# Patient Record
Sex: FEMALE | Race: WHITE | NOT HISPANIC OR LATINO | Employment: OTHER | URBAN - METROPOLITAN AREA
[De-identification: names, ages, dates, MRNs, and addresses within clinical notes are randomized per-mention and may not be internally consistent; named-entity substitution may affect disease eponyms.]

---

## 2017-04-28 ENCOUNTER — TELEPHONE (OUTPATIENT)
Dept: CARDIOLOGY | Facility: CLINIC | Age: 82
End: 2017-04-28

## 2017-04-28 NOTE — TELEPHONE ENCOUNTER
----- Message from Kirsten Celestin sent at 4/28/2017  9:17 AM CDT -----  Contact: self, [phone]  Patient called in requesting to speak with you regarding her medications information requested.  Please advise.

## 2017-05-11 ENCOUNTER — OFFICE VISIT (OUTPATIENT)
Dept: CARDIOLOGY | Facility: CLINIC | Age: 82
End: 2017-05-11
Payer: MEDICARE

## 2017-05-11 VITALS
HEIGHT: 65 IN | HEART RATE: 63 BPM | BODY MASS INDEX: 25.83 KG/M2 | WEIGHT: 155 LBS | SYSTOLIC BLOOD PRESSURE: 178 MMHG | DIASTOLIC BLOOD PRESSURE: 70 MMHG

## 2017-05-11 DIAGNOSIS — I51.89 DIASTOLIC DYSFUNCTION: ICD-10-CM

## 2017-05-11 DIAGNOSIS — E03.9 HYPOTHYROIDISM, UNSPECIFIED TYPE: ICD-10-CM

## 2017-05-11 DIAGNOSIS — I10 ESSENTIAL HYPERTENSION: ICD-10-CM

## 2017-05-11 DIAGNOSIS — I51.81 TAKOTSUBO CARDIOMYOPATHY: ICD-10-CM

## 2017-05-11 DIAGNOSIS — G45.9 TRANSIENT CEREBRAL ISCHEMIA, UNSPECIFIED TYPE: ICD-10-CM

## 2017-05-11 DIAGNOSIS — I77.9 RIGHT-SIDED CAROTID ARTERY DISEASE: ICD-10-CM

## 2017-05-11 DIAGNOSIS — I65.8: ICD-10-CM

## 2017-05-11 DIAGNOSIS — R06.02 SOB (SHORTNESS OF BREATH): Primary | ICD-10-CM

## 2017-05-11 PROCEDURE — 3078F DIAST BP <80 MM HG: CPT | Mod: S$GLB,,, | Performed by: INTERNAL MEDICINE

## 2017-05-11 PROCEDURE — 1126F AMNT PAIN NOTED NONE PRSNT: CPT | Mod: S$GLB,,, | Performed by: INTERNAL MEDICINE

## 2017-05-11 PROCEDURE — 3077F SYST BP >= 140 MM HG: CPT | Mod: S$GLB,,, | Performed by: INTERNAL MEDICINE

## 2017-05-11 PROCEDURE — 99999 PR PBB SHADOW E&M-EST. PATIENT-LVL III: CPT | Mod: PBBFAC,,, | Performed by: INTERNAL MEDICINE

## 2017-05-11 PROCEDURE — 99203 OFFICE O/P NEW LOW 30 MIN: CPT | Mod: S$GLB,,, | Performed by: INTERNAL MEDICINE

## 2017-05-11 PROCEDURE — 1159F MED LIST DOCD IN RCRD: CPT | Mod: S$GLB,,, | Performed by: INTERNAL MEDICINE

## 2017-05-11 PROCEDURE — 1160F RVW MEDS BY RX/DR IN RCRD: CPT | Mod: S$GLB,,, | Performed by: INTERNAL MEDICINE

## 2017-05-11 RX ORDER — MONTELUKAST SODIUM 10 MG/1
TABLET ORAL
COMMUNITY
Start: 2017-05-10 | End: 2018-05-10

## 2017-05-11 RX ORDER — FLUTICASONE PROPIONATE AND SALMETEROL 50; 250 UG/1; UG/1
POWDER RESPIRATORY (INHALATION)
COMMUNITY
Start: 2017-05-10 | End: 2018-09-06

## 2017-05-11 RX ORDER — DESLORATADINE 5 MG/1
TABLET ORAL
COMMUNITY
Start: 2017-05-10 | End: 2018-05-10

## 2017-05-11 RX ORDER — ALBUTEROL SULFATE 90 UG/1
AEROSOL, METERED RESPIRATORY (INHALATION)
COMMUNITY
Start: 2017-05-10 | End: 2018-05-10

## 2017-05-12 NOTE — PROGRESS NOTES
Subjective:    Patient ID:  Dorothy M Knobloch is a 84 y.o. female who presents for evaluation of Shortness of Breath      HPI  85 y/o female with a hx of Takotsubo Cardiomyopathy 3/2013, HTN, moderate right carotid disease, TIA 12/2013 who presents for evaluation.  She had a dental procedure last summer after which she had fever and SOB and presented to ED. Had 2DE without evidence of vegetation, did not receive Abx and symptoms resolved. For the past several months she had progressive and worsening SOB. Had 2DE with vegetation on MV. No evidence of active infection. Her SOB is with activity or with stress. Currently being evaluated by Pulmonary and Cardiology at . Denies CP, palpitations, orthopnea, PND, or orthopnea. Takes care of her  who is going blind and under a lot of stress.     Review of Systems   Constitution: Negative for weakness and malaise/fatigue.   HENT: Negative for congestion and headaches.    Eyes: Negative for blurred vision.   Cardiovascular: Positive for dyspnea on exertion. Negative for chest pain, claudication, cyanosis, irregular heartbeat, leg swelling, near-syncope, orthopnea, palpitations, paroxysmal nocturnal dyspnea and syncope.   Respiratory: Positive for shortness of breath.    Endocrine: Negative for polyuria.   Hematologic/Lymphatic: Negative for bleeding problem.   Skin: Negative for itching and rash.   Musculoskeletal: Negative for joint swelling, muscle cramps and muscle weakness.   Gastrointestinal: Negative for abdominal pain, hematemesis, hematochezia, melena, nausea and vomiting.   Genitourinary: Negative for dysuria and hematuria.   Neurological: Negative for dizziness, focal weakness, light-headedness and loss of balance.   Psychiatric/Behavioral: Negative for depression. The patient is nervous/anxious.         Objective:    Physical Exam   Constitutional: She is oriented to person, place, and time. She appears well-developed and well-nourished.   HENT:   Head:  "Normocephalic and atraumatic.   Neck: Neck supple. No JVD present.   Cardiovascular: Normal rate, regular rhythm and normal heart sounds.    Pulses:       Carotid pulses are 2+ on the right side, and 2+ on the left side.       Radial pulses are 2+ on the right side, and 2+ on the left side.        Femoral pulses are 2+ on the right side, and 2+ on the left side.       Dorsalis pedis pulses are 2+ on the right side, and 2+ on the left side.        Posterior tibial pulses are 2+ on the right side, and 2+ on the left side.   Pulmonary/Chest: Effort normal and breath sounds normal.   Abdominal: Soft. Bowel sounds are normal.   Musculoskeletal: She exhibits no edema.   Neurological: She is alert and oriented to person, place, and time.   Skin: Skin is warm and dry.   Psychiatric: She has a normal mood and affect. Her behavior is normal. Thought content normal.         Assessment:       1. SOB (shortness of breath)    2. 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 Transient cerebral ischemia, unspecified type    4. Right-sided carotid artery disease    5. Essential hypertension    6. Takotsubo cardiomyopathy    7. Hypothyroidism, unspecified type    8. Diastolic dysfunction      83 y/o female with hx and presentation as above. SOB is with exertion and difficult to determine etiology. Has CT in our system which shows multiple nodules, but pt states repeat CT at another facility did not show modules and had some "lung tests" done. Need records. KELSEY this week to evaluate vegetation and will also need records.        Plan:       -Continue current medical management for now  -Obtain records from Pulmonologist and Cardiologist at             "

## 2017-12-01 ENCOUNTER — OFFICE VISIT (OUTPATIENT)
Dept: CARDIOLOGY | Facility: CLINIC | Age: 82
End: 2017-12-01
Payer: MEDICARE

## 2017-12-01 VITALS
DIASTOLIC BLOOD PRESSURE: 70 MMHG | HEART RATE: 70 BPM | BODY MASS INDEX: 26.33 KG/M2 | HEIGHT: 65 IN | SYSTOLIC BLOOD PRESSURE: 148 MMHG | WEIGHT: 158 LBS | OXYGEN SATURATION: 97 %

## 2017-12-01 DIAGNOSIS — I65.8 OCCLUSION AND STENOSIS OF MULTIPLE AND BILATERAL PRECEREBRAL ARTERIES: ICD-10-CM

## 2017-12-01 DIAGNOSIS — I51.81 TAKOTSUBO CARDIOMYOPATHY: ICD-10-CM

## 2017-12-01 DIAGNOSIS — I77.9 RIGHT-SIDED CAROTID ARTERY DISEASE: ICD-10-CM

## 2017-12-01 DIAGNOSIS — E03.9 HYPOTHYROIDISM, UNSPECIFIED TYPE: ICD-10-CM

## 2017-12-01 DIAGNOSIS — I10 ESSENTIAL HYPERTENSION: Primary | ICD-10-CM

## 2017-12-01 DIAGNOSIS — I51.89 DIASTOLIC DYSFUNCTION: ICD-10-CM

## 2017-12-01 DIAGNOSIS — I24.9 ACUTE CORONARY SYNDROME: ICD-10-CM

## 2017-12-01 DIAGNOSIS — G45.9 TRANSIENT CEREBRAL ISCHEMIA, UNSPECIFIED TYPE: ICD-10-CM

## 2017-12-01 PROCEDURE — 99999 PR PBB SHADOW E&M-EST. PATIENT-LVL III: CPT | Mod: PBBFAC,,, | Performed by: INTERNAL MEDICINE

## 2017-12-01 PROCEDURE — 99214 OFFICE O/P EST MOD 30 MIN: CPT | Mod: S$GLB,,, | Performed by: INTERNAL MEDICINE

## 2017-12-01 RX ORDER — AMLODIPINE BESYLATE 5 MG/1
TABLET ORAL
COMMUNITY
Start: 2017-11-28 | End: 2017-12-01 | Stop reason: SDUPTHER

## 2017-12-01 RX ORDER — METOPROLOL SUCCINATE 25 MG/1
25 TABLET, EXTENDED RELEASE ORAL DAILY
COMMUNITY
Start: 2017-11-28 | End: 2018-06-26 | Stop reason: SDUPTHER

## 2017-12-01 RX ORDER — ASPIRIN 325 MG
50000 TABLET, DELAYED RELEASE (ENTERIC COATED) ORAL
Refills: 5 | COMMUNITY
Start: 2017-09-14

## 2017-12-01 RX ORDER — VALSARTAN AND HYDROCHLOROTHIAZIDE 320; 12.5 MG/1; MG/1
1 TABLET, FILM COATED ORAL DAILY
Refills: 6 | COMMUNITY
Start: 2017-11-20 | End: 2018-04-12

## 2017-12-01 RX ORDER — AMLODIPINE BESYLATE 10 MG/1
10 TABLET ORAL DAILY
Qty: 30 TABLET | Refills: 11 | Status: SHIPPED | OUTPATIENT
Start: 2017-12-01 | End: 2018-12-04

## 2017-12-01 RX ORDER — DOXAZOSIN 1 MG/1
1 TABLET ORAL NIGHTLY
Qty: 30 TABLET | Refills: 11 | Status: SHIPPED | OUTPATIENT
Start: 2017-12-01 | End: 2018-03-13

## 2017-12-01 NOTE — PATIENT INSTRUCTIONS
Increase amlodipine to 2 tablets (10 mg) once daily and when the prescription is finished,  new 10 mg tablet prescription    Start Doxazosin 1 mg every night

## 2017-12-11 ENCOUNTER — OFFICE VISIT (OUTPATIENT)
Dept: CARDIOLOGY | Facility: CLINIC | Age: 82
End: 2017-12-11
Payer: MEDICARE

## 2017-12-11 VITALS
DIASTOLIC BLOOD PRESSURE: 66 MMHG | HEART RATE: 73 BPM | SYSTOLIC BLOOD PRESSURE: 132 MMHG | BODY MASS INDEX: 25.66 KG/M2 | HEIGHT: 65 IN | OXYGEN SATURATION: 97 % | WEIGHT: 154 LBS

## 2017-12-11 DIAGNOSIS — E03.9 HYPOTHYROIDISM, UNSPECIFIED TYPE: ICD-10-CM

## 2017-12-11 DIAGNOSIS — I65.8 OCCLUSION AND STENOSIS OF MULTIPLE AND BILATERAL PRECEREBRAL ARTERIES: ICD-10-CM

## 2017-12-11 DIAGNOSIS — I24.9 ACUTE CORONARY SYNDROME: ICD-10-CM

## 2017-12-11 DIAGNOSIS — G45.9 TRANSIENT CEREBRAL ISCHEMIA, UNSPECIFIED TYPE: ICD-10-CM

## 2017-12-11 DIAGNOSIS — E78.5 HYPERLIPIDEMIA, UNSPECIFIED HYPERLIPIDEMIA TYPE: ICD-10-CM

## 2017-12-11 DIAGNOSIS — I10 ESSENTIAL HYPERTENSION: Primary | ICD-10-CM

## 2017-12-11 DIAGNOSIS — I51.81 TAKOTSUBO CARDIOMYOPATHY: ICD-10-CM

## 2017-12-11 DIAGNOSIS — I77.9 RIGHT-SIDED CAROTID ARTERY DISEASE: ICD-10-CM

## 2017-12-11 DIAGNOSIS — I51.89 DIASTOLIC DYSFUNCTION: ICD-10-CM

## 2017-12-11 PROCEDURE — 99214 OFFICE O/P EST MOD 30 MIN: CPT | Mod: S$GLB,,, | Performed by: INTERNAL MEDICINE

## 2017-12-11 PROCEDURE — 99999 PR PBB SHADOW E&M-EST. PATIENT-LVL III: CPT | Mod: PBBFAC,,, | Performed by: INTERNAL MEDICINE

## 2017-12-27 ENCOUNTER — TELEPHONE (OUTPATIENT)
Dept: CARDIOLOGY | Facility: CLINIC | Age: 82
End: 2017-12-27

## 2017-12-27 NOTE — TELEPHONE ENCOUNTER
----- Message from Zina Coles sent at 12/27/2017 10:29 AM CST -----  Contact: [phone]/self  Patient is requesting a call back regarding records being sent from another hospital. Please advise.

## 2018-01-04 ENCOUNTER — TELEPHONE (OUTPATIENT)
Dept: CARDIOLOGY | Facility: CLINIC | Age: 83
End: 2018-01-04

## 2018-01-04 NOTE — TELEPHONE ENCOUNTER
----- Message from Patience Jenkins sent at 1/4/2018 11:35 AM CST -----  Contact: self/[phone]  She is calling for the result of the testing she had done at .

## 2018-01-08 ENCOUNTER — TELEPHONE (OUTPATIENT)
Dept: CARDIOLOGY | Facility: CLINIC | Age: 83
End: 2018-01-08

## 2018-01-08 NOTE — TELEPHONE ENCOUNTER
----- Message from Tatyana Dupree sent at 1/8/2018  4:35 PM CST -----  Contact: Self [phone]  Patient is calling to talk to nurse she would like a call back from the doctor. Please advice

## 2018-01-16 ENCOUNTER — TELEPHONE (OUTPATIENT)
Dept: CARDIOLOGY | Facility: CLINIC | Age: 83
End: 2018-01-16

## 2018-01-16 ENCOUNTER — OFFICE VISIT (OUTPATIENT)
Dept: CARDIOLOGY | Facility: CLINIC | Age: 83
End: 2018-01-16
Payer: MEDICARE

## 2018-01-16 VITALS
OXYGEN SATURATION: 98 % | SYSTOLIC BLOOD PRESSURE: 126 MMHG | DIASTOLIC BLOOD PRESSURE: 65 MMHG | BODY MASS INDEX: 25.49 KG/M2 | HEART RATE: 64 BPM | WEIGHT: 153 LBS | HEIGHT: 65 IN

## 2018-01-16 DIAGNOSIS — I77.9 RIGHT-SIDED CAROTID ARTERY DISEASE: ICD-10-CM

## 2018-01-16 DIAGNOSIS — I51.89 DIASTOLIC DYSFUNCTION: ICD-10-CM

## 2018-01-16 DIAGNOSIS — I10 ESSENTIAL HYPERTENSION: ICD-10-CM

## 2018-01-16 DIAGNOSIS — I51.81 TAKOTSUBO CARDIOMYOPATHY: ICD-10-CM

## 2018-01-16 DIAGNOSIS — G45.9 TRANSIENT CEREBRAL ISCHEMIA, UNSPECIFIED TYPE: ICD-10-CM

## 2018-01-16 DIAGNOSIS — I24.9 ACUTE CORONARY SYNDROME: ICD-10-CM

## 2018-01-16 DIAGNOSIS — R06.09 DOE (DYSPNEA ON EXERTION): Primary | ICD-10-CM

## 2018-01-16 DIAGNOSIS — E03.9 HYPOTHYROIDISM, UNSPECIFIED TYPE: ICD-10-CM

## 2018-01-16 DIAGNOSIS — E78.5 HYPERLIPIDEMIA, UNSPECIFIED HYPERLIPIDEMIA TYPE: ICD-10-CM

## 2018-01-16 PROCEDURE — 99214 OFFICE O/P EST MOD 30 MIN: CPT | Mod: S$GLB,,, | Performed by: INTERNAL MEDICINE

## 2018-01-16 PROCEDURE — 99999 PR PBB SHADOW E&M-EST. PATIENT-LVL III: CPT | Mod: PBBFAC,,, | Performed by: INTERNAL MEDICINE

## 2018-01-16 NOTE — PROGRESS NOTES
Subjective:    Patient ID:  Dorothy M Knobloch is a 85 y.o. female who presents for follow-up of Hypertension and Shortness of Breath      HPI  84 y/o female with a hx of SSS s/p PPM, Takotsubo Cardiomyopathy 3/2013, HTN, moderate right carotid disease, TIA 12/2013, HLD, hypothyroidism, 2DE with vegetation on the MV (dental procedure?) who presents for f/u.  Last clinic visit was seen for symptomatic HTN and I started her on doxyzosin 1 mg nightly and SBP has significantly improved. BP cuff available and readings 120's. Her symptoms have also improved and she is feeling better. Tolerating regimen well. Continues to have SMITH, however, has not worsened and she is able to accomplish her ADL's. Before SMITH was regular and with moderate activity. SMITH is now intermittent and she was able to walk here with cane from parking area without significant SMITH. Denies CP, orthopnea, PND, syncope. Compliant with meds. Has bilateral LE edema which has not worsened, and has been using compression stockings with improvement. She is frustrated with the SMITH, however, it has improved since improvement in BP. Has chest CT soon and appt with Pulmonologist in March. [facility] section record reviewed and has 2DE from 9/2017 with normal EF 65%, grade I diastolic dysfunction and mild MS.    Review of Systems   Constitution: Positive for malaise/fatigue. Negative for weakness.   HENT: Negative for congestion.    Eyes: Negative for blurred vision.   Cardiovascular: Positive for dyspnea on exertion and leg swelling. Negative for chest pain, claudication, cyanosis, irregular heartbeat, near-syncope, orthopnea, palpitations, paroxysmal nocturnal dyspnea and syncope.   Respiratory: Positive for shortness of breath.    Endocrine: Negative for polyuria.   Hematologic/Lymphatic: Negative for bleeding problem.   Skin: Negative for itching and rash.   Musculoskeletal: Negative for joint swelling, muscle cramps and muscle weakness.   Gastrointestinal:  Negative for abdominal pain, hematemesis, hematochezia, melena, nausea and vomiting.   Genitourinary: Negative for dysuria and hematuria.   Neurological: Negative for dizziness, focal weakness, headaches, light-headedness and loss of balance.   Psychiatric/Behavioral: Negative for depression. The patient is not nervous/anxious.         Objective:    Physical Exam   Constitutional: She is oriented to person, place, and time. She appears well-developed and well-nourished.   HENT:   Head: Normocephalic and atraumatic.   Neck: Neck supple. No JVD present.   Cardiovascular: Normal rate and regular rhythm.    Murmur heard.   Systolic murmur is present with a grade of 2/6   Pulses:       Carotid pulses are 2+ on the right side, and 2+ on the left side.       Radial pulses are 2+ on the right side, and 2+ on the left side.        Femoral pulses are 2+ on the right side, and 2+ on the left side.       Dorsalis pedis pulses are 2+ on the right side, and 2+ on the left side.        Posterior tibial pulses are 2+ on the right side, and 2+ on the left side.   Pulmonary/Chest: Effort normal and breath sounds normal.   Abdominal: Soft. Bowel sounds are normal.   Musculoskeletal: She exhibits no edema.   Neurological: She is alert and oriented to person, place, and time.   Skin: Skin is warm and dry.   Psychiatric: She has a normal mood and affect. Her behavior is normal. Thought content normal.         Assessment:       1. SMITH (dyspnea on exertion)    2. Essential hypertension    3. Diastolic dysfunction    4. Acute coronary syndrome    5. Right-sided carotid artery disease    6. Hyperlipidemia, unspecified hyperlipidemia type    7. Takotsubo cardiomyopathy    8. Transient cerebral ischemia, unspecified type    9. Hypothyroidism, unspecified type      84 y/o female with hx and presentation as above. Improved from a SOB perspective, but still pretty significantly SOB. I have optimized her from a cardiac standpoint and will await  pulmonary w/u. Will repeat 2DE in about 1 month and have her f/u with me.        Plan:       -2DE in 1 month  -f/u shortly after

## 2018-01-28 ENCOUNTER — NURSE TRIAGE (OUTPATIENT)
Dept: ADMINISTRATIVE | Facility: CLINIC | Age: 83
End: 2018-01-28

## 2018-01-28 ENCOUNTER — HOSPITAL ENCOUNTER (OUTPATIENT)
Facility: HOSPITAL | Age: 83
Discharge: HOME | End: 2018-01-29
Attending: EMERGENCY MEDICINE | Admitting: PSYCHIATRY & NEUROLOGY
Payer: MEDICARE

## 2018-01-28 DIAGNOSIS — D32.9 MENINGIOMA: ICD-10-CM

## 2018-01-28 DIAGNOSIS — H34.12 CRAO (CENTRAL RETINAL ARTERY OCCLUSION), LEFT: Primary | ICD-10-CM

## 2018-01-28 LAB
ALBUMIN SERPL BCP-MCNC: 3.6 G/DL
ALP SERPL-CCNC: 80 U/L
ALT SERPL W/O P-5'-P-CCNC: 8 U/L
ANION GAP SERPL CALC-SCNC: 10 MMOL/L
APTT BLDCRRT: 25.1 SEC
AST SERPL-CCNC: 15 U/L
BACTERIA #/AREA URNS AUTO: NORMAL /HPF
BASOPHILS # BLD AUTO: 0.05 K/UL
BASOPHILS NFR BLD: 0.7 %
BILIRUB SERPL-MCNC: 0.3 MG/DL
BILIRUB UR QL STRIP: NEGATIVE
BUN SERPL-MCNC: 29 MG/DL
CALCIUM SERPL-MCNC: 9.8 MG/DL
CHLORIDE SERPL-SCNC: 103 MMOL/L
CHOLEST SERPL-MCNC: 277 MG/DL
CHOLEST/HDLC SERPL: 4.1 {RATIO}
CLARITY UR REFRACT.AUTO: CLEAR
CO2 SERPL-SCNC: 24 MMOL/L
COLOR UR AUTO: ABNORMAL
CREAT SERPL-MCNC: 0.8 MG/DL
CRP SERPL-MCNC: 2.8 MG/L
DIFFERENTIAL METHOD: ABNORMAL
EOSINOPHIL # BLD AUTO: 0.1 K/UL
EOSINOPHIL NFR BLD: 1.4 %
ERYTHROCYTE [DISTWIDTH] IN BLOOD BY AUTOMATED COUNT: 12.9 %
ERYTHROCYTE [SEDIMENTATION RATE] IN BLOOD BY WESTERGREN METHOD: 22 MM/HR
EST. GFR  (AFRICAN AMERICAN): >60 ML/MIN/1.73 M^2
EST. GFR  (NON AFRICAN AMERICAN): >60 ML/MIN/1.73 M^2
ESTIMATED AVG GLUCOSE: 103 MG/DL
GLUCOSE SERPL-MCNC: 107 MG/DL
GLUCOSE UR QL STRIP: ABNORMAL
HBA1C MFR BLD HPLC: 5.2 %
HCT VFR BLD AUTO: 36.1 %
HDLC SERPL-MCNC: 68 MG/DL
HDLC SERPL: 24.5 %
HGB BLD-MCNC: 12.3 G/DL
HGB UR QL STRIP: NEGATIVE
IMM GRANULOCYTES # BLD AUTO: 0.04 K/UL
IMM GRANULOCYTES NFR BLD AUTO: 0.6 %
INR PPP: 0.9
KETONES UR QL STRIP: NEGATIVE
LDLC SERPL CALC-MCNC: 180.6 MG/DL
LEUKOCYTE ESTERASE UR QL STRIP: NEGATIVE
LYMPHOCYTES # BLD AUTO: 1.5 K/UL
LYMPHOCYTES NFR BLD: 22.1 %
MCH RBC QN AUTO: 30.4 PG
MCHC RBC AUTO-ENTMCNC: 34.1 G/DL
MCV RBC AUTO: 89 FL
MICROSCOPIC COMMENT: NORMAL
MONOCYTES # BLD AUTO: 0.4 K/UL
MONOCYTES NFR BLD: 6 %
NEUTROPHILS # BLD AUTO: 4.8 K/UL
NEUTROPHILS NFR BLD: 69.2 %
NITRITE UR QL STRIP: NEGATIVE
NONHDLC SERPL-MCNC: 209 MG/DL
NRBC BLD-RTO: 0 /100 WBC
PH UR STRIP: 7 [PH] (ref 5–8)
PLATELET # BLD AUTO: 235 K/UL
PMV BLD AUTO: 9.1 FL
POCT GLUCOSE: 107 MG/DL (ref 70–110)
POTASSIUM SERPL-SCNC: 4.1 MMOL/L
PROT SERPL-MCNC: 7.2 G/DL
PROT UR QL STRIP: NEGATIVE
PROTHROMBIN TIME: 10 SEC
RBC # BLD AUTO: 4.05 M/UL
RBC #/AREA URNS AUTO: 2 /HPF (ref 0–4)
SODIUM SERPL-SCNC: 137 MMOL/L
SP GR UR STRIP: 1.01 (ref 1–1.03)
T4 FREE SERPL-MCNC: 1.4 NG/DL
TRIGL SERPL-MCNC: 142 MG/DL
TSH SERPL DL<=0.005 MIU/L-ACNC: 0.17 UIU/ML
URN SPEC COLLECT METH UR: ABNORMAL
UROBILINOGEN UR STRIP-ACNC: NEGATIVE EU/DL
WBC # BLD AUTO: 6.97 K/UL
WBC #/AREA URNS AUTO: 0 /HPF (ref 0–5)
YEAST UR QL AUTO: NORMAL

## 2018-01-28 PROCEDURE — 99283 EMERGENCY DEPT VISIT LOW MDM: CPT | Mod: GC,,, | Performed by: OPHTHALMOLOGY

## 2018-01-28 PROCEDURE — 85651 RBC SED RATE NONAUTOMATED: CPT

## 2018-01-28 PROCEDURE — 84439 ASSAY OF FREE THYROXINE: CPT

## 2018-01-28 PROCEDURE — 25000003 PHARM REV CODE 250: Performed by: PSYCHIATRY & NEUROLOGY

## 2018-01-28 PROCEDURE — 85610 PROTHROMBIN TIME: CPT

## 2018-01-28 PROCEDURE — 99220 PR INITIAL OBSERVATION CARE,LEVL III: CPT | Mod: ICN,,, | Performed by: PSYCHIATRY & NEUROLOGY

## 2018-01-28 PROCEDURE — G0378 HOSPITAL OBSERVATION PER HR: HCPCS

## 2018-01-28 PROCEDURE — A4216 STERILE WATER/SALINE, 10 ML: HCPCS | Performed by: PSYCHIATRY & NEUROLOGY

## 2018-01-28 PROCEDURE — 81001 URINALYSIS AUTO W/SCOPE: CPT

## 2018-01-28 PROCEDURE — 93010 ELECTROCARDIOGRAM REPORT: CPT | Mod: ,,, | Performed by: INTERNAL MEDICINE

## 2018-01-28 PROCEDURE — 63600175 PHARM REV CODE 636 W HCPCS: Performed by: PSYCHIATRY & NEUROLOGY

## 2018-01-28 PROCEDURE — 25500020 PHARM REV CODE 255: Performed by: EMERGENCY MEDICINE

## 2018-01-28 PROCEDURE — 82962 GLUCOSE BLOOD TEST: CPT

## 2018-01-28 PROCEDURE — 99285 EMERGENCY DEPT VISIT HI MDM: CPT | Mod: ,,, | Performed by: EMERGENCY MEDICINE

## 2018-01-28 PROCEDURE — 80061 LIPID PANEL: CPT

## 2018-01-28 PROCEDURE — 84443 ASSAY THYROID STIM HORMONE: CPT

## 2018-01-28 PROCEDURE — 80053 COMPREHEN METABOLIC PANEL: CPT

## 2018-01-28 PROCEDURE — 99285 EMERGENCY DEPT VISIT HI MDM: CPT | Mod: 25

## 2018-01-28 PROCEDURE — 85730 THROMBOPLASTIN TIME PARTIAL: CPT

## 2018-01-28 PROCEDURE — 93005 ELECTROCARDIOGRAM TRACING: CPT

## 2018-01-28 PROCEDURE — 85025 COMPLETE CBC W/AUTO DIFF WBC: CPT

## 2018-01-28 PROCEDURE — 86140 C-REACTIVE PROTEIN: CPT

## 2018-01-28 PROCEDURE — 83036 HEMOGLOBIN GLYCOSYLATED A1C: CPT

## 2018-01-28 RX ORDER — VALSARTAN 160 MG/1
320 TABLET ORAL DAILY
Status: DISCONTINUED | OUTPATIENT
Start: 2018-01-29 | End: 2018-01-29 | Stop reason: HOSPADM

## 2018-01-28 RX ORDER — VALSARTAN AND HYDROCHLOROTHIAZIDE 320; 12.5 MG/1; MG/1
1 TABLET, FILM COATED ORAL DAILY
Status: DISCONTINUED | OUTPATIENT
Start: 2018-01-29 | End: 2018-01-28 | Stop reason: SDUPTHER

## 2018-01-28 RX ORDER — ATORVASTATIN CALCIUM 20 MG/1
40 TABLET, FILM COATED ORAL DAILY
Status: DISCONTINUED | OUTPATIENT
Start: 2018-01-29 | End: 2018-01-29 | Stop reason: HOSPADM

## 2018-01-28 RX ORDER — HYDROCHLOROTHIAZIDE 12.5 MG/1
12.5 TABLET ORAL DAILY
Status: DISCONTINUED | OUTPATIENT
Start: 2018-01-29 | End: 2018-01-29 | Stop reason: HOSPADM

## 2018-01-28 RX ORDER — METOPROLOL TARTRATE 25 MG/1
25 TABLET, FILM COATED ORAL 2 TIMES DAILY
Status: DISCONTINUED | OUTPATIENT
Start: 2018-01-28 | End: 2018-01-29 | Stop reason: HOSPADM

## 2018-01-28 RX ORDER — DOXAZOSIN 1 MG/1
1 TABLET ORAL NIGHTLY
Status: DISCONTINUED | OUTPATIENT
Start: 2018-01-28 | End: 2018-01-29 | Stop reason: HOSPADM

## 2018-01-28 RX ORDER — AMLODIPINE BESYLATE 10 MG/1
10 TABLET ORAL DAILY
Status: DISCONTINUED | OUTPATIENT
Start: 2018-01-29 | End: 2018-01-29 | Stop reason: HOSPADM

## 2018-01-28 RX ORDER — ASPIRIN 325 MG
325 TABLET, DELAYED RELEASE (ENTERIC COATED) ORAL DAILY
Status: DISCONTINUED | OUTPATIENT
Start: 2018-01-29 | End: 2018-01-29 | Stop reason: HOSPADM

## 2018-01-28 RX ORDER — SODIUM CHLORIDE 0.9 % (FLUSH) 0.9 %
3 SYRINGE (ML) INJECTION EVERY 8 HOURS
Status: DISCONTINUED | OUTPATIENT
Start: 2018-01-28 | End: 2018-01-29 | Stop reason: HOSPADM

## 2018-01-28 RX ORDER — CLOPIDOGREL BISULFATE 75 MG/1
75 TABLET ORAL DAILY
Status: DISCONTINUED | OUTPATIENT
Start: 2018-01-29 | End: 2018-01-29 | Stop reason: HOSPADM

## 2018-01-28 RX ORDER — LABETALOL HYDROCHLORIDE 5 MG/ML
10 INJECTION, SOLUTION INTRAVENOUS
Status: DISCONTINUED | OUTPATIENT
Start: 2018-01-28 | End: 2018-01-29 | Stop reason: HOSPADM

## 2018-01-28 RX ORDER — LEVOTHYROXINE SODIUM 88 UG/1
88 TABLET ORAL
Status: DISCONTINUED | OUTPATIENT
Start: 2018-01-29 | End: 2018-01-29 | Stop reason: HOSPADM

## 2018-01-28 RX ORDER — HEPARIN SODIUM 5000 [USP'U]/ML
5000 INJECTION, SOLUTION INTRAVENOUS; SUBCUTANEOUS EVERY 8 HOURS
Status: DISCONTINUED | OUTPATIENT
Start: 2018-01-28 | End: 2018-01-29 | Stop reason: HOSPADM

## 2018-01-28 RX ADMIN — METOPROLOL TARTRATE 25 MG: 25 TABLET ORAL at 09:01

## 2018-01-28 RX ADMIN — IOHEXOL 75 ML: 350 INJECTION, SOLUTION INTRAVENOUS at 05:01

## 2018-01-28 RX ADMIN — DOXAZOSIN 1 MG: 1 TABLET ORAL at 09:01

## 2018-01-28 RX ADMIN — Medication 3 ML: at 09:01

## 2018-01-28 NOTE — ED PROVIDER NOTES
"Encounter Date: 1/28/2018    SCRIBE #1 NOTE: I, Ann Marie Huynh, am scribing for, and in the presence of,  Dr. Ray. I have scribed the following portions of the note - the Resident attestation.       History     Chief Complaint   Patient presents with    Eye Problem     states bright lights in left eye last night, now describes as 1/8 pie shape, no other neuro deficits.       84 yo F w/ hx of HTN, CHF, sick sinus s/p pacemaker placement, prior CVA/TIA, takotsubo cardiomyopathy 2013, right carotid disease, HLD, presenting to ED w/ new onset vision changes.  Patient states that last night she was having some flashing lights in her visual field from her left eye "like everything was shiny" and went to bed around 12:30am with these symptoms, states that when she woke up at 4:45am her visual field was clear because she could read the clock fine, went to the bathroom and when returning she states that her visual field was wedge shaped  - has continued to only have a small wedge shaped visual field intact in her left eye, no visual changes to right, no pain to her eye. Denies recent trauma, falls, pain, chest pain, shortness of breath, back pain, or weakness/numbness/tingling.       The history is provided by the patient and medical records.     Review of patient's allergies indicates:   Allergen Reactions    Aspartame Swelling     equal     Past Medical History:   Diagnosis Date    Anticoagulant long-term use     Arthritis     Bilateral nonexudative age-related macular degeneration 8/27/2013    Blood transfusion     Cataract     CHF (congestive heart failure)     Enlarged heart     High cholesterol     Hypertension     Sick sinus syndrome     Stroke     Thyroid disease      Past Surgical History:   Procedure Laterality Date    CARDIAC PACEMAKER PLACEMENT      CARPAL TUNNEL RELEASE Bilateral 1991    CATARACT EXTRACTION Right     JOINT REPLACEMENT      DC TRANSCRAN DOPP INTRACRAN, EMBOLI W/O INJ  " 1/29/2018         SKIN BIOPSY      TONSILLECTOMY      TOTAL HIP ARTHROPLASTY  11-1-2000    right/[facility]     Family History   Problem Relation Age of Onset    Early death Mother     Early death Father     Arthritis Sister     Hearing loss Daughter     Birth defects Son     Hypertension Son     Diabetes Paternal Aunt     Cancer Paternal Aunt 80     colon  cancer    Diabetes Paternal Uncle     Arthritis Maternal Grandmother     Arthritis Paternal Grandmother     Hypertension Paternal Grandmother     Amblyopia Neg Hx     Blindness Neg Hx     Cataracts Neg Hx     Glaucoma Neg Hx     Macular degeneration Neg Hx     Retinal detachment Neg Hx     Strabismus Neg Hx     Stroke Neg Hx     Thyroid disease Neg Hx      Social History   Substance Use Topics    Smoking status: Never Smoker    Smokeless tobacco: Never Used    Alcohol use No     Review of Systems   Constitutional: Negative for chills, diaphoresis and fever.   HENT: Negative for congestion, sneezing and sore throat.    Eyes: Positive for visual disturbance. Negative for photophobia, pain, discharge and redness.   Respiratory: Negative for cough, shortness of breath and wheezing.    Cardiovascular: Negative for chest pain, palpitations and leg swelling.   Gastrointestinal: Negative for abdominal distention, abdominal pain, constipation, diarrhea, nausea and vomiting.   Genitourinary: Negative for dysuria and flank pain.   Musculoskeletal: Negative for back pain, neck pain and neck stiffness.   Skin: Negative for rash and wound.   Neurological: Negative for dizziness, tremors, seizures, syncope, facial asymmetry, speech difficulty, weakness, light-headedness, numbness and headaches.   Psychiatric/Behavioral: Negative for agitation and confusion.       Physical Exam     Initial Vitals [01/28/18 1214]   BP Pulse Resp Temp SpO2   (!) 146/63 68 18 98.1 °F (36.7 °C) 100 %      MAP       90.67         Physical Exam    Nursing note and  vitals reviewed.  Constitutional: She appears well-developed and well-nourished. She is not diaphoretic. She is cooperative.  Non-toxic appearance. She does not have a sickly appearance. She does not appear ill. No distress.   HENT:   Head: Normocephalic and atraumatic.   Mouth/Throat: Oropharynx is clear and moist. No oropharyngeal exudate.   Eyes: Conjunctivae and EOM are normal. No scleral icterus.   Neck: Normal range of motion.   Cardiovascular: Normal rate, regular rhythm, normal heart sounds and intact distal pulses. Exam reveals no gallop and no friction rub.    No murmur heard.  Pulmonary/Chest: Breath sounds normal. No accessory muscle usage or stridor. No respiratory distress. She has no wheezes. She has no rhonchi.   Abdominal: Soft. She exhibits no distension. There is no tenderness.   Musculoskeletal: Normal range of motion. She exhibits no edema or tenderness.   Neurological: She is alert and oriented to person, place, and time. She has normal strength. She displays no tremor. No sensory deficit. She exhibits normal muscle tone. She displays no seizure activity. GCS eye subscore is 4. GCS verbal subscore is 5. GCS motor subscore is 6.   Patient has wedge shaped visual field from left eye, right intact, unable to detect motion or light outside of this wedge shaped visual field in left eye    Skin: Skin is warm and dry. No erythema.         ED Course   Procedures  Labs Reviewed   CBC W/ AUTO DIFFERENTIAL - Abnormal; Notable for the following:        Result Value    Hematocrit 36.1 (*)     MPV 9.1 (*)     Immature Granulocytes 0.6 (*)     All other components within normal limits   COMPREHENSIVE METABOLIC PANEL - Abnormal; Notable for the following:     BUN, Bld 29 (*)     ALT 8 (*)     All other components within normal limits   SEDIMENTATION RATE, MANUAL - Abnormal; Notable for the following:     Sed Rate 22 (*)     All other components within normal limits   URINALYSIS - Abnormal; Notable for the  following:     Glucose, UA 3+ (*)     All other components within normal limits    Narrative:     If patient is unable to provide a clean catch specimen due to  impairments such as mobility or cognition, obtain straight  cath specimen.  received yellow and gray top   TSH - Abnormal; Notable for the following:     TSH 0.165 (*)     All other components within normal limits    Narrative:     ADD ON GHGB PER MD KALA @  01/28/2018  16:55 ORDER #[id number]   LIPID PANEL - Abnormal; Notable for the following:     Cholesterol 277 (*)     LDL Cholesterol 180.6 (*)     All other components within normal limits    Narrative:     ADD ON GHGB PER MD KALA @  01/28/2018  16:55 ORDER #[id number]   PROTIME-INR   APTT   C-REACTIVE PROTEIN   TSH   LIPID PANEL   HEMOGLOBIN A1C   HEMOGLOBIN A1C    Narrative:     ADD ON GHGB PER MD KALA @  01/28/2018  16:55 ORDER #[id number]   URINALYSIS MICROSCOPIC    Narrative:     If patient is unable to provide a clean catch specimen due to  impairments such as mobility or cognition, obtain straight  cath specimen.  received yellow and gray top   T4, FREE    Narrative:     ADD ON GHGB PER MD KALA @  01/28/2018  16:55 ORDER #[id number]   POCT GLUCOSE   POCT GLUCOSE MONITORING CONTINUOUS        HO2  86 yo F w/ recent visual changes last night at midnight and again at just before 5am, patient states wedge shaped visual field from left eye, painless.   Concern currently for retinal detachment, central ret art occlusion, central ret vein occlusion, cva, thromboembolic event.  Opthalmology consulted and evaluated patient, labs and imaging ordered.   Bartolo Ascencio MD PGY2  01/29/2018 1:44 PM      HO2  Ophthalmology evaluated patient, diagnosed patient with central retinal artery occlusion, vascular neurology consulted. CT pending at this time.   Bartolo Ascencio MD PGY2  01/29/2018 3:13 PM    HO2  Patient admitted at this time for central retinal artery occlusion, obs per  vascular neurology. Patient states that she will stay for further evaluation and management.   Bratolo Ascencio MD PGY2  01/29/2018 5:32 PM    Medical Decision Making:   History:   Old Medical Records: I decided to obtain old medical records.  Clinical Tests:   Lab Tests: Ordered and Reviewed  Radiological Study: Ordered and Reviewed  Medical Tests: Ordered and Reviewed            Scribe Attestation:   Scribe #1: I performed the above scribed service and the documentation accurately describes the services I performed. I attest to the accuracy of the note.    Attending Attestation:   Physician Attestation Statement for Resident:  As the supervising MD   Physician Attestation Statement: I have personally seen and examined this patient.   I agree with the above history. -: 85 year old female presenting with visual field disturbance since 0500 today. Patient was directed to ER. Opthalmology is evaluating patient. Will continue to monitor.     Ophthalmology evaluated patient.  Exam is consistent with ASTRID BELL.  Discussed case with vascular neurology who is recommending admission for further workup.     As the supervising MD I agree with the above PE.    As the supervising MD I agree with the above treatment, course, plan, and disposition.  I have reviewed and agree with the residents interpretation of the following: lab data, CT scans and EKG.          Physician Attestation for Scribe:      Comments: I, Dr. Marcy Ray, personally performed the services described in this documentation. All medical record entries made by the scribe were at my direction and in my presence.  I have reviewed the chart and agree that the record reflects my personal performance and is accurate and complete. Marcy Ray MD.              ED Course      Clinical Impression:   The encounter diagnosis was CRAO (central retinal artery occlusion), left.    Disposition:   Disposition: Admitted                        Bartolo Ascencio,  MD  Resident  01/28/18 7628       Marcy Ray MD  01/29/18 [id number]

## 2018-01-28 NOTE — CONSULTS
[facility]  Ophthalmology  Consult Note    Patient Name: Dorothy M Knobloch  MRN: [id number]  Admission Date: 1/28/2018  Hospital Length of Stay: 0 days  Attending Provider: Marcy Ray MD   Primary Care Physician: Lola Wolff MD  Principal Problem:<principal problem not specified>    Inpatient consult to Ophthalmology  Consult performed by: DENISE PANDA  Consult ordered by: GARETT HERNANDEZ        Subjective:     Chief Complaint:  L eye vision loss    HPI:   86 y/o female with a hx of SSS s/p PPM, Takotsubo Cardiomyopathy 3/2013, HTN, moderate right carotid disease, TIA 12/2013, HLD, hypothyroidism, 2DE with vegetation on the MV (dental procedure?), PVD OU, ERM OS, NSC OU, dry AMD OU, presents with vision loss OS since 12:30am. Patient states she started seeing sparkling lights in her vision around 12:30am before she went to sleep and when she awoke around 5am this morning to use the restroom, everything was dark in her left eye except for her superotemporal vision. Patient denies any floaters or trauma. Denies headaches, jaw claudication, scalp tenderness.     No new subjective & objective note has been filed under this hospital service since the last note was generated.      Base Eye Exam     Visual Acuity (Snellen - Linear)       Right Left    Dist sc 20/40 CF@ 3ft inferotemporal          Tonometry (Tonopen, 12:27 PM)       Right Left    Pressure 19 17          Pupils       Dark Light Shape React APD    Right 3 2 Round Brisk None    Left 3 2 Round Brisk +4          Visual Fields       Right Left    Restrictions  Total inferior temporal, superior nasal, inferior nasal deficiencies; Partial inner superior temporal deficiency          Extraocular Movement       Right Left     Full, Ortho Full, Ortho          Dilation     Both eyes:  1.0% Mydriacyl, 2.5% phenylephrine @ 12:25 PM            Slit Lamp and Fundus Exam     External Exam       Right Left    External Normal Normal        "   Slit Lamp Exam       Right Left    Lids/Lashes Normal Normal    Conjunctiva/Sclera White and quiet White and quiet    Cornea Clear Clear    Anterior Chamber Deep and quiet Deep and quiet    Iris Round and reactive Round and reactive    Lens PCIOL Nuclear sclerosis, Cortical cataract    Vitreous Posterior vitreous detachment Posterior vitreous detachment          Fundus Exam       Right Left    Disc Normal 2 white emboli inferior optic disc     C/D Ratio 0.40 0.35    Macula Drusen Drusen, Epiretinal membrane causing foveal traction, retinal edema    Vessels attenuated attenuated, boxcarring of inferotemporal vessels    Periphery Normal Normal, no holes/tears 360              Assessment and Plan:     CRAO (central retinal artery occlusion), left    -patient with initial "sparkling lights" OS around 12:30 am before bed and starting having black vision except a superotemporal "pie" around 5am this morning  -on DFE, no obvious cherry red spot with retinal whitening, but boxcarring of inferotemporal vessels and macular edema noted along with 2 white emboli in inferior vessels in disc   -normal platelets and CRP. Normal ESR for age. No GCA symptoms.   -recommend vascular neurology consult  -recommend U/S bilateral carotids given previous 50-59% stenosis of bilateral carotid arteries, most likely source of emboli - consider vascular surgery consult to assess whether surgical intervention is advised   -recommend 2D echo CFD  -do not recommend IR ophthalmic artery tPA given time lapse since initial visual change and patients desire to be conservative  -follow up with Dr. Cole outpatient in 1 month         NS (nuclear sclerosis)    -PCIOL OD - vision stable per patient   -NS OS        Bilateral nonexudative age-related macular degeneration    -AG, AREDS2        Epiretinal membrane, left eye    -causing foveal distortion          Seen and discussed with Dr. Cole.   Thank you for your consult.     Gricelda Alfaro, " MD  Ophthalmology  [facility]

## 2018-01-28 NOTE — ASSESSMENT & PLAN NOTE
"-patient with initial "sparkling lights" OS around 12:30 am before bed and starting having black vision except a superotemporal "pie" around 5am this morning  -on DFE, no obvious cherry red spot with retinal whitening, but boxcarring of inferotemporal vessels and macular edema noted along with 2 white emboli in inferior vessels in disc   -recommend vascular neurology consult  -recommend U/S bilateral carotids given previous 50-59% stenosis of bilateral carotid arteries, most likely source of emboli - consider vascular surgery consult to assess whether surgical intervention is advised   -recommend 2D echo CFD  -do not recommend IR ophthalmic artery tPA given limited literature evidence and significant time lapse since initial visual change  -follow up with Dr. Cole outpatient in 1 month   "

## 2018-01-28 NOTE — ED TRIAGE NOTES
"Pt arrived to ED with CC of left eye partial vision loss, reports was see "sparkles last night" from left eye onset approx midnight, administered artificial tears to left eye but resolved so pt went to bed, woke up able to see through "pie shaped" area of vision field on lateral aspect, reports sees black and white through rest of visual field. Denies pain itching or drainage from eye. Denies HA dizziness or blurred vision. Denies injury.    No other complaints at this time.    Patient identifiers verified and correct for Dorothy M Knobloch.    LOC: The patient is awake, alert and oriented x 4. Pt is speaking appropriately, no slurred speech.  APPEARANCE: Patient resting and in no acute distress. Pt is clean and well groomed. No JVD visible. Pt reports pain level of 0.  SKIN: Skin is warm dry and intact, and color is consistent with ethnicity. No tenting observed and capillary refill <3 seconds. No clubbing noted to nail beds. No breakdown or brusing visible and mucus membranes moist and acyanotic.  MUSCULOSKELETAL: Full range of motion present in all extremities. Hand  equal and leg strength strong +5 bilaterally.  RESPIRATORY: Airway is open and patent. Respirations-unlabored, regular rate, equal bilaterally on inspiration and expiration. No accessory muscle use noted. Lungs clear to auscultation in all fields bilaterally anterior and posterior.   CARDIAC: Patient has regular heart rate and rhythm.  No peripheral edema noted, and patient has no c/o chest pain.  NEUROLOGIC: Eyes open spontaneously and facial expression symmetrical. Pt behavior appropriate to situation, and pt follows commands.  Pt reports sensation present in all extremities when touched with a finger.        "

## 2018-01-28 NOTE — ED NOTES
Attempt made to call report, Obs states unable to take report on pt because they have 10 patients at this time and have to move a patient out before they can accept pt, states to call back in an hour.

## 2018-01-28 NOTE — HPI
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 Patient evaluated in ED yesterday after initially seeing sparkling lights OS followed by black vision with superotemporal vision remaining. CTA head and neck showed "Significant atherosclerosis of the carotid bulbs bilaterally with approximately 25 percent stenosis of the proximal ICA by mass effect criteria.  Otherwise, no evidence of significant stenosis or vascular occlusion. Persistent beaded appearance of the bilateral internal carotid arteries, left greater than right, which can be seen in the setting of fibromuscular dysplasia." Emboli detection study unable to be performed. 2D echo showed normal systolic function with no vegetation.     Ophthalmology consulted today for blurry vision and decreased depth perception. Patient stated she noted around 12:30pm that when looking at TV, edges of image are blurred. Denies floaters, flashes, or dark spots in the R eye.   "

## 2018-01-28 NOTE — ED NOTES
Hourly rounding complete. Patient resting in stretcher and is in NAD at this time. Pt is awake alert and oriented x4, VSS, respirations even and unlabored. Pt denies pain at this time. Family at bedside, pt and family aware of POC - Ophthalmology MD at bedside. Bed low and locked with side rails up x2, call bell in pt reach. Pt voices no needs at this time.

## 2018-01-28 NOTE — TELEPHONE ENCOUNTER
"Patient stated that she has almost total vision loss in her left eye that started around 0030. History of TIAs. Advised to go the ED per protocol, however she refused this disposition. Notified Dr. Gricelda Alfaro, opthalmology resident, who spoke directly with the patient. Dr. Alfaro suggested the ED as well but the patient remains hesitant.    Reason for Disposition   Vision loss or change is main symptom   Complete loss of vision in 1 or both eyes    Answer Assessment - Initial Assessment Questions  1. SYMPTOM: "What is the main symptom you are concerned about?" (e.g., weakness, numbness)      Left eye vision loss  2. ONSET: "When did this start?" (minutes, hours, days; while sleeping)      Last night  3. LAST NORMAL: "When was the last time you were normal (no symptoms)?"      Midnight was fine and 30 minutes later the symptoms started   4. PATTERN "Does this come and go, or has it been constant since it started?"  "Is it present now?"      Constant and present now  5. CARDIAC SYMPTOMS: "Have you had any of the following symptoms: chest pain, difficulty breathing, palpitations?"      None  6. NEUROLOGIC SYMPTOMS: "Have you had any of the following symptoms: headache, dizziness, vision loss, double vision, changes in speech, unsteady on your feet?"      Just vision loss  7. OTHER SYMPTOMS: "Do you have any other symptoms?"      No  8. PREGNANCY: "Is there any chance you are pregnant?" "When was your last menstrual period?"      No    Protocols used: ST NEUROLOGIC DEFICIT-A-AH, ST VISION LOSS OR CHANGE-A-AH      "

## 2018-01-29 ENCOUNTER — TELEPHONE (OUTPATIENT)
Dept: OPHTHALMOLOGY | Facility: CLINIC | Age: 83
End: 2018-01-29

## 2018-01-29 ENCOUNTER — TELEPHONE (OUTPATIENT)
Dept: NEUROLOGY | Facility: CLINIC | Age: 83
End: 2018-01-29

## 2018-01-29 VITALS
HEIGHT: 63 IN | BODY MASS INDEX: 28.41 KG/M2 | OXYGEN SATURATION: 96 % | DIASTOLIC BLOOD PRESSURE: 53 MMHG | RESPIRATION RATE: 16 BRPM | TEMPERATURE: 97 F | SYSTOLIC BLOOD PRESSURE: 107 MMHG | HEART RATE: 60 BPM | WEIGHT: 160.31 LBS

## 2018-01-29 PROBLEM — H53.8 BLURRY VISION, BILATERAL: Status: ACTIVE | Noted: 2018-01-29

## 2018-01-29 PROBLEM — D32.9 MENINGIOMA: Status: ACTIVE | Noted: 2018-01-29

## 2018-01-29 PROBLEM — H34.12 CRAO (CENTRAL RETINAL ARTERY OCCLUSION), LEFT: Status: ACTIVE | Noted: 2018-01-29

## 2018-01-29 LAB
ALBUMIN SERPL BCP-MCNC: 3.2 G/DL
ALP SERPL-CCNC: 71 U/L
ALT SERPL W/O P-5'-P-CCNC: 7 U/L
ANION GAP SERPL CALC-SCNC: 7 MMOL/L
APTT BLDCRRT: 24.8 SEC
AST SERPL-CCNC: 13 U/L
BASOPHILS # BLD AUTO: 0.06 K/UL
BASOPHILS NFR BLD: 1.1 %
BILIRUB SERPL-MCNC: 0.4 MG/DL
BUN SERPL-MCNC: 21 MG/DL
CALCIUM SERPL-MCNC: 9.6 MG/DL
CHLORIDE SERPL-SCNC: 104 MMOL/L
CK MB SERPL-MCNC: 1.5 NG/ML
CK MB SERPL-RTO: 3.3 %
CK SERPL-CCNC: 46 U/L
CO2 SERPL-SCNC: 26 MMOL/L
CREAT SERPL-MCNC: 0.7 MG/DL
DIFFERENTIAL METHOD: ABNORMAL
EOSINOPHIL # BLD AUTO: 0.2 K/UL
EOSINOPHIL NFR BLD: 3.5 %
ERYTHROCYTE [DISTWIDTH] IN BLOOD BY AUTOMATED COUNT: 12.8 %
EST. GFR  (AFRICAN AMERICAN): >60 ML/MIN/1.73 M^2
EST. GFR  (NON AFRICAN AMERICAN): >60 ML/MIN/1.73 M^2
ESTIMATED PA SYSTOLIC PRESSURE: 32.81
GLUCOSE SERPL-MCNC: 91 MG/DL
HCT VFR BLD AUTO: 34.8 %
HGB BLD-MCNC: 11.7 G/DL
IMM GRANULOCYTES # BLD AUTO: 0.02 K/UL
IMM GRANULOCYTES NFR BLD AUTO: 0.4 %
INR PPP: 1
LYMPHOCYTES # BLD AUTO: 1.8 K/UL
LYMPHOCYTES NFR BLD: 32.1 %
MAGNESIUM SERPL-MCNC: 2 MG/DL
MCH RBC QN AUTO: 30.5 PG
MCHC RBC AUTO-ENTMCNC: 33.6 G/DL
MCV RBC AUTO: 91 FL
MITRAL VALVE MOBILITY: NORMAL
MONOCYTES # BLD AUTO: 0.5 K/UL
MONOCYTES NFR BLD: 9 %
NEUTROPHILS # BLD AUTO: 3 K/UL
NEUTROPHILS NFR BLD: 53.9 %
NRBC BLD-RTO: 0 /100 WBC
PHOSPHATE SERPL-MCNC: 2.7 MG/DL
PLATELET # BLD AUTO: 227 K/UL
PMV BLD AUTO: 9.3 FL
POCT GLUCOSE: 87 MG/DL (ref 70–110)
POTASSIUM SERPL-SCNC: 3.7 MMOL/L
PROT SERPL-MCNC: 6.4 G/DL
PROTHROMBIN TIME: 10.3 SEC
RBC # BLD AUTO: 3.83 M/UL
RETIRED EF AND QEF - SEE NOTES: 60 (ref 55–65)
SODIUM SERPL-SCNC: 137 MMOL/L
TRICUSPID VALVE REGURGITATION: NORMAL
TROPONIN I SERPL DL<=0.01 NG/ML-MCNC: 0.01 NG/ML
WBC # BLD AUTO: 5.64 K/UL

## 2018-01-29 PROCEDURE — G8989 SELF CARE D/C STATUS: HCPCS | Mod: CH

## 2018-01-29 PROCEDURE — A4216 STERILE WATER/SALINE, 10 ML: HCPCS | Performed by: PSYCHIATRY & NEUROLOGY

## 2018-01-29 PROCEDURE — G8987 SELF CARE CURRENT STATUS: HCPCS | Mod: CH

## 2018-01-29 PROCEDURE — 83735 ASSAY OF MAGNESIUM: CPT

## 2018-01-29 PROCEDURE — 85025 COMPLETE CBC W/AUTO DIFF WBC: CPT

## 2018-01-29 PROCEDURE — 97161 PT EVAL LOW COMPLEX 20 MIN: CPT

## 2018-01-29 PROCEDURE — 92610 EVALUATE SWALLOWING FUNCTION: CPT

## 2018-01-29 PROCEDURE — 97530 THERAPEUTIC ACTIVITIES: CPT

## 2018-01-29 PROCEDURE — 99215 OFFICE O/P EST HI 40 MIN: CPT | Mod: ,,, | Performed by: PSYCHIATRY & NEUROLOGY

## 2018-01-29 PROCEDURE — 97165 OT EVAL LOW COMPLEX 30 MIN: CPT

## 2018-01-29 PROCEDURE — 93880 EXTRACRANIAL BILAT STUDY: CPT | Performed by: SURGERY

## 2018-01-29 PROCEDURE — 36415 COLL VENOUS BLD VENIPUNCTURE: CPT

## 2018-01-29 PROCEDURE — 97127 HC THERAPEUTIC INTVTN, COGN FUNCTION - OT: CPT

## 2018-01-29 PROCEDURE — 82553 CREATINE MB FRACTION: CPT

## 2018-01-29 PROCEDURE — G8988 SELF CARE GOAL STATUS: HCPCS | Mod: CH

## 2018-01-29 PROCEDURE — 85610 PROTHROMBIN TIME: CPT

## 2018-01-29 PROCEDURE — 25000003 PHARM REV CODE 250: Performed by: PSYCHIATRY & NEUROLOGY

## 2018-01-29 PROCEDURE — 93306 TTE W/DOPPLER COMPLETE: CPT | Mod: 26,,, | Performed by: INTERNAL MEDICINE

## 2018-01-29 PROCEDURE — 80053 COMPREHEN METABOLIC PANEL: CPT

## 2018-01-29 PROCEDURE — G0378 HOSPITAL OBSERVATION PER HR: HCPCS

## 2018-01-29 PROCEDURE — 82550 ASSAY OF CK (CPK): CPT

## 2018-01-29 PROCEDURE — 93892 TCD EMBOLI DETECT W/O INJ: CPT

## 2018-01-29 PROCEDURE — 97535 SELF CARE MNGMENT TRAINING: CPT

## 2018-01-29 PROCEDURE — 84100 ASSAY OF PHOSPHORUS: CPT

## 2018-01-29 PROCEDURE — 85730 THROMBOPLASTIN TIME PARTIAL: CPT

## 2018-01-29 PROCEDURE — G8979 MOBILITY GOAL STATUS: HCPCS | Mod: CH

## 2018-01-29 PROCEDURE — 93306 TTE W/DOPPLER COMPLETE: CPT

## 2018-01-29 PROCEDURE — G8980 MOBILITY D/C STATUS: HCPCS | Mod: CH

## 2018-01-29 PROCEDURE — G8978 MOBILITY CURRENT STATUS: HCPCS | Mod: CH

## 2018-01-29 PROCEDURE — 84484 ASSAY OF TROPONIN QUANT: CPT

## 2018-01-29 PROCEDURE — 99220 PR INITIAL OBSERVATION CARE,LEVL III: CPT | Mod: GC,,, | Performed by: SURGERY

## 2018-01-29 RX ORDER — ATORVASTATIN CALCIUM 40 MG/1
TABLET, FILM COATED ORAL
Qty: 90 TABLET | Refills: 0 | Status: CANCELLED | OUTPATIENT
Start: 2018-01-29

## 2018-01-29 RX ORDER — CLOPIDOGREL BISULFATE 75 MG/1
TABLET ORAL
Qty: 90 TABLET | Refills: 0 | Status: CANCELLED | OUTPATIENT
Start: 2018-01-29

## 2018-01-29 RX ORDER — ATORVASTATIN CALCIUM 40 MG/1
40 TABLET, FILM COATED ORAL DAILY
Qty: 30 TABLET | Refills: 0 | Status: SHIPPED | OUTPATIENT
Start: 2018-01-30 | End: 2018-03-13

## 2018-01-29 RX ORDER — ATORVASTATIN CALCIUM 40 MG/1
40 TABLET, FILM COATED ORAL DAILY
Qty: 30 TABLET | Refills: 0 | Status: SHIPPED | OUTPATIENT
Start: 2018-01-30 | End: 2018-01-29

## 2018-01-29 RX ORDER — CLOPIDOGREL BISULFATE 75 MG/1
75 TABLET ORAL DAILY
Qty: 30 TABLET | Refills: 0 | Status: SHIPPED | OUTPATIENT
Start: 2018-01-30 | End: 2018-01-29

## 2018-01-29 RX ORDER — CLOPIDOGREL BISULFATE 75 MG/1
75 TABLET ORAL DAILY
Qty: 30 TABLET | Refills: 0 | Status: SHIPPED | OUTPATIENT
Start: 2018-01-30 | End: 2020-06-09 | Stop reason: SDUPTHER

## 2018-01-29 RX ADMIN — Medication 3 ML: at 03:01

## 2018-01-29 RX ADMIN — METOPROLOL TARTRATE 25 MG: 25 TABLET ORAL at 09:01

## 2018-01-29 RX ADMIN — VALSARTAN 320 MG: 160 TABLET, FILM COATED ORAL at 09:01

## 2018-01-29 RX ADMIN — REGULAR STRENGTH 325 MG: 325 TABLET ORAL at 09:01

## 2018-01-29 RX ADMIN — LEVOTHYROXINE SODIUM 88 MCG: 88 TABLET ORAL at 06:01

## 2018-01-29 RX ADMIN — CLOPIDOGREL 75 MG: 75 TABLET, FILM COATED ORAL at 09:01

## 2018-01-29 RX ADMIN — AMLODIPINE BESYLATE 10 MG: 10 TABLET ORAL at 09:01

## 2018-01-29 RX ADMIN — HYDROCHLOROTHIAZIDE 12.5 MG: 12.5 TABLET ORAL at 09:01

## 2018-01-29 RX ADMIN — Medication 3 ML: at 06:01

## 2018-01-29 NOTE — PT/OT/SLP EVAL
"Speech-Language Pathology Evaluation  Bedside Swallow    Patient Name:  Dorothy M Knobloch   MRN:  [id number]  Admitting Diagnosis: CRAO (central retinal artery occlusion), left    Recommendations:                 General Recommendations:  SLP Speech/Language/Cognitive Evaluation  Diet recommendations:  Regular, Thin   Aspiration Precautions: Standard aspiration precautions   General Precautions: Standard, fall, vision impaired  Communication strategies:  none    History:     Past Medical History:   Diagnosis Date    Anticoagulant long-term use     Arthritis     Bilateral nonexudative age-related macular degeneration 8/27/2013    Blood transfusion     Cataract     CHF (congestive heart failure)     Enlarged heart     High cholesterol     Hypertension     Sick sinus syndrome     Stroke     Thyroid disease        Past Surgical History:   Procedure Laterality Date    CARDIAC PACEMAKER PLACEMENT      CATARACT EXTRACTION Right     JOINT REPLACEMENT      SD TRANSCRAN DOPP INTRACRAN, EMBOLI W/O INJ  1/29/2018         SKIN BIOPSY      TONSILLECTOMY      TOTAL HIP ARTHROPLASTY  11-1-2000    [facility]       Social History: Patient lives with & cares for  who is blind. She is 100% IND with ADLs, handles cooking, cleaning & finances. Pt drives.     Prior diet: regular/thin      Subjective   Awake & alert.  at bedside. SLP Speech/Language/Cognitive Evaluation had to be discontinued 2/2 technician arrived to complete echo had to be completed. NSG reports pt tolerating current diet & taking meds whole with thin.     Objective:   Pt reports speech, language & cognition at baseline. Pt reports "I can only see out of a tiny wedge in left eye" but does not report notice visual spatial deficits & is able to read fluently using right eye without difficulty. Pt did appear to demonstrate impaired depth perception when reaching for water bottle but will need further assessment.       Oral " Musculature Evaluation  · Oral Musculature: WFL  · Dentition: present and adequate  · Mucosal Quality: adequate  · Oral Labial Strength and Mobility: WFL  · Lingual Strength and Mobility: WFL  · Volitional Cough: adequate  · Volitional Swallow: adequate  · Voice Prior to PO Intake: clear    Bedside Swallow Eval:   Consistencies Assessed:  · Thin liquids sips via water bottle x3  · Solids bites of crcaker x2     Oral Phase:   · WFL    Pharyngeal Phase:   · no overt clinical signs/symptoms of aspiration  · no overt clinical signs/symptoms of pharyngeal dysphagia    SLP educated pt on possible effects of dx, role of SLP, POC with f/u for SLP Speech/Language/Cognitive Evaluation, precautions, etc.     Assessment:     Dorothy M Knobloch is a 85 y.o. female with an SLP diagnosis of CRAO. Oropharyngeal swallow appears WFL, tolerating current diet. SLP will follow up to complete SLP Speech/Language/Cognitive Evaluation.      Goals:    SLP Goals        Problem: SLP Goal    Goal Priority Disciplines Outcome   SLP Goal     SLP    Description:  Speech Language Pathology Goals  Goals expected to be met by 2/5  1. Pt will participate in SLP Speech/Language/Cognitive Evaluation                    Plan:     · Patient to be seen:      · Plan of Care expires:     · Plan of Care reviewed with:  patient, spouse   · SLP Follow-Up:     Yes, to complete SLP Speech/Language/Cognitive Evaluation     Discharge recommendations:  home     Time Tracking:     SLP Treatment Date:   01/29/18  Speech Start Time:  1000  Speech Stop Time:  1019     Speech Total Time (min):  19 min    Billable Minutes: Eval Swallow and Oral Function 11 and Seld Care/Home Management Training 8    Arleen Slater CCC-SLP  01/29/2018   [phone]

## 2018-01-29 NOTE — PLAN OF CARE
Problem: Patient Care Overview  Goal: Plan of Care Review  Outcome: Ongoing (interventions implemented as appropriate)  Patient has remained free of falls this shift. She is AAOx4 and VS's have been stable.  is at the bedside because he does not see well and cannot be at home by himself. He is probably patient's most important concern. Daughter is also ill and had a child die in October. Patient is doing her best to not show her worry.

## 2018-01-29 NOTE — PLAN OF CARE
PCP:Lola Wolff MD    Extended Emergency Contact Information  Primary Emergency Contact: Sohail Solis  Address: [address] [place] [address] [facility]  Home Phone: [phone]  Mobile Phone: [phone]  Relation: Daughter  Secondary Emergency Contact: Knobloch,Bonnie   [place]  Mobile Phone: [phone]  Relation: Grandchild      [facility] [address] - [facility] [address] AT [facility] [address]  [address] [facility] [address]  Phone: [phone] Fax: [phone]    [id number] - [place], [place] - [address] AT [facility]  [address] [facility] [address]  Phone: [phone] Fax: [phone]    Payor: [employer] MEDICARE / Plan: [employer] 65 / Product Type: Medicare Advantage /     Patient was independent living and taking care of her  prior to hospitalization. PT/OT are recommending home with no needs at this time. Cm will continue to follow.     01/29/18 0845   Discharge Assessment   Assessment Type Discharge Planning Assessment   Confirmed/corrected address and phone number on facesheet? Yes   Assessment information obtained from? Patient   Communicated expected length of stay with patient/caregiver yes   Prior to hospitilization cognitive status: Alert/Oriented   Prior to hospitalization functional status: Independent   Current cognitive status: Alert/Oriented   Current Functional Status: Independent   Facility Arrived From: Home   Lives With spouse   Able to Return to Prior Arrangements yes   Is patient able to care for self after discharge? Yes   Who are your caregiver(s) and their phone number(s)? Sohail Solis (daughter) H [phone] C [phone], Bonnie Knobloch (grandchild) [phone]   Patient's perception of discharge disposition home or selfcare   Readmission Within The Last 30 Days no previous admission in last 30 days    Patient currently being followed by outpatient case management? No   Patient currently receives any other outside agency services? No   Equipment Currently Used at Home none   Do you have any problems affording any of your prescribed medications? No   Is the patient taking medications as prescribed? yes   Does the patient have transportation home? Yes   Transportation Available car;family or friend will provide   Does the patient receive services at the [facility]mad[facility] Clinic? No   Discharge Plan A Home with family   Discharge Plan B Home with family;Home Health   Patient/Family In Agreement With Plan yes

## 2018-01-29 NOTE — ED NOTES
Bed: [facility]  Expected date:   Expected time:   Means of arrival:   Comments:

## 2018-01-29 NOTE — PT/OT/SLP EVAL
"Occupational Therapy   Evaluation/Discharge Summary    Name: Dorothy M Knobloch  MRN: [id number]  Admitting Diagnosis:  CRAO (central retinal artery occlusion), left      Recommendations:     Discharge Recommendations: home  Discharge Equipment Recommendations:  none  Barriers to discharge:  None    History:     Occupational Profile:  Patient resides with  (blind) in [place] in 2 story home (live on first floor) with one step to enter.  PTA patient independent with ADLs including driving.  DME:  Cane.  Patient is right handed.  Hobbies:  [place] painting, art work, sewing, VANCL.  Roles/Responsibilies:  Mother, wife, grandmother, grocery shopping, cooking.    Past Medical History:   Diagnosis Date    Anticoagulant long-term use     Arthritis     Bilateral nonexudative age-related macular degeneration 8/27/2013    Blood transfusion     Cataract     CHF (congestive heart failure)     Enlarged heart     High cholesterol     Hypertension     Sick sinus syndrome     Stroke     Thyroid disease        Past Surgical History:   Procedure Laterality Date    CARDIAC PACEMAKER PLACEMENT      CATARACT EXTRACTION Right     JOINT REPLACEMENT      SKIN BIOPSY      TONSILLECTOMY      TOTAL HIP ARTHROPLASTY  11-1-2000    [facility]       Subjective     Patient:  "I just have this pie shape view out of my left eye.  I need to ask the doctor about driving."  Communicated with: Nurse prior to session.  Pain/Comfort:  · Pain Rating 1: 0/10  · Pain Rating Post-Intervention 1: 0/10    Patients cultural, spiritual, Nondenominational conflicts given the current situation: Oriental orthodox    Objective:     Patient found with: blood pressure cuff, peripheral IV, telemetry   present.  General Precautions: Standard, aspiration, fall   Orthopedic Precautions:N/A   Braces: N/A     Occupational Performance:    Bed Mobility:    · Patient completed Rolling/Turning to Left with  independence  · Patient completed " "Rolling/Turning to Right with independence  · Patient completed Scooting/Bridging with independence  · Patient completed Supine to Sit with independence  · Patient completed Sit to Supine with independence    Functional Mobility/Transfers:  · Patient completed Sit <> Stand Transfer with modified independence  with cane   · Patient completed Bed <> Chair Transfer using Stand Pivot technique with modified independence with cane    Activities of Daily Living:  · Grooming: independence while standing  · UB Dressing: independence    · LB Dressing: independence    · Toileting: modified independence     Cognitive/Visual Perceptual:  Cognitive/Psychosocial Skills:     -       Oriented to: Person, Place, Time and Situation   -       Follows Commands/attention:Follows one-step commands  -       Communication: clear/fluent  -       Memory: No Deficits noted  -       Safety awareness/insight to disability: intact   -       Mood/Affect/Coping skills/emotional control: Appropriate to situation  Visual/Perceptual:      -describes "pie shape" view out of left eye    Physical Exam:  Postural examination/scapula alignment:    -       Rounded shoulders  Skin integrity: Visible skin intact  Edema:  None noted  Sensation:    -       Intact  Upper Extremity Range of Motion:     -       Right Upper Extremity: WNL  -       Left Upper Extremity: WNL  Upper Extremity Strength:    -       Right Upper Extremity: WNL  -       Left Upper Extremity: WNL    Patient left supine with all lines intact and call button in reach    AMPA 6 Click:  AMPA Total Score: 24    Treatment & Education:  Patient/ Family education provided for stroke warning signs, prevention guidelines and personal risk factors.  Patient/ Family verbalizing understanding via teach back method.   Patient education provided on role of OT and need for no further OT upon discharge.   Patient verbalizing understanding via teach back method. Patient and family instructed on need to " "call for assistance for toileting needs and when getting up.  Patient alert and oriented x 3; able to follow 4/4 one step commands.  Patient attentive and interactive throughout the session.  Patient able to identify 5/5 body parts.  Able to name 5/5 objects.  Able to sequence 7/7 days of the week and 12/12 months of the year.  Able to read 3 paragraphs without error.  Able to identify 20/20 items on visual scanning task with efficient search pattern.  Patient's functional status and disposition recommendation discussed with stroke team in daily rounds.  White board updated in patient's room.  OT asked if there were any other questions; patient/ family had no further questions.       Education:    Assessment:     Dorothy M Knobloch is a 85 y.o. female with a medical diagnosis of CRAO (central retinal artery occlusion), left.  She presents with vision impairments without affecting safety during ADLs.  Able to organize occupational performance in a timely and safe manner; demonstrating skills necessary to successfully and appropriately participate in everyday tasks and social situations.  No activity limitations noted. No further OT needed.     Rehab Prognosis:  Good; patient would benefit from acute skilled OT services to address these deficits and reach maximum level of function.         Clinical Decision Makin.  OT Low:  "Pt evaluation falls under low complexity for evaluation coding due to performance deficits noted in 1-3 areas as stated above and 0 co-morbities affecting current functional status. Data obtained from problem focused assessments. No modifications or assistance was required for completion of evaluation. Only brief occupational profile and history review completed."     Plan:     · Discharge from OT services  · Plan of Care Expires: 18  · Plan of Care Reviewed with: patient, spouse    This Plan of care has been discussed with the patient who was involved in its development and " understands and is in agreement with the identified goals and treatment plan    GOALS:    Occupational Therapy Goals     Not on file          Multidisciplinary Problems (Resolved)        Problem: Occupational Therapy Goal    Goal Priority Disciplines Outcome Interventions   Occupational Therapy Goal   (Resolved)     OT, PT/OT Outcome(s) achieved    Description:  Goals not set 2* no further OT needed.                      Time Tracking:     OT Date of Treatment: 01/29/18  OT Start Time: 0638  OT Stop Time: 0714  OT Total Time (min): 36 min    Billable Minutes:Evaluation 12  Therapeutic Activity 10  Cognitive Retraining 14    ALAN Robertson  1/29/2018

## 2018-01-29 NOTE — PROGRESS NOTES
"Gerda from stroke aware that pt is c/o new onset of poor bilateral depth perception and bilateral "clouds in outskirts of vision".   "

## 2018-01-29 NOTE — HPI
84 yo F with h/o HTN, HLD, sick sinus syndrome s/p pacemaker, TIA (2013)  Presenting to hospital for changes to vision in left eye.  Patient reports that on Saturday had sparkling lights around her vision around 12:30 AM followed by a weblike vision superimposed, awoke around 5 am on Sunday with everything dark in her left eye with the exception of supratemporal vision.  Was seen initially by ophthalmology who diagnosed with left central retinal artery occlusion; with involvement of vascular neurology. Initial NIHSS was 1.  CTA head/neck done showing moderate stenosis of R ICA; minimal stenosis of L ICA.  Patient had US 2013 demonstrating 50-69% R ICA stenosis was well.  Vascular surgery consulted for possible intervention.    Patient reports being taken off her ASA, has not been taking.  Has had issues with shortness of breath with ambulating, is being worked up by cards/pulmonolgy currently.    Never a smoker.No prior MI.

## 2018-01-29 NOTE — SUBJECTIVE & OBJECTIVE
Past Medical History:   Diagnosis Date    Anticoagulant long-term use     Arthritis     Bilateral nonexudative age-related macular degeneration 8/27/2013    Blood transfusion     Cataract     CHF (congestive heart failure)     Enlarged heart     High cholesterol     Hypertension     Sick sinus syndrome     Stroke     Thyroid disease      Past Surgical History:   Procedure Laterality Date    CARDIAC PACEMAKER PLACEMENT      CATARACT EXTRACTION Right     JOINT REPLACEMENT      SKIN BIOPSY      TONSILLECTOMY      TOTAL HIP ARTHROPLASTY  11-1-2000    right[facility]     Review of patient's allergies indicates:   Allergen Reactions    Aspartame Swelling     equal       Scheduled Medications:    amLODIPine  10 mg Oral Daily    aspirin  325 mg Oral Daily    atorvastatin  40 mg Oral Daily    clopidogrel  75 mg Oral Daily    doxazosin  1 mg Oral QHS    heparin (porcine)  5,000 Units Subcutaneous Q8H    hydroCHLOROthiazide  12.5 mg Oral Daily    levothyroxine  88 mcg Oral Before breakfast    metoprolol tartrate  25 mg Oral BID    sodium chloride 0.9%  3 mL Intravenous Q8H    valsartan  320 mg Oral Daily       PRN Medications: labetalol, sodium chloride 0.9%    Family History     Problem Relation (Age of Onset)    Arthritis Sister, Maternal Grandmother, Paternal Grandmother    Birth defects Son    Cancer Paternal Aunt (80)    Diabetes Paternal Aunt, Paternal Uncle    Early death Mother, Father    Hearing loss Daughter    Hypertension Son, Paternal Grandmother        Social History Main Topics    Smoking status: Never Smoker    Smokeless tobacco: Never Used    Alcohol use No    Drug use: No    Sexual activity: Not on file     Review of Systems   Constitutional: Negative for chills, fatigue and fever.   HENT: Negative for facial swelling, trouble swallowing and voice change.    Eyes: Positive for visual disturbance. Negative for discharge.   Respiratory: Negative for cough, shortness of  breath and wheezing.    Cardiovascular: Negative for chest pain and palpitations.   Gastrointestinal: Negative for abdominal distention, nausea and vomiting.   Genitourinary: Negative for difficulty urinating and flank pain.   Musculoskeletal: Negative for arthralgias and gait problem.   Skin: Negative for color change and rash.   Neurological: Positive for numbness. Negative for facial asymmetry, speech difficulty, weakness and headaches.        Paresthesias to L foot    Psychiatric/Behavioral: Negative for agitation and confusion.     Objective:     Vital Signs (Most Recent):  Temp: 98.1 °F (36.7 °C) (01/29/18 0832)  Pulse: 65 (01/29/18 0832)  Resp: 17 (01/29/18 0832)  BP: 137/60 (01/29/18 0832)  SpO2: (!) 94 % (01/29/18 0832)    Vital Signs (24h Range):  Temp:  [97.7 °F (36.5 °C)-98.2 °F (36.8 °C)] 98.1 °F (36.7 °C)  Pulse:  [59-79] 65  Resp:  [13-20] 17  SpO2:  [91 %-100 %] 94 %  BP: (127-161)/(57-74) 137/60     Body mass index is 28.4 kg/m².    Physical Exam   Constitutional: She is oriented to person, place, and time. She appears well-developed and well-nourished.   HENT:   Head: Normocephalic and atraumatic.   Eyes: EOM are normal. Pupils are equal, round, and reactive to light.   Neck: Normal range of motion.   Cardiovascular: Normal rate and regular rhythm.    Pulmonary/Chest: No respiratory distress.   Abdominal: Soft. There is no tenderness.   Musculoskeletal: Normal range of motion. She exhibits no deformity.   Neurological: She is alert and oriented to person, place, and time. A sensory deficit (L foot) is present. She exhibits normal muscle tone.   RUE: 5/5.  LUE: 5/5.  RLE: 4+/5.  LLE: 5/5.     Skin: Skin is warm and dry.   Psychiatric: She has a normal mood and affect. Her behavior is normal. Cognition and memory are not impaired.   Vitals reviewed.    NEUROLOGICAL EXAMINATION:     MENTAL STATUS   Oriented to person, place, and time.     CRANIAL NERVES     CN III, IV, VI   Pupils are equal, round,  and reactive to light.  Extraocular motions are normal.       Diagnostic Results:   Labs: Reviewed  X-Ray: Reviewed  CT: Reviewed

## 2018-01-29 NOTE — ASSESSMENT & PLAN NOTE
86 yo F with h/o HTN, HLD, sick sinus syndrome s/p pacemaker, TIA (2013)  Presenting to hospital for changes to vision in left eye found to have L CRAO.    Patient's right ICA with higher grade stenosis than left; will obtain US to assess (2013 with 50-69% stenosis R ICA)  Agree with dual antiplatelet for L CRAO (ASA 81 mg, plavix) high dose statin  No intervention on L ICA at this time; patient needs continued w/u for shortness of breath  Will arrange for follow up with Dr. Portillo in 3-4 weeks with repeat US

## 2018-01-29 NOTE — ASSESSMENT & PLAN NOTE
86 yo female w CRAO on the left eye.   CTA with bilateral carotid disease, carotid US with 1-39% plaque but velocities suggestive of higher degree of stenosis. Vascular surgery consulted but defer intervention at this time, will follow up as outpatient.   ECHO with LAE.       Antiplatelets: ASA 325mg/plavix 75mg   Statins: Atorvastatin 40mg daily   Diagnostics pending: none  DVT PPX: heparin 5000u subq  PT/OT/ST evaluate and treat: recommended for home without therapy  Follow up: Vascular surgery (Dr. Portillo), vascular surgery and neurosurgery (for incidental likely meningioma), opthalmology (Dr. Cole outpatient in 1 month)

## 2018-01-29 NOTE — ASSESSMENT & PLAN NOTE
84 yo female w CRAO on the left eye.  Will complete workup with cardiac echo, but has somewhat irregular plaque in the right carotid artery.    Will get TCD for microemboli and consult vascular surgery for their impression.

## 2018-01-29 NOTE — CONSULTS
Inpatient consult to Physical Medicine Rehab  Consult performed by: ROB STOLL  Consult ordered by: CORINNE SIMON  Reason for consult: assess rehab needs        Reviewed patient history and current admission.  Rehab team following.  Full consult to follow.    SHE Finley, FNP-C  Physical Medicine & Rehabilitation   01/29/2018  Spectralink: [id number]

## 2018-01-29 NOTE — HOSPITAL COURSE
1/29/18: Evaluated by OT.  PT evaluation pending.  Bed mobility, sit to stand, and transfers (I).  UBD and LBD (I).

## 2018-01-29 NOTE — ASSESSMENT & PLAN NOTE
"-patient with initial "sparkling lights" OS around 12:30 am before bed and starting having black vision except a superotemporal "pie"  -on DFE, no obvious cherry red spot with retinal whitening, but boxcarring of inferotemporal vessels and macular edema noted along with 2 white emboli in inferior vessels in disc   -patient admitted to obs under vascular neurology for further workup  -CTA neck showed stenosis of carotid bulbs and beading of internal carotids L>R  -lipid panel showed elevated cholesterol and LDL   -2D echo showed normal systolic function with no vegetation   -TCD emboli study unable to be done   -follow up with Dr. Cole outpatient in 1 month   "

## 2018-01-29 NOTE — PLAN OF CARE
Problem: Patient Care Overview  Goal: Plan of Care Review  Outcome: Ongoing (interventions implemented as appropriate)  Pt's VSS,NAD noted. Bed locked in lowest position, side rails upx2, call bell within reach. Pt ambulates independently with quad cane very well. Pt remains free of falls.

## 2018-01-29 NOTE — SUBJECTIVE & OBJECTIVE
Past Medical History:   Diagnosis Date    Anticoagulant long-term use     Arthritis     Bilateral nonexudative age-related macular degeneration 8/27/2013    Blood transfusion     Cataract     CHF (congestive heart failure)     Enlarged heart     High cholesterol     Hypertension     Sick sinus syndrome     Stroke     Thyroid disease      Past Surgical History:   Procedure Laterality Date    CARDIAC PACEMAKER PLACEMENT      CATARACT EXTRACTION Right     JOINT REPLACEMENT      SKIN BIOPSY      TONSILLECTOMY      TOTAL HIP ARTHROPLASTY  11-1-2000    right/[facility]     Family History   Problem Relation Age of Onset    Early death Mother     Early death Father     Arthritis Sister     Hearing loss Daughter     Birth defects Son     Hypertension Son     Diabetes Paternal Aunt     Cancer Paternal Aunt 80     colon  cancer    Diabetes Paternal Uncle     Arthritis Maternal Grandmother     Arthritis Paternal Grandmother     Hypertension Paternal Grandmother     Amblyopia Neg Hx     Blindness Neg Hx     Cataracts Neg Hx     Glaucoma Neg Hx     Macular degeneration Neg Hx     Retinal detachment Neg Hx     Strabismus Neg Hx     Stroke Neg Hx     Thyroid disease Neg Hx      Social History   Substance Use Topics    Smoking status: Never Smoker    Smokeless tobacco: Never Used    Alcohol use No     Review of patient's allergies indicates:   Allergen Reactions    Aspartame Swelling     equal       Medications: I have reviewed the current medication administration record.    Prescriptions Prior to Admission   Medication Sig Dispense Refill Last Dose    amLODIPine (NORVASC) 10 MG tablet Take 1 tablet (10 mg total) by mouth once daily. 30 tablet 11 1/28/2018    cholecalciferol, vitamin D3, 50,000 unit capsule Take 50,000 Units by mouth every 7 days.  5 1/28/2018    doxazosin (CARDURA) 1 MG tablet Take 1 tablet (1 mg total) by mouth every evening. 30 tablet 11 1/28/2018     ibuprofen (ADVIL,MOTRIN) 800 MG tablet TK 1 T PO   Q EIGHT H PRN P  0 1/27/2018    levothyroxine (SYNTHROID) 88 MCG tablet Take 88 mcg by mouth before breakfast.    1/28/2018    metoprolol succinate (TOPROL-XL) 25 MG 24 hr tablet    1/28/2018    valsartan-hydrochlorothiazide (DIOVAN-HCT) 320-12.5 mg per tablet Take 1 tablet by mouth once daily.  6 1/28/2018    ADVAIR DISKUS 250-50 mcg/dose diskus inhaler        aspirin 325 MG tablet Take 325 mg by mouth once daily.       desloratadine (CLARINEX) 5 mg tablet        fish oil-omega-3 fatty acids 300-1,000 mg capsule Take 1 g by mouth once daily.       montelukast (SINGULAIR) 10 mg tablet        NITROSTAT 0.4 mg SL tablet        VENTOLIN HFA 90 mcg/actuation inhaler           Review of Systems   Constitutional: Negative for chills and fever.   HENT: Negative for drooling, hearing loss, sore throat and trouble swallowing.    Eyes: Negative for visual disturbance.   Respiratory: Negative for choking and shortness of breath.    Cardiovascular: Negative for chest pain.   Gastrointestinal: Negative for blood in stool, nausea and vomiting.   Genitourinary: Negative for hematuria.   Musculoskeletal: Negative for arthralgias and back pain.   Skin: Negative for rash.   Neurological: Negative for dizziness, facial asymmetry, speech difficulty, weakness, light-headedness, numbness and headaches.   Hematological: Does not bruise/bleed easily.   Psychiatric/Behavioral: Negative for confusion and sleep disturbance.     Objective:     Vital Signs (Most Recent):  Temp: 98.2 °F (36.8 °C) (01/28/18 1700)  Pulse: 62 (01/28/18 1900)  Resp: 20 (01/28/18 1800)  BP: 135/63 (01/28/18 1800)  SpO2: 99 % (01/28/18 1800)    Vital Signs Range (Last 24H):  Temp:  [98.1 °F (36.7 °C)-98.2 °F (36.8 °C)]   Pulse:  [60-79]   Resp:  [13-20]   BP: (127-161)/(57-74)   SpO2:  [96 %-100 %]     Physical Exam   Constitutional: She appears well-developed and well-nourished.   HENT:   Head:  Normocephalic.   Eyes: EOM are normal. Pupils are equal, round, and reactive to light.   Neck: Normal range of motion.   Cardiovascular: Normal rate and regular rhythm.    Pulmonary/Chest: Breath sounds normal.   Abdominal: Soft. Bowel sounds are normal.   Musculoskeletal: Normal range of motion.   Skin: No rash noted.   Psychiatric: She has a normal mood and affect.       Neurological Exam:   LOC: alert  Attention Span: Good   Language: No aphasia  Articulation: No dysarthria  Orientation: Person, Place, Time   Visual Fields: Full  Monocular visual loss: left  EOM (CN III, IV, VI): Full/intact  Pupils (CN II, III): PERRL  Facial Sensation (CN V): Normal  Facial Movement (CN VII): Symmetric facial expression    Gag Reflex: present  Reflexes: 2+ throughout  Motor: Arm left  Normal 5/5  Leg left  Normal 5/5  Arm right  Normal 5/5  Leg right Normal 5/5  Cebellar: No evidence of appendicular or axial ataxia  Sensation: Intact to light touch, temperature and vibration  Tone: Normal tone throughout      Laboratory:  CMP:   Recent Labs  Lab 01/28/18  1317   CALCIUM 9.8   ALBUMIN 3.6   PROT 7.2      K 4.1   CO2 24      BUN 29*   CREATININE 0.8   ALKPHOS 80   ALT 8*   AST 15   BILITOT 0.3     CBC:   Recent Labs  Lab 01/28/18  1317   WBC 6.97   RBC 4.05   HGB 12.3   HCT 36.1*      MCV 89   MCH 30.4   MCHC 34.1     Lipid Panel:   Recent Labs  Lab 01/28/18  1317   CHOL 277*   LDLCALC 180.6*   HDL 68   TRIG 142     Coagulation:   Recent Labs  Lab 01/28/18  1317   INR 0.9   APTT 25.1     Hgb A1C:   Recent Labs  Lab 01/28/18  1317   HGBA1C 5.2     TSH:   Recent Labs  Lab 01/28/18  1317   TSH 0.165*       Diagnostic Results:      Brain imaging:  Head CT: no acute findings    Vessel Imaging:  CTA: bilateral carotid disease

## 2018-01-29 NOTE — HOSPITAL COURSE
1/29/18: NAEON. TCD unable to be performed; no windows to visualize. Vascular surgery defers intervention, recommend follow up as outpatient.     Ms. Knobloch is an 86yo F with HTN, HLD, CHF, carotid artery disease who presents with left eye visual loss, secondary to central retinal artery occlusion. The occlusion is most likely thrombo-embolic from carotid disease. CTA showed significant carotid bulb atherosclerosis bilaterally. CT head was negative for acute infarct/hemorrhage. She was seen by opthalmology IP and is recommended to follow up OP as well. Vascular surgery was consulted to give recommendations on carotid disease but no acute intervention was recommended; she will follow up in 4 weeks as outpatient. Carotid US showed velocities suggestive of 60-79% bilaterally but plaque visualized is closer to 1-39% bilaterally. She will have a repeat carotid US with vascular surgery. TCDs were unable to be performed due to lack of windows. Ms. Knobloch will go home without any therapy needs. She is on aspirin and plavix for secondary stroke prevention as well as atorvastatin 40mg daily. She was provided with stroke education prior to discharge.

## 2018-01-29 NOTE — PROGRESS NOTES
[facility]  Vascular Neurology  Comprehensive Stroke Center  Progress Note    Assessment/Plan:     * CRAO (central retinal artery occlusion), left    84 yo female w CRAO on the left eye.   CTA with bilateral carotid disease, carotid US with 1-39% plaque but velocities suggestive of higher degree of stenosis. Vascular surgery consulted but defer intervention at this time, will follow up as outpatient.   ECHO with LAE.       Antiplatelets: ASA 325mg/plavix 75mg   Statins: Atorvastatin 40mg daily   Diagnostics pending: none  DVT PPX: heparin 5000u subq  PT/OT/ST evaluate and treat: recommended for home without therapy  Follow up: Vascular surgery (Dr. Portillo), vascular surgery and neurosurgery (for incidental likely meningioma), opthalmology (Dr. Cole outpatient in 1 month)            Blurry vision, bilateral    Consult ophthalmology, appreciate recommendations        Meningioma    1.2 cm enhancing mass in the posterior fossa, likely meningioma  Will have patient follow up as OP with neurosurgery; defer to neurosurgery for further imaging needs        Hypothyroidism    Stroke risk factor  Levothyroxine 88mcg         HLD (hyperlipidemia)    Stroke risk factor   Atorvastatin 40mg daily        Essential hypertension    Stroke risk factor  BP<140 as outpatient              1/29/18: NAEON. TCD unable to be performed; no windows to visualize. Vascular surgery defers intervention, recommend follow up as outpatient.     STROKE DOCUMENTATION        NIH Scale:  1a. Level Of Consciousness: 0-->Alert: keenly responsive  1b. LOC Questions: 0-->Answers both questions correctly  1c. LOC Commands: 0-->Performs both tasks correctly  2. Best Gaze: 0-->Normal  3. Visual: 2-->Complete hemianopia  4. Facial Palsy: 0-->Normal symmetrical movements  5a. Motor Arm, Left: 0-->No drift: limb holds 90 (or 45) degrees for full 10 secs  5b. Motor Arm, Right: 0-->No drift: limb holds 90 (or 45) degrees for full 10 secs  6a.  Motor Leg, Left: 0-->No drift: leg holds 30 degree position for full 5 secs  6b. Motor Leg, Right: 0-->No drift: leg holds 30 degree position for full 5 secs  7. Limb Ataxia: 0-->Absent  8. Sensory: 0-->Normal: no sensory loss  9. Best Language: 0-->No aphasia: normal  10. Dysarthria: 0-->Normal  11. Extinction and Inattention (formerly Neglect): 0-->No abnormality  Total (NIH Stroke Scale): 2       Modified [place] Score: 0  [place] Coma Scale:    ABCD2 Score:    [id number] Score:   HAS -BLED Score:   ICH Score:   [place] Classification:      Hemorrhagic change of an Ischemic Stroke: Does this patient have an ischemic stroke with hemorrhagic changes? No     Neurologic Chief Complaint: L CRAO    Subjective:     Interval History: Patient is seen for follow-up neurological assessment and treatment recommendations:     NAEON. TCD unable to be performed; no windows to visualize. Vascular surgery defers intervention, recommend follow up as outpatient.     HPI, Past Medical, Family, and Social History remains the same as documented in the initial encounter.     Review of Systems   Constitutional: Negative for fever.   Eyes: Positive for visual disturbance. Negative for photophobia and pain.   Respiratory: Negative for shortness of breath.    Cardiovascular: Negative for palpitations.   Musculoskeletal: Negative for gait problem.   Neurological: Negative for headaches.   Psychiatric/Behavioral: Negative for agitation and confusion.     Scheduled Meds:   amLODIPine  10 mg Oral Daily    aspirin  325 mg Oral Daily    atorvastatin  40 mg Oral Daily    clopidogrel  75 mg Oral Daily    doxazosin  1 mg Oral QHS    heparin (porcine)  5,000 Units Subcutaneous Q8H    hydroCHLOROthiazide  12.5 mg Oral Daily    levothyroxine  88 mcg Oral Before breakfast    metoprolol tartrate  25 mg Oral BID    sodium chloride 0.9%  3 mL Intravenous Q8H    valsartan  320 mg Oral Daily     Continuous Infusions:   sodium chloride 0.9%        PRN Meds:labetalol, sodium chloride 0.9%    Objective:     Vital Signs (Most Recent):  Temp: 97.6 °F (36.4 °C) (01/29/18 1536)  Pulse: 67 (01/29/18 1536)  Resp: 17 (01/29/18 1536)  BP: (!) 120/57 (01/29/18 1536)  SpO2: (!) 94 % (01/29/18 1536)  BP Location: Left arm    Vital Signs Range (Last 24H):  Temp:  [97.6 °F (36.4 °C)-98.2 °F (36.8 °C)]   Pulse:  [58-79]   Resp:  [17-20]   BP: (114-144)/(53-63)   SpO2:  [91 %-100 %]   BP Location: Left arm    Physical Exam   Constitutional: She appears well-developed and well-nourished. No distress.   HENT:   Head: Normocephalic and atraumatic.   Eyes: EOM are normal.   Neck: Normal range of motion. Neck supple.   Abdominal: Soft.   Neurological: She is alert.   Skin: Skin is warm and dry. She is not diaphoretic.       Neurological Exam:   LOC: alert  Attention Span: Good   Language: No aphasia  Articulation: No dysarthria  Orientation: Person, Place, Time   Visual Fields: Monocular visual loss: left  EOM (CN III, IV, VI): Full/intact  Facial Movement (CN VII): Symmetric facial expression    Motor: Arm left  Normal 5/5  Leg left  Normal 5/5  Arm right  Normal 5/5  Leg right Normal 5/5  Cebellar: No evidence of appendicular or axial ataxia  Sensation: Intact to light touch, temperature and vibration  Tone: Normal tone throughout    Laboratory:  CMP:   Recent Labs  Lab 01/29/18  0526   CALCIUM 9.6   ALBUMIN 3.2*   PROT 6.4      K 3.7   CO2 26      BUN 21   CREATININE 0.7   ALKPHOS 71   ALT 7*   AST 13   BILITOT 0.4     BMP:   Recent Labs  Lab 01/29/18  0526      K 3.7      CO2 26   BUN 21   CREATININE 0.7   CALCIUM 9.6     CBC:   Recent Labs  Lab 01/29/18  0526   WBC 5.64   RBC 3.83*   HGB 11.7*   HCT 34.8*      MCV 91   MCH 30.5   MCHC 33.6     Lipid Panel:   Recent Labs  Lab 01/28/18  1317   CHOL 277*   LDLCALC 180.6*   HDL 68   TRIG 142     Coagulation:   Recent Labs  Lab 01/29/18  0526   INR 1.0   APTT 24.8     Platelet Aggregation Study:  No results for input(s): PLTAGG, PLTAGINTERP, PLTAGREGLACO, ADPPLTAGGREG in the last 168 hours.  Hgb A1C:   Recent Labs  Lab 01/28/18  1317   HGBA1C 5.2     TSH:   Recent Labs  Lab 01/28/18  1317   TSH 0.165*       Diagnostic Results     Brain Imaging   CT head 1/28/18:   1.2  cm partially calcified structure centered within the posterior fossa located along the tentorium which appears to demonstrate enhancement.  The findings represent a partially calcified meningioma.  Nonemergent MRI of the brain with contrast may be obtained for additional evaluation.  Grossly unchanged irregular soft tissue opacity within the right lung apex.  Significant degenerative changes of the cervical spine.    Vessel Imaging   CTA head and neck 1/28/18:  Significant atherosclerosis of the carotid bulbs bilaterally with approximately 25 percent stenosis of the proximal ICA by mass effect criteria.  Otherwise, no evidence of significant stenosis or vascular occlusion.    Persistent beaded appearance of the bilateral internal carotid arteries, left greater than right, which can be seen in the setting of fibromuscular dysplasia.    1.2  cm partially calcified structure centered within the posterior fossa located along the tentorium which appears to demonstrate enhancement.  The findings represent a partially calcified meningioma.  Nonemergent MRI of the brain with contrast may be obtained for additional evaluation.    Grossly unchanged irregular soft tissue opacity within the right lung apex.    Significant degenerative changes of the cervical spine.    Cardiac Imaging   ECHO 1/29/18:   CONCLUSIONS     1 - Normal left ventricular systolic function (EF 60-65%).     2 - Normal right ventricular systolic function .     3 - Moderate left atrial enlargement.     4 - Mild tricuspid regurgitation.     5 - Marked mitral annular calcification without stenosis.     6 - Aortic sclerosis without stenosis.     7 - The estimated PA systolic pressure is  33 mmHg      Gerda Le PA-C  Comprehensive Stroke Center  Department of Vascular Neurology   [facility]

## 2018-01-29 NOTE — CONSULTS
[facility]  Vascular Surgery  Consult Note    Inpatient consult to Vascular Surgery  Consult performed by: ONELIA BYRNES  Consult ordered by: CORINNE SIMON        Subjective:     Chief Complaint/Reason for Admission: left vision changes    History of Present Illness: 86 yo F with h/o HTN, HLD, sick sinus syndrome s/p pacemaker, TIA (2013)  Presenting to hospital for changes to vision in left eye.  Patient reports that on Saturday had sparkling lights around her vision around 12:30 AM followed by a weblike vision superimposed, awoke around 5 am on Sunday with everything dark in her left eye with the exception of supratemporal vision.  Was seen initially by ophthalmology who diagnosed with left central retinal artery occlusion; with involvement of vascular neurology. Initial NIHSS was 1.  CTA head/neck done showing moderate stenosis of R ICA; minimal stenosis of L ICA.  Patient had US 2013 demonstrating 50-69% R ICA stenosis was well.  Vascular surgery consulted for possible intervention.    Patient reports being taken off her ASA, has not been taking.  Has had issues with shortness of breath with ambulating, is being worked up by cards/pulmonolgy currently.    Never a smoker.No prior MI.    Prescriptions Prior to Admission   Medication Sig Dispense Refill Last Dose    amLODIPine (NORVASC) 10 MG tablet Take 1 tablet (10 mg total) by mouth once daily. 30 tablet 11 1/28/2018    cholecalciferol, vitamin D3, 50,000 unit capsule Take 50,000 Units by mouth every 7 days.  5 1/28/2018    doxazosin (CARDURA) 1 MG tablet Take 1 tablet (1 mg total) by mouth every evening. 30 tablet 11 1/28/2018    ibuprofen (ADVIL,MOTRIN) 800 MG tablet TK 1 T PO   Q EIGHT H PRN P  0 1/27/2018    levothyroxine (SYNTHROID) 88 MCG tablet Take 88 mcg by mouth before breakfast.    1/28/2018    metoprolol succinate (TOPROL-XL) 25 MG 24 hr tablet    1/28/2018    valsartan-hydrochlorothiazide (DIOVAN-HCT) 320-12.5  mg per tablet Take 1 tablet by mouth once daily.  6 1/28/2018    ADVAIR DISKUS 250-50 mcg/dose diskus inhaler        aspirin 325 MG tablet Take 325 mg by mouth once daily.       desloratadine (CLARINEX) 5 mg tablet        fish oil-omega-3 fatty acids 300-1,000 mg capsule Take 1 g by mouth once daily.       montelukast (SINGULAIR) 10 mg tablet        NITROSTAT 0.4 mg SL tablet        VENTOLIN HFA 90 mcg/actuation inhaler           Review of patient's allergies indicates:   Allergen Reactions    Aspartame Swelling     equal       Past Medical History:   Diagnosis Date    Anticoagulant long-term use     Arthritis     Bilateral nonexudative age-related macular degeneration 8/27/2013    Blood transfusion     Cataract     CHF (congestive heart failure)     Enlarged heart     High cholesterol     Hypertension     Sick sinus syndrome     Stroke     Thyroid disease      Past Surgical History:   Procedure Laterality Date    CARDIAC PACEMAKER PLACEMENT      CATARACT EXTRACTION Right     JOINT REPLACEMENT      SKIN BIOPSY      TONSILLECTOMY      TOTAL HIP ARTHROPLASTY  11-1-2000    [facility]     Family History     Problem Relation (Age of Onset)    Arthritis Sister, Maternal Grandmother, Paternal Grandmother    Birth defects Son    Cancer Paternal Aunt (80)    Diabetes Paternal Aunt, Paternal Uncle    Early death Mother, Father    Hearing loss Daughter    Hypertension Son, Paternal Grandmother        Social History Main Topics    Smoking status: Never Smoker    Smokeless tobacco: Never Used    Alcohol use No    Drug use: No    Sexual activity: Not on file     Review of Systems   Constitutional: Negative for activity change and chills.   HENT: Negative for congestion and dental problem.    Eyes: Positive for visual disturbance.   Respiratory: Positive for shortness of breath. Negative for apnea.    Cardiovascular: Negative for chest pain and leg swelling.   Gastrointestinal: Negative  for abdominal distention and abdominal pain.   Endocrine: Negative for cold intolerance and heat intolerance.   Genitourinary: Negative for difficulty urinating.   Musculoskeletal: Negative for arthralgias and back pain.   Skin: Negative for color change and pallor.   Allergic/Immunologic: Negative for environmental allergies and food allergies.   Neurological: Negative for dizziness.   Hematological: Negative for adenopathy. Does not bruise/bleed easily.   Psychiatric/Behavioral: Negative for agitation and behavioral problems.     Objective:     Vital Signs (Most Recent):  Temp: 98.1 °F (36.7 °C) (01/29/18 0832)  Pulse: 65 (01/29/18 0832)  Resp: 17 (01/29/18 0832)  BP: 137/60 (01/29/18 0832)  SpO2: (!) 94 % (01/29/18 0832) Vital Signs (24h Range):  Temp:  [97.7 °F (36.5 °C)-98.2 °F (36.8 °C)] 98.1 °F (36.7 °C)  Pulse:  [59-79] 65  Resp:  [13-20] 17  SpO2:  [91 %-100 %] 94 %  BP: (127-161)/(57-74) 137/60     Weight: 72.7 kg (160 lb 4.8 oz)  Body mass index is 28.4 kg/m².    Physical Exam   Constitutional: She is oriented to person, place, and time. She appears well-developed and well-nourished.   HENT:   Head: Normocephalic and atraumatic.   Eyes: EOM are normal.   Cardiovascular: Normal rate and regular rhythm.    Pulses:       Femoral pulses are 2+ on the right side, and 2+ on the left side.  Right 2+ DP; Left biphasic DP and PT   Pulmonary/Chest: Effort normal. No respiratory distress.   Abdominal: Soft. She exhibits no distension. There is no tenderness.   Musculoskeletal: Normal range of motion. She exhibits no edema.   Neurological: She is alert and oriented to person, place, and time.   5/5 strength bilateral upper and lower extremities   Skin: Skin is warm.       Significant Labs:  CBC:   Recent Labs  Lab 01/29/18  0526   WBC 5.64   RBC 3.83*   HGB 11.7*   HCT 34.8*      MCV 91   MCH 30.5   MCHC 33.6     CMP:   Recent Labs  Lab 01/29/18  0526   GLU 91   CALCIUM 9.6   ALBUMIN 3.2*   PROT 6.4       K 3.7   CO2 26      BUN 21   CREATININE 0.7   ALKPHOS 71   ALT 7*   AST 13   BILITOT 0.4     Coagulation:   Recent Labs  Lab 01/29/18  0526   LABPROT 10.3   INR 1.0   APTT 24.8       Significant Diagnostics:  CT ANGIOGRAM OF THE HEAD AND NECK:     TECHNIQUE: Initial noncontrast  scanogram performed. Using multidetector helical CT technique, and 75 cc of 350 contrast IV, thin section arterial phase axial images of the neck and head (COW) were performed.2 D coronal, sagittal, and/or 3-D reconstructions post processing of the intracranial and neck vessels was performed.     COMPARISON: CTA head and neck 12/13/13    INDICATION: Vessel stenosis, vision loss    FINDINGS:    CT head:    The ventricular system is normal in size and configuration.    There is mild patchy hypoattenuation of supratentorial white matter in a distribution consistent with mild chronic microvascular ischemic change.  No evidence of large vascular territory infarct.  No intra-axial mass or intracranial hemorrhage.    There is a 1.2 cm partially calcified structure centered within the posterior fossa located along the tentorium which appears to demonstrate enhancement.  No extra-axial hemorrhage.  No other enhancing lesion.    CTA:  There is calcific atherosclerosis of the aortic arch.  The bilateral common carotid arteries appear widely patent.  There is significant calcified and noncalcified atherosclerotic plaque of the carotid bulbs with approximately 25 percent stenosis bilaterally by NASCET criteria.  There is a persistent beaded appearance of the bilateral internal carotid arteries, left greater than right, which is nonspecific but can be seen in setting of fibromuscular dysplasia.  There is mild calcific atherosclerosis of the cavernous ICA bilaterally without significant stenosis.  The internal carotid arteries are otherwise widely patent.  No significant stenosis or occlusion of the intracranial anterior circulation including  the MCA and HOMERO.    The vertebral arteries are widely patent bilaterally.  There is mild calcific atherosclerosis of the V4 segments.  The basilar artery and posterior cerebral arteries are patent without evidence of stenosis or occlusion.  No vascular aneurysm.    The soft tissue structures of the neck reveal no significant abnormality.    Airways appear patent.  There is an irregular soft tissue opacity containing calcifications within the right lung apex which appears relatively stable in extent from the previous examination on 9/6/16.  No new pulmonary lesions.    There is severe degenerative change of the cervical spine with associated intervertebral disc space narrowing, endplate changes, and mild kyphotic curvature.  No evidence of acute fracture or destructive osseous lesion.  The calvarium appears normal.  There is partial opacification of the left mastoid air cells.   Impression        Significant atherosclerosis of the carotid bulbs bilaterally with approximately 25 percent stenosis of the proximal ICA by mass effect criteria.  Otherwise, no evidence of significant stenosis or vascular occlusion.    Persistent beaded appearance of the bilateral internal carotid arteries, left greater than right, which can be seen in the setting of fibromuscular dysplasia.    1.2  cm partially calcified structure centered within the posterior fossa located along the tentorium which appears to demonstrate enhancement.  The findings represent a partially calcified meningioma.  Nonemergent MRI of the brain with contrast may be obtained for additional evaluation.    Grossly unchanged irregular soft tissue opacity within the right lung apex.    Significant degenerative changes of the cervical spine.        ______________________________________     Electronically signed by resident: CORAL DOWNEY MD         Assessment/Plan:     * CRAO (central retinal artery occlusion), left    84 yo F with h/o HTN, HLD, sick sinus syndrome  s/p pacemaker, TIA (2013)  Presenting to hospital for changes to vision in left eye found to have L CRAO.    Patient's right ICA with higher grade stenosis than left; will obtain US to assess (2013 with 50-69% stenosis R ICA)  Agree with dual antiplatelet for L CRAO (ASA 81 mg, plavix) high dose statin  No intervention on L ICA at this time; patient needs continued w/u for shortness of breath  Will arrange for follow up with Dr. Portillo in 3-4 weeks with repeat US            Thank you for your consult.     Meme Whitehead MD  Vascular Surgery  [facility]    Vascular Attending  Agree with above  Very active and functional 86 yo woman with h/o CAD, pacemaker presenting with L CRAO - acute L vision changes on 1/27/18  Reviewed CTA neck:  L ICA has no stenosis but a kink in mid-ICA and some mild web-like changes, as seen in FMD. However, these are not severe.    Etiology may have been the significant kink in the L ICA  R ICA has at least 60% stenosis; could obtain carotid u/s to further characterize  Would treat with ASA 81 po, plavix 75 po qd and statin rx  Fu with me in 1 month    Tino Portillo MD FACS

## 2018-01-29 NOTE — ED NOTES
2nd attempt made to call report, states still waiting to move a patient off the unit in order to accept this patient, charge nurse Edgar made aware.

## 2018-01-29 NOTE — PROCEDURES
**THIS IS A PRELIMINARY REPORT**     Notable abnormal values in bold:         Findings:   Patient has absent bilateral transtemporal windows. Unable to perform emboli detection study.    Please correlate clinically.    See final report for detailed and verified results.       ESTELLE Harrison  Registered Vascular Sonographer  [phone] (Office)

## 2018-01-29 NOTE — SUBJECTIVE & OBJECTIVE
Prescriptions Prior to Admission   Medication Sig Dispense Refill Last Dose    amLODIPine (NORVASC) 10 MG tablet Take 1 tablet (10 mg total) by mouth once daily. 30 tablet 11 1/28/2018    cholecalciferol, vitamin D3, 50,000 unit capsule Take 50,000 Units by mouth every 7 days.  5 1/28/2018    doxazosin (CARDURA) 1 MG tablet Take 1 tablet (1 mg total) by mouth every evening. 30 tablet 11 1/28/2018    ibuprofen (ADVIL,MOTRIN) 800 MG tablet TK 1 T PO   Q EIGHT H PRN P  0 1/27/2018    levothyroxine (SYNTHROID) 88 MCG tablet Take 88 mcg by mouth before breakfast.    1/28/2018    metoprolol succinate (TOPROL-XL) 25 MG 24 hr tablet    1/28/2018    valsartan-hydrochlorothiazide (DIOVAN-HCT) 320-12.5 mg per tablet Take 1 tablet by mouth once daily.  6 1/28/2018    ADVAIR DISKUS 250-50 mcg/dose diskus inhaler        aspirin 325 MG tablet Take 325 mg by mouth once daily.       desloratadine (CLARINEX) 5 mg tablet        fish oil-omega-3 fatty acids 300-1,000 mg capsule Take 1 g by mouth once daily.       montelukast (SINGULAIR) 10 mg tablet        NITROSTAT 0.4 mg SL tablet        VENTOLIN HFA 90 mcg/actuation inhaler           Review of patient's allergies indicates:   Allergen Reactions    Aspartame Swelling     equal       Past Medical History:   Diagnosis Date    Anticoagulant long-term use     Arthritis     Bilateral nonexudative age-related macular degeneration 8/27/2013    Blood transfusion     Cataract     CHF (congestive heart failure)     Enlarged heart     High cholesterol     Hypertension     Sick sinus syndrome     Stroke     Thyroid disease      Past Surgical History:   Procedure Laterality Date    CARDIAC PACEMAKER PLACEMENT      CATARACT EXTRACTION Right     JOINT REPLACEMENT      SKIN BIOPSY      TONSILLECTOMY      TOTAL HIP ARTHROPLASTY  11-1-2000    [facility]     Family History     Problem Relation (Age of Onset)    Arthritis Sister, Maternal Grandmother,  Paternal Grandmother    Birth defects Son    Cancer Paternal Aunt (80)    Diabetes Paternal Aunt, Paternal Uncle    Early death Mother, Father    Hearing loss Daughter    Hypertension Son, Paternal Grandmother        Social History Main Topics    Smoking status: Never Smoker    Smokeless tobacco: Never Used    Alcohol use No    Drug use: No    Sexual activity: Not on file     Review of Systems   Constitutional: Negative for activity change and chills.   HENT: Negative for congestion and dental problem.    Eyes: Positive for visual disturbance.   Respiratory: Positive for shortness of breath. Negative for apnea.    Cardiovascular: Negative for chest pain and leg swelling.   Gastrointestinal: Negative for abdominal distention and abdominal pain.   Endocrine: Negative for cold intolerance and heat intolerance.   Genitourinary: Negative for difficulty urinating.   Musculoskeletal: Negative for arthralgias and back pain.   Skin: Negative for color change and pallor.   Allergic/Immunologic: Negative for environmental allergies and food allergies.   Neurological: Negative for dizziness.   Hematological: Negative for adenopathy. Does not bruise/bleed easily.   Psychiatric/Behavioral: Negative for agitation and behavioral problems.     Objective:     Vital Signs (Most Recent):  Temp: 98.1 °F (36.7 °C) (01/29/18 0832)  Pulse: 65 (01/29/18 0832)  Resp: 17 (01/29/18 0832)  BP: 137/60 (01/29/18 0832)  SpO2: (!) 94 % (01/29/18 0832) Vital Signs (24h Range):  Temp:  [97.7 °F (36.5 °C)-98.2 °F (36.8 °C)] 98.1 °F (36.7 °C)  Pulse:  [59-79] 65  Resp:  [13-20] 17  SpO2:  [91 %-100 %] 94 %  BP: (127-161)/(57-74) 137/60     Weight: 72.7 kg (160 lb 4.8 oz)  Body mass index is 28.4 kg/m².    Physical Exam   Constitutional: She is oriented to person, place, and time. She appears well-developed and well-nourished.   HENT:   Head: Normocephalic and atraumatic.   Eyes: EOM are normal.   Cardiovascular: Normal rate and regular rhythm.     Pulses:       Femoral pulses are 2+ on the right side, and 2+ on the left side.  Right 2+ DP; Left biphasic DP and PT   Pulmonary/Chest: Effort normal. No respiratory distress.   Abdominal: Soft. She exhibits no distension. There is no tenderness.   Musculoskeletal: Normal range of motion. She exhibits no edema.   Neurological: She is alert and oriented to person, place, and time.   5/5 strength bilateral upper and lower extremities   Skin: Skin is warm.       Significant Labs:  CBC:   Recent Labs  Lab 01/29/18  0526   WBC 5.64   RBC 3.83*   HGB 11.7*   HCT 34.8*      MCV 91   MCH 30.5   MCHC 33.6     CMP:   Recent Labs  Lab 01/29/18  0526   GLU 91   CALCIUM 9.6   ALBUMIN 3.2*   PROT 6.4      K 3.7   CO2 26      BUN 21   CREATININE 0.7   ALKPHOS 71   ALT 7*   AST 13   BILITOT 0.4     Coagulation:   Recent Labs  Lab 01/29/18  0526   LABPROT 10.3   INR 1.0   APTT 24.8       Significant Diagnostics:  CT ANGIOGRAM OF THE HEAD AND NECK:     TECHNIQUE: Initial noncontrast  scanogram performed. Using multidetector helical CT technique, and 75 cc of 350 contrast IV, thin section arterial phase axial images of the neck and head (COW) were performed.2 D coronal, sagittal, and/or 3-D reconstructions post processing of the intracranial and neck vessels was performed.     COMPARISON: CTA head and neck 12/13/13    INDICATION: Vessel stenosis, vision loss    FINDINGS:    CT head:    The ventricular system is normal in size and configuration.    There is mild patchy hypoattenuation of supratentorial white matter in a distribution consistent with mild chronic microvascular ischemic change.  No evidence of large vascular territory infarct.  No intra-axial mass or intracranial hemorrhage.    There is a 1.2 cm partially calcified structure centered within the posterior fossa located along the tentorium which appears to demonstrate enhancement.  No extra-axial hemorrhage.  No other enhancing  lesion.    CTA:  There is calcific atherosclerosis of the aortic arch.  The bilateral common carotid arteries appear widely patent.  There is significant calcified and noncalcified atherosclerotic plaque of the carotid bulbs with approximately 25 percent stenosis bilaterally by NASCET criteria.  There is a persistent beaded appearance of the bilateral internal carotid arteries, left greater than right, which is nonspecific but can be seen in setting of fibromuscular dysplasia.  There is mild calcific atherosclerosis of the cavernous ICA bilaterally without significant stenosis.  The internal carotid arteries are otherwise widely patent.  No significant stenosis or occlusion of the intracranial anterior circulation including the MCA and HOMERO.    The vertebral arteries are widely patent bilaterally.  There is mild calcific atherosclerosis of the V4 segments.  The basilar artery and posterior cerebral arteries are patent without evidence of stenosis or occlusion.  No vascular aneurysm.    The soft tissue structures of the neck reveal no significant abnormality.    Airways appear patent.  There is an irregular soft tissue opacity containing calcifications within the right lung apex which appears relatively stable in extent from the previous examination on 9/6/16.  No new pulmonary lesions.    There is severe degenerative change of the cervical spine with associated intervertebral disc space narrowing, endplate changes, and mild kyphotic curvature.  No evidence of acute fracture or destructive osseous lesion.  The calvarium appears normal.  There is partial opacification of the left mastoid air cells.   Impression        Significant atherosclerosis of the carotid bulbs bilaterally with approximately 25 percent stenosis of the proximal ICA by mass effect criteria.  Otherwise, no evidence of significant stenosis or vascular occlusion.    Persistent beaded appearance of the bilateral internal carotid arteries, left greater  than right, which can be seen in the setting of fibromuscular dysplasia.    1.2  cm partially calcified structure centered within the posterior fossa located along the tentorium which appears to demonstrate enhancement.  The findings represent a partially calcified meningioma.  Nonemergent MRI of the brain with contrast may be obtained for additional evaluation.    Grossly unchanged irregular soft tissue opacity within the right lung apex.    Significant degenerative changes of the cervical spine.        ______________________________________     Electronically signed by resident: CORAL DOWNEY MD

## 2018-01-29 NOTE — HPI
Dorothy Knobloch is a 85-year-old female with PMHx of thyroid disease, SSS (s/p PPM), HLP, HTN, CHF, and macular degeneration.  Patient presented to [facility] [place] [place] on 1/28 with decreased vision of the L eye without trauma.  Opthalmology were consulted and recommend VN consult for central retinal artery occlusion.  She previously had a Echo in 2016 which revealed 50-59% stenosis of bilateral carotid arteries, most likely source of emboli per Opthalmology.  CTA revealed significant atherosclerosis of the carotid bulbs bilaterally with a partially calcified structure centered within the posterior fossa.    Echo pending.     Functional History: Patient lives in [place] with  in a two story home with 1 step to enter.  Prior to admission, (I) with ADLs, mobility, and driving.  DME: [place].

## 2018-01-29 NOTE — SUBJECTIVE & OBJECTIVE
Neurologic Chief Complaint: L CRAO    Subjective:     Interval History: Patient is seen for follow-up neurological assessment and treatment recommendations:     NAEON. TCD unable to be performed; no windows to visualize. Vascular surgery defers intervention, recommend follow up as outpatient.     HPI, Past Medical, Family, and Social History remains the same as documented in the initial encounter.     Review of Systems   Constitutional: Negative for fever.   Eyes: Positive for visual disturbance. Negative for photophobia and pain.   Respiratory: Negative for shortness of breath.    Cardiovascular: Negative for palpitations.   Musculoskeletal: Negative for gait problem.   Neurological: Negative for headaches.   Psychiatric/Behavioral: Negative for agitation and confusion.     Scheduled Meds:   amLODIPine  10 mg Oral Daily    aspirin  325 mg Oral Daily    atorvastatin  40 mg Oral Daily    clopidogrel  75 mg Oral Daily    doxazosin  1 mg Oral QHS    heparin (porcine)  5,000 Units Subcutaneous Q8H    hydroCHLOROthiazide  12.5 mg Oral Daily    levothyroxine  88 mcg Oral Before breakfast    metoprolol tartrate  25 mg Oral BID    sodium chloride 0.9%  3 mL Intravenous Q8H    valsartan  320 mg Oral Daily     Continuous Infusions:   sodium chloride 0.9%       PRN Meds:labetalol, sodium chloride 0.9%    Objective:     Vital Signs (Most Recent):  Temp: 97.6 °F (36.4 °C) (01/29/18 1536)  Pulse: 67 (01/29/18 1536)  Resp: 17 (01/29/18 1536)  BP: (!) 120/57 (01/29/18 1536)  SpO2: (!) 94 % (01/29/18 1536)  BP Location: Left arm    Vital Signs Range (Last 24H):  Temp:  [97.6 °F (36.4 °C)-98.2 °F (36.8 °C)]   Pulse:  [58-79]   Resp:  [17-20]   BP: (114-144)/(53-63)   SpO2:  [91 %-100 %]   BP Location: Left arm    Physical Exam   Constitutional: She appears well-developed and well-nourished. No distress.   HENT:   Head: Normocephalic and atraumatic.   Eyes: EOM are normal.   Neck: Normal range of motion. Neck supple.    Abdominal: Soft.   Neurological: She is alert.   Skin: Skin is warm and dry. She is not diaphoretic.       Neurological Exam:   LOC: alert  Attention Span: Good   Language: No aphasia  Articulation: No dysarthria  Orientation: Person, Place, Time   Visual Fields: Monocular visual loss: left  EOM (CN III, IV, VI): Full/intact  Facial Movement (CN VII): Symmetric facial expression    Motor: Arm left  Normal 5/5  Leg left  Normal 5/5  Arm right  Normal 5/5  Leg right Normal 5/5  Cebellar: No evidence of appendicular or axial ataxia  Sensation: Intact to light touch, temperature and vibration  Tone: Normal tone throughout    Laboratory:  CMP:   Recent Labs  Lab 01/29/18  0526   CALCIUM 9.6   ALBUMIN 3.2*   PROT 6.4      K 3.7   CO2 26      BUN 21   CREATININE 0.7   ALKPHOS 71   ALT 7*   AST 13   BILITOT 0.4     BMP:   Recent Labs  Lab 01/29/18  0526      K 3.7      CO2 26   BUN 21   CREATININE 0.7   CALCIUM 9.6     CBC:   Recent Labs  Lab 01/29/18  0526   WBC 5.64   RBC 3.83*   HGB 11.7*   HCT 34.8*      MCV 91   MCH 30.5   MCHC 33.6     Lipid Panel:   Recent Labs  Lab 01/28/18  1317   CHOL 277*   LDLCALC 180.6*   HDL 68   TRIG 142     Coagulation:   Recent Labs  Lab 01/29/18  0526   INR 1.0   APTT 24.8     Platelet Aggregation Study: No results for input(s): PLTAGG, PLTAGINTERP, PLTAGREGLACO, ADPPLTAGGREG in the last 168 hours.  Hgb A1C:   Recent Labs  Lab 01/28/18  1317   HGBA1C 5.2     TSH:   Recent Labs  Lab 01/28/18  1317   TSH 0.165*       Diagnostic Results     Brain Imaging   CT head 1/28/18:   1.2  cm partially calcified structure centered within the posterior fossa located along the tentorium which appears to demonstrate enhancement.  The findings represent a partially calcified meningioma.  Nonemergent MRI of the brain with contrast may be obtained for additional evaluation.  Grossly unchanged irregular soft tissue opacity within the right lung apex.  Significant degenerative  changes of the cervical spine.    Vessel Imaging   CTA head and neck 1/28/18:  Significant atherosclerosis of the carotid bulbs bilaterally with approximately 25 percent stenosis of the proximal ICA by mass effect criteria.  Otherwise, no evidence of significant stenosis or vascular occlusion.    Persistent beaded appearance of the bilateral internal carotid arteries, left greater than right, which can be seen in the setting of fibromuscular dysplasia.    1.2  cm partially calcified structure centered within the posterior fossa located along the tentorium which appears to demonstrate enhancement.  The findings represent a partially calcified meningioma.  Nonemergent MRI of the brain with contrast may be obtained for additional evaluation.    Grossly unchanged irregular soft tissue opacity within the right lung apex.    Significant degenerative changes of the cervical spine.    Cardiac Imaging   ECHO 1/29/18:   CONCLUSIONS     1 - Normal left ventricular systolic function (EF 60-65%).     2 - Normal right ventricular systolic function .     3 - Moderate left atrial enlargement.     4 - Mild tricuspid regurgitation.     5 - Marked mitral annular calcification without stenosis.     6 - Aortic sclerosis without stenosis.     7 - The estimated PA systolic pressure is 33 mmHg

## 2018-01-29 NOTE — ED NOTES
Hourly rounding complete. Patient resting in stretcher and is in NAD at this time. Pt is awake alert and oriented x4, VSS, respirations even and unlabored. Family at bedside. Pt and family aware of POC. Bed low and locked with side rails up x2, call bell in pt reach. Pt voices no needs at this time.

## 2018-01-29 NOTE — PLAN OF CARE
Problem: Physical Therapy Goal  Goal: Physical Therapy Goal  Outcome: Outcome(s) achieved Date Met: 01/29/18  Initial eval completed.  Results, POC, and therapy recommendations discussed with patient and .  Complete evaluation documentation to follow.     Pt requires supervision with mobility in the hospital setting d/t visual deficits. Patient is at her baseline mobility.     Rubi Alvarado, PT  1/29/2018  [phone] (pager)

## 2018-01-29 NOTE — HPI
84 yo female who presented with left eye decreased vision.  She denies associated sympotms like pain or weakness. Has not had in the past.  Symptoms have not improved.  Has not been treated. Seen by ophtho and dx with left central retinal artery occlusion.  There were no clear triggers.

## 2018-01-29 NOTE — ASSESSMENT & PLAN NOTE
-presented with decreased L vision without trauma  opthalmology consult and concern for CRAO given previous Echo with notable stenosis  - likely embolic etiology for CRAO  -VN consulted for evaluation of carotid stenosis  -Echo pending

## 2018-01-29 NOTE — PLAN OF CARE
Problem: Occupational Therapy Goal  Goal: Occupational Therapy Goal  Goals not set 2* no further OT needed.    Outcome: Outcome(s) achieved Date Met: 01/29/18  OT evaluation completed.  Discharged from OT.  ALAN Robertson  1/29/2018

## 2018-01-29 NOTE — H&P
[facility]  Vascular Neurology  Comprehensive Stroke Center  History & Physical    Inpatient consult to Vascular (Stroke) Neurology  Consult performed by: CORINNE SIMON  Consult ordered by: GARETT HERNANDEZ  Reason for consult: L CRAO  Assessment/Recommendations: See note        Assessment/Plan:     Patient is a 85 y.o. year old female with:    * CRAO (central retinal artery occlusion), left    84 yo female w CRAO on the left eye.  Will complete workup with cardiac echo, but has somewhat irregular plaque in the right carotid artery.    Will get TCD for microemboli and consult vascular surgery for their impression.            STROKE DOCUMENTATION          NIH Scale:  Interval: baseline (upon arrival/admit)  1a. Level Of Consciousness: 0-->Alert: keenly responsive  1b. LOC Questions: 0-->Answers both questions correctly  1c. LOC Commands: 0-->Performs both tasks correctly  2. Best Gaze: 0-->Normal  3. Visual: 1-->Partial hemianopia  4. Facial Palsy: 0-->Normal symmetrical movements  5a. Motor Arm, Left: 0-->No drift: limb holds 90 (or 45) degrees for full 10 secs  5b. Motor Arm, Right: 0-->No drift: limb holds 90 (or 45) degrees for full 10 secs  6a. Motor Leg, Left: 0-->No drift: leg holds 30 degree position for full 5 secs  6b. Motor Leg, Right: 0-->No drift: leg holds 30 degree position for full 5 secs  7. Limb Ataxia: 0-->Absent  8. Sensory: 0-->Normal: no sensory loss  9. Best Language: 0-->No aphasia: normal  10. Dysarthria: 0-->Normal  11. Extinction and Inattention (formerly Neglect): 0-->No abnormality  Total (NIH Stroke Scale): 1     Modified [place] Score: 0  John Coma Scale:15   ABCD2 Score:    [id number] Score:   HAS -BLED Score:   ICH Score:   Hunt & Keys Classification:      Thrombolysis Candidate? No, Out of window       Interventional Revascularization Candidate?   Is the patient eligible for mechanical endovascular reperfusion (EVERTON)?  No; No large vessel  occlusion    Hemorrhagic change of an Ischemic Stroke: Does this patient have an ischemic stroke with hemorrhagic changes? No         Subjective:     History of Present Illness:  84 yo female who presented with left eye decreased vision.  She denies associated sympotms like pain or weakness. Has not had in the past.  Symptoms have not improved.  Has not been treated. Seen by ophtho and dx with left central retinal artery occlusion.  There were no clear triggers.        Past Medical History:   Diagnosis Date    Anticoagulant long-term use     Arthritis     Bilateral nonexudative age-related macular degeneration 8/27/2013    Blood transfusion     Cataract     CHF (congestive heart failure)     Enlarged heart     High cholesterol     Hypertension     Sick sinus syndrome     Stroke     Thyroid disease      Past Surgical History:   Procedure Laterality Date    CARDIAC PACEMAKER PLACEMENT      CATARACT EXTRACTION Right     JOINT REPLACEMENT      SKIN BIOPSY      TONSILLECTOMY      TOTAL HIP ARTHROPLASTY  11-1-2000    right/[facility]     Family History   Problem Relation Age of Onset    Early death Mother     Early death Father     Arthritis Sister     Hearing loss Daughter     Birth defects Son     Hypertension Son     Diabetes Paternal Aunt     Cancer Paternal Aunt 80     colon  cancer    Diabetes Paternal Uncle     Arthritis Maternal Grandmother     Arthritis Paternal Grandmother     Hypertension Paternal Grandmother     Amblyopia Neg Hx     Blindness Neg Hx     Cataracts Neg Hx     Glaucoma Neg Hx     Macular degeneration Neg Hx     Retinal detachment Neg Hx     Strabismus Neg Hx     Stroke Neg Hx     Thyroid disease Neg Hx      Social History   Substance Use Topics    Smoking status: Never Smoker    Smokeless tobacco: Never Used    Alcohol use No     Review of patient's allergies indicates:   Allergen Reactions    Aspartame Swelling     equal       Medications: I have  reviewed the current medication administration record.    Prescriptions Prior to Admission   Medication Sig Dispense Refill Last Dose    amLODIPine (NORVASC) 10 MG tablet Take 1 tablet (10 mg total) by mouth once daily. 30 tablet 11 1/28/2018    cholecalciferol, vitamin D3, 50,000 unit capsule Take 50,000 Units by mouth every 7 days.  5 1/28/2018    doxazosin (CARDURA) 1 MG tablet Take 1 tablet (1 mg total) by mouth every evening. 30 tablet 11 1/28/2018    ibuprofen (ADVIL,MOTRIN) 800 MG tablet TK 1 T PO   Q EIGHT H PRN P  0 1/27/2018    levothyroxine (SYNTHROID) 88 MCG tablet Take 88 mcg by mouth before breakfast.    1/28/2018    metoprolol succinate (TOPROL-XL) 25 MG 24 hr tablet    1/28/2018    valsartan-hydrochlorothiazide (DIOVAN-HCT) 320-12.5 mg per tablet Take 1 tablet by mouth once daily.  6 1/28/2018    ADVAIR DISKUS 250-50 mcg/dose diskus inhaler        aspirin 325 MG tablet Take 325 mg by mouth once daily.       desloratadine (CLARINEX) 5 mg tablet        fish oil-omega-3 fatty acids 300-1,000 mg capsule Take 1 g by mouth once daily.       montelukast (SINGULAIR) 10 mg tablet        NITROSTAT 0.4 mg SL tablet        VENTOLIN HFA 90 mcg/actuation inhaler           Review of Systems   Constitutional: Negative for chills and fever.   HENT: Negative for drooling, hearing loss, sore throat and trouble swallowing.    Eyes: Negative for visual disturbance.   Respiratory: Negative for choking and shortness of breath.    Cardiovascular: Negative for chest pain.   Gastrointestinal: Negative for blood in stool, nausea and vomiting.   Genitourinary: Negative for hematuria.   Musculoskeletal: Negative for arthralgias and back pain.   Skin: Negative for rash.   Neurological: Negative for dizziness, facial asymmetry, speech difficulty, weakness, light-headedness, numbness and headaches.   Hematological: Does not bruise/bleed easily.   Psychiatric/Behavioral: Negative for confusion and sleep disturbance.      Objective:     Vital Signs (Most Recent):  Temp: 98.2 °F (36.8 °C) (01/28/18 1700)  Pulse: 62 (01/28/18 1900)  Resp: 20 (01/28/18 1800)  BP: 135/63 (01/28/18 1800)  SpO2: 99 % (01/28/18 1800)    Vital Signs Range (Last 24H):  Temp:  [98.1 °F (36.7 °C)-98.2 °F (36.8 °C)]   Pulse:  [60-79]   Resp:  [13-20]   BP: (127-161)/(57-74)   SpO2:  [96 %-100 %]     Physical Exam   Constitutional: She appears well-developed and well-nourished.   HENT:   Head: Normocephalic.   Eyes: EOM are normal. Pupils are equal, round, and reactive to light.   Neck: Normal range of motion.   Cardiovascular: Normal rate and regular rhythm.    Pulmonary/Chest: Breath sounds normal.   Abdominal: Soft. Bowel sounds are normal.   Musculoskeletal: Normal range of motion.   Skin: No rash noted.   Psychiatric: She has a normal mood and affect.       Neurological Exam:   LOC: alert  Attention Span: Good   Language: No aphasia  Articulation: No dysarthria  Orientation: Person, Place, Time   Visual Fields: Full  Monocular visual loss: left  EOM (CN III, IV, VI): Full/intact  Pupils (CN II, III): PERRL  Facial Sensation (CN V): Normal  Facial Movement (CN VII): Symmetric facial expression    Gag Reflex: present  Reflexes: 2+ throughout  Motor: Arm left  Normal 5/5  Leg left  Normal 5/5  Arm right  Normal 5/5  Leg right Normal 5/5  Cebellar: No evidence of appendicular or axial ataxia  Sensation: Intact to light touch, temperature and vibration  Tone: Normal tone throughout      Laboratory:  CMP:   Recent Labs  Lab 01/28/18  1317   CALCIUM 9.8   ALBUMIN 3.6   PROT 7.2      K 4.1   CO2 24      BUN 29*   CREATININE 0.8   ALKPHOS 80   ALT 8*   AST 15   BILITOT 0.3     CBC:   Recent Labs  Lab 01/28/18  1317   WBC 6.97   RBC 4.05   HGB 12.3   HCT 36.1*      MCV 89   MCH 30.4   MCHC 34.1     Lipid Panel:   Recent Labs  Lab 01/28/18  1317   CHOL 277*   LDLCALC 180.6*   HDL 68   TRIG 142     Coagulation:   Recent Labs  Lab 01/28/18  1317    INR 0.9   APTT 25.1     Hgb A1C:   Recent Labs  Lab 01/28/18  1317   HGBA1C 5.2     TSH:   Recent Labs  Lab 01/28/18  1317   TSH 0.165*       Diagnostic Results:      Brain imaging:  Head CT: no acute findings    Vessel Imaging:  CTA: bilateral carotid disease          Eleno Park MD  Comprehensive Stroke Center  Department of Vascular Neurology   [facility]

## 2018-01-29 NOTE — CONSULTS
"[facility]  Ophthalmology  Consult Note    Patient Name: Dorothy M Knobloch  MRN: [id number]  Admission Date: 1/28/2018  Hospital Length of Stay: 0 days  Attending Provider: Eleno Park MD   Primary Care Physician: Lola Wolff MD  Principal Problem:CRAO (central retinal artery occlusion), left    Inpatient consult to Ophthalmology  Consult performed by: DENISE PANDA  Consult ordered by: NGOC BESS        Subjective:     Chief Complaint:  Blurry vision     HPI:   84 y/o female with a hx of SSS s/p PPM, Takotsubo Cardiomyopathy 3/2013, HTN, moderate right carotid disease, TIA 12/2013, HLD, hypothyroidism, 2DE with vegetation on the MV (dental procedure?), PVD OU, ERM OS, NSC OU, dry AMD OU, admitted for workup of CRAO OS. Patient evaluated in ED yesterday after initially seeing sparkling lights OS followed by black vision with superotemporal vision remaining. CTA head and neck showed "Significant atherosclerosis of the carotid bulbs bilaterally with approximately 25 percent stenosis of the proximal ICA by mass effect criteria.  Otherwise, no evidence of significant stenosis or vascular occlusion. Persistent beaded appearance of the bilateral internal carotid arteries, left greater than right, which can be seen in the setting of fibromuscular dysplasia." Emboli detection study unable to be performed. 2D echo showed normal systolic function with no vegetation.     Ophthalmology consulted today for blurry vision and decreased depth perception. Patient stated she noted around 12:30pm that when looking at TV, edges of image are blurred. Denies floaters, flashes, or dark spots in the R eye.     No new subjective & objective note has been filed under this hospital service since the last note was generated.      Base Eye Exam     Visual Acuity (Snellen - Linear)       Right Left    Dist sc 20/40 CF@ 3ft inferotemporal          Tonometry (Tonopen, 2:55 PM)       Right Left    Pressure 13 9    " "      Pupils       Dark Light Shape React APD    Right 3 2 Round Brisk None    Left 4 3.5 Round Slow +4          Visual Fields       Right Left    Restrictions  Total inferior temporal, superior nasal, inferior nasal deficiencies; Partial inner superior temporal deficiency          Extraocular Movement       Right Left     Full, Ortho Full, Ortho          Dilation     Both eyes:  1.0% Mydriacyl, 2.5% phenylephrine @ 3:00 PM            Slit Lamp and Fundus Exam     External Exam       Right Left    External Normal Normal          Slit Lamp Exam       Right Left    Lids/Lashes Normal Normal    Conjunctiva/Sclera White and quiet White and quiet    Cornea Clear Clear    Anterior Chamber Deep and quiet Deep and quiet    Iris Round and reactive Round and reactive    Lens PCIOL Nuclear sclerosis, Cortical cataract    Vitreous Posterior vitreous detachment Posterior vitreous detachment          Fundus Exam       Right Left    Disc Normal 2 white emboli inferior optic disc     C/D Ratio 0.40 0.35    Macula Drusen Drusen, Epiretinal membrane causing foveal traction, retinal edema    Vessels attenuated attenuated, boxcarring of inferotemporal vessels    Periphery Normal Normal, no holes/tears 360              Assessment and Plan:     * CRAO (central retinal artery occlusion), left    -patient with initial "sparkling lights" OS around 12:30 am before bed and starting having black vision except a superotemporal "pie"  -on DFE, no obvious cherry red spot with retinal whitening, but boxcarring of inferotemporal vessels and macular edema noted along with 2 white emboli in inferior vessels in disc   -patient admitted to obs under vascular neurology for further workup  -CTA neck showed stenosis of carotid bulbs and beading of internal carotids L>R  -lipid panel showed elevated cholesterol and LDL   -2D echo showed normal systolic function with no vegetation   -TCD emboli study unable to be done   -follow up with Dr. Cole " outpatient in 1 month         Blurry vision, bilateral    -patient with new onset blurry vision which she describes as edge of image blurry when she stares at TV for a long time  -asked patient to blink a few times after this happened and she stated it went away  -no floaters/flashes/dark spots in R eye - VA stable  -on DFE, no changes from yesterday - no holes/tears/lesions   -suspect decreased tear film when prolonged staring - asked patient to note whether this clears with blinking if it happens again   -can start PFATs QID OU  -RD precautions extensively reviewed - patient expressed understanding         NS (nuclear sclerosis)    -PCIOL OD - vision stable per patient   -NS OS        Bilateral nonexudative age-related macular degeneration    -AG, AREDS2        Epiretinal membrane, left eye    -causing foveal distortion          Please call if patient notes any additional visual changes.  Thank you for your consult. I will sign off. Please contact us if you have any additional questions.    Gricelda Alfaro MD  Ophthalmology  [facility]

## 2018-01-29 NOTE — ASSESSMENT & PLAN NOTE
-patient with new onset blurry vision which she describes as edge of image blurry when she stares at TV for a long time  -asked patient to blink a few times after this happened and she stated it went away  -no floaters/flashes/dark spots in R eye  -on DFE, no changes from yesterday - no holes/tears/lesions   -suspect decreased tear film when prolonged staring - asked patient to note whether this clears with blinking if it happens again   -can start PFATs QID OU  -RD precautions extensively reviewed - patient expressed understanding

## 2018-01-29 NOTE — PT/OT/SLP EVAL
Physical Therapy Evaluation and Discharge Note    Patient Name:  Dorothy M Knobloch   MRN:  [id number]    Recommendations:     Discharge Recommendations:  home   Discharge Equipment Recommendations: none   Barriers to discharge: None    Assessment:     Dorothy M Knobloch is a 85 y.o. female admitted with a medical diagnosis of CRAO (central retinal artery occlusion), left. She was completely independent prior to admit and is the sole caregiver for her  who has visual deficits.  At this time, the patient is functioning at her prior level of function and does not require further acute PT services. She demonstrated safety and independence with mobility and gait despite visual deficits. She is safe to return home with no additional PT needs when medically appropriate.  Recommend driving assessment prior to returning to driving.    Recent Surgery: * No surgery found *      Plan:     During this hospitalization, patient does not require further acute PT services.  Please re-consult if situation changes.     Plan of Care Reviewed with: patient, spouse    History:     Past Medical History:   Diagnosis Date    Anticoagulant long-term use     Arthritis     Bilateral nonexudative age-related macular degeneration 8/27/2013    Blood transfusion     Cataract     CHF (congestive heart failure)     Enlarged heart     High cholesterol     Hypertension     Sick sinus syndrome     Stroke     Thyroid disease        Past Surgical History:   Procedure Laterality Date    CARDIAC PACEMAKER PLACEMENT      CARPAL TUNNEL RELEASE Bilateral 1991    CATARACT EXTRACTION Right     JOINT REPLACEMENT      [place] TRANSCRAN DOPP INTRACRAN, EMBOLI W/O INJ  1/29/2018         SKIN BIOPSY      TONSILLECTOMY      TOTAL HIP ARTHROPLASTY  11-1-2000    right/[facility]       Subjective     Communicated with RN prior to session.  Patient found supine in bed with  at bedside upon PT entry to room, agreeable to evaluation.   "    Chief Complaint: L visual deficits  Patient comments/goals: "I have to get home. I am the one who takes care of my .  My daughter has cancer.  She was my right hand man."  Pain/Comfort:  · Pain Rating 1: 0/10  · Pain Rating Post-Intervention 1: 0/10    Patients cultural, spiritual, Rastafari conflicts given the current situation:      Living Environment:  Pt lives in [place] in a 2 story home, but stays on the 1st floor.  She was independent, driving and caring for her  prior to admit.  She used a cane for mobility.     Patient has the following equipment:  (cane).  DME owned (not currently used): none.  Upon discharge, patient will have limited assistance from her daughter.    Objective:     Patient found with: telemetry     General Precautions: Standard, aspiration, fall, vision impaired     PHYSICAL EXAMINATION  Cognitive Function:  - Oriented to: person, place, time and situation  - Level of Alertness: awake and alert  - Follows Commands/attention: Follows multistep  commands  - Communication: clear/fluent  - Safety awareness/insight to disability: intact  Musculoskeletal System  Upper Extremities:   ROM: WFL  Strength: WFL  Lower Extremities:  ROM: WFL  Strength: grossly 5/5 in all major muscle groups  Integumentary System: Visible skin intact  Neuromuscular System:  - Sensation: grossly intact   - Coordination: NT  Posture and gross symmetry: rounded shoulders, wide DAKOTA    BALANCE:  Sitting:  - Static: GOOD+: Takes MAXIMAL challenges from all directions.  ;   - Dynamic: GOOD+: Maintains balance through MAXIMAL excursions of active trunk motion;   Standing:  - Static Stand: GOOD: Takes MODERATE challenges from all directions;   - Dynamic stand: GOOD+: Independent gait (with or without assistive device);     FUNCTIONAL MOBILITY ASSESSMENT:  Bed Mobility: performed with HOB elevated  - Rolling/Turning R: independent  - Rolling/Turning L:  independent  - Supine <> sit:  independent  - " Scooting EOB:  independent     Transfers:  - Sit <> stand transfer: stand by assist   - Bed <> chair transfer: stand by assist with cane    Gait:   - Gait 50 feet x 2 with cane and supervision   - Patient demonstrated 2 point gait pattern using cane with wide DAKOTA but no overt LOB or path deviation.   - She compensated for visual deficits using head turns without ceuing.     THERAPEUTIC ACTIVITIES AND EXERCISES:  Therapist educated patient and  on the role of PT, POC, and therapy recommendations of no therapy needs.  Therapist discussed the patients current mobility status and level of assistance with patient and .  PT reviewed stroke education with patient using teach back method and repetition.  Therapist answered questions to patient/familys satisfaction within scope of practice.  White board updated to reflect current level of assistance.     Pt is safe to perform transfers and with nursing supervision.      Patient left up in chair with all lines intact, call button in reach and  present.     AM-PAC 6 CLICK MOBILITY  Total Score:24     GOALS:    Physical Therapy Goals     Not on file          Multidisciplinary Problems (Resolved)        Problem: Physical Therapy Goal    Goal Priority Disciplines Outcome Goal Variances Interventions   Physical Therapy Goal   (Resolved)     PT/OT, PT Outcome(s) achieved                     Clinical Decision Making:   COMPLEXITY OF PT EXAMINATION:  HISTORY  - Comorbidities that affect the PT plan of care or the patient's ability to participate in/progress with therapy:  1. TIA  2. Arthritis  3. Chronic R MARCEL with need for AD  4. Age related MD  5. Cardiac pacemaker  - Personal Factors:   1. Time since onset of injury / illness / exacerbation.  2. Patient's home situation (environment and family support).  EXAMINATION  - Body Systems:  1. Communication ability, affect, cognition, language, and learning style: the assessment of the ability to make needs known,  consciousness, orientation, expected emotional /behavioral responses, and learning preferences  2. Neuromuscular system: a general assessment of gross coordinated movement (eg, balance, gait, locomotion, transfers, and transitions) and motor function (motor control and motor learning)  3. Musculoskeletal system: the assessment of gross symmetry, gross range of motion, gross strength, height, and weight  4. Integumentary system: the assessment of pliability(texture), presence of scar formation, skin color, and skin integrity  - Activity or participation limitations:   Status of current condition  CLINICAL PRESENTATION: Stable and/or uncomplicated  LEVEL OF COMPLEXITY: Low Complexity - no personal factors or comorbidities that impact the plan of care; examination addressing 1-2 body structures and functions, activity limitations, and/or participation restrictions; and clinical presentation with stable or uncomplicated characteristics.    Time Tracking:     PT Received On: 01/29/18  PT Start Time: 1510     PT Stop Time: 1540  PT Total Time (min): 30 min     Billable Minutes: Evaluation 20 and Therapeutic Activity 10      Rubi Alvarado, PT  01/29/2018

## 2018-01-29 NOTE — PLAN OF CARE
01/29/18 1706   Final Note   Assessment Type Final Discharge Note   Discharge Disposition Home     Patient is discharged to home today no needs. Cm tried to make a PCP appt for the patient within two weeks from discharge date (today) but the  stated that the patient would have to call and make her own appt. Cm notified patient that she needs a follow up appt in two weeks from today. Cm also placed the PCP appt time frame in the AVS. In basket message was sent to Vascular Neurology for hospital follow up in 4-6 weeks. Patient's family will provide transportation home.

## 2018-01-29 NOTE — ASSESSMENT & PLAN NOTE
1.2 cm enhancing mass in the posterior fossa, likely meningioma  Will have patient follow up as OP with neurosurgery; defer to neurosurgery for further imaging needs

## 2018-01-29 NOTE — TELEPHONE ENCOUNTER
----- Message from Johanne Ulloa RN sent at 1/29/2018  3:17 PM CST -----  Contact: Johanne Beck  Please schedule patient for hospital follow up appt in 4-6 weeks. Please call the patient with date an time of appt.

## 2018-01-30 ENCOUNTER — TELEPHONE (OUTPATIENT)
Dept: NEUROSURGERY | Facility: HOSPITAL | Age: 83
End: 2018-01-30

## 2018-01-30 DIAGNOSIS — I65.23 BILATERAL CAROTID ARTERY STENOSIS: Primary | ICD-10-CM

## 2018-01-30 NOTE — TELEPHONE ENCOUNTER
Called number on file.  Spoke with patient.  Risk factors specific to patient for stroke discussed with teach back implemented.  Patient verbalized understanding of discharge instructions and medications.  Patient was asked about discharge appointments and follow up care.  Follow appointment scheduled for 2/22/2018 at 1540. Warning signs discussed with teach back discussion method implemented.  Notified to seek immediate medical help via 911 if new or worsening stroke symptoms occur.  Patient relayed no new questions or comments at this time.  Instructed to call [facility] with any further questions.

## 2018-01-30 NOTE — PROGRESS NOTES
Pt discharged and left unit via wheelchair with family. VSS, NAD noted. IV removed with catheter intact. Discharge instructions reviewed and pt verbalized understanding.

## 2018-02-03 NOTE — DISCHARGE SUMMARY
[facility]  Vascular Neurology  Comprehensive Stroke Center  Discharge Summary     Summary:     Admit Date: 1/28/2018 12:15 PM    Discharge Date and Time: 1/29/2018  6:31 PM    Attending Physician: No att. providers found     Discharge Provider: Gerda Le PA-C    History of Present Illness: 86 yo female who presented with left eye decreased vision.  She denies associated sympotms like pain or weakness. Has not had in the past.  Symptoms have not improved.  Has not been treated. Seen by ophtho and dx with left central retinal artery occlusion.  There were no clear triggers.    Hospital Course (synopsis of major diagnoses, care, treatment, and services provided during the course of the hospital stay): 1/29/18: NAEON. TCD unable to be performed; no windows to visualize. Vascular surgery defers intervention, recommend follow up as outpatient.     Ms. Knobloch is an 86yo F with HTN, HLD, CHF, carotid artery disease who presents with left eye visual loss, secondary to central retinal artery occlusion. The occlusion is most likely thrombo-embolic from carotid disease. CTA showed significant carotid bulb atherosclerosis bilaterally. CT head was negative for acute infarct/hemorrhage. She was seen by opthalmology IP and is recommended to follow up OP as well. Vascular surgery was consulted to give recommendations on carotid disease but no acute intervention was recommended; she will follow up in 4 weeks as outpatient. Carotid US showed velocities suggestive of 60-79% bilaterally but plaque visualized is closer to 1-39% bilaterally. She will have a repeat carotid US with vascular surgery. TCDs were unable to be performed due to lack of windows. Ms. Knobloch will go home without any therapy needs. She is on aspirin and plavix for secondary stroke prevention as well as atorvastatin 40mg daily. She was provided with stroke education prior to discharge.     STROKE DOCUMENTATION         NIH  Scale:  Interval: 7 days or at discharge (whichever comes first)  1a. Level Of Consciousness: 0-->Alert: keenly responsive  1b. LOC Questions: 0-->Answers both questions correctly  1c. LOC Commands: 0-->Performs both tasks correctly  2. Best Gaze: 0-->Normal  3. Visual: 2-->Complete hemianopia  4. Facial Palsy: 0-->Normal symmetrical movements  5a. Motor Arm, Left: 0-->No drift: limb holds 90 (or 45) degrees for full 10 secs  5b. Motor Arm, Right: 0-->No drift: limb holds 90 (or 45) degrees for full 10 secs  6a. Motor Leg, Left: 0-->No drift: leg holds 30 degree position for full 5 secs  6b. Motor Leg, Right: 0-->No drift: leg holds 30 degree position for full 5 secs  7. Limb Ataxia: 0-->Absent  8. Sensory: 0-->Normal: no sensory loss  9. Best Language: 0-->No aphasia: normal  10. Dysarthria: 0-->Normal  11. Extinction and Inattention (formerly Neglect): 0-->No abnormality  Total (NIH Stroke Scale): 2        Modified Denton Score: 1  Hext Coma Scale:15   ABCD2 Score:    [id number] Score:   HAS -BLED Score:   ICH Score:   Hunt & Keys Classification:       Assessment/Plan:     Diagnostic Results:      Brain Imaging:   Brain Imaging   CT head 1/28/18:   1.2  cm partially calcified structure centered within the posterior fossa located along the tentorium which appears to demonstrate enhancement.  The findings represent a partially calcified meningioma.  Nonemergent MRI of the brain with contrast may be obtained for additional evaluation.  Grossly unchanged irregular soft tissue opacity within the right lung apex.  Significant degenerative changes of the cervical spine.     Vessel Imaging   CTA head and neck 1/28/18:  Significant atherosclerosis of the carotid bulbs bilaterally with approximately 25 percent stenosis of the proximal ICA by mass effect criteria.  Otherwise, no evidence of significant stenosis or vascular occlusion.    Persistent beaded appearance of the bilateral internal carotid arteries, left  greater than right, which can be seen in the setting of fibromuscular dysplasia.    1.2  cm partially calcified structure centered within the posterior fossa located along the tentorium which appears to demonstrate enhancement.  The findings represent a partially calcified meningioma.  Nonemergent MRI of the brain with contrast may be obtained for additional evaluation.    Grossly unchanged irregular soft tissue opacity within the right lung apex.    Significant degenerative changes of the cervical spine.     Cardiac Imaging   ECHO 1/29/18:   CONCLUSIONS     1 - Normal left ventricular systolic function (EF 60-65%).     2 - Normal right ventricular systolic function .     3 - Moderate left atrial enlargement.     4 - Mild tricuspid regurgitation.     5 - Marked mitral annular calcification without stenosis.     6 - Aortic sclerosis without stenosis.     7 - The estimated PA systolic pressure is 33 mmHg    Interventions: None    Complications: None    Disposition: Home or Self Care    Final Active Diagnoses:    Diagnosis Date Noted POA    PRINCIPAL PROBLEM:  CRAO (central retinal artery occlusion), left [H34.12] 01/29/2018 Yes    Blurry vision, bilateral [H53.8] 01/29/2018 Yes    Meningioma [D32.9] 01/29/2018 Yes    HLD (hyperlipidemia) [E78.5] 12/13/2013 Yes    Hypothyroidism [E03.9] 12/13/2013 Yes    Essential hypertension [I10] 12/13/2013 Yes    NS (nuclear sclerosis) [H25.10] 08/27/2013 Yes    Epiretinal membrane, left eye [H35.372] 08/27/2013 Yes    Bilateral nonexudative age-related macular degeneration [H35.3130] 08/27/2013 Yes      Problems Resolved During this Admission:    Diagnosis Date Noted Date Resolved POA     * CRAO (central retinal artery occlusion), left    84 yo female w CRAO on the left eye.   CTA with bilateral carotid disease, carotid US with 1-39% plaque but velocities suggestive of higher degree of stenosis. Vascular surgery consulted but defer intervention at this time, will follow  up as outpatient.   ECHO with LAE.       Antiplatelets: ASA 325mg/plavix 75mg   Statins: Atorvastatin 40mg daily   Diagnostics pending: none  DVT PPX: heparin 5000u subq  PT/OT/ST evaluate and treat: recommended for home without therapy  Follow up: Vascular surgery (Dr. Portillo), vascular surgery and neurosurgery (for incidental likely meningioma), opthalmology (Dr. Cole outpatient in 1 month)            Blurry vision, bilateral    Consult ophthalmology, appreciate recommendations        Meningioma    1.2 cm enhancing mass in the posterior fossa, likely meningioma  Will have patient follow up as OP with neurosurgery; defer to neurosurgery for further imaging needs        Hypothyroidism    Stroke risk factor  Levothyroxine 88mcg         HLD (hyperlipidemia)    Stroke risk factor   Atorvastatin 40mg daily        Essential hypertension    Stroke risk factor  BP<140 as outpatient             Recommendations:     Post-discharge complication risks: None    Stroke Education given to: patient and family    Follow-up in Stroke Clinic in 4-6 weeks.     Discharge Plan:  Antithrombotics: Aspirin 325mg, Clopidogrel 75mg  Statin: Atorvastatin 40mg  Anticoagulant: none  Smoking Cessation  Aggresive risk factor modification:  Hypertension  High Cholesterol  Diet  Exercise  Obesity  Carotid Artery disease    Follow Up:  Follow-up Information     Lola Wolff MD. Schedule an appointment as soon as possible for a visit in 2 weeks.    Specialty:  Family Medicine  Why:  Jason attempted to make the appt but the  office asked for the patient to call and schedule an appt.   Contact information:  [address] [address] [phone]             [facility]    Specialty:  Neurology  Why:  The office will call you to schedule an appt in 4-6 weeks. If the office has not called you by 2/5/18 please call the office to schedule an appt.  Contact information:  [address]  [place]  [place] [address] [phone]  Additional information:  7th Floor           Tino Portillo MD In 4 weeks.    Specialty:  Vascular Surgery  Why:  with carotid US  Contact information:  [address]  [facility] [id number] [phone]             Schedule an appointment as soon as possible for a visit with [facility] 7th Fl.    Specialty:  Neurosurgery  Why:  For follow up of incidental meningioma  Contact information:  [address]  [facility] [address] [phone]  Additional information:  7th Floor                 Patient Instructions:     Ambulatory Referral to Neurosurgery   Referral Priority: Routine Referral Type: Consultation   Referral Reason: Specialty Services Required    Requested Specialty: Neurosurgery    Number of Visits Requested: 1      Diet Cardiac   Order Comments: See Stroke Patient Education Guide Booklet for details.     Call 911 for any of the following:   Order Comments: Call 911  right away if any of the following warning signs come on suddenly, even if the symptoms only last for a few minutes. With stroke, timing is very important.   - Warning Signs of Stroke:  - Weakness: You may feel a sudden weakness, tingling or loss of feeling on one side of your face or body.  - Vision Problems: You may have sudden double vision or trouble seeing in one or both eyes.  - Speech Problems: You may have sudden trouble talking, slured speech, or problems understanding others.  - Headache: You may have sudden, severe headache.  - Movement Problems: You may experience dizziness, a feeling of spinning, a loss of balance, a feeling of falling or blackouts.         Medications:  Reconciled Home Medications:   Discharge Medication List as of 1/29/2018  5:44 PM      START taking these medications    Details   atorvastatin (LIPITOR) 40 MG tablet Take 1 tablet (40 mg total) by mouth once daily., Starting Tue 1/30/2018, Until Wed 1/30/2019, Normal      clopidogrel (PLAVIX)  75 mg tablet Take 1 tablet (75 mg total) by mouth once daily., Starting Tue 1/30/2018, Until Wed 1/30/2019, Normal         CONTINUE these medications which have NOT CHANGED    Details   ADVAIR DISKUS 250-50 mcg/dose diskus inhaler Starting 5/10/2017, Until Discontinued, Historical Med      amLODIPine (NORVASC) 10 MG tablet Take 1 tablet (10 mg total) by mouth once daily., Starting Fri 12/1/2017, Normal      aspirin 325 MG tablet Take 325 mg by mouth once daily., Until Discontinued, Historical Med      cholecalciferol, vitamin D3, 50,000 unit capsule Take 50,000 Units by mouth every 7 days., Starting Thu 9/14/2017, Historical Med      desloratadine (CLARINEX) 5 mg tablet Starting 5/10/2017, Until Discontinued, Historical Med      doxazosin (CARDURA) 1 MG tablet Take 1 tablet (1 mg total) by mouth every evening., Starting Fri 12/1/2017, Until Sat 12/1/2018, Normal      fish oil-omega-3 fatty acids 300-1,000 mg capsule Take 1 g by mouth once daily., Until Discontinued, Historical Med      ibuprofen (ADVIL,MOTRIN) 800 MG tablet TK 1 T PO   Q EIGHT H PRN P, Historical Med      levothyroxine (SYNTHROID) 88 MCG tablet Take 88 mcg by mouth before breakfast. , Until Discontinued, Historical Med      metoprolol succinate (TOPROL-XL) 25 MG 24 hr tablet Starting Tue 11/28/2017, Historical Med      montelukast (SINGULAIR) 10 mg tablet Starting 5/10/2017, Until Discontinued, Historical Med      NITROSTAT 0.4 mg SL tablet Starting 9/21/2015, Until Discontinued, Historical Med      valsartan-hydrochlorothiazide (DIOVAN-HCT) 320-12.5 mg per tablet Take 1 tablet by mouth once daily., Starting Mon 11/20/2017, Historical Med      VENTOLIN HFA 90 mcg/actuation inhaler Starting 5/10/2017, Until Discontinued, Historical Med             Gerda Le PA-C  [facility]  Department of Vascular Neurology   [facility]

## 2018-02-03 NOTE — ASSESSMENT & PLAN NOTE
84 yo female w CRAO on the left eye.   CTA with bilateral carotid disease, carotid US with 1-39% plaque but velocities suggestive of higher degree of stenosis. Vascular surgery consulted but defer intervention at this time, will follow up as outpatient.   ECHO with LAE.       Antiplatelets: ASA 325mg/plavix 75mg   Statins: Atorvastatin 40mg daily   Diagnostics pending: none  DVT PPX: heparin 5000u subq  PT/OT/ST evaluate and treat: recommended for home without therapy  Follow up: Vascular surgery (Dr. Portillo), vascular surgery and neurosurgery (for incidental likely meningioma), opthalmology (Dr. Cole outpatient in 1 month)

## 2018-02-06 ENCOUNTER — TELEPHONE (OUTPATIENT)
Dept: CARDIOLOGY | Facility: CLINIC | Age: 83
End: 2018-02-06

## 2018-02-06 NOTE — TELEPHONE ENCOUNTER
----- Message from Bryan Strange sent at 2/6/2018  4:12 PM CST -----  Contact: [phone] self  Patient called in requesting to speak with you. Patient prefers to speak with a nurse. Please advise.

## 2018-02-09 ENCOUNTER — TELEPHONE (OUTPATIENT)
Dept: CARDIOLOGY | Facility: CLINIC | Age: 83
End: 2018-02-09

## 2018-02-09 NOTE — TELEPHONE ENCOUNTER
On Plavix was told by PCP to take aspirin 325 mg in addition. Wants to make sure this is ok with you.

## 2018-02-09 NOTE — TELEPHONE ENCOUNTER
----- Message from July Betts sent at 2/9/2018 11:21 AM CST -----  Contact: [phone]  Patient called in requesting to speak with you. Patient prefers to speak with a nurse. Please advise.

## 2018-02-09 NOTE — TELEPHONE ENCOUNTER
----- Message from Priscilla Hoskins sent at 2/9/2018  3:58 PM CST -----  Contact: Self/ [phone]  Patient called in requesting to speak with you. Patient prefers to speak with a nurse.     Please call and advise.

## 2018-02-14 ENCOUNTER — HOME CARE VISIT (OUTPATIENT)
Dept: NEUROLOGY | Facility: HOSPITAL | Age: 83
End: 2018-02-14

## 2018-02-20 ENCOUNTER — OFFICE VISIT (OUTPATIENT)
Dept: OPHTHALMOLOGY | Facility: CLINIC | Age: 83
End: 2018-02-20
Payer: MEDICARE

## 2018-02-20 VITALS — SYSTOLIC BLOOD PRESSURE: 129 MMHG | DIASTOLIC BLOOD PRESSURE: 57 MMHG | HEART RATE: 65 BPM

## 2018-02-20 DIAGNOSIS — H26.491 POSTERIOR CAPSULAR OPACIFICATION, RIGHT: ICD-10-CM

## 2018-02-20 DIAGNOSIS — H43.813 POSTERIOR VITREOUS DETACHMENT, BOTH EYES: ICD-10-CM

## 2018-02-20 DIAGNOSIS — H35.372 EPIRETINAL MEMBRANE, LEFT EYE: ICD-10-CM

## 2018-02-20 DIAGNOSIS — H34.12 CRAO (CENTRAL RETINAL ARTERY OCCLUSION), LEFT: Primary | ICD-10-CM

## 2018-02-20 PROCEDURE — 92134 CPTRZ OPH DX IMG PST SGM RTA: CPT | Mod: S$GLB,,, | Performed by: OPHTHALMOLOGY

## 2018-02-20 PROCEDURE — 99999 PR PBB SHADOW E&M-EST. PATIENT-LVL III: CPT | Mod: PBBFAC,,, | Performed by: OPHTHALMOLOGY

## 2018-02-20 PROCEDURE — 92014 COMPRE OPH EXAM EST PT 1/>: CPT | Mod: S$GLB,,, | Performed by: OPHTHALMOLOGY

## 2018-02-20 PROCEDURE — 92226 PR SPECIAL EYE EXAM, SUBSEQUENT: CPT | Mod: RT,S$GLB,, | Performed by: OPHTHALMOLOGY

## 2018-02-20 NOTE — PROGRESS NOTES
HPI     Eye Problem    Additional comments: f/u per Dr Alfaro/MICKEY OS           Comments   DLS 1/28/18- Had stroke in eye 2/4/18. She was getting ready for bed and she started seeing webbing in vision      OCT - ERM OS  Drusen OU      A/P    1. CRAO OS  Was admitted for stroke work up    2. PCIOL OD  3 piece in slight decentration     NS OS    PCO OD   Pt with significant glare - would benefit from PCO  Will set up with JDN    3. PVD OU    4. Dry AMD OU -   AREDS/AG    5. ERM OS      6 months OCT

## 2018-02-22 DIAGNOSIS — R53.1 WEAKNESS: ICD-10-CM

## 2018-02-22 DIAGNOSIS — I10 HYPERTENSION, UNSPECIFIED TYPE: Primary | ICD-10-CM

## 2018-02-22 DIAGNOSIS — R30.0 DYSURIA: ICD-10-CM

## 2018-02-24 ENCOUNTER — LAB VISIT (OUTPATIENT)
Dept: LAB | Facility: HOSPITAL | Age: 83
End: 2018-02-24
Attending: INTERNAL MEDICINE
Payer: MEDICARE

## 2018-02-24 DIAGNOSIS — I10 HYPERTENSION, UNSPECIFIED TYPE: ICD-10-CM

## 2018-02-24 LAB
ALBUMIN SERPL BCP-MCNC: 3.4 G/DL
ALP SERPL-CCNC: 75 U/L
ALT SERPL W/O P-5'-P-CCNC: 11 U/L
ANION GAP SERPL CALC-SCNC: 10 MMOL/L
AST SERPL-CCNC: 16 U/L
BASOPHILS # BLD AUTO: 0.07 K/UL
BASOPHILS NFR BLD: 1.1 %
BILIRUB SERPL-MCNC: 0.3 MG/DL
BUN SERPL-MCNC: 25 MG/DL
CALCIUM SERPL-MCNC: 9.6 MG/DL
CHLORIDE SERPL-SCNC: 101 MMOL/L
CO2 SERPL-SCNC: 24 MMOL/L
CREAT SERPL-MCNC: 0.8 MG/DL
DIFFERENTIAL METHOD: ABNORMAL
EOSINOPHIL # BLD AUTO: 0.3 K/UL
EOSINOPHIL NFR BLD: 3.9 %
ERYTHROCYTE [DISTWIDTH] IN BLOOD BY AUTOMATED COUNT: 12.9 %
EST. GFR  (AFRICAN AMERICAN): >60 ML/MIN/1.73 M^2
EST. GFR  (NON AFRICAN AMERICAN): >60 ML/MIN/1.73 M^2
GLUCOSE SERPL-MCNC: 100 MG/DL
HCT VFR BLD AUTO: 36.2 %
HGB BLD-MCNC: 12.2 G/DL
LYMPHOCYTES # BLD AUTO: 1.8 K/UL
LYMPHOCYTES NFR BLD: 27.7 %
MCH RBC QN AUTO: 30 PG
MCHC RBC AUTO-ENTMCNC: 33.7 G/DL
MCV RBC AUTO: 89 FL
MONOCYTES # BLD AUTO: 0.6 K/UL
MONOCYTES NFR BLD: 8.9 %
NEUTROPHILS # BLD AUTO: 3.9 K/UL
NEUTROPHILS NFR BLD: 58.1 %
NRBC BLD-RTO: 0 /100 WBC
PLATELET # BLD AUTO: 277 K/UL
PMV BLD AUTO: 8.7 FL
POTASSIUM SERPL-SCNC: 4.4 MMOL/L
PROT SERPL-MCNC: 7 G/DL
RBC # BLD AUTO: 4.06 M/UL
SODIUM SERPL-SCNC: 135 MMOL/L
WBC # BLD AUTO: 6.65 K/UL

## 2018-02-24 PROCEDURE — 85025 COMPLETE CBC W/AUTO DIFF WBC: CPT

## 2018-02-24 PROCEDURE — 80053 COMPREHEN METABOLIC PANEL: CPT

## 2018-02-24 PROCEDURE — 36415 COLL VENOUS BLD VENIPUNCTURE: CPT

## 2018-02-25 VITALS — OXYGEN SATURATION: 99 % | SYSTOLIC BLOOD PRESSURE: 122 MMHG | HEART RATE: 70 BPM | DIASTOLIC BLOOD PRESSURE: 64 MMHG

## 2018-02-25 NOTE — PROGRESS NOTES
Mr. Knobloch VS are WNL on today's visit. She is familiar to  program. PMH of stroke 3yrs ago. LHE educated again on stroke S/S including what to do if they occur. Patient reports multiple MD visits for BP management over the last yr. LHE educated and instructed on current stroke residual present and S/S to report to medical professionals.

## 2018-02-27 RX ORDER — ATORVASTATIN CALCIUM 40 MG/1
40 TABLET, FILM COATED ORAL DAILY
Qty: 30 TABLET | Refills: 0 | OUTPATIENT
Start: 2018-02-27 | End: 2019-02-27

## 2018-03-01 ENCOUNTER — OFFICE VISIT (OUTPATIENT)
Dept: VASCULAR SURGERY | Facility: CLINIC | Age: 83
End: 2018-03-01
Payer: MEDICARE

## 2018-03-01 ENCOUNTER — HOSPITAL ENCOUNTER (OUTPATIENT)
Dept: VASCULAR SURGERY | Facility: CLINIC | Age: 83
Discharge: HOME | End: 2018-03-01
Attending: SURGERY
Payer: MEDICARE

## 2018-03-01 VITALS
SYSTOLIC BLOOD PRESSURE: 145 MMHG | DIASTOLIC BLOOD PRESSURE: 61 MMHG | HEIGHT: 63 IN | WEIGHT: 154.31 LBS | TEMPERATURE: 98 F | HEART RATE: 61 BPM | BODY MASS INDEX: 27.34 KG/M2

## 2018-03-01 DIAGNOSIS — Z86.018 HISTORY OF HEMANGIOMA: Primary | ICD-10-CM

## 2018-03-01 DIAGNOSIS — I65.23 BILATERAL CAROTID ARTERY STENOSIS: ICD-10-CM

## 2018-03-01 DIAGNOSIS — G45.9 TRANSIENT CEREBRAL ISCHEMIA, UNSPECIFIED TYPE: Primary | ICD-10-CM

## 2018-03-01 PROCEDURE — 99214 OFFICE O/P EST MOD 30 MIN: CPT | Mod: S$GLB,,, | Performed by: SURGERY

## 2018-03-01 PROCEDURE — 93880 EXTRACRANIAL BILAT STUDY: CPT | Mod: S$GLB,,, | Performed by: SURGERY

## 2018-03-01 PROCEDURE — 3078F DIAST BP <80 MM HG: CPT | Mod: S$GLB,,, | Performed by: SURGERY

## 2018-03-01 PROCEDURE — 3077F SYST BP >= 140 MM HG: CPT | Mod: S$GLB,,, | Performed by: SURGERY

## 2018-03-01 PROCEDURE — 99999 PR PBB SHADOW E&M-EST. PATIENT-LVL III: CPT | Mod: PBBFAC,,, | Performed by: SURGERY

## 2018-03-01 NOTE — PROGRESS NOTES
Dorothy M Knobloch  03/01/2018    HPI:  Patient is a 85 y.o. female with a h/o HTN, HLD, sick sinus syndrome s/p pacemaker, TIA (2013) --- I met her for vision changes in left eye: ISAIAH AREVALO in late Jan 2018 - no recurrences.    Prior to coming, she had 'sparkling lights' around her vision around 12:30 AM followed by a weblike vision superimposed, awoke around 5 am on Sunday with everything dark in her left eye with the exception of supratemporal vision.  Was seen initially by ophthalmology who diagnosed with left central retinal artery occlusion; with involvement of vascular neurology. Initial NIHSS was 1.  CTA head/neck done showing moderate stenosis of R ICA; minimal stenosis of L ICA.  Patient had US 2013 demonstrating 50-69% R ICA stenosis was well.    Now has a recurrent cataract in R eye.      Patient reports being taken off her ASA, has not been taking.  Has had issues with shortness of breath with ambulating, is being worked up by cards/pulmonolgy currently.    Never a smoker.No prior MI.    Past Medical History:   Diagnosis Date    Anticoagulant long-term use     Arthritis     Bilateral nonexudative age-related macular degeneration 8/27/2013    Blood transfusion     Cataract     CHF (congestive heart failure)     Enlarged heart     High cholesterol     Hypertension     Sick sinus syndrome     Stroke     Thyroid disease      Past Surgical History:   Procedure Laterality Date    CARDIAC PACEMAKER PLACEMENT      CARPAL TUNNEL RELEASE Bilateral 1991    CATARACT EXTRACTION Right     JOINT REPLACEMENT      OK TRANSCRAN DOPP INTRACRAN, EMBOLI W/O INJ  1/29/2018         SKIN BIOPSY      TONSILLECTOMY      TOTAL HIP ARTHROPLASTY  11-1-2000    right/[facility]     Family History   Problem Relation Age of Onset    Early death Mother     Early death Father     Arthritis Sister     Hearing loss Daughter     Birth defects Son     Hypertension Son     Diabetes Paternal Aunt     Cancer  Paternal Aunt 80     colon  cancer    Diabetes Paternal Uncle     Arthritis Maternal Grandmother     Arthritis Paternal Grandmother     Hypertension Paternal Grandmother     Amblyopia Neg Hx     Blindness Neg Hx     Cataracts Neg Hx     Glaucoma Neg Hx     Macular degeneration Neg Hx     Retinal detachment Neg Hx     Strabismus Neg Hx     Stroke Neg Hx     Thyroid disease Neg Hx      Social History     Social History    Marital status:      Spouse name: N/A    Number of children: N/A    Years of education: N/A     Occupational History    Not on file.     Social History Main Topics    Smoking status: Never Smoker    Smokeless tobacco: Never Used    Alcohol use No    Drug use: No    Sexual activity: Not on file     Other Topics Concern    Not on file     Social History Narrative    No narrative on file       Current Outpatient Prescriptions:     ADVAIR DISKUS 250-50 mcg/dose diskus inhaler, , Disp: , Rfl:     amLODIPine (NORVASC) 10 MG tablet, Take 1 tablet (10 mg total) by mouth once daily., Disp: 30 tablet, Rfl: 11    aspirin 325 MG tablet, Take 325 mg by mouth once daily., Disp: , Rfl:     atorvastatin (LIPITOR) 40 MG tablet, Take 1 tablet (40 mg total) by mouth once daily., Disp: 30 tablet, Rfl: 0    cholecalciferol, vitamin D3, 50,000 unit capsule, Take 50,000 Units by mouth every 7 days., Disp: , Rfl: 5    clopidogrel (PLAVIX) 75 mg tablet, Take 1 tablet (75 mg total) by mouth once daily., Disp: 30 tablet, Rfl: 0    desloratadine (CLARINEX) 5 mg tablet, , Disp: , Rfl:     doxazosin (CARDURA) 1 MG tablet, Take 1 tablet (1 mg total) by mouth every evening., Disp: 30 tablet, Rfl: 11    fish oil-omega-3 fatty acids 300-1,000 mg capsule, Take 1 g by mouth once daily., Disp: , Rfl:     ibuprofen (ADVIL,MOTRIN) 800 MG tablet, TK 1 T PO   Q EIGHT H PRN P, Disp: , Rfl: 0    levothyroxine (SYNTHROID) 88 MCG tablet, Take 88 mcg by mouth before breakfast. , Disp: , Rfl:      metoprolol succinate (TOPROL-XL) 25 MG 24 hr tablet, , Disp: , Rfl:     montelukast (SINGULAIR) 10 mg tablet, , Disp: , Rfl:     NITROSTAT 0.4 mg SL tablet, , Disp: , Rfl:     valsartan-hydrochlorothiazide (DIOVAN-HCT) 320-12.5 mg per tablet, Take 1 tablet by mouth once daily., Disp: , Rfl: 6    VENTOLIN HFA 90 mcg/actuation inhaler, , Disp: , Rfl:     REVIEW OF SYSTEMS:  General: negative; ENT: negative; Allergy and Immunology: negative; Hematological and Lymphatic: Negative; Endocrine: negative; Respiratory: no cough, shortness of breath, or wheezing; Cardiovascular: no chest pain or dyspnea on exertion; Gastrointestinal: no abdominal pain/back, change in bowel habits, or bloody stools; Genito-Urinary: no dysuria, trouble voiding, or hematuria; Musculoskeletal: negative  Neurological: no TIA or stroke symptoms; Psychiatric: no nervousness, anxiety or depression.    PHYSICAL EXAM:   Vitals:    18 1502   BP: (!) 145/61   Pulse: 61   Temp: 98 °F (36.7 °C)     Right Arm BP - Sittin/61 (18 1502)  Left Arm BP - Sittin/56 (18 1502)      General appearance:  Alert, well-appearing, and in no distress.  Oriented to person, place, and time   Neurological: Normal speech, no focal findings noted; CN II - XII grossly intact           Musculoskeletal: Digits/nail without cyanosis/clubbing.  Normal muscle strength/tone.                 Neck: Supple, no significant adenopathy; thyroid is not enlarged                  No carotid bruit can be auscultated                Chest:  Clear to auscultation, no wheezes, rales or rhonchi, symmetric air entry     No use of accessory muscles             Cardiac: Normal rate and regular rhythm, S1 and S2 normal; PMI non-displaced          Abdomen: Soft, nontender, nondistended, no masses or organomegaly     No rebound tenderness noted; bowel sounds normal     Pulsatile aortic mass is not palpable.     No groin adenopathy      Extremities:   2+ femoral pulses  bilaterally     LAB RESULTS:  Lab Results   Component Value Date    K 4.4 02/24/2018    K 3.7 01/29/2018    K 4.1 01/28/2018    CREATININE 0.8 02/24/2018    CREATININE 0.7 01/29/2018    CREATININE 0.8 01/28/2018     Lab Results   Component Value Date    WBC 6.65 02/24/2018    WBC 5.64 01/29/2018    WBC 6.97 01/28/2018    HCT 36.2 (L) 02/24/2018    HCT 34.8 (L) 01/29/2018    HCT 36.1 (L) 01/28/2018     02/24/2018     01/29/2018     01/28/2018     Lab Results   Component Value Date    HGBA1C 5.2 01/28/2018    HGBA1C 5.8 12/13/2013    HGBA1C 5.5 03/14/2013     IMAGING:  Carotid u/s Jan 2017:                      Right                               Left  _____________________________________________________________________                    PSV               EDV               PSV               EDV                    [cm/s]            [cm/s]            [cm/s]            [cm/s]  CCA prox.         32                                  74                  CCA dist.         39                7                 84                13  ICA prox.         108               18                110               29  ICA dist.         154               25                190               22  ECA mid.          65                6                 135                 Vertebral mid.    67                7                 68                22    Ratio ICA / CCA   3.9               3.6               2.3               2.2    ICA Plaque        Calcification                       Calcification  Vertebral Artery  Resistant flow                      Resistant flow     The determination of percent ICA stenosis using hemodynamic criteria is problematic in the presence of bilateral hemodynamically significant stenoses or contralateral occlusion. Visual estimation is taken into account as well as hemodynamic criteria in   estimating ICA stenosis.   Heavy acoustic shadowing secondary to calcific plague denies complete visualization  of the ICA.    Report Summary:  Impression:   RIGHT SIDE:   Velocities are suggestive of 60-79 % right ICA stenosis. However, plaque within the ICA is visualized 1-39% ICA stenosis.  Calcification of the right internal carotid artery.   Resistant flow in the right vertebral artery.   LEFT SIDE:  Velocities are suggestive of 60-79 % right ICA stenosis. However, plaque within the ICA is visualized 1-39% ICA stenosis.  Calcification of the left internal carotid artery.   Resistant flow in the left vertebral artery.     These findings of tortuous vessels and elevated PSVs of the distal ICAs without evidence of a focal stenosis suggest possible fibromuscular dysplasia of the distal ICA versus tortuous arteries.      IMP/PLAN:  Very active and functional 85 y.o.woman with h/o CAD, h/o HTN, HLD, sick sinus syndrome s/p pacemaker, TIA (2013) with pacemaker (June 2017) presenting with L CRAO - acute L vision changes on 1/27/18  Reviewed CTA neck:  L ICA has no stenosis but a kink in mid-ICA and some mild web-like changes, as seen in FMD. However, these are not severe.    Etiology may have been the significant kink in the L ICA with beaded-like appearance, such as FMD.    Both ICA have < 50% stenosis    Has responded to DAPT; would cont with ASA 81 po, plavix 75 po qd and statin rx  F/u with carotid u/s in 6 months  Will schedule w NS for calcified hemangioma - outpt    Tino Portillo MD FACS  Vascular/Endovascular Surgery

## 2018-03-13 ENCOUNTER — OFFICE VISIT (OUTPATIENT)
Dept: CARDIOLOGY | Facility: CLINIC | Age: 83
End: 2018-03-13
Payer: MEDICARE

## 2018-03-13 VITALS
BODY MASS INDEX: 27.38 KG/M2 | WEIGHT: 154.5 LBS | HEIGHT: 63 IN | SYSTOLIC BLOOD PRESSURE: 127 MMHG | DIASTOLIC BLOOD PRESSURE: 67 MMHG | HEART RATE: 65 BPM

## 2018-03-13 DIAGNOSIS — E03.9 HYPOTHYROIDISM, UNSPECIFIED TYPE: ICD-10-CM

## 2018-03-13 DIAGNOSIS — H43.813 POSTERIOR VITREOUS DETACHMENT, BOTH EYES: ICD-10-CM

## 2018-03-13 DIAGNOSIS — I65.23 BILATERAL CAROTID ARTERY STENOSIS: ICD-10-CM

## 2018-03-13 DIAGNOSIS — I51.89 DIASTOLIC DYSFUNCTION: ICD-10-CM

## 2018-03-13 DIAGNOSIS — I77.9 RIGHT-SIDED CAROTID ARTERY DISEASE: ICD-10-CM

## 2018-03-13 DIAGNOSIS — G45.9 TRANSIENT CEREBRAL ISCHEMIA, UNSPECIFIED TYPE: ICD-10-CM

## 2018-03-13 DIAGNOSIS — I51.81 TAKOTSUBO CARDIOMYOPATHY: ICD-10-CM

## 2018-03-13 DIAGNOSIS — E78.5 HYPERLIPIDEMIA, UNSPECIFIED HYPERLIPIDEMIA TYPE: ICD-10-CM

## 2018-03-13 DIAGNOSIS — H53.8 BLURRY VISION, BILATERAL: ICD-10-CM

## 2018-03-13 DIAGNOSIS — I10 ESSENTIAL HYPERTENSION: Primary | ICD-10-CM

## 2018-03-13 DIAGNOSIS — H34.12 CRAO (CENTRAL RETINAL ARTERY OCCLUSION), LEFT: ICD-10-CM

## 2018-03-13 PROCEDURE — 99214 OFFICE O/P EST MOD 30 MIN: CPT | Mod: S$GLB,,, | Performed by: INTERNAL MEDICINE

## 2018-03-13 PROCEDURE — 99999 PR PBB SHADOW E&M-EST. PATIENT-LVL III: CPT | Mod: PBBFAC,,, | Performed by: INTERNAL MEDICINE

## 2018-03-13 PROCEDURE — 3074F SYST BP LT 130 MM HG: CPT | Mod: CPTII,S$GLB,, | Performed by: INTERNAL MEDICINE

## 2018-03-13 PROCEDURE — 3078F DIAST BP <80 MM HG: CPT | Mod: CPTII,S$GLB,, | Performed by: INTERNAL MEDICINE

## 2018-03-13 RX ORDER — ASPIRIN 81 MG/1
81 TABLET ORAL DAILY
COMMUNITY
End: 2018-09-12

## 2018-03-13 NOTE — PROGRESS NOTES
Assessment /Plan     For exam results, see Encounter Report.    Posterior capsular opacification, right    Nuclear sclerosis of left eye    Advanced atrophic nonexudative age-related macular degeneration of both eyes with subfoveal involvement    CRAO (central retinal artery occlusion), left    Iris neovascularization, left          Dr. Cole::  1. CRAO OS  Was admitted for stroke work up     ==> + NVI & NVD OS  3/27/2018  Discussed with Dr Cole  Plan for IO meds / PRP OS        2. PCIOL OD  3 piece in slight decentration   OD SP YAG Cap 3/27/2018    NS OS    PCO OD       3. PVD OU    4. Dry AMD OU -   AREDS/AG    5. ERM OS        Laser Capsulotomy  right eye    Laser Capsulotomy Surgery Consent:  Patient with visually significant capsular opacification negatively impacting visually based ADLs and QOL.  Discussed with patient options, risks and benefits, expectations of laser surgery  with questions and answers to facilitate discussion.  We specifically covered the risks of IOP spike, bleeding, lens disruption, persistent visual disturbance,macular edema, retinal detachment,  iritis & pain,  and the need for further surgery.  The patient  voiced good understanding and patient wishes to proceed with surgery.  The patient will likely benefit from laser surgery and patient signed consent for Right Eye.      The correct eye was identified by patient prior to start of the procedure.    YAG Capsulotomy:    Total Energy:  63.9 mJ    Total # of Exposures: 44 Burst    Patient tolerated procedure well       Carol understands the information Dr. English provided at the time of visit.  The patient voices good understanding of these these instructions and agrees with the plan.  Retinal detachment precautions were discussed and patient is to return for increasing flashes, floaters and decreasing vision.  In addition, patient will return to clinic sooner as needed for pain, decreasing vision etc.    PF 1% 4x/day  for 4-5 Days in the eye with laser treatment      RD precautions:  Discussed with patient symptoms of RD with increased flashes, floaters, decreasing vision.  Patient/Family to call and return immediately to clinic should the symptoms of RD occur.  patient voice good understanding.        Plan  RTC 2 days retina service --> SP YAG Cap OD --> & NVI / NVD / PAS OS after CRVA  RTC sooner prn with good understanding

## 2018-03-15 NOTE — PROGRESS NOTES
Subjective:    Patient ID:  Dorothy M Knobloch is a 85 y.o. female who presents for follow-up of Hypertension      HPI  84 y/o female with a hx of SSS s/p PPM, Takotsubo Cardiomyopathy 3/2013, HTN, moderate bilateral carotid disease, TIA 12/2013, HLD, hypothyroidism, 2DE with vegetation on the MV (dental procedure?), who presents for f/u. Since last clinic visit was hospitalized for visual deficit and diagnosed with left central retinal artery occlusion. Elevated lipid panel intolerant to statins. Had been feeling weak at home, so I stopped doxyzosin. Has been feeling better. BP stable in the 130's. SOB improved. Main complaint is of left eye visual deficit. Also has right eye issues and is scheduled to have a procedure soon. Denies CP, orthopnea, PND, syncope. Compliant with meds. Has bilateral LE edema which has improved with compression stockings. EPIC media section record reviewed and has 2DE from 9/2017 with normal EF 65%, grade I diastolic dysfunction and mild MS. 2DE from 2/2018 with normal EF, DD, and moderate AI.     Review of Systems   Constitution: Positive for malaise/fatigue. Negative for weakness.   HENT: Negative for congestion.    Eyes: Positive for vision loss in left eye. Negative for blurred vision.   Cardiovascular: Positive for dyspnea on exertion. Negative for chest pain, claudication, cyanosis, irregular heartbeat, leg swelling, near-syncope, orthopnea, palpitations, paroxysmal nocturnal dyspnea and syncope.   Respiratory: Negative for shortness of breath.    Endocrine: Negative for polyuria.   Hematologic/Lymphatic: Negative for bleeding problem.   Skin: Negative for itching and rash.   Musculoskeletal: Negative for joint swelling, muscle cramps and muscle weakness.   Gastrointestinal: Negative for abdominal pain, hematemesis, hematochezia, melena, nausea and vomiting.   Genitourinary: Negative for dysuria and hematuria.   Neurological: Negative for dizziness, focal weakness, headaches,  light-headedness and loss of balance.   Psychiatric/Behavioral: Negative for depression. The patient is not nervous/anxious.         Objective:    Physical Exam   Constitutional: She is oriented to person, place, and time. She appears well-developed and well-nourished.   HENT:   Head: Normocephalic and atraumatic.   Neck: Neck supple. No JVD present.   Cardiovascular: Normal rate and regular rhythm.    Murmur heard.   Systolic murmur is present with a grade of 2/6   Pulses:       Carotid pulses are 2+ on the right side, and 2+ on the left side.       Radial pulses are 2+ on the right side, and 2+ on the left side.        Femoral pulses are 2+ on the right side, and 2+ on the left side.       Dorsalis pedis pulses are 2+ on the right side, and 2+ on the left side.        Posterior tibial pulses are 2+ on the right side, and 2+ on the left side.   Pulmonary/Chest: Effort normal and breath sounds normal.   Abdominal: Soft. Bowel sounds are normal.   Musculoskeletal: She exhibits no edema.   Neurological: She is alert and oriented to person, place, and time.   Skin: Skin is warm and dry.   Psychiatric: She has a normal mood and affect. Her behavior is normal. Thought content normal.         Assessment:       1. Essential hypertension    2. Right-sided carotid artery disease    3. Bilateral carotid artery stenosis    4. Diastolic dysfunction    5. Hyperlipidemia, unspecified hyperlipidemia type    6. Takotsubo cardiomyopathy    7. Posterior vitreous detachment, both eyes    8. CRAO (central retinal artery occlusion), left    9. Blurry vision, bilateral    10. Transient cerebral ischemia, unspecified type    11. Hypothyroidism, unspecified type      86 y/o female with hx and presentation as above. Improved from a cardiac perspective. Would likely benefit from PCSK9 inhibitor, as she is intolerant to statin.        Plan:       -f/u in 3 months

## 2018-03-21 ENCOUNTER — HOME CARE VISIT (OUTPATIENT)
Dept: NEUROLOGY | Facility: HOSPITAL | Age: 83
End: 2018-03-21

## 2018-03-27 ENCOUNTER — OFFICE VISIT (OUTPATIENT)
Dept: OPHTHALMOLOGY | Facility: CLINIC | Age: 83
End: 2018-03-27
Payer: MEDICARE

## 2018-03-27 VITALS — HEART RATE: 63 BPM | DIASTOLIC BLOOD PRESSURE: 51 MMHG | SYSTOLIC BLOOD PRESSURE: 136 MMHG

## 2018-03-27 DIAGNOSIS — H25.12 NUCLEAR SCLEROSIS OF LEFT EYE: ICD-10-CM

## 2018-03-27 DIAGNOSIS — H26.491 POSTERIOR CAPSULAR OPACIFICATION, RIGHT: Primary | ICD-10-CM

## 2018-03-27 DIAGNOSIS — H35.3134 ADVANCED ATROPHIC NONEXUDATIVE AGE-RELATED MACULAR DEGENERATION OF BOTH EYES WITH SUBFOVEAL INVOLVEMENT: ICD-10-CM

## 2018-03-27 DIAGNOSIS — H34.12 CRAO (CENTRAL RETINAL ARTERY OCCLUSION), LEFT: ICD-10-CM

## 2018-03-27 DIAGNOSIS — H21.1X2 IRIS NEOVASCULARIZATION, LEFT: ICD-10-CM

## 2018-03-27 PROCEDURE — 66821 AFTER CATARACT LASER SURGERY: CPT | Mod: RT,S$GLB,, | Performed by: OPHTHALMOLOGY

## 2018-03-27 PROCEDURE — 99999 PR PBB SHADOW E&M-EST. PATIENT-LVL III: CPT | Mod: PBBFAC,,, | Performed by: OPHTHALMOLOGY

## 2018-03-27 PROCEDURE — 92020 GONIOSCOPY: CPT | Mod: S$GLB,,, | Performed by: OPHTHALMOLOGY

## 2018-03-27 PROCEDURE — 92014 COMPRE OPH EXAM EST PT 1/>: CPT | Mod: 57,S$GLB,, | Performed by: OPHTHALMOLOGY

## 2018-03-29 ENCOUNTER — OFFICE VISIT (OUTPATIENT)
Dept: OPHTHALMOLOGY | Facility: CLINIC | Age: 83
End: 2018-03-29
Payer: MEDICARE

## 2018-03-29 VITALS — HEART RATE: 62 BPM | SYSTOLIC BLOOD PRESSURE: 136 MMHG | DIASTOLIC BLOOD PRESSURE: 55 MMHG

## 2018-03-29 DIAGNOSIS — H34.12 CENTRAL RETINAL ARTERY OCCLUSION OF LEFT EYE: Primary | ICD-10-CM

## 2018-03-29 DIAGNOSIS — H34.12 CRAO (CENTRAL RETINAL ARTERY OCCLUSION), LEFT: ICD-10-CM

## 2018-03-29 DIAGNOSIS — H40.52X1 NEOVASCULAR GLAUCOMA, LEFT EYE, MILD STAGE: ICD-10-CM

## 2018-03-29 PROCEDURE — 67228 TREATMENT X10SV RETINOPATHY: CPT | Mod: 79,LT,S$GLB, | Performed by: OPHTHALMOLOGY

## 2018-03-29 PROCEDURE — 92014 COMPRE OPH EXAM EST PT 1/>: CPT | Mod: 25,S$GLB,, | Performed by: OPHTHALMOLOGY

## 2018-03-29 PROCEDURE — 92134 CPTRZ OPH DX IMG PST SGM RTA: CPT | Mod: S$GLB,,, | Performed by: OPHTHALMOLOGY

## 2018-03-29 PROCEDURE — 99999 PR PBB SHADOW E&M-EST. PATIENT-LVL III: CPT | Mod: PBBFAC,,, | Performed by: OPHTHALMOLOGY

## 2018-03-29 NOTE — PROGRESS NOTES
HPI     Eye Problem    Additional comments: check per Tameka           Comments   DLS 2/20/18- Stroke OS January 28th which blinded eye. She recently saw   Tameka and was told that abnormal blood vessels were growing behind OS.   Since laser she notices black spot in vision when she first gets up in the   morning      OCT - ERM OS  Drusen OU      A/P    1. CRAO OS  Was admitted for stroke work up  Now with NVI and early NVG OS    PRP OS    2. PCIOL OD  3 piece in slight decentration   S/p YAG for PCO - pt notes improvement    NS OS      3. PVD OU    4. Dry AMD OU -   AREDS/AG    5. ERM OS      2 weeks for PRP OS complete    Risks, benefits, and alternatives to treatment discussed in detail with the patient.  The patient voiced understanding and wished to proceed with the procedure    Laser Procedure Note  Dx: CRAO with NVG OS  Laser: PRP OS  Argon  Spot: 200  Power: 150-500  Dur: 0.1  #:   [id number]  Complications: None  F/U as above

## 2018-04-03 ENCOUNTER — TELEPHONE (OUTPATIENT)
Dept: OPHTHALMOLOGY | Facility: CLINIC | Age: 83
End: 2018-04-03

## 2018-04-03 ENCOUNTER — OFFICE VISIT (OUTPATIENT)
Dept: OPHTHALMOLOGY | Facility: CLINIC | Age: 83
End: 2018-04-03
Payer: MEDICARE

## 2018-04-03 VITALS — HEART RATE: 65 BPM | DIASTOLIC BLOOD PRESSURE: 64 MMHG | SYSTOLIC BLOOD PRESSURE: 154 MMHG

## 2018-04-03 DIAGNOSIS — H43.813 POSTERIOR VITREOUS DETACHMENT, BOTH EYES: ICD-10-CM

## 2018-04-03 DIAGNOSIS — H21.1X2 IRIS NEOVASCULARIZATION, LEFT: ICD-10-CM

## 2018-04-03 DIAGNOSIS — H40.52X2 NEOVASCULAR GLAUCOMA OF LEFT EYE, MODERATE STAGE: ICD-10-CM

## 2018-04-03 DIAGNOSIS — H34.12 CRAO (CENTRAL RETINAL ARTERY OCCLUSION), LEFT: Primary | ICD-10-CM

## 2018-04-03 PROBLEM — H40.52X0 NEOVASCULAR GLAUCOMA, LEFT EYE: Status: ACTIVE | Noted: 2018-03-29

## 2018-04-03 PROCEDURE — S0119 ONDANSETRON 4 MG: HCPCS | Mod: S$GLB,,, | Performed by: OPHTHALMOLOGY

## 2018-04-03 PROCEDURE — 92014 COMPRE OPH EXAM EST PT 1/>: CPT | Mod: 24,25,S$GLB, | Performed by: OPHTHALMOLOGY

## 2018-04-03 PROCEDURE — 99999 PR PBB SHADOW E&M-EST. PATIENT-LVL III: CPT | Mod: PBBFAC,,, | Performed by: OPHTHALMOLOGY

## 2018-04-03 PROCEDURE — 67028 INJECTION EYE DRUG: CPT | Mod: 79,LT,S$GLB, | Performed by: OPHTHALMOLOGY

## 2018-04-03 RX ORDER — ONDANSETRON 4 MG/1
4 TABLET, ORALLY DISINTEGRATING ORAL ONCE
Status: COMPLETED | OUTPATIENT
Start: 2018-04-03 | End: 2018-04-03

## 2018-04-03 RX ORDER — ONDANSETRON 4 MG/1
4 TABLET, FILM COATED ORAL ONCE
Status: DISCONTINUED | OUTPATIENT
Start: 2018-04-03 | End: 2018-05-10

## 2018-04-03 RX ORDER — ACETAZOLAMIDE 250 MG/1
250 TABLET ORAL
Status: COMPLETED | OUTPATIENT
Start: 2018-04-03 | End: 2018-04-03

## 2018-04-03 RX ADMIN — Medication 1.25 MG: at 12:04

## 2018-04-03 RX ADMIN — ACETAZOLAMIDE 250 MG: 250 TABLET ORAL at 11:04

## 2018-04-03 RX ADMIN — ONDANSETRON 4 MG: 4 TABLET, ORALLY DISINTEGRATING ORAL at 12:04

## 2018-04-03 NOTE — TELEPHONE ENCOUNTER
Phoned in RX combigan BID OS to [facility] [phone]. Patient thought she was to use a second drop but confirmed combigan only per Kelsey

## 2018-04-03 NOTE — TELEPHONE ENCOUNTER
----- Message from Renea Trinh sent at 4/3/2018  4:00 PM CDT -----  Contact: Carol Gabriel called to f/u about orders to start a Rx she does not have. Pt can be reached at [phone].

## 2018-04-03 NOTE — PATIENT INSTRUCTIONS

## 2018-04-03 NOTE — PROGRESS NOTES
HPI     DLS 3/29/18-   Pt states she has been in a lot pain, severe worse than childbirth off and   on since Sunday.  Has not thrown up since early this AM .  VA has not   changed.     Instilled Alphagan P and Combigan drops OS  11:37.  Pt given Acetazolamide 250mg PO 11:42 with water and belia crackers.         OCT - ERM OS  Drusen OU      A/P    1. CRAO OS  Was admitted for stroke work up  Now with NVI and early NVG OS  S/p Parital PRP    Avastin OS today with tap    Resume Combigan BID OS    2. PCIOL OD  3 piece in slight decentration   S/p YAG for PCO - pt notes improvement    NS OS      3. PVD OU    4. Dry AMD OU -   AREDS/AG    5. ERM OS      1 week for PRP OS complete    Risks, benefits, and alternatives to treatment discussed in detail with the patient.  The patient voiced understanding and wished to proceed with the procedure    Injection Procedure Note:  Diagnosis:NVG OS    Topical Proparacaine and Betadine.  Inject Avastin OS at 6:00 @ 3.5-4mm posterior to limbus  Tap 30g  Post Operative Dx: Same  Complications: None  Follow up as above.

## 2018-04-06 ENCOUNTER — OFFICE VISIT (OUTPATIENT)
Dept: OPHTHALMOLOGY | Facility: CLINIC | Age: 83
End: 2018-04-06
Payer: MEDICARE

## 2018-04-06 DIAGNOSIS — H40.52X2 NEOVASCULAR GLAUCOMA OF LEFT EYE, MODERATE STAGE: Primary | ICD-10-CM

## 2018-04-06 PROCEDURE — 92014 COMPRE OPH EXAM EST PT 1/>: CPT | Mod: S$GLB,,, | Performed by: OPHTHALMOLOGY

## 2018-04-06 PROCEDURE — 99999 PR PBB SHADOW E&M-EST. PATIENT-LVL II: CPT | Mod: PBBFAC,,, | Performed by: OPHTHALMOLOGY

## 2018-04-06 RX ORDER — BRIMONIDINE TARTRATE, TIMOLOL MALEATE 2; 5 MG/ML; MG/ML
SOLUTION/ DROPS OPHTHALMIC
Refills: 3 | COMMUNITY
Start: 2018-04-03 | End: 2018-10-20 | Stop reason: SDUPTHER

## 2018-04-06 RX ORDER — ACETAZOLAMIDE 500 MG/1
500 CAPSULE, EXTENDED RELEASE ORAL 2 TIMES DAILY
Qty: 60 CAPSULE | Refills: 11 | Status: SHIPPED | OUTPATIENT
Start: 2018-04-06 | End: 2018-09-06

## 2018-04-06 NOTE — PROGRESS NOTES
HPI     Eye Problem    Additional comments: eye pain           Comments   DLS 4/3/18- Yesterday she started with pain OS which she rates as 8-9 on   pain scale. It is a sharp pain that shoots to the top of her head    Combigan BID OS    HPI     DLS 3/29/18-   Pt states she has been in a lot pain, severe worse than childbirth off and   on since Sunday.  Has not thrown up since early this AM .  VA has not   changed.     Instilled Alphagan P and Combigan drops OS  11:37.  Pt given Acetazolamide 250mg PO 11:42 with water and belia crackers.         OCT - ERM OS  Drusen OU      A/P    1. CRAO OS  Was admitted for stroke work up  Now with NVI and early NVG OS  S/p Parital PRP - Needs PRP completion    S/p Avastin OS today with tap4/3/18    Increase Combigan TID OS  Azopt TID  Diamox 500 BID      2. PCIOL OD  3 piece in slight decentration   S/p YAG for PCO - pt notes improvement    NS OS    3. PVD OU    4. Dry AMD OU -   AREDS/AG    5. ERM OS      JDN on Monday    Me 3 weeks for more avastin - will need eventual PRP

## 2018-04-09 ENCOUNTER — OFFICE VISIT (OUTPATIENT)
Dept: OPHTHALMOLOGY | Facility: CLINIC | Age: 83
End: 2018-04-09
Payer: MEDICARE

## 2018-04-09 DIAGNOSIS — H34.12 CRAO (CENTRAL RETINAL ARTERY OCCLUSION), LEFT: ICD-10-CM

## 2018-04-09 DIAGNOSIS — H35.372 EPIRETINAL MEMBRANE, LEFT EYE: ICD-10-CM

## 2018-04-09 DIAGNOSIS — H21.1X2 IRIS NEOVASCULARIZATION, LEFT: ICD-10-CM

## 2018-04-09 DIAGNOSIS — H40.52X2 NEOVASCULAR GLAUCOMA OF LEFT EYE, MODERATE STAGE: Primary | ICD-10-CM

## 2018-04-09 DIAGNOSIS — H35.3134 ADVANCED ATROPHIC NONEXUDATIVE AGE-RELATED MACULAR DEGENERATION OF BOTH EYES WITH SUBFOVEAL INVOLVEMENT: ICD-10-CM

## 2018-04-09 PROBLEM — H26.491 POSTERIOR CAPSULAR OPACIFICATION, RIGHT: Status: RESOLVED | Noted: 2018-02-20 | Resolved: 2018-04-09

## 2018-04-09 PROCEDURE — 92012 INTRM OPH EXAM EST PATIENT: CPT | Mod: S$GLB,,, | Performed by: OPHTHALMOLOGY

## 2018-04-09 PROCEDURE — 92020 GONIOSCOPY: CPT | Mod: S$GLB,,, | Performed by: OPHTHALMOLOGY

## 2018-04-09 PROCEDURE — 99999 PR PBB SHADOW E&M-EST. PATIENT-LVL II: CPT | Mod: PBBFAC,,, | Performed by: OPHTHALMOLOGY

## 2018-04-09 NOTE — PROGRESS NOTES
HPI     85 yr old here for 1 week f/u of high IOP OS.  Mrs. Knobloch states that   she is now having bilateral eye pain when she reads.  The pain comes and   goes, feels like a sharp shooting pain.  She had not noticed any vision   changes right eye and is putting in her eye drops daily.  Dr. cole   started her on Diamox 250 mg last week.  No pain today     Combigan TID OS   Azopt TID OS   Diamox 500 mg BID    1. ERM OS with foveal distortion   Rec. 25g PPV/MP/AFx OS     2. NS OU     3. PVD OU     4. Early Dry AMD OU    Last edited by Krys Bryant on 4/9/2018  1:36 PM. (History)            Assessment /Plan     For exam results, see Encounter Report.    Neovascular glaucoma of left eye, moderate stage    CRAO (central retinal artery occlusion), left    Advanced atrophic nonexudative age-related macular degeneration of both eyes with subfoveal involvement    Epiretinal membrane, left eye    Iris neovascularization, left        Dr. Cole::  CRAO OS  Was admitted for stroke work up     ==> + NVI & NVD OS  3/27/2018  Dr Cole  Plan for IO meds / PRP OS    Left eye  Combigan TID --> BID  Azopt BID  Diamox 250 BID --> discussed SE --> Experiencing SE Fatigue, Loss of appetite, depressed etc    Patient will discuss with HTN meds and substitute meds    Consider  OS as discussed        2. PCIOL OD  3 piece in slight decentration   OD SP YAG Cap 3/27/2018    NS OS        3. PVD OU    4. Dry AMD OU -   AREDS/AG    5. ERM OS      RD precautions:  Discussed with patient symptoms of RD with increased flashes, floaters, decreasing vision.  Patient/Family to call and return immediately to clinic should the symptoms of RD occur.  patient voice good understanding.        Plan  RTC Dr Cole as scheduled  [facility] PRN  RTC sooner prn with good understanding

## 2018-04-10 ENCOUNTER — TELEPHONE (OUTPATIENT)
Dept: CARDIOLOGY | Facility: CLINIC | Age: 83
End: 2018-04-10

## 2018-04-10 NOTE — TELEPHONE ENCOUNTER
----- Message from Priscilla Hoskins sent at 4/9/2018  3:32 PM CDT -----  Contact: Self/ [phone]  Patient called in requesting to speak with you. Patient prefers to speak with a nurse.     Please call and advise.

## 2018-04-11 ENCOUNTER — TELEPHONE (OUTPATIENT)
Dept: OPHTHALMOLOGY | Facility: CLINIC | Age: 83
End: 2018-04-11

## 2018-04-11 NOTE — TELEPHONE ENCOUNTER
Mrs. Knobloch called and stated that her blood pressure was very low, she was unable to stay awake most of the day and felt terrible.  Mrs. Knobloch stated that she is physically unable to get out of the bed and that she has been checking her blood pressure through out the day, it has been running around 108/52. Pt stated she spoke to her cardiologist Dr. Abadie who stopped her HCTZ 2 days ago.   Spoke to Dr. English who advised pt to decrease her Diamox 500mg sequels to once daily with food.   Pt verbalized that she will take her Diamox 500mg once a day with food. She will call in 2 days to report if she is feeling any better.

## 2018-04-11 NOTE — TELEPHONE ENCOUNTER
----- Message from Echo Hodgson sent at 4/11/2018 11:49 AM CDT -----  Contact: Carol  Pt calling to check back with Dr. English with regard to her medication.  She states that her pressure is too low in her eyes now and thinks she may have to delete something.  She can be reached at [phone]

## 2018-04-12 ENCOUNTER — OFFICE VISIT (OUTPATIENT)
Dept: OPHTHALMOLOGY | Facility: CLINIC | Age: 83
End: 2018-04-12
Payer: MEDICARE

## 2018-04-12 DIAGNOSIS — H40.52X2 NEOVASCULAR GLAUCOMA OF LEFT EYE, MODERATE STAGE: ICD-10-CM

## 2018-04-12 DIAGNOSIS — H34.12 CRAO (CENTRAL RETINAL ARTERY OCCLUSION), LEFT: Primary | ICD-10-CM

## 2018-04-12 DIAGNOSIS — H35.372 EPIRETINAL MEMBRANE, LEFT EYE: ICD-10-CM

## 2018-04-12 DIAGNOSIS — H21.1X2 IRIS NEOVASCULARIZATION, LEFT: ICD-10-CM

## 2018-04-12 PROCEDURE — 92014 COMPRE OPH EXAM EST PT 1/>: CPT | Mod: 25,S$GLB,, | Performed by: OPHTHALMOLOGY

## 2018-04-12 PROCEDURE — 99999 PR PBB SHADOW E&M-EST. PATIENT-LVL III: CPT | Mod: PBBFAC,,, | Performed by: OPHTHALMOLOGY

## 2018-04-12 PROCEDURE — 67228 TREATMENT X10SV RETINOPATHY: CPT | Mod: 79,LT,S$GLB, | Performed by: OPHTHALMOLOGY

## 2018-04-12 RX ORDER — BRINZOLAMIDE 10 MG/ML
1 SUSPENSION/ DROPS OPHTHALMIC 3 TIMES DAILY
COMMUNITY
End: 2018-08-09 | Stop reason: SDUPTHER

## 2018-04-12 NOTE — PROGRESS NOTES
Mrs. Buckner PV completed. Patient reports no new symptoms, or hospital stays or ER visits. She reports having multiple MD appointments coming up.

## 2018-04-12 NOTE — PROGRESS NOTES
HPI     DLS 4/6/18- No eye pain. Some of her medications were changed because her   BP became very low.    Combigan BID OS  Azopt TID OS  Diamox 250 daily      HPI     Eye Problem    Additional comments: eye pain           Comments   DLS 4/3/18- Yesterday she started with pain OS which she rates as 8-9 on   pain scale. It is a sharp pain that shoots to the top of her head    Combigan BID OS    HPI     DLS 3/29/18-   Pt states she has been in a lot pain, severe worse than childbirth off and   on since Sunday.  Has not thrown up since early this AM .  VA has not   changed.     Instilled Alphagan P and Combigan drops OS  11:37.  Pt given Acetazolamide 250mg PO 11:42 with water and belia crackers.         OCT - ERM OS  Drusen OU      A/P    1. CRAO OS  Was admitted for stroke work up  Now with NVI and early NVG OS  S/p Parital PRP - Needs PRP completion today    S/p Avastin OS  with tap 4/3/18    Combigan BID OS  Azopt TID  Was on diamox 500 BID - having hypotensive episodes  JDN cut back Diamox to 250mg Q day  Dr. Mcneill D/C'gerald Valsartan    2. PCIOL OD  3 piece in slight decentration   S/p YAG for PCO - pt notes improvement    NS OS    3. PVD OU    4. Dry AMD OU -   AREDS/AG    5. ERM OS      3 weeks for Avastin OS with tap    Risks, benefits, and alternatives to treatment discussed in detail with the patient.  The patient voiced understanding and wished to proceed with the procedure    Laser Procedure Note  Dx: CRAO/NVG OS  Laser: PRP OS  Argon  Spot: 200  Power: 150-500  Dur: 0.1s  #:   [id number]  Complications: None  F/U as above

## 2018-04-23 ENCOUNTER — HOME CARE VISIT (OUTPATIENT)
Dept: NEUROLOGY | Facility: HOSPITAL | Age: 83
End: 2018-04-23

## 2018-04-23 VITALS
OXYGEN SATURATION: 98 % | HEART RATE: 62 BPM | WEIGHT: 153.81 LBS | SYSTOLIC BLOOD PRESSURE: 120 MMHG | BODY MASS INDEX: 27.24 KG/M2 | DIASTOLIC BLOOD PRESSURE: 48 MMHG

## 2018-04-23 NOTE — PROGRESS NOTES
Mr. Knobloch VS are WNL on today's visit. She has been having many visits for eye treatments. Denies any ER or hospital visits. LHE educated him on hypotension, proper hydration, and discussed new injections for HLP.

## 2018-04-27 ENCOUNTER — TELEPHONE (OUTPATIENT)
Dept: CARDIOLOGY | Facility: CLINIC | Age: 83
End: 2018-04-27

## 2018-04-27 RX ORDER — HYDROCHLOROTHIAZIDE 12.5 MG/1
12.5 TABLET ORAL DAILY
Qty: 30 TABLET | Refills: 11 | Status: SHIPPED | OUTPATIENT
Start: 2018-04-27 | End: 2019-04-17 | Stop reason: SDUPTHER

## 2018-04-27 NOTE — TELEPHONE ENCOUNTER
----- Message from Sallie Alfaro sent at 4/26/2018  4:58 PM CDT -----  Contact: [phone]/self  Patient requesting to speak with you regarding medication that was supposed to be called in for her. Please advise.

## 2018-04-27 NOTE — TELEPHONE ENCOUNTER
States you were to order HCTZ for her due to LE swelling.  She is not sure what dose you wanted.  Needs it sent to [facility].

## 2018-05-10 ENCOUNTER — PROCEDURE VISIT (OUTPATIENT)
Dept: OPHTHALMOLOGY | Facility: CLINIC | Age: 83
End: 2018-05-10
Payer: MEDICARE

## 2018-05-10 VITALS — SYSTOLIC BLOOD PRESSURE: 125 MMHG | HEART RATE: 56 BPM | DIASTOLIC BLOOD PRESSURE: 58 MMHG

## 2018-05-10 DIAGNOSIS — H34.12 CRAO (CENTRAL RETINAL ARTERY OCCLUSION), LEFT: Primary | ICD-10-CM

## 2018-05-10 DIAGNOSIS — H40.52X3 NEOVASCULAR GLAUCOMA OF LEFT EYE, SEVERE STAGE: ICD-10-CM

## 2018-05-10 DIAGNOSIS — H43.813 POSTERIOR VITREOUS DETACHMENT, BOTH EYES: ICD-10-CM

## 2018-05-10 PROCEDURE — 92014 COMPRE OPH EXAM EST PT 1/>: CPT | Mod: 25,S$GLB,, | Performed by: OPHTHALMOLOGY

## 2018-05-10 PROCEDURE — 67028 INJECTION EYE DRUG: CPT | Mod: 79,LT,S$GLB, | Performed by: OPHTHALMOLOGY

## 2018-05-10 RX ADMIN — Medication 1.25 MG: at 04:05

## 2018-05-10 NOTE — PROGRESS NOTES
HPI     3 weeks for Avastin OS with tap  DLS-04/12/2018 Dr. Cole     Pt sts no change in va but still getting pressure sensation in OU but OS>OD.   (-)Flashes (-)Floaters  (+)Photophobia  (+)Glare    Combigan BID OS  Azopt TID OS  Diamox 250 daily    HPI     DLS 4/6/18- No eye pain. Some of her medications were changed because her   BP became very low.    Combigan BID OS  Azopt TID OS  Diamox 250 daily      HPI     Eye Problem    Additional comments: eye pain           Comments   DLS 4/3/18- Yesterday she started with pain OS which she rates as 8-9 on   pain scale. It is a sharp pain that shoots to the top of her head    Combigan BID OS    HPI     DLS 3/29/18-   Pt states she has been in a lot pain, severe worse than childbirth off and   on since Sunday.  Has not thrown up since early this AM .  VA has not   changed.     Instilled Alphagan P and Combigan drops OS  11:37.  Pt given Acetazolamide 250mg PO 11:42 with water and belia crackers.         OCT - ERM OS  Drusen OU      A/P    1. CRAO OS  Was admitted for stroke work up  Now with NVI and early NVG OS  S/p Avastin OS  with tap 4/3/18  S/p PRP - completed 4/12/18    Combigan BID OS  Azopt TID  Was on diamox 500 BID - having hypotensive episodes  JDN cut back Diamox to 250mg Q day  Dr. Mcneill D/ASTRID'gerald Valsartan    Avastin OS with tap    DC Diamox 2 weeks prior to return visit    2. PCIOL OD  3 piece in slight decentration   S/p YAG for PCO - pt notes improvement    NS OS    3. PVD OU    4. Dry AMD OU -   AREDS/AG    5. ERM OS      8 weeks for Avastin OS with tap    Risks, benefits, and alternatives to treatment discussed in detail with the patient.  The patient voiced understanding and wished to proceed with the procedure    Injection Procedure Note:  Diagnosis: CRAO with NVG OS    Topical Proparacaine and Betadine.  Inject Avastin OS at 6:00 @ 3.5-4mm posterior to limbus  Tap with 30g  Post Operative Dx: Same  Complications: None  Follow up as above.

## 2018-05-10 NOTE — PATIENT INSTRUCTIONS

## 2018-05-29 ENCOUNTER — HOME CARE VISIT (OUTPATIENT)
Dept: NEUROLOGY | Facility: HOSPITAL | Age: 83
End: 2018-05-29

## 2018-05-29 ENCOUNTER — TELEPHONE (OUTPATIENT)
Dept: CARDIOLOGY | Facility: CLINIC | Age: 83
End: 2018-05-29

## 2018-05-29 NOTE — TELEPHONE ENCOUNTER
----- Message from Kirsten Sabi sent at 5/29/2018  9:43 AM CDT -----  Contact: self, [phone]  Patient requests to be seen sooner than the next available appointment on 6/12. States she is having issues with medication prescribed. Please advise.

## 2018-05-30 VITALS
DIASTOLIC BLOOD PRESSURE: 64 MMHG | HEART RATE: 67 BPM | OXYGEN SATURATION: 99 % | WEIGHT: 151.19 LBS | BODY MASS INDEX: 26.78 KG/M2 | SYSTOLIC BLOOD PRESSURE: 108 MMHG

## 2018-05-30 NOTE — PROGRESS NOTES
Mrs. Buckner VS are WNL on today's visit. She states she's been having diarrhea for the last 9 days. SM nurse notified patient she needs to contact her PCP and notify them of the diarrhea. New Hyperlipidemia medication started 3 weeks ago. Pralvent weekly injections. LHE educated patient in dehydration and and encouraged her to increase fluid intake due to diarrhea. Patient denies any ER visits or hospital stays.

## 2018-06-07 ENCOUNTER — OFFICE VISIT (OUTPATIENT)
Dept: CARDIOLOGY | Facility: CLINIC | Age: 83
End: 2018-06-07
Payer: MEDICARE

## 2018-06-07 VITALS
HEART RATE: 60 BPM | BODY MASS INDEX: 26.4 KG/M2 | SYSTOLIC BLOOD PRESSURE: 119 MMHG | WEIGHT: 149 LBS | OXYGEN SATURATION: 96 % | HEIGHT: 63 IN | DIASTOLIC BLOOD PRESSURE: 62 MMHG

## 2018-06-07 DIAGNOSIS — I65.8 OCCLUSION AND STENOSIS OF MULTIPLE AND BILATERAL PRECEREBRAL ARTERIES: ICD-10-CM

## 2018-06-07 DIAGNOSIS — I77.9 RIGHT-SIDED CAROTID ARTERY DISEASE: ICD-10-CM

## 2018-06-07 DIAGNOSIS — I65.23 BILATERAL CAROTID ARTERY STENOSIS: ICD-10-CM

## 2018-06-07 DIAGNOSIS — I51.89 DIASTOLIC DYSFUNCTION: Primary | ICD-10-CM

## 2018-06-07 DIAGNOSIS — H34.12 CRAO (CENTRAL RETINAL ARTERY OCCLUSION), LEFT: ICD-10-CM

## 2018-06-07 DIAGNOSIS — I24.9 ACUTE CORONARY SYNDROME: ICD-10-CM

## 2018-06-07 DIAGNOSIS — I10 ESSENTIAL HYPERTENSION: ICD-10-CM

## 2018-06-07 DIAGNOSIS — E78.5 HYPERLIPIDEMIA, UNSPECIFIED HYPERLIPIDEMIA TYPE: ICD-10-CM

## 2018-06-07 DIAGNOSIS — G45.9 TRANSIENT CEREBRAL ISCHEMIA, UNSPECIFIED TYPE: ICD-10-CM

## 2018-06-07 DIAGNOSIS — E03.9 HYPOTHYROIDISM, UNSPECIFIED TYPE: ICD-10-CM

## 2018-06-07 DIAGNOSIS — I51.81 TAKOTSUBO CARDIOMYOPATHY: ICD-10-CM

## 2018-06-07 PROCEDURE — 3074F SYST BP LT 130 MM HG: CPT | Mod: CPTII,S$GLB,, | Performed by: INTERNAL MEDICINE

## 2018-06-07 PROCEDURE — 99214 OFFICE O/P EST MOD 30 MIN: CPT | Mod: S$GLB,,, | Performed by: INTERNAL MEDICINE

## 2018-06-07 PROCEDURE — 99999 PR PBB SHADOW E&M-EST. PATIENT-LVL III: CPT | Mod: PBBFAC,,, | Performed by: INTERNAL MEDICINE

## 2018-06-07 PROCEDURE — 3078F DIAST BP <80 MM HG: CPT | Mod: CPTII,S$GLB,, | Performed by: INTERNAL MEDICINE

## 2018-06-07 NOTE — PROGRESS NOTES
Subjective:    Patient ID:  Dorothy M Knobloch is a 85 y.o. female who presents for follow-up of Shortness of Breath      HPI     86 y/o female with a hx of SSS s/p PPM, Takotsubo Cardiomyopathy 3/2013, HTN, moderate bilateral carotid disease, TIA 12/2013, HLD, hypothyroidism, 2DE with vegetation on the MV (dental procedure?), visual deficit and diagnosed with left central retinal artery occlusion. EPIC media section record reviewed and has 2DE from 9/2017 with normal EF 65%, grade I diastolic dysfunction and mild MS. 2DE from 2/2018 with normal EF, DD, and moderate AI. Elevated lipid panel intolerant to statins, so recently started on Praluent. Had been feeling weak at home, so I stopped doxyzosin. Has been feeling better. BP stable in the 130's.   Since last clinic visit had about 5-6 days of diarrhea, now resolved. Continues to have intermittent SOB, however, has not worsened. Main complaint is of left eye visual deficit, which has improved. Denies CP, orthopnea, PND, syncope. Compliant with meds. Has bilateral LE edema which has improved with compression stockings, however, still present (amlodipine?). States she has no energy or drive and is depressed.     Review of Systems   Constitution: Positive for weakness and malaise/fatigue.   HENT: Negative for congestion.    Eyes: Negative for blurred vision.   Cardiovascular: Positive for dyspnea on exertion and leg swelling. Negative for chest pain, claudication, cyanosis, irregular heartbeat, near-syncope, orthopnea, palpitations, paroxysmal nocturnal dyspnea and syncope.   Respiratory: Positive for shortness of breath.    Endocrine: Negative for polyuria.   Hematologic/Lymphatic: Negative for bleeding problem.   Skin: Negative for itching and rash.   Musculoskeletal: Positive for back pain, joint pain, joint swelling and muscle weakness. Negative for muscle cramps.   Gastrointestinal: Positive for diarrhea. Negative for abdominal pain, hematemesis, hematochezia,  melena, nausea and vomiting.   Genitourinary: Negative for dysuria and hematuria.   Neurological: Negative for dizziness, focal weakness, headaches, light-headedness and loss of balance.   Psychiatric/Behavioral: Positive for depression. The patient is not nervous/anxious.         Objective:    Physical Exam   Constitutional: She is oriented to person, place, and time. She appears well-developed and well-nourished.   HENT:   Head: Normocephalic and atraumatic.   Neck: Neck supple. No JVD present.   Cardiovascular: Normal rate and regular rhythm.    Murmur heard.   Systolic murmur is present with a grade of 2/6   Pulses:       Carotid pulses are 2+ on the right side, and 2+ on the left side.       Radial pulses are 2+ on the right side, and 2+ on the left side.        Femoral pulses are 2+ on the right side, and 2+ on the left side.       Posterior tibial pulses are 1+ on the right side, and 1+ on the left side.   Pulmonary/Chest: Effort normal and breath sounds normal.   Abdominal: Soft. Bowel sounds are normal.   Musculoskeletal: She exhibits edema.   Neurological: She is alert and oriented to person, place, and time.   Skin: Skin is warm and dry.   Psychiatric: She has a normal mood and affect. Her behavior is normal. Thought content normal.         Assessment:       1. Diastolic dysfunction    2. Takotsubo cardiomyopathy    3. Essential hypertension    4. Right-sided carotid artery disease    5. Bilateral carotid artery stenosis    6. Acute coronary syndrome    7. Hyperlipidemia, unspecified hyperlipidemia type    8. 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 CRAO (central retinal artery occlusion), left    10. Transient cerebral ischemia, unspecified type    11. Hypothyroidism, unspecified type      86 y/o female with hx and presentation as above. Stable from a cardiac perspective and BP well controlled on current regimen- no changes. LE edema may be related to amlodipine, but no  pain and BP well controlled, so will continue. Discussed lack of energy, appetite, and depression. Counseled to stay active, and will likely need a dietary supplement like BOOST.        Plan:       -As above  -F/u in 3 months

## 2018-06-26 RX ORDER — METOPROLOL SUCCINATE 25 MG/1
25 TABLET, EXTENDED RELEASE ORAL DAILY
Qty: 90 TABLET | Refills: 3 | Status: SHIPPED | OUTPATIENT
Start: 2018-06-26 | End: 2018-12-04

## 2018-07-02 ENCOUNTER — HOME CARE VISIT (OUTPATIENT)
Dept: NEUROLOGY | Facility: HOSPITAL | Age: 83
End: 2018-07-02

## 2018-07-05 ENCOUNTER — TELEPHONE (OUTPATIENT)
Dept: OPHTHALMOLOGY | Facility: CLINIC | Age: 83
End: 2018-07-05

## 2018-07-05 NOTE — TELEPHONE ENCOUNTER
----- Message from Stephanie Montes sent at 7/5/2018  8:25 AM CDT -----  Contact: Knobloch,Dorothy   Pt would like to speak with  nurse please ,pt has a question for the doctor,pt can be reached [phone] please thank you.

## 2018-07-05 NOTE — TELEPHONE ENCOUNTER
pt called to report off and on eye pain in the right eye.  No loss of vision or blurred vision.    pt will try artificial tears and call back in the AM if no improvement.

## 2018-07-08 NOTE — PROGRESS NOTES
PV completed with Mrs. Knobloch. No changes in medication. Denies any ER visits or hospital stays.

## 2018-07-12 ENCOUNTER — PROCEDURE VISIT (OUTPATIENT)
Dept: OPHTHALMOLOGY | Facility: CLINIC | Age: 83
End: 2018-07-12
Payer: MEDICARE

## 2018-07-12 VITALS — HEART RATE: 57 BPM | SYSTOLIC BLOOD PRESSURE: 127 MMHG | DIASTOLIC BLOOD PRESSURE: 58 MMHG

## 2018-07-12 DIAGNOSIS — H34.12 CRAO (CENTRAL RETINAL ARTERY OCCLUSION), LEFT: Primary | ICD-10-CM

## 2018-07-12 DIAGNOSIS — H35.3132 NONEXUDATIVE AGE-RELATED MACULAR DEGENERATION, BILATERAL, INTERMEDIATE DRY STAGE: ICD-10-CM

## 2018-07-12 DIAGNOSIS — H43.813 POSTERIOR VITREOUS DETACHMENT, BOTH EYES: ICD-10-CM

## 2018-07-12 DIAGNOSIS — H40.52X2 NEOVASCULAR GLAUCOMA OF LEFT EYE, MODERATE STAGE: ICD-10-CM

## 2018-07-12 PROCEDURE — 92226 PR SPECIAL EYE EXAM, SUBSEQUENT: CPT | Mod: LT,S$GLB,, | Performed by: OPHTHALMOLOGY

## 2018-07-12 PROCEDURE — 92014 COMPRE OPH EXAM EST PT 1/>: CPT | Mod: S$GLB,,, | Performed by: OPHTHALMOLOGY

## 2018-07-12 NOTE — PROGRESS NOTES
HPI     Eye Problem    Additional comments: 8 week check           Comments   DLS 5/10/18- At time she gets pressure sensation OD which is relieved with   ATs. She gets an occasional tinge of pain OS-nothing too bad    Combigan BID OS   Azopt TID OS         OCT - ERM OS  Drusen OU      A/P    1. CRAO OS  Was admitted for stroke work up   NVI and early NVG OS  S/p Avastin OS  with tap last 5/18  S/p PRP - completed 4/12/18    Combigan BID OS  Azopt TID  Was on diamox 500 BID - having hypotensive episodes  JDN cut back Diamox to 250mg Q day  Dr. Mcneill D/C'd Valsartan    7/18 - no NVI/NVA - Try without avastin and tap today  DC Diamox  Keep Combigan and AZOPT BID OS        2. PCIOL OD  3 piece in slight decentration   S/p YAG for PCO - pt notes improvement    NS OS    3. PVD OU    4. Dry AMD OU -   AREDS/AG    5. ERM OS      8 weeks

## 2018-08-03 ENCOUNTER — HOME CARE VISIT (OUTPATIENT)
Dept: NEUROLOGY | Facility: HOSPITAL | Age: 83
End: 2018-08-03

## 2018-08-04 VITALS
WEIGHT: 149 LBS | BODY MASS INDEX: 26.39 KG/M2 | HEART RATE: 59 BPM | OXYGEN SATURATION: 97 % | DIASTOLIC BLOOD PRESSURE: 64 MMHG | SYSTOLIC BLOOD PRESSURE: 138 MMHG

## 2018-08-04 NOTE — PROGRESS NOTES
Ms. Knobloch VS are WNL on today's visit. She denies any ER visits or hospital stays. patient reports her pacemaker doctor prescribed her Eliquis but she's afraid to take it. Per her records she has a follow up appointment with her cardiologist next week. LHE educated her on medication prescribed and compliance, also communicating concerns to appropriate medical personal in a timey fashion.  nurse instructed her to contact prescribing MD and notify him that she is not taking the medication along with her concerns. In addition also notify cardiologist at visit next week.

## 2018-08-09 DIAGNOSIS — H40.52X2 NEOVASCULAR GLAUCOMA OF LEFT EYE, MODERATE STAGE: Primary | ICD-10-CM

## 2018-08-09 RX ORDER — BRINZOLAMIDE 10 MG/ML
1 SUSPENSION/ DROPS OPHTHALMIC 2 TIMES DAILY
Qty: 10 ML | Refills: 6 | Status: SHIPPED | OUTPATIENT
Start: 2018-08-09 | End: 2019-08-21 | Stop reason: SDUPTHER

## 2018-08-09 NOTE — TELEPHONE ENCOUNTER
----- Message from Renea Trinh sent at 8/9/2018 10:30 AM CDT -----  Contact: Carol  Needs Advice    Reason for call:    Pt called to request a Rx for the samples she was given in clinic.  Communication Preference:[phone]  Additional Information:

## 2018-08-10 ENCOUNTER — TELEPHONE (OUTPATIENT)
Dept: OPTOMETRY | Facility: CLINIC | Age: 83
End: 2018-08-10

## 2018-08-10 ENCOUNTER — OFFICE VISIT (OUTPATIENT)
Dept: OPHTHALMOLOGY | Facility: CLINIC | Age: 83
End: 2018-08-10
Payer: MEDICARE

## 2018-08-10 DIAGNOSIS — H57.11 PERIORBITAL PAIN, RIGHT: ICD-10-CM

## 2018-08-10 DIAGNOSIS — H25.12 NUCLEAR SCLEROTIC CATARACT OF LEFT EYE: ICD-10-CM

## 2018-08-10 DIAGNOSIS — Z98.41 STATUS POST CATARACT EXTRACTION AND INSERTION OF INTRAOCULAR LENS OF RIGHT EYE: ICD-10-CM

## 2018-08-10 DIAGNOSIS — H35.3134 ADVANCED ATROPHIC NONEXUDATIVE AGE-RELATED MACULAR DEGENERATION OF BOTH EYES WITH SUBFOVEAL INVOLVEMENT: ICD-10-CM

## 2018-08-10 DIAGNOSIS — Z96.1 STATUS POST CATARACT EXTRACTION AND INSERTION OF INTRAOCULAR LENS OF RIGHT EYE: ICD-10-CM

## 2018-08-10 DIAGNOSIS — H54.10 BLINDNESS AND LOW VISION: ICD-10-CM

## 2018-08-10 DIAGNOSIS — H34.12 CRAO (CENTRAL RETINAL ARTERY OCCLUSION), LEFT: ICD-10-CM

## 2018-08-10 DIAGNOSIS — H04.123 DRY EYE SYNDROME OF BOTH EYES: Primary | ICD-10-CM

## 2018-08-10 PROBLEM — Z98.49 STATUS POST CATARACT EXTRACTION AND INSERTION OF INTRAOCULAR LENS: Status: ACTIVE | Noted: 2018-08-10

## 2018-08-10 PROCEDURE — 92014 COMPRE OPH EXAM EST PT 1/>: CPT | Mod: S$GLB,,, | Performed by: OPHTHALMOLOGY

## 2018-08-10 PROCEDURE — 99999 PR PBB SHADOW E&M-EST. PATIENT-LVL II: CPT | Mod: PBBFAC,,, | Performed by: OPHTHALMOLOGY

## 2018-08-10 PROCEDURE — 92020 GONIOSCOPY: CPT | Mod: S$GLB,,, | Performed by: OPHTHALMOLOGY

## 2018-08-10 RX ORDER — ALIROCUMAB 75 MG/ML
INJECTION, SOLUTION SUBCUTANEOUS
COMMUNITY
Start: 2018-08-06 | End: 2024-01-12

## 2018-08-10 RX ORDER — AMLODIPINE BESYLATE 5 MG/1
TABLET ORAL
COMMUNITY
Start: 2018-07-13 | End: 2018-09-06 | Stop reason: DRUGHIGH

## 2018-08-10 NOTE — PROGRESS NOTES
HPI     Eye Pain    Additional comments: eye pain od x's 1 mon           Comments   Patient states her right eye has been somewhat painful for about 1 month   now. Va in OD seems to be stable. No discharge.    Combigan BID OS   Azopt TID OS   AT's PRN OU    1) CRAO OS   NVI and early NVG   S/p partial PRP   S/p avastid os w/tap (5/10/18)     2) PCIOL OD   NS OS     3) PVD OU     4) Dry AMD OU     5) ERM OS        Last edited by Jerrell Mackey on 8/10/2018 11:59 AM. (History)            Assessment /Plan     For exam results, see Encounter Report.    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  Also discussed options utilizing medications.          Dr. Cole::  CRAO OS  Was admitted for stroke work up     ==> + NVI & NVD OS  3/27/2018  Dr Cole  IO meds / PRP OS    Left eye  Combigan BID  Azopt BID  Diamox 250 BID --> Intolerant        PCIOL OD  3 piece in slight decentration   OD SP YAG Cap 3/27/2018    NS OS  Observe      PVD OU    Dry AMD OU -   AREDS/AG    ERM OS      RD precautions:  Discussed with patient symptoms of RD with increased flashes, floaters, decreasing vision.  Patient/Family to call and return immediately to clinic should the symptoms of RD occur.  patient voice good understanding.        Plan  RTC Dr Cole as scheduled  [facility] PRN  RTC sooner prn with good understanding

## 2018-08-10 NOTE — TELEPHONE ENCOUNTER
Pt sts having pain with no relief from gtts offered same day appointment pt declined i told pt next available for dr cai was not until sept and i could get her in to see another ophthalmologist sooner pt requested Dr. English i did not see anything until august 31st pt was frustrated and hung up sent message to Dr. English to see if they can see her sooner

## 2018-08-17 ENCOUNTER — TELEPHONE (OUTPATIENT)
Dept: CARDIOLOGY | Facility: CLINIC | Age: 83
End: 2018-08-17

## 2018-08-17 NOTE — TELEPHONE ENCOUNTER
Left pt message advising her about [employer] pacemaker check scheduled for next week Tuesday. Pt advised to give a call back if she has any questions

## 2018-08-21 ENCOUNTER — CLINICAL SUPPORT (OUTPATIENT)
Dept: CARDIOLOGY | Facility: CLINIC | Age: 83
End: 2018-08-21
Payer: MEDICARE

## 2018-08-21 DIAGNOSIS — Z95.0 CARDIAC PACEMAKER: Primary | ICD-10-CM

## 2018-08-21 PROCEDURE — 99499 UNLISTED E&M SERVICE: CPT | Mod: S$GLB,,, | Performed by: INTERNAL MEDICINE

## 2018-08-21 PROCEDURE — 93280 PM DEVICE PROGR EVAL DUAL: CPT | Mod: 26,,, | Performed by: INTERNAL MEDICINE

## 2018-08-22 PROBLEM — Z95.0 CARDIAC PACEMAKER: Status: ACTIVE | Noted: 2018-08-22

## 2018-08-22 NOTE — PROGRESS NOTES
"  Subjective:      Patient ID: Dorothy M Knobloch is a 86 y.o. female.    Chief Complaint: Follow-up (Device check only)    HPI:    ROS    [employer] pacemaker interrogated in office with rep.  FAINA 9 years.  30 atrial tachycardia or atrial fibrillation episodes with the longest lasting an hour and a half on 8/4/18 with ventricular response 65 bpm.  Total AF burden 0.8% since June 29/2018.  EGM's reviewed and paroxysmal a fib confirmed.  Although PMH includes "anticoagulant long term use" I do not see formal anticoagulant on medication list.  Will defer decision regarding formal anticoagulation to Dr Mcneill.    Leads OK.  Normal device function.    Full report scanned into [employer].    Past Medical History:   Diagnosis Date    Anticoagulant long-term use     Arthritis     Bilateral nonexudative age-related macular degeneration 8/27/2013    Blood transfusion     Cataract     CHF (congestive heart failure)     Enlarged heart     High cholesterol     Hypertension     Sick sinus syndrome     Stroke     Thyroid disease         Past Surgical History:   Procedure Laterality Date    CARDIAC PACEMAKER PLACEMENT      CARPAL TUNNEL RELEASE Bilateral 1991    CATARACT EXTRACTION Right     JOINT REPLACEMENT      MI TRANSCRAN DOPP INTRACRAN, EMBOLI W/O INJ  1/29/2018         SKIN BIOPSY      TONSILLECTOMY      TOTAL HIP ARTHROPLASTY  11-1-2000    right/[facility]       Family History   Problem Relation Age of Onset    Early death Mother     Early death Father     Arthritis Sister     Hearing loss Daughter     Birth defects Son     Hypertension Son     Diabetes Paternal Aunt     Cancer Paternal Aunt 80        colon  cancer    Diabetes Paternal Uncle     Arthritis Maternal Grandmother     Arthritis Paternal Grandmother     Hypertension Paternal Grandmother     Amblyopia Neg Hx     Blindness Neg Hx     Cataracts Neg Hx     Glaucoma Neg Hx     Macular degeneration Neg Hx     Retinal " detachment Neg Hx     Strabismus Neg Hx     Stroke Neg Hx     Thyroid disease Neg Hx        Social History     Socioeconomic History    Marital status:      Spouse name: Not on file    Number of children: Not on file    Years of education: Not on file    Highest education level: Not on file   Social Needs    Financial resource strain: Not on file    Food insecurity - worry: Not on file    Food insecurity - inability: Not on file    Transportation needs - medical: Not on file    Transportation needs - non-medical: Not on file   Occupational History    Not on file   Tobacco Use    Smoking status: Never Smoker    Smokeless tobacco: Never Used   Substance and Sexual Activity    Alcohol use: No     Alcohol/week: 0.0 oz    Drug use: No    Sexual activity: Not on file   Other Topics Concern    Not on file   Social History Narrative    Cares for her blind         Current Outpatient Medications on File Prior to Visit   Medication Sig Dispense Refill    acetaZOLAMIDE (DIAMOX SEQUELS) 500 mg CpSR Take 1 capsule (500 mg total) by mouth 2 (two) times daily. (Patient taking differently: Take 500 mg by mouth once daily. ) 60 capsule 11    ADVAIR DISKUS 250-50 mcg/dose diskus inhaler       alirocumab (PRALUENT PEN SUBQ) Inject into the skin.      amLODIPine (NORVASC) 10 MG tablet Take 1 tablet (10 mg total) by mouth once daily. 30 tablet 11    amLODIPine (NORVASC) 5 MG tablet       aspirin (ECOTRIN) 81 MG EC tablet Take 81 mg by mouth once daily.      brinzolamide (AZOPT) 1 % ophthalmic suspension Place 1 drop into the left eye 2 (two) times daily. 10 mL 6    cholecalciferol, vitamin D3, 50,000 unit capsule Take 50,000 Units by mouth every 7 days.  5    clopidogrel (PLAVIX) 75 mg tablet Take 1 tablet (75 mg total) by mouth once daily. 30 tablet 0    COMBIGAN 0.2-0.5 % Drop INSTILL 1 DROP INTO LEFT EYE TWICE A DAY  3    hydroCHLOROthiazide (HYDRODIURIL) 12.5 MG Tab Take 1 tablet (12.5 mg  total) by mouth once daily. 30 tablet 11    levothyroxine (SYNTHROID) 88 MCG tablet Take 88 mcg by mouth before breakfast.       metoprolol succinate (TOPROL-XL) 25 MG 24 hr tablet Take 1 tablet (25 mg total) by mouth once daily. 90 tablet 3    NITROSTAT 0.4 mg SL tablet       PRALUENT PEN 75 mg/mL PnIj        No current facility-administered medications on file prior to visit.        Review of patient's allergies indicates:   Allergen Reactions    Aspartame Swelling     equal     Objective:   There were no vitals filed for this visit.     Physical Exam     Assessment:     1. Cardiac pacemaker      Plan:   Carol was seen today for follow-up.    Diagnoses and all orders for this visit:    Cardiac pacemaker         No Follow-up on file.

## 2018-09-05 ENCOUNTER — HOME CARE VISIT (OUTPATIENT)
Dept: NEUROLOGY | Facility: HOSPITAL | Age: 83
End: 2018-09-05

## 2018-09-06 ENCOUNTER — OFFICE VISIT (OUTPATIENT)
Dept: OPHTHALMOLOGY | Facility: CLINIC | Age: 83
End: 2018-09-06
Payer: MEDICARE

## 2018-09-06 DIAGNOSIS — H34.12 CRAO (CENTRAL RETINAL ARTERY OCCLUSION), LEFT: Primary | ICD-10-CM

## 2018-09-06 DIAGNOSIS — H35.372 EPIRETINAL MEMBRANE, LEFT EYE: ICD-10-CM

## 2018-09-06 DIAGNOSIS — H35.3132 NONEXUDATIVE AGE-RELATED MACULAR DEGENERATION, BILATERAL, INTERMEDIATE DRY STAGE: ICD-10-CM

## 2018-09-06 DIAGNOSIS — H43.813 POSTERIOR VITREOUS DETACHMENT, BOTH EYES: ICD-10-CM

## 2018-09-06 PROCEDURE — 92014 COMPRE OPH EXAM EST PT 1/>: CPT | Mod: S$PBB,,, | Performed by: OPHTHALMOLOGY

## 2018-09-06 PROCEDURE — 92226 PR SPECIAL EYE EXAM, SUBSEQUENT: CPT | Mod: S$PBB,LT,, | Performed by: OPHTHALMOLOGY

## 2018-09-06 PROCEDURE — 92226 PR SPECIAL EYE EXAM, SUBSEQUENT: CPT | Mod: 50,PBBFAC,PO | Performed by: OPHTHALMOLOGY

## 2018-09-06 PROCEDURE — 99213 OFFICE O/P EST LOW 20 MIN: CPT | Mod: PBBFAC,25,PO | Performed by: OPHTHALMOLOGY

## 2018-09-06 PROCEDURE — 99999 PR PBB SHADOW E&M-EST. PATIENT-LVL III: CPT | Mod: PBBFAC,,, | Performed by: OPHTHALMOLOGY

## 2018-09-06 NOTE — PROGRESS NOTES
HPI     Eye Problem      Additional comments: 8 week check              Comments     DLS 7/12/18- No changes x last visit    Combigan BID OS   Azopt BID OS         OCT - ERM OS  Drusen OU      A/P    1. CRAO OS  Was admitted for stroke work up   NVI and early NVG OS  S/p Avastin OS  with tap last 5/18  S/p PRP - completed 4/12/18    Combigan BID OS  Azopt TID  Was on diamox 500 BID - having hypotensive episodes  JDN cut back Diamox to 250mg Q day  Dr. Mcneill D/C'gerald Valsartan    7/18 - no NVI/NVA - Try without avastin and tap today  DC Diamox  Keep Combigan and AZOPT BID OS      2. PCIOL OD  3 piece in slight decentration   S/p YAG for PCO - pt notes improvement    NS OS    3. PVD OU    4. Dry AMD OU -   AREDS/AG    5. ERM OS  Given CRAO - nothing to do      6 months

## 2018-09-11 ENCOUNTER — OFFICE VISIT (OUTPATIENT)
Dept: CARDIOLOGY | Facility: CLINIC | Age: 83
End: 2018-09-11
Payer: MEDICARE

## 2018-09-11 ENCOUNTER — TELEPHONE (OUTPATIENT)
Dept: CARDIOLOGY | Facility: CLINIC | Age: 83
End: 2018-09-11

## 2018-09-11 VITALS
SYSTOLIC BLOOD PRESSURE: 120 MMHG | WEIGHT: 149 LBS | OXYGEN SATURATION: 96 % | DIASTOLIC BLOOD PRESSURE: 64 MMHG | HEART RATE: 61 BPM | BODY MASS INDEX: 26.4 KG/M2 | HEIGHT: 63 IN

## 2018-09-11 DIAGNOSIS — H35.3134 ADVANCED ATROPHIC NONEXUDATIVE AGE-RELATED MACULAR DEGENERATION OF BOTH EYES WITH SUBFOVEAL INVOLVEMENT: ICD-10-CM

## 2018-09-11 DIAGNOSIS — I24.9 ACUTE CORONARY SYNDROME: ICD-10-CM

## 2018-09-11 DIAGNOSIS — G45.9 TRANSIENT CEREBRAL ISCHEMIA, UNSPECIFIED TYPE: ICD-10-CM

## 2018-09-11 DIAGNOSIS — I77.9 RIGHT-SIDED CAROTID ARTERY DISEASE: ICD-10-CM

## 2018-09-11 DIAGNOSIS — D32.9 MENINGIOMA: ICD-10-CM

## 2018-09-11 DIAGNOSIS — I10 ESSENTIAL HYPERTENSION: ICD-10-CM

## 2018-09-11 DIAGNOSIS — E03.9 HYPOTHYROIDISM, UNSPECIFIED TYPE: ICD-10-CM

## 2018-09-11 DIAGNOSIS — I51.81 TAKOTSUBO CARDIOMYOPATHY: ICD-10-CM

## 2018-09-11 DIAGNOSIS — H54.10 BLINDNESS AND LOW VISION: ICD-10-CM

## 2018-09-11 DIAGNOSIS — E78.5 HYPERLIPIDEMIA, UNSPECIFIED HYPERLIPIDEMIA TYPE: ICD-10-CM

## 2018-09-11 DIAGNOSIS — I51.89 DIASTOLIC DYSFUNCTION: ICD-10-CM

## 2018-09-11 DIAGNOSIS — I48.0 PAROXYSMAL ATRIAL FIBRILLATION: Primary | ICD-10-CM

## 2018-09-11 DIAGNOSIS — H34.12 CRAO (CENTRAL RETINAL ARTERY OCCLUSION), LEFT: ICD-10-CM

## 2018-09-11 DIAGNOSIS — I65.23 BILATERAL CAROTID ARTERY STENOSIS: ICD-10-CM

## 2018-09-11 DIAGNOSIS — Z95.0 CARDIAC PACEMAKER: ICD-10-CM

## 2018-09-11 PROCEDURE — 99999 PR PBB SHADOW E&M-EST. PATIENT-LVL III: CPT | Mod: PBBFAC,,, | Performed by: INTERNAL MEDICINE

## 2018-09-11 PROCEDURE — 99213 OFFICE O/P EST LOW 20 MIN: CPT | Mod: PBBFAC,PO | Performed by: INTERNAL MEDICINE

## 2018-09-11 PROCEDURE — 1101F PT FALLS ASSESS-DOCD LE1/YR: CPT | Mod: CPTII,,, | Performed by: INTERNAL MEDICINE

## 2018-09-11 PROCEDURE — 99214 OFFICE O/P EST MOD 30 MIN: CPT | Mod: S$PBB,,, | Performed by: INTERNAL MEDICINE

## 2018-09-11 NOTE — PROGRESS NOTES
Subjective:    Patient ID:  Dorothy M Knobloch is a 86 y.o. female who presents for follow-up of Atrial Fibrillation and Leg Swelling      HPI  85 y/o female with a hx of SSS s/p PPM, Afib not currently on AC, Takotsubo Cardiomyopathy 3/2013, HTN, moderate bilateral carotid disease, TIA 12/2013, HLD, hypothyroidism, 2DE with vegetation on the MV (dental procedure?), visual deficit and diagnosed with left central retinal artery occlusion. EPIC media section record reviewed and has 2DE from 9/2017 with normal EF 65%, grade I diastolic dysfunction and mild MS. 2DE from 2/2018 with normal EF, DD, and moderate AI. Elevated lipid panel intolerant to statins, so recently started on Praluent. Had been feeling weak at home, so I stopped doxyzosin, felt better after. BP stable in the 130's.   Continues to have intermittent SOB, however, has not worsened. Main complaint is of left eye visual deficit, which has improved. Denies CP, orthopnea, PND, syncope. Compliant with meds. Has bilateral LE edema which has improved with compression stockings, however, still present (amlodipine?). States she has no energy or drive and is depressed.   Recent PPM interrogation (report available in media section) with episodes of Afib with longest 1.5 hours (afib burden 0.8%?). Underlying rhythm SR with frequent 2:1 AVB.     Review of Systems   Constitution: Positive for weakness and malaise/fatigue.   HENT: Negative for congestion.    Eyes: Negative for blurred vision.   Cardiovascular: Positive for dyspnea on exertion and leg swelling. Negative for chest pain, claudication, cyanosis, irregular heartbeat, near-syncope, orthopnea, palpitations, paroxysmal nocturnal dyspnea and syncope.   Respiratory: Positive for shortness of breath.    Endocrine: Negative for polyuria.   Hematologic/Lymphatic: Negative for bleeding problem.   Skin: Negative for itching and rash.   Musculoskeletal: Positive for back pain, joint pain, joint swelling and muscle  weakness. Negative for muscle cramps.   Gastrointestinal: Negative for abdominal pain, hematemesis, hematochezia, melena, nausea and vomiting.   Genitourinary: Negative for dysuria and hematuria.   Neurological: Negative for dizziness, focal weakness, headaches, light-headedness and loss of balance.   Psychiatric/Behavioral: Positive for depression. The patient is not nervous/anxious.         Objective:    Physical Exam   Constitutional: She is oriented to person, place, and time. She appears well-developed and well-nourished.   HENT:   Head: Normocephalic and atraumatic.   Neck: Neck supple. No JVD present.   Cardiovascular: Normal rate and regular rhythm.   Murmur heard.   Systolic murmur is present with a grade of 2/6.  Pulses:       Carotid pulses are 2+ on the right side, and 2+ on the left side.       Radial pulses are 2+ on the right side, and 2+ on the left side.        Femoral pulses are 2+ on the right side, and 2+ on the left side.       Posterior tibial pulses are 1+ on the right side, and 1+ on the left side.   Pulmonary/Chest: Effort normal and breath sounds normal.   Abdominal: Soft. Bowel sounds are normal.   Musculoskeletal: She exhibits edema.   Neurological: She is alert and oriented to person, place, and time.   Skin: Skin is warm and dry.   Psychiatric: She has a normal mood and affect. Her behavior is normal. Thought content normal.         Assessment:       1. Paroxysmal atrial fibrillation    2. Takotsubo cardiomyopathy    3. Essential hypertension    4. Diastolic dysfunction    5. Hyperlipidemia, unspecified hyperlipidemia type    6. Right-sided carotid artery disease    7. Bilateral carotid artery stenosis    8. Cardiac pacemaker    9. Acute coronary syndrome    10. Meningioma    11. Hypothyroidism, unspecified type    12. CRAO (central retinal artery occlusion), left    13. Blindness and low vision    14. Advanced atrophic nonexudative age-related macular degeneration of both eyes with  subfoveal involvement    15. Transient cerebral ischemia, unspecified type      85 y/o pt with hx and presentation as above. Continues to have chronic SOB, unchanged. PPM interrogated and reveals episodes of PAFib. Currently on BB therapy with metoprolol. CHADSVasc 7 (CHF, HTN, Age x2, stroke/TIA x 2, female) and indication for AC. Will Start Eliquis 2.5 mg BID (Age > 80 and weight close to 60 kg). Will D/C ASA and continue plavix (to limit triple therapy in this octogenarian will continue plavix with Eliquis only). Will have her establish with EP here.   Leg edema continues to be and issue and she states she had a recent venous doppler at  - will attempt to obtain records. Amlodipine may be a contributing factor, however, given how difficult it has been to control BP with other regimens, she has opted to continue with amlodipine. Leg edema is currently treated with compression stockings and HCTZ (any increase in dose of HCTZ or lasix has caused her not to have good UOP per patient).   On a positive note, her BP has been under good control recently.        Plan:       -Eliquis 2.5 mg BID  -Obtain records from   -Referral to EP  -f/u with me in 1 month

## 2018-09-11 NOTE — TELEPHONE ENCOUNTER
----- Message from Nisa Hunt sent at 9/11/2018  4:35 PM CDT -----  Contact: [phone]  Called in to provide fax and phone number for Dr.James Vaca. Pt stated nurse is aware of situation.    Phone Number- [phone]  Fax Number- [phone]

## 2018-09-12 ENCOUNTER — TELEPHONE (OUTPATIENT)
Dept: CARDIOLOGY | Facility: CLINIC | Age: 83
End: 2018-09-12

## 2018-09-12 NOTE — TELEPHONE ENCOUNTER
Pt was seen in clinic yesterday, is supposed to be starting Eliquis 2.5 mg BID but rx was never sent

## 2018-09-12 NOTE — TELEPHONE ENCOUNTER
----- Message from Kirsten Sabi sent at 9/12/2018 11:46 AM CDT -----  Contact: self, [phone]  Patient states her Eloquis prescription refill requested has not been sent to [facility] pharmacy in [place]. Please advise.

## 2018-09-18 ENCOUNTER — OFFICE VISIT (OUTPATIENT)
Dept: ELECTROPHYSIOLOGY | Facility: CLINIC | Age: 83
End: 2018-09-18
Payer: MEDICARE

## 2018-09-18 ENCOUNTER — HOSPITAL ENCOUNTER (OUTPATIENT)
Dept: CARDIOLOGY | Facility: CLINIC | Age: 83
Discharge: HOME | End: 2018-09-18
Payer: MEDICARE

## 2018-09-18 VITALS
WEIGHT: 149.06 LBS | BODY MASS INDEX: 26.41 KG/M2 | DIASTOLIC BLOOD PRESSURE: 74 MMHG | HEIGHT: 63 IN | HEART RATE: 70 BPM | SYSTOLIC BLOOD PRESSURE: 126 MMHG

## 2018-09-18 DIAGNOSIS — I49.9 CARDIAC ARRHYTHMIA, UNSPECIFIED CARDIAC ARRHYTHMIA TYPE: ICD-10-CM

## 2018-09-18 DIAGNOSIS — I10 ESSENTIAL HYPERTENSION: ICD-10-CM

## 2018-09-18 DIAGNOSIS — I48.0 PAROXYSMAL ATRIAL FIBRILLATION: Primary | ICD-10-CM

## 2018-09-18 DIAGNOSIS — G45.9 TIA (TRANSIENT ISCHEMIC ATTACK): ICD-10-CM

## 2018-09-18 DIAGNOSIS — I48.91 ATRIAL FIBRILLATION: ICD-10-CM

## 2018-09-18 DIAGNOSIS — Z95.0 CARDIAC PACEMAKER: ICD-10-CM

## 2018-09-18 DIAGNOSIS — I49.9 CARDIAC ARRHYTHMIA, UNSPECIFIED CARDIAC ARRHYTHMIA TYPE: Primary | ICD-10-CM

## 2018-09-18 DIAGNOSIS — G45.9 TRANSIENT CEREBRAL ISCHEMIA, UNSPECIFIED TYPE: ICD-10-CM

## 2018-09-18 DIAGNOSIS — I51.89 DIASTOLIC DYSFUNCTION: ICD-10-CM

## 2018-09-18 DIAGNOSIS — Z95.0 CARDIAC PACEMAKER IN SITU: Primary | ICD-10-CM

## 2018-09-18 PROCEDURE — 99999 PR PBB SHADOW E&M-EST. PATIENT-LVL III: CPT | Mod: PBBFAC,,, | Performed by: INTERNAL MEDICINE

## 2018-09-18 PROCEDURE — 99213 OFFICE O/P EST LOW 20 MIN: CPT | Mod: PBBFAC,25 | Performed by: INTERNAL MEDICINE

## 2018-09-18 PROCEDURE — 1101F PT FALLS ASSESS-DOCD LE1/YR: CPT | Mod: CPTII,,, | Performed by: INTERNAL MEDICINE

## 2018-09-18 PROCEDURE — 93010 ELECTROCARDIOGRAM REPORT: CPT | Mod: S$PBB,,, | Performed by: INTERNAL MEDICINE

## 2018-09-18 PROCEDURE — 93005 ELECTROCARDIOGRAM TRACING: CPT | Mod: PBBFAC | Performed by: INTERNAL MEDICINE

## 2018-09-18 PROCEDURE — 99214 OFFICE O/P EST MOD 30 MIN: CPT | Mod: S$PBB,,, | Performed by: INTERNAL MEDICINE

## 2018-09-18 NOTE — LETTER
September 18, 2018      North Mcneill MD  [address]  [address]  [place] [address]           [facility] - Arrhythmia  [address]  [facility] [address]  Phone: [phone]  Fax: [phone]          Patient: Dorothy M Knobloch   MR Number: [id number]   YOB: 1932   Date of Visit: 9/18/2018       Dear Dr. North Mcneill:    Thank you for referring Dorothy Knobloch to me for evaluation. Attached you will find relevant portions of my assessment and plan of care.    If you have questions, please do not hesitate to call me. I look forward to following Dorothy Knobloch along with you.    Sincerely,    Marquis Velazquez MD    Enclosure  CC:  No Recipients    If you would like to receive this communication electronically, please contact [email or web address] or [phone] to request more information on [employer] access.    For providers and/or their staff who would like to refer a patient to [facility], please contact us through our one-stop-shop provider referral line, [facility], at [phone].    If you feel you have received this communication in error or would no longer like to receive these types of communications, please e-mail [email or web address]

## 2018-09-18 NOTE — PROGRESS NOTES
Subjective:    Patient ID:  Dorothy M Knobloch is a 86 y.o. female who presents for evaluation of Paroxysmal atrial fibrillation    Referring Cardiologist: North Mcneill MD    HPI  I had the pleasure of seeing Mrs. Knobloch today in our electrophysiology clinic in consultation for her pacemaker and atrial fibrillation. As you are aware she is a pleasant 86 year-old woman with hypertension, Takotsubo CMP 3/2013, TIA 12/2013, moderate right carotid artery disease, paroxysmal AF, and high-grade AV block with bradycardia s/p dc PPM 2017 at [facility]. She is transferring care to [facility]. She feels well. Device interrogation notes normal function. She does have <1% AF burden without high-ventricular rates. She notes occasional brief fluttering sensation.    ECHO 1/2018 CONCLUSIONS     1 - Normal left ventricular systolic function (EF 60-65%).     2 - Normal right ventricular systolic function .     3 - Moderate left atrial enlargement.     4 - Mild tricuspid regurgitation.     5 - Marked mitral annular calcification without stenosis.     6 - Aortic sclerosis without stenosis.     7 - The estimated PA systolic pressure is 33 mmHg.    PPM interrogation: stable parameters, 52%A, 63%V paced. AF<0.8%, longest episode 3 hours.    My interpretation of today's in clinic ECG is AV paced rhythm at 70bpm and QRS duration of 150msec.    Review of Systems   Constitution: Positive for malaise/fatigue. Negative for weakness.   HENT: Negative for congestion and sore throat.    Eyes: Negative for blurred vision and visual disturbance.   Cardiovascular: Positive for dyspnea on exertion and palpitations. Negative for chest pain, leg swelling, near-syncope and orthopnea.   Respiratory: Negative for cough and shortness of breath.    Hematologic/Lymphatic: Negative for bleeding problem. Does not bruise/bleed easily.   Skin: Negative.    Musculoskeletal: Positive for arthritis, back pain and joint pain.   Gastrointestinal: Negative for  hematochezia and melena.   Neurological: Negative for dizziness and light-headedness.        Objective:    Physical Exam   Constitutional: She is oriented to person, place, and time. She appears well-developed and well-nourished. No distress.   HENT:   Head: Normocephalic and atraumatic.   Eyes: Conjunctivae are normal. Right eye exhibits no discharge. Left eye exhibits no discharge.   Neck: Neck supple. No JVD present.   Cardiovascular: Normal rate, regular rhythm and normal heart sounds. Exam reveals no gallop and no friction rub.   No murmur heard.  Pulmonary/Chest: Effort normal and breath sounds normal. No respiratory distress. She has no wheezes. She has no rales.   Abdominal: Soft. Bowel sounds are normal. She exhibits no distension. There is no tenderness. There is no rebound.   Musculoskeletal: She exhibits no edema.   Neurological: She is alert and oriented to person, place, and time.   Skin: Skin is warm and dry. She is not diaphoretic.   Psychiatric: She has a normal mood and affect. Her behavior is normal. Thought content normal.   Vitals reviewed.        Assessment:       1. Paroxysmal atrial fibrillation    2. Essential hypertension    3. Cardiac pacemaker    4. Transient cerebral ischemia, unspecified type         Plan:       In summary, Mrs. Knobloch is a pleasant 86 year-old woman with hypertension, Takotsubo CMP 3/2013, TIA 12/2013, moderate right carotid artery disease, paroxysmal AF, and high-grade AV block with bradycardia s/p dc PPM 2017 at [facility]. I had a long discussion with the patient about the pathophysiology and risks of atrial fibrillation and its basic pathophysiology, including its health implications and treatment options with the patient. Specifically, I addressed the need for CVA (stroke) prophylaxis with aspirin versus oral anticoagulation (warfarin vs NOACs, discussed bleeding risks, and need to come to the ER for any head trauma for CT scanning even if asymptomatic). Her  [id number] score is >2 and she is on eliquis. Recommend standard eliquis dose 5mg bid as her only indication for reduced dosing is age. Her weight is 67 kg and has normal renal function. Would check CBC in 6 weeks. Her PPM is operating appropriately. Discussed long-term potential risk of RV pacing induced cardiomyopathy.     Plan  CBC in 6 weeks  PPM check every 3 months  RTC in 6 months, sooner if needed.    Thank you for allowing me to participate in the care of this patient. Please do not hesitate to call me with any questions or concerns.    Marquis Velazquez MD, PhD  Cardiac Electrophysiology

## 2018-10-12 ENCOUNTER — HOME CARE VISIT (OUTPATIENT)
Dept: NEUROLOGY | Facility: HOSPITAL | Age: 83
End: 2018-10-12

## 2018-10-16 ENCOUNTER — OFFICE VISIT (OUTPATIENT)
Dept: CARDIOLOGY | Facility: CLINIC | Age: 83
End: 2018-10-16
Payer: MEDICARE

## 2018-10-16 VITALS
SYSTOLIC BLOOD PRESSURE: 122 MMHG | OXYGEN SATURATION: 97 % | DIASTOLIC BLOOD PRESSURE: 66 MMHG | BODY MASS INDEX: 25.87 KG/M2 | HEIGHT: 63 IN | WEIGHT: 146 LBS | HEART RATE: 69 BPM

## 2018-10-16 DIAGNOSIS — E03.9 HYPOTHYROIDISM, UNSPECIFIED TYPE: ICD-10-CM

## 2018-10-16 DIAGNOSIS — H34.12 CRAO (CENTRAL RETINAL ARTERY OCCLUSION), LEFT: ICD-10-CM

## 2018-10-16 DIAGNOSIS — I10 ESSENTIAL HYPERTENSION: ICD-10-CM

## 2018-10-16 DIAGNOSIS — I65.23 BILATERAL CAROTID ARTERY STENOSIS: ICD-10-CM

## 2018-10-16 DIAGNOSIS — I51.81 TAKOTSUBO CARDIOMYOPATHY: ICD-10-CM

## 2018-10-16 DIAGNOSIS — H53.8 BLURRY VISION, BILATERAL: ICD-10-CM

## 2018-10-16 DIAGNOSIS — D32.9 MENINGIOMA: ICD-10-CM

## 2018-10-16 DIAGNOSIS — I51.89 DIASTOLIC DYSFUNCTION: ICD-10-CM

## 2018-10-16 DIAGNOSIS — E78.5 HYPERLIPIDEMIA, UNSPECIFIED HYPERLIPIDEMIA TYPE: ICD-10-CM

## 2018-10-16 DIAGNOSIS — I24.9 ACUTE CORONARY SYNDROME: ICD-10-CM

## 2018-10-16 DIAGNOSIS — Z95.0 CARDIAC PACEMAKER: ICD-10-CM

## 2018-10-16 DIAGNOSIS — I65.8 OCCLUSION AND STENOSIS OF MULTIPLE AND BILATERAL PRECEREBRAL ARTERIES: ICD-10-CM

## 2018-10-16 DIAGNOSIS — I48.0 PAROXYSMAL ATRIAL FIBRILLATION: Primary | ICD-10-CM

## 2018-10-16 PROCEDURE — 1101F PT FALLS ASSESS-DOCD LE1/YR: CPT | Mod: CPTII,,, | Performed by: INTERNAL MEDICINE

## 2018-10-16 PROCEDURE — 99999 PR PBB SHADOW E&M-EST. PATIENT-LVL III: CPT | Mod: PBBFAC,,, | Performed by: INTERNAL MEDICINE

## 2018-10-16 PROCEDURE — 99214 OFFICE O/P EST MOD 30 MIN: CPT | Mod: S$PBB,,, | Performed by: INTERNAL MEDICINE

## 2018-10-16 PROCEDURE — 99213 OFFICE O/P EST LOW 20 MIN: CPT | Mod: PBBFAC,PO | Performed by: INTERNAL MEDICINE

## 2018-10-16 NOTE — PROGRESS NOTES
Subjective:    Patient ID:  Dorothy M Knobloch is a 86 y.o. female who presents for follow-up of Atrial Fibrillation      HPI  87 y/o female with a hx of SSS s/p PPM, Afib not currently on AC, Takotsubo Cardiomyopathy 3/2013, HTN, moderate bilateral carotid disease, TIA 12/2013, HLD, hypothyroidism, 2DE with vegetation on the MV (dental procedure?), visual deficit and diagnosed with left central retinal artery occlusion. EPIC media section record reviewed and has 2DE from 9/2017 with normal EF 65%, grade I diastolic dysfunction and mild MS. 2DE from 2/2018 with normal EF, DD, and moderate AI. Elevated lipid panel intolerant to statins, so started on Praluent. Had been feeling weak at home, so I stopped doxyzosin, felt better after. BP stable since.  Continues to have intermittent SOB, however, has not worsened. Main complaint is of left eye visual deficit, which has improved. Denies CP, orthopnea, PND, syncope. Compliant with meds. Has bilateral LE edema which has improved with compression stockings. Established now with EP and PPM clinic. Overall stable and doing 'OK' per patient. Gets fatigued while cooking.     Review of Systems   Constitution: Positive for malaise/fatigue. Negative for weakness.   HENT: Negative for congestion.    Eyes: Negative for blurred vision.   Cardiovascular: Positive for dyspnea on exertion and leg swelling. Negative for chest pain, claudication, cyanosis, irregular heartbeat, near-syncope, orthopnea, palpitations, paroxysmal nocturnal dyspnea and syncope.   Respiratory: Negative for shortness of breath.    Endocrine: Negative for polyuria.   Hematologic/Lymphatic: Negative for bleeding problem.   Skin: Negative for itching and rash.   Musculoskeletal: Positive for joint pain. Negative for joint swelling, muscle cramps and muscle weakness.   Gastrointestinal: Negative for abdominal pain, hematemesis, hematochezia, melena, nausea and vomiting.   Genitourinary: Negative for dysuria and  hematuria.   Neurological: Negative for dizziness, focal weakness, headaches, light-headedness and loss of balance.   Psychiatric/Behavioral: Negative for depression. The patient is not nervous/anxious.         Objective:    Physical Exam   Constitutional: She is oriented to person, place, and time. She appears well-developed and well-nourished.   HENT:   Head: Normocephalic and atraumatic.   Neck: Neck supple. No JVD present.   Cardiovascular: Normal rate and regular rhythm.   Murmur heard.   Systolic murmur is present with a grade of 2/6.  Pulses:       Carotid pulses are 2+ on the right side, and 2+ on the left side.       Radial pulses are 2+ on the right side, and 2+ on the left side.        Femoral pulses are 2+ on the right side, and 2+ on the left side.       Posterior tibial pulses are 1+ on the right side, and 1+ on the left side.   Pulmonary/Chest: Effort normal and breath sounds normal.   Abdominal: Soft. Bowel sounds are normal.   Musculoskeletal: She exhibits edema.   Neurological: She is alert and oriented to person, place, and time.   Skin: Skin is warm and dry.   Psychiatric: She has a normal mood and affect. Her behavior is normal. Thought content normal.         Assessment:       1. Paroxysmal atrial fibrillation    2. Takotsubo cardiomyopathy    3. Acute coronary syndrome    4. Bilateral carotid artery stenosis    5. Cardiac pacemaker    6. Diastolic dysfunction    7. Essential hypertension    8. Hyperlipidemia, unspecified hyperlipidemia type    9. 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 CRAO (central retinal artery occlusion), left    11. Blurry vision, bilateral    12. Meningioma    13. Hypothyroidism, unspecified type      85 y/o pt with hx and presentation as above. Doing well from a cardiac perspective and compensated from a HF perspective. Symptoms stable and no change to management.          Plan:       -Continue current medical management  -f/u in 3  months

## 2018-10-18 ENCOUNTER — TELEPHONE (OUTPATIENT)
Dept: OPHTHALMOLOGY | Facility: CLINIC | Age: 83
End: 2018-10-18

## 2018-10-18 NOTE — TELEPHONE ENCOUNTER
I informed her she can try and use Refresh or Optive and see if these products are better for her.    ----- Message from Echo Hodgson sent at 10/18/2018 11:10 AM CDT -----  Contact: Carol  Pt using Systane vials and she states that when she uses it, it makes the edges of her eye itch.  She would like to know what else she can use that will not make her eyes itch.  She can be reached at [phone]

## 2018-10-22 RX ORDER — BRIMONIDINE TARTRATE, TIMOLOL MALEATE 2; 5 MG/ML; MG/ML
SOLUTION/ DROPS OPHTHALMIC
Qty: 5 ML | Refills: 3 | Status: SHIPPED | OUTPATIENT
Start: 2018-10-22 | End: 2019-05-01 | Stop reason: SDUPTHER

## 2018-10-31 ENCOUNTER — LAB VISIT (OUTPATIENT)
Dept: LAB | Facility: HOSPITAL | Age: 83
End: 2018-10-31
Attending: INTERNAL MEDICINE
Payer: MEDICARE

## 2018-10-31 DIAGNOSIS — I48.0 PAROXYSMAL ATRIAL FIBRILLATION: ICD-10-CM

## 2018-10-31 LAB
BASOPHILS # BLD AUTO: 0.05 K/UL
BASOPHILS NFR BLD: 0.9 %
DIFFERENTIAL METHOD: NORMAL
EOSINOPHIL # BLD AUTO: 0.1 K/UL
EOSINOPHIL NFR BLD: 1.9 %
ERYTHROCYTE [DISTWIDTH] IN BLOOD BY AUTOMATED COUNT: 13.8 %
HCT VFR BLD AUTO: 38.2 %
HGB BLD-MCNC: 12.9 G/DL
IMM GRANULOCYTES # BLD AUTO: 0.02 K/UL
IMM GRANULOCYTES NFR BLD AUTO: 0.4 %
LYMPHOCYTES # BLD AUTO: 1.5 K/UL
LYMPHOCYTES NFR BLD: 26.9 %
MCH RBC QN AUTO: 30.5 PG
MCHC RBC AUTO-ENTMCNC: 33.8 G/DL
MCV RBC AUTO: 90 FL
MONOCYTES # BLD AUTO: 0.6 K/UL
MONOCYTES NFR BLD: 9.8 %
NEUTROPHILS # BLD AUTO: 3.4 K/UL
NEUTROPHILS NFR BLD: 60.1 %
NRBC BLD-RTO: 0 /100 WBC
PLATELET # BLD AUTO: 252 K/UL
PMV BLD AUTO: 9.3 FL
RBC # BLD AUTO: 4.23 M/UL
WBC # BLD AUTO: 5.69 K/UL

## 2018-10-31 PROCEDURE — 36415 COLL VENOUS BLD VENIPUNCTURE: CPT

## 2018-10-31 PROCEDURE — 85025 COMPLETE CBC W/AUTO DIFF WBC: CPT

## 2018-11-02 ENCOUNTER — TELEPHONE (OUTPATIENT)
Dept: ELECTROPHYSIOLOGY | Facility: CLINIC | Age: 83
End: 2018-11-02

## 2018-11-02 DIAGNOSIS — I48.0 PAROXYSMAL ATRIAL FIBRILLATION: Primary | ICD-10-CM

## 2018-11-02 NOTE — TELEPHONE ENCOUNTER
Spoke to Ms. Knobloch.  Results given.    Ms. Knobloch states she has noticed increased SOB on exertion over the last couple of weeks.  She states she does not think she is in afib, but she doesn't really know.      She reports she was being followed by pulmonology at [facility] (Dr. Phoenix) however he told her there was nothing further he would do for her over a year ago.     Ms. Knobloch does not think it is bad enough for ED at this time.  Let her know I would discuss with Dr. Velazquez and let her know what his recommendations are.     Ms. Knobloch verbalizes understanding and appreciates call.

## 2018-11-02 NOTE — TELEPHONE ENCOUNTER
----- Message from Marquis Velazquez MD sent at 11/1/2018 12:36 PM CDT -----  Please notify the patient that blood count remains normal since starting eliquis

## 2018-11-06 NOTE — TELEPHONE ENCOUNTER
Attempting to reach Ms. Medina to find out how we can get a transmission sent over.      No answer, message left for pt to return call to clinic.

## 2018-11-08 ENCOUNTER — HOSPITAL ENCOUNTER (OUTPATIENT)
Dept: CARDIOLOGY | Facility: CLINIC | Age: 83
Discharge: HOME | End: 2018-11-08
Attending: INTERNAL MEDICINE
Payer: MEDICARE

## 2018-11-08 VITALS
BODY MASS INDEX: 25.87 KG/M2 | DIASTOLIC BLOOD PRESSURE: 66 MMHG | HEART RATE: 64 BPM | WEIGHT: 146 LBS | SYSTOLIC BLOOD PRESSURE: 122 MMHG | HEIGHT: 63 IN

## 2018-11-08 DIAGNOSIS — I48.0 PAROXYSMAL ATRIAL FIBRILLATION: ICD-10-CM

## 2018-11-08 LAB
ASCENDING AORTA: 2.97 CM
AV MEAN GRADIENT: 5.07 MMHG
AV PEAK GRADIENT: 8.86 MMHG
AV VALVE AREA: 2.35 CM2
BSA FOR ECHO PROCEDURE: 1.72 M2
CV ECHO LV RWT: 0.38 CM
DOP CALC AO PEAK VEL: 1.49 M/S
DOP CALC AO VTI: 22.22 CM
DOP CALC LVOT AREA: 3.46 CM2
DOP CALC LVOT DIAMETER: 2.1 CM
DOP CALC LVOT STROKE VOLUME: 52.14 CM3
DOP CALCLVOT PEAK VEL VTI: 15.06 CM
E WAVE DECELERATION TIME: 119.9 MSEC
E/A RATIO: 0.82
E/E' RATIO: 5.4
ECHO LV POSTERIOR WALL: 0.8 CM (ref 0.6–1.1)
FRACTIONAL SHORTENING: 35 % (ref 28–44)
INTERVENTRICULAR SEPTUM: 0.73 CM (ref 0.6–1.1)
IVRT: 0.05 MSEC
LA MAJOR: 3.93 CM
LA MINOR: 4.03 CM
LA WIDTH: 3.66 CM
LEFT ATRIUM SIZE: 2.44 CM
LEFT ATRIUM VOLUME INDEX: 17.6 ML/M2
LEFT ATRIUM VOLUME: 30.21 CM3
LEFT INTERNAL DIMENSION IN SYSTOLE: 2.74 CM (ref 2.1–4)
LEFT VENTRICLE DIASTOLIC VOLUME INDEX: 46.41 ML/M2
LEFT VENTRICLE DIASTOLIC VOLUME: 79.83 ML
LEFT VENTRICLE MASS INDEX: 56.2 G/M2
LEFT VENTRICLE SYSTOLIC VOLUME INDEX: 16.3 ML/M2
LEFT VENTRICLE SYSTOLIC VOLUME: 28.05 ML
LEFT VENTRICULAR INTERNAL DIMENSION IN DIASTOLE: 4.23 CM (ref 3.5–6)
LEFT VENTRICULAR MASS: 96.63 G
LV LATERAL E/E' RATIO: 5.4
LV SEPTAL E/E' RATIO: 5.4
MV PEAK A VEL: 0.66 M/S
MV PEAK E VEL: 0.54 M/S
MV STENOSIS PRESSURE HALF TIME: 34.77 MS
MV VALVE AREA P 1/2 METHOD: 6.33 CM2
PISA TR MAX VEL: 1.7 M/S
PULM VEIN S/D RATIO: 0.9
PV PEAK D VEL: 0.69 M/S
PV PEAK GRADIENT: 3.16 MMHG
PV PEAK S VEL: 0.62 M/S
PV PEAK VELOCITY: 0.88 CM/S
RA MAJOR: 4.51 CM
RA PRESSURE: 3 MMHG
RA WIDTH: 2.98 CM
RIGHT VENTRICULAR END-DIASTOLIC DIMENSION: 3 CM
SINUS: 3.52 CM
STJ: 2.8 CM
TDI LATERAL: 0.1
TDI SEPTAL: 0.1
TDI: 0.1
TR MAX PG: 11.56 MMHG
TRICUSPID ANNULAR PLANE SYSTOLIC EXCURSION: 1.56 CM
TV REST PULMONARY ARTERY PRESSURE: 14.56 MMHG

## 2018-11-08 PROCEDURE — 93306 TTE W/DOPPLER COMPLETE: CPT | Mod: S$GLB,,, | Performed by: INTERNAL MEDICINE

## 2018-11-08 NOTE — TELEPHONE ENCOUNTER
Spoke to Ms. Medina     Echo scheduled for today at 1:45.  Directions given.  Ms. Medina verbalizes understanding and appreciates call.

## 2018-11-09 ENCOUNTER — TELEPHONE (OUTPATIENT)
Dept: ELECTROPHYSIOLOGY | Facility: CLINIC | Age: 83
End: 2018-11-09

## 2018-11-09 NOTE — PROGRESS NOTES
Please notify the patient that her heart ultrasound was completely normal. I have no explanation from a cardiac standpoint to explain her symptoms.

## 2018-11-09 NOTE — TELEPHONE ENCOUNTER
----- Message from Marquis Velazquez MD sent at 11/9/2018  7:42 AM CST -----  Please notify the patient that her heart ultrasound was completely normal. I have no explanation from a cardiac standpoint to explain her symptoms.

## 2018-11-12 ENCOUNTER — CLINICAL SUPPORT (OUTPATIENT)
Dept: CARDIOLOGY | Facility: HOSPITAL | Age: 83
End: 2018-11-12
Attending: INTERNAL MEDICINE
Payer: MEDICARE

## 2018-11-12 ENCOUNTER — TELEPHONE (OUTPATIENT)
Dept: CARDIOLOGY | Facility: HOSPITAL | Age: 83
End: 2018-11-12

## 2018-11-12 DIAGNOSIS — Z95.0 CARDIAC PACEMAKER IN SITU: ICD-10-CM

## 2018-11-12 DIAGNOSIS — G45.9 TIA (TRANSIENT ISCHEMIC ATTACK): ICD-10-CM

## 2018-11-12 DIAGNOSIS — I48.91 ATRIAL FIBRILLATION: ICD-10-CM

## 2018-11-12 DIAGNOSIS — I51.89 DIASTOLIC DYSFUNCTION: ICD-10-CM

## 2018-11-12 PROCEDURE — 93294 REM INTERROG EVL PM/LDLS PM: CPT | Mod: ,,, | Performed by: INTERNAL MEDICINE

## 2018-11-12 PROCEDURE — 93296 REM INTERROG EVL PM/IDS: CPT

## 2018-11-19 ENCOUNTER — HOME CARE VISIT (OUTPATIENT)
Dept: NEUROLOGY | Facility: HOSPITAL | Age: 83
End: 2018-11-19

## 2018-11-20 ENCOUNTER — CLINICAL SUPPORT (OUTPATIENT)
Dept: CARDIOLOGY | Facility: CLINIC | Age: 83
End: 2018-11-20
Payer: MEDICARE

## 2018-11-20 DIAGNOSIS — Z95.0 CARDIAC PACEMAKER: Primary | ICD-10-CM

## 2018-11-20 DIAGNOSIS — I48.0 PAROXYSMAL ATRIAL FIBRILLATION: ICD-10-CM

## 2018-11-20 PROCEDURE — 99499 UNLISTED E&M SERVICE: CPT | Mod: S$GLB,,, | Performed by: INTERNAL MEDICINE

## 2018-11-20 PROCEDURE — 93280 PM DEVICE PROGR EVAL DUAL: CPT | Mod: 26,,, | Performed by: INTERNAL MEDICINE

## 2018-11-29 NOTE — PROGRESS NOTES
Subjective:      Patient ID: Dorothy M Knobloch is a 86 y.o. female.    Chief Complaint: No chief complaint on file.    HPI:    ROS Dual chamber pacemaker interrogated in office.  FAINA 8 years, Leads OK.  Atrial fibrillation 2% of the time.  On Eliquis.  Normal device function.    Past Medical History:   Diagnosis Date    Anticoagulant long-term use     Arthritis     Bilateral nonexudative age-related macular degeneration 8/27/2013    Blood transfusion     Cataract     CHF (congestive heart failure)     Enlarged heart     High cholesterol     Hypertension     Sick sinus syndrome     Stroke     Thyroid disease         Past Surgical History:   Procedure Laterality Date    CARDIAC PACEMAKER PLACEMENT      CARPAL TUNNEL RELEASE Bilateral 1991    CATARACT EXTRACTION Right     COLONOSCOPY N/A 8/11/2015    Performed by Cirilo Yo [facility] at [facility] (4TH FLR)    JOINT REPLACEMENT      ME TRANSCRAN DOPP INTRACRAN, EMBOLI W/O INJ  1/29/2018         SKIN BIOPSY      TONSILLECTOMY      TOTAL HIP ARTHROPLASTY  11-1-2000    right/[facility]       Family History   Problem Relation Age of Onset    Early death Mother     Early death Father     Arthritis Sister     Hearing loss Daughter     Birth defects Son     Hypertension Son     Diabetes Paternal Aunt     Cancer Paternal Aunt 80        colon  cancer    Diabetes Paternal Uncle     Arthritis Maternal Grandmother     Arthritis Paternal Grandmother     Hypertension Paternal Grandmother     Amblyopia Neg Hx     Blindness Neg Hx     Cataracts Neg Hx     Glaucoma Neg Hx     Macular degeneration Neg Hx     Retinal detachment Neg Hx     Strabismus Neg Hx     Stroke Neg Hx     Thyroid disease Neg Hx        Social History     Socioeconomic History    Marital status:      Spouse name: Not on file    Number of children: Not on file    Years of education: Not on file    Highest education level: Not on file   Social Needs     Financial resource strain: Not on file    Food insecurity - worry: Not on file    Food insecurity - inability: Not on file    Transportation needs - medical: Not on file    Transportation needs - non-medical: Not on file   Occupational History    Not on file   Tobacco Use    Smoking status: Never Smoker    Smokeless tobacco: Never Used   Substance and Sexual Activity    Alcohol use: No     Alcohol/week: 0.0 oz    Drug use: No    Sexual activity: Not on file   Other Topics Concern    Not on file   Social History Narrative    Cares for her blind         Current Outpatient Medications on File Prior to Visit   Medication Sig Dispense Refill    amLODIPine (NORVASC) 10 MG tablet Take 1 tablet (10 mg total) by mouth once daily. 30 tablet 11    apixaban (ELIQUIS) 5 mg Tab Take 1 tablet (5 mg total) by mouth 2 (two) times daily. 180 tablet 3    brinzolamide (AZOPT) 1 % ophthalmic suspension Place 1 drop into the left eye 2 (two) times daily. 10 mL 6    cholecalciferol, vitamin D3, 50,000 unit capsule Take 50,000 Units by mouth every 7 days.  5    clopidogrel (PLAVIX) 75 mg tablet Take 1 tablet (75 mg total) by mouth once daily. 30 tablet 0    COMBIGAN 0.2-0.5 % Drop INSTILL 1 DROP INTO LEFT EYE TWICE A DAY 5 mL 3    hydroCHLOROthiazide (HYDRODIURIL) 12.5 MG Tab Take 1 tablet (12.5 mg total) by mouth once daily. 30 tablet 11    levothyroxine (SYNTHROID) 88 MCG tablet Take 88 mcg by mouth before breakfast.       metoprolol succinate (TOPROL-XL) 25 MG 24 hr tablet Take 1 tablet (25 mg total) by mouth once daily. 90 tablet 3    NITROSTAT 0.4 mg SL tablet       PRALUENT PEN 75 mg/mL PnIj        No current facility-administered medications on file prior to visit.        Review of patient's allergies indicates:   Allergen Reactions    Aspartame Swelling     equal     Objective:   There were no vitals filed for this visit.     Physical Exam     Assessment:     1. Cardiac pacemaker    2. Paroxysmal  atrial fibrillation      Plan:   Diagnoses and all orders for this visit:    Cardiac pacemaker    Paroxysmal atrial fibrillation         No Follow-up on file.

## 2018-12-04 ENCOUNTER — OFFICE VISIT (OUTPATIENT)
Dept: CARDIOLOGY | Facility: CLINIC | Age: 83
End: 2018-12-04
Payer: MEDICARE

## 2018-12-04 VITALS
HEIGHT: 63 IN | WEIGHT: 148 LBS | BODY MASS INDEX: 26.22 KG/M2 | DIASTOLIC BLOOD PRESSURE: 53 MMHG | SYSTOLIC BLOOD PRESSURE: 115 MMHG | HEART RATE: 68 BPM

## 2018-12-04 DIAGNOSIS — I48.0 PAROXYSMAL ATRIAL FIBRILLATION: ICD-10-CM

## 2018-12-04 DIAGNOSIS — E03.9 HYPOTHYROIDISM, UNSPECIFIED TYPE: ICD-10-CM

## 2018-12-04 DIAGNOSIS — H34.12 CRAO (CENTRAL RETINAL ARTERY OCCLUSION), LEFT: ICD-10-CM

## 2018-12-04 DIAGNOSIS — I65.8 OCCLUSION AND STENOSIS OF MULTIPLE AND BILATERAL PRECEREBRAL ARTERIES: ICD-10-CM

## 2018-12-04 DIAGNOSIS — D32.9 MENINGIOMA: ICD-10-CM

## 2018-12-04 DIAGNOSIS — Z95.0 CARDIAC PACEMAKER: ICD-10-CM

## 2018-12-04 DIAGNOSIS — I48.91 A-FIB: ICD-10-CM

## 2018-12-04 DIAGNOSIS — I65.23 BILATERAL CAROTID ARTERY STENOSIS: ICD-10-CM

## 2018-12-04 DIAGNOSIS — I77.9 RIGHT-SIDED CAROTID ARTERY DISEASE, UNSPECIFIED TYPE: ICD-10-CM

## 2018-12-04 DIAGNOSIS — E78.5 HYPERLIPIDEMIA, UNSPECIFIED HYPERLIPIDEMIA TYPE: ICD-10-CM

## 2018-12-04 DIAGNOSIS — I48.91 ATRIAL FIBRILLATION, UNSPECIFIED TYPE: Primary | ICD-10-CM

## 2018-12-04 DIAGNOSIS — I51.81 TAKOTSUBO CARDIOMYOPATHY: ICD-10-CM

## 2018-12-04 DIAGNOSIS — I51.89 DIASTOLIC DYSFUNCTION: ICD-10-CM

## 2018-12-04 DIAGNOSIS — G45.9 TIA (TRANSIENT ISCHEMIC ATTACK): ICD-10-CM

## 2018-12-04 PROCEDURE — 93000 ELECTROCARDIOGRAM COMPLETE: CPT | Mod: S$GLB,,, | Performed by: INTERNAL MEDICINE

## 2018-12-04 PROCEDURE — 1101F PT FALLS ASSESS-DOCD LE1/YR: CPT | Mod: CPTII,S$GLB,, | Performed by: INTERNAL MEDICINE

## 2018-12-04 PROCEDURE — 99999 PR PBB SHADOW E&M-EST. PATIENT-LVL III: CPT | Mod: PBBFAC,,, | Performed by: INTERNAL MEDICINE

## 2018-12-04 PROCEDURE — 99214 OFFICE O/P EST MOD 30 MIN: CPT | Mod: S$GLB,,, | Performed by: INTERNAL MEDICINE

## 2018-12-04 PROCEDURE — 93288 INTERROG EVL PM/LDLS PM IP: CPT | Mod: 26,,, | Performed by: INTERNAL MEDICINE

## 2018-12-04 RX ORDER — METOPROLOL SUCCINATE 25 MG/1
50 TABLET, EXTENDED RELEASE ORAL DAILY
Qty: 180 TABLET | Refills: 3 | Status: SHIPPED | OUTPATIENT
Start: 2018-12-04 | End: 2019-11-27 | Stop reason: SDUPTHER

## 2018-12-04 RX ORDER — AMLODIPINE BESYLATE 5 MG/1
5 TABLET ORAL DAILY
Qty: 90 TABLET | Refills: 3 | Status: SHIPPED | OUTPATIENT
Start: 2018-12-04 | End: 2019-11-27 | Stop reason: SDUPTHER

## 2018-12-04 NOTE — PATIENT INSTRUCTIONS
Increase metoprolol to 50 mg once daily starting tomorrow  Decrease amlodipine to 5 mg once daily starting tomorrow

## 2018-12-04 NOTE — PROGRESS NOTES
Subjective:    Patient ID:  Dorothy M Knobloch is a 86 y.o. female who presents for follow-up of Palpitations      HPI     87 y/o female with a hx of SSS s/p PPM, Afib currently on AC with Eliquis and follows with Dr Velazquez, Takotsubo Cardiomyopathy 3/2013, HTN, moderate bilateral carotid disease, TIA 12/2013, HLD, hypothyroidism, 2DE with vegetation on the MV (dental procedure?), visual deficit and diagnosed with left central retinal artery occlusion. Recent 2DE with normal function. Elevated lipid panel intolerant to statins, so started on Praluent. Had been feeling weak at home, so I stopped doxyzosin, felt better after. BP stable since. Has chronic intermittent SOB.   Saturday had acute onset of weakness, palps, dizziness and worsening SOB. Had a stressful situation at home. The following 2 days continued to have similar symptoms although no more stress. Denies CP, orthopnea, PND, syncope. Compliant with meds. Has bilateral LE edema which has improved with compression stockings. Established now with EP and PPM clinic. Recent PPM interrogation showed 2% AFib burden (Nov 20). I interrogated the PPM today in clinic and since NOv has increased Afib burden to 4%.     Review of Systems   Constitution: Positive for weakness and malaise/fatigue.   HENT: Negative for congestion.    Eyes: Negative for blurred vision.   Cardiovascular: Positive for dyspnea on exertion, leg swelling and palpitations. Negative for chest pain, claudication, cyanosis, irregular heartbeat, near-syncope, orthopnea, paroxysmal nocturnal dyspnea and syncope.   Respiratory: Positive for shortness of breath.    Endocrine: Negative for polyuria.   Hematologic/Lymphatic: Negative for bleeding problem.   Skin: Negative for itching and rash.   Musculoskeletal: Positive for arthritis and joint pain. Negative for joint swelling, muscle cramps and muscle weakness.   Gastrointestinal: Negative for abdominal pain, hematemesis, hematochezia, melena, nausea  and vomiting.   Genitourinary: Negative for dysuria and hematuria.   Neurological: Positive for dizziness. Negative for focal weakness, headaches, light-headedness and loss of balance.   Psychiatric/Behavioral: Negative for depression. The patient is not nervous/anxious.         Objective:    Physical Exam   Constitutional: She is oriented to person, place, and time. She appears well-developed and well-nourished.   HENT:   Head: Normocephalic and atraumatic.   Neck: Neck supple. No JVD present.   Cardiovascular: Normal rate and regular rhythm.   Murmur heard.   Systolic murmur is present with a grade of 2/6.  Pulses:       Carotid pulses are 2+ on the right side, and 2+ on the left side.       Radial pulses are 2+ on the right side, and 2+ on the left side.        Femoral pulses are 2+ on the right side, and 2+ on the left side.       Posterior tibial pulses are 1+ on the right side, and 1+ on the left side.   Pulmonary/Chest: Effort normal and breath sounds normal.   Abdominal: Soft. Bowel sounds are normal.   Musculoskeletal: She exhibits edema.   Neurological: She is alert and oriented to person, place, and time.   Skin: Skin is warm and dry.   Psychiatric: She has a normal mood and affect. Her behavior is normal. Thought content normal.         Assessment:       1. Atrial fibrillation, unspecified type    2. A-fib    3. Paroxysmal atrial fibrillation    4. Takotsubo cardiomyopathy    5. 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 Hyperlipidemia, unspecified hyperlipidemia type    7. Diastolic dysfunction    8. Right-sided carotid artery disease, unspecified type    9. Cardiac pacemaker    10. Bilateral carotid artery stenosis    11. CRAO (central retinal artery occlusion), left    12. TIA (transient ischemic attack)    13. Meningioma    14. Hypothyroidism, unspecified type      85 y/o pt with hx and presentation as above. Symptoms may be related to increased episodes of Afib. ECG  in clinic with AV paced rhythm with approx 4% afib burden (previously 2%). Will increase metoprolol and discuss with Dr Velazquez role of antiarrhythmic.         Plan:       -Increase Toprol to 50 mg daily  -Decrease amolodipine to 5 mg  -f/u phone call tomorrow and in clinic 1 month

## 2019-01-24 ENCOUNTER — TELEPHONE (OUTPATIENT)
Dept: CARDIOLOGY | Facility: HOSPITAL | Age: 84
End: 2019-01-24

## 2019-01-24 NOTE — TELEPHONE ENCOUNTER
----- Message from Bekah Rose MA sent at 1/24/2019 12:10 PM CST -----  Contact: self  Pt. has an appt. with  on 3/19.In notes with appt,it has no device,but she said she has home monitor device. Does she need to have it checked. Please call

## 2019-02-08 DIAGNOSIS — Z95.0 CARDIAC PACEMAKER IN SITU: Primary | ICD-10-CM

## 2019-02-12 ENCOUNTER — OFFICE VISIT (OUTPATIENT)
Dept: CARDIOLOGY | Facility: CLINIC | Age: 84
End: 2019-02-12
Payer: MEDICARE

## 2019-02-12 VITALS
HEIGHT: 63 IN | WEIGHT: 149 LBS | BODY MASS INDEX: 26.4 KG/M2 | DIASTOLIC BLOOD PRESSURE: 70 MMHG | SYSTOLIC BLOOD PRESSURE: 128 MMHG | HEART RATE: 63 BPM | OXYGEN SATURATION: 94 %

## 2019-02-12 DIAGNOSIS — I51.89 DIASTOLIC DYSFUNCTION: ICD-10-CM

## 2019-02-12 DIAGNOSIS — H34.12 CRAO (CENTRAL RETINAL ARTERY OCCLUSION), LEFT: ICD-10-CM

## 2019-02-12 DIAGNOSIS — D32.9 MENINGIOMA: ICD-10-CM

## 2019-02-12 DIAGNOSIS — Z95.0 CARDIAC PACEMAKER: ICD-10-CM

## 2019-02-12 DIAGNOSIS — G45.9 TIA (TRANSIENT ISCHEMIC ATTACK): ICD-10-CM

## 2019-02-12 DIAGNOSIS — I51.81 TAKOTSUBO CARDIOMYOPATHY: ICD-10-CM

## 2019-02-12 DIAGNOSIS — E78.5 HYPERLIPIDEMIA, UNSPECIFIED HYPERLIPIDEMIA TYPE: ICD-10-CM

## 2019-02-12 DIAGNOSIS — I24.9 ACUTE CORONARY SYNDROME: ICD-10-CM

## 2019-02-12 DIAGNOSIS — I65.8 OCCLUSION AND STENOSIS OF MULTIPLE AND BILATERAL PRECEREBRAL ARTERIES: ICD-10-CM

## 2019-02-12 DIAGNOSIS — I48.0 PAROXYSMAL ATRIAL FIBRILLATION: Primary | ICD-10-CM

## 2019-02-12 DIAGNOSIS — E03.9 HYPOTHYROIDISM, UNSPECIFIED TYPE: ICD-10-CM

## 2019-02-12 DIAGNOSIS — I77.9 RIGHT-SIDED CAROTID ARTERY DISEASE, UNSPECIFIED TYPE: ICD-10-CM

## 2019-02-12 DIAGNOSIS — H54.10 BLINDNESS AND LOW VISION: ICD-10-CM

## 2019-02-12 DIAGNOSIS — I10 ESSENTIAL HYPERTENSION: ICD-10-CM

## 2019-02-12 DIAGNOSIS — I65.23 BILATERAL CAROTID ARTERY STENOSIS: ICD-10-CM

## 2019-02-12 PROCEDURE — 1101F PR PT FALLS ASSESS DOC 0-1 FALLS W/OUT INJ PAST YR: ICD-10-PCS | Mod: CPTII,S$GLB,, | Performed by: INTERNAL MEDICINE

## 2019-02-12 PROCEDURE — 1101F PT FALLS ASSESS-DOCD LE1/YR: CPT | Mod: CPTII,S$GLB,, | Performed by: INTERNAL MEDICINE

## 2019-02-12 PROCEDURE — 99214 OFFICE O/P EST MOD 30 MIN: CPT | Mod: S$GLB,,, | Performed by: INTERNAL MEDICINE

## 2019-02-12 PROCEDURE — 99999 PR PBB SHADOW E&M-EST. PATIENT-LVL III: ICD-10-PCS | Mod: PBBFAC,,, | Performed by: INTERNAL MEDICINE

## 2019-02-12 PROCEDURE — 99999 PR PBB SHADOW E&M-EST. PATIENT-LVL III: CPT | Mod: PBBFAC,,, | Performed by: INTERNAL MEDICINE

## 2019-02-12 PROCEDURE — 99214 PR OFFICE/OUTPT VISIT, EST, LEVL IV, 30-39 MIN: ICD-10-PCS | Mod: S$GLB,,, | Performed by: INTERNAL MEDICINE

## 2019-02-12 NOTE — PROGRESS NOTES
Subjective:    Patient ID:  Dorothy M Knobloch is a 86 y.o. female who presents for follow-up of Atrial Fibrillation      HPI     85 y/o female with a hx of SSS s/p PPM, Afib currently on AC with Eliquis and follows with Dr Velazquez, Takotsubo Cardiomyopathy 3/2013, HTN, moderate bilateral carotid disease, TIA 12/2013, HLD, hypothyroidism, 2DE with vegetation on the MV (dental procedure?), visual deficit and diagnosed with left central retinal artery occlusion. Recent 2DE with normal function. Elevated lipid panel intolerant to statins, so started on Praluent. Had been feeling weak at home, so I stopped doxyzosin, felt better after. BP stable since. Has chronic intermittent SOB.   Had been doing well with improvement in SOB and last Friday had acute onset of SOB. Had a stressful situation - had an issue with picking up a Rx. Has continue to have SOB. Denies CP, orthopnea, PND, syncope. Compliant with meds. Has bilateral LE edema which has improved with compression stockings. Established now with EP and PPM clinic. Last clinic visit PPM interrogated and had increased Afib burden to 4% and I doubled BB.     Review of Systems   Constitution: Positive for weakness and malaise/fatigue.   HENT: Negative for congestion.    Eyes: Negative for blurred vision.   Cardiovascular: Positive for dyspnea on exertion and leg swelling. Negative for chest pain, claudication, cyanosis, irregular heartbeat, near-syncope, orthopnea, palpitations, paroxysmal nocturnal dyspnea and syncope.   Respiratory: Positive for shortness of breath.    Endocrine: Negative for polyuria.   Hematologic/Lymphatic: Negative for bleeding problem.   Skin: Negative for itching and rash.   Musculoskeletal: Positive for joint pain, joint swelling and muscle weakness. Negative for muscle cramps.   Gastrointestinal: Negative for abdominal pain, hematemesis, hematochezia, melena, nausea and vomiting.   Genitourinary: Negative for dysuria and hematuria.    Neurological: Negative for dizziness, focal weakness, headaches, light-headedness and loss of balance.   Psychiatric/Behavioral: Negative for depression. The patient is not nervous/anxious.         Objective:    Physical Exam   Constitutional: She is oriented to person, place, and time. She appears well-developed and well-nourished.   HENT:   Head: Normocephalic and atraumatic.   Neck: Neck supple. No JVD present.   Cardiovascular: Normal rate and regular rhythm.   Murmur heard.   Systolic murmur is present with a grade of 2/6.  Pulses:       Carotid pulses are 2+ on the right side, and 2+ on the left side.       Radial pulses are 2+ on the right side, and 2+ on the left side.        Femoral pulses are 2+ on the right side, and 2+ on the left side.       Posterior tibial pulses are 1+ on the right side, and 1+ on the left side.   Pulmonary/Chest: Effort normal and breath sounds normal.   Abdominal: Soft. Bowel sounds are normal.   Musculoskeletal: She exhibits edema.   Neurological: She is alert and oriented to person, place, and time.   Skin: Skin is warm and dry.   Psychiatric: She has a normal mood and affect. Her behavior is normal. Thought content normal.         Assessment:       1. Paroxysmal atrial fibrillation    2. Takotsubo cardiomyopathy    3. Diastolic dysfunction    4. Cardiac pacemaker    5. Bilateral carotid artery stenosis    6. Acute coronary syndrome    7. Right-sided carotid artery disease, unspecified type    8. Essential hypertension    9. Hyperlipidemia, unspecified hyperlipidemia type    10. 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 TIA (transient ischemic attack)    12. Blindness and low vision    13. CRAO (central retinal artery occlusion), left    14. Meningioma    15. Hypothyroidism, unspecified type      85 y/o pt with hx and presentation as above. Doing well from a cardiac perspective and compensated from a HF perspective. Her symptoms are likely  related to stress and anxiety. We discussed methods for reducing stress. She has stress at home and her symptoms are likely provoked and exacerbated by stress. Offered starting PRN BZD, however, she wishes to hold off for now. Discussed the importance of med compliance, heart healthy diet, and regular exercise.        Plan:       -Continue current medical management  -f/u in 2 weeks

## 2019-02-25 ENCOUNTER — TELEPHONE (OUTPATIENT)
Dept: CARDIOLOGY | Facility: HOSPITAL | Age: 84
End: 2019-02-25

## 2019-02-25 NOTE — TELEPHONE ENCOUNTER
Returned the patient's call and the patient stated she was unable to send her remote transmission ([employer] link) and she did speak to [employer] who also attempted to assist her.  Patient stated the person she spoke to informed her the monitor is not working and that a new one would be mailed to her as well as a return kit for the one she already has.  Confirmed in the Care link web site that a new monitor was ordered on today.  Patient stated she informed them that she does Not have a Smart Phone therefor she would Not be able to use the [employer].  Patient instructed to contact the clinic upon receipt of the new monitor and that the home monitor device check would now be rescheduled to 3/11/19 to allow receipt of the new monitor.  Patient verbalized understanding to all of the above.

## 2019-02-25 NOTE — TELEPHONE ENCOUNTER
----- Message from Charla Rivera sent at 2/25/2019  9:58 AM CST -----  Contact: Pt called   Pt having problems with her device. Please call pt @ [phone]. Thank you.

## 2019-02-25 NOTE — TELEPHONE ENCOUNTER
Returned the patient's call on this am.  Patient stated she was calling to find out what the routine process is as it relates to her Pacemaker remote monitoring.  Informed the patient she would send the manual transmission @ anytime on the date it is scheduled on.  Patient was instructed to send her manual transmission this am as to she is scheduled for this today.  Patient given next the date and time of her next remote transmission on 5/27/19.  Patient stated she does not need assistance with this as to she has done manual transmissions with her previous doctors office.  Understanding was verbalized and patient appreciated the return call.

## 2019-02-26 ENCOUNTER — OFFICE VISIT (OUTPATIENT)
Dept: CARDIOLOGY | Facility: CLINIC | Age: 84
End: 2019-02-26
Payer: MEDICARE

## 2019-02-26 VITALS
BODY MASS INDEX: 26.62 KG/M2 | OXYGEN SATURATION: 94 % | HEIGHT: 63 IN | DIASTOLIC BLOOD PRESSURE: 70 MMHG | HEART RATE: 65 BPM | WEIGHT: 150.25 LBS | SYSTOLIC BLOOD PRESSURE: 138 MMHG

## 2019-02-26 DIAGNOSIS — I24.9 ACUTE CORONARY SYNDROME: ICD-10-CM

## 2019-02-26 DIAGNOSIS — I77.9 RIGHT-SIDED CAROTID ARTERY DISEASE, UNSPECIFIED TYPE: ICD-10-CM

## 2019-02-26 DIAGNOSIS — I65.23 BILATERAL CAROTID ARTERY STENOSIS: ICD-10-CM

## 2019-02-26 DIAGNOSIS — I48.0 PAROXYSMAL ATRIAL FIBRILLATION: ICD-10-CM

## 2019-02-26 DIAGNOSIS — H34.12 CRAO (CENTRAL RETINAL ARTERY OCCLUSION), LEFT: ICD-10-CM

## 2019-02-26 DIAGNOSIS — Z95.0 CARDIAC PACEMAKER: ICD-10-CM

## 2019-02-26 DIAGNOSIS — E78.5 HYPERLIPIDEMIA, UNSPECIFIED HYPERLIPIDEMIA TYPE: ICD-10-CM

## 2019-02-26 DIAGNOSIS — I51.89 DIASTOLIC DYSFUNCTION: Primary | ICD-10-CM

## 2019-02-26 DIAGNOSIS — G45.9 TIA (TRANSIENT ISCHEMIC ATTACK): ICD-10-CM

## 2019-02-26 DIAGNOSIS — I10 ESSENTIAL HYPERTENSION: ICD-10-CM

## 2019-02-26 DIAGNOSIS — I51.81 TAKOTSUBO CARDIOMYOPATHY: ICD-10-CM

## 2019-02-26 DIAGNOSIS — E03.9 HYPOTHYROIDISM, UNSPECIFIED TYPE: ICD-10-CM

## 2019-02-26 DIAGNOSIS — I65.8 OCCLUSION AND STENOSIS OF MULTIPLE AND BILATERAL PRECEREBRAL ARTERIES: ICD-10-CM

## 2019-02-26 PROCEDURE — 99214 PR OFFICE/OUTPT VISIT, EST, LEVL IV, 30-39 MIN: ICD-10-PCS | Mod: S$GLB,,, | Performed by: INTERNAL MEDICINE

## 2019-02-26 PROCEDURE — 99999 PR PBB SHADOW E&M-EST. PATIENT-LVL III: CPT | Mod: PBBFAC,,, | Performed by: INTERNAL MEDICINE

## 2019-02-26 PROCEDURE — 1101F PT FALLS ASSESS-DOCD LE1/YR: CPT | Mod: CPTII,S$GLB,, | Performed by: INTERNAL MEDICINE

## 2019-02-26 PROCEDURE — 1101F PR PT FALLS ASSESS DOC 0-1 FALLS W/OUT INJ PAST YR: ICD-10-PCS | Mod: CPTII,S$GLB,, | Performed by: INTERNAL MEDICINE

## 2019-02-26 PROCEDURE — 99999 PR PBB SHADOW E&M-EST. PATIENT-LVL III: ICD-10-PCS | Mod: PBBFAC,,, | Performed by: INTERNAL MEDICINE

## 2019-02-26 PROCEDURE — 99214 OFFICE O/P EST MOD 30 MIN: CPT | Mod: S$GLB,,, | Performed by: INTERNAL MEDICINE

## 2019-02-26 NOTE — PROGRESS NOTES
Subjective:    Patient ID:  Dorothy M Knobloch is a 86 y.o. female who presents for follow-up of Atrial Fibrillation and Congestive Heart Failure      HPI     85 y/o female with a hx of SSS s/p PPM, Afib currently on AC with Eliquis and follows with Dr Velazquez, Takutsubrachel Cardiomyopathy 3/2013, HTN, moderate bilateral carotid disease, TIA 12/2013, HLD, hypothyroidism, 2DE with vegetation on the MV (dental procedure?), visual deficit and diagnosed with left central retinal artery occlusion. Recent 2DE with normal function. Elevated lipid panel intolerant to statins, so started on Praluent. Had been feeling weak at home, so I stopped doxyzosin, felt better after. BP stable since. Has chronic intermittent SOB.   Last clinic visit had an episode of palps with SOB from a stressful situation. No further episodes, however, current main complaint is SMITH and fatigue. Denies CP, orthopnea, PND, syncope. Compliant with meds. Has bilateral LE edema which has improved with compression stockings. Established now with EP and PPM clinic.    Review of Systems   Constitution: Positive for weakness and malaise/fatigue.   HENT: Negative for congestion.    Eyes: Negative for blurred vision.   Cardiovascular: Positive for dyspnea on exertion, leg swelling and palpitations. Negative for chest pain, claudication, cyanosis, irregular heartbeat, near-syncope, orthopnea, paroxysmal nocturnal dyspnea and syncope.   Respiratory: Positive for shortness of breath.    Endocrine: Negative for polyuria.   Hematologic/Lymphatic: Negative for bleeding problem.   Skin: Negative for itching and rash.   Musculoskeletal: Positive for back pain, joint pain and joint swelling. Negative for muscle cramps and muscle weakness.   Gastrointestinal: Negative for abdominal pain, hematemesis, hematochezia, melena, nausea and vomiting.   Genitourinary: Negative for dysuria and hematuria.   Neurological: Positive for focal weakness. Negative for dizziness, headaches,  light-headedness and loss of balance.   Psychiatric/Behavioral: Negative for depression. The patient is not nervous/anxious.         Objective:    Physical Exam   Constitutional: She is oriented to person, place, and time. She appears well-developed and well-nourished.   HENT:   Head: Normocephalic and atraumatic.   Neck: Neck supple. No JVD present.   Cardiovascular: Normal rate and regular rhythm.   Murmur heard.   Systolic murmur is present with a grade of 2/6.  Pulses:       Carotid pulses are 2+ on the right side, and 2+ on the left side.       Radial pulses are 2+ on the right side, and 2+ on the left side.        Femoral pulses are 2+ on the right side, and 2+ on the left side.       Dorsalis pedis pulses are 2+ on the right side, and 2+ on the left side.        Posterior tibial pulses are 2+ on the right side, and 2+ on the left side.   Pulmonary/Chest: Effort normal and breath sounds normal.   Abdominal: Soft. Bowel sounds are normal.   Musculoskeletal: She exhibits no edema.   Neurological: She is alert and oriented to person, place, and time.   Skin: Skin is warm and dry.   Psychiatric: She has a normal mood and affect. Her behavior is normal. Thought content normal.         Assessment:       1. Diastolic dysfunction    2. Paroxysmal atrial fibrillation    3. Acute coronary syndrome    4. Bilateral carotid artery stenosis    5. Cardiac pacemaker    6. Right-sided carotid artery disease, unspecified type    7. Essential hypertension    8. Hyperlipidemia, unspecified hyperlipidemia type    9. 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 Takotsubo cardiomyopathy    11. TIA (transient ischemic attack)    12. CRAO (central retinal artery occlusion), left    13. Hypothyroidism, unspecified type      87 y/o pt with hx and presentation as above. Doing well from a cardiac perspective and compensated from a HF perspective. Feeling better, however, SMITH and fatigue still an issue. Will  continue with current regimen for now.        Plan:       -Continue current medical management  -f/u in 2 months

## 2019-03-08 ENCOUNTER — TELEPHONE (OUTPATIENT)
Dept: CARDIOLOGY | Facility: HOSPITAL | Age: 84
End: 2019-03-08

## 2019-03-11 ENCOUNTER — CLINICAL SUPPORT (OUTPATIENT)
Dept: CARDIOLOGY | Facility: HOSPITAL | Age: 84
End: 2019-03-11
Attending: INTERNAL MEDICINE
Payer: MEDICARE

## 2019-03-11 DIAGNOSIS — Z95.0 CARDIAC PACEMAKER IN SITU: ICD-10-CM

## 2019-03-11 PROCEDURE — 93296 REM INTERROG EVL PM/IDS: CPT

## 2019-03-18 DIAGNOSIS — G45.9 TIA (TRANSIENT ISCHEMIC ATTACK): Primary | ICD-10-CM

## 2019-03-19 ENCOUNTER — OFFICE VISIT (OUTPATIENT)
Dept: ELECTROPHYSIOLOGY | Facility: CLINIC | Age: 84
End: 2019-03-19
Payer: MEDICARE

## 2019-03-19 ENCOUNTER — HOSPITAL ENCOUNTER (OUTPATIENT)
Dept: CARDIOLOGY | Facility: CLINIC | Age: 84
Discharge: HOME | End: 2019-03-19
Payer: MEDICARE

## 2019-03-19 VITALS
HEART RATE: 60 BPM | HEIGHT: 63 IN | DIASTOLIC BLOOD PRESSURE: 52 MMHG | WEIGHT: 147 LBS | BODY MASS INDEX: 26.05 KG/M2 | SYSTOLIC BLOOD PRESSURE: 144 MMHG

## 2019-03-19 DIAGNOSIS — I44.2 AV BLOCK, 3RD DEGREE: ICD-10-CM

## 2019-03-19 DIAGNOSIS — I48.0 PAROXYSMAL ATRIAL FIBRILLATION: Primary | ICD-10-CM

## 2019-03-19 DIAGNOSIS — Z95.0 CARDIAC PACEMAKER: ICD-10-CM

## 2019-03-19 DIAGNOSIS — G45.9 TIA (TRANSIENT ISCHEMIC ATTACK): ICD-10-CM

## 2019-03-19 PROCEDURE — 99999 PR PBB SHADOW E&M-EST. PATIENT-LVL III: ICD-10-PCS | Mod: PBBFAC,,, | Performed by: INTERNAL MEDICINE

## 2019-03-19 PROCEDURE — 93000 RHYTHM STRIP: ICD-10-PCS | Mod: S$GLB,,, | Performed by: INTERNAL MEDICINE

## 2019-03-19 PROCEDURE — 99213 OFFICE O/P EST LOW 20 MIN: CPT | Mod: S$GLB,,, | Performed by: INTERNAL MEDICINE

## 2019-03-19 PROCEDURE — 1101F PR PT FALLS ASSESS DOC 0-1 FALLS W/OUT INJ PAST YR: ICD-10-PCS | Mod: CPTII,S$GLB,, | Performed by: INTERNAL MEDICINE

## 2019-03-19 PROCEDURE — 1101F PT FALLS ASSESS-DOCD LE1/YR: CPT | Mod: CPTII,S$GLB,, | Performed by: INTERNAL MEDICINE

## 2019-03-19 PROCEDURE — 99213 PR OFFICE/OUTPT VISIT, EST, LEVL III, 20-29 MIN: ICD-10-PCS | Mod: S$GLB,,, | Performed by: INTERNAL MEDICINE

## 2019-03-19 PROCEDURE — 93000 ELECTROCARDIOGRAM COMPLETE: CPT | Mod: S$GLB,,, | Performed by: INTERNAL MEDICINE

## 2019-03-19 PROCEDURE — 99999 PR PBB SHADOW E&M-EST. PATIENT-LVL III: CPT | Mod: PBBFAC,,, | Performed by: INTERNAL MEDICINE

## 2019-03-19 NOTE — PROGRESS NOTES
Subjective:    Patient ID:  Dorothy M Knobloch is a 86 y.o. female who presents for evaluation of Atrial Fibrillation    Referring Cardiologist: North Mcneill MD    HPI  Prior Hx:  I had the pleasure of seeing Mrs. Knobloch today in our electrophysiology clinic in consultation for her pacemaker and atrial fibrillation. As you are aware she is a pleasant 86 year-old woman with hypertension, Takotsubo CMP 3/2013, TIA 12/2013, moderate right carotid artery disease, paroxysmal AF, and high-grade AV block with bradycardia s/p dc PPM 2017 at [facility]. She is transferring care to [facility]. She feels well. Device interrogation notes normal function. She does have <1% AF burden without high-ventricular rates. She notes occasional brief fluttering sensation.    ECHO 1/2018 CONCLUSIONS     1 - Normal left ventricular systolic function (EF 60-65%).     2 - Normal right ventricular systolic function .     3 - Moderate left atrial enlargement.     4 - Mild tricuspid regurgitation.     5 - Marked mitral annular calcification without stenosis.     6 - Aortic sclerosis without stenosis.     7 - The estimated PA systolic pressure is 33 mmHg.    PPM interrogation: stable parameters, 52%A, 63%V paced. AF<0.8%, longest episode 3 hours.    Interim Hx:  Mrs. Mcakenzie returns for 6 months follow-up. 63% RV pacing, 3%AF burden, longest episode 2 hours. V rates controlled.    My interpretation of today's in clinic ECG is AV paced rhythm at 60 bpm and QRS duration of 150msec.    Review of Systems   Constitution: Positive for malaise/fatigue. Negative for weakness.   HENT: Negative for congestion and sore throat.    Eyes: Negative for blurred vision and visual disturbance.   Cardiovascular: Positive for dyspnea on exertion and palpitations. Negative for chest pain, leg swelling, near-syncope and orthopnea.   Respiratory: Negative for cough and shortness of breath.    Hematologic/Lymphatic: Negative for bleeding problem. Does not bruise/bleed  easily.   Skin: Negative.    Musculoskeletal: Positive for arthritis, back pain and joint pain.   Gastrointestinal: Negative for hematochezia and melena.   Neurological: Negative for dizziness and light-headedness.        Objective:    Physical Exam   Constitutional: She is oriented to person, place, and time. She appears well-developed and well-nourished. No distress.   HENT:   Head: Normocephalic and atraumatic.   Eyes: Conjunctivae are normal. Right eye exhibits no discharge. Left eye exhibits no discharge.   Neck: Neck supple. No JVD present.   Cardiovascular: Normal rate, regular rhythm and normal heart sounds. Exam reveals no gallop and no friction rub.   No murmur heard.  Pulmonary/Chest: Effort normal and breath sounds normal. No respiratory distress. She has no wheezes. She has no rales.   Abdominal: Soft. Bowel sounds are normal. She exhibits no distension. There is no tenderness. There is no rebound.   Musculoskeletal: She exhibits no edema.   Neurological: She is alert and oriented to person, place, and time.   Skin: Skin is warm and dry. She is not diaphoretic.   Psychiatric: She has a normal mood and affect. Her behavior is normal. Thought content normal.   Vitals reviewed.        Assessment:       1. Paroxysmal atrial fibrillation    2. Cardiac pacemaker    3. AV block, 3rd degree         Plan:       In summary, Mrs. Knobloch is a pleasant 86 year-old woman with hypertension, Takotsubo CMP 3/2013, TIA 12/2013, moderate right carotid artery disease, paroxysmal AF, and high-grade AV block with bradycardia s/p dc PPM 2017 at [facility]. She understands risks of AF and bleeding risks of DOAC therapy. Her [id number] score is >2 and she is on eliquis. Device is functioning appropriately. Discussed long-term potential risk of RV pacing induced cardiomyopathy.     Continue remote checks every 3 months. RTC in 6 months.    Thank you for allowing me to participate in the care of this patient. Please do not hesitate  to call me with any questions or concerns.    Marquis Velazquez MD, PhD  Cardiac Electrophysiology

## 2019-03-25 ENCOUNTER — OFFICE VISIT (OUTPATIENT)
Dept: OPHTHALMOLOGY | Facility: CLINIC | Age: 84
End: 2019-03-25
Payer: MEDICARE

## 2019-03-25 DIAGNOSIS — H25.12 NUCLEAR SCLEROTIC CATARACT OF LEFT EYE: ICD-10-CM

## 2019-03-25 DIAGNOSIS — H34.12 CRAO (CENTRAL RETINAL ARTERY OCCLUSION), LEFT: ICD-10-CM

## 2019-03-25 DIAGNOSIS — H35.372 EPIRETINAL MEMBRANE, LEFT EYE: ICD-10-CM

## 2019-03-25 DIAGNOSIS — H35.3132 NONEXUDATIVE AGE-RELATED MACULAR DEGENERATION, BILATERAL, INTERMEDIATE DRY STAGE: Primary | ICD-10-CM

## 2019-03-25 PROCEDURE — 92014 PR EYE EXAM, EST PATIENT,COMPREHESV: ICD-10-PCS | Mod: S$GLB,,, | Performed by: OPHTHALMOLOGY

## 2019-03-25 PROCEDURE — 92226 PR SPECIAL EYE EXAM, SUBSEQUENT: ICD-10-PCS | Mod: LT,S$GLB,, | Performed by: OPHTHALMOLOGY

## 2019-03-25 PROCEDURE — 99999 PR PBB SHADOW E&M-EST. PATIENT-LVL III: ICD-10-PCS | Mod: PBBFAC,,, | Performed by: OPHTHALMOLOGY

## 2019-03-25 PROCEDURE — 92226 PR SPECIAL EYE EXAM, SUBSEQUENT: CPT | Mod: RT,S$GLB,, | Performed by: OPHTHALMOLOGY

## 2019-03-25 PROCEDURE — 99999 PR PBB SHADOW E&M-EST. PATIENT-LVL III: CPT | Mod: PBBFAC,,, | Performed by: OPHTHALMOLOGY

## 2019-03-25 PROCEDURE — 92014 COMPRE OPH EXAM EST PT 1/>: CPT | Mod: S$GLB,,, | Performed by: OPHTHALMOLOGY

## 2019-03-25 NOTE — PROGRESS NOTES
HPI     6 mo f/u   DLS 09/06/2018 Dr. Cole    Pt sts no change since last visit denies pain   (+)Flashes occasionally  (-)Floaters  (-)Photophobia  (-)Glare    Combigan BID OS   Azopt BID OS       OCT - ERM OS  Drusen OU      A/P    1. CRAO OS  Was admitted for stroke work up   NVI and early NVG OS  S/p Avastin OS  with tap last 5/18  S/p PRP - completed 4/12/18    Combigan BID OS  Azopt TID  Was on diamox 500 BID - having hypotensive episodes  JDN cut back Diamox to 250mg Q day  Dr. Mcneill D/C'gerald Valsartan    7/18 - no NVI/NVA - Try without avastin and tap today  DC Diamox  Keep Combigan and AZOPT BID OS      2. PCIOL OD  3 piece in slight decentration   S/p YAG for PCO - pt notes improvement    NS OS    3. PVD OU    4. Dry AMD OU -   AREDS/AG    5. ERM OS  Given CRAO - nothing to do      12 months

## 2019-04-05 ENCOUNTER — TELEPHONE (OUTPATIENT)
Dept: OPHTHALMOLOGY | Facility: CLINIC | Age: 84
End: 2019-04-05

## 2019-04-05 NOTE — TELEPHONE ENCOUNTER
----- Message from Zo Ybarra sent at 4/5/2019  1:57 PM CDT -----  Contact: Dorothy Ms. Knobloch is returning Spencer's call. She can be reached at [phone]

## 2019-04-17 RX ORDER — HYDROCHLOROTHIAZIDE 12.5 MG/1
12.5 TABLET ORAL DAILY
Qty: 30 TABLET | Refills: 11 | Status: SHIPPED | OUTPATIENT
Start: 2019-04-17 | End: 2020-03-11

## 2019-04-17 NOTE — TELEPHONE ENCOUNTER
----- Message from Geoff Hunt sent at 4/17/2019 10:01 AM CDT -----  Contact: self/[phone]  Type:  RX Refill Request    Who Called: Carol  Refill or New Rx: refill  RX Name and Strength: hydroCHLOROthiazide (HYDRODIURIL) 12.5 MG Tab  How is the patient currently taking it? (ex. 1XDay): 1Xday  Is this a 30 day or 90 day RX: 30 day  Preferred Pharmacy with phone number: [id number] [place], [place] - [address] AT [facility]  Local or Mail Order: Local  Ordering Provider: Dr. Mcneill  Would the patient rather a call back or a response via [facility]?  Call back  Best Call Back Number: [phone]  Additional Information: She says the pharmacy told her your office is not responding to their request for a refill

## 2019-05-02 RX ORDER — BRIMONIDINE TARTRATE, TIMOLOL MALEATE 2; 5 MG/ML; MG/ML
SOLUTION/ DROPS OPHTHALMIC
Qty: 5 ML | Refills: 3 | Status: SHIPPED | OUTPATIENT
Start: 2019-05-02 | End: 2024-01-12

## 2019-05-28 NOTE — PROGRESS NOTES
Assessment /Plan     For exam results, see Encounter Report.    Advanced atrophic nonexudative age-related macular degeneration of both eyes with subfoveal involvement    Blindness and low vision    CRAO (central retinal artery occlusion), left    Dry eye syndrome of both eyes    Neovascular glaucoma of left eye, moderate stage    Nuclear sclerotic cataract of left eye    Status post cataract extraction and insertion of intraocular lens of right eye          Dr. Cole::  CRAO OS  Was admitted for stroke work up     ==> + NVI & NVD OS  3/27/2018  Dr Cole  IO meds / PRP OS    Left eye  Combigan BID  Azopt BID  Diamox 250 BID --> Intolerant      PCIOL OD  3 piece in slight decentration   OD SP YAG Cap 3/27/2018    NS OS  Observe      PVD OU    Dry AMD OU -   AREDS/AG    ERM OS    Dry Eye Syndrome: discussed use of warm compresses, preserved & non-preserved artificial tears, gel and PM ointment options.  Also discussed options utilizing medications.        RD precautions:  Discussed with patient symptoms of RD with increased flashes, floaters, decreasing vision.  Patient/Family to call and return immediately to clinic should the symptoms of RD occur.  patient voice good understanding.      Plan  RTC Dr Cole as scheduled  Tameka 6 month with  --> IOP and Gonio  RTC sooner prn with good understanding

## 2019-06-04 ENCOUNTER — OFFICE VISIT (OUTPATIENT)
Dept: OPHTHALMOLOGY | Facility: CLINIC | Age: 84
End: 2019-06-04
Payer: MEDICARE

## 2019-06-04 DIAGNOSIS — H40.52X2 NEOVASCULAR GLAUCOMA OF LEFT EYE, MODERATE STAGE: Primary | ICD-10-CM

## 2019-06-04 DIAGNOSIS — H54.10 BLINDNESS AND LOW VISION: ICD-10-CM

## 2019-06-04 DIAGNOSIS — H25.12 NUCLEAR SCLEROTIC CATARACT OF LEFT EYE: ICD-10-CM

## 2019-06-04 DIAGNOSIS — Z96.1 STATUS POST CATARACT EXTRACTION AND INSERTION OF INTRAOCULAR LENS OF RIGHT EYE: ICD-10-CM

## 2019-06-04 DIAGNOSIS — H04.123 DRY EYE SYNDROME OF BOTH EYES: ICD-10-CM

## 2019-06-04 DIAGNOSIS — H34.12 CRAO (CENTRAL RETINAL ARTERY OCCLUSION), LEFT: ICD-10-CM

## 2019-06-04 DIAGNOSIS — Z98.41 STATUS POST CATARACT EXTRACTION AND INSERTION OF INTRAOCULAR LENS OF RIGHT EYE: ICD-10-CM

## 2019-06-04 DIAGNOSIS — H35.3134 ADVANCED ATROPHIC NONEXUDATIVE AGE-RELATED MACULAR DEGENERATION OF BOTH EYES WITH SUBFOVEAL INVOLVEMENT: ICD-10-CM

## 2019-06-04 PROCEDURE — 99999 PR PBB SHADOW E&M-EST. PATIENT-LVL II: ICD-10-PCS | Mod: PBBFAC,,, | Performed by: OPHTHALMOLOGY

## 2019-06-04 PROCEDURE — 92012 PR EYE EXAM, EST PATIENT,INTERMED: ICD-10-PCS | Mod: S$GLB,,, | Performed by: OPHTHALMOLOGY

## 2019-06-04 PROCEDURE — 92012 INTRM OPH EXAM EST PATIENT: CPT | Mod: S$GLB,,, | Performed by: OPHTHALMOLOGY

## 2019-06-04 PROCEDURE — 99999 PR PBB SHADOW E&M-EST. PATIENT-LVL II: CPT | Mod: PBBFAC,,, | Performed by: OPHTHALMOLOGY

## 2019-06-14 ENCOUNTER — TELEPHONE (OUTPATIENT)
Dept: CARDIOLOGY | Facility: HOSPITAL | Age: 84
End: 2019-06-14

## 2019-06-14 ENCOUNTER — CLINICAL SUPPORT (OUTPATIENT)
Dept: CARDIOLOGY | Facility: HOSPITAL | Age: 84
End: 2019-06-14
Attending: INTERNAL MEDICINE
Payer: MEDICARE

## 2019-06-14 DIAGNOSIS — Z95.0 CARDIAC PACEMAKER IN SITU: ICD-10-CM

## 2019-06-14 PROCEDURE — 93296 REM INTERROG EVL PM/IDS: CPT

## 2019-06-14 NOTE — TELEPHONE ENCOUNTER
Returned the patient's call on this afternoon and informed her the Pacemaker check appointment scheduled on 7/9/19 will be rescheduled in Sept along with her 6 month f/u with MD/NP.  Patient was scheduled for a remote check on 6/10/19.  Patient to send a manual transmission on this afternoon. Understanding was verbalized.

## 2019-06-14 NOTE — TELEPHONE ENCOUNTER
----- Message from Charla Rivera sent at 6/14/2019 12:07 PM CDT -----  Contact: Pt called  Pt would like to rescheduled appt. Please call pt @ [phone]. Thank you.

## 2019-07-26 ENCOUNTER — TELEPHONE (OUTPATIENT)
Dept: ELECTROPHYSIOLOGY | Facility: CLINIC | Age: 84
End: 2019-07-26

## 2019-07-26 NOTE — TELEPHONE ENCOUNTER
----- Message from Bekah Rose MA sent at 7/26/2019  1:16 PM CDT -----  Contact: self  Pt. is calling to make an recall appt. with   for Sept. Please call.

## 2019-08-06 ENCOUNTER — TELEPHONE (OUTPATIENT)
Dept: ELECTROPHYSIOLOGY | Facility: CLINIC | Age: 84
End: 2019-08-06

## 2019-08-06 NOTE — TELEPHONE ENCOUNTER
Mrs. Knobloch ,  Your appointments have already been scheduled 10-03-19.  Please call to confirm appointments.    Etta Hong (rita)  .  08/06/2019                  ----- Message from Etta Hong MA sent at 7/22/2019  4:58 PM CDT -----  Message   Received: Today   Message Contents   Charla WHITTAKER Staff  Caller: pt called (Today,  3:51 PM)         Pt would like to schedule an appt and she has a device.Please call pt @ [phone]. Thank you.

## 2019-08-21 DIAGNOSIS — H40.52X2 NEOVASCULAR GLAUCOMA OF LEFT EYE, MODERATE STAGE: ICD-10-CM

## 2019-08-21 RX ORDER — BRINZOLAMIDE 10 MG/ML
SUSPENSION/ DROPS OPHTHALMIC
Qty: 10 ML | Refills: 6 | Status: SHIPPED | OUTPATIENT
Start: 2019-08-21 | End: 2020-11-19 | Stop reason: SDUPTHER

## 2019-09-22 DIAGNOSIS — I48.0 PAROXYSMAL ATRIAL FIBRILLATION: ICD-10-CM

## 2019-09-23 RX ORDER — APIXABAN 5 MG/1
TABLET, FILM COATED ORAL
Qty: 180 TABLET | Refills: 3 | Status: SHIPPED | OUTPATIENT
Start: 2019-09-23 | End: 2020-10-22

## 2019-10-03 ENCOUNTER — HOSPITAL ENCOUNTER (OUTPATIENT)
Dept: CARDIOLOGY | Facility: CLINIC | Age: 84
Discharge: HOME | End: 2019-10-03
Payer: MEDICARE

## 2019-10-03 ENCOUNTER — OFFICE VISIT (OUTPATIENT)
Dept: ELECTROPHYSIOLOGY | Facility: CLINIC | Age: 84
End: 2019-10-03
Payer: MEDICARE

## 2019-10-03 VITALS
HEART RATE: 80 BPM | SYSTOLIC BLOOD PRESSURE: 139 MMHG | BODY MASS INDEX: 26.58 KG/M2 | HEIGHT: 63 IN | WEIGHT: 150 LBS | DIASTOLIC BLOOD PRESSURE: 64 MMHG

## 2019-10-03 DIAGNOSIS — Z95.0 CARDIAC PACEMAKER: ICD-10-CM

## 2019-10-03 DIAGNOSIS — I48.0 PAROXYSMAL ATRIAL FIBRILLATION: ICD-10-CM

## 2019-10-03 DIAGNOSIS — I44.2 AV BLOCK, 3RD DEGREE: Primary | ICD-10-CM

## 2019-10-03 DIAGNOSIS — I10 ESSENTIAL HYPERTENSION: ICD-10-CM

## 2019-10-03 DIAGNOSIS — G45.9 TIA (TRANSIENT ISCHEMIC ATTACK): ICD-10-CM

## 2019-10-03 PROCEDURE — 93010 RHYTHM STRIP: ICD-10-PCS | Mod: S$GLB,,, | Performed by: INTERNAL MEDICINE

## 2019-10-03 PROCEDURE — 93010 ELECTROCARDIOGRAM REPORT: CPT | Mod: S$GLB,,, | Performed by: INTERNAL MEDICINE

## 2019-10-03 PROCEDURE — 99214 PR OFFICE/OUTPT VISIT, EST, LEVL IV, 30-39 MIN: ICD-10-PCS | Mod: S$GLB,,, | Performed by: INTERNAL MEDICINE

## 2019-10-03 PROCEDURE — 99214 OFFICE O/P EST MOD 30 MIN: CPT | Mod: S$GLB,,, | Performed by: INTERNAL MEDICINE

## 2019-10-03 PROCEDURE — 1101F PR PT FALLS ASSESS DOC 0-1 FALLS W/OUT INJ PAST YR: ICD-10-PCS | Mod: CPTII,S$GLB,, | Performed by: INTERNAL MEDICINE

## 2019-10-03 PROCEDURE — 1101F PT FALLS ASSESS-DOCD LE1/YR: CPT | Mod: CPTII,S$GLB,, | Performed by: INTERNAL MEDICINE

## 2019-10-03 PROCEDURE — 99999 PR PBB SHADOW E&M-EST. PATIENT-LVL III: ICD-10-PCS | Mod: PBBFAC,,, | Performed by: INTERNAL MEDICINE

## 2019-10-03 PROCEDURE — 99999 PR PBB SHADOW E&M-EST. PATIENT-LVL III: CPT | Mod: PBBFAC,,, | Performed by: INTERNAL MEDICINE

## 2019-10-03 PROCEDURE — 93005 ELECTROCARDIOGRAM TRACING: CPT | Mod: S$GLB,,, | Performed by: INTERNAL MEDICINE

## 2019-10-03 PROCEDURE — 93005 RHYTHM STRIP: ICD-10-PCS | Mod: S$GLB,,, | Performed by: INTERNAL MEDICINE

## 2019-10-03 NOTE — PROGRESS NOTES
Subjective:    Patient ID:  Dorothy M Knobloch is a 87 y.o. female who presents for evaluation of Atrial Fibrillation    Referring Cardiologist: North Mcneill MD    Prior Hx:  I had the pleasure of seeing Mrs. Knobloch today in our electrophysiology clinic in consultation for her pacemaker and atrial fibrillation. As you are aware she is a pleasant 87 year-old woman with hypertension, Takotsubo CMP 3/2013, TIA 12/2013, moderate right carotid artery disease, paroxysmal AF, and high-grade AV block with bradycardia s/p dc PPM 2017 at [facility]. She is transferring care to [facility]. She feels well. Device interrogation notes normal function. She does have <1% AF burden without high-ventricular rates. She notes occasional brief fluttering sensation.    ECHO 1/2018 CONCLUSIONS     1 - Normal left ventricular systolic function (EF 60-65%).     2 - Normal right ventricular systolic function .     3 - Moderate left atrial enlargement.     4 - Mild tricuspid regurgitation.     5 - Marked mitral annular calcification without stenosis.     6 - Aortic sclerosis without stenosis.     7 - The estimated PA systolic pressure is 33 mmHg.    PPM interrogation: stable parameters, 52%A, 63%V paced. AF<0.8%, longest episode 3 hours.    Interim Hx:  Mrs. Mackenzie returns for 6 months follow-up. 75% A paced and 70% RV pacing, 18.8%AF burden, longest episode 27 minutes. V rates controlled. She does not notice any AF anymore.    My interpretation of today's in clinic ECG is A sensed V paced rhythm at 80 bpm and QRS duration of 170msec.    Review of Systems   Constitution: Positive for malaise/fatigue.   HENT: Negative for congestion and sore throat.    Eyes: Negative for blurred vision and visual disturbance.   Cardiovascular: Positive for dyspnea on exertion. Negative for chest pain, leg swelling, near-syncope, orthopnea and palpitations.   Respiratory: Negative for cough and shortness of breath.    Hematologic/Lymphatic: Negative for  bleeding problem. Does not bruise/bleed easily.   Skin: Negative.    Musculoskeletal: Positive for arthritis, back pain and joint pain.   Gastrointestinal: Negative for hematochezia and melena.   Neurological: Negative for dizziness, light-headedness and weakness.        Objective:    Physical Exam   Constitutional: She is oriented to person, place, and time. She appears well-developed and well-nourished. No distress.   HENT:   Head: Normocephalic and atraumatic.   Eyes: Conjunctivae are normal. Right eye exhibits no discharge. Left eye exhibits no discharge.   Neck: Neck supple. No JVD present.   Cardiovascular: Normal rate, regular rhythm and normal heart sounds. Exam reveals no gallop and no friction rub.   No murmur heard.  Pulmonary/Chest: Effort normal and breath sounds normal. No respiratory distress. She has no wheezes. She has no rales.   Abdominal: Soft. Bowel sounds are normal. She exhibits no distension. There is no tenderness. There is no rebound.   Musculoskeletal: She exhibits no edema.   Neurological: She is alert and oriented to person, place, and time.   Skin: Skin is warm and dry. She is not diaphoretic.   Psychiatric: She has a normal mood and affect. Her behavior is normal. Thought content normal.   Vitals reviewed.        Assessment:       1. AV block, 3rd degree    2. Cardiac pacemaker    3. Essential hypertension    4. Paroxysmal atrial fibrillation         Plan:       In summary, Mrs. Knobloch is a pleasant 87 year-old woman with hypertension, Takotsubo CMP 3/2013, TIA 12/2013, moderate right carotid artery disease, paroxysmal AF, and high-grade AV block with bradycardia s/p dc PPM 2017 at [facility]. She understands risks of AF and bleeding risks of DOAC therapy. Her [id number] score is >2 and she is on eliquis. Device is functioning appropriately. Discussed long-term potential risk of RV pacing induced cardiomyopathy. Would get yearly ECHO.    Continue remote checks every 3 months. RTC in 6  months.    Thank you for allowing me to participate in the care of this patient. Please do not hesitate to call me with any questions or concerns.    Marquis Velazquez MD, PhD  Cardiac Electrophysiology

## 2019-10-04 ENCOUNTER — TELEPHONE (OUTPATIENT)
Dept: ELECTROPHYSIOLOGY | Facility: CLINIC | Age: 84
End: 2019-10-04

## 2019-10-04 NOTE — TELEPHONE ENCOUNTER
Spoke to Jovanni Knobloch.  Results given.  She verbalizes understanding and will call our office with any questions or concerns.

## 2019-10-04 NOTE — TELEPHONE ENCOUNTER
----- Message from Marquis Velazquez MD sent at 10/4/2019 10:29 AM CDT -----  Please notify the patient that her lab results are normal.

## 2019-10-04 NOTE — TELEPHONE ENCOUNTER
Attempting to reach Mrs. Knobloch regarding test results.  No answer x 2, message left asking for her to return call to our office.

## 2019-10-08 ENCOUNTER — CLINICAL SUPPORT (OUTPATIENT)
Dept: CARDIOLOGY | Facility: HOSPITAL | Age: 84
End: 2019-10-08
Attending: INTERNAL MEDICINE
Payer: MEDICARE

## 2019-10-08 DIAGNOSIS — Z95.0 CARDIAC PACEMAKER IN SITU: ICD-10-CM

## 2019-10-08 PROCEDURE — 93294 CARDIAC DEVICE CHECK - REMOTE: ICD-10-PCS | Mod: ,,, | Performed by: INTERNAL MEDICINE

## 2019-10-08 PROCEDURE — 93294 REM INTERROG EVL PM/LDLS PM: CPT | Mod: ,,, | Performed by: INTERNAL MEDICINE

## 2019-10-08 PROCEDURE — 93296 REM INTERROG EVL PM/IDS: CPT | Performed by: INTERNAL MEDICINE

## 2019-10-09 ENCOUNTER — HOSPITAL ENCOUNTER (OUTPATIENT)
Dept: CARDIOLOGY | Facility: CLINIC | Age: 84
Discharge: HOME | End: 2019-10-09
Attending: INTERNAL MEDICINE
Payer: MEDICARE

## 2019-10-09 VITALS
HEART RATE: 60 BPM | HEIGHT: 64 IN | SYSTOLIC BLOOD PRESSURE: 128 MMHG | WEIGHT: 153 LBS | BODY MASS INDEX: 26.12 KG/M2 | DIASTOLIC BLOOD PRESSURE: 66 MMHG

## 2019-10-09 DIAGNOSIS — I44.2 AV BLOCK, 3RD DEGREE: ICD-10-CM

## 2019-10-09 LAB
ASCENDING AORTA: 3.13 CM
AV INDEX (PROSTH): 0.46
AV MEAN GRADIENT: 7 MMHG
AV PEAK GRADIENT: 12 MMHG
AV VALVE AREA: 1.41 CM2
AV VELOCITY RATIO: 0.46
BSA FOR ECHO PROCEDURE: 1.77 M2
CV ECHO LV RWT: 0.33 CM
DOP CALC AO PEAK VEL: 1.75 M/S
DOP CALC AO VTI: 37.93 CM
DOP CALC LVOT AREA: 3 CM2
DOP CALC LVOT DIAMETER: 1.97 CM
DOP CALC LVOT PEAK VEL: 0.8 M/S
DOP CALC LVOT STROKE VOLUME: 53.56 CM3
DOP CALCLVOT PEAK VEL VTI: 17.58 CM
E/E' RATIO: 20 M/S
ECHO LV POSTERIOR WALL: 0.8 CM (ref 0.6–1.1)
FRACTIONAL SHORTENING: 53 % (ref 28–44)
HR TR ECHO: 74 BPM
INTERVENTRICULAR SEPTUM: 0.9 CM (ref 0.6–1.1)
LA MAJOR: 6.13 CM
LA MINOR: 6.31 CM
LA WIDTH: 3.02 CM
LEFT ATRIUM SIZE: 4.19 CM
LEFT ATRIUM VOLUME INDEX: 38.3 ML/M2
LEFT ATRIUM VOLUME: 66.89 CM3
LEFT INTERNAL DIMENSION IN SYSTOLE: 2.23 CM (ref 2.1–4)
LEFT VENTRICLE DIASTOLIC VOLUME INDEX: 61.06 ML/M2
LEFT VENTRICLE DIASTOLIC VOLUME: 106.59 ML
LEFT VENTRICLE MASS INDEX: 78 G/M2
LEFT VENTRICLE SYSTOLIC VOLUME INDEX: 9.6 ML/M2
LEFT VENTRICLE SYSTOLIC VOLUME: 16.82 ML
LEFT VENTRICULAR INTERNAL DIMENSION IN DIASTOLE: 4.78 CM (ref 3.5–6)
LEFT VENTRICULAR MASS: 136.12 G
LV LATERAL E/E' RATIO: 20 M/S
LV SEPTAL E/E' RATIO: 20 M/S
MV MEAN GRADIENT: 3 MMHG
MV PEAK E VEL: 1.4 M/S
PISA TR MAX VEL: 2.97 M/S
RA MAJOR: 6.47 CM
RA PRESSURE: 8 MMHG
RA WIDTH: 4.16 CM
RIGHT VENTRICULAR END-DIASTOLIC DIMENSION: 3.95 CM
SINUS: 2.7 CM
STJ: 2.16 CM
TDI LATERAL: 0.07 M/S
TDI SEPTAL: 0.07 M/S
TDI: 0.07 M/S
TR MAX PG: 35 MMHG
TRICUSPID ANNULAR PLANE SYSTOLIC EXCURSION: 1.89 CM
TV REST PULMONARY ARTERY PRESSURE: 43 MMHG

## 2019-10-09 PROCEDURE — 93306 ECHO (CUPID ONLY): ICD-10-PCS | Mod: S$GLB,,, | Performed by: INTERNAL MEDICINE

## 2019-10-09 PROCEDURE — 93306 TTE W/DOPPLER COMPLETE: CPT | Mod: S$GLB,,, | Performed by: INTERNAL MEDICINE

## 2019-10-10 ENCOUNTER — TELEPHONE (OUTPATIENT)
Dept: ELECTROPHYSIOLOGY | Facility: CLINIC | Age: 84
End: 2019-10-10

## 2019-10-10 NOTE — TELEPHONE ENCOUNTER
Spoke to Mrs. Medina.  Results given.  She verbalizes understanding and appreciates call.    Detail Level: Detailed Comment: Per Dr. Sharmila Gutierrez, send to pharmacy to see if covered by patientâs insurance, if not covered patient may use OTC Sarna lotion.

## 2019-10-10 NOTE — TELEPHONE ENCOUNTER
----- Message from Marquis Velazquez MD sent at 10/9/2019  4:09 PM CDT -----  Please notify the patient that her heart function is normal.

## 2019-10-25 ENCOUNTER — TELEPHONE (OUTPATIENT)
Dept: CARDIOLOGY | Facility: CLINIC | Age: 84
End: 2019-10-25

## 2019-10-25 NOTE — TELEPHONE ENCOUNTER
----- Message from Zina Kemp sent at 10/25/2019 10:02 AM CDT -----  Contact: [phone] /self  Type:  Needs Medical Advice    Who Called:  self  Symptoms (please be specific):  Trouble breathing at night   How long has patient had these symptoms:   2 weeks but is getting worse  Pharmacy name and phone #:     Would the patient rather a call back or a response via [facility]?  call  Best Call Back Number:    Additional Information:

## 2019-10-25 NOTE — TELEPHONE ENCOUNTER
Left pt detailed message advising her to head over to the ED for any difficulty breathing, SOB, chest tightness     Requested call back if any questions

## 2019-10-28 ENCOUNTER — TELEPHONE (OUTPATIENT)
Dept: ELECTROPHYSIOLOGY | Facility: CLINIC | Age: 84
End: 2019-10-28

## 2019-10-28 NOTE — TELEPHONE ENCOUNTER
----- Message from Marquis Velazquez MD sent at 10/28/2019  3:51 PM CDT -----  Agree with evaluation by Dr. Mcneill. Recent ECHO noted a normal LVEF. We can have her seen in our clinic and discuss starting amiodarone. I was never convinced her AF caused any true symptoms.    ----- Message -----  From: Kallie Seay RN  Sent: 10/28/2019   2:14 PM CDT  To: MD Dr. Marcus Beckford,  Please see below from [place].  Mrs. Knobloch called today with worsening sob and a new cough.  Please advise.   ThanksKallie     ----- Message -----  From: Neelima Leon RN  Sent: 10/28/2019   1:48 PM CDT  To: Kallie Seay RN    Hi Kallie,    Ms. Knobloch is having a lot more AF than when she saw Dr. Velazquez last in clinic. His OV note on 10/3/19 states her longest episode was ~ 3 hours and her AF burden was 18.8%. Today's transmission is showing her longest event being 18 hours and her AF burden is now 59.3%.    ----- Message -----  From: RT Jami  Sent: 10/28/2019   1:31 PM CDT  To: Kallie Seay RN, Neelima Leon RN    MDT, patient just transmitted today.    Good afternoon,     Any alerts or arrhythmias noted on device?     Thanks,     KINGSLEY Arreguin   Routing comment     Kallie Seay RN 3 minutes ago (1:22 PM)          Spoke to Mrs. Knobloch who reports she has been having a cough and sob that is worsening over the past week or so.  She reports the cough is new, but she has been having the sob.  She reports she has gone from sleeping with one pillow to now sleeping with 3.  She reports she thinks this may be due to heart failure.  Let Mrs. Knobloch know we are first going to rule out any arrhythmias on her device, and if that comes back ok, we will send message over to Dr. Mcneill's staff to address possible fluid overload.  She reports she does not want to go to ED at this time.     She verbalizes understanding and appreciates call.       Documentation     Kallie Seay RN 11 minutes ago (1:14 PM)          ----- Message from  Chitra Gan sent at 10/28/2019 11:54 AM CDT -----  Contact: Self  Pt is having labor breathing with a cough for the past week.  Thanks     [phone]

## 2019-10-28 NOTE — TELEPHONE ENCOUNTER
----- Message from Chitra Gan sent at 10/28/2019 11:54 AM CDT -----  Contact: Self  Pt is having labor breathing with a cough for the past week.  Thanks    [phone]

## 2019-10-28 NOTE — TELEPHONE ENCOUNTER
Spoke to Mrs. Altmanoch and appointment set up to see our NP, Neelima, this Thursday at 1130  With EKG prior.   Mrs. Altmanoch verbalizes understanding and appreciates call.

## 2019-10-28 NOTE — TELEPHONE ENCOUNTER
Spoke to Mrs. Knobloch who reports she has been having a cough and sob that is worsening over the past week or so.  She reports the cough is new, but she has been having the sob.  She reports she has gone from sleeping with one pillow to now sleeping with 3.  She reports she thinks this may be due to heart failure.  Let Mrs. Knobloch know we are first going to rule out any arrhythmias on her device, and if that comes back ok, we will send message over to Dr. Mcneill's staff to address possible fluid overload.  She reports she does not want to go to ED at this time.    She verbalizes understanding and appreciates call.

## 2019-10-30 NOTE — PROGRESS NOTES
Ms. Knobloch is a patient of Dr. Velazquez and was last seen in clinic 10/3/19.    Subjective:   Patient ID:  Dorothy M Knobloch is a 87 y.o. female who presents for follow-up of Atrial fibrillation.  HPI:  Ms. Knobloch is a 87 y.o. 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     Background:  General Cardiologist Dr. 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 She is transferring care to [facility]. She feels well. Device interrogation (9/18/2018) notes normal function. She does have <1% AF burden without high-ventricular rates. She notes occasional brief fluttering sensation. Echo 1/2018 Normal left ventricular systolic function (EF 60-65%).    PPM interrogation: stable parameters, 52%A, 63%V paced. AF<0.8%, longest episode 3 hours.     3/19/19 Patient returned for 6 month follow-up. 75% A paced and 70% RV pacing, 18.8% AF burden, longest episode 27 minutes. V rates controlled. She does not notice any AF anymore.  ECG showed A sensed V paced rhythm at 80 bpm and QRS duration of 170msec.  Dr. Velazquez discussed long-term potential risk of RV pacing induced cardiomyopathy. Will get yearly ECHO.  Continue remote checks every 3 months. RTC in 6 months.    10/3/19 Last office visit no concerns noted, CBC and BMP WNL.  10/9/19 Echo showed EF 65%.    10/28/19 Mrs. Knobloch called to report she has been having a cough and SOB that has worsened over the past week or so. She reports the cough is new, but she has been having the SOB x 2 weeks.  She reports she has gone from sleeping with one pillow to now sleeping with 3 and that she thinks this may be due to heart failure. She did not want to go to the ED.  Remote device transmission  showed patient is having a lot more AF than when she saw Dr. Velazquez last in clinic. His OV note on 10/3/19 states her longest episode was ~ 3 hours and her AF burden was 18.8%. 10/28/19 transmission showed her longest event being 18 hours and her AF burden is now 59.3%. Per Dr. Velazquez, patient can be seen in our clinic and discuss starting amiodarone. Although, he was never convinced her AF caused any true symptoms. Will also plan to contact Dr. Mcneill's staff to address possible fluid overload.   Recent ECHO noted a normal LVEF.     Update (10/31/2019):  Ms. Knobloch presents today with c/o SOB/SMITH x 2 weeks and a dry cough that started a few days ago. She experiences orthopnea (sleeping on three pillows lately) and bilateral LE edema. She denies chest pain with exertion or at rest, palpitations, light headedness, dizziness, or syncope. She denies any symptoms c/w AF, states she no longer feels her AF. She states she does not feel ill enough to go to the ED today. In clinic pulse ox showed O2 sat 98%.   Last Echo (10/9/19) showed EF 65%, PA systolic pressure 43.     She is currently taking hydrochlorothiazide 12.5 mg daily, eliquis 5 mg BID for stroke prophylaxis-denies significant bleeding episodes, and metoprolol 50 mg daily for HR control. Kidney function is stable, with a creatinine of 0.7 on 10/3/19.    10/28/19 device transmission showed AF burden is now 59.3% longest episode being 18 hours (up from 18.8% burden with longest episode 3 hours). A-pacing 35.3% V-pacing 98.9%. V rates controlled. She does not notice any AF symptoms.   Case and plans discussed with Dr. Velazquez. We will have patient see Dr. Mcneill tomorrow for diuresis and have her follow up with us in 4-6 weeks to assess for symptom improvement. If patient is still symptomatic after diuresis, may consider amiodarone initiation for AF. I offered patient lasix PO until she is able to see Dr. Mcneill, she agreed.    I have personally reviewed the  patient's EKG today, which shows V-paced rhythm at 80 bpm. QTc is 519.    Recent Cardiac Tests:  2D Echo (10/9/19):  · Mild left atrial enlargement.  · Normal left ventricular systolic function. The estimated ejection fraction is 65%  · Mild right atrial enlargement.  · Normal right ventricular systolic function.  · Mild aortic valve stenosis.  · Aortic valve area is 1.41 cm2; peak velocity is 1.75 m/s; mean gradient is 7 mmHg.  · Severe mitral annular calcification. The mean diastolic gradient across the mitral valve is 3 mmHg at a heart rate of 74 bpm.  · Mild mitral regurgitation.  · Mild tricuspid regurgitation.  · Pulmonary hypertension present.  · The estimated PA systolic pressure is 43 mm Hg  · Intermediate central venous pressure (8 mm Hg).    Past Medical History:   Diagnosis Date    Anticoagulant long-term use     Arthritis     Bilateral nonexudative age-related macular degeneration 8/27/2013    Blood transfusion     Cataract     CHF (congestive heart failure)     Enlarged heart     High cholesterol     Hypertension     Sick sinus syndrome     Stroke     Thyroid disease      Past Surgical History:   Procedure Laterality Date    CARDIAC PACEMAKER PLACEMENT      CARPAL TUNNEL RELEASE Bilateral 1991    CATARACT EXTRACTION Right     JOINT REPLACEMENT      MO TRANSCRAN DOPP INTRACRAN, EMBOLI W/O INJ  1/29/2018         SKIN BIOPSY      TONSILLECTOMY      TOTAL HIP ARTHROPLASTY  11-1-2000    right/[facility]       Current Outpatient Medications   Medication Sig    amLODIPine (NORVASC) 5 MG tablet Take 1 tablet (5 mg total) by mouth once daily.    AZOPT 1 % ophthalmic suspension PLACE 1 DROP INTO THE LEFT EYE 2 (TWO) TIMES DAILY.    cholecalciferol, vitamin D3, 50,000 unit capsule Take 50,000 Units by mouth every 7 days.    COMBIGAN 0.2-0.5 % Drop INSTILL 1 DROP INTO LEFT EYE TWICE A DAY    ELIQUIS 5 mg Tab TAKE 1 TABLET BY MOUTH TWICE A DAY    hydroCHLOROthiazide (HYDRODIURIL)  12.5 MG Tab Take 1 tablet (12.5 mg total) by mouth once daily.    levothyroxine (SYNTHROID) 88 MCG tablet Take 88 mcg by mouth before breakfast.     metoprolol succinate (TOPROL-XL) 25 MG 24 hr tablet Take 2 tablets (50 mg total) by mouth once daily.    NITROSTAT 0.4 mg SL tablet     PRALUENT PEN 75 mg/mL PnIj     clopidogrel (PLAVIX) 75 mg tablet Take 1 tablet (75 mg total) by mouth once daily.    furosemide (LASIX) 20 MG tablet Take 1 tablet (20 mg total) by mouth once daily.     No current facility-administered medications for this visit.      Review of Systems   Constitution: Negative for chills, fever and malaise/fatigue.   HENT: Negative for congestion and nosebleeds.    Eyes: Negative for blurred vision.   Cardiovascular: Positive for dyspnea on exertion, leg swelling and orthopnea. Negative for chest pain, irregular heartbeat, near-syncope, palpitations, paroxysmal nocturnal dyspnea and syncope.   Respiratory: Positive for cough and shortness of breath. Negative for hemoptysis, sleep disturbances due to breathing and wheezing.    Endocrine: Negative for polyphagia.   Hematologic/Lymphatic: Negative for bleeding problem. Does not bruise/bleed easily.   Skin: Negative for itching and rash.   Musculoskeletal: Negative for back pain, joint pain, muscle cramps and muscle weakness.   Gastrointestinal: Negative for bloating, abdominal pain and hematemesis.   Genitourinary: Negative for hematuria.   Neurological: Negative for dizziness, focal weakness, headaches, light-headedness, loss of balance, numbness, paresthesias and weakness.   Psychiatric/Behavioral: Negative for altered mental status. The patient is not nervous/anxious.      Objective:   BP (!) 144/65   Pulse 80   SpO2 98%     Physical Exam   Constitutional: She is oriented to person, place, and time. She appears well-developed and well-nourished. She does not appear ill. No distress.   HENT:   Head: Normocephalic and atraumatic.   Eyes: Pupils are  equal, round, and reactive to light. Conjunctivae, EOM and lids are normal.   Neck: Normal range of motion. Neck supple.   Cardiovascular: Normal rate, normal heart sounds, intact distal pulses and normal pulses. An irregularly irregular rhythm present. Exam reveals no gallop and no friction rub.   No murmur heard.  Pulses:       Radial pulses are 2+ on the right side, and 2+ on the left side.   Pulmonary/Chest: Effort normal and breath sounds normal. No accessory muscle usage. No respiratory distress. She has no decreased breath sounds. She has no wheezes. She has no rhonchi. She has no rales. She exhibits no tenderness.   Left chest wall device site in good condition.   Abdominal: Soft. Bowel sounds are normal. She exhibits no distension. There is no tenderness. There is no guarding.   Musculoskeletal: Normal range of motion. She exhibits edema.   1+ bilateral LEs.   Neurological: She is alert and oriented to person, place, and time. She has normal strength. She is not disoriented. No sensory deficit. Coordination and gait normal.   Skin: Skin is warm and dry. No bruising noted. She is not diaphoretic. No erythema.   Psychiatric: She has a normal mood and affect. Her speech is normal and behavior is normal. Judgment and thought content normal.   Nursing note and vitals reviewed.    Lab Results   Component Value Date     10/03/2019    K 3.8 10/03/2019    MG 2.0 01/29/2018    BUN 18 10/03/2019    CREATININE 0.7 10/03/2019    ALT 11 02/24/2018    AST 16 02/24/2018    HGB 13.1 10/03/2019    HCT 41.1 10/03/2019    TSH 0.165 (L) 01/28/2018    LDLCALC 180.6 (H) 01/28/2018     Recent Labs   Lab 01/28/18  1317 01/29/18  0526   INR 0.9 1.0       Assessment:     1. SOB (shortness of breath)    2. AV block, 3rd degree    3. Paroxysmal atrial fibrillation    4. Cardiac pacemaker    5. Takotsubo cardiomyopathy    6. Bilateral carotid artery stenosis    7. Essential hypertension    8. TIA (transient ischemic attack)    9.  Hypothyroidism, unspecified type    10. Hyperlipidemia, unspecified hyperlipidemia type      Plan:   In summary, Ms. Knobloch is a 87 y.o. 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 Ms. Knobloch presents today with c/o SOB/SMITH x 2 weeks and a dry cough that started a few days ago. She experiences orthopnea and bilateral LE edema. She denies any symptoms c/w AF, states she no longer feels her AF. She states she does not feel ill enough to go to the ED today. In clinic pulse ox showed O2 sat 98%.   Last Echo (10/9/19) showed EF 65%, PA systolic pressure 43.     She is currently taking hydrochlorothiazide 12.5 mg daily, eliquis 5 mg BID for stroke prophylaxis, and metoprolol 50 mg daily for HR control. Kidney function is stable, with a creatinine of 0.7 on 10/3/19.    Remote device transmission (10/28/19) showed AF burden is now 59.3% longest episode being 18 hours (up from 18.8% burden with longest episode 3 hours). A-pacing 35.3% V-pacing 98.9%. V rates controlled. She does not notice any AF symptoms.   Case and plans discussed with Dr. Velazquez. We will have patient see Dr. Mcneill tomorrow for diuresis and have her follow up with us in 4-6 weeks to assess for symptom improvement. If patient is still symptomatic after diuresis, may consider amiodarone initiation for AF. I offered patient lasix PO until she is able to see Dr. Mcneill, she agreed.    Continue current medication regimen.  Lasix 20 mg PO daily.  Follow up with Dr. Mcneill tomorrow.  Follow up in EP about 4-6 weeks (around 12/12/2019), sooner as needed.    *Case discussed and copy of this note has been sent to Dr. Velazquez*  ------------------------------------------------------------------    SHE Mora, FNP-C  [facility]

## 2019-10-31 ENCOUNTER — TELEPHONE (OUTPATIENT)
Dept: CARDIOLOGY | Facility: CLINIC | Age: 84
End: 2019-10-31

## 2019-10-31 ENCOUNTER — TELEPHONE (OUTPATIENT)
Dept: ELECTROPHYSIOLOGY | Facility: CLINIC | Age: 84
End: 2019-10-31

## 2019-10-31 ENCOUNTER — HOSPITAL ENCOUNTER (OUTPATIENT)
Dept: CARDIOLOGY | Facility: CLINIC | Age: 84
Discharge: HOME | End: 2019-10-31
Payer: MEDICARE

## 2019-10-31 ENCOUNTER — OFFICE VISIT (OUTPATIENT)
Dept: ELECTROPHYSIOLOGY | Facility: CLINIC | Age: 84
End: 2019-10-31
Payer: MEDICARE

## 2019-10-31 VITALS — HEART RATE: 80 BPM | SYSTOLIC BLOOD PRESSURE: 144 MMHG | DIASTOLIC BLOOD PRESSURE: 65 MMHG | OXYGEN SATURATION: 98 %

## 2019-10-31 DIAGNOSIS — G45.9 TIA (TRANSIENT ISCHEMIC ATTACK): ICD-10-CM

## 2019-10-31 DIAGNOSIS — R06.02 SOB (SHORTNESS OF BREATH): Primary | ICD-10-CM

## 2019-10-31 DIAGNOSIS — I51.81 TAKOTSUBO CARDIOMYOPATHY: ICD-10-CM

## 2019-10-31 DIAGNOSIS — Z95.0 CARDIAC PACEMAKER: ICD-10-CM

## 2019-10-31 DIAGNOSIS — I48.0 PAROXYSMAL ATRIAL FIBRILLATION: ICD-10-CM

## 2019-10-31 DIAGNOSIS — I65.23 BILATERAL CAROTID ARTERY STENOSIS: ICD-10-CM

## 2019-10-31 DIAGNOSIS — I10 ESSENTIAL HYPERTENSION: ICD-10-CM

## 2019-10-31 DIAGNOSIS — E78.5 HYPERLIPIDEMIA, UNSPECIFIED HYPERLIPIDEMIA TYPE: ICD-10-CM

## 2019-10-31 DIAGNOSIS — E03.9 HYPOTHYROIDISM, UNSPECIFIED TYPE: ICD-10-CM

## 2019-10-31 DIAGNOSIS — I44.2 AV BLOCK, 3RD DEGREE: ICD-10-CM

## 2019-10-31 PROCEDURE — 93005 RHYTHM STRIP: ICD-10-PCS | Mod: S$GLB,,, | Performed by: INTERNAL MEDICINE

## 2019-10-31 PROCEDURE — 93010 RHYTHM STRIP: ICD-10-PCS | Mod: S$GLB,,, | Performed by: INTERNAL MEDICINE

## 2019-10-31 PROCEDURE — 1101F PR PT FALLS ASSESS DOC 0-1 FALLS W/OUT INJ PAST YR: ICD-10-PCS | Mod: CPTII,S$GLB,, | Performed by: NURSE PRACTITIONER

## 2019-10-31 PROCEDURE — 93005 ELECTROCARDIOGRAM TRACING: CPT | Mod: S$GLB,,, | Performed by: INTERNAL MEDICINE

## 2019-10-31 PROCEDURE — 99999 PR PBB SHADOW E&M-EST. PATIENT-LVL III: ICD-10-PCS | Mod: PBBFAC,,, | Performed by: NURSE PRACTITIONER

## 2019-10-31 PROCEDURE — 99214 OFFICE O/P EST MOD 30 MIN: CPT | Mod: S$GLB,,, | Performed by: NURSE PRACTITIONER

## 2019-10-31 PROCEDURE — 99999 PR PBB SHADOW E&M-EST. PATIENT-LVL III: CPT | Mod: PBBFAC,,, | Performed by: NURSE PRACTITIONER

## 2019-10-31 PROCEDURE — 99214 PR OFFICE/OUTPT VISIT, EST, LEVL IV, 30-39 MIN: ICD-10-PCS | Mod: S$GLB,,, | Performed by: NURSE PRACTITIONER

## 2019-10-31 PROCEDURE — 93010 ELECTROCARDIOGRAM REPORT: CPT | Mod: S$GLB,,, | Performed by: INTERNAL MEDICINE

## 2019-10-31 PROCEDURE — 1101F PT FALLS ASSESS-DOCD LE1/YR: CPT | Mod: CPTII,S$GLB,, | Performed by: NURSE PRACTITIONER

## 2019-10-31 RX ORDER — FUROSEMIDE 20 MG/1
20 TABLET ORAL DAILY
Qty: 15 TABLET | Refills: 0 | Status: SHIPPED | OUTPATIENT
Start: 2019-10-31 | End: 2019-11-01

## 2019-10-31 NOTE — TELEPHONE ENCOUNTER
Reached out to Neelima galicia/ Dr. Velazquez office     Dr. Acosta's requesting that pt be seen tomorrow for SOB and fluid overload     Pt was put on the schedule

## 2019-10-31 NOTE — TELEPHONE ENCOUNTER
----- Message from Tomasz Narendra sent at 10/31/2019 12:22 PM CDT -----  Contact: [phone]/ Neelima with Dr. Marquis Velazquez/ [facility] Samantha with Dr. Marquis Velazquez would like to speak with you in regards to getting the patient in for an appointment on 11/1/19. Patient is experiencing fluid over load and shortness of breath. Please call.

## 2019-10-31 NOTE — TELEPHONE ENCOUNTER
----- Message from Cici Cerrato MA sent at 10/31/2019 12:41 PM CDT -----  Katie Ellison!  I spoke to Neelima, we got the patient scheduled to see Dr. Mcneill tomorrow. She said she would give the patient a call to let her know as well.    Thank you  ----- Message -----  From: Terra Rodriguez MA  Sent: 10/31/2019  12:09 PM CDT  To: [place] Mcneill MD, [facility] Staff    Hi Dr. Mcneill,    Dr. Velazquez asked me to reach out to you and your staff to see if it's possible for Mrs. Knobloch to see you tomorrow for SOB & fluid overload.      Can someone please each out to Mrs. Knobloch to get her scheduled??    Thank you for your help!  Terra

## 2019-11-01 ENCOUNTER — HOSPITAL ENCOUNTER (OUTPATIENT)
Dept: RADIOLOGY | Facility: HOSPITAL | Age: 84
Discharge: HOME | End: 2019-11-01
Attending: INTERNAL MEDICINE
Payer: MEDICARE

## 2019-11-01 ENCOUNTER — OFFICE VISIT (OUTPATIENT)
Dept: CARDIOLOGY | Facility: CLINIC | Age: 84
End: 2019-11-01
Payer: MEDICARE

## 2019-11-01 VITALS
HEIGHT: 64 IN | SYSTOLIC BLOOD PRESSURE: 130 MMHG | BODY MASS INDEX: 27.18 KG/M2 | OXYGEN SATURATION: 94 % | WEIGHT: 159.19 LBS | HEART RATE: 83 BPM | DIASTOLIC BLOOD PRESSURE: 60 MMHG

## 2019-11-01 DIAGNOSIS — G45.9 TIA (TRANSIENT ISCHEMIC ATTACK): ICD-10-CM

## 2019-11-01 DIAGNOSIS — I10 ESSENTIAL HYPERTENSION: ICD-10-CM

## 2019-11-01 DIAGNOSIS — I51.81 TAKOTSUBO CARDIOMYOPATHY: ICD-10-CM

## 2019-11-01 DIAGNOSIS — I77.9 RIGHT-SIDED CAROTID ARTERY DISEASE, UNSPECIFIED TYPE: ICD-10-CM

## 2019-11-01 DIAGNOSIS — I65.8 OCCLUSION AND STENOSIS OF MULTIPLE AND BILATERAL PRECEREBRAL ARTERIES: ICD-10-CM

## 2019-11-01 DIAGNOSIS — I65.23 BILATERAL CAROTID ARTERY STENOSIS: ICD-10-CM

## 2019-11-01 DIAGNOSIS — I48.0 PAROXYSMAL ATRIAL FIBRILLATION: ICD-10-CM

## 2019-11-01 DIAGNOSIS — R06.02 SOB (SHORTNESS OF BREATH): ICD-10-CM

## 2019-11-01 DIAGNOSIS — E03.9 HYPOTHYROIDISM, UNSPECIFIED TYPE: ICD-10-CM

## 2019-11-01 DIAGNOSIS — I51.89 DIASTOLIC DYSFUNCTION: ICD-10-CM

## 2019-11-01 DIAGNOSIS — Z95.0 CARDIAC PACEMAKER: ICD-10-CM

## 2019-11-01 DIAGNOSIS — I44.2 AV BLOCK, 3RD DEGREE: ICD-10-CM

## 2019-11-01 DIAGNOSIS — R06.02 SOB (SHORTNESS OF BREATH): Primary | ICD-10-CM

## 2019-11-01 DIAGNOSIS — E78.5 HYPERLIPIDEMIA, UNSPECIFIED HYPERLIPIDEMIA TYPE: ICD-10-CM

## 2019-11-01 PROCEDURE — 71046 X-RAY EXAM CHEST 2 VIEWS: CPT | Mod: TC,FY

## 2019-11-01 PROCEDURE — 99999 PR PBB SHADOW E&M-EST. PATIENT-LVL IV: CPT | Mod: PBBFAC,,, | Performed by: INTERNAL MEDICINE

## 2019-11-01 PROCEDURE — 99214 PR OFFICE/OUTPT VISIT, EST, LEVL IV, 30-39 MIN: ICD-10-PCS | Mod: S$GLB,,, | Performed by: INTERNAL MEDICINE

## 2019-11-01 PROCEDURE — 1101F PT FALLS ASSESS-DOCD LE1/YR: CPT | Mod: CPTII,S$GLB,, | Performed by: INTERNAL MEDICINE

## 2019-11-01 PROCEDURE — 1101F PR PT FALLS ASSESS DOC 0-1 FALLS W/OUT INJ PAST YR: ICD-10-PCS | Mod: CPTII,S$GLB,, | Performed by: INTERNAL MEDICINE

## 2019-11-01 PROCEDURE — 99214 OFFICE O/P EST MOD 30 MIN: CPT | Mod: S$GLB,,, | Performed by: INTERNAL MEDICINE

## 2019-11-01 PROCEDURE — 71046 XR CHEST PA AND LATERAL: ICD-10-PCS | Mod: 26,,, | Performed by: RADIOLOGY

## 2019-11-01 PROCEDURE — 71046 X-RAY EXAM CHEST 2 VIEWS: CPT | Mod: 26,,, | Performed by: RADIOLOGY

## 2019-11-01 PROCEDURE — 99999 PR PBB SHADOW E&M-EST. PATIENT-LVL IV: ICD-10-PCS | Mod: PBBFAC,,, | Performed by: INTERNAL MEDICINE

## 2019-11-01 RX ORDER — BUMETANIDE 0.5 MG/1
0.5 TABLET ORAL 2 TIMES DAILY
Qty: 180 TABLET | Refills: 3 | Status: SHIPPED | OUTPATIENT
Start: 2019-11-01 | End: 2020-10-20

## 2019-11-01 RX ORDER — BENZONATATE 100 MG/1
100 CAPSULE ORAL 3 TIMES DAILY PRN
Qty: 60 CAPSULE | Refills: 1 | Status: SHIPPED | OUTPATIENT
Start: 2019-11-01 | End: 2024-01-12

## 2019-11-01 NOTE — PATIENT INSTRUCTIONS
Take Bumex twice daily and if after 3 days, no noticeable increase in urination, increase to 2 tabs in the AM and 2 tabs in the PM

## 2019-11-13 NOTE — PROGRESS NOTES
Subjective:    Patient ID:  Dorothy M Knobloch is a 87 y.o. female who presents for follow-up of Congestive Heart Failure      HPI     86 y/o female with a hx of SSS s/p PPM, Afib currently on AC with Eliquis and follows with Dr Velazquez, Takutsubo Cardiomyopathy 3/2013, HTN, moderate bilateral carotid disease, TIA 12/2013, HLD, hypothyroidism, 2DE with vegetation on the MV (dental procedure?), visual deficit and diagnosed with left central retinal artery occlusion.Last 2DE with normal function. Elevated lipid panel intolerant to statins, so started on Praluent, no longer taking due to reaction. Had been feeling weak at home, so I stopped doxyzosin, felt better after. BP stable since. Has chronic intermittent SOB.   Current main complaint is SMITH and fatigue. Denies CP, orthopnea, PND, syncope. Compliant with meds. Has bilateral LE edema which has improved with compression stockings. Established now with EP and PPM clinic.    Review of Systems   Constitution: Positive for malaise/fatigue.   HENT: Negative for congestion.    Eyes: Negative for blurred vision.   Cardiovascular: Positive for dyspnea on exertion and leg swelling. Negative for chest pain, claudication, cyanosis, irregular heartbeat, near-syncope, orthopnea, palpitations, paroxysmal nocturnal dyspnea and syncope.   Respiratory: Positive for shortness of breath.    Endocrine: Negative for polyuria.   Hematologic/Lymphatic: Negative for bleeding problem.   Skin: Negative for itching and rash.   Musculoskeletal: Negative for joint swelling, muscle cramps and muscle weakness.   Gastrointestinal: Negative for abdominal pain, hematemesis, hematochezia, melena, nausea and vomiting.   Genitourinary: Negative for dysuria and hematuria.   Neurological: Positive for weakness. Negative for dizziness, focal weakness, headaches, light-headedness and loss of balance.   Psychiatric/Behavioral: Negative for depression. The patient is not nervous/anxious.         Objective:     Physical Exam   Constitutional: She is oriented to person, place, and time. She appears well-developed and well-nourished.   HENT:   Head: Normocephalic and atraumatic.   Neck: Neck supple. No JVD present.   Cardiovascular: Normal rate, regular rhythm and normal heart sounds.   Pulses:       Carotid pulses are 2+ on the right side, and 2+ on the left side.       Radial pulses are 2+ on the right side, and 2+ on the left side.        Femoral pulses are 2+ on the right side, and 2+ on the left side.       Dorsalis pedis pulses are 2+ on the right side, and 2+ on the left side.        Posterior tibial pulses are 2+ on the right side, and 2+ on the left side.   Pulmonary/Chest: Effort normal. She has rales.   Abdominal: Soft. Bowel sounds are normal.   Musculoskeletal: She exhibits edema.   Neurological: She is alert and oriented to person, place, and time.   Skin: Skin is warm and dry.   Psychiatric: She has a normal mood and affect. Her behavior is normal. Thought content normal.         Assessment:       1. SOB (shortness of breath)    2. AV block, 3rd degree    3. Right-sided carotid artery disease, unspecified type    4. Diastolic dysfunction    5. Essential hypertension    6. Cardiac pacemaker    7. Bilateral carotid artery stenosis    8. Hyperlipidemia, unspecified hyperlipidemia type    9. 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 Paroxysmal atrial fibrillation    11. Takotsubo cardiomyopathy    12. TIA (transient ischemic attack)    13. Hypothyroidism, unspecified type      86 y/o pt with hx and presentation as above. SOB was worsened and palpitations. Concerned about respiratory tract infection. Will obtain CXR and labs. Does not appear clinically too volume overloaded, but will order BNP. Has been following a low sodium diet. Will increase Bumex for better diuresis. Discussed the etiology, evaluation, and management of CHF, DD, HTN, HLD, Pafib, TIA, hypothyroidism, TIA,  PPM. Discussed the importance of med compliance, heart healthy diet, and regular exercise.        Plan:       -CXR  -CBC, CMP, BNP  -Increase Bumex  -f/u in 2 weeks

## 2019-11-15 ENCOUNTER — HOSPITAL ENCOUNTER (OUTPATIENT)
Dept: RADIOLOGY | Facility: HOSPITAL | Age: 84
Discharge: HOME | End: 2019-11-15
Attending: INTERNAL MEDICINE
Payer: MEDICARE

## 2019-11-15 ENCOUNTER — OFFICE VISIT (OUTPATIENT)
Dept: CARDIOLOGY | Facility: CLINIC | Age: 84
End: 2019-11-15
Payer: MEDICARE

## 2019-11-15 VITALS
DIASTOLIC BLOOD PRESSURE: 60 MMHG | SYSTOLIC BLOOD PRESSURE: 115 MMHG | WEIGHT: 152 LBS | HEIGHT: 63 IN | HEART RATE: 78 BPM | BODY MASS INDEX: 26.93 KG/M2

## 2019-11-15 DIAGNOSIS — I50.9 CONGESTIVE HEART FAILURE, UNSPECIFIED HF CHRONICITY, UNSPECIFIED HEART FAILURE TYPE: ICD-10-CM

## 2019-11-15 DIAGNOSIS — G45.9 TIA (TRANSIENT ISCHEMIC ATTACK): ICD-10-CM

## 2019-11-15 DIAGNOSIS — I65.8 OCCLUSION AND STENOSIS OF MULTIPLE AND BILATERAL PRECEREBRAL ARTERIES: ICD-10-CM

## 2019-11-15 DIAGNOSIS — I44.2 AV BLOCK, 3RD DEGREE: ICD-10-CM

## 2019-11-15 DIAGNOSIS — R06.02 SHORTNESS OF BREATH: ICD-10-CM

## 2019-11-15 DIAGNOSIS — I51.89 DIASTOLIC DYSFUNCTION: ICD-10-CM

## 2019-11-15 DIAGNOSIS — E78.5 HYPERLIPIDEMIA, UNSPECIFIED HYPERLIPIDEMIA TYPE: ICD-10-CM

## 2019-11-15 DIAGNOSIS — E03.9 HYPOTHYROIDISM, UNSPECIFIED TYPE: ICD-10-CM

## 2019-11-15 DIAGNOSIS — I48.0 PAROXYSMAL ATRIAL FIBRILLATION: ICD-10-CM

## 2019-11-15 DIAGNOSIS — Z95.0 CARDIAC PACEMAKER: ICD-10-CM

## 2019-11-15 DIAGNOSIS — I77.9 RIGHT-SIDED CAROTID ARTERY DISEASE, UNSPECIFIED TYPE: ICD-10-CM

## 2019-11-15 DIAGNOSIS — R06.02 SOB (SHORTNESS OF BREATH): Primary | ICD-10-CM

## 2019-11-15 DIAGNOSIS — I51.81 TAKOTSUBO CARDIOMYOPATHY: ICD-10-CM

## 2019-11-15 DIAGNOSIS — I24.9 ACUTE CORONARY SYNDROME: ICD-10-CM

## 2019-11-15 DIAGNOSIS — I65.23 BILATERAL CAROTID ARTERY STENOSIS: ICD-10-CM

## 2019-11-15 DIAGNOSIS — D32.9 MENINGIOMA: ICD-10-CM

## 2019-11-15 DIAGNOSIS — I10 ESSENTIAL HYPERTENSION: ICD-10-CM

## 2019-11-15 PROCEDURE — 99214 PR OFFICE/OUTPT VISIT, EST, LEVL IV, 30-39 MIN: ICD-10-PCS | Mod: S$GLB,,, | Performed by: INTERNAL MEDICINE

## 2019-11-15 PROCEDURE — 1101F PR PT FALLS ASSESS DOC 0-1 FALLS W/OUT INJ PAST YR: ICD-10-PCS | Mod: CPTII,S$GLB,, | Performed by: INTERNAL MEDICINE

## 2019-11-15 PROCEDURE — 99999 PR PBB SHADOW E&M-EST. PATIENT-LVL III: ICD-10-PCS | Mod: PBBFAC,,, | Performed by: INTERNAL MEDICINE

## 2019-11-15 PROCEDURE — 1101F PT FALLS ASSESS-DOCD LE1/YR: CPT | Mod: CPTII,S$GLB,, | Performed by: INTERNAL MEDICINE

## 2019-11-15 PROCEDURE — 71250 CT THORAX DX C-: CPT | Mod: 26,,, | Performed by: RADIOLOGY

## 2019-11-15 PROCEDURE — 1159F MED LIST DOCD IN RCRD: CPT | Mod: S$GLB,,, | Performed by: INTERNAL MEDICINE

## 2019-11-15 PROCEDURE — 71250 CT THORAX DX C-: CPT | Mod: TC

## 2019-11-15 PROCEDURE — 71250 CT CHEST WITHOUT CONTRAST: ICD-10-PCS | Mod: 26,,, | Performed by: RADIOLOGY

## 2019-11-15 PROCEDURE — 1159F PR MEDICATION LIST DOCUMENTED IN MEDICAL RECORD: ICD-10-PCS | Mod: S$GLB,,, | Performed by: INTERNAL MEDICINE

## 2019-11-15 PROCEDURE — 99999 PR PBB SHADOW E&M-EST. PATIENT-LVL III: CPT | Mod: PBBFAC,,, | Performed by: INTERNAL MEDICINE

## 2019-11-15 PROCEDURE — 1126F AMNT PAIN NOTED NONE PRSNT: CPT | Mod: S$GLB,,, | Performed by: INTERNAL MEDICINE

## 2019-11-15 PROCEDURE — 1126F PR PAIN SEVERITY QUANTIFIED, NO PAIN PRESENT: ICD-10-PCS | Mod: S$GLB,,, | Performed by: INTERNAL MEDICINE

## 2019-11-15 PROCEDURE — 99214 OFFICE O/P EST MOD 30 MIN: CPT | Mod: S$GLB,,, | Performed by: INTERNAL MEDICINE

## 2019-11-15 NOTE — PROGRESS NOTES
Subjective:    Patient ID:  Dorothy M Knobloch is a 87 y.o. female who presents for follow-up of Shortness of Breath      HPI    86 y/o female with a hx of SSS s/p PPM, Afib currently on AC with Eliquis and follows with Dr Velazquez, Takutsubrachel Cardiomyopathy 3/2013, HTN, moderate bilateral carotid disease, TIA 12/2013, HLD, hypothyroidism, 2DE with vegetation on the MV (dental procedure?), visual deficit and diagnosed with left central retinal artery occlusion.Last 2DE with normal function. Elevated lipid panel intolerant to statins, so started on Praluent, no longer taking due to reaction. Had been feeling weak at home, so I stopped doxyzosin, felt better after. BP stable since. Has chronic intermittent SOB.   Current main complaint continues to be SMITH and fatigue. Bumex increased and slight improvement. CXR with RML opacity. BNP elevated >1000 and other labs remarkable for mild hyponatremia. Denies CP, orthopnea, PND, syncope. Compliant with meds. Has bilateral LE edema which has improved with compression stockings.     Review of Systems   Constitution: Positive for malaise/fatigue.   HENT: Negative for congestion.    Eyes: Negative for blurred vision.   Cardiovascular: Positive for dyspnea on exertion and leg swelling. Negative for chest pain, claudication, cyanosis, irregular heartbeat, near-syncope, orthopnea, palpitations, paroxysmal nocturnal dyspnea and syncope.   Respiratory: Positive for shortness of breath.    Endocrine: Negative for polyuria.   Hematologic/Lymphatic: Negative for bleeding problem.   Skin: Negative for itching and rash.   Musculoskeletal: Negative for joint swelling, muscle cramps and muscle weakness.   Gastrointestinal: Negative for abdominal pain, hematemesis, hematochezia, melena, nausea and vomiting.   Genitourinary: Negative for dysuria and hematuria.   Neurological: Positive for weakness. Negative for dizziness, focal weakness, headaches, light-headedness and loss of balance.    Psychiatric/Behavioral: Negative for depression. The patient is not nervous/anxious.         Objective:    Physical Exam   Constitutional: She is oriented to person, place, and time. She appears well-developed and well-nourished.   HENT:   Head: Normocephalic and atraumatic.   Neck: Neck supple. No JVD present.   Cardiovascular: Normal rate, regular rhythm and normal heart sounds.   Pulses:       Carotid pulses are 2+ on the right side, and 2+ on the left side.       Radial pulses are 2+ on the right side, and 2+ on the left side.        Femoral pulses are 2+ on the right side, and 2+ on the left side.       Dorsalis pedis pulses are 2+ on the right side, and 2+ on the left side.        Posterior tibial pulses are 2+ on the right side, and 2+ on the left side.   Pulmonary/Chest: Effort normal and breath sounds normal.   Abdominal: Soft. Bowel sounds are normal.   Musculoskeletal: She exhibits edema.   Neurological: She is alert and oriented to person, place, and time.   Skin: Skin is warm and dry.   Psychiatric: She has a normal mood and affect. Her behavior is normal. Thought content normal.         Assessment:       1. SOB (shortness of breath)    2. Shortness of breath    3. Diastolic dysfunction    4. Paroxysmal atrial fibrillation    5. Takotsubo cardiomyopathy    6. Essential hypertension    7. Hyperlipidemia, unspecified hyperlipidemia type    8. 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 Cardiac pacemaker    10. Right-sided carotid artery disease, unspecified type    11. Bilateral carotid artery stenosis    12. Acute coronary syndrome    13. AV block, 3rd degree    14. TIA (transient ischemic attack)    15. Meningioma    16. Hypothyroidism, unspecified type      88 y/o pt with hx and presentation as above. Continues to be SOB, mildly improved. Will obtain chest CT. Continue diuretic, low sodium diet. Discussed the etiology, evaluation, and management of CHF, HTN, HLD, PPM,  DELBERT, TIA. Discussed the importance of med compliance, heart healthy diet, and regular exercise.          Plan:       -Chest CT  -continue diuretic  -f/u in 2 weeks

## 2019-11-24 PROBLEM — I50.9 CHF (CONGESTIVE HEART FAILURE): Status: ACTIVE | Noted: 2019-11-24

## 2019-11-27 NOTE — TELEPHONE ENCOUNTER
Pt has scheduled clinical visit to see you next week Tuesday 12/3 at 10:00    Pt was requesting an appointment in the afternoon instead    Advised pt to come in at any time in the afternoon next week Tuesday 12/3

## 2019-11-27 NOTE — TELEPHONE ENCOUNTER
----- Message from Tomasz Mackey sent at 11/26/2019 11:19 AM CST -----  Contact: [phone]/ self   Patient requesting to speak with you regarding her appointment. She wants to know if she can be seen at a later time on her scheduled appointment date. Please call.

## 2019-11-28 RX ORDER — AMLODIPINE BESYLATE 5 MG/1
TABLET ORAL
Qty: 90 TABLET | Refills: 3 | Status: SHIPPED | OUTPATIENT
Start: 2019-11-28 | End: 2020-12-08

## 2019-11-28 RX ORDER — METOPROLOL SUCCINATE 25 MG/1
50 TABLET, EXTENDED RELEASE ORAL DAILY
Qty: 180 TABLET | Refills: 3 | Status: SHIPPED | OUTPATIENT
Start: 2019-11-28 | End: 2020-12-08

## 2019-12-12 ENCOUNTER — HOSPITAL ENCOUNTER (OUTPATIENT)
Dept: CARDIOLOGY | Facility: CLINIC | Age: 84
Discharge: HOME | End: 2019-12-12
Payer: MEDICARE

## 2019-12-12 ENCOUNTER — OFFICE VISIT (OUTPATIENT)
Dept: ELECTROPHYSIOLOGY | Facility: CLINIC | Age: 84
End: 2019-12-12
Payer: MEDICARE

## 2019-12-12 ENCOUNTER — CLINICAL SUPPORT (OUTPATIENT)
Dept: CARDIOLOGY | Facility: HOSPITAL | Age: 84
End: 2019-12-12
Attending: INTERNAL MEDICINE
Payer: MEDICARE

## 2019-12-12 VITALS
WEIGHT: 147.5 LBS | SYSTOLIC BLOOD PRESSURE: 136 MMHG | HEIGHT: 63 IN | HEART RATE: 75 BPM | BODY MASS INDEX: 26.13 KG/M2 | DIASTOLIC BLOOD PRESSURE: 60 MMHG

## 2019-12-12 DIAGNOSIS — I10 ESSENTIAL HYPERTENSION: ICD-10-CM

## 2019-12-12 DIAGNOSIS — I48.0 PAROXYSMAL ATRIAL FIBRILLATION: ICD-10-CM

## 2019-12-12 DIAGNOSIS — G45.9 TIA (TRANSIENT ISCHEMIC ATTACK): ICD-10-CM

## 2019-12-12 DIAGNOSIS — I44.2 AV BLOCK, 3RD DEGREE: ICD-10-CM

## 2019-12-12 DIAGNOSIS — Z95.0 CARDIAC PACEMAKER: ICD-10-CM

## 2019-12-12 DIAGNOSIS — I50.9 CONGESTIVE HEART FAILURE, UNSPECIFIED HF CHRONICITY, UNSPECIFIED HEART FAILURE TYPE: Primary | ICD-10-CM

## 2019-12-12 DIAGNOSIS — E78.5 HYPERLIPIDEMIA, UNSPECIFIED HYPERLIPIDEMIA TYPE: ICD-10-CM

## 2019-12-12 DIAGNOSIS — I77.9 RIGHT-SIDED CAROTID ARTERY DISEASE, UNSPECIFIED TYPE: ICD-10-CM

## 2019-12-12 DIAGNOSIS — Z95.0 CARDIAC PACEMAKER IN SITU: ICD-10-CM

## 2019-12-12 DIAGNOSIS — E03.9 HYPOTHYROIDISM, UNSPECIFIED TYPE: ICD-10-CM

## 2019-12-12 PROCEDURE — 99214 PR OFFICE/OUTPT VISIT, EST, LEVL IV, 30-39 MIN: ICD-10-PCS | Mod: S$GLB,,, | Performed by: NURSE PRACTITIONER

## 2019-12-12 PROCEDURE — 93010 ELECTROCARDIOGRAM REPORT: CPT | Mod: S$GLB,,, | Performed by: INTERNAL MEDICINE

## 2019-12-12 PROCEDURE — 99999 PR PBB SHADOW E&M-EST. PATIENT-LVL III: CPT | Mod: PBBFAC,,, | Performed by: NURSE PRACTITIONER

## 2019-12-12 PROCEDURE — 1101F PT FALLS ASSESS-DOCD LE1/YR: CPT | Mod: CPTII,S$GLB,, | Performed by: NURSE PRACTITIONER

## 2019-12-12 PROCEDURE — 93005 ELECTROCARDIOGRAM TRACING: CPT | Mod: S$GLB,,, | Performed by: INTERNAL MEDICINE

## 2019-12-12 PROCEDURE — 99214 OFFICE O/P EST MOD 30 MIN: CPT | Mod: S$GLB,,, | Performed by: NURSE PRACTITIONER

## 2019-12-12 PROCEDURE — 1159F MED LIST DOCD IN RCRD: CPT | Mod: S$GLB,,, | Performed by: NURSE PRACTITIONER

## 2019-12-12 PROCEDURE — 1101F PR PT FALLS ASSESS DOC 0-1 FALLS W/OUT INJ PAST YR: ICD-10-PCS | Mod: CPTII,S$GLB,, | Performed by: NURSE PRACTITIONER

## 2019-12-12 PROCEDURE — 93010 RHYTHM STRIP: ICD-10-PCS | Mod: S$GLB,,, | Performed by: INTERNAL MEDICINE

## 2019-12-12 PROCEDURE — 93005 RHYTHM STRIP: ICD-10-PCS | Mod: S$GLB,,, | Performed by: INTERNAL MEDICINE

## 2019-12-12 PROCEDURE — 1126F PR PAIN SEVERITY QUANTIFIED, NO PAIN PRESENT: ICD-10-PCS | Mod: S$GLB,,, | Performed by: NURSE PRACTITIONER

## 2019-12-12 PROCEDURE — 1159F PR MEDICATION LIST DOCUMENTED IN MEDICAL RECORD: ICD-10-PCS | Mod: S$GLB,,, | Performed by: NURSE PRACTITIONER

## 2019-12-12 PROCEDURE — 1126F AMNT PAIN NOTED NONE PRSNT: CPT | Mod: S$GLB,,, | Performed by: NURSE PRACTITIONER

## 2019-12-12 PROCEDURE — 99999 PR PBB SHADOW E&M-EST. PATIENT-LVL III: ICD-10-PCS | Mod: PBBFAC,,, | Performed by: NURSE PRACTITIONER

## 2019-12-12 NOTE — PROGRESS NOTES
Ms. Knobloch is a patient of Dr. Velazquez and was last seen in clinic 10/31/19.    Subjective:   Patient ID:  Dorothy M Knobloch is a 87 y.o. female who presents for follow-up of Atrial fibrillation.  HPI:  Ms. Knobloch is a 87 y.o. female with hypertension, Takotsubo CMP 3/2013, TIA 12/2013, moderate right carotid artery disease, paroxysmal AF, and high-grade AV block with bradycardia s/p dc PPM 2017 at [facility].      Background:  General Cardiologist Dr. North Mcneill  87 year-old female with hypertension, Takotsubo CMP 3/2013, TIA 12/2013, moderate right carotid artery disease, paroxysmal AF, and high-grade AV block with bradycardia s/p dc PPM 2017 at [facility]. She is transferring care to [facility]. She feels well. Device interrogation (9/18/2018) notes normal function. She does have <1% AF burden without high-ventricular rates. She notes occasional brief fluttering sensation. Echo 1/2018 Normal left ventricular systolic function (EF 60-65%).    PPM interrogation: stable parameters, 52%A, 63%V paced. AF<0.8%, longest episode 3 hours.     3/19/19 Patient returned for 6 month follow-up. 75% A paced and 70% RV pacing, 18.8% AF burden, longest episode 27 minutes. V rates controlled. She does not notice any AF anymore.  ECG showed A sensed V paced rhythm at 80 bpm and QRS duration of 170msec.  Dr. Velazquez discussed long-term potential risk of RV pacing induced cardiomyopathy. Will get yearly ECHO.  Continue remote checks every 3 months. RTC in 6 months.     10/3/19 Last office visit no concerns noted, CBC and BMP WNL.  10/9/19 Echo showed EF 65%.     10/28/19 Mrs. Knobloch called to report she has been having a cough and SOB that has worsened over the past week or so. She reports the cough is new, but she has been having the SOB x 2 weeks.  She reports she has gone from sleeping with one pillow to now sleeping with 3 and that she thinks this may be due to heart failure. She did not want to go to the ED.  Remote device  transmission showed patient is having a lot more AF than when she saw Dr. Velazquez last in clinic. His OV note on 10/3/19 states her longest episode was ~ 3 hours and her AF burden was 18.8%. 10/28/19 transmission showed her longest event being 18 hours and her AF burden is now 59.3%. Per Dr. Velazquez, patient can be seen in our clinic and discuss starting amiodarone. Although, he was never convinced her AF caused any true symptoms. Will also plan to contact Dr. Mcneill's staff to address possible fluid overload.   Recent ECHO noted a normal LVEF.      Update (10/31/2019):  Ms. Knobloch presents today with c/o SOB/SMITH x 2 weeks and a dry cough that started a few days ago. She experiences orthopnea (sleeping on three pillows lately) and bilateral LE edema. She denies chest pain with exertion or at rest, palpitations, light headedness, dizziness, or syncope. She denies any symptoms c/w AF, states she no longer feels her AF. She states she does not feel ill enough to go to the ED today. In clinic pulse ox showed O2 sat 98%.   Last Echo (10/9/19) showed EF 65%, PA systolic pressure 43.      She is currently taking hydrochlorothiazide 12.5 mg daily, eliquis 5 mg BID for stroke prophylaxis-denies significant bleeding episodes, and metoprolol 50 mg daily for HR control. Kidney function is stable, with a creatinine of 0.7 on 10/3/19.     10/28/19 device transmission showed AF burden is now 59.3% longest episode being 18 hours (up from 18.8% burden with longest episode 3 hours). A-pacing 35.3% V-pacing 98.9%. V rates controlled. She does not notice any AF symptoms.   Case and plans discussed with Dr. Velazquez. We will have patient see Dr. Mcneill tomorrow for diuresis and have her follow up with us in 4-6 weeks to assess for symptom improvement. If patient is still symptomatic after diuresis, may consider amiodarone initiation for AF. I offered patient lasix PO until she is able to see Dr. Mcneill, she agreed.    Update  (12/13/2019):  Ms. Knobloch presents for follow up of AF and CHF exacerbation. She was seen by Dr. Mcneill after our last visit and began diuresis. He initiated bumex 0.5 mg BID and Ms. Knobloch states she has improved tremendously since beginning this medication. She denies chest pain with exertion or at rest, palpitations, SOB, dizziness, or syncope. She still will experience SMITH, but only with long distances. She states she does not feel her AF.    Dr. Mcneill obtain chest X-ray and CT which showed right lobe nodule. He is following this up.    She is currently taking hydrochlorothiazide 12.5 mg daily, eliquis 5 mg BID for stroke prophylaxis-denies significant bleeding episodes, metoprolol 50 mg daily for HR control, and bumex 0.5 mg BID. Kidney function is stable, with a creatinine of 0.7 on 11/1/19.     Device Interrogation (12/12/2019) reveals an intrinsic atrial fibrillation with stable lead and device function. Episode of AF in progress since 11/1/19. Overall burden now 100%. More persistent AF. She paces 73% in the RA and 76% in the RV pacing. V rates controlled. 5.5 years left on battery. (of note she has high grade AV block).    I suspect her former symptoms were due to fluid overload from CHF exacerbation and not AF. She feels much better now after to diuresis. She is asymptomatic from an AF standpoint.  Case discussed with Dr. Velazquez who agrees. We will make no changes for now and stay the course.     I have personally reviewed the patient's EKG today, which shows V-paced rhythm at 75 bpm.     Recent Cardiac Tests:  2D Echo (10/9/2019):  · Mild left atrial enlargement.  · Normal left ventricular systolic function. The estimated ejection fraction is 65%  · Mild right atrial enlargement.  · Normal right ventricular systolic function.  · Mild aortic valve stenosis.  · Aortic valve area is 1.41 cm2; peak velocity is 1.75 m/s; mean gradient is 7 mmHg.  · Severe mitral annular calcification. The mean  diastolic gradient across the mitral valve is 3 mmHg at a heart rate of 74 bpm.  · Mild mitral regurgitation.  · Mild tricuspid regurgitation.  · Pulmonary hypertension present.  · The estimated PA systolic pressure is 43 mm Hg  · Intermediate central venous pressure (8 mm Hg).  Past Medical History:   Diagnosis Date    Anticoagulant long-term use     Arthritis     Bilateral nonexudative age-related macular degeneration 8/27/2013    Blood transfusion     Cataract     CHF (congestive heart failure)     Enlarged heart     High cholesterol     Hypertension     Sick sinus syndrome     Stroke     Thyroid disease      Past Surgical History:   Procedure Laterality Date    CARDIAC PACEMAKER PLACEMENT      CARPAL TUNNEL RELEASE Bilateral 1991    CATARACT EXTRACTION Right     JOINT REPLACEMENT      MA TRANSCRAN DOPP INTRACRAN, EMBOLI W/O INJ  1/29/2018         SKIN BIOPSY      TONSILLECTOMY      TOTAL HIP ARTHROPLASTY  11-1-2000    right/[facility]       Current Outpatient Medications   Medication Sig    amLODIPine (NORVASC) 5 MG tablet TAKE 1 TABLET BY MOUTH EVERY DAY    AZOPT 1 % ophthalmic suspension PLACE 1 DROP INTO THE LEFT EYE 2 (TWO) TIMES DAILY.    benzonatate (TESSALON) 100 MG capsule Take 1 capsule (100 mg total) by mouth 3 (three) times daily as needed for Cough.    bumetanide (BUMEX) 0.5 MG Tab Take 1 tablet (0.5 mg total) by mouth 2 (two) times daily.    cholecalciferol, vitamin D3, 50,000 unit capsule Take 50,000 Units by mouth every 7 days.    COMBIGAN 0.2-0.5 % Drop INSTILL 1 DROP INTO LEFT EYE TWICE A DAY    ELIQUIS 5 mg Tab TAKE 1 TABLET BY MOUTH TWICE A DAY    hydroCHLOROthiazide (HYDRODIURIL) 12.5 MG Tab Take 1 tablet (12.5 mg total) by mouth once daily.    levothyroxine (SYNTHROID) 88 MCG tablet Take 88 mcg by mouth before breakfast.     metoprolol succinate (TOPROL-XL) 25 MG 24 hr tablet TAKE 2 TABLETS (50 MG TOTAL) BY MOUTH ONCE DAILY.    NITROSTAT 0.4 mg SL tablet  "    PRALUENT PEN 75 mg/mL PnIj     clopidogrel (PLAVIX) 75 mg tablet Take 1 tablet (75 mg total) by mouth once daily.     No current facility-administered medications for this visit.        Review of Systems   Constitution: Negative for chills, fever and malaise/fatigue.   HENT: Negative for congestion and nosebleeds.    Eyes: Negative for blurred vision.   Cardiovascular: Positive for dyspnea on exertion. Negative for chest pain, irregular heartbeat, leg swelling, near-syncope, orthopnea, palpitations, paroxysmal nocturnal dyspnea and syncope.   Respiratory: Negative for cough, hemoptysis, shortness of breath, sleep disturbances due to breathing and wheezing.    Endocrine: Negative for polyphagia.   Hematologic/Lymphatic: Negative for bleeding problem. Does not bruise/bleed easily.   Skin: Negative for itching and rash.   Musculoskeletal: Negative for back pain, joint pain, muscle cramps and muscle weakness.   Gastrointestinal: Negative for bloating, abdominal pain and hematemesis.   Genitourinary: Negative for hematuria.   Neurological: Negative for dizziness, focal weakness, headaches, light-headedness, loss of balance, numbness, paresthesias and weakness.   Psychiatric/Behavioral: Negative for altered mental status. The patient is not nervous/anxious.        Objective:     /60   Pulse 75   Ht 5' 3" (1.6 m)   Wt 66.9 kg (147 lb 7.8 oz)   BMI 26.13 kg/m²     Physical Exam   Constitutional: She is oriented to person, place, and time. She appears well-developed and well-nourished. She does not appear ill. No distress.   HENT:   Head: Normocephalic and atraumatic.   Eyes: Pupils are equal, round, and reactive to light. Conjunctivae, EOM and lids are normal.   Neck: Normal range of motion. Neck supple.   Cardiovascular: Normal rate, intact distal pulses and normal pulses. An irregularly irregular rhythm present. PMI is not displaced.   Pulses:       Radial pulses are 2+ on the right side, and 2+ on the " left side.   Pulmonary/Chest: Effort normal and breath sounds normal. No accessory muscle usage. No respiratory distress. She has no decreased breath sounds. She has no wheezes. She has no rhonchi. She has no rales. She exhibits no tenderness.   Device site in good condition.   Abdominal: Soft. Bowel sounds are normal. She exhibits no distension. There is no tenderness. There is no guarding.   Musculoskeletal: Normal range of motion. She exhibits no edema.   Neurological: She is alert and oriented to person, place, and time. She has normal strength. She is not disoriented. No sensory deficit. Coordination and gait normal.   Skin: Skin is warm and dry. No bruising noted. She is not diaphoretic. No erythema.   Psychiatric: She has a normal mood and affect. Her speech is normal and behavior is normal. Judgment and thought content normal.   Nursing note and vitals reviewed.    Lab Results   Component Value Date     (L) 11/01/2019    K 3.9 11/01/2019    MG 2.0 01/29/2018    BUN 14 11/01/2019    CREATININE 0.7 11/01/2019    ALT 13 11/01/2019    AST 19 11/01/2019    HGB 12.6 11/01/2019    HCT 37.2 11/01/2019    TSH 0.165 (L) 01/28/2018    LDLCALC 180.6 (H) 01/28/2018       Recent Labs   Lab 01/28/18  1317 01/29/18  0526   INR 0.9 1.0         Assessment:     1. Congestive heart failure, unspecified HF chronicity, unspecified heart failure type    2. AV block, 3rd degree    3. Paroxysmal atrial fibrillation    4. Cardiac pacemaker    5. Right-sided carotid artery disease, unspecified type    6. Hypothyroidism, unspecified type    7. Hyperlipidemia, unspecified hyperlipidemia type    8. Essential hypertension    9. TIA (transient ischemic attack)      Plan:   In summary, Ms. Knobloch is a 87 y.o. female with a history of hypertension, Takotsubo CMP 3/2013, TIA 12/2013, moderate right carotid artery disease, paroxysmal AF, and high-grade AV block with bradycardia s/p dc PPM 2017 at [facility]. At our last visit, Ms. Knobloch  presents with c/o SOB/SMITH x 2 weeks and a dry cough that started a few days ago. She was experiencing orthopnea (sleeping on three pillows lately) and bilateral LE edema. She denied any symptoms c/w AF, stated she no longer feels her AF. She is well rate controlled. In clinic pulse ox showed O2 sat 98%. Last Echo (10/9/19) showed EF 65%, PA systolic pressure 43.   10/28/19 device transmission showed AF burden was 59.3% longest episode being 18 hours (up from 18.8% burden with longest episode 3 hours). A-pacing 35.3% V-pacing 98.9%. V rates controlled.   Scheduled patient to see Dr. Mcneill for diuresis and have her follow up with us in 4-6 weeks to assess for symptom improvement. If patient is still symptomatic after diuresis, may consider amiodarone initiation for AF.     Today, Ms. Knobloch presents for follow up of AF and CHF exacerbation. She was seen by Dr. Mcneill after our last visit and began diuresis. He initiated bumex 0.5 mg BID and Ms. Knobloch states she has improved tremendously since beginning this medication. She still will experience SMITH, but only with long distances. She states she does not feel her AF.    Dr. Mcneill obtain chest X-ray and CT which showed right lobe nodule. He is following this up.    She is currently taking hydrochlorothiazide 12.5 mg daily, eliquis 5 mg BID for stroke prophylaxis-denies significant bleeding episodes, metoprolol 50 mg daily for HR control, and bumex 0.5 mg BID. Kidney function is stable, with a creatinine of 0.7 on 11/1/19.     Device Interrogation (12/12/2019) reveals an intrinsic atrial fibrillation with stable lead and device function. Episode of AF in progress since 11/1/19. Overall burden now 100%. More persistent AF. She paces 73% in the RA and 76% in the RV pacing. V rates controlled. 5.5 years left on battery (of note she has high grade AV block).    I suspect her former symptoms were due to fluid overload from CHF exacerbation and not AF. She feels much  better now after to diuresis. She is asymptomatic from an AF standpoint.  Case discussed with Dr. Velazquez who agrees. We will make no changes for now and stay the course.     Follow up in about 6 months (around 6/12/2020).  *Case discussed with Dr. Velazquez and a copy of this note has been sent to Dr. Velazquez*  ------------------------------------------------------------------    SHE Mora, ARTP-C  [facility]

## 2020-01-06 ENCOUNTER — OFFICE VISIT (OUTPATIENT)
Dept: OPHTHALMOLOGY | Facility: CLINIC | Age: 85
End: 2020-01-06
Payer: MEDICARE

## 2020-01-06 DIAGNOSIS — Z96.1 STATUS POST CATARACT EXTRACTION AND INSERTION OF INTRAOCULAR LENS OF RIGHT EYE: ICD-10-CM

## 2020-01-06 DIAGNOSIS — Z98.41 STATUS POST CATARACT EXTRACTION AND INSERTION OF INTRAOCULAR LENS OF RIGHT EYE: ICD-10-CM

## 2020-01-06 DIAGNOSIS — H25.12 NUCLEAR SCLEROTIC CATARACT OF LEFT EYE: Primary | ICD-10-CM

## 2020-01-06 DIAGNOSIS — H54.10 BLINDNESS AND LOW VISION: ICD-10-CM

## 2020-01-06 DIAGNOSIS — H35.372 EPIRETINAL MEMBRANE, LEFT EYE: ICD-10-CM

## 2020-01-06 DIAGNOSIS — H35.3134 ADVANCED ATROPHIC NONEXUDATIVE AGE-RELATED MACULAR DEGENERATION OF BOTH EYES WITH SUBFOVEAL INVOLVEMENT: ICD-10-CM

## 2020-01-06 DIAGNOSIS — H04.123 DRY EYE SYNDROME OF BOTH EYES: ICD-10-CM

## 2020-01-06 DIAGNOSIS — H34.12 CRAO (CENTRAL RETINAL ARTERY OCCLUSION), LEFT: ICD-10-CM

## 2020-01-06 PROCEDURE — 99999 PR PBB SHADOW E&M-EST. PATIENT-LVL III: ICD-10-PCS | Mod: PBBFAC,,, | Performed by: OPHTHALMOLOGY

## 2020-01-06 PROCEDURE — 92014 PR EYE EXAM, EST PATIENT,COMPREHESV: ICD-10-PCS | Mod: S$GLB,,, | Performed by: OPHTHALMOLOGY

## 2020-01-06 PROCEDURE — 99999 PR PBB SHADOW E&M-EST. PATIENT-LVL III: CPT | Mod: PBBFAC,,, | Performed by: OPHTHALMOLOGY

## 2020-01-06 PROCEDURE — 92014 COMPRE OPH EXAM EST PT 1/>: CPT | Mod: S$GLB,,, | Performed by: OPHTHALMOLOGY

## 2020-01-06 NOTE — PROGRESS NOTES
Assessment /Plan     For exam results, see Encounter Report.    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 Cole::  CRAO OS  Was admitted for stroke work up     ==> + NVI & NVD OS  3/27/2018  Dr Cole  IO meds / PRP OS    Left eye  Combigan BID  Azopt BID  Diamox 250 BID --> Intolerant      PCIOL OD  3 piece in slight decentration   OD SP YAG Cap 3/27/2018    NS OS  Observe      PVD OU    Dry AMD OU -   AREDS/AG  [facility] Chart Routine c Q & A    ERM OS    Dry Eye Syndrome: discussed use of warm compresses, preserved & non-preserved artificial tears, gel and PM ointment options.  Also discussed options utilizing medications.        RD precautions:  Discussed with patient symptoms of RD with increased flashes, floaters, decreasing vision.  Patient/Family to call and return immediately to clinic should the symptoms of RD occur.  patient voice good understanding.      Plan  RTC Dr Cole as scheduled  Tameka 6 month with  --> IOP  RTC sooner prn with good understanding

## 2020-01-21 ENCOUNTER — OFFICE VISIT (OUTPATIENT)
Dept: CARDIOLOGY | Facility: CLINIC | Age: 85
End: 2020-01-21
Payer: MEDICARE

## 2020-01-21 VITALS
DIASTOLIC BLOOD PRESSURE: 50 MMHG | HEART RATE: 81 BPM | WEIGHT: 149.56 LBS | SYSTOLIC BLOOD PRESSURE: 112 MMHG | BODY MASS INDEX: 26.5 KG/M2 | HEIGHT: 63 IN | OXYGEN SATURATION: 97 %

## 2020-01-21 DIAGNOSIS — I48.0 PAROXYSMAL ATRIAL FIBRILLATION: ICD-10-CM

## 2020-01-21 DIAGNOSIS — E03.9 HYPOTHYROIDISM, UNSPECIFIED TYPE: ICD-10-CM

## 2020-01-21 DIAGNOSIS — D32.9 MENINGIOMA: ICD-10-CM

## 2020-01-21 DIAGNOSIS — I50.9 CONGESTIVE HEART FAILURE, UNSPECIFIED HF CHRONICITY, UNSPECIFIED HEART FAILURE TYPE: Primary | ICD-10-CM

## 2020-01-21 DIAGNOSIS — Z95.0 CARDIAC PACEMAKER: ICD-10-CM

## 2020-01-21 DIAGNOSIS — I44.2 AV BLOCK, 3RD DEGREE: ICD-10-CM

## 2020-01-21 DIAGNOSIS — I51.81 TAKOTSUBO CARDIOMYOPATHY: ICD-10-CM

## 2020-01-21 DIAGNOSIS — I50.9 CHF (CONGESTIVE HEART FAILURE): ICD-10-CM

## 2020-01-21 DIAGNOSIS — G45.9 TIA (TRANSIENT ISCHEMIC ATTACK): ICD-10-CM

## 2020-01-21 DIAGNOSIS — I24.9 ACUTE CORONARY SYNDROME: ICD-10-CM

## 2020-01-21 DIAGNOSIS — R06.02 SOB (SHORTNESS OF BREATH): ICD-10-CM

## 2020-01-21 DIAGNOSIS — H43.813 POSTERIOR VITREOUS DETACHMENT, BOTH EYES: ICD-10-CM

## 2020-01-21 DIAGNOSIS — I77.9 RIGHT-SIDED CAROTID ARTERY DISEASE, UNSPECIFIED TYPE: ICD-10-CM

## 2020-01-21 DIAGNOSIS — I65.8 OCCLUSION AND STENOSIS OF MULTIPLE AND BILATERAL PRECEREBRAL ARTERIES: ICD-10-CM

## 2020-01-21 DIAGNOSIS — I65.23 BILATERAL CAROTID ARTERY STENOSIS: ICD-10-CM

## 2020-01-21 DIAGNOSIS — H35.3134 ADVANCED ATROPHIC NONEXUDATIVE AGE-RELATED MACULAR DEGENERATION OF BOTH EYES WITH SUBFOVEAL INVOLVEMENT: ICD-10-CM

## 2020-01-21 DIAGNOSIS — H25.12 NUCLEAR SCLEROTIC CATARACT OF LEFT EYE: ICD-10-CM

## 2020-01-21 DIAGNOSIS — H34.12 CRAO (CENTRAL RETINAL ARTERY OCCLUSION), LEFT: ICD-10-CM

## 2020-01-21 DIAGNOSIS — I51.89 DIASTOLIC DYSFUNCTION: ICD-10-CM

## 2020-01-21 DIAGNOSIS — E78.5 HYPERLIPIDEMIA, UNSPECIFIED HYPERLIPIDEMIA TYPE: ICD-10-CM

## 2020-01-21 DIAGNOSIS — I10 ESSENTIAL HYPERTENSION: ICD-10-CM

## 2020-01-21 PROCEDURE — 93005 EKG 12-LEAD: ICD-10-PCS | Mod: S$GLB,,, | Performed by: INTERNAL MEDICINE

## 2020-01-21 PROCEDURE — 1101F PR PT FALLS ASSESS DOC 0-1 FALLS W/OUT INJ PAST YR: ICD-10-PCS | Mod: CPTII,S$GLB,, | Performed by: INTERNAL MEDICINE

## 2020-01-21 PROCEDURE — 1159F PR MEDICATION LIST DOCUMENTED IN MEDICAL RECORD: ICD-10-PCS | Mod: S$GLB,,, | Performed by: INTERNAL MEDICINE

## 2020-01-21 PROCEDURE — 99214 PR OFFICE/OUTPT VISIT, EST, LEVL IV, 30-39 MIN: ICD-10-PCS | Mod: S$GLB,,, | Performed by: INTERNAL MEDICINE

## 2020-01-21 PROCEDURE — 1159F MED LIST DOCD IN RCRD: CPT | Mod: S$GLB,,, | Performed by: INTERNAL MEDICINE

## 2020-01-21 PROCEDURE — 1101F PT FALLS ASSESS-DOCD LE1/YR: CPT | Mod: CPTII,S$GLB,, | Performed by: INTERNAL MEDICINE

## 2020-01-21 PROCEDURE — 99999 PR PBB SHADOW E&M-EST. PATIENT-LVL III: CPT | Mod: PBBFAC,,, | Performed by: INTERNAL MEDICINE

## 2020-01-21 PROCEDURE — 93005 ELECTROCARDIOGRAM TRACING: CPT | Mod: S$GLB,,, | Performed by: INTERNAL MEDICINE

## 2020-01-21 PROCEDURE — 99999 PR PBB SHADOW E&M-EST. PATIENT-LVL III: ICD-10-PCS | Mod: PBBFAC,,, | Performed by: INTERNAL MEDICINE

## 2020-01-21 PROCEDURE — 1126F AMNT PAIN NOTED NONE PRSNT: CPT | Mod: S$GLB,,, | Performed by: INTERNAL MEDICINE

## 2020-01-21 PROCEDURE — 99214 OFFICE O/P EST MOD 30 MIN: CPT | Mod: S$GLB,,, | Performed by: INTERNAL MEDICINE

## 2020-01-21 PROCEDURE — 93010 ELECTROCARDIOGRAM REPORT: CPT | Mod: S$GLB,,, | Performed by: INTERNAL MEDICINE

## 2020-01-21 PROCEDURE — 1126F PR PAIN SEVERITY QUANTIFIED, NO PAIN PRESENT: ICD-10-PCS | Mod: S$GLB,,, | Performed by: INTERNAL MEDICINE

## 2020-01-21 PROCEDURE — 93010 EKG 12-LEAD: ICD-10-PCS | Mod: S$GLB,,, | Performed by: INTERNAL MEDICINE

## 2020-01-21 RX ORDER — ALBUTEROL SULFATE 90 UG/1
2 AEROSOL, METERED RESPIRATORY (INHALATION) EVERY 6 HOURS PRN
Qty: 1 INHALER | Refills: 0 | Status: SHIPPED | OUTPATIENT
Start: 2020-01-21 | End: 2020-07-27

## 2020-01-21 NOTE — PROGRESS NOTES
"Subjective:    Patient ID:  Dorothy M Knobloch is a 87 y.o. female who presents for follow-up of Congestive Heart Failure      HPI    86 y/o female with a hx of SSS s/p PPM, Afib currently on AC with Eliquis and follows with Dr Velazquez, Takutsubo Cardiomyopathy 3/2013, HTN, moderate bilateral carotid disease, TIA 12/2013, HLD, hypothyroidism, 2DE with vegetation on the MV (dental procedure?), visual deficit and diagnosed with left central retinal artery occlusion.Last 2DE with normal function. Elevated lipid panel intolerant to statins, so started on Praluent, no longer taking due to reaction. Had been feeling weak at home, so I stopped doxyzosin, felt better after. BP stable since. Has chronic intermittent SOB.   Has episodic SMITH and fatigue. Bumex increased and slight improvement last episode. CXR with RML opacity. BNP elevated >1000 and other labs remarkable for mild hyponatremia. Again developed an episode of SOB, fatigue, and "just not feeling well." Has palps, unchanged LE edema. Denies CP, orthopnea, PND, syncope. Compliant with meds. Has bilateral LE edema which has improved with compression stockings.     Review of Systems   Constitution: Positive for malaise/fatigue.   HENT: Negative for congestion.    Eyes: Positive for blurred vision.   Cardiovascular: Positive for dyspnea on exertion and leg swelling. Negative for chest pain, claudication, cyanosis, irregular heartbeat, near-syncope, orthopnea, palpitations, paroxysmal nocturnal dyspnea and syncope.   Respiratory: Negative for shortness of breath.    Endocrine: Negative for polyuria.   Hematologic/Lymphatic: Negative for bleeding problem.   Skin: Negative for itching and rash.   Musculoskeletal: Negative for joint swelling, muscle cramps and muscle weakness.   Gastrointestinal: Negative for abdominal pain, hematemesis, hematochezia, melena, nausea and vomiting.   Genitourinary: Negative for dysuria and hematuria.   Neurological: Negative for dizziness, " focal weakness, headaches, light-headedness, loss of balance and weakness.   Psychiatric/Behavioral: Negative for depression. The patient is not nervous/anxious.         Objective:    Physical Exam   Constitutional: She is oriented to person, place, and time. She appears well-developed and well-nourished.   HENT:   Head: Normocephalic and atraumatic.   Neck: Neck supple. No JVD present.   Cardiovascular: Normal rate, regular rhythm and normal heart sounds.   Pulses:       Carotid pulses are 2+ on the right side, and 2+ on the left side.       Radial pulses are 2+ on the right side, and 2+ on the left side.        Femoral pulses are 2+ on the right side, and 2+ on the left side.  Pulmonary/Chest: Effort normal and breath sounds normal.   Abdominal: Soft. Bowel sounds are normal.   Musculoskeletal: She exhibits edema.   Neurological: She is alert and oriented to person, place, and time.   Skin: Skin is warm and dry.   Psychiatric: She has a normal mood and affect. Her behavior is normal. Thought content normal.         Assessment:       1. Congestive heart failure, unspecified HF chronicity, unspecified heart failure type    2. Diastolic dysfunction    3. Essential hypertension    4. Hyperlipidemia, unspecified hyperlipidemia type    5. Bilateral carotid artery stenosis    6. Cardiac pacemaker    7. Right-sided carotid artery disease, unspecified type    8. Acute coronary syndrome    9. AV block, 3rd degree    10. 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 Paroxysmal atrial fibrillation    12. Takotsubo cardiomyopathy    13. TIA (transient ischemic attack)    14. 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 CRAO (central retinal artery occlusion), left    16. Nuclear sclerotic cataract of left eye    17. Posterior vitreous detachment, both eyes    18. Meningioma    19. Hypothyroidism, unspecified type    20. SOB  (shortness of breath)      88 y/o pt with hx and presentation as above. Continues to be SOB, mildly improved. Continues to have good UOP and symptoms may be anxiety related vs asthma. Will prescribe inhaler, continue other meds and close f/u. Discussed the etiology, evaluation, and management of CHF, HTN, HLD, PPM, DELBERT, TIA. Discussed the importance of med compliance, heart healthy diet, and regular exercise.     Plan:       -Albuterol inhaler PRN  -F/u in 2 weeks

## 2020-02-04 ENCOUNTER — OFFICE VISIT (OUTPATIENT)
Dept: CARDIOLOGY | Facility: CLINIC | Age: 85
End: 2020-02-04
Payer: MEDICARE

## 2020-02-04 VITALS
HEART RATE: 82 BPM | OXYGEN SATURATION: 95 % | HEIGHT: 63 IN | WEIGHT: 145.94 LBS | BODY MASS INDEX: 25.86 KG/M2 | DIASTOLIC BLOOD PRESSURE: 60 MMHG | SYSTOLIC BLOOD PRESSURE: 132 MMHG

## 2020-02-04 DIAGNOSIS — G45.9 TIA (TRANSIENT ISCHEMIC ATTACK): ICD-10-CM

## 2020-02-04 DIAGNOSIS — Z95.0 CARDIAC PACEMAKER: ICD-10-CM

## 2020-02-04 DIAGNOSIS — I77.9 RIGHT-SIDED CAROTID ARTERY DISEASE, UNSPECIFIED TYPE: ICD-10-CM

## 2020-02-04 DIAGNOSIS — H34.12 CRAO (CENTRAL RETINAL ARTERY OCCLUSION), LEFT: ICD-10-CM

## 2020-02-04 DIAGNOSIS — R06.02 SOB (SHORTNESS OF BREATH): ICD-10-CM

## 2020-02-04 DIAGNOSIS — I44.2 AV BLOCK, 3RD DEGREE: ICD-10-CM

## 2020-02-04 DIAGNOSIS — Z96.1 STATUS POST CATARACT EXTRACTION AND INSERTION OF INTRAOCULAR LENS OF RIGHT EYE: ICD-10-CM

## 2020-02-04 DIAGNOSIS — I51.81 TAKOTSUBO CARDIOMYOPATHY: ICD-10-CM

## 2020-02-04 DIAGNOSIS — I24.9 ACUTE CORONARY SYNDROME: ICD-10-CM

## 2020-02-04 DIAGNOSIS — I65.23 BILATERAL CAROTID ARTERY STENOSIS: ICD-10-CM

## 2020-02-04 DIAGNOSIS — D32.9 MENINGIOMA: ICD-10-CM

## 2020-02-04 DIAGNOSIS — E03.9 HYPOTHYROIDISM, UNSPECIFIED TYPE: ICD-10-CM

## 2020-02-04 DIAGNOSIS — I51.89 DIASTOLIC DYSFUNCTION: ICD-10-CM

## 2020-02-04 DIAGNOSIS — I10 ESSENTIAL HYPERTENSION: ICD-10-CM

## 2020-02-04 DIAGNOSIS — E78.5 HYPERLIPIDEMIA, UNSPECIFIED HYPERLIPIDEMIA TYPE: ICD-10-CM

## 2020-02-04 DIAGNOSIS — I48.0 PAROXYSMAL ATRIAL FIBRILLATION: ICD-10-CM

## 2020-02-04 DIAGNOSIS — I50.9 CONGESTIVE HEART FAILURE, UNSPECIFIED HF CHRONICITY, UNSPECIFIED HEART FAILURE TYPE: Primary | ICD-10-CM

## 2020-02-04 DIAGNOSIS — Z98.41 STATUS POST CATARACT EXTRACTION AND INSERTION OF INTRAOCULAR LENS OF RIGHT EYE: ICD-10-CM

## 2020-02-04 DIAGNOSIS — I65.8 OCCLUSION AND STENOSIS OF MULTIPLE AND BILATERAL PRECEREBRAL ARTERIES: ICD-10-CM

## 2020-02-04 PROCEDURE — 1159F PR MEDICATION LIST DOCUMENTED IN MEDICAL RECORD: ICD-10-PCS | Mod: S$GLB,,, | Performed by: INTERNAL MEDICINE

## 2020-02-04 PROCEDURE — 99214 OFFICE O/P EST MOD 30 MIN: CPT | Mod: S$GLB,,, | Performed by: INTERNAL MEDICINE

## 2020-02-04 PROCEDURE — 99214 PR OFFICE/OUTPT VISIT, EST, LEVL IV, 30-39 MIN: ICD-10-PCS | Mod: S$GLB,,, | Performed by: INTERNAL MEDICINE

## 2020-02-04 PROCEDURE — 99999 PR PBB SHADOW E&M-EST. PATIENT-LVL III: CPT | Mod: PBBFAC,,, | Performed by: INTERNAL MEDICINE

## 2020-02-04 PROCEDURE — 1101F PR PT FALLS ASSESS DOC 0-1 FALLS W/OUT INJ PAST YR: ICD-10-PCS | Mod: CPTII,S$GLB,, | Performed by: INTERNAL MEDICINE

## 2020-02-04 PROCEDURE — 1126F AMNT PAIN NOTED NONE PRSNT: CPT | Mod: S$GLB,,, | Performed by: INTERNAL MEDICINE

## 2020-02-04 PROCEDURE — 1159F MED LIST DOCD IN RCRD: CPT | Mod: S$GLB,,, | Performed by: INTERNAL MEDICINE

## 2020-02-04 PROCEDURE — 1126F PR PAIN SEVERITY QUANTIFIED, NO PAIN PRESENT: ICD-10-PCS | Mod: S$GLB,,, | Performed by: INTERNAL MEDICINE

## 2020-02-04 PROCEDURE — 99999 PR PBB SHADOW E&M-EST. PATIENT-LVL III: ICD-10-PCS | Mod: PBBFAC,,, | Performed by: INTERNAL MEDICINE

## 2020-02-04 PROCEDURE — 1101F PT FALLS ASSESS-DOCD LE1/YR: CPT | Mod: CPTII,S$GLB,, | Performed by: INTERNAL MEDICINE

## 2020-02-04 NOTE — PROGRESS NOTES
"Subjective:    Patient ID:  Dorothy M Knobloch is a 87 y.o. female who presents for follow-up of Congestive Heart Failure      HPI    86 y/o female with a hx of SSS s/p PPM, Afib currently on AC with Eliquis and follows with Dr Velazquez, Takutsubo Cardiomyopathy 3/2013, HTN, moderate bilateral carotid disease, TIA 12/2013, HLD, hypothyroidism, 2DE with vegetation on the MV (dental procedure?), visual deficit and diagnosed with left central retinal artery occlusion.Last 2DE with normal function. Elevated lipid panel intolerant to statins, so started on Praluent, no longer taking due to reaction. Had been feeling weak at home, so I stopped doxyzosin, felt better after. BP stable since. Has chronic intermittent SOB.   Has episodic SMITH and fatigue. Bumex increased and slight improvement last episode. Previously, CXR with RML opacity, BNP elevated >1000 and other labs remarkable for mild hyponatremia. Again developed an episode of SOB, fatigue, and "just not feeling well."   Prescribed inhaler and improved since last visit.   Has now infrequent palps, unchanged LE edema. Denies CP, orthopnea, PND, syncope. Compliant with meds. Has bilateral LE edema which has improved with compression stockings.     Review of Systems   Constitution: Positive for malaise/fatigue.   HENT: Negative for congestion.    Eyes: Negative for blurred vision.   Cardiovascular: Positive for dyspnea on exertion, leg swelling and palpitations. Negative for chest pain, claudication, cyanosis, irregular heartbeat, near-syncope, orthopnea, paroxysmal nocturnal dyspnea and syncope.   Respiratory: Positive for shortness of breath.    Endocrine: Negative for polyuria.   Hematologic/Lymphatic: Negative for bleeding problem.   Skin: Negative for itching and rash.   Musculoskeletal: Positive for back pain, joint pain, joint swelling and muscle cramps. Negative for muscle weakness.   Gastrointestinal: Negative for abdominal pain, hematemesis, hematochezia, " melena, nausea and vomiting.   Genitourinary: Negative for dysuria and hematuria.   Neurological: Positive for dizziness and weakness. Negative for focal weakness, headaches, light-headedness and loss of balance.   Psychiatric/Behavioral: Negative for depression. The patient is not nervous/anxious.         Objective:    Physical Exam   Constitutional: She is oriented to person, place, and time. She appears well-developed and well-nourished.   HENT:   Head: Normocephalic and atraumatic.   Neck: Neck supple. No JVD present.   Cardiovascular: Normal rate, regular rhythm and normal heart sounds.   Pulses:       Carotid pulses are 2+ on the right side, and 2+ on the left side.       Radial pulses are 2+ on the right side, and 2+ on the left side.        Femoral pulses are 2+ on the right side, and 2+ on the left side.  Pulmonary/Chest: Effort normal and breath sounds normal.   Abdominal: Soft. Bowel sounds are normal.   Musculoskeletal: She exhibits no edema.   Neurological: She is alert and oriented to person, place, and time.   Skin: Skin is warm and dry.   Psychiatric: She has a normal mood and affect. Her behavior is normal. Thought content normal.         Assessment:       1. Congestive heart failure, unspecified HF chronicity, unspecified heart failure type    2. Acute coronary syndrome    3. AV block, 3rd degree    4. Cardiac pacemaker    5. Right-sided carotid artery disease, unspecified type    6. Diastolic dysfunction    7. Essential hypertension    8. Hyperlipidemia, unspecified hyperlipidemia type    9. 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 Paroxysmal atrial fibrillation    11. Takotsubo cardiomyopathy    12. Bilateral carotid artery stenosis    13. Status post cataract extraction and insertion of intraocular lens of right eye    14. TIA (transient ischemic attack)    15. CRAO (central retinal artery occlusion), left    16. Meningioma    17. Hypothyroidism, unspecified  type    18. SOB (shortness of breath)      88 y/o pt with hx and presentation as above. Continues to be SOB, mildly improved. Continues to have good UOP and symptoms may be anxiety related vs asthma. Continue other meds and close f/u. Reassurance given. Discussed the etiology, evaluation, and management of CHF, HTN, HLD, PPM, DELBERT, TIA. Discussed the importance of med compliance, heart healthy diet, and regular exercise.       Plan:       -Continue current medical management  -f/u in 3 months

## 2020-03-10 ENCOUNTER — IMMUNIZATION (OUTPATIENT)
Dept: PHARMACY | Facility: CLINIC | Age: 85
End: 2020-03-10
Payer: MEDICARE

## 2020-03-11 RX ORDER — HYDROCHLOROTHIAZIDE 12.5 MG/1
TABLET ORAL
Qty: 90 TABLET | Refills: 3 | Status: SHIPPED | OUTPATIENT
Start: 2020-03-11 | End: 2021-03-08

## 2020-06-09 RX ORDER — CLOPIDOGREL BISULFATE 75 MG/1
75 TABLET ORAL DAILY
Qty: 90 TABLET | Refills: 3 | Status: SHIPPED | OUTPATIENT
Start: 2020-06-09 | End: 2021-03-31

## 2020-09-11 ENCOUNTER — TELEPHONE (OUTPATIENT)
Dept: ELECTROPHYSIOLOGY | Facility: CLINIC | Age: 85
End: 2020-09-11

## 2020-09-11 ENCOUNTER — TELEPHONE (OUTPATIENT)
Dept: CARDIOLOGY | Facility: CLINIC | Age: 85
End: 2020-09-11

## 2020-09-11 DIAGNOSIS — I49.8 OTHER SPECIFIED CARDIAC ARRHYTHMIAS: Primary | ICD-10-CM

## 2020-09-11 NOTE — TELEPHONE ENCOUNTER
Spoke with Mrs.Knobloch to confirm times of upcoming appt with  . Pt is aware that a EKG & DEVICE appt was scheduled. A appointment letter will be mailed out as a reminder. Pt verbalized understanding.         ----- Message from Rose Hunt MA sent at 9/11/2020 10:40 AM CDT -----  Regarding: FW: Dereje    ----- Message -----  From: Chitra Gan  Sent: 9/11/2020  10:29 AM CDT  To: Marcus WHITTAKER Staff  Subject: Orders                                           Pt is scheduled for 11/24 needs in-clinic device & EKG, if can call Pt with time. Thanks

## 2020-09-11 NOTE — TELEPHONE ENCOUNTER
----- Message from Dana Arreguin sent at 9/11/2020 10:24 AM CDT -----  Contact: patient/  [phone]  Patient returning your call   Please advise

## 2020-09-29 ENCOUNTER — OFFICE VISIT (OUTPATIENT)
Dept: CARDIOLOGY | Facility: CLINIC | Age: 85
End: 2020-09-29
Payer: MEDICARE

## 2020-09-29 VITALS
BODY MASS INDEX: 25.25 KG/M2 | DIASTOLIC BLOOD PRESSURE: 67 MMHG | OXYGEN SATURATION: 97 % | HEART RATE: 72 BPM | WEIGHT: 142.5 LBS | SYSTOLIC BLOOD PRESSURE: 126 MMHG

## 2020-09-29 DIAGNOSIS — I51.89 DIASTOLIC DYSFUNCTION: ICD-10-CM

## 2020-09-29 DIAGNOSIS — Z95.0 CARDIAC PACEMAKER: ICD-10-CM

## 2020-09-29 DIAGNOSIS — G45.9 TIA (TRANSIENT ISCHEMIC ATTACK): ICD-10-CM

## 2020-09-29 DIAGNOSIS — I65.8 OCCLUSION AND STENOSIS OF MULTIPLE AND BILATERAL PRECEREBRAL ARTERIES: ICD-10-CM

## 2020-09-29 DIAGNOSIS — I51.81 TAKOTSUBO CARDIOMYOPATHY: ICD-10-CM

## 2020-09-29 DIAGNOSIS — E78.5 HYPERLIPIDEMIA, UNSPECIFIED HYPERLIPIDEMIA TYPE: ICD-10-CM

## 2020-09-29 DIAGNOSIS — R06.02 SOB (SHORTNESS OF BREATH): ICD-10-CM

## 2020-09-29 DIAGNOSIS — I48.0 PAROXYSMAL ATRIAL FIBRILLATION: ICD-10-CM

## 2020-09-29 DIAGNOSIS — I77.9 RIGHT-SIDED CAROTID ARTERY DISEASE, UNSPECIFIED TYPE: ICD-10-CM

## 2020-09-29 DIAGNOSIS — I65.23 BILATERAL CAROTID ARTERY STENOSIS: ICD-10-CM

## 2020-09-29 DIAGNOSIS — I10 ESSENTIAL HYPERTENSION: ICD-10-CM

## 2020-09-29 DIAGNOSIS — I50.9 CONGESTIVE HEART FAILURE, UNSPECIFIED HF CHRONICITY, UNSPECIFIED HEART FAILURE TYPE: Primary | ICD-10-CM

## 2020-09-29 PROCEDURE — 99214 PR OFFICE/OUTPT VISIT, EST, LEVL IV, 30-39 MIN: ICD-10-PCS | Mod: S$GLB,,, | Performed by: INTERNAL MEDICINE

## 2020-09-29 PROCEDURE — 1101F PR PT FALLS ASSESS DOC 0-1 FALLS W/OUT INJ PAST YR: ICD-10-PCS | Mod: CPTII,S$GLB,, | Performed by: INTERNAL MEDICINE

## 2020-09-29 PROCEDURE — 1159F MED LIST DOCD IN RCRD: CPT | Mod: S$GLB,,, | Performed by: INTERNAL MEDICINE

## 2020-09-29 PROCEDURE — 1159F PR MEDICATION LIST DOCUMENTED IN MEDICAL RECORD: ICD-10-PCS | Mod: S$GLB,,, | Performed by: INTERNAL MEDICINE

## 2020-09-29 PROCEDURE — 1126F AMNT PAIN NOTED NONE PRSNT: CPT | Mod: S$GLB,,, | Performed by: INTERNAL MEDICINE

## 2020-09-29 PROCEDURE — 99999 PR PBB SHADOW E&M-EST. PATIENT-LVL III: CPT | Mod: PBBFAC,,, | Performed by: INTERNAL MEDICINE

## 2020-09-29 PROCEDURE — 99999 PR PBB SHADOW E&M-EST. PATIENT-LVL III: ICD-10-PCS | Mod: PBBFAC,,, | Performed by: INTERNAL MEDICINE

## 2020-09-29 PROCEDURE — 1101F PT FALLS ASSESS-DOCD LE1/YR: CPT | Mod: CPTII,S$GLB,, | Performed by: INTERNAL MEDICINE

## 2020-09-29 PROCEDURE — 99214 OFFICE O/P EST MOD 30 MIN: CPT | Mod: S$GLB,,, | Performed by: INTERNAL MEDICINE

## 2020-09-29 PROCEDURE — 1126F PR PAIN SEVERITY QUANTIFIED, NO PAIN PRESENT: ICD-10-PCS | Mod: S$GLB,,, | Performed by: INTERNAL MEDICINE

## 2020-10-15 ENCOUNTER — TELEPHONE (OUTPATIENT)
Dept: OPHTHALMOLOGY | Facility: CLINIC | Age: 85
End: 2020-10-15

## 2020-10-15 NOTE — TELEPHONE ENCOUNTER
----- Message from Echo Hodgson sent at 10/15/2020  3:22 PM CDT -----  Regarding: pt thinks she has pink eye  Contact: Carol  Wants to speak to someone with regard and see what she should do. Please contact her at [phone]

## 2020-10-16 ENCOUNTER — TELEPHONE (OUTPATIENT)
Dept: OPHTHALMOLOGY | Facility: CLINIC | Age: 85
End: 2020-10-16

## 2020-10-16 NOTE — TELEPHONE ENCOUNTER
Pt unable to come to clinic today.   Dr. English advised pt to use aggressive artificial tears and try Lunr282 BID OS   Pt will come to see Dr. English on 10/20/2020 at 3:00.   Pt verbalized understanding.

## 2020-10-16 NOTE — TELEPHONE ENCOUNTER
----- Message from Kolton Godinez sent at 10/16/2020  8:04 AM CDT -----  Regarding: Appt  Contact: Carol Gabriel need to reschedule appt.      [phone]

## 2020-10-20 ENCOUNTER — OFFICE VISIT (OUTPATIENT)
Dept: OPHTHALMOLOGY | Facility: CLINIC | Age: 85
End: 2020-10-20
Payer: MEDICARE

## 2020-10-20 DIAGNOSIS — H54.10 BLINDNESS AND LOW VISION: ICD-10-CM

## 2020-10-20 DIAGNOSIS — Z96.1 STATUS POST CATARACT EXTRACTION AND INSERTION OF INTRAOCULAR LENS OF RIGHT EYE: ICD-10-CM

## 2020-10-20 DIAGNOSIS — Z98.41 STATUS POST CATARACT EXTRACTION AND INSERTION OF INTRAOCULAR LENS OF RIGHT EYE: ICD-10-CM

## 2020-10-20 DIAGNOSIS — H04.123 DRY EYE SYNDROME OF BOTH EYES: ICD-10-CM

## 2020-10-20 DIAGNOSIS — H02.045 SPASTIC ENTROPION OF LEFT LOWER EYELID: Primary | ICD-10-CM

## 2020-10-20 DIAGNOSIS — H25.12 NUCLEAR SCLEROTIC CATARACT OF LEFT EYE: ICD-10-CM

## 2020-10-20 DIAGNOSIS — H34.12 CRAO (CENTRAL RETINAL ARTERY OCCLUSION), LEFT: ICD-10-CM

## 2020-10-20 PROCEDURE — 92012 INTRM OPH EXAM EST PATIENT: CPT | Mod: S$GLB,,, | Performed by: OPHTHALMOLOGY

## 2020-10-20 PROCEDURE — 99999 PR PBB SHADOW E&M-EST. PATIENT-LVL III: CPT | Mod: PBBFAC,,, | Performed by: OPHTHALMOLOGY

## 2020-10-20 PROCEDURE — 99999 PR PBB SHADOW E&M-EST. PATIENT-LVL III: ICD-10-PCS | Mod: PBBFAC,,, | Performed by: OPHTHALMOLOGY

## 2020-10-20 PROCEDURE — 92012 PR EYE EXAM, EST PATIENT,INTERMED: ICD-10-PCS | Mod: S$GLB,,, | Performed by: OPHTHALMOLOGY

## 2020-10-20 NOTE — PROGRESS NOTES
Assessment /Plan     For exam results, see Encounter Report.    Spastic entropion of left lower eyelid    Blindness and low vision    CRAO (central retinal artery occlusion), left    Dry eye syndrome of both eyes    Nuclear sclerotic cataract of left eye    Status post cataract extraction and insertion of intraocular lens of right eye        LLL spastic entropion  Intermittent  No K-abrasion   Lubricants  Tape  Discussed sx options      Dr. Cole::  CRAO OS  Was admitted for stroke work up     ==> + NVI & NVD OS  3/27/2018  Dr Cole  IO meds / PRP OS      Left eye --> not using --> NVG resolved  Combigan BID  Azopt BID  Diamox 250 BID --> Intolerant      PCIOL OD  3 piece in slight decentration   OD SP YAG Cap 3/27/2018    NS OS  Observe      PVD OU    Dry AMD OU -   AREDS/AG  Amsler Chart Routine c Q & A    ERM OS    Dry Eye Syndrome: discussed use of warm compresses, preserved & non-preserved artificial tears, gel and PM ointment options.  Also discussed options utilizing medications.      RD precautions:  Discussed with patient symptoms of RD with increased flashes, floaters, decreasing vision.  Patient/Family to call and return immediately to clinic should the symptoms of RD occur.  patient voice good understanding.      Plan  RTC Dr Cole as scheduled  Tameka 6 month with  --> IOP / DFE --> keep appt  RTC sooner prn with good understanding

## 2020-10-29 NOTE — PROGRESS NOTES
"Subjective:    Patient ID:  Dorothy M Knobloch is a 88 y.o. female who presents for follow-up of Congestive Heart Failure      HPI     89 y/o female with a hx of SSS s/p PPM, Afib currently on AC with Eliquis and follows with Dr Velazquez, Takutsubo Cardiomyopathy 3/2013, HTN, moderate bilateral carotid disease, TIA 12/2013, HLD, hypothyroidism, 2DE with vegetation on the MV (dental procedure?), visual deficit and diagnosed with left central retinal artery occlusion. Last 2DE with normal function. Elevated lipid panel intolerant to statins, so started on Praluent, no longer taking due to reaction. Had been feeling weak at home, so I stopped doxyzosin, felt better after. BP stable since. Has chronic intermittent SOB.   Has episodic SMITH and fatigue. Bumex increased with improvement. Previously, CXR with RML opacity, BNP elevated >1000 and other labs remarkable for mild hyponatremia. Again developed an episode of SOB, fatigue, and "just not feeling well." Prescribed inhaler and improved.  Doing fair lately and lege edema improved. Denies CP, orthopnea, PND, syncope. Compliant with meds. Has bilateral LE edema which has improved with compression stockings. Has caregiver fatigue.    Review of Systems   Constitution: Positive for malaise/fatigue.   HENT: Negative for congestion.    Eyes: Negative for blurred vision.   Cardiovascular: Positive for dyspnea on exertion and leg swelling. Negative for chest pain, claudication, cyanosis, irregular heartbeat, near-syncope, orthopnea, palpitations, paroxysmal nocturnal dyspnea and syncope.   Respiratory: Positive for shortness of breath.    Endocrine: Negative for polyuria.   Hematologic/Lymphatic: Negative for bleeding problem.   Skin: Negative for itching and rash.   Musculoskeletal: Positive for back pain, joint pain and muscle weakness. Negative for joint swelling and muscle cramps.   Gastrointestinal: Negative for abdominal pain, hematemesis, hematochezia, melena, nausea and " vomiting.   Genitourinary: Negative for dysuria and hematuria.   Neurological: Positive for weakness. Negative for dizziness, focal weakness, headaches, light-headedness and loss of balance.   Psychiatric/Behavioral: Negative for depression. The patient is nervous/anxious.         Objective:    Physical Exam   Constitutional: She is oriented to person, place, and time. She appears well-developed and well-nourished.   HENT:   Head: Normocephalic and atraumatic.   Neck: Neck supple. No JVD present.   Cardiovascular: Normal rate, regular rhythm and normal heart sounds.   Pulses:       Carotid pulses are 2+ on the right side and 2+ on the left side.       Radial pulses are 2+ on the right side and 2+ on the left side.        Femoral pulses are 2+ on the right side and 2+ on the left side.  Pulmonary/Chest: Effort normal and breath sounds normal.   Abdominal: Soft. Bowel sounds are normal.   Musculoskeletal:         General: Edema present.   Neurological: She is alert and oriented to person, place, and time.   Skin: Skin is warm and dry.   Psychiatric: She has a normal mood and affect. Her behavior is normal. Thought content normal.         Assessment:       1. Congestive heart failure, unspecified HF chronicity, unspecified heart failure type    2. Diastolic dysfunction    3. Essential hypertension    4. Hyperlipidemia, unspecified hyperlipidemia type    5. 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 Paroxysmal atrial fibrillation    7. Takotsubo cardiomyopathy    8. Right-sided carotid artery disease, unspecified type    9. Cardiac pacemaker    10. Bilateral carotid artery stenosis    11. TIA (transient ischemic attack)    12. SOB (shortness of breath)      89 y/o pt with hx and presentation as above. Continues to be SOB, mildly improved. Continues to have good UOP and symptoms may be anxiety related vs asthma. Continue other meds and close f/u. Reassurance given. Discussed the etiology,  evaluation, and management of CHF, HTN, HLD, PPM, DELBERT, TIA. Discussed the importance of med compliance, heart healthy diet, and regular exercise.     Plan:       -Continue current medical management  -f/u in 6 months

## 2020-11-19 DIAGNOSIS — H40.52X2 NEOVASCULAR GLAUCOMA OF LEFT EYE, MODERATE STAGE: ICD-10-CM

## 2020-11-19 RX ORDER — BRINZOLAMIDE 10 MG/ML
1 SUSPENSION/ DROPS OPHTHALMIC 2 TIMES DAILY
Qty: 10 ML | Refills: 6 | Status: SHIPPED | OUTPATIENT
Start: 2020-11-19 | End: 2021-12-13

## 2020-11-19 NOTE — TELEPHONE ENCOUNTER
----- Message from Kolton Godinez sent at 11/19/2020 12:04 PM CST -----  Regarding: Refill  Contact: Carol  Pt need new Rx for AZOPT 1 % ophthalmic suspension. Pt is almost out completely.      [id number] - [place], [place] - [address] [facility]  [address]  [facility] [address]  Phone: [phone] Fax: [phone]

## 2020-11-23 ENCOUNTER — TELEPHONE (OUTPATIENT)
Dept: CARDIOLOGY | Facility: HOSPITAL | Age: 85
End: 2020-11-23

## 2020-11-23 NOTE — TELEPHONE ENCOUNTER
Patient called to cancel follow up in clinic tomorrow r/t Covid 19 concerns. Advised patient to call back to clinic when ready to reschedule.

## 2020-11-24 ENCOUNTER — TELEPHONE (OUTPATIENT)
Dept: CARDIOLOGY | Facility: HOSPITAL | Age: 85
End: 2020-11-24

## 2020-11-24 NOTE — TELEPHONE ENCOUNTER
Patient called back. I walked her through reconnecting her remote home monitor. We were unable to get a connection from her monitor. Gave her the number to [employer] Get Connected. I asked her to call me back and let me know what they say. She stated understanding.

## 2020-11-24 NOTE — TELEPHONE ENCOUNTER
Called patient to have her reconnect her remote home [employer] monitor. No answer. I left her a voice message to return my call. I left my direct number.

## 2020-12-09 ENCOUNTER — CLINICAL SUPPORT (OUTPATIENT)
Dept: CARDIOLOGY | Facility: HOSPITAL | Age: 85
End: 2020-12-09
Attending: INTERNAL MEDICINE
Payer: MEDICARE

## 2020-12-09 DIAGNOSIS — Z95.0 PRESENCE OF CARDIAC PACEMAKER: ICD-10-CM

## 2020-12-09 PROCEDURE — 93296 REM INTERROG EVL PM/IDS: CPT | Performed by: INTERNAL MEDICINE

## 2020-12-09 PROCEDURE — 93294 CARDIAC DEVICE CHECK - REMOTE: ICD-10-PCS | Mod: ,,, | Performed by: INTERNAL MEDICINE

## 2020-12-09 PROCEDURE — 93294 REM INTERROG EVL PM/LDLS PM: CPT | Mod: ,,, | Performed by: INTERNAL MEDICINE

## 2021-01-13 ENCOUNTER — IMMUNIZATION (OUTPATIENT)
Dept: INTERNAL MEDICINE | Facility: CLINIC | Age: 86
End: 2021-01-13
Payer: MEDICARE

## 2021-01-13 DIAGNOSIS — Z23 NEED FOR VACCINATION: ICD-10-CM

## 2021-01-13 PROCEDURE — 91300 COVID-19, MRNA, LNP-S, PF, 30 MCG/0.3 ML DOSE VACCINE: CPT | Mod: PBBFAC | Performed by: INTERNAL MEDICINE

## 2021-02-03 ENCOUNTER — IMMUNIZATION (OUTPATIENT)
Dept: INTERNAL MEDICINE | Facility: CLINIC | Age: 86
End: 2021-02-03
Payer: MEDICARE

## 2021-02-03 DIAGNOSIS — Z23 NEED FOR VACCINATION: Primary | ICD-10-CM

## 2021-02-03 PROCEDURE — 0002A PR IMMUNIZ ADMIN, SARS-COV-2 COVID-19 VACC, 30MCG/0.3ML, 2ND DOSE: CPT | Mod: PBBFAC | Performed by: INTERNAL MEDICINE

## 2021-02-03 PROCEDURE — 91300 PR SARS-COV- 2 COVID-19 VACCINE, NO PRSV, 30MCG/0.3ML, IM: CPT | Mod: PBBFAC | Performed by: INTERNAL MEDICINE

## 2021-02-03 RX ADMIN — RNA INGREDIENT BNT-162B2 0.3 ML: 0.23 INJECTION, SUSPENSION INTRAMUSCULAR at 10:02

## 2021-07-16 ENCOUNTER — TELEPHONE (OUTPATIENT)
Dept: CARDIOLOGY | Facility: HOSPITAL | Age: 86
End: 2021-07-16

## 2021-07-18 ENCOUNTER — CLINICAL SUPPORT (OUTPATIENT)
Dept: CARDIOLOGY | Facility: HOSPITAL | Age: 86
End: 2021-07-18
Payer: MEDICARE

## 2021-07-18 DIAGNOSIS — Z95.0 PRESENCE OF CARDIAC PACEMAKER: ICD-10-CM

## 2021-07-18 DIAGNOSIS — I44.2 ATRIOVENTRICULAR BLOCK, COMPLETE: ICD-10-CM

## 2021-07-18 PROCEDURE — 93294 REM INTERROG EVL PM/LDLS PM: CPT | Mod: ,,, | Performed by: INTERNAL MEDICINE

## 2021-07-18 PROCEDURE — 93296 REM INTERROG EVL PM/IDS: CPT | Performed by: INTERNAL MEDICINE

## 2021-07-18 PROCEDURE — 93294 CARDIAC DEVICE CHECK - REMOTE: ICD-10-PCS | Mod: ,,, | Performed by: INTERNAL MEDICINE

## 2021-08-09 ENCOUNTER — TELEPHONE (OUTPATIENT)
Dept: CARDIOLOGY | Facility: HOSPITAL | Age: 86
End: 2021-08-09

## 2021-08-11 ENCOUNTER — DOCUMENTATION ONLY (OUTPATIENT)
Dept: CARDIOLOGY | Facility: HOSPITAL | Age: 86
End: 2021-08-11

## 2021-08-20 RX ORDER — AMOXICILLIN 500 MG/1
500 TABLET, FILM COATED ORAL EVERY 12 HOURS
Qty: 10 TABLET | Refills: 0 | Status: SHIPPED | OUTPATIENT
Start: 2021-08-20 | End: 2021-08-25

## 2021-10-20 ENCOUNTER — CLINICAL SUPPORT (OUTPATIENT)
Dept: CARDIOLOGY | Facility: HOSPITAL | Age: 86
End: 2021-10-20
Payer: MEDICARE

## 2021-10-20 DIAGNOSIS — Z95.0 PRESENCE OF CARDIAC PACEMAKER: ICD-10-CM

## 2021-10-20 PROCEDURE — 93294 CARDIAC DEVICE CHECK - REMOTE: ICD-10-PCS | Mod: ,,, | Performed by: INTERNAL MEDICINE

## 2021-10-20 PROCEDURE — 93296 REM INTERROG EVL PM/IDS: CPT | Performed by: INTERNAL MEDICINE

## 2021-10-20 PROCEDURE — 93294 REM INTERROG EVL PM/LDLS PM: CPT | Mod: ,,, | Performed by: INTERNAL MEDICINE

## 2021-11-09 ENCOUNTER — PES CALL (OUTPATIENT)
Dept: ADMINISTRATIVE | Facility: CLINIC | Age: 86
End: 2021-11-09
Payer: MEDICARE

## 2021-11-11 ENCOUNTER — IMMUNIZATION (OUTPATIENT)
Dept: INTERNAL MEDICINE | Facility: CLINIC | Age: 86
End: 2021-11-11
Payer: MEDICARE

## 2021-11-11 DIAGNOSIS — Z23 NEED FOR VACCINATION: Primary | ICD-10-CM

## 2021-11-11 PROCEDURE — 91300 COVID-19, MRNA, LNP-S, PF, 30 MCG/0.3 ML DOSE VACCINE: CPT | Mod: PBBFAC | Performed by: INTERNAL MEDICINE

## 2021-11-11 PROCEDURE — 0003A COVID-19, MRNA, LNP-S, PF, 30 MCG/0.3 ML DOSE VACCINE: CPT | Mod: CV19,PBBFAC | Performed by: INTERNAL MEDICINE

## 2021-12-21 ENCOUNTER — TELEPHONE (OUTPATIENT)
Dept: OPHTHALMOLOGY | Facility: CLINIC | Age: 86
End: 2021-12-21
Payer: MEDICARE

## 2021-12-21 ENCOUNTER — OFFICE VISIT (OUTPATIENT)
Dept: OPHTHALMOLOGY | Facility: CLINIC | Age: 86
End: 2021-12-21
Payer: MEDICARE

## 2021-12-21 DIAGNOSIS — Z13.9 SCREENING FOR UNSPECIFIED CONDITION: Primary | ICD-10-CM

## 2021-12-21 DIAGNOSIS — H34.12 CRAO (CENTRAL RETINAL ARTERY OCCLUSION), LEFT: ICD-10-CM

## 2021-12-21 DIAGNOSIS — H54.10 BLINDNESS AND LOW VISION: ICD-10-CM

## 2021-12-21 DIAGNOSIS — H02.035 SENILE ENTROPION OF LEFT LOWER EYELID: Primary | ICD-10-CM

## 2021-12-21 PROCEDURE — 99214 PR OFFICE/OUTPT VISIT, EST, LEVL IV, 30-39 MIN: ICD-10-PCS | Mod: 25,S$GLB,, | Performed by: OPHTHALMOLOGY

## 2021-12-21 PROCEDURE — 92285 EXTERNAL PHOTOGRAPHY - OU - BOTH EYES: ICD-10-PCS | Mod: S$GLB,,, | Performed by: OPHTHALMOLOGY

## 2021-12-21 PROCEDURE — 68840 EXPLORE/IRRIGATE TEAR DUCTS: CPT | Mod: LT,S$GLB,, | Performed by: OPHTHALMOLOGY

## 2021-12-21 PROCEDURE — 92285 EXTERNAL OCULAR PHOTOGRAPHY: CPT | Mod: S$GLB,,, | Performed by: OPHTHALMOLOGY

## 2021-12-21 PROCEDURE — 99999 PR PBB SHADOW E&M-EST. PATIENT-LVL III: CPT | Mod: PBBFAC,,, | Performed by: OPHTHALMOLOGY

## 2021-12-21 PROCEDURE — 99214 OFFICE O/P EST MOD 30 MIN: CPT | Mod: 25,S$GLB,, | Performed by: OPHTHALMOLOGY

## 2021-12-21 PROCEDURE — 99999 PR PBB SHADOW E&M-EST. PATIENT-LVL III: ICD-10-PCS | Mod: PBBFAC,,, | Performed by: OPHTHALMOLOGY

## 2021-12-21 PROCEDURE — 68840 PR EXPLORE LACRIMAL CANALICULI: ICD-10-PCS | Mod: LT,S$GLB,, | Performed by: OPHTHALMOLOGY

## 2021-12-22 ENCOUNTER — TELEPHONE (OUTPATIENT)
Dept: OPHTHALMOLOGY | Facility: CLINIC | Age: 86
End: 2021-12-22
Payer: MEDICARE

## 2021-12-22 DIAGNOSIS — H04.123 DRY EYE SYNDROME OF BOTH EYES: ICD-10-CM

## 2021-12-22 DIAGNOSIS — H02.035 SENILE ENTROPION OF LEFT LOWER EYELID: Primary | ICD-10-CM

## 2022-01-12 ENCOUNTER — TELEPHONE (OUTPATIENT)
Dept: CARDIOLOGY | Facility: HOSPITAL | Age: 87
End: 2022-01-12
Payer: MEDICARE

## 2022-01-12 NOTE — TELEPHONE ENCOUNTER
Received message that patient voiced concerns about remote device checks to MA earlier this afternoon while on a phone call to confirm upcoming apt. Called patient to get additional information to assist, no answer. Left detailed voicemail with direct contact info for return call to address concerns.

## 2022-01-13 ENCOUNTER — HOSPITAL ENCOUNTER (OUTPATIENT)
Dept: CARDIOLOGY | Facility: CLINIC | Age: 87
Discharge: HOME | End: 2022-01-13
Payer: MEDICARE

## 2022-01-13 ENCOUNTER — CLINICAL SUPPORT (OUTPATIENT)
Dept: CARDIOLOGY | Facility: HOSPITAL | Age: 87
End: 2022-01-13
Attending: INTERNAL MEDICINE
Payer: MEDICARE

## 2022-01-13 ENCOUNTER — OFFICE VISIT (OUTPATIENT)
Dept: ELECTROPHYSIOLOGY | Facility: CLINIC | Age: 87
End: 2022-01-13
Payer: MEDICARE

## 2022-01-13 VITALS
DIASTOLIC BLOOD PRESSURE: 62 MMHG | HEART RATE: 63 BPM | HEIGHT: 62 IN | BODY MASS INDEX: 27.59 KG/M2 | WEIGHT: 149.94 LBS | SYSTOLIC BLOOD PRESSURE: 130 MMHG

## 2022-01-13 DIAGNOSIS — I44.2 AV BLOCK, 3RD DEGREE: Primary | ICD-10-CM

## 2022-01-13 DIAGNOSIS — I49.8 OTHER SPECIFIED CARDIAC ARRHYTHMIAS: ICD-10-CM

## 2022-01-13 DIAGNOSIS — Z95.0 CARDIAC PACEMAKER: ICD-10-CM

## 2022-01-13 DIAGNOSIS — I10 ESSENTIAL HYPERTENSION: ICD-10-CM

## 2022-01-13 DIAGNOSIS — I48.0 PAROXYSMAL ATRIAL FIBRILLATION: ICD-10-CM

## 2022-01-13 PROCEDURE — 1126F PR PAIN SEVERITY QUANTIFIED, NO PAIN PRESENT: ICD-10-PCS | Mod: CPTII,S$GLB,, | Performed by: INTERNAL MEDICINE

## 2022-01-13 PROCEDURE — 1159F PR MEDICATION LIST DOCUMENTED IN MEDICAL RECORD: ICD-10-PCS | Mod: CPTII,S$GLB,, | Performed by: INTERNAL MEDICINE

## 2022-01-13 PROCEDURE — 99214 OFFICE O/P EST MOD 30 MIN: CPT | Mod: S$GLB,,, | Performed by: INTERNAL MEDICINE

## 2022-01-13 PROCEDURE — 1159F MED LIST DOCD IN RCRD: CPT | Mod: CPTII,S$GLB,, | Performed by: INTERNAL MEDICINE

## 2022-01-13 PROCEDURE — 1126F AMNT PAIN NOTED NONE PRSNT: CPT | Mod: CPTII,S$GLB,, | Performed by: INTERNAL MEDICINE

## 2022-01-13 PROCEDURE — 99214 PR OFFICE/OUTPT VISIT, EST, LEVL IV, 30-39 MIN: ICD-10-PCS | Mod: S$GLB,,, | Performed by: INTERNAL MEDICINE

## 2022-01-13 PROCEDURE — 93010 RHYTHM STRIP: ICD-10-PCS | Mod: S$GLB,,, | Performed by: INTERNAL MEDICINE

## 2022-01-13 PROCEDURE — 1160F PR REVIEW ALL MEDS BY PRESCRIBER/CLIN PHARMACIST DOCUMENTED: ICD-10-PCS | Mod: CPTII,S$GLB,, | Performed by: INTERNAL MEDICINE

## 2022-01-13 PROCEDURE — 1160F RVW MEDS BY RX/DR IN RCRD: CPT | Mod: CPTII,S$GLB,, | Performed by: INTERNAL MEDICINE

## 2022-01-13 PROCEDURE — 99999 PR PBB SHADOW E&M-EST. PATIENT-LVL III: CPT | Mod: PBBFAC,,, | Performed by: INTERNAL MEDICINE

## 2022-01-13 PROCEDURE — 3288F PR FALLS RISK ASSESSMENT DOCUMENTED: ICD-10-PCS | Mod: CPTII,S$GLB,, | Performed by: INTERNAL MEDICINE

## 2022-01-13 PROCEDURE — 1101F PR PT FALLS ASSESS DOC 0-1 FALLS W/OUT INJ PAST YR: ICD-10-PCS | Mod: CPTII,S$GLB,, | Performed by: INTERNAL MEDICINE

## 2022-01-13 PROCEDURE — 99999 PR PBB SHADOW E&M-EST. PATIENT-LVL III: ICD-10-PCS | Mod: PBBFAC,,, | Performed by: INTERNAL MEDICINE

## 2022-01-13 PROCEDURE — 93005 ELECTROCARDIOGRAM TRACING: CPT | Mod: S$GLB,,, | Performed by: INTERNAL MEDICINE

## 2022-01-13 PROCEDURE — 93280 CARDIAC DEVICE CHECK - IN CLINIC & HOSPITAL: ICD-10-PCS | Mod: 26,,, | Performed by: INTERNAL MEDICINE

## 2022-01-13 PROCEDURE — 93280 PM DEVICE PROGR EVAL DUAL: CPT | Mod: 26,,, | Performed by: INTERNAL MEDICINE

## 2022-01-13 PROCEDURE — 93005 RHYTHM STRIP: ICD-10-PCS | Mod: S$GLB,,, | Performed by: INTERNAL MEDICINE

## 2022-01-13 PROCEDURE — 93010 ELECTROCARDIOGRAM REPORT: CPT | Mod: S$GLB,,, | Performed by: INTERNAL MEDICINE

## 2022-01-13 PROCEDURE — 1101F PT FALLS ASSESS-DOCD LE1/YR: CPT | Mod: CPTII,S$GLB,, | Performed by: INTERNAL MEDICINE

## 2022-01-13 PROCEDURE — 3288F FALL RISK ASSESSMENT DOCD: CPT | Mod: CPTII,S$GLB,, | Performed by: INTERNAL MEDICINE

## 2022-01-13 PROCEDURE — 93280 PM DEVICE PROGR EVAL DUAL: CPT

## 2022-01-13 NOTE — PROGRESS NOTES
Subjective:    Patient ID:  Dorothy M Knobloch is a 89 y.o. female who presents for evaluation of Pacemaker Check and Atrial Fibrillation    Referring Cardiologist: North Mcneill MD    Prior Hx:  I had the pleasure of seeing Mrs. Knobloch today in our electrophysiology clinic in consultation for her pacemaker and atrial fibrillation. As you are aware she is a pleasant 87 year-old woman with hypertension, Takotsubo CMP 3/2013, TIA 12/2013, moderate right carotid artery disease, paroxysmal AF, and high-grade AV block with bradycardia s/p dc PPM 2017 at [facility]. She is transferring care to [facility]. She feels well. Device interrogation notes normal function. She does have <1% AF burden without high-ventricular rates. She notes occasional brief fluttering sensation.    PPM interrogation: stable parameters, 52%A, 63%V paced. AF<0.8%, longest episode 3 hours.    Mrs. Mackenzie returns for  follow-up 10/2019. 75% A paced and 70% RV pacing, 18.8%AF burden, longest episode 27 minutes. V rates controlled. She didnot notice any AF anymore. We discussed risks of RV pacing induced CMP. ECHO noted preserved LVEF.    12/13/2019: Seen by Neelima Lake. She was seen by Dr. Mcneill after our last visit and began diuresis. He initiated bumex 0.5 mg BID.    Interim Hx:  Mrs. Mackenzie returns for overdue follow-up. She has no current complaints.    In clinic device interrogation notes stable lead parameters with 75% RA and 90% RV pacing with 25% AF burden and 3 years of estimated battery longevity.    My interpretation of today's in clinic ECG is AV paced rhythm at 63 bpm.    Review of Systems   Constitutional: Negative for malaise/fatigue.   HENT: Negative for congestion and sore throat.    Eyes: Negative for blurred vision and visual disturbance.   Cardiovascular: Positive for dyspnea on exertion. Negative for chest pain, leg swelling, near-syncope, orthopnea and palpitations.   Respiratory: Negative for cough and shortness of  breath.    Hematologic/Lymphatic: Negative for bleeding problem. Does not bruise/bleed easily.   Skin: Negative.    Musculoskeletal: Positive for arthritis, back pain and joint pain.   Gastrointestinal: Negative for hematochezia and melena.   Neurological: Negative for dizziness, light-headedness and weakness.        Objective:    Physical Exam  Vitals reviewed.   Constitutional:       General: She is not in acute distress.     Appearance: She is well-developed. She is not diaphoretic.   HENT:      Head: Normocephalic and atraumatic.   Eyes:      General:         Right eye: No discharge.         Left eye: No discharge.      Conjunctiva/sclera: Conjunctivae normal.   Neck:      Vascular: No JVD.   Cardiovascular:      Rate and Rhythm: Normal rate and regular rhythm.      Heart sounds: Normal heart sounds. No murmur heard.  No friction rub. No gallop.    Pulmonary:      Effort: Pulmonary effort is normal. No respiratory distress.      Breath sounds: Normal breath sounds. No wheezing or rales.   Abdominal:      General: Bowel sounds are normal. There is no distension.      Palpations: Abdomen is soft.      Tenderness: There is no abdominal tenderness. There is no rebound.   Musculoskeletal:      Cervical back: Neck supple.   Skin:     General: Skin is warm and dry.   Neurological:      Mental Status: She is alert and oriented to person, place, and time.   Psychiatric:         Behavior: Behavior normal.         Thought Content: Thought content normal.           Assessment:       1. AV block, 3rd degree    2. Cardiac pacemaker    3. Essential hypertension    4. Paroxysmal atrial fibrillation         Plan:       In summary, Mrs. Knobloch is a pleasant 89 year-old woman with hypertension, Takotsubo CMP 3/2013, TIA 12/2013, moderate right carotid artery disease, paroxysmal AF, and high-grade AV block with bradycardia s/p dc PPM 2017 at [facility]. She understands risks of AF and bleeding risks of DOAC therapy. Her [id number]  score is >2 and she is on eliquis. Device is functioning appropriately. Discussed long-term potential risk of RV pacing induced cardiomyopathy. Would get yearly ECHO. Will order one now. Need updated CBC/BMP.    Continue remote checks every 3 months. RTC in 6 months.    Thank you for allowing me to participate in the care of this patient. Please do not hesitate to call me with any questions or concerns.    Marquis Velazquez MD, PhD  Cardiac Electrophysiology                  HPI

## 2022-01-18 ENCOUNTER — CLINICAL SUPPORT (OUTPATIENT)
Dept: CARDIOLOGY | Facility: HOSPITAL | Age: 87
End: 2022-01-18
Payer: MEDICARE

## 2022-01-18 DIAGNOSIS — Z95.0 PRESENCE OF CARDIAC PACEMAKER: ICD-10-CM

## 2022-01-18 PROCEDURE — 93294 CARDIAC DEVICE CHECK - REMOTE: ICD-10-PCS | Mod: ,,, | Performed by: INTERNAL MEDICINE

## 2022-01-18 PROCEDURE — 93294 REM INTERROG EVL PM/LDLS PM: CPT | Mod: ,,, | Performed by: INTERNAL MEDICINE

## 2022-01-18 PROCEDURE — 93296 REM INTERROG EVL PM/IDS: CPT | Performed by: INTERNAL MEDICINE

## 2022-01-19 ENCOUNTER — TELEPHONE (OUTPATIENT)
Dept: CARDIOLOGY | Facility: HOSPITAL | Age: 87
End: 2022-01-19
Payer: MEDICARE

## 2022-01-19 NOTE — TELEPHONE ENCOUNTER
RE: in AF since 1/14/22  Received: Yesterday  MD Becky Beckford, RN  Nothing further, thanks             Previous Messages        ________________________________________    in AF since 1/14/22  Received: Today  Becky Mckeon, MD Dr. Marcus Alford,   Pt was seen by you in clinic 1/13/22.   She has been in AF since 1/14/22 seen on remote.     She reports she is asymptomatic, taking Eliquis and Toprol   V rates wnl as she has CHB.     Please advise,   Becky

## 2022-01-24 ENCOUNTER — TELEPHONE (OUTPATIENT)
Dept: ELECTROPHYSIOLOGY | Facility: CLINIC | Age: 87
End: 2022-01-24
Payer: MEDICARE

## 2022-01-24 NOTE — PROGRESS NOTES
Spoke with patient's daughter (Phoebe) who was concerned because Ms Knobloch has not been feeling well. I spoke with Ms Knobloch and she states for the past week she has been very symptomatic with palpitations and SOB that wakes her up in the early morning. SBP has been in the 130's and HR in the 80's despite symptoms. Has been in afib with controlled V response per messages on Epic. Will increase Toprol to 2 tabs in the AM and 1 in the PM.

## 2022-01-24 NOTE — TELEPHONE ENCOUNTER
"----- Message from Marquis Velazquez MD sent at 1/19/2022  5:41 PM CST -----  Regarding: RE: Billable transmission alert  THanks    ----- Message -----  From: Neelima Leon RN  Sent: 1/19/2022   1:42 PM CST  To: Marquis Velazquez MD, Corrie Cole  Subject: Billable transmission alert                      Hey Dr. Velazquez,    [facility] alert received for patient being in AF for 5 days. Event started on 1/14/21 @ 9:06 AM and is currently still in progress. Patient is taking Eliquis and V rates in AF are well controlled (she's CHB). I called and spoke with Ms. Knobloch who reported no symptoms other than her normal SOB. She stated  "I don't realize when I'm in it". She's compliant with her Eliquis.       Please advise of any changes.  Thanks,  Stanislaw      "

## 2022-02-09 ENCOUNTER — TELEPHONE (OUTPATIENT)
Dept: CARDIOLOGY | Facility: CLINIC | Age: 87
End: 2022-02-09
Payer: MEDICARE

## 2022-02-09 NOTE — TELEPHONE ENCOUNTER
----- Message from Dana Arreguin sent at 2/9/2022  1:20 PM CST -----  Contact: patient [phone]  Patient calling to speak with the nurse (personal)  Please advise

## 2022-02-21 ENCOUNTER — TELEPHONE (OUTPATIENT)
Dept: OPHTHALMOLOGY | Facility: CLINIC | Age: 87
End: 2022-02-21
Payer: MEDICARE

## 2022-02-21 NOTE — TELEPHONE ENCOUNTER
----- Message from Ariane Moser sent at 2/21/2022  2:04 PM CST -----  Contact: Carol Knobloch  Patient is returning a call to from you. Patient call back number is [phone].       Patient called and surgery placed on hold, patient moving into assisted living. Patient will call back when ready to reschedule, call down to scheduling and have them place on new date.     Rossi Horner

## 2022-02-21 NOTE — TELEPHONE ENCOUNTER
----- Message from Funmilayo Xie sent at 2/16/2022  2:09 PM CST -----  Contact: [phone]    ----- Message -----  From: Coni Franco  Sent: 2/16/2022   1:51 PM CST  To: Rupa Dover Staff    Patient is calling to postpone surgery. Please call back at [phone]    Patient called message left, for her to return my call when convenient    Rossi Horner

## 2022-03-17 ENCOUNTER — HOSPITAL ENCOUNTER (OUTPATIENT)
Dept: CARDIOLOGY | Facility: HOSPITAL | Age: 87
Discharge: HOME | End: 2022-03-17
Attending: INTERNAL MEDICINE
Payer: MEDICARE

## 2022-03-17 VITALS
WEIGHT: 149 LBS | SYSTOLIC BLOOD PRESSURE: 130 MMHG | DIASTOLIC BLOOD PRESSURE: 62 MMHG | HEART RATE: 68 BPM | HEIGHT: 62 IN | BODY MASS INDEX: 27.42 KG/M2

## 2022-03-17 DIAGNOSIS — I44.2 AV BLOCK, 3RD DEGREE: ICD-10-CM

## 2022-03-17 LAB
ASCENDING AORTA: 2.78 CM
AV INDEX (PROSTH): 0.34
AV MEAN GRADIENT: 13 MMHG
AV PEAK GRADIENT: 22 MMHG
AV VALVE AREA: 1.07 CM2
AV VELOCITY RATIO: 0.33
BSA FOR ECHO PROCEDURE: 1.72 M2
CV ECHO LV RWT: 0.44 CM
DOP CALC AO PEAK VEL: 2.35 M/S
DOP CALC AO VTI: 54.6 CM
DOP CALC LVOT AREA: 3.1 CM2
DOP CALC LVOT DIAMETER: 2 CM
DOP CALC LVOT PEAK VEL: 0.78 M/S
DOP CALC LVOT STROKE VOLUME: 58.37 CM3
DOP CALCLVOT PEAK VEL VTI: 18.59 CM
E WAVE DECELERATION TIME: 173.27 MSEC
E/A RATIO: 3.34
E/E' RATIO: 27.4 M/S
ECHO LV POSTERIOR WALL: 0.93 CM (ref 0.6–1.1)
EJECTION FRACTION: 60 %
FRACTIONAL SHORTENING: 38 % (ref 28–44)
INTERVENTRICULAR SEPTUM: 0.9 CM (ref 0.6–1.1)
LA MAJOR: 5.93 CM
LA MINOR: 5.94 CM
LA WIDTH: 3.79 CM
LEFT ATRIUM SIZE: 4.2 CM
LEFT ATRIUM VOLUME INDEX MOD: 37.3 ML/M2
LEFT ATRIUM VOLUME INDEX: 47.5 ML/M2
LEFT ATRIUM VOLUME MOD: 63.07 CM3
LEFT ATRIUM VOLUME: 80.3 CM3
LEFT INTERNAL DIMENSION IN SYSTOLE: 2.59 CM (ref 2.1–4)
LEFT VENTRICLE DIASTOLIC VOLUME INDEX: 46.77 ML/M2
LEFT VENTRICLE DIASTOLIC VOLUME: 79.04 ML
LEFT VENTRICLE MASS INDEX: 72 G/M2
LEFT VENTRICLE SYSTOLIC VOLUME INDEX: 14.5 ML/M2
LEFT VENTRICLE SYSTOLIC VOLUME: 24.45 ML
LEFT VENTRICULAR INTERNAL DIMENSION IN DIASTOLE: 4.21 CM (ref 3.5–6)
LEFT VENTRICULAR MASS: 121.85 G
LV LATERAL E/E' RATIO: 22.83 M/S
LV SEPTAL E/E' RATIO: 34.25 M/S
MV PEAK A VEL: 0.41 M/S
MV PEAK E VEL: 1.37 M/S
MV STENOSIS PRESSURE HALF TIME: 50.25 MS
MV VALVE AREA P 1/2 METHOD: 4.38 CM2
PISA TR MAX VEL: 2.82 M/S
RA MAJOR: 6.19 CM
RA PRESSURE: 15 MMHG
RA WIDTH: 3.7 CM
RIGHT ATRIAL AREA: 22 CM2
RIGHT VENTRICULAR END-DIASTOLIC DIMENSION: 3.98 CM
RV TISSUE DOPPLER FREE WALL SYSTOLIC VELOCITY 1 (APICAL 4 CHAMBER VIEW): 10.33 CM/S
SINUS: 2.67 CM
STJ: 2.44 CM
TDI LATERAL: 0.06 M/S
TDI SEPTAL: 0.04 M/S
TDI: 0.05 M/S
TR MAX PG: 32 MMHG
TRICUSPID ANNULAR PLANE SYSTOLIC EXCURSION: 1.68 CM
TV REST PULMONARY ARTERY PRESSURE: 47 MMHG

## 2022-03-17 PROCEDURE — 93306 ECHO (CUPID ONLY): ICD-10-PCS | Mod: 26,,, | Performed by: INTERNAL MEDICINE

## 2022-03-17 PROCEDURE — 93306 TTE W/DOPPLER COMPLETE: CPT

## 2022-03-17 PROCEDURE — 93306 TTE W/DOPPLER COMPLETE: CPT | Mod: 26,,, | Performed by: INTERNAL MEDICINE

## 2022-03-17 NOTE — PROGRESS NOTES
Please notify the patient that her heart function remains normal. Her aortic valve remains moderately sticky. Something Dr. Mcneill will keep an eye on in the future. Should she feel like she is getting more short of breath I would recommend she reach out to him regarding diuretic therapy.

## 2022-03-21 ENCOUNTER — TELEPHONE (OUTPATIENT)
Dept: ELECTROPHYSIOLOGY | Facility: CLINIC | Age: 87
End: 2022-03-21
Payer: MEDICARE

## 2022-03-21 NOTE — TELEPHONE ENCOUNTER
Attempted to contact patient to discuss results of her echocardiogram but no answer received. VM left advising that her results show that her heart functions remains normal and request return call to discuss further details regarding  results.

## 2022-03-21 NOTE — TELEPHONE ENCOUNTER
----- Message from Marquis Velazquez MD sent at 3/17/2022  4:44 PM CDT -----  Please notify the patient that her heart function remains normal. Her aortic valve remains moderately sticky. Something Dr. Mcneill will keep an eye on in the future. Should she feel like she is getting more short of breath I would recommend she reach out to him regarding diuretic therapy.

## 2022-03-22 ENCOUNTER — TELEPHONE (OUTPATIENT)
Dept: ELECTROPHYSIOLOGY | Facility: CLINIC | Age: 87
End: 2022-03-22
Payer: MEDICARE

## 2022-03-25 ENCOUNTER — PATIENT MESSAGE (OUTPATIENT)
Dept: ELECTROPHYSIOLOGY | Facility: CLINIC | Age: 87
End: 2022-03-25
Payer: MEDICARE

## 2022-03-25 NOTE — TELEPHONE ENCOUNTER
Spoke with patient. Echo results discussed as well as Dr Velazquez recommendations to notify Dr Mcneill if she begins to become short of breath or retaining fluid to discuss diuretic therapy. Patient states that she has been having increased lower extremity edema recently and will contact Dr Mcneill to discuss.

## 2022-04-20 ENCOUNTER — CLINICAL SUPPORT (OUTPATIENT)
Dept: CARDIOLOGY | Facility: HOSPITAL | Age: 87
End: 2022-04-20
Payer: MEDICARE

## 2022-04-20 DIAGNOSIS — I44.2 ATRIOVENTRICULAR BLOCK, COMPLETE: ICD-10-CM

## 2022-04-20 DIAGNOSIS — Z95.0 PRESENCE OF CARDIAC PACEMAKER: ICD-10-CM

## 2022-04-20 DIAGNOSIS — I48.91 UNSPECIFIED ATRIAL FIBRILLATION: ICD-10-CM

## 2022-04-20 PROCEDURE — 93296 REM INTERROG EVL PM/IDS: CPT | Performed by: INTERNAL MEDICINE

## 2022-04-20 PROCEDURE — 93294 CARDIAC DEVICE CHECK - REMOTE: ICD-10-PCS | Mod: ,,, | Performed by: INTERNAL MEDICINE

## 2022-04-20 PROCEDURE — 93294 REM INTERROG EVL PM/LDLS PM: CPT | Mod: ,,, | Performed by: INTERNAL MEDICINE

## 2022-05-08 RX ORDER — METOPROLOL SUCCINATE 25 MG/1
TABLET, EXTENDED RELEASE ORAL
Qty: 270 TABLET | Refills: 3 | Status: SHIPPED | OUTPATIENT
Start: 2022-05-08 | End: 2023-04-14

## 2022-06-30 ENCOUNTER — HOSPITAL ENCOUNTER (OUTPATIENT)
Dept: CARDIOLOGY | Facility: CLINIC | Age: 87
Discharge: HOME | End: 2022-06-30
Payer: MEDICARE

## 2022-06-30 ENCOUNTER — OFFICE VISIT (OUTPATIENT)
Dept: ELECTROPHYSIOLOGY | Facility: CLINIC | Age: 87
End: 2022-06-30
Payer: MEDICARE

## 2022-06-30 ENCOUNTER — CLINICAL SUPPORT (OUTPATIENT)
Dept: CARDIOLOGY | Facility: HOSPITAL | Age: 87
End: 2022-06-30
Attending: INTERNAL MEDICINE
Payer: MEDICARE

## 2022-06-30 VITALS
SYSTOLIC BLOOD PRESSURE: 150 MMHG | HEART RATE: 83 BPM | WEIGHT: 144.81 LBS | BODY MASS INDEX: 25.66 KG/M2 | HEIGHT: 63 IN | DIASTOLIC BLOOD PRESSURE: 60 MMHG

## 2022-06-30 DIAGNOSIS — I48.0 PAROXYSMAL ATRIAL FIBRILLATION: ICD-10-CM

## 2022-06-30 DIAGNOSIS — I44.2 AV BLOCK, 3RD DEGREE: ICD-10-CM

## 2022-06-30 DIAGNOSIS — I77.9 RIGHT-SIDED CAROTID ARTERY DISEASE, UNSPECIFIED TYPE: ICD-10-CM

## 2022-06-30 DIAGNOSIS — Z95.0 CARDIAC PACEMAKER: ICD-10-CM

## 2022-06-30 DIAGNOSIS — I10 ESSENTIAL HYPERTENSION: ICD-10-CM

## 2022-06-30 DIAGNOSIS — I44.2 AV BLOCK, 3RD DEGREE: Primary | ICD-10-CM

## 2022-06-30 PROCEDURE — 1126F PR PAIN SEVERITY QUANTIFIED, NO PAIN PRESENT: ICD-10-PCS | Mod: CPTII,S$GLB,, | Performed by: INTERNAL MEDICINE

## 2022-06-30 PROCEDURE — 93005 RHYTHM STRIP: ICD-10-PCS | Mod: S$GLB,,, | Performed by: INTERNAL MEDICINE

## 2022-06-30 PROCEDURE — 1160F RVW MEDS BY RX/DR IN RCRD: CPT | Mod: CPTII,S$GLB,, | Performed by: INTERNAL MEDICINE

## 2022-06-30 PROCEDURE — 93010 RHYTHM STRIP: ICD-10-PCS | Mod: S$GLB,,, | Performed by: INTERNAL MEDICINE

## 2022-06-30 PROCEDURE — 99214 OFFICE O/P EST MOD 30 MIN: CPT | Mod: S$GLB,,, | Performed by: INTERNAL MEDICINE

## 2022-06-30 PROCEDURE — 99214 PR OFFICE/OUTPT VISIT, EST, LEVL IV, 30-39 MIN: ICD-10-PCS | Mod: S$GLB,,, | Performed by: INTERNAL MEDICINE

## 2022-06-30 PROCEDURE — 93010 ELECTROCARDIOGRAM REPORT: CPT | Mod: S$GLB,,, | Performed by: INTERNAL MEDICINE

## 2022-06-30 PROCEDURE — 99999 PR PBB SHADOW E&M-EST. PATIENT-LVL III: ICD-10-PCS | Mod: PBBFAC,,, | Performed by: INTERNAL MEDICINE

## 2022-06-30 PROCEDURE — 1159F PR MEDICATION LIST DOCUMENTED IN MEDICAL RECORD: ICD-10-PCS | Mod: CPTII,S$GLB,, | Performed by: INTERNAL MEDICINE

## 2022-06-30 PROCEDURE — 93005 ELECTROCARDIOGRAM TRACING: CPT | Mod: S$GLB,,, | Performed by: INTERNAL MEDICINE

## 2022-06-30 PROCEDURE — 1160F PR REVIEW ALL MEDS BY PRESCRIBER/CLIN PHARMACIST DOCUMENTED: ICD-10-PCS | Mod: CPTII,S$GLB,, | Performed by: INTERNAL MEDICINE

## 2022-06-30 PROCEDURE — 99999 PR PBB SHADOW E&M-EST. PATIENT-LVL III: CPT | Mod: PBBFAC,,, | Performed by: INTERNAL MEDICINE

## 2022-06-30 PROCEDURE — 1126F AMNT PAIN NOTED NONE PRSNT: CPT | Mod: CPTII,S$GLB,, | Performed by: INTERNAL MEDICINE

## 2022-06-30 PROCEDURE — 1159F MED LIST DOCD IN RCRD: CPT | Mod: CPTII,S$GLB,, | Performed by: INTERNAL MEDICINE

## 2022-06-30 NOTE — PROGRESS NOTES
Subjective:    Patient ID:  Dorothy M Knobloch is a 89 y.o. female who presents for evaluation of Atrial Fibrillation and PPM check    Referring Cardiologist: [place] Mcneill MD    Prior Hx:  I had the pleasure of seeing Mrs. Knobloch today in our electrophysiology clinic in consultation for her pacemaker and atrial fibrillation. 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 She is transferring care to [facility]. She feels well. Device interrogation notes normal function. She does have <1% AF burden without high-ventricular rates. She notes occasional brief fluttering sensation.    PPM interrogation: stable parameters, 52%A, 63%V paced. AF<0.8%, longest episode 3 hours.    Mrs. Mackenzie returns for  follow-up 10/2019. 75% A paced and 70% RV pacing, 18.8%AF burden, longest episode 27 minutes. V rates controlled. She didnot notice any AF anymore. We discussed risks of RV pacing induced CMP. ECHO noted preserved LVEF.    12/13/2019: Seen by [place]. She was seen by Dr. Mcneill after our last visit and began diuresis. He initiated bumex 0.5 mg BID.    1/2022: Seen in follow-up. Had no complaints. ECHO 3/2022 noted preserved LV function with moderate AS.    Interim Hx:  Mrs. Mackenzie returns for overdue follow-up. She reports intermittently her PPM bothers her depending on her position. She has lost some weight.    In clinic device interrogation notes stable lead parameters with 10% RA and 99% RV pacing with 100% AF burden since January 2022, 2.5 years of estimated battery longevity.    My interpretation of today's in clinic ECG is AV paced rhythm at 83 bpm.    Review of Systems   Constitutional: Negative for malaise/fatigue.   HENT: Negative for congestion and sore throat.    Eyes: Negative for blurred vision and visual  disturbance.   Cardiovascular: Positive for dyspnea on exertion. Negative for chest pain, leg swelling, near-syncope, orthopnea and palpitations.   Respiratory: Negative for cough and shortness of breath.    Hematologic/Lymphatic: Negative for bleeding problem. Does not bruise/bleed easily.   Skin: Negative.    Musculoskeletal: Positive for arthritis, back pain and joint pain.   Gastrointestinal: Negative for hematochezia and melena.   Neurological: Negative for dizziness, light-headedness and weakness.        Objective:    Physical Exam  Vitals reviewed.   Constitutional:       General: She is not in acute distress.     Appearance: She is well-developed. She is not diaphoretic.   HENT:      Head: Normocephalic and atraumatic.   Eyes:      General:         Right eye: No discharge.         Left eye: No discharge.      Conjunctiva/sclera: Conjunctivae normal.   Neck:      Vascular: No JVD.   Cardiovascular:      Rate and Rhythm: Normal rate and regular rhythm.      Heart sounds: Normal heart sounds. No murmur heard.    No friction rub. No gallop.   Pulmonary:      Effort: Pulmonary effort is normal. No respiratory distress.      Breath sounds: Normal breath sounds. No wheezing or rales.   Chest:      Comments: PPM well healed, skin not retracted or adherent to the generator.  Abdominal:      General: Bowel sounds are normal. There is no distension.      Palpations: Abdomen is soft.      Tenderness: There is no abdominal tenderness. There is no rebound.   Musculoskeletal:      Cervical back: Neck supple.   Skin:     General: Skin is warm and dry.   Neurological:      Mental Status: She is alert and oriented to person, place, and time.   Psychiatric:         Behavior: Behavior normal.         Thought Content: Thought content normal.           Assessment:       1. AV block, 3rd degree    2. Paroxysmal atrial fibrillation    3. Cardiac pacemaker    4. Right-sided carotid artery disease, unspecified type    5. Essential  hypertension         Plan:       In summary, Mrs. 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 She understands risks of AF and bleeding risks of DOAC therapy. Her [id number] score is >2 and she is on eliquis. Device is functioning appropriately. Discussed long-term potential risk of RV pacing induced cardiomyopathy. Would get yearly ECHO.     Continue remote checks every 3 months. RTC in 1 year with ECHO just prior.    Thank you for allowing me to participate in the care of this patient. Please do not hesitate to call me with any questions or concerns.    Marquis Velazquez MD, PhD  Cardiac Electrophysiology                  HPI

## 2022-07-11 ENCOUNTER — TELEPHONE (OUTPATIENT)
Dept: OPHTHALMOLOGY | Facility: CLINIC | Age: 87
End: 2022-07-11
Payer: MEDICARE

## 2022-07-11 NOTE — TELEPHONE ENCOUNTER
----- Message from Coni Franco sent at 7/11/2022 10:20 AM CDT -----  Patient is calling in regards to OS. She stated that it's hurting and swollen. She stated that the eyelashes have gone into the OS.     Please contact patient to schedule at [phone]

## 2022-07-12 ENCOUNTER — OFFICE VISIT (OUTPATIENT)
Dept: OPHTHALMOLOGY | Facility: CLINIC | Age: 87
End: 2022-07-12
Payer: MEDICARE

## 2022-07-12 DIAGNOSIS — H25.12 NUCLEAR SCLEROTIC CATARACT OF LEFT EYE: ICD-10-CM

## 2022-07-12 DIAGNOSIS — H35.3134 ADVANCED ATROPHIC NONEXUDATIVE AGE-RELATED MACULAR DEGENERATION OF BOTH EYES WITH SUBFOVEAL INVOLVEMENT: ICD-10-CM

## 2022-07-12 DIAGNOSIS — Z96.1 STATUS POST CATARACT EXTRACTION AND INSERTION OF INTRAOCULAR LENS OF RIGHT EYE: ICD-10-CM

## 2022-07-12 DIAGNOSIS — H54.10 BLINDNESS AND LOW VISION: ICD-10-CM

## 2022-07-12 DIAGNOSIS — H34.12 CRAO (CENTRAL RETINAL ARTERY OCCLUSION), LEFT: ICD-10-CM

## 2022-07-12 DIAGNOSIS — H40.52X2 NEOVASCULAR GLAUCOMA OF LEFT EYE, MODERATE STAGE: Primary | ICD-10-CM

## 2022-07-12 DIAGNOSIS — Z98.41 STATUS POST CATARACT EXTRACTION AND INSERTION OF INTRAOCULAR LENS OF RIGHT EYE: ICD-10-CM

## 2022-07-12 DIAGNOSIS — H02.006 ENTROPION AND TRICHIASIS OF EYELID, LEFT: ICD-10-CM

## 2022-07-12 DIAGNOSIS — H04.123 DRY EYE SYNDROME OF BOTH EYES: ICD-10-CM

## 2022-07-12 PROCEDURE — 99999 PR PBB SHADOW E&M-EST. PATIENT-LVL III: ICD-10-PCS | Mod: PBBFAC,,, | Performed by: OPHTHALMOLOGY

## 2022-07-12 PROCEDURE — 99999 PR PBB SHADOW E&M-EST. PATIENT-LVL III: CPT | Mod: PBBFAC,,, | Performed by: OPHTHALMOLOGY

## 2022-07-12 PROCEDURE — 99214 PR OFFICE/OUTPT VISIT, EST, LEVL IV, 30-39 MIN: ICD-10-PCS | Mod: S$GLB,,, | Performed by: OPHTHALMOLOGY

## 2022-07-12 PROCEDURE — 1126F PR PAIN SEVERITY QUANTIFIED, NO PAIN PRESENT: ICD-10-PCS | Mod: CPTII,S$GLB,, | Performed by: OPHTHALMOLOGY

## 2022-07-12 PROCEDURE — 1160F RVW MEDS BY RX/DR IN RCRD: CPT | Mod: CPTII,S$GLB,, | Performed by: OPHTHALMOLOGY

## 2022-07-12 PROCEDURE — 92020 PR SPECIAL EYE EVAL,GONIOSCOPY: ICD-10-PCS | Mod: S$GLB,,, | Performed by: OPHTHALMOLOGY

## 2022-07-12 PROCEDURE — 1159F PR MEDICATION LIST DOCUMENTED IN MEDICAL RECORD: ICD-10-PCS | Mod: CPTII,S$GLB,, | Performed by: OPHTHALMOLOGY

## 2022-07-12 PROCEDURE — 1126F AMNT PAIN NOTED NONE PRSNT: CPT | Mod: CPTII,S$GLB,, | Performed by: OPHTHALMOLOGY

## 2022-07-12 PROCEDURE — 99214 OFFICE O/P EST MOD 30 MIN: CPT | Mod: S$GLB,,, | Performed by: OPHTHALMOLOGY

## 2022-07-12 PROCEDURE — 1159F MED LIST DOCD IN RCRD: CPT | Mod: CPTII,S$GLB,, | Performed by: OPHTHALMOLOGY

## 2022-07-12 PROCEDURE — 1160F PR REVIEW ALL MEDS BY PRESCRIBER/CLIN PHARMACIST DOCUMENTED: ICD-10-PCS | Mod: CPTII,S$GLB,, | Performed by: OPHTHALMOLOGY

## 2022-07-12 PROCEDURE — 92020 GONIOSCOPY: CPT | Mod: S$GLB,,, | Performed by: OPHTHALMOLOGY

## 2022-07-12 NOTE — PROGRESS NOTES
Assessment /Plan     For exam results, see Encounter Report.    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 Cole::  CRAO OS  Was admitted for stroke work up       NVG OS  ==> + NVI & NVD OS  3/27/2018  Dr Cole  IO meds / PRP OS      Left eye --> using  Combigan BID  Azopt BID  Diamox 250 BID --> Intolerant      PC IOL OD  3 piece in slight decentration   OD SP YAG Cap 3/27/2018    NSC OS  Observe      PVD OU    Dry AMD OU -   AREDS/AG  Amsler Chart Routine c Q & A    ERM OS  Observe    LLL spastic entropion  Intermittent  No K-abrasion   Lubricants  Tape  Discussed sx options --> deferring      Dry Eye Syndrome: discussed use of warm compresses, preserved & non-preserved artificial tears, gel and PM ointment options.  Also discussed options utilizing medications.    RD precautions:  Discussed with patient symptoms of RD with increased flashes, floaters, decreasing vision.  Patient/Family to call and return immediately to clinic should the symptoms of RD occur.  patient voice good understanding.      Plan  RTC 6 month with  --> IOP / DFE & adherence  RTC sooner prn with good understanding

## 2022-07-19 ENCOUNTER — CLINICAL SUPPORT (OUTPATIENT)
Dept: CARDIOLOGY | Facility: HOSPITAL | Age: 87
End: 2022-07-19
Payer: MEDICARE

## 2022-07-19 DIAGNOSIS — I44.2 ATRIOVENTRICULAR BLOCK, COMPLETE: ICD-10-CM

## 2022-07-19 DIAGNOSIS — Z95.0 PRESENCE OF CARDIAC PACEMAKER: ICD-10-CM

## 2022-07-19 DIAGNOSIS — I48.91 UNSPECIFIED ATRIAL FIBRILLATION: ICD-10-CM

## 2022-07-19 PROCEDURE — 93296 REM INTERROG EVL PM/IDS: CPT | Performed by: INTERNAL MEDICINE

## 2022-08-11 ENCOUNTER — TELEPHONE (OUTPATIENT)
Dept: CARDIOLOGY | Facility: CLINIC | Age: 87
End: 2022-08-11
Payer: MEDICARE

## 2022-08-11 NOTE — TELEPHONE ENCOUNTER
Detail Level: Generalized Reached out to in regards to insurance claim for Assisted Living pending office records.    Reached out to [facility] at [phone] spoke with Salvador who stated that no records were received from Dr Mcneill's office.     Verified correct fax for records to be sent [phone]    Policy [id number]    Claim [id number]    All records from Dr Mcneill and Dr Velazquez were faxed with diagnostic results of Echo, EKG, Lab and device interrogations faxed as requested.     Notified Pattey that all records have been sent and to please notify our office if there is any else we can provide      Voiced appreciation for response.

## 2022-09-11 NOTE — PROGRESS NOTES
"Subjective:    Patient ID:  Dorothy M Knobloch is a 85 y.o. female who presents for follow-up of Hypertension      HPI  86 y/o female with a hx of SSS s/p PPM, Takotsubo Cardiomyopathy 3/2013, HTN, moderate right carotid disease, TIA 12/2013, HLD, hypothyroidism, 2DE with vegetation on the MV (dental procedure?) who presents for f/u.  Last clinic visit was seen for symptomatic HTN (had been having SBP in the 200's nightly for about 3-4 nights and was feeling overall "bad" and SOB). I started her on doxyzosin 1 mg nightly and SBP has significantly improved. BP cuff available and readings 120-140's. Her symptoms have also improved and she is feeling better. Tolerating regimen well. Continues to have SMITH, however, has not worsened and she is able to accomplish her ADL's. Denies CP, orthopnea, PND, syncope. Compliant with meds. Has bilateral LE edema which has not worsened, and does not really tolerate compression stockings.     Review of Systems   Constitution: Positive for weakness. Negative for malaise/fatigue.   HENT: Negative for congestion.    Eyes: Negative for blurred vision.   Cardiovascular: Positive for dyspnea on exertion and leg swelling. Negative for chest pain, claudication, cyanosis, irregular heartbeat, near-syncope, orthopnea, palpitations, paroxysmal nocturnal dyspnea and syncope.   Respiratory: Negative for shortness of breath.    Endocrine: Negative for polyuria.   Hematologic/Lymphatic: Negative for bleeding problem.   Skin: Negative for itching and rash.   Musculoskeletal: Negative for joint swelling, muscle cramps and muscle weakness.   Gastrointestinal: Negative for abdominal pain, hematemesis, hematochezia, melena, nausea and vomiting.   Genitourinary: Negative for dysuria and hematuria.   Neurological: Negative for dizziness, focal weakness, headaches, light-headedness and loss of balance.   Psychiatric/Behavioral: Negative for depression. The patient is not nervous/anxious.         Objective: "    Physical Exam   Constitutional: She is oriented to person, place, and time. She appears well-developed and well-nourished.   HENT:   Head: Normocephalic and atraumatic.   Neck: Neck supple. No JVD present.   Cardiovascular: Normal rate and regular rhythm.    Murmur heard.   Systolic murmur is present with a grade of 2/6   Pulses:       Carotid pulses are 2+ on the right side, and 2+ on the left side.       Radial pulses are 2+ on the right side, and 2+ on the left side.        Femoral pulses are 2+ on the right side, and 2+ on the left side.       Dorsalis pedis pulses are 2+ on the right side, and 2+ on the left side.        Posterior tibial pulses are 2+ on the right side, and 2+ on the left side.   Pulmonary/Chest: Effort normal and breath sounds normal.   Abdominal: Soft. Bowel sounds are normal.   Musculoskeletal: She exhibits edema.   Neurological: She is alert and oriented to person, place, and time.   Skin: Skin is warm and dry.   Psychiatric: She has a normal mood and affect. Her behavior is normal. Thought content normal.         Assessment:       1. Essential hypertension    2. Transient cerebral ischemia, unspecified type    3. Acute coronary syndrome    4. Right-sided carotid artery disease    5. Diastolic dysfunction    6. Hyperlipidemia, unspecified hyperlipidemia type    7. 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 Takotsubo cardiomyopathy    9. Hypothyroidism, unspecified type      86 y/o female with hx and presentation as above. Improved symptomatically and BP improved on current regimen. Will continue for now and monitor.        Plan:       -Continue current medical regimen  -f/u in 3 months             CHIEF COMPLAINT: Patient is a 56y old  Male who presents with a chief complaint of L HydroPTX (10 Sep 2022 15:16)    INTERVAL EVENTS:  - Heparin GTT increased to 11.5 overnight.     REVIEW OF SYSTEMS: Seen by bedside during AM rounds and denies SOB but notes mild pain in left chest/ scapular.     OBJECTIVE:  ICU Vital Signs Last 24 Hrs  T(C): 36.2 (11 Sep 2022 04:39), Max: 36.8 (10 Sep 2022 08:45)  T(F): 97.1 (11 Sep 2022 04:39), Max: 98.2 (10 Sep 2022 08:45)  HR: 78 (11 Sep 2022 04:39) (72 - 89)  BP: 140/96 (11 Sep 2022 04:39) (130/92 - 149/97)  BP(mean): --  ABP: --  ABP(mean): --  RR: 18 (11 Sep 2022 04:39) (18 - 20)  SpO2: 97% (11 Sep 2022 04:39) (96% - 98%)    O2 Parameters below as of 11 Sep 2022 04:39  Patient On (Oxygen Delivery Method): room air    CAPILLARY BLOOD GLUCOSE    HOSPITAL MEDICATIONS:  MEDICATIONS  (STANDING):  chlorhexidine 2% Cloths 1 Application(s) Topical daily  heparin  Infusion 1000 Unit(s)/Hr (11.5 mL/Hr) IV Continuous <Continuous>  influenza   Vaccine 0.5 milliLiter(s) IntraMuscular once  lidocaine   4% Patch 1 Patch Transdermal every 24 hours  polyethylene glycol 3350 17 Gram(s) Oral daily  senna 2 Tablet(s) Oral at bedtime    MEDICATIONS  (PRN):  acetaminophen     Tablet .. 650 milliGRAM(s) Oral every 6 hours PRN Temp greater or equal to 38C (100.4F), Mild Pain (1 - 3), Moderate Pain (4 - 6)    PHYSICAL EXAMINATION  General: NAD   Cards: S1/S2, no murmurs   Pulm: CTA on right, however diminished breath sounds on left. No wheezes.   Abdomen: Soft, NTND. BS (+)   Extremities: No pedal edema. ALEJANDRA of BL upper and lower extremities.  Neurology: AOx3 with no focal neurological deficits     LABS:                        12.9   3.61  )-----------( 270      ( 11 Sep 2022 04:28 )             40.2     09-11    136  |  100  |  18  ----------------------------<  90  4.0   |  26  |  1.12    Ca    9.1      11 Sep 2022 04:28  Phos  3.8     09-11  Mg     1.90     09-11    TPro  7.0  /  Alb  x   /  TBili  x   /  DBili  x   /  AST  x   /  ALT  x   /  AlkPhos  x   09-11    PT/INR - ( 11 Sep 2022 04:28 )   PT: 11.9 sec;   INR: 1.03 ratio         PTT - ( 11 Sep 2022 04:28 )  PTT:65.7 sec      PAST MEDICAL & SURGICAL HISTORY:  Former smoker    s/p open right thoracotomy for posterior cardiac mass removal ~20 years ago, reported by patient to be benign.    FAMILY HISTORY:  Family history of cancer in father  Cancer of unknown origin on Father's side.    Social History:  Unmarried, has two children, lives in [place] but flew to [place] to help Sister who is sick.   Unvaccinated for COVID (10 Sep 2022 14:18)    RADIOLOGY:  [ ] Reviewed and interpreted by me    PULMONARY FUNCTION TESTS:    EKG: CHIEF COMPLAINT: Patient is a 56y old  Male who presents with a chief complaint of L HydroPTX (10 Sep 2022 15:16)    INTERVAL EVENTS:  - Heparin GTT increased to 11.5 overnight.   - Pending IP evaluation     REVIEW OF SYSTEMS: Seen by bedside during AM rounds and denies SOB but notes mild pain in left chest/ scapular.     OBJECTIVE:  ICU Vital Signs Last 24 Hrs  T(C): 36.2 (11 Sep 2022 04:39), Max: 36.8 (10 Sep 2022 08:45)  T(F): 97.1 (11 Sep 2022 04:39), Max: 98.2 (10 Sep 2022 08:45)  HR: 78 (11 Sep 2022 04:39) (72 - 89)  BP: 140/96 (11 Sep 2022 04:39) (130/92 - 149/97)  BP(mean): --  ABP: --  ABP(mean): --  RR: 18 (11 Sep 2022 04:39) (18 - 20)  SpO2: 97% (11 Sep 2022 04:39) (96% - 98%)    O2 Parameters below as of 11 Sep 2022 04:39  Patient On (Oxygen Delivery Method): room air    CAPILLARY BLOOD GLUCOSE    HOSPITAL MEDICATIONS:  MEDICATIONS  (STANDING):  chlorhexidine 2% Cloths 1 Application(s) Topical daily  heparin  Infusion 1000 Unit(s)/Hr (11.5 mL/Hr) IV Continuous <Continuous>  influenza   Vaccine 0.5 milliLiter(s) IntraMuscular once  lidocaine   4% Patch 1 Patch Transdermal every 24 hours  polyethylene glycol 3350 17 Gram(s) Oral daily  senna 2 Tablet(s) Oral at bedtime    MEDICATIONS  (PRN):  acetaminophen     Tablet .. 650 milliGRAM(s) Oral every 6 hours PRN Temp greater or equal to 38C (100.4F), Mild Pain (1 - 3), Moderate Pain (4 - 6)    PHYSICAL EXAMINATION  General: NAD   Cards: S1/S2, no murmurs   Pulm: CTA on right, however diminished breath sounds on left. No wheezes.   Abdomen: Soft, NTND. BS (+)   Extremities: No pedal edema. ALEJANDRA of BL upper and lower extremities.  Neurology: AOx3 with no focal neurological deficits     LABS:                        12.9   3.61  )-----------( 270      ( 11 Sep 2022 04:28 )             40.2     09-11    136  |  100  |  18  ----------------------------<  90  4.0   |  26  |  1.12    Ca    9.1      11 Sep 2022 04:28  Phos  3.8     09-11  Mg     1.90     09-11    TPro  7.0  /  Alb  x   /  TBili  x   /  DBili  x   /  AST  x   /  ALT  x   /  AlkPhos  x   09-11    PT/INR - ( 11 Sep 2022 04:28 )   PT: 11.9 sec;   INR: 1.03 ratio        PTT - ( 11 Sep 2022 04:28 )  PTT:65.7 sec    PAST MEDICAL & SURGICAL HISTORY:  Former smoker    s/p open right thoracotomy for posterior cardiac mass removal ~20 years ago, reported by patient to be benign.    FAMILY HISTORY:  Family history of cancer in father  Cancer of unknown origin on Father's side.    Social History:  Unmarried, has two children, lives in [place] but flew to [place] to help Sister who is sick.   Unvaccinated for COVID (10 Sep 2022 14:18)    RADIOLOGY:  [ ] Reviewed and interpreted by me    PULMONARY FUNCTION TESTS:    EKG: CHIEF COMPLAINT: Patient is a 56y old  Male who presents with a chief complaint of L HydroPTX (10 Sep 2022 15:16)    INTERVAL EVENTS:  - Heparin GTT increased to 11.5 overnight.   - Complaining of chest pain on left, likely second to PTX, however pending CE/ECG.   - Pending IP evaluation     REVIEW OF SYSTEMS: Seen by bedside during AM rounds and denies SOB but notes mild pain in left chest/ scapular.     OBJECTIVE:  ICU Vital Signs Last 24 Hrs  T(C): 36.2 (11 Sep 2022 04:39), Max: 36.8 (10 Sep 2022 08:45)  T(F): 97.1 (11 Sep 2022 04:39), Max: 98.2 (10 Sep 2022 08:45)  HR: 78 (11 Sep 2022 04:39) (72 - 89)  BP: 140/96 (11 Sep 2022 04:39) (130/92 - 149/97)  BP(mean): --  ABP: --  ABP(mean): --  RR: 18 (11 Sep 2022 04:39) (18 - 20)  SpO2: 97% (11 Sep 2022 04:39) (96% - 98%)    O2 Parameters below as of 11 Sep 2022 04:39  Patient On (Oxygen Delivery Method): room air    CAPILLARY BLOOD GLUCOSE    HOSPITAL MEDICATIONS:  MEDICATIONS  (STANDING):  chlorhexidine 2% Cloths 1 Application(s) Topical daily  heparin  Infusion 1000 Unit(s)/Hr (11.5 mL/Hr) IV Continuous <Continuous>  influenza   Vaccine 0.5 milliLiter(s) IntraMuscular once  lidocaine   4% Patch 1 Patch Transdermal every 24 hours  polyethylene glycol 3350 17 Gram(s) Oral daily  senna 2 Tablet(s) Oral at bedtime    MEDICATIONS  (PRN):  acetaminophen     Tablet .. 650 milliGRAM(s) Oral every 6 hours PRN Temp greater or equal to 38C (100.4F), Mild Pain (1 - 3), Moderate Pain (4 - 6)    PHYSICAL EXAMINATION  General: NAD   Cards: S1/S2, no murmurs   Pulm: CTA on right, however diminished breath sounds on left. No wheezes.   Abdomen: Soft, NTND. BS (+)   Extremities: No pedal edema. ALEJANDRA of BL upper and lower extremities.  Neurology: AOx3 with no focal neurological deficits     LABS:                        12.9   3.61  )-----------( 270      ( 11 Sep 2022 04:28 )             40.2     09-11    136  |  100  |  18  ----------------------------<  90  4.0   |  26  |  1.12    Ca    9.1      11 Sep 2022 04:28  Phos  3.8     09-11  Mg     1.90     09-11    TPro  7.0  /  Alb  x   /  TBili  x   /  DBili  x   /  AST  x   /  ALT  x   /  AlkPhos  x   09-11    PT/INR - ( 11 Sep 2022 04:28 )   PT: 11.9 sec;   INR: 1.03 ratio        PTT - ( 11 Sep 2022 04:28 )  PTT:65.7 sec    PAST MEDICAL & SURGICAL HISTORY:  Former smoker    s/p open right thoracotomy for posterior cardiac mass removal ~20 years ago, reported by patient to be benign.    FAMILY HISTORY:  Family history of cancer in father  Cancer of unknown origin on Father's side.    Social History:  Unmarried, has two children, lives in [place] but flew to [place] to help Sister who is sick.   Unvaccinated for COVID (10 Sep 2022 14:18)    RADIOLOGY:  [ ] Reviewed and interpreted by me    PULMONARY FUNCTION TESTS:    EKG:

## 2022-10-19 ENCOUNTER — CLINICAL SUPPORT (OUTPATIENT)
Dept: CARDIOLOGY | Facility: HOSPITAL | Age: 87
End: 2022-10-19
Payer: MEDICARE

## 2022-10-19 DIAGNOSIS — Z95.0 PRESENCE OF CARDIAC PACEMAKER: ICD-10-CM

## 2022-10-19 PROCEDURE — 93296 REM INTERROG EVL PM/IDS: CPT | Performed by: INTERNAL MEDICINE

## 2022-10-19 PROCEDURE — 93294 REM INTERROG EVL PM/LDLS PM: CPT | Mod: ,,, | Performed by: INTERNAL MEDICINE

## 2022-10-19 PROCEDURE — 93294 CARDIAC DEVICE CHECK - REMOTE: ICD-10-PCS | Mod: ,,, | Performed by: INTERNAL MEDICINE

## 2023-01-03 ENCOUNTER — OFFICE VISIT (OUTPATIENT)
Dept: OPHTHALMOLOGY | Facility: CLINIC | Age: 88
End: 2023-01-03
Payer: MEDICARE

## 2023-01-03 DIAGNOSIS — H35.3132 NONEXUDATIVE AGE-RELATED MACULAR DEGENERATION, BILATERAL, INTERMEDIATE DRY STAGE: ICD-10-CM

## 2023-01-03 DIAGNOSIS — Z98.41 STATUS POST CATARACT EXTRACTION AND INSERTION OF INTRAOCULAR LENS OF RIGHT EYE: ICD-10-CM

## 2023-01-03 DIAGNOSIS — H34.12 CRAO (CENTRAL RETINAL ARTERY OCCLUSION), LEFT: ICD-10-CM

## 2023-01-03 DIAGNOSIS — H40.52X2 NEOVASCULAR GLAUCOMA OF LEFT EYE, MODERATE STAGE: Primary | ICD-10-CM

## 2023-01-03 DIAGNOSIS — H04.123 DRY EYE SYNDROME OF BOTH EYES: ICD-10-CM

## 2023-01-03 DIAGNOSIS — Z96.1 STATUS POST CATARACT EXTRACTION AND INSERTION OF INTRAOCULAR LENS OF RIGHT EYE: ICD-10-CM

## 2023-01-03 DIAGNOSIS — H25.12 NUCLEAR SCLEROTIC CATARACT OF LEFT EYE: ICD-10-CM

## 2023-01-03 PROCEDURE — 3288F FALL RISK ASSESSMENT DOCD: CPT | Mod: CPTII,S$GLB,, | Performed by: OPHTHALMOLOGY

## 2023-01-03 PROCEDURE — 99214 PR OFFICE/OUTPT VISIT, EST, LEVL IV, 30-39 MIN: ICD-10-PCS | Mod: S$GLB,,, | Performed by: OPHTHALMOLOGY

## 2023-01-03 PROCEDURE — 1160F PR REVIEW ALL MEDS BY PRESCRIBER/CLIN PHARMACIST DOCUMENTED: ICD-10-PCS | Mod: CPTII,S$GLB,, | Performed by: OPHTHALMOLOGY

## 2023-01-03 PROCEDURE — 1159F PR MEDICATION LIST DOCUMENTED IN MEDICAL RECORD: ICD-10-PCS | Mod: CPTII,S$GLB,, | Performed by: OPHTHALMOLOGY

## 2023-01-03 PROCEDURE — 99999 PR PBB SHADOW E&M-EST. PATIENT-LVL III: ICD-10-PCS | Mod: PBBFAC,,, | Performed by: OPHTHALMOLOGY

## 2023-01-03 PROCEDURE — 1101F PT FALLS ASSESS-DOCD LE1/YR: CPT | Mod: CPTII,S$GLB,, | Performed by: OPHTHALMOLOGY

## 2023-01-03 PROCEDURE — 1159F MED LIST DOCD IN RCRD: CPT | Mod: CPTII,S$GLB,, | Performed by: OPHTHALMOLOGY

## 2023-01-03 PROCEDURE — 1160F RVW MEDS BY RX/DR IN RCRD: CPT | Mod: CPTII,S$GLB,, | Performed by: OPHTHALMOLOGY

## 2023-01-03 PROCEDURE — 1126F AMNT PAIN NOTED NONE PRSNT: CPT | Mod: CPTII,S$GLB,, | Performed by: OPHTHALMOLOGY

## 2023-01-03 PROCEDURE — 99214 OFFICE O/P EST MOD 30 MIN: CPT | Mod: S$GLB,,, | Performed by: OPHTHALMOLOGY

## 2023-01-03 PROCEDURE — 99999 PR PBB SHADOW E&M-EST. PATIENT-LVL III: CPT | Mod: PBBFAC,,, | Performed by: OPHTHALMOLOGY

## 2023-01-03 PROCEDURE — 1101F PR PT FALLS ASSESS DOC 0-1 FALLS W/OUT INJ PAST YR: ICD-10-PCS | Mod: CPTII,S$GLB,, | Performed by: OPHTHALMOLOGY

## 2023-01-03 PROCEDURE — 1126F PR PAIN SEVERITY QUANTIFIED, NO PAIN PRESENT: ICD-10-PCS | Mod: CPTII,S$GLB,, | Performed by: OPHTHALMOLOGY

## 2023-01-03 PROCEDURE — 3288F PR FALLS RISK ASSESSMENT DOCUMENTED: ICD-10-PCS | Mod: CPTII,S$GLB,, | Performed by: OPHTHALMOLOGY

## 2023-01-03 NOTE — PROGRESS NOTES
Assessment /Plan     For exam results, see Encounter Report.    Neovascular glaucoma of left eye, moderate stage    CRAO (central retinal artery occlusion), left    Nonexudative age-related macular degeneration, bilateral, intermediate dry stage    Status post cataract extraction and insertion of intraocular lens of right eye    Dry eye syndrome of both eyes    Nuclear sclerotic cataract of left eye      Patient with daughter      Dr. Kelsey AREVALO OS  Was admitted for stroke work up       NVG // NLP OS  ==> + NVI & NVD OS  3/27/2018  Dr Cole  IO meds / PRP OS  Comfort is goal --> achieved    Monocular precautions      Left eye --> CSM for comfort as discussed  Combigan BID  Azopt BID  Diamox 250 BID --> Intolerant    Dry AMD OU --> becoming symptomatic  AREDS/AG  Amsler Chart Routine c Q & A      PC IOL OD  3 piece in slight decentration   OD SP YAG Cap 3/27/2018    NSC OS  Observe      PVD OU  Observe      ERM OS  Observe    LLL spastic entropion  Intermittent  No K-abrasion   Lubricants  Tape  Discussed sx options --> deferring      Dry Eye Syndrome: discussed use of warm compresses, preserved & non-preserved artificial tears, gel and PM ointment options.  Also discussed options utilizing medications.        Plan  RTC Dr Cole // Retina service  RTC LV clinic   RTC 6 month with  --> IOP & adherence  RTC sooner prn with good understanding

## 2023-01-17 ENCOUNTER — TELEPHONE (OUTPATIENT)
Dept: CARDIOLOGY | Facility: HOSPITAL | Age: 88
End: 2023-01-17
Payer: MEDICARE

## 2023-01-17 ENCOUNTER — CLINICAL SUPPORT (OUTPATIENT)
Dept: CARDIOLOGY | Facility: HOSPITAL | Age: 88
End: 2023-01-17
Payer: MEDICARE

## 2023-01-17 DIAGNOSIS — Z95.0 PRESENCE OF CARDIAC PACEMAKER: ICD-10-CM

## 2023-01-17 DIAGNOSIS — I48.91 UNSPECIFIED ATRIAL FIBRILLATION: ICD-10-CM

## 2023-01-17 DIAGNOSIS — I44.2 ATRIOVENTRICULAR BLOCK, COMPLETE: ICD-10-CM

## 2023-01-17 PROCEDURE — 93296 REM INTERROG EVL PM/IDS: CPT | Performed by: INTERNAL MEDICINE

## 2023-01-17 NOTE — TELEPHONE ENCOUNTER
Patient called to get her remote transmission dates for the next year.  I gave her the dates.  She stated understanding.

## 2023-01-24 ENCOUNTER — OFFICE VISIT (OUTPATIENT)
Dept: OPHTHALMOLOGY | Facility: CLINIC | Age: 88
End: 2023-01-24
Payer: MEDICARE

## 2023-01-24 DIAGNOSIS — H35.372 EPIRETINAL MEMBRANE, LEFT EYE: ICD-10-CM

## 2023-01-24 DIAGNOSIS — H35.3132 NONEXUDATIVE AGE-RELATED MACULAR DEGENERATION, BILATERAL, INTERMEDIATE DRY STAGE: Primary | ICD-10-CM

## 2023-01-24 DIAGNOSIS — H43.813 POSTERIOR VITREOUS DETACHMENT, BOTH EYES: ICD-10-CM

## 2023-01-24 PROCEDURE — 1126F PR PAIN SEVERITY QUANTIFIED, NO PAIN PRESENT: ICD-10-PCS | Mod: CPTII,S$GLB,, | Performed by: OPHTHALMOLOGY

## 2023-01-24 PROCEDURE — 92201 PR OPHTHALMOSCOPY, EXT, W/RET DRAW/SCLERAL DEPR, I&R, UNI/BI: ICD-10-PCS | Mod: S$GLB,,, | Performed by: OPHTHALMOLOGY

## 2023-01-24 PROCEDURE — 1160F PR REVIEW ALL MEDS BY PRESCRIBER/CLIN PHARMACIST DOCUMENTED: ICD-10-PCS | Mod: CPTII,S$GLB,, | Performed by: OPHTHALMOLOGY

## 2023-01-24 PROCEDURE — 92014 COMPRE OPH EXAM EST PT 1/>: CPT | Mod: S$GLB,,, | Performed by: OPHTHALMOLOGY

## 2023-01-24 PROCEDURE — 3288F FALL RISK ASSESSMENT DOCD: CPT | Mod: CPTII,S$GLB,, | Performed by: OPHTHALMOLOGY

## 2023-01-24 PROCEDURE — 1126F AMNT PAIN NOTED NONE PRSNT: CPT | Mod: CPTII,S$GLB,, | Performed by: OPHTHALMOLOGY

## 2023-01-24 PROCEDURE — 92014 PR EYE EXAM, EST PATIENT,COMPREHESV: ICD-10-PCS | Mod: S$GLB,,, | Performed by: OPHTHALMOLOGY

## 2023-01-24 PROCEDURE — 1159F PR MEDICATION LIST DOCUMENTED IN MEDICAL RECORD: ICD-10-PCS | Mod: CPTII,S$GLB,, | Performed by: OPHTHALMOLOGY

## 2023-01-24 PROCEDURE — 92201 OPSCPY EXTND RTA DRAW UNI/BI: CPT | Mod: S$GLB,,, | Performed by: OPHTHALMOLOGY

## 2023-01-24 PROCEDURE — 99999 PR PBB SHADOW E&M-EST. PATIENT-LVL III: CPT | Mod: PBBFAC,,, | Performed by: OPHTHALMOLOGY

## 2023-01-24 PROCEDURE — 1101F PR PT FALLS ASSESS DOC 0-1 FALLS W/OUT INJ PAST YR: ICD-10-PCS | Mod: CPTII,S$GLB,, | Performed by: OPHTHALMOLOGY

## 2023-01-24 PROCEDURE — 1159F MED LIST DOCD IN RCRD: CPT | Mod: CPTII,S$GLB,, | Performed by: OPHTHALMOLOGY

## 2023-01-24 PROCEDURE — 99999 PR PBB SHADOW E&M-EST. PATIENT-LVL III: ICD-10-PCS | Mod: PBBFAC,,, | Performed by: OPHTHALMOLOGY

## 2023-01-24 PROCEDURE — 3288F PR FALLS RISK ASSESSMENT DOCUMENTED: ICD-10-PCS | Mod: CPTII,S$GLB,, | Performed by: OPHTHALMOLOGY

## 2023-01-24 PROCEDURE — 1101F PT FALLS ASSESS-DOCD LE1/YR: CPT | Mod: CPTII,S$GLB,, | Performed by: OPHTHALMOLOGY

## 2023-01-24 PROCEDURE — 1160F RVW MEDS BY RX/DR IN RCRD: CPT | Mod: CPTII,S$GLB,, | Performed by: OPHTHALMOLOGY

## 2023-01-24 NOTE — PROGRESS NOTES
HPI    Patient here today for AMD check. C/o decreasing VA ou, no pain,   occasional flashes without floaters    Combigan bid OS  Not using Azopt 2/2 insurance  Last edited by Cathy Mercado on 1/24/2023  1:53 PM.          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 CRAO OS  Was admitted for stroke work up   NVI and early NVG OS  S/p Avastin OS  with tap last 5/18  S/p PRP - completed 4/12/18    Combigan BID OS  Azopt TID  Was on diamox 500 BID - having hypotensive episodes  JDN cut back Diamox to 250mg Q day  Dr. Mcneill D/ASTRID'gerald Valsartan    7/18 - no NVI/NVA - Try without avastin and tap today  DC Diamox  Keep Combigan and AZOPT BID OS      2. PCIOL OD  3 piece in slight decentration   S/p YAG for PCO - pt notes improvement    NS OS    3. PVD OU    4. Dry AMD OU -   AREDS/AG    5. ERM OS  Given CRAO - nothing to do      12 months OCT

## 2023-01-31 ENCOUNTER — PATIENT MESSAGE (OUTPATIENT)
Dept: CARDIOLOGY | Facility: HOSPITAL | Age: 88
End: 2023-01-31
Payer: MEDICARE

## 2023-02-07 RX ORDER — BUMETANIDE 0.5 MG/1
0.5 TABLET ORAL 2 TIMES DAILY
Qty: 180 TABLET | Refills: 3 | Status: SHIPPED | OUTPATIENT
Start: 2023-02-07 | End: 2023-11-08

## 2023-03-12 ENCOUNTER — HOSPITAL ENCOUNTER (INPATIENT)
Facility: HOSPITAL | Age: 88
LOS: 1 days | Discharge: HOME | DRG: 871 | End: 2023-03-13
Attending: EMERGENCY MEDICINE | Admitting: FAMILY MEDICINE
Payer: MEDICARE

## 2023-03-12 DIAGNOSIS — R68.89 RIGORS: Primary | ICD-10-CM

## 2023-03-12 DIAGNOSIS — R07.9 CHEST PAIN: ICD-10-CM

## 2023-03-12 DIAGNOSIS — A41.9 SEPSIS: ICD-10-CM

## 2023-03-12 DIAGNOSIS — R07.9 ACUTE CHEST PAIN: ICD-10-CM

## 2023-03-12 DIAGNOSIS — R68.83 SHAKING CHILLS: ICD-10-CM

## 2023-03-12 PROBLEM — I50.33 ACUTE ON CHRONIC DIASTOLIC CONGESTIVE HEART FAILURE: Status: ACTIVE | Noted: 2019-11-24

## 2023-03-12 PROBLEM — M25.511 RIGHT SHOULDER PAIN: Status: ACTIVE | Noted: 2023-03-12

## 2023-03-12 LAB
ALBUMIN SERPL BCP-MCNC: 4.1 G/DL (ref 3.5–5.2)
ALLENS TEST: ABNORMAL
ALLENS TEST: NORMAL
ALP SERPL-CCNC: 69 U/L (ref 55–135)
ALT SERPL W/O P-5'-P-CCNC: 10 U/L (ref 10–44)
ANION GAP SERPL CALC-SCNC: 12 MMOL/L (ref 8–16)
AST SERPL-CCNC: 23 U/L (ref 10–40)
BASOPHILS # BLD AUTO: 0.06 K/UL (ref 0–0.2)
BASOPHILS NFR BLD: 0.4 % (ref 0–1.9)
BILIRUB SERPL-MCNC: 0.6 MG/DL (ref 0.1–1)
BUN SERPL-MCNC: 23 MG/DL (ref 8–23)
CALCIUM SERPL-MCNC: 10.2 MG/DL (ref 8.7–10.5)
CHLORIDE SERPL-SCNC: 97 MMOL/L (ref 95–110)
CO2 SERPL-SCNC: 25 MMOL/L (ref 23–29)
CREAT SERPL-MCNC: 0.9 MG/DL (ref 0.5–1.4)
DIFFERENTIAL METHOD: ABNORMAL
EOSINOPHIL # BLD AUTO: 0.1 K/UL (ref 0–0.5)
EOSINOPHIL NFR BLD: 0.6 % (ref 0–8)
ERYTHROCYTE [DISTWIDTH] IN BLOOD BY AUTOMATED COUNT: 16.9 % (ref 11.5–14.5)
EST. GFR  (NO RACE VARIABLE): >60 ML/MIN/1.73 M^2
GLUCOSE SERPL-MCNC: 102 MG/DL (ref 70–110)
HCO3 UR-SCNC: 28.5 MMOL/L (ref 24–28)
HCT VFR BLD AUTO: 39.2 % (ref 37–48.5)
HGB BLD-MCNC: 12.3 G/DL (ref 12–16)
IMM GRANULOCYTES # BLD AUTO: 0.06 K/UL (ref 0–0.04)
IMM GRANULOCYTES NFR BLD AUTO: 0.4 % (ref 0–0.5)
INFLUENZA A, MOLECULAR: NOT DETECTED
INFLUENZA B, MOLECULAR: NOT DETECTED
LACTATE SERPL-SCNC: 1.5 MMOL/L (ref 0.5–2.2)
LDH SERPL L TO P-CCNC: 1.86 MMOL/L (ref 0.5–2.2)
LYMPHOCYTES # BLD AUTO: 1.1 K/UL (ref 1–4.8)
LYMPHOCYTES NFR BLD: 6.9 % (ref 18–48)
MCH RBC QN AUTO: 28.2 PG (ref 27–31)
MCHC RBC AUTO-ENTMCNC: 31.4 G/DL (ref 32–36)
MCV RBC AUTO: 90 FL (ref 82–98)
MONOCYTES # BLD AUTO: 0.8 K/UL (ref 0.3–1)
MONOCYTES NFR BLD: 5.3 % (ref 4–15)
NEUTROPHILS # BLD AUTO: 13.5 K/UL (ref 1.8–7.7)
NEUTROPHILS NFR BLD: 86.4 % (ref 38–73)
NRBC BLD-RTO: 0 /100 WBC
PCO2 BLDA: 45.2 MMHG (ref 35–45)
PH SMN: 7.41 [PH] (ref 7.35–7.45)
PLATELET # BLD AUTO: 285 K/UL (ref 150–450)
PMV BLD AUTO: 9.5 FL (ref 9.2–12.9)
PO2 BLDA: 24 MMHG (ref 40–60)
POC BE: 4 MMOL/L
POC SATURATED O2: 42 % (ref 95–100)
POC TCO2: 30 MMOL/L (ref 24–29)
POTASSIUM SERPL-SCNC: 4 MMOL/L (ref 3.5–5.1)
PROCALCITONIN SERPL IA-MCNC: 0.04 NG/ML
PROT SERPL-MCNC: 7.5 G/DL (ref 6–8.4)
RBC # BLD AUTO: 4.36 M/UL (ref 4–5.4)
RSV AG BY MOLECULAR METHOD: NOT DETECTED
SAMPLE: ABNORMAL
SAMPLE: NORMAL
SARS-COV-2 RNA RESP QL NAA+PROBE: NOT DETECTED
SITE: ABNORMAL
SITE: NORMAL
SODIUM SERPL-SCNC: 134 MMOL/L (ref 136–145)
TSH SERPL DL<=0.005 MIU/L-ACNC: 1.58 UIU/ML (ref 0.4–4)
WBC # BLD AUTO: 15.6 K/UL (ref 3.9–12.7)

## 2023-03-12 PROCEDURE — 94640 AIRWAY INHALATION TREATMENT: CPT

## 2023-03-12 PROCEDURE — 0241U SARS-COV2 (COVID) WITH FLU/RSV BY PCR: CPT | Performed by: PHYSICIAN ASSISTANT

## 2023-03-12 PROCEDURE — 84145 PROCALCITONIN (PCT): CPT | Performed by: PHYSICIAN ASSISTANT

## 2023-03-12 PROCEDURE — 84443 ASSAY THYROID STIM HORMONE: CPT | Performed by: PHYSICIAN ASSISTANT

## 2023-03-12 PROCEDURE — G0378 HOSPITAL OBSERVATION PER HR: HCPCS

## 2023-03-12 PROCEDURE — 93010 EKG 12-LEAD: ICD-10-PCS | Mod: ,,, | Performed by: INTERNAL MEDICINE

## 2023-03-12 PROCEDURE — 25000003 PHARM REV CODE 250: Performed by: PHYSICIAN ASSISTANT

## 2023-03-12 PROCEDURE — 85025 COMPLETE CBC W/AUTO DIFF WBC: CPT | Performed by: PHYSICIAN ASSISTANT

## 2023-03-12 PROCEDURE — 82803 BLOOD GASES ANY COMBINATION: CPT

## 2023-03-12 PROCEDURE — 99285 EMERGENCY DEPT VISIT HI MDM: CPT | Mod: CS,,, | Performed by: PHYSICIAN ASSISTANT

## 2023-03-12 PROCEDURE — 99223 1ST HOSP IP/OBS HIGH 75: CPT | Mod: ,,, | Performed by: PHYSICIAN ASSISTANT

## 2023-03-12 PROCEDURE — 80053 COMPREHEN METABOLIC PANEL: CPT | Performed by: PHYSICIAN ASSISTANT

## 2023-03-12 PROCEDURE — 99285 PR EMERGENCY DEPT VISIT,LEVEL V: ICD-10-PCS | Mod: CS,,, | Performed by: PHYSICIAN ASSISTANT

## 2023-03-12 PROCEDURE — 93010 ELECTROCARDIOGRAM REPORT: CPT | Mod: ,,, | Performed by: INTERNAL MEDICINE

## 2023-03-12 PROCEDURE — 93005 ELECTROCARDIOGRAM TRACING: CPT

## 2023-03-12 PROCEDURE — 99900035 HC TECH TIME PER 15 MIN (STAT)

## 2023-03-12 PROCEDURE — 25000242 PHARM REV CODE 250 ALT 637 W/ HCPCS: Performed by: PHYSICIAN ASSISTANT

## 2023-03-12 PROCEDURE — 63600175 PHARM REV CODE 636 W HCPCS: Mod: TB,JG | Performed by: PHYSICIAN ASSISTANT

## 2023-03-12 PROCEDURE — 99223 PR INITIAL HOSPITAL CARE,LEVL III: ICD-10-PCS | Mod: ,,, | Performed by: PHYSICIAN ASSISTANT

## 2023-03-12 PROCEDURE — 96365 THER/PROPH/DIAG IV INF INIT: CPT

## 2023-03-12 PROCEDURE — 83605 ASSAY OF LACTIC ACID: CPT

## 2023-03-12 PROCEDURE — 96375 TX/PRO/DX INJ NEW DRUG ADDON: CPT

## 2023-03-12 PROCEDURE — 87040 BLOOD CULTURE FOR BACTERIA: CPT | Performed by: PHYSICIAN ASSISTANT

## 2023-03-12 PROCEDURE — 99285 EMERGENCY DEPT VISIT HI MDM: CPT | Mod: 25

## 2023-03-12 PROCEDURE — 83605 ASSAY OF LACTIC ACID: CPT | Performed by: PHYSICIAN ASSISTANT

## 2023-03-12 RX ORDER — IBUPROFEN 200 MG
16 TABLET ORAL
Status: DISCONTINUED | OUTPATIENT
Start: 2023-03-12 | End: 2023-03-13 | Stop reason: HOSPADM

## 2023-03-12 RX ORDER — GLUCAGON 1 MG
1 KIT INJECTION
Status: DISCONTINUED | OUTPATIENT
Start: 2023-03-12 | End: 2023-03-13 | Stop reason: HOSPADM

## 2023-03-12 RX ORDER — BRIMONIDINE TARTRATE 2 MG/ML
1 SOLUTION/ DROPS OPHTHALMIC 2 TIMES DAILY
Status: DISCONTINUED | OUTPATIENT
Start: 2023-03-12 | End: 2023-03-13 | Stop reason: HOSPADM

## 2023-03-12 RX ORDER — IBUPROFEN 200 MG
24 TABLET ORAL
Status: DISCONTINUED | OUTPATIENT
Start: 2023-03-12 | End: 2023-03-13 | Stop reason: HOSPADM

## 2023-03-12 RX ORDER — LEVOTHYROXINE SODIUM 88 UG/1
88 TABLET ORAL
Status: DISCONTINUED | OUTPATIENT
Start: 2023-03-13 | End: 2023-03-13 | Stop reason: HOSPADM

## 2023-03-12 RX ORDER — HYDROCHLOROTHIAZIDE 12.5 MG/1
12.5 TABLET ORAL DAILY
Status: DISCONTINUED | OUTPATIENT
Start: 2023-03-13 | End: 2023-03-13 | Stop reason: HOSPADM

## 2023-03-12 RX ORDER — METOPROLOL SUCCINATE 25 MG/1
25 TABLET, EXTENDED RELEASE ORAL NIGHTLY
Status: DISCONTINUED | OUTPATIENT
Start: 2023-03-12 | End: 2023-03-13 | Stop reason: HOSPADM

## 2023-03-12 RX ORDER — POLYETHYLENE GLYCOL 3350 17 G/17G
17 POWDER, FOR SOLUTION ORAL DAILY PRN
Status: DISCONTINUED | OUTPATIENT
Start: 2023-03-12 | End: 2023-03-13 | Stop reason: HOSPADM

## 2023-03-12 RX ORDER — ENOXAPARIN SODIUM 100 MG/ML
40 INJECTION SUBCUTANEOUS EVERY 24 HOURS
Status: DISCONTINUED | OUTPATIENT
Start: 2023-03-12 | End: 2023-03-12

## 2023-03-12 RX ORDER — ACETAMINOPHEN 325 MG/1
650 TABLET ORAL EVERY 4 HOURS PRN
Status: DISCONTINUED | OUTPATIENT
Start: 2023-03-12 | End: 2023-03-13 | Stop reason: HOSPADM

## 2023-03-12 RX ORDER — BISACODYL 10 MG
10 SUPPOSITORY, RECTAL RECTAL DAILY PRN
Status: DISCONTINUED | OUTPATIENT
Start: 2023-03-12 | End: 2023-03-13 | Stop reason: HOSPADM

## 2023-03-12 RX ORDER — TIMOLOL MALEATE 5 MG/ML
1 SOLUTION/ DROPS OPHTHALMIC 2 TIMES DAILY
Status: DISCONTINUED | OUTPATIENT
Start: 2023-03-12 | End: 2023-03-13 | Stop reason: HOSPADM

## 2023-03-12 RX ORDER — BRINZOLAMIDE 10 MG/ML
1 SUSPENSION/ DROPS OPHTHALMIC 2 TIMES DAILY
Status: DISCONTINUED | OUTPATIENT
Start: 2023-03-12 | End: 2023-03-13 | Stop reason: HOSPADM

## 2023-03-12 RX ORDER — IPRATROPIUM BROMIDE AND ALBUTEROL SULFATE 2.5; .5 MG/3ML; MG/3ML
3 SOLUTION RESPIRATORY (INHALATION) EVERY 4 HOURS PRN
Status: DISCONTINUED | OUTPATIENT
Start: 2023-03-12 | End: 2023-03-13 | Stop reason: HOSPADM

## 2023-03-12 RX ORDER — AMLODIPINE BESYLATE 5 MG/1
5 TABLET ORAL DAILY
Status: DISCONTINUED | OUTPATIENT
Start: 2023-03-13 | End: 2023-03-12

## 2023-03-12 RX ORDER — ONDANSETRON 8 MG/1
8 TABLET, ORALLY DISINTEGRATING ORAL EVERY 8 HOURS PRN
Status: DISCONTINUED | OUTPATIENT
Start: 2023-03-12 | End: 2023-03-13 | Stop reason: HOSPADM

## 2023-03-12 RX ORDER — TALC
6 POWDER (GRAM) TOPICAL NIGHTLY PRN
Status: DISCONTINUED | OUTPATIENT
Start: 2023-03-12 | End: 2023-03-13 | Stop reason: HOSPADM

## 2023-03-12 RX ORDER — BUMETANIDE 0.5 MG/1
0.5 TABLET ORAL 2 TIMES DAILY
Status: DISCONTINUED | OUTPATIENT
Start: 2023-03-12 | End: 2023-03-13 | Stop reason: HOSPADM

## 2023-03-12 RX ORDER — PROMETHAZINE HYDROCHLORIDE 25 MG/1
25 TABLET ORAL EVERY 6 HOURS PRN
Status: DISCONTINUED | OUTPATIENT
Start: 2023-03-12 | End: 2023-03-13 | Stop reason: HOSPADM

## 2023-03-12 RX ORDER — METOPROLOL SUCCINATE 50 MG/1
50 TABLET, EXTENDED RELEASE ORAL DAILY
Status: DISCONTINUED | OUTPATIENT
Start: 2023-03-13 | End: 2023-03-13 | Stop reason: HOSPADM

## 2023-03-12 RX ORDER — BRIMONIDINE TARTRATE AND TIMOLOL MALEATE 2; 5 MG/ML; MG/ML
1 SOLUTION OPHTHALMIC
Status: DISCONTINUED | OUTPATIENT
Start: 2023-03-12 | End: 2023-03-12

## 2023-03-12 RX ORDER — CLOPIDOGREL BISULFATE 75 MG/1
75 TABLET ORAL DAILY
Status: DISCONTINUED | OUTPATIENT
Start: 2023-03-13 | End: 2023-03-13 | Stop reason: HOSPADM

## 2023-03-12 RX ADMIN — VANCOMYCIN HYDROCHLORIDE 1250 MG: 1.25 INJECTION, POWDER, LYOPHILIZED, FOR SOLUTION INTRAVENOUS at 07:03

## 2023-03-12 RX ADMIN — METOPROLOL SUCCINATE 25 MG: 25 TABLET, EXTENDED RELEASE ORAL at 11:03

## 2023-03-12 RX ADMIN — IPRATROPIUM BROMIDE AND ALBUTEROL SULFATE 3 ML: .5; 3 SOLUTION RESPIRATORY (INHALATION) at 11:03

## 2023-03-12 RX ADMIN — PIPERACILLIN AND TAZOBACTAM 4.5 G: 4; .5 INJECTION, POWDER, LYOPHILIZED, FOR SOLUTION INTRAVENOUS; PARENTERAL at 05:03

## 2023-03-12 RX ADMIN — APIXABAN 5 MG: 5 TABLET, FILM COATED ORAL at 11:03

## 2023-03-12 NOTE — ED PROVIDER NOTES
Encounter Date: 3/12/2023       History     Chief Complaint   Patient presents with    Chills     States she feels like she has a fever. EMS took temp an not fever noted. No other complaints at this time.     89-year-old female with history of sick sinus syndrome with pacemaker, CAD, hypertension, CHF, thyroid disease presents for an episode of shaking chills that occurred prior to arrival.  She first noticed that it felt cold and adjusted the thermostat at the facility and which she is staying but despite turning it up to 83 degrees she still felt very chilled.  She then had an episode of chills with uncontrollable shaking.  The shaking has stopped, she feels generally unwell but as no other specific complaints at this time.  She reports chronic shortness of breath which is unchanged, denies chest pain, abdominal pain, vomiting, sore throat wounds, cuts or sores.  No sick contacts.  She denies any prior similar episodes.    Review of patient's allergies indicates:   Allergen Reactions    Aspartame Swelling     equal     Past Medical History:   Diagnosis Date    Anticoagulant long-term use     Arthritis     Bilateral nonexudative age-related macular degeneration 8/27/2013    Blood transfusion     Cataract     CHF (congestive heart failure)     Enlarged heart     High cholesterol     Hypertension     Sick sinus syndrome     Stroke     Thyroid disease      Past Surgical History:   Procedure Laterality Date    CARDIAC PACEMAKER PLACEMENT      CARPAL TUNNEL RELEASE Bilateral 1991    CATARACT EXTRACTION Right     JOINT REPLACEMENT      AL TRANSCRAN DOPP INTRACRAN, EMBOLI W/O INJ  01/29/2018         SKIN BIOPSY      TONSILLECTOMY      TOTAL HIP ARTHROPLASTY  11/01/2000    right/[facility]     Family History   Problem Relation Age of Onset    Early death Mother     Early death Father     Arthritis Sister     Hearing loss Daughter     Birth defects Son     Hypertension Son     Diabetes Paternal Aunt     Cancer  Paternal Aunt 80        colon  cancer    Diabetes Paternal Uncle     Arthritis Maternal Grandmother     Arthritis Paternal Grandmother     Hypertension Paternal Grandmother     Amblyopia Neg Hx     Blindness Neg Hx     Cataracts Neg Hx     Glaucoma Neg Hx     Macular degeneration Neg Hx     Retinal detachment Neg Hx     Strabismus Neg Hx     Stroke Neg Hx     Thyroid disease Neg Hx      Social History     Tobacco Use    Smoking status: Never    Smokeless tobacco: Never   Substance Use Topics    Alcohol use: No     Alcohol/week: 0.0 standard drinks    Drug use: No     Review of Systems   Constitutional:  Positive for chills, fatigue and fever.   Respiratory:  Positive for shortness of breath. Negative for cough.    Cardiovascular:  Negative for chest pain.   Gastrointestinal:  Negative for abdominal pain, nausea and vomiting.   Allergic/Immunologic: Negative for immunocompromised state.     Physical Exam     Initial Vitals [03/12/23 1602]   BP Pulse Resp Temp SpO2   (!) 161/90 90 16 98.4 °F (36.9 °C) 99 %      MAP       --         Physical Exam    Nursing note and vitals reviewed.  Constitutional: She appears well-developed and well-nourished. She is not diaphoretic. No distress.   HENT:   Head: Normocephalic and atraumatic.   Dry mucous membranes   Cardiovascular:  Normal rate, regular rhythm, normal heart sounds and intact distal pulses.     Exam reveals no gallop and no friction rub.       No murmur heard.  Pulmonary/Chest: Breath sounds normal. No respiratory distress. She has no wheezes. She has no rhonchi. She has no rales. She exhibits no tenderness.   Abdominal: Abdomen is soft. Bowel sounds are normal. She exhibits no distension and no mass. There is no abdominal tenderness. There is no rebound and no guarding.   Musculoskeletal:         General: Normal range of motion.     Neurological: She is alert and oriented to person, place, and time.   Skin: Skin is warm and dry.   Psychiatric: She has a normal mood  and affect.       ED Course   Procedures  Labs Reviewed   CBC W/ AUTO DIFFERENTIAL - Abnormal; Notable for the following components:       Result Value    WBC 15.60 (*)     MCHC 31.4 (*)     RDW 16.9 (*)     Gran # (ANC) 13.5 (*)     Immature Grans (Abs) 0.06 (*)     Gran % 86.4 (*)     Lymph % 6.9 (*)     All other components within normal limits   COMPREHENSIVE METABOLIC PANEL - Abnormal; Notable for the following components:    Sodium 134 (*)     All other components within normal limits   ISTAT PROCEDURE - Abnormal; Notable for the following components:    POC PCO2 45.2 (*)     POC PO2 24 (*)     POC HCO3 28.5 (*)     POC SATURATED O2 42 (*)     POC TCO2 30 (*)     All other components within normal limits   CULTURE, BLOOD   CULTURE, BLOOD   CULTURE, RESPIRATORY   SARS-COV2 (COVID) WITH FLU/RSV BY PCR   PROCALCITONIN   TSH   LACTIC ACID, PLASMA   URINALYSIS, REFLEX TO URINE CULTURE   URINALYSIS, REFLEX TO URINE CULTURE   ISTAT LACTATE     EKG Readings: (Independently Interpreted)   Initial Reading: No STEMI. Previous EKG: Compared with most recent EKG Previous EKG Date: 6/30/2022. Rhythm: Paced Rhythm. Heart Rate: 86. Conduction: LBBB. ST Segments: Normal ST Segments. Clinical Impression: Paced Rhythm with RBBB     Imaging Results              X-ray Shoulder 2 or More Views Right (Final result)  Result time 03/12/23 20:59:09      Final result by Quinton Gonzalez MD (03/12/23 20:59:09)                   Impression:      No acute fracture.    Moderate to severe degenerative change at the right glenohumeral joint.      Electronically signed by: Quinton Gonzalez MD  Date:    03/12/2023  Time:    20:59               Narrative:    EXAMINATION:  XR SHOULDER COMPLETE 2 OR MORE VIEWS RIGHT    CLINICAL HISTORY:  R shoulder pain;    TECHNIQUE:  Two or three views of the right shoulder were performed.    COMPARISON:  None    FINDINGS:  Bones: No fracture.  Moderate to severe degenerative change at the right glenohumeral  joint.    Joints: No evidence for glenohumeral dislocation.  Acromioclavicular joint is unremarkable.    Soft tissues: Unremarkable.                                       X-Ray Chest AP Portable (Final result)  Result time 03/12/23 17:17:53      Final result by Albino Adkins MD (03/12/23 17:17:53)                   Impression:      Left more than right mid to lower lung zone interval increased nonspecific interstitial coarsening with patchy ground-glass opacities.  Differential considerations can include worsening pulmonary edema/CHF pattern with subsegmental atelectasis; however, superimposed aspiration or pneumonia not excluded.      Electronically signed by: Albino Adkins MD  Date:    03/12/2023  Time:    17:17               Narrative:    EXAMINATION:  XR CHEST AP PORTABLE    CLINICAL HISTORY:  Sepsis;    TECHNIQUE:  Single frontal view of the chest was performed.    COMPARISON:  Chest radiograph 11/01/2019 and CT thorax 11/15/2019    FINDINGS:  Monitoring leads overlie the chest.  Large body habitus.  Patient is rotated.    Left chest dual lead ICD unchanged.  Cardiomediastinal silhouette is midline and stable.  Hilar contours are within normal limits.  Interval increased nonspecific interstitial coarsening with patchy ground glass opacities at the bilateral mid to lower lung zones, slightly more so on the left.  Scattered linear opacities consistent with platelike scarring versus atelectasis.  No consolidation, pleural effusion or pneumothorax.  Biapical pleuroparenchymal scarring.  No acute osseous process seen.                                       Medications   albuterol-ipratropium 2.5 mg-0.5 mg/3 mL nebulizer solution 3 mL (has no administration in time range)   melatonin tablet 6 mg (has no administration in time range)   ondansetron disintegrating tablet 8 mg (has no administration in time range)   promethazine tablet 25 mg (has no administration in time range)   polyethylene glycol packet 17 g (has no  administration in time range)   bisacodyL suppository 10 mg (has no administration in time range)   acetaminophen tablet 650 mg (has no administration in time range)   glucose chewable tablet 16 g (has no administration in time range)   glucose chewable tablet 24 g (has no administration in time range)   glucagon (human recombinant) injection 1 mg (has no administration in time range)   dextrose 10% bolus 125 mL 125 mL (has no administration in time range)   dextrose 10% bolus 250 mL 250 mL (has no administration in time range)   brinzolamide 1 % ophthalmic suspension 1 drop (has no administration in time range)   bumetanide tablet 0.5 mg (has no administration in time range)   clopidogreL tablet 75 mg (has no administration in time range)   apixaban tablet 5 mg (has no administration in time range)   metoprolol succinate (TOPROL-XL) 24 hr tablet 50 mg (has no administration in time range)     And   metoprolol succinate (TOPROL-XL) 24 hr tablet 25 mg (has no administration in time range)   levothyroxine tablet 88 mcg (has no administration in time range)   hydroCHLOROthiazide tablet 12.5 mg (has no administration in time range)   brimonidine 0.2% ophthalmic solution 1 drop (has no administration in time range)   timolol maleate 0.5% ophthalmic solution 1 drop (has no administration in time range)   piperacillin-tazobactam (ZOSYN) 4.5 g in dextrose 5 % in water (D5W) 5 % 100 mL IVPB (MB+) (has no administration in time range)   vancomycin - pharmacy to dose (has no administration in time range)   vancomycin 750 mg in dextrose 5 % (D5W) 250 mL IVPB (Vial-Mate) (750 mg Intravenous Trough Due As Scheduled Before Dose 3/14/23 1930)   piperacillin-tazobactam (ZOSYN) 4.5 g in dextrose 5 % in water (D5W) 5 % 100 mL IVPB (MB+) (0 g Intravenous Stopped 3/12/23 1824)   vancomycin 1,250 mg in dextrose 5 % (D5W) 250 mL IVPB (Vial-Mate) (1,250 mg Intravenous New Bag 3/12/23 1949)     Medical Decision Making:   History:   Old  "Medical Records: I decided to obtain old medical records.  Old Records Summarized: records from clinic visits.       <> Summary of Records: No recent ED visits or admissions  Initial Assessment:   89-year-old female presenting for episode of uncontrollable shaking chills.  Upon arrival in the ED she is hypertensive with otherwise normal vitals.  Afebrile in the ED.  Differential Diagnosis:   Differential is wide, concern for transient bacteremia, sepsis, UTI, pneumonia, dehydration or electrolyte derangement  Independently Interpreted Test(s):   I have ordered and independently interpreted X-rays - see summary below.       <> Summary of X-Ray Reading(s): Pulmonary edema, no focal consolidation  I have ordered and independently interpreted EKG Reading(s) - see prior notes  Clinical Tests:   Lab Tests: Ordered and Reviewed  Radiological Study: Ordered and Reviewed  Medical Tests: Ordered and Reviewed  Sepsis Perfusion Assessment: "I attest a sepsis perfusion exam was performed within 6 hours of sepsis, severe sepsis, or septic shock presentation, following fluid resuscitation."    Sepsis Perfusion Assessment Complete: 3/12/2023 10:08 PM    ED Management:  Will do septic workup and reassess.    Chest x-ray shows pulmonary edema, will hold IV fluids at this time given concern for fluid overload.  Labs notable for leukocytosis, broad-spectrum antibiotics ordered.  Patient will be admitted to Hospital Medicine for further management.  Patient and family comfortable with admission.           ED Course as of 03/12/23 2209   Sun Mar 12, 2023   1641 X-Ray Chest AP Portable  Significant pulmonary edema on chest x-ray.  Will hold fluids for now [CC]   1704 WBC(!): 15.60 [CC]   1705 POC Lactate: 1.86 [CC]   1737 SARS-CoV2 (COVID-19) Qualitative PCR: Not Detected [CC]   1743 TSH: 1.576 [CC]   1751 Procalcitonin: 0.04 [CC]      ED Course User Index  [CC] Karolina Barron PA-C                 Clinical Impression:   Final " diagnoses:  [R68.83] Shaking chills  [R68.89] Rigors (Primary)        ED Disposition Condition    Observation                 Karolina Barron PA-C  03/12/23 2539

## 2023-03-13 VITALS
OXYGEN SATURATION: 98 % | BODY MASS INDEX: 25.52 KG/M2 | TEMPERATURE: 98 F | HEART RATE: 62 BPM | WEIGHT: 144 LBS | DIASTOLIC BLOOD PRESSURE: 56 MMHG | HEIGHT: 63 IN | SYSTOLIC BLOOD PRESSURE: 120 MMHG | RESPIRATION RATE: 18 BRPM

## 2023-03-13 PROBLEM — J18.9 MULTIFOCAL PNEUMONIA: Status: ACTIVE | Noted: 2023-03-13

## 2023-03-13 LAB
AMORPH CRY UR QL COMP ASSIST: NORMAL
ANION GAP SERPL CALC-SCNC: 12 MMOL/L (ref 8–16)
ASCENDING AORTA: 2.52 CM
AV INDEX (PROSTH): 0.31
AV MEAN GRADIENT: 15 MMHG
AV PEAK GRADIENT: 25 MMHG
AV VALVE AREA: 0.97 CM2
AV VELOCITY RATIO: 0.31
BACTERIA #/AREA URNS AUTO: NORMAL /HPF
BASOPHILS # BLD AUTO: 0.04 K/UL (ref 0–0.2)
BASOPHILS NFR BLD: 0.3 % (ref 0–1.9)
BILIRUB UR QL STRIP: NEGATIVE
BNP SERPL-MCNC: 908 PG/ML (ref 0–99)
BSA FOR ECHO PROCEDURE: 1.7 M2
BUN SERPL-MCNC: 21 MG/DL (ref 8–23)
CALCIUM SERPL-MCNC: 9.6 MG/DL (ref 8.7–10.5)
CHLORIDE SERPL-SCNC: 97 MMOL/L (ref 95–110)
CLARITY UR REFRACT.AUTO: CLEAR
CO2 SERPL-SCNC: 24 MMOL/L (ref 23–29)
COLOR UR AUTO: YELLOW
CREAT SERPL-MCNC: 0.7 MG/DL (ref 0.5–1.4)
CV ECHO LV RWT: 0.62 CM
DIFFERENTIAL METHOD: ABNORMAL
DOP CALC AO PEAK VEL: 2.5 M/S
DOP CALC AO VTI: 55.76 CM
DOP CALC LVOT AREA: 3.1 CM2
DOP CALC LVOT DIAMETER: 2 CM
DOP CALC LVOT PEAK VEL: 0.78 M/S
DOP CALC LVOT STROKE VOLUME: 54.01 CM3
DOP CALCLVOT PEAK VEL VTI: 17.2 CM
ECHO LV POSTERIOR WALL: 1.17 CM (ref 0.6–1.1)
EJECTION FRACTION: 60 %
EOSINOPHIL # BLD AUTO: 0 K/UL (ref 0–0.5)
EOSINOPHIL NFR BLD: 0.1 % (ref 0–8)
ERYTHROCYTE [DISTWIDTH] IN BLOOD BY AUTOMATED COUNT: 17 % (ref 11.5–14.5)
EST. GFR  (NO RACE VARIABLE): >60 ML/MIN/1.73 M^2
ESTIMATED AVG GLUCOSE: 108 MG/DL (ref 68–131)
FRACTIONAL SHORTENING: 28 % (ref 28–44)
GLUCOSE SERPL-MCNC: 116 MG/DL (ref 70–110)
GLUCOSE UR QL STRIP: ABNORMAL
HBA1C MFR BLD: 5.4 % (ref 4–5.6)
HCT VFR BLD AUTO: 33.8 % (ref 37–48.5)
HGB BLD-MCNC: 10.9 G/DL (ref 12–16)
HGB UR QL STRIP: NEGATIVE
IMM GRANULOCYTES # BLD AUTO: 0.06 K/UL (ref 0–0.04)
IMM GRANULOCYTES NFR BLD AUTO: 0.4 % (ref 0–0.5)
INTERVENTRICULAR SEPTUM: 1.08 CM (ref 0.6–1.1)
KETONES UR QL STRIP: NEGATIVE
LA MAJOR: 5.82 CM
LA MINOR: 5.37 CM
LA WIDTH: 4.85 CM
LEFT ATRIUM SIZE: 4.64 CM
LEFT ATRIUM VOLUME INDEX: 63.6 ML/M2
LEFT ATRIUM VOLUME: 106.85 CM3
LEFT INTERNAL DIMENSION IN SYSTOLE: 2.7 CM (ref 2.1–4)
LEFT VENTRICLE DIASTOLIC VOLUME INDEX: 35.76 ML/M2
LEFT VENTRICLE DIASTOLIC VOLUME: 60.07 ML
LEFT VENTRICLE MASS INDEX: 81 G/M2
LEFT VENTRICLE SYSTOLIC VOLUME INDEX: 16.1 ML/M2
LEFT VENTRICLE SYSTOLIC VOLUME: 26.98 ML
LEFT VENTRICULAR INTERNAL DIMENSION IN DIASTOLE: 3.75 CM (ref 3.5–6)
LEFT VENTRICULAR MASS: 136.44 G
LEUKOCYTE ESTERASE UR QL STRIP: NEGATIVE
LYMPHOCYTES # BLD AUTO: 0.9 K/UL (ref 1–4.8)
LYMPHOCYTES NFR BLD: 6.6 % (ref 18–48)
MAGNESIUM SERPL-MCNC: 2 MG/DL (ref 1.6–2.6)
MCH RBC QN AUTO: 28.1 PG (ref 27–31)
MCHC RBC AUTO-ENTMCNC: 32.2 G/DL (ref 32–36)
MCV RBC AUTO: 87 FL (ref 82–98)
MICROSCOPIC COMMENT: NORMAL
MONOCYTES # BLD AUTO: 0.8 K/UL (ref 0.3–1)
MONOCYTES NFR BLD: 5.8 % (ref 4–15)
NEUTROPHILS # BLD AUTO: 11.8 K/UL (ref 1.8–7.7)
NEUTROPHILS NFR BLD: 86.8 % (ref 38–73)
NITRITE UR QL STRIP: NEGATIVE
NRBC BLD-RTO: 0 /100 WBC
PH UR STRIP: 6 [PH] (ref 5–8)
PHOSPHATE SERPL-MCNC: 2.5 MG/DL (ref 2.7–4.5)
PISA TR MAX VEL: 3.84 M/S
PLATELET # BLD AUTO: 223 K/UL (ref 150–450)
PMV BLD AUTO: 10.1 FL (ref 9.2–12.9)
POTASSIUM SERPL-SCNC: 3.1 MMOL/L (ref 3.5–5.1)
PROT UR QL STRIP: NEGATIVE
RA MAJOR: 6.27 CM
RA PRESSURE: 8 MMHG
RA WIDTH: 4.96 CM
RBC # BLD AUTO: 3.88 M/UL (ref 4–5.4)
RBC #/AREA URNS AUTO: 2 /HPF (ref 0–4)
RIGHT VENTRICULAR END-DIASTOLIC DIMENSION: 3.58 CM
SINUS: 2.7 CM
SODIUM SERPL-SCNC: 133 MMOL/L (ref 136–145)
SP GR UR STRIP: 1.02 (ref 1–1.03)
SQUAMOUS #/AREA URNS AUTO: 1 /HPF
STJ: 2.28 CM
TR MAX PG: 59 MMHG
TROPONIN I SERPL DL<=0.01 NG/ML-MCNC: 0.02 NG/ML (ref 0–0.03)
TV REST PULMONARY ARTERY PRESSURE: 67 MMHG
URN SPEC COLLECT METH UR: ABNORMAL
WBC # BLD AUTO: 13.62 K/UL (ref 3.9–12.7)
WBC #/AREA URNS AUTO: 1 /HPF (ref 0–5)
YEAST UR QL AUTO: NORMAL

## 2023-03-13 PROCEDURE — 81001 URINALYSIS AUTO W/SCOPE: CPT | Performed by: PHYSICIAN ASSISTANT

## 2023-03-13 PROCEDURE — 84484 ASSAY OF TROPONIN QUANT: CPT

## 2023-03-13 PROCEDURE — 99239 PR HOSPITAL DISCHARGE DAY,>30 MIN: ICD-10-PCS | Mod: ,,,

## 2023-03-13 PROCEDURE — 25000003 PHARM REV CODE 250

## 2023-03-13 PROCEDURE — 96366 THER/PROPH/DIAG IV INF ADDON: CPT

## 2023-03-13 PROCEDURE — 63600175 PHARM REV CODE 636 W HCPCS

## 2023-03-13 PROCEDURE — 84100 ASSAY OF PHOSPHORUS: CPT | Performed by: PHYSICIAN ASSISTANT

## 2023-03-13 PROCEDURE — 11000001 HC ACUTE MED/SURG PRIVATE ROOM

## 2023-03-13 PROCEDURE — 63700000 PHARM REV CODE 250 ALT 637 W/O HCPCS

## 2023-03-13 PROCEDURE — 25000003 PHARM REV CODE 250: Performed by: PHYSICIAN ASSISTANT

## 2023-03-13 PROCEDURE — 80048 BASIC METABOLIC PNL TOTAL CA: CPT | Performed by: PHYSICIAN ASSISTANT

## 2023-03-13 PROCEDURE — 83036 HEMOGLOBIN GLYCOSYLATED A1C: CPT | Performed by: PHYSICIAN ASSISTANT

## 2023-03-13 PROCEDURE — 63600175 PHARM REV CODE 636 W HCPCS: Performed by: HOSPITALIST

## 2023-03-13 PROCEDURE — 63600175 PHARM REV CODE 636 W HCPCS: Performed by: PHYSICIAN ASSISTANT

## 2023-03-13 PROCEDURE — 99239 HOSP IP/OBS DSCHRG MGMT >30: CPT | Mod: ,,,

## 2023-03-13 PROCEDURE — 83880 ASSAY OF NATRIURETIC PEPTIDE: CPT

## 2023-03-13 PROCEDURE — 85025 COMPLETE CBC W/AUTO DIFF WBC: CPT | Performed by: PHYSICIAN ASSISTANT

## 2023-03-13 PROCEDURE — 83735 ASSAY OF MAGNESIUM: CPT | Performed by: PHYSICIAN ASSISTANT

## 2023-03-13 PROCEDURE — 36415 COLL VENOUS BLD VENIPUNCTURE: CPT

## 2023-03-13 RX ORDER — AZITHROMYCIN 250 MG/1
500 TABLET, FILM COATED ORAL ONCE
Status: COMPLETED | OUTPATIENT
Start: 2023-03-13 | End: 2023-03-13

## 2023-03-13 RX ORDER — MAGNESIUM SULFATE HEPTAHYDRATE 40 MG/ML
2 INJECTION, SOLUTION INTRAVENOUS ONCE
Status: COMPLETED | OUTPATIENT
Start: 2023-03-13 | End: 2023-03-13

## 2023-03-13 RX ORDER — POTASSIUM CHLORIDE 750 MG/1
30 CAPSULE, EXTENDED RELEASE ORAL
Status: COMPLETED | OUTPATIENT
Start: 2023-03-13 | End: 2023-03-13

## 2023-03-13 RX ORDER — AZITHROMYCIN 250 MG/1
250 TABLET, FILM COATED ORAL DAILY
Status: DISCONTINUED | OUTPATIENT
Start: 2023-03-14 | End: 2023-03-13 | Stop reason: HOSPADM

## 2023-03-13 RX ORDER — AZITHROMYCIN 250 MG/1
250 TABLET, FILM COATED ORAL DAILY
Qty: 4 TABLET | Refills: 0 | Status: SHIPPED | OUTPATIENT
Start: 2023-03-14 | End: 2023-03-18

## 2023-03-13 RX ORDER — NITROGLYCERIN 0.4 MG/1
0.4 TABLET SUBLINGUAL EVERY 5 MIN PRN
Status: DISCONTINUED | OUTPATIENT
Start: 2023-03-13 | End: 2023-03-13 | Stop reason: HOSPADM

## 2023-03-13 RX ADMIN — POTASSIUM CHLORIDE 30 MEQ: 10 CAPSULE, COATED, EXTENDED RELEASE ORAL at 10:03

## 2023-03-13 RX ADMIN — CLOPIDOGREL BISULFATE 75 MG: 75 TABLET ORAL at 09:03

## 2023-03-13 RX ADMIN — BRINZOLAMIDE 1 DROP: 10 SUSPENSION/ DROPS OPHTHALMIC at 12:03

## 2023-03-13 RX ADMIN — MAGNESIUM SULFATE 2 G: 2 INJECTION INTRAVENOUS at 10:03

## 2023-03-13 RX ADMIN — BUMETANIDE 0.5 MG: 0.5 TABLET ORAL at 09:03

## 2023-03-13 RX ADMIN — TIMOLOL MALEATE 1 DROP: 5 SOLUTION/ DROPS OPHTHALMIC at 12:03

## 2023-03-13 RX ADMIN — BRIMONIDINE TARTRATE 1 DROP: 2 SOLUTION OPHTHALMIC at 09:03

## 2023-03-13 RX ADMIN — AZITHROMYCIN MONOHYDRATE 500 MG: 250 TABLET ORAL at 03:03

## 2023-03-13 RX ADMIN — PIPERACILLIN AND TAZOBACTAM 4.5 G: 4; .5 INJECTION, POWDER, LYOPHILIZED, FOR SOLUTION INTRAVENOUS; PARENTERAL at 10:03

## 2023-03-13 RX ADMIN — HYDROCHLOROTHIAZIDE 12.5 MG: 12.5 TABLET ORAL at 09:03

## 2023-03-13 RX ADMIN — METOPROLOL SUCCINATE 50 MG: 50 TABLET, EXTENDED RELEASE ORAL at 09:03

## 2023-03-13 RX ADMIN — BRINZOLAMIDE 1 DROP: 10 SUSPENSION/ DROPS OPHTHALMIC at 09:03

## 2023-03-13 RX ADMIN — POTASSIUM CHLORIDE 30 MEQ: 10 CAPSULE, COATED, EXTENDED RELEASE ORAL at 11:03

## 2023-03-13 RX ADMIN — CEFTRIAXONE 1 G: 1 INJECTION, POWDER, FOR SOLUTION INTRAMUSCULAR; INTRAVENOUS at 03:03

## 2023-03-13 RX ADMIN — LEVOTHYROXINE SODIUM 88 MCG: 88 TABLET ORAL at 06:03

## 2023-03-13 RX ADMIN — BRIMONIDINE TARTRATE 1 DROP: 2 SOLUTION OPHTHALMIC at 12:03

## 2023-03-13 RX ADMIN — PIPERACILLIN AND TAZOBACTAM 4.5 G: 4; .5 INJECTION, POWDER, LYOPHILIZED, FOR SOLUTION INTRAVENOUS; PARENTERAL at 02:03

## 2023-03-13 RX ADMIN — APIXABAN 5 MG: 5 TABLET, FILM COATED ORAL at 09:03

## 2023-03-13 NOTE — HPI
Dorothy M Knobloch is a 89 y.o. 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 Patient reports an episode of shaking chills occurred earlier today. She adjusted the thermostat at the facility at which she is staying but she still felt very chills despite turning it up to 83 degrees.  She then had an episode of severe chills with uncontrollable shaking. The shaking has since stopped, she feels generally unwell but as no other specific complaints at this time. 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 She also notes right shoulder pain x1 months which she thinks is due to hx of torn rotator cuff. Also endorses recent increased urinary frequency but denies dysuria, hematuria or flank pain. Denies chest pain, abdominal pain, vomiting, sore throat wounds, cuts or sores. No sick contacts.     ED: Temp 98.4, HR 90, RR 24, /90. Leukocytosis of WBC 15.60. Flu, covid negative. CXR shows nonspecific interstitial coarsening with patchy ground-glass opacities concerning for CHF vs aspiration vs PNA. Given IV vanc and zosyn.

## 2023-03-13 NOTE — NURSING
Care provided as ordered during shift  Discharge instructions reviewed with pt and daughter at bedside  Understanding verbalized  Questions encouraged and answered  IV and telemetry monitor removed  No falls or injuries noted during shift  Pt dressed appropriately for weather

## 2023-03-13 NOTE — ED NOTES
"Pt presents from assisted living c/o "severe chills" sudden onset. Denies cough, dysuria, fevers. States she talked to cardiologist who said she should report to ed to rule out infection.  On arrival pt looks short of breath and tachypneic but states this is normal for  her due to" a fib" .   Pt a&ox4,skin warm and dry. Breath sounds clear and equal bilat, tachypneic rate 22. Abd soft non tender,denies abd pain, nausea, vomiting. Moving all ext appropriately. No difficulties urinating although she reports having to urinate more frequently lately. Heart rate paced rate 62 on monitors. 96% on ra  "

## 2023-03-13 NOTE — PLAN OF CARE
[facility]  Discharge Final Note    Primary Care Provider: Lola Wolff MD    Expected Discharge Date: 3/13/2023    Pt discharged home with no services.  Jerrell Shaw, RN,BSN        Final Discharge Note (most recent)       Final Note - 03/13/23 1631          Final Note    Assessment Type Final Discharge Note     Anticipated Discharge Disposition Home or Self Care     Hospital Resources/Appts/Education Provided Provided patient/caregiver with written discharge plan information;Appointments scheduled and added to AVS        Post-Acute Status    Discharge Delays None known at this time                     Important Message from Medicare             Contact Info       Lola Wolff MD   Specialty: Family Medicine   Relationship: PCP - [facility]  [address] [place] [address]  [address] [place] [id number]   Phone: [phone]       Next Steps: Follow up    Instructions: The Clinic Nurse will call you with an appointment. If you do not hear from them in 48 hrs, please call [phone]

## 2023-03-13 NOTE — PROGRESS NOTES
Therapy with vancomycin complete and/or consult discontinued by provider.  Pharmacy will sign off, please re-consult as needed.    Jose Rafael Mckeon, PharmD  Ext: [phone]

## 2023-03-13 NOTE — CONSULTS
[facility]  Physical Medicine & Rehab  Consult Note    Patient Name: Dorothy M Knobloch  MRN: [id number]  Admission Date: 1/28/2018  Hospital Length of Stay: 0 days  Attending Physician: Eleno Park MD     Inpatient consult to Physical Medicine & Rehabilitation  Consult performed by: Cathy Cheek NP  Consult requested by:  Eleno Park MD    Reason for Consult:  assess rehabilitation needs    Consults  Subjective:     Principal Problem: CRAO (central retinal artery occlusion), left    HPI: Dorothy Knobloch is a 85-year-old female with PMHx of thyroid disease, SSS (s/p PPM), HLP, HTN, CHF, and macular degeneration.  Patient presented to [facility] on 1/28 with decreased vision of the L eye without trauma.  Opthalmology were consulted and recommend VN consult for central retinal artery occlusion.  She previously had a Echo in 2016 which revealed 50-59% stenosis of bilateral carotid arteries, most likely source of emboli per Opthalmology.  CTA revealed significant atherosclerosis of the carotid bulbs bilaterally with a partially calcified structure centered within the posterior fossa.    Echo pending.     Functional History: Patient lives in [place] with  in a two story home with 1 step to enter.  Prior to admission, (I) with ADLs, mobility, and driving.  DME: [facility].    Hospital Course: 1/29/18: Evaluated by OT.  PT evaluation pending.  Bed mobility and sit to stand (I).  Transfers Mod (I).  UBD and LBD (I).      Past Medical History:   Diagnosis Date    Anticoagulant long-term use     Arthritis     Bilateral nonexudative age-related macular degeneration 8/27/2013    Blood transfusion     Cataract     CHF (congestive heart failure)     Enlarged heart     High cholesterol     Hypertension     Sick sinus syndrome     Stroke     Thyroid disease      Past Surgical History:   Procedure Laterality Date    CARDIAC PACEMAKER PLACEMENT      CATARACT EXTRACTION Right     JOINT  Patient safe to DC home per MD. DC instructions reviewed with patient, including when and how to follow up. Patient verbalizes understanding. REPLACEMENT      SKIN BIOPSY      TONSILLECTOMY      TOTAL HIP ARTHROPLASTY  11-1-2000    right/[facility]     Review of patient's allergies indicates:   Allergen Reactions    Aspartame Swelling     equal       Scheduled Medications:    amLODIPine  10 mg Oral Daily    aspirin  325 mg Oral Daily    atorvastatin  40 mg Oral Daily    clopidogrel  75 mg Oral Daily    doxazosin  1 mg Oral QHS    heparin (porcine)  5,000 Units Subcutaneous Q8H    hydroCHLOROthiazide  12.5 mg Oral Daily    levothyroxine  88 mcg Oral Before breakfast    metoprolol tartrate  25 mg Oral BID    sodium chloride 0.9%  3 mL Intravenous Q8H    valsartan  320 mg Oral Daily       PRN Medications: labetalol, sodium chloride 0.9%    Family History     Problem Relation (Age of Onset)    Arthritis Sister, Maternal Grandmother, Paternal Grandmother    Birth defects Son    Cancer Paternal Aunt (80)    Diabetes Paternal Aunt, Paternal Uncle    Early death Mother, Father    Hearing loss Daughter    Hypertension Son, Paternal Grandmother        Social History Main Topics    Smoking status: Never Smoker    Smokeless tobacco: Never Used    Alcohol use No    Drug use: No    Sexual activity: Not on file     Review of Systems   Constitutional: Negative for chills, fatigue and fever.   HENT: Negative for facial swelling, trouble swallowing and voice change.    Eyes: Positive for visual disturbance. Negative for discharge.   Respiratory: Negative for cough, shortness of breath and wheezing.    Cardiovascular: Negative for chest pain and palpitations.   Gastrointestinal: Negative for abdominal distention, nausea and vomiting.   Genitourinary: Negative for difficulty urinating and flank pain.   Musculoskeletal: Negative for arthralgias and gait problem.   Skin: Negative for color change and rash.   Neurological: Positive for numbness. Negative for facial asymmetry, speech difficulty, weakness and headaches.        Paresthesias to L foot     Psychiatric/Behavioral: Negative for agitation and confusion.     Objective:     Vital Signs (Most Recent):  Temp: 98.1 °F (36.7 °C) (01/29/18 0832)  Pulse: 65 (01/29/18 0832)  Resp: 17 (01/29/18 0832)  BP: 137/60 (01/29/18 0832)  SpO2: (!) 94 % (01/29/18 0832)    Vital Signs (24h Range):  Temp:  [97.7 °F (36.5 °C)-98.2 °F (36.8 °C)] 98.1 °F (36.7 °C)  Pulse:  [59-79] 65  Resp:  [13-20] 17  SpO2:  [91 %-100 %] 94 %  BP: (127-161)/(57-74) 137/60     Body mass index is 28.4 kg/m².    Physical Exam   Constitutional: She is oriented to person, place, and time. She appears well-developed and well-nourished.   HENT:   Head: Normocephalic and atraumatic.   Eyes: EOM are normal. Partial L hemianopsia.   Neck: Normal range of motion.   Cardiovascular: Normal rate and regular rhythm.    Pulmonary/Chest: No respiratory distress.   Abdominal: Soft. There is no tenderness.   Musculoskeletal: Normal range of motion. She exhibits no deformity.   Neurological: She is alert and oriented to person, place, and time. A sensory deficit (L foot) is present. She exhibits normal muscle tone.   RUE: 5/5.  LUE: 5/5.  RLE: 4+/5.  LLE: 5/5.  Skin: Skin is warm and dry.   Psychiatric: She has a normal mood and affect. Her behavior is normal. Cognition and memory are not impaired.   Vitals reviewed.        Diagnostic Results:   Labs: Reviewed  X-Ray: Reviewed  CT: Reviewed    Assessment/Plan:     * CRAO (central retinal artery occlusion), left    -presented with decreased L vision without trauma  opthalmology consult and concern for CRAO given previous Echo with notable stenosis  - likely embolic etiology for CRAO  -VN consulted for evaluation of carotid stenosis  -Echo pending        NS (nuclear sclerosis)    -opthalmology consulted   -vision stable per patient         Patient is high level with OT.  PT evaluation pending.  Likely will not meet criteria for IPR.  Will follow and discuss with rehab team for rehab recommendation.      Thank you  for your consult.     Cathy Cheek NP  Department of Physical Medicine & Rehab  [facility]

## 2023-03-13 NOTE — H&P
[facility] - Emergency Dept  [facility] Medicine  History & Physical    Patient Name: Dorothy M Knobloch  MRN: [id number]  Patient Class: OP- Observation  Admission Date: 3/12/2023  Attending Physician: Katie Sanz MD   Primary Care Provider: Lola Wolff MD         Patient information was obtained from patient, relative(s), past medical records and ER records.     Subjective:     Principal Problem:Sepsis without acute organ dysfunction    Chief Complaint:   Chief Complaint   Patient presents with    Chills     States she feels like she has a fever. EMS took temp an not fever noted. No other complaints at this time.        HPI: Dorothy M Knobloch is a 89 y.o. female with a PMHx of HTN, HLD, SSS s/p PPM, pAF, CRAO, reported hx endocarditis (2017), HFpEF who presents to [facility] for evaluation of chills. Patient reports an episode of shaking chills occurred earlier today. She adjusted the thermostat at the facility at which she is staying but she still felt very chills despite turning it up to 83 degrees.  She then had an episode of severe chills with uncontrollable shaking. The shaking has since stopped, she feels generally unwell but as no other specific complaints at this time. 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 She also notes right shoulder pain x1 months which she thinks is due to hx of torn rotator cuff. Also endorses recent increased urinary frequency but denies dysuria, hematuria or flank pain. Denies chest pain, abdominal pain, vomiting, sore throat wounds, cuts or sores. No sick contacts.     ED: Temp 98.4, HR 90, RR 24, /90. Leukocytosis of WBC 15.60. Flu, covid negative. CXR shows nonspecific interstitial coarsening with patchy ground-glass opacities concerning for CHF vs aspiration vs PNA. Given IV vanc and zosyn.       Past Medical History:   Diagnosis Date    Anticoagulant long-term use     Arthritis     Bilateral  nonexudative age-related macular degeneration 8/27/2013    Blood transfusion     Cataract     CHF (congestive heart failure)     Enlarged heart     High cholesterol     Hypertension     Sick sinus syndrome     Stroke     Thyroid disease        Past Surgical History:   Procedure Laterality Date    CARDIAC PACEMAKER PLACEMENT      CARPAL TUNNEL RELEASE Bilateral 1991    CATARACT EXTRACTION Right     JOINT REPLACEMENT      NE TRANSCRAN DOPP INTRACRAN, EMBOLI W/O INJ  01/29/2018         SKIN BIOPSY      TONSILLECTOMY      TOTAL HIP ARTHROPLASTY  11/01/2000    right/[facility]       Review of patient's allergies indicates:   Allergen Reactions    Aspartame Swelling     equal       No current facility-administered medications on file prior to encounter.     Current Outpatient Medications on File Prior to Encounter   Medication Sig    albuterol (PROVENTIL/VENTOLIN HFA) 90 mcg/actuation inhaler INHALE 2 PUFFS INTO THE LUNGS EVERY 6 (SIX) HOURS AS NEEDED FOR WHEEZING. RESCUE    amLODIPine (NORVASC) 5 MG tablet TAKE 1 TABLET BY MOUTH EVERY DAY    benzonatate (TESSALON) 100 MG capsule Take 1 capsule (100 mg total) by mouth 3 (three) times daily as needed for Cough.    brinzolamide (AZOPT) 1 % ophthalmic suspension PLACE 1 DROP INTO THE LEFT EYE 2 TIMES A DAY.    bumetanide (BUMEX) 0.5 MG Tab Take 1 tablet (0.5 mg total) by mouth 2 (two) times daily.    cholecalciferol, vitamin D3, 50,000 unit capsule Take 50,000 Units by mouth every 7 days.    clopidogreL (PLAVIX) 75 mg tablet Take 1 tablet (75 mg total) by mouth once daily.    clopidogreL (PLAVIX) 75 mg tablet TAKE 1 TABLET BY MOUTH EVERY DAY    COMBIGAN 0.2-0.5 % Drop INSTILL 1 DROP INTO LEFT EYE TWICE A DAY    ELIQUIS 5 mg Tab TAKE 1 TABLET BY MOUTH TWICE A DAY    hydroCHLOROthiazide (HYDRODIURIL) 12.5 MG Tab TAKE 1 TABLET BY MOUTH EVERY DAY    levothyroxine (SYNTHROID) 88 MCG tablet Take 88 mcg by mouth before breakfast.     metoprolol succinate (TOPROL-XL) 25 MG 24  hr tablet Take 2 tablets in the AM and 1 tablet in the evening    NITROSTAT 0.4 mg SL tablet     PRALUENT PEN 75 mg/mL PnIj      Family History       Problem Relation (Age of Onset)    Arthritis Sister, Maternal Grandmother, Paternal Grandmother    Birth defects Son    Cancer Paternal Aunt (80)    Diabetes Paternal Aunt, Paternal Uncle    Early death Mother, Father    Hearing loss Daughter    Hypertension Son, Paternal Grandmother          Tobacco Use    Smoking status: Never    Smokeless tobacco: Never   Substance and Sexual Activity    Alcohol use: No     Alcohol/week: 0.0 standard drinks    Drug use: No    Sexual activity: Not on file     Review of Systems   Constitutional:  Positive for chills. Negative for fatigue and fever.   HENT:  Negative for congestion, trouble swallowing and voice change.    Eyes:  Positive for visual disturbance (chronic L eye blindness). Negative for photophobia.   Respiratory:  Positive for shortness of breath (chronic). Negative for cough, chest tightness and wheezing.    Cardiovascular:  Negative for chest pain, palpitations and leg swelling.   Gastrointestinal:  Negative for abdominal distention, abdominal pain, constipation, diarrhea, nausea and vomiting.   Endocrine: Negative for polyphagia and polyuria.   Genitourinary:  Positive for frequency. Negative for difficulty urinating, dysuria, flank pain and hematuria.   Musculoskeletal:  Positive for arthralgias. Negative for back pain and gait problem.   Skin:  Negative for color change and wound.   Neurological:  Negative for dizziness, seizures, syncope, speech difficulty, weakness and light-headedness.   Hematological:  Bruises/bleeds easily.   Psychiatric/Behavioral:  Negative for behavioral problems, confusion and decreased concentration.    Objective:     Vital Signs (Most Recent):  Temp: 99 °F (37.2 °C) (03/12/23 1930)  Pulse: 60 (03/12/23 1930)  Resp: (!) 24 (03/12/23 1930)  BP: (!) 155/85 (03/12/23 1930)  SpO2: 95 %  (03/12/23 1930)   Vital Signs (24h Range):  Temp:  [98.4 °F (36.9 °C)-99 °F (37.2 °C)] 99 °F (37.2 °C)  Pulse:  [60-90] 60  Resp:  [16-24] 24  SpO2:  [95 %-99 %] 95 %  BP: (155-161)/(85-90) 155/85     Weight: 65.3 kg (144 lb)  Body mass index is 25.51 kg/m².    Physical Exam  Vitals and nursing note reviewed.   Constitutional:       General: She is not in acute distress.     Appearance: She is well-developed.   HENT:      Head: Normocephalic and atraumatic.      Mouth/Throat:      Pharynx: No oropharyngeal exudate.   Eyes:      General: No scleral icterus.     Extraocular Movements: Extraocular movements intact.      Conjunctiva/sclera: Conjunctivae normal.   Cardiovascular:      Rate and Rhythm: Normal rate and regular rhythm.      Heart sounds: Normal heart sounds.   Pulmonary:      Effort: Pulmonary effort is normal. No respiratory distress.      Breath sounds: No wheezing.      Comments: Diminished BS to BLL  Abdominal:      General: Bowel sounds are normal. There is no distension.      Palpations: Abdomen is soft.      Tenderness: There is no abdominal tenderness.   Musculoskeletal:         General: No tenderness. Normal range of motion.      Cervical back: Normal range of motion and neck supple.      Right lower leg: Edema present.      Left lower leg: Edema present.   Lymphadenopathy:      Cervical: No cervical adenopathy.   Skin:     General: Skin is warm and dry.      Capillary Refill: Capillary refill takes less than 2 seconds.      Findings: No rash.   Neurological:      Mental Status: She is alert and oriented to person, place, and time.      Cranial Nerves: No cranial nerve deficit.      Sensory: No sensory deficit.      Coordination: Coordination normal.   Psychiatric:         Behavior: Behavior normal.         Thought Content: Thought content normal.         Judgment: Judgment normal.           Significant Labs: All pertinent labs within the past 24 hours have been reviewed.  CBC:   Recent Labs   Lab  03/12/23  1642   WBC 15.60*   HGB 12.3   HCT 39.2        CMP:   Recent Labs   Lab 03/12/23  1642   *   K 4.0   CL 97   CO2 25      BUN 23   CREATININE 0.9   CALCIUM 10.2   PROT 7.5   ALBUMIN 4.1   BILITOT 0.6   ALKPHOS 69   AST 23   ALT 10   ANIONGAP 12       Significant Imaging: I have reviewed all pertinent imaging results/findings within the past 24 hours.  X-Ray Chest AP Portable  Narrative: EXAMINATION:  XR CHEST AP PORTABLE    CLINICAL HISTORY:  Sepsis;    TECHNIQUE:  Single frontal view of the chest was performed.    COMPARISON:  Chest radiograph 11/01/2019 and CT thorax 11/15/2019    FINDINGS:  Monitoring leads overlie the chest.  Large body habitus.  Patient is rotated.    Left chest dual lead ICD unchanged.  Cardiomediastinal silhouette is midline and stable.  Hilar contours are within normal limits.  Interval increased nonspecific interstitial coarsening with patchy ground glass opacities at the bilateral mid to lower lung zones, slightly more so on the left.  Scattered linear opacities consistent with platelike scarring versus atelectasis.  No consolidation, pleural effusion or pneumothorax.  Biapical pleuroparenchymal scarring.  No acute osseous process seen.  Impression: Left more than right mid to lower lung zone interval increased nonspecific interstitial coarsening with patchy ground-glass opacities.  Differential considerations can include worsening pulmonary edema/CHF pattern with subsegmental atelectasis; however, superimposed aspiration or pneumonia not excluded.    Electronically signed by: Albino Adkins MD  Date:    03/12/2023  Time:    17:17        Assessment/Plan:     * Sepsis without acute organ dysfunction  - 3/4 SIRS for HR>90, RR>20, WBC>12  - procal WNL  - lactate pending  - source unclear-- possible PNA on CXR?  - continue BS IV vanc and zosyn  - follow UA and BCx  - check TTE to eval valves/ PPM for vegetations given hx endocarditis  - hold on IVFs for now as  appears volume overloaded on exam    Acute on chronic diastolic congestive heart failure  - appears volume overloased on exam  - dry weight ~137, admit weight 144lbs  - BNP pending, CXR with pulm edema  - takes bumex PRN at home-- will schedule BID here  - follow up TTE  - strict I/Os, daily weights    Right shoulder pain  - pain with ROM x 1 month  - mildly warm but no swelling or erythema  - check XR  - tylenol PRN    Paroxysmal atrial fibrillation  - continue MTP and eliquis     Cardiac pacemaker  - follow up TTE    Neovascular glaucoma, left eye  - continue eye drops     Carotid artery disease  - continue plavix     Hypothyroidism  - continue synthroid    Essential hypertension  - hold home amlodipine given sepsis-- can resume in AM if BP still elevated  - continue HCTZ only for now      VTE Risk Mitigation (From admission, onward)           Ordered     apixaban tablet 5 mg  2 times daily         03/12/23 1948     IP VTE HIGH RISK PATIENT  Once         03/12/23 1919                       Patt Nguyen PA-C  Department of Hospital Medicine   [facility] - Emergency Dept

## 2023-03-13 NOTE — PLAN OF CARE
Referrals sent to Home Health Agencies via [employer]     03/13/23 6320   Post-Acute Status   Post-Acute Authorization Home Health   Home Health Status Referrals Sent   Discharge Plan   Discharge Plan A Home Health   Discharge Plan B Home Health     Jerrell Shaw RN,BSN

## 2023-03-13 NOTE — ED TRIAGE NOTES
"Pt reports today at her assisted living she had an episode of "severe chills" states due to  her cardiac hx (a fib and pacemaker) she called her cardiologist who recommended her present to ED to rule out infection. Pt denies cough, fevers, chills, dysuria.  "

## 2023-03-13 NOTE — ASSESSMENT & PLAN NOTE
- appears volume overloased on exam  - dry weight ~137, admit weight 144lbs  - BNP pending, CXR with pulm edema  - takes bumex PRN at home-- will schedule BID here  - follow up TTE  - strict I/Os, daily weights

## 2023-03-13 NOTE — ASSESSMENT & PLAN NOTE
- hold home amlodipine given sepsis-- can resume in AM if BP still elevated  - continue HCTZ only for now

## 2023-03-13 NOTE — ASSESSMENT & PLAN NOTE
- 3/4 SIRS for HR>90, RR>20, WBC>12  - procal WNL  - lactate pending  - source unclear-- possible PNA on CXR?  - continue BS IV vanc and zosyn  - follow UA and BCx  - check TTE to eval valves/ PPM for vegetations given hx endocarditis  - hold on IVFs for now as appears volume overloaded on exam

## 2023-03-13 NOTE — SUBJECTIVE & OBJECTIVE
Past Medical History:   Diagnosis Date    Anticoagulant long-term use     Arthritis     Bilateral nonexudative age-related macular degeneration 8/27/2013    Blood transfusion     Cataract     CHF (congestive heart failure)     Enlarged heart     High cholesterol     Hypertension     Sick sinus syndrome     Stroke     Thyroid disease        Past Surgical History:   Procedure Laterality Date    CARDIAC PACEMAKER PLACEMENT      CARPAL TUNNEL RELEASE Bilateral 1991    CATARACT EXTRACTION Right     JOINT REPLACEMENT      [place] TRANSCRAN DOPP INTRACRAN, EMBOLI W/O INJ  01/29/2018         SKIN BIOPSY      TONSILLECTOMY      TOTAL HIP ARTHROPLASTY  11/01/2000    right/[facility]       Review of patient's allergies indicates:   Allergen Reactions    Aspartame Swelling     equal       No current facility-administered medications on file prior to encounter.     Current Outpatient Medications on File Prior to Encounter   Medication Sig    albuterol (PROVENTIL/VENTOLIN HFA) 90 mcg/actuation inhaler INHALE 2 PUFFS INTO THE LUNGS EVERY 6 (SIX) HOURS AS NEEDED FOR WHEEZING. RESCUE    amLODIPine (NORVASC) 5 MG tablet TAKE 1 TABLET BY MOUTH EVERY DAY    benzonatate (TESSALON) 100 MG capsule Take 1 capsule (100 mg total) by mouth 3 (three) times daily as needed for Cough.    brinzolamide (AZOPT) 1 % ophthalmic suspension PLACE 1 DROP INTO THE LEFT EYE 2 TIMES A DAY.    bumetanide (BUMEX) 0.5 MG Tab Take 1 tablet (0.5 mg total) by mouth 2 (two) times daily.    cholecalciferol, vitamin D3, 50,000 unit capsule Take 50,000 Units by mouth every 7 days.    clopidogreL (PLAVIX) 75 mg tablet Take 1 tablet (75 mg total) by mouth once daily.    clopidogreL (PLAVIX) 75 mg tablet TAKE 1 TABLET BY MOUTH EVERY DAY    COMBIGAN 0.2-0.5 % Drop INSTILL 1 DROP INTO LEFT EYE TWICE A DAY    ELIQUIS 5 mg Tab TAKE 1 TABLET BY MOUTH TWICE A DAY    hydroCHLOROthiazide (HYDRODIURIL) 12.5 MG Tab TAKE 1 TABLET BY MOUTH EVERY DAY    levothyroxine (SYNTHROID)  88 MCG tablet Take 88 mcg by mouth before breakfast.     metoprolol succinate (TOPROL-XL) 25 MG 24 hr tablet Take 2 tablets in the AM and 1 tablet in the evening    NITROSTAT 0.4 mg SL tablet     PRALUENT PEN 75 mg/mL PnIj      Family History       Problem Relation (Age of Onset)    Arthritis Sister, Maternal Grandmother, Paternal Grandmother    Birth defects Son    Cancer Paternal Aunt (80)    Diabetes Paternal Aunt, Paternal Uncle    Early death Mother, Father    Hearing loss Daughter    Hypertension Son, Paternal Grandmother          Tobacco Use    Smoking status: Never    Smokeless tobacco: Never   Substance and Sexual Activity    Alcohol use: No     Alcohol/week: 0.0 standard drinks    Drug use: No    Sexual activity: Not on file     Review of Systems   Constitutional:  Positive for chills. Negative for fatigue and fever.   HENT:  Negative for congestion, trouble swallowing and voice change.    Eyes:  Positive for visual disturbance (chronic L eye blindness). Negative for photophobia.   Respiratory:  Positive for shortness of breath (chronic). Negative for cough, chest tightness and wheezing.    Cardiovascular:  Negative for chest pain, palpitations and leg swelling.   Gastrointestinal:  Negative for abdominal distention, abdominal pain, constipation, diarrhea, nausea and vomiting.   Endocrine: Negative for polyphagia and polyuria.   Genitourinary:  Positive for frequency. Negative for difficulty urinating, dysuria, flank pain and hematuria.   Musculoskeletal:  Positive for arthralgias. Negative for back pain and gait problem.   Skin:  Negative for color change and wound.   Neurological:  Negative for dizziness, seizures, syncope, speech difficulty, weakness and light-headedness.   Hematological:  Bruises/bleeds easily.   Psychiatric/Behavioral:  Negative for behavioral problems, confusion and decreased concentration.    Objective:     Vital Signs (Most Recent):  Temp: 99 °F (37.2 °C) (03/12/23 1930)  Pulse: 60  (03/12/23 1930)  Resp: (!) 24 (03/12/23 1930)  BP: (!) 155/85 (03/12/23 1930)  SpO2: 95 % (03/12/23 1930)   Vital Signs (24h Range):  Temp:  [98.4 °F (36.9 °C)-99 °F (37.2 °C)] 99 °F (37.2 °C)  Pulse:  [60-90] 60  Resp:  [16-24] 24  SpO2:  [95 %-99 %] 95 %  BP: (155-161)/(85-90) 155/85     Weight: 65.3 kg (144 lb)  Body mass index is 25.51 kg/m².    Physical Exam  Vitals and nursing note reviewed.   Constitutional:       General: She is not in acute distress.     Appearance: She is well-developed.   HENT:      Head: Normocephalic and atraumatic.      Mouth/Throat:      Pharynx: No oropharyngeal exudate.   Eyes:      General: No scleral icterus.     Extraocular Movements: Extraocular movements intact.      Conjunctiva/sclera: Conjunctivae normal.   Cardiovascular:      Rate and Rhythm: Normal rate and regular rhythm.      Heart sounds: Normal heart sounds.   Pulmonary:      Effort: Pulmonary effort is normal. No respiratory distress.      Breath sounds: No wheezing.      Comments: Diminished BS to BLL  Abdominal:      General: Bowel sounds are normal. There is no distension.      Palpations: Abdomen is soft.      Tenderness: There is no abdominal tenderness.   Musculoskeletal:         General: No tenderness. Normal range of motion.      Cervical back: Normal range of motion and neck supple.      Right lower leg: Edema present.      Left lower leg: Edema present.   Lymphadenopathy:      Cervical: No cervical adenopathy.   Skin:     General: Skin is warm and dry.      Capillary Refill: Capillary refill takes less than 2 seconds.      Findings: No rash.   Neurological:      Mental Status: She is alert and oriented to person, place, and time.      Cranial Nerves: No cranial nerve deficit.      Sensory: No sensory deficit.      Coordination: Coordination normal.   Psychiatric:         Behavior: Behavior normal.         Thought Content: Thought content normal.         Judgment: Judgment normal.           Significant Labs:  All pertinent labs within the past 24 hours have been reviewed.  CBC:   Recent Labs   Lab 03/12/23  1642   WBC 15.60*   HGB 12.3   HCT 39.2        CMP:   Recent Labs   Lab 03/12/23  1642   *   K 4.0   CL 97   CO2 25      BUN 23   CREATININE 0.9   CALCIUM 10.2   PROT 7.5   ALBUMIN 4.1   BILITOT 0.6   ALKPHOS 69   AST 23   ALT 10   ANIONGAP 12       Significant Imaging: I have reviewed all pertinent imaging results/findings within the past 24 hours.  X-Ray Chest AP Portable  Narrative: EXAMINATION:  XR CHEST AP PORTABLE    CLINICAL HISTORY:  Sepsis;    TECHNIQUE:  Single frontal view of the chest was performed.    COMPARISON:  Chest radiograph 11/01/2019 and CT thorax 11/15/2019    FINDINGS:  Monitoring leads overlie the chest.  Large body habitus.  Patient is rotated.    Left chest dual lead ICD unchanged.  Cardiomediastinal silhouette is midline and stable.  Hilar contours are within normal limits.  Interval increased nonspecific interstitial coarsening with patchy ground glass opacities at the bilateral mid to lower lung zones, slightly more so on the left.  Scattered linear opacities consistent with platelike scarring versus atelectasis.  No consolidation, pleural effusion or pneumothorax.  Biapical pleuroparenchymal scarring.  No acute osseous process seen.  Impression: Left more than right mid to lower lung zone interval increased nonspecific interstitial coarsening with patchy ground-glass opacities.  Differential considerations can include worsening pulmonary edema/CHF pattern with subsegmental atelectasis; however, superimposed aspiration or pneumonia not excluded.    Electronically signed by: Albino Adkins MD  Date:    03/12/2023  Time:    17:17

## 2023-03-13 NOTE — PROGRESS NOTES
Pharmacokinetic Initial Assessment: IV Vancomycin    Assessment/Plan:    Initiate intravenous vancomycin with loading dose of 1250 mg once followed by a maintenance dose of vancomycin 750mg IV every 24 hours  Desired empiric serum trough concentration is 15 to 20 mcg/mL  Draw vancomycin trough level 30 min prior to third dose on 3/14 at approximately 1930  Pharmacy will continue to follow and monitor vancomycin.      Please contact pharmacy at extension [phone] with any questions regarding this assessment.     Thank you for the consult,   Roberto Gray       Patient brief summary:  Dorothy M Knobloch is a 89 y.o. female initiated on antimicrobial therapy with IV Vancomycin for treatment of suspected bacteremia    Drug Allergies:   Review of patient's allergies indicates:   Allergen Reactions    Aspartame Swelling     equal       Actual Body Weight:   65.3 kg      Renal Function:   Estimated Creatinine Clearance: 37.8 mL/min (based on SCr of 0.9 mg/dL).,     Dialysis Method (if applicable):  N/A    CBC (last 72 hours):  Recent Labs   Lab Result Units 03/12/23  1642   WBC K/uL 15.60*   Hemoglobin g/dL 12.3   Hematocrit % 39.2   Platelets K/uL 285   Gran % % 86.4*   Lymph % % 6.9*   Mono % % 5.3   Eosinophil % % 0.6   Basophil % % 0.4   Differential Method  Automated       Metabolic Panel (last 72 hours):  Recent Labs   Lab Result Units 03/12/23  1642   Sodium mmol/L 134*   Potassium mmol/L 4.0   Chloride mmol/L 97   CO2 mmol/L 25   Glucose mg/dL 102   BUN mg/dL 23   Creatinine mg/dL 0.9   Albumin g/dL 4.1   Total Bilirubin mg/dL 0.6   Alkaline Phosphatase U/L 69   AST U/L 23   ALT U/L 10       Drug levels (last 3 results):  No results for input(s): VANCOMYCINRA, VANCORANDOM, VANCOMYCINPE, VANCOPEAK, VANCOMYCINTR, VANCOTROUGH in the last 72 hours.    Microbiologic Results:  Microbiology Results (last 7 days)       Procedure Component Value Units Date/Time    Culture, Respiratory with Gram Stain [[id number]]     Order  Status: No result Specimen: Sputum, Expectorated     Blood culture x two cultures. Draw prior to antibiotics. [[id number]] Collected: 03/12/23 1641    Order Status: Sent Specimen: Blood from Peripheral, Antecubital, Right Updated: 03/12/23 1652    Blood culture x two cultures. Draw prior to antibiotics. [[id number]] Collected: 03/12/23 1626    Order Status: Sent Specimen: Blood from Peripheral, Antecubital, Left Updated: 03/12/23 1652

## 2023-03-13 NOTE — PLAN OF CARE
[facility]  Discharge Assessment    Primary Care Provider: Lola Wolff MD     Discharge Assessment (most recent)       BRIEF DISCHARGE ASSESSMENT - 03/13/23 1221          Discharge Planning    Assessment Type Discharge Planning Brief Assessment     Resource/Environmental Concerns none     Support Systems Children;Family members     Equipment Currently Used at Home raised toilet;walker, rolling     Current Living Arrangements assisted living facility     Patient/Family Anticipates Transition to home     Patient/Family Anticipated Services at Transition none;home health care     DME Needed Upon Discharge  none     Discharge Plan A Home     Discharge Plan B Home Health        Financial Resource Strain    How hard is it for you to pay for the very basics like food, housing, medical care, and heating? Not hard at all        Housing Stability    In the last 12 months, was there a time when you were not able to pay the mortgage or rent on time? No     In the last 12 months, how many places have you lived? 1     In the last 12 months, was there a time when you did not have a steady place to sleep or slept in a shelter (including now)? No        Food Insecurity    Within the past 12 months, you worried that your food would run out before you got the money to buy more. Never true     Within the past 12 months, the food you bought just didn't last and you didn't have money to get more. Never true        Social Connections    Are you , , , , never , or living with a partner?                    Pt is a 89 y.o. female admitted with sepsis w/o acute organ dysfunction and has a PMH of HTN, pAF and endocarditis. She lives in [facility] with her  on the third floor and the building has an elevator. She requires some SBA with bathing and getting off the toilet and there is staff there to assist. She is independent with other self care and IADLs.  [facility] given to patient and/or family with understanding verbalized.   name and number and estimated discharge date written on white board in patient's room with request to call for any questions or concerns.  Will continue to follow for needs.  Jerrell Shaw RN,BSN

## 2023-03-13 NOTE — ED NOTES
Telemetry Verification   Patient placed on Telemetry Box  Verified with War Room  Box # [id number]   Monitor Tech War room   Rate    Rhythm

## 2023-03-14 NOTE — HOSPITAL COURSE
Patient admitted to hospital medicine for concern pneumonia. Tachycardia and tachypnea improving. Echo without evidence of endocarditis. Leukocytosis decreasing. Replacing K as it is low at 3.1. De-escalated abx and patient stable for discharge. Azithromycin delivered to the bedside. Patient stable for discharge. Instructed patient to follow-up with PCP within 1-2 weeks. Return precautions given, patient and family verbalized understanding. All questions answered.

## 2023-03-14 NOTE — DISCHARGE SUMMARY
[facility] [address] [place] [address] Medicine  Discharge Summary      Patient Name: Dorothy M Knobloch  MRN: [id number]  IZABELA: [id number]  Patient Class: IP- Inpatient  Admission Date: 3/12/2023  Hospital Length of Stay: 1 days  Discharge Date and Time: 3/13/2023  7:35 PM  Attending Physician: No att. providers found   Discharging Provider: Uma Perez PA-C  Primary Care Provider: Lola Wolff MD  [facility] Medicine Team: [facility] MED F Uma Perez PA-C  Primary Care Team: [facility] MED F    HPI:   Dorothy M Knobloch is a 89 y.o. female with a PMHx of HTN, HLD, SSS s/p PPM, pAF, CRAO, reported hx endocarditis (2017), HFpEF who presents to [facility] for evaluation of chills. Patient reports an episode of shaking chills occurred earlier today. She adjusted the thermostat at the facility at which she is staying but she still felt very chills despite turning it up to 83 degrees.  She then had an episode of severe chills with uncontrollable shaking. The shaking has since stopped, she feels generally unwell but as no other specific complaints at this time. 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 She also notes right shoulder pain x1 months which she thinks is due to hx of torn rotator cuff. Also endorses recent increased urinary frequency but denies dysuria, hematuria or flank pain. Denies chest pain, abdominal pain, vomiting, sore throat wounds, cuts or sores. No sick contacts.     ED: Temp 98.4, HR 90, RR 24, /90. Leukocytosis of WBC 15.60. Flu, covid negative. CXR shows nonspecific interstitial coarsening with patchy ground-glass opacities concerning for CHF vs aspiration vs PNA. Given IV vanc and zosyn.       * No surgery found *      Hospital Course:   Patient admitted to hospital medicine for concern pneumonia. Tachycardia and tachypnea improving. Echo without evidence of endocarditis. Leukocytosis decreasing. Replacing K as it is  low at 3.1. De-escalated abx and patient stable for discharge. Azithromycin delivered to the bedside. Patient stable for discharge. Instructed patient to follow-up with PCP within 1-2 weeks. Return precautions given, patient and family verbalized understanding. All questions answered.         Goals of Care Treatment Preferences:  Code Status: Full Code      Consults:     Ophtho  Neovascular glaucoma, left eye  - continue eye drops     Cardiac/Vascular  Acute on chronic diastolic congestive heart failure  - continue home bumex  - instructed patient to take an extra dose if there is worsening of chronic SOB    Carotid artery disease  - continue plavix     Essential hypertension  - resume home meds    ID  * Sepsis without acute organ dysfunction  Multifocal pneumonia    - 3/4 SIRS for HR>90, RR>20, WBC>12  - procal WNL  - lactate negative  - source likely multifocal PNA  - continue BS IV vanc and zosyn > de-escalate to ceftriaxone and azithromycin  - follow UA and BCx > NGTD  - check TTE to eval valves/ PPM for vegetations given hx endocarditis > negative  - discharged in stable condition with azithromycin    Endocrine  Hypothyroidism  - continue synthroid      Final Active Diagnoses:    Diagnosis Date Noted POA    PRINCIPAL PROBLEM:  Sepsis without acute organ dysfunction [A41.9] 03/12/2023 Yes    Multifocal pneumonia [J18.9] 03/13/2023 Yes    Right shoulder pain [M25.511] 03/12/2023 Yes    Acute on chronic diastolic congestive heart failure [I50.33] 11/24/2019 Yes    Paroxysmal atrial fibrillation [I48.0] 09/11/2018 Yes    Cardiac pacemaker [Z95.0] 08/22/2018 Yes    Neovascular glaucoma, left eye [H40.52X0] 03/29/2018 Yes    Carotid artery disease [I77.9] 08/10/2016 Yes    Hypothyroidism [E03.9] 12/13/2013 Yes    Essential hypertension [I10] 12/13/2013 Yes      Problems Resolved During this Admission:       Discharged Condition: good    Disposition: Home or Self Care    Follow Up:   Follow-up Information      Lola Wolff MD Follow up.    Specialty: Family Medicine  Why: The Clinic Nurse will call you with an appointment. If you do not hear from them in 48 hrs, please call [phone]  Contact information:  [address]  [address] [phone]                       Patient Instructions:      Diet Cardiac     Notify your health care provider if you experience any of the following:  temperature >100.4     Notify your health care provider if you experience any of the following:  severe uncontrolled pain     Notify your health care provider if you experience any of the following:  difficulty breathing or increased cough     Notify your health care provider if you experience any of the following:  increased confusion or weakness     Activity as tolerated       Significant Diagnostic Studies: Labs:   CMP   Recent Labs   Lab 03/12/23  1642 03/13/23  0321   * 133*   K 4.0 3.1*   CL 97 97   CO2 25 24    116*   BUN 23 21   CREATININE 0.9 0.7   CALCIUM 10.2 9.6   PROT 7.5  --    ALBUMIN 4.1  --    BILITOT 0.6  --    ALKPHOS 69  --    AST 23  --    ALT 10  --    ANIONGAP 12 12     Cardiac Graphics: Echocardiogram:   Transthoracic echo (TTE) complete (Cupid Only):   Results for orders placed or performed during the hospital encounter of 03/12/23   Echo   Result Value Ref Range    Ascending aorta 2.52 cm    STJ 2.28 cm    AV mean gradient 15 mmHg    Ao peak jorgito 2.50 m/s    Ao VTI 55.76 cm    IVS 1.08 0.6 - 1.1 cm    LA size 4.64 cm    Left Atrium Major Axis 5.82 cm    Left Atrium Minor Axis 5.37 cm    LVIDd 3.75 3.5 - 6.0 cm    LVIDs 2.70 2.1 - 4.0 cm    LVOT diameter 2.00 cm    LVOT peak VTI 17.20 cm    Posterior Wall 1.17 (A) 0.6 - 1.1 cm    RA Major Axis 6.27 cm    RA Width 4.96 cm    RVDD 3.58 cm    Sinus 2.70 cm    TR Max Jorgito 3.84 m/s    LA WIDTH 4.85 cm    LV Diastolic Volume 60.07 mL    LV Systolic Volume 26.98 mL    LVOT peak jorgito 0.78 m/s    FS 28 %    LA volume 106.85 cm3    LV  mass 136.44 g    Left Ventricle Relative Wall Thickness 0.62 cm    AV valve area 0.97 cm2    AV Velocity Ratio 0.31     AV index (prosthetic) 0.31     LVOT area 3.1 cm2    LVOT stroke volume 54.01 cm3    AV peak gradient 25 mmHg    LV Systolic Volume Index 16.1 mL/m2    LV Diastolic Volume Index 35.76 mL/m2    LA Volume Index 63.6 mL/m2    LV Mass Index 81 g/m2    Triscuspid Valve Regurgitation Peak Gradient 59 mmHg    BSA 1.7 m2    Right Atrial Pressure (from IVC) 8 mmHg    EF 60 %    TV rest pulmonary artery pressure 67 mmHg    Narrative    · The left ventricle is normal in size with concentric remodeling and   normal systolic function. The estimated ejection fraction is 60%.  · Normal right ventricular size with normal right ventricular systolic   function.  · Indeterminate left ventricular diastolic function.  · Severe biatrial enlargement.  · Moderate tricuspid regurgitation.  · Marked mitral annular calcification without significant stenosis and   with mild mitral regurgitation.  · There is moderate aortic valve stenosis. Aortic valve area is 1.0 cm2;   peak velocity is 2.50 m/s; mean gradient is 15 mmHg.  · There is pulmonary hypertension. The estimated PA systolic pressure is   67 mmHg.  · Intermediate central venous pressure (8 mmHg).          Pending Diagnostic Studies:     None         Medications:  Reconciled Home Medications:      Medication List      START taking these medications    azithromycin 250 MG tablet  Commonly known as: Z-RHINA  Take 1 tablet (250 mg total) by mouth once daily. for 4 days        CONTINUE taking these medications    albuterol 90 mcg/actuation inhaler  Commonly known as: PROVENTIL/VENTOLIN HFA  INHALE 2 PUFFS INTO THE LUNGS EVERY 6 (SIX) HOURS AS NEEDED FOR WHEEZING. RESCUE     amLODIPine 5 MG tablet  Commonly known as: NORVASC  TAKE 1 TABLET BY MOUTH EVERY DAY     benzonatate 100 MG capsule  Commonly known as: TESSALON  Take 1 capsule (100 mg total) by mouth 3 (three) times  daily as needed for Cough.     brinzolamide 1 % ophthalmic suspension  Commonly known as: AZOPT  PLACE 1 DROP INTO THE LEFT EYE 2 TIMES A DAY.     bumetanide 0.5 MG Tab  Commonly known as: BUMEX  Take 1 tablet (0.5 mg total) by mouth 2 (two) times daily.     cholecalciferol (vitamin D3) 1,250 mcg (50,000 unit) capsule  Take 50,000 Units by mouth every 7 days.     * clopidogreL 75 mg tablet  Commonly known as: PLAVIX  Take 1 tablet (75 mg total) by mouth once daily.     * clopidogreL 75 mg tablet  Commonly known as: PLAVIX  TAKE 1 TABLET BY MOUTH EVERY DAY     COMBIGAN 0.2-0.5 % Drop  Generic drug: brimonidine-timoloL  INSTILL 1 DROP INTO LEFT EYE TWICE A DAY     ELIQUIS 5 mg Tab  Generic drug: apixaban  TAKE 1 TABLET BY MOUTH TWICE A DAY     hydroCHLOROthiazide 12.5 MG Tab  Commonly known as: HYDRODIURIL  TAKE 1 TABLET BY MOUTH EVERY DAY     levothyroxine 88 MCG tablet  Commonly known as: SYNTHROID  Take 88 mcg by mouth before breakfast.     metoprolol succinate 25 MG 24 hr tablet  Commonly known as: TOPROL-XL  Take 2 tablets in the AM and 1 tablet in the evening     NITROSTAT 0.4 MG SL tablet  Generic drug: nitroGLYCERIN     PRALUENT PEN 75 mg/mL Pnij  Generic drug: alirocumab         * This list has 2 medication(s) that are the same as other medications prescribed for you. Read the directions carefully, and ask your doctor or other care provider to review them with you.                Indwelling Lines/Drains at time of discharge:   Lines/Drains/Airways     None                 Time spent on the discharge of patient: 35 minutes    Discussed patient's plan of care with Dr. Umu Perez PA-C  Department of Hospital Medicine  [facility]

## 2023-03-14 NOTE — ASSESSMENT & PLAN NOTE
Multifocal pneumonia    - 3/4 SIRS for HR>90, RR>20, WBC>12  - procal WNL  - lactate negative  - source likely multifocal PNA  - continue BS IV vanc and zosyn > de-escalate to ceftriaxone and azithromycin  - follow UA and BCx > NGTD  - check TTE to eval valves/ PPM for vegetations given hx endocarditis > negative  - discharged in stable condition with azithromycin

## 2023-03-14 NOTE — ASSESSMENT & PLAN NOTE
- continue home bumex  - instructed patient to take an extra dose if there is worsening of chronic SOB

## 2023-03-17 LAB
BACTERIA BLD CULT: NORMAL
BACTERIA BLD CULT: NORMAL

## 2023-04-17 ENCOUNTER — CLINICAL SUPPORT (OUTPATIENT)
Dept: CARDIOLOGY | Facility: HOSPITAL | Age: 88
End: 2023-04-17
Payer: MEDICARE

## 2023-04-17 DIAGNOSIS — Z95.0 PRESENCE OF CARDIAC PACEMAKER: ICD-10-CM

## 2023-04-17 PROCEDURE — 93296 REM INTERROG EVL PM/IDS: CPT | Performed by: INTERNAL MEDICINE

## 2023-04-17 PROCEDURE — 93294 CARDIAC DEVICE CHECK - REMOTE: ICD-10-PCS | Mod: ,,, | Performed by: INTERNAL MEDICINE

## 2023-04-17 PROCEDURE — 93294 REM INTERROG EVL PM/LDLS PM: CPT | Mod: ,,, | Performed by: INTERNAL MEDICINE

## 2023-04-28 DIAGNOSIS — I44.2 AV BLOCK, 3RD DEGREE: Primary | ICD-10-CM

## 2023-06-12 PROBLEM — J18.9 MULTIFOCAL PNEUMONIA: Status: RESOLVED | Noted: 2023-03-13 | Resolved: 2023-06-12

## 2023-06-19 PROBLEM — A41.9 SEPSIS WITHOUT ACUTE ORGAN DYSFUNCTION: Status: RESOLVED | Noted: 2023-03-12 | Resolved: 2023-06-19

## 2023-07-16 ENCOUNTER — CLINICAL SUPPORT (OUTPATIENT)
Dept: CARDIOLOGY | Facility: HOSPITAL | Age: 88
End: 2023-07-16
Payer: MEDICARE

## 2023-07-16 DIAGNOSIS — Z95.0 PRESENCE OF CARDIAC PACEMAKER: ICD-10-CM

## 2023-07-16 PROCEDURE — 93296 REM INTERROG EVL PM/IDS: CPT | Performed by: INTERNAL MEDICINE

## 2023-07-24 DIAGNOSIS — H02.006 ENTROPION AND TRICHIASIS OF EYELID, LEFT: Primary | ICD-10-CM

## 2023-07-24 RX ORDER — ERYTHROMYCIN 5 MG/G
OINTMENT OPHTHALMIC
Qty: 3.5 G | Refills: 6 | Status: SHIPPED | OUTPATIENT
Start: 2023-07-24 | End: 2024-01-12

## 2023-08-08 ENCOUNTER — CLINICAL SUPPORT (OUTPATIENT)
Dept: CARDIOLOGY | Facility: HOSPITAL | Age: 88
End: 2023-08-08
Attending: INTERNAL MEDICINE
Payer: MEDICARE

## 2023-08-08 ENCOUNTER — HOSPITAL ENCOUNTER (OUTPATIENT)
Dept: CARDIOLOGY | Facility: CLINIC | Age: 88
Discharge: HOME | End: 2023-08-08
Attending: INTERNAL MEDICINE
Payer: MEDICARE

## 2023-08-08 ENCOUNTER — OFFICE VISIT (OUTPATIENT)
Dept: ELECTROPHYSIOLOGY | Facility: CLINIC | Age: 88
End: 2023-08-08
Payer: MEDICARE

## 2023-08-08 VITALS
HEART RATE: 87 BPM | WEIGHT: 142.44 LBS | SYSTOLIC BLOOD PRESSURE: 112 MMHG | DIASTOLIC BLOOD PRESSURE: 60 MMHG | BODY MASS INDEX: 25.24 KG/M2 | HEIGHT: 63 IN

## 2023-08-08 DIAGNOSIS — I44.2 AV BLOCK, 3RD DEGREE: ICD-10-CM

## 2023-08-08 DIAGNOSIS — I48.21 PERMANENT ATRIAL FIBRILLATION: Primary | ICD-10-CM

## 2023-08-08 DIAGNOSIS — Z95.0 CARDIAC PACEMAKER: ICD-10-CM

## 2023-08-08 PROCEDURE — 99999 PR PBB SHADOW E&M-EST. PATIENT-LVL III: ICD-10-PCS | Mod: PBBFAC,,, | Performed by: INTERNAL MEDICINE

## 2023-08-08 PROCEDURE — 3288F FALL RISK ASSESSMENT DOCD: CPT | Mod: CPTII,S$GLB,, | Performed by: INTERNAL MEDICINE

## 2023-08-08 PROCEDURE — 99999 PR PBB SHADOW E&M-EST. PATIENT-LVL III: CPT | Mod: PBBFAC,,, | Performed by: INTERNAL MEDICINE

## 2023-08-08 PROCEDURE — 1126F AMNT PAIN NOTED NONE PRSNT: CPT | Mod: CPTII,S$GLB,, | Performed by: INTERNAL MEDICINE

## 2023-08-08 PROCEDURE — 1101F PT FALLS ASSESS-DOCD LE1/YR: CPT | Mod: CPTII,S$GLB,, | Performed by: INTERNAL MEDICINE

## 2023-08-08 PROCEDURE — 99214 OFFICE O/P EST MOD 30 MIN: CPT | Mod: S$GLB,,, | Performed by: INTERNAL MEDICINE

## 2023-08-08 PROCEDURE — 99214 PR OFFICE/OUTPT VISIT, EST, LEVL IV, 30-39 MIN: ICD-10-PCS | Mod: S$GLB,,, | Performed by: INTERNAL MEDICINE

## 2023-08-08 PROCEDURE — 93005 RHYTHM STRIP: ICD-10-PCS | Mod: S$GLB,,, | Performed by: INTERNAL MEDICINE

## 2023-08-08 PROCEDURE — 3288F PR FALLS RISK ASSESSMENT DOCUMENTED: ICD-10-PCS | Mod: CPTII,S$GLB,, | Performed by: INTERNAL MEDICINE

## 2023-08-08 PROCEDURE — 1126F PR PAIN SEVERITY QUANTIFIED, NO PAIN PRESENT: ICD-10-PCS | Mod: CPTII,S$GLB,, | Performed by: INTERNAL MEDICINE

## 2023-08-08 PROCEDURE — 1160F RVW MEDS BY RX/DR IN RCRD: CPT | Mod: CPTII,S$GLB,, | Performed by: INTERNAL MEDICINE

## 2023-08-08 PROCEDURE — 1159F MED LIST DOCD IN RCRD: CPT | Mod: CPTII,S$GLB,, | Performed by: INTERNAL MEDICINE

## 2023-08-08 PROCEDURE — 93010 ELECTROCARDIOGRAM REPORT: CPT | Mod: S$GLB,,, | Performed by: INTERNAL MEDICINE

## 2023-08-08 PROCEDURE — 93005 ELECTROCARDIOGRAM TRACING: CPT | Mod: S$GLB,,, | Performed by: INTERNAL MEDICINE

## 2023-08-08 PROCEDURE — 1101F PR PT FALLS ASSESS DOC 0-1 FALLS W/OUT INJ PAST YR: ICD-10-PCS | Mod: CPTII,S$GLB,, | Performed by: INTERNAL MEDICINE

## 2023-08-08 PROCEDURE — 93280 PM DEVICE PROGR EVAL DUAL: CPT | Mod: 26,,, | Performed by: INTERNAL MEDICINE

## 2023-08-08 PROCEDURE — 93280 CARDIAC DEVICE CHECK - IN CLINIC & HOSPITAL: ICD-10-PCS | Mod: 26,,, | Performed by: INTERNAL MEDICINE

## 2023-08-08 PROCEDURE — 93280 PM DEVICE PROGR EVAL DUAL: CPT

## 2023-08-08 PROCEDURE — 93010 RHYTHM STRIP: ICD-10-PCS | Mod: S$GLB,,, | Performed by: INTERNAL MEDICINE

## 2023-08-08 PROCEDURE — 1160F PR REVIEW ALL MEDS BY PRESCRIBER/CLIN PHARMACIST DOCUMENTED: ICD-10-PCS | Mod: CPTII,S$GLB,, | Performed by: INTERNAL MEDICINE

## 2023-08-08 PROCEDURE — 1159F PR MEDICATION LIST DOCUMENTED IN MEDICAL RECORD: ICD-10-PCS | Mod: CPTII,S$GLB,, | Performed by: INTERNAL MEDICINE

## 2023-08-08 NOTE — PROGRESS NOTES
Subjective:   Patient ID:  Dorothy M Knobloch is a 89 y.o. female who presents for follow-up of AV Block, 3rd Degree      Patient of Dr. Velazquez.  Last seen in clinic 6/30/22    Dorothy M Knobloch. 89 y.o. female here for follow up for pacemaker management and atrial fibrillation. Patient has hypertension, Takotsubo CMP 3/2013, TIA 12/2013, moderate right carotid artery disease, paroxysmal AF, and high-grade AV block with bradycardia s/p dc PPM 2017 at [facility]. She is transferring care to [facility]. She feels well. Device interrogation notes normal function. She does have <1% AF burden without high-ventricular rates. She notes occasional brief fluttering sensation.     PPM interrogation: stable parameters, 52%A, 63%V paced. AF<0.8%, longest episode 3 hours.     Mrs. Mackenzie returns for  follow-up 10/2019. 75% A paced and 70% RV pacing, 18.8%AF burden, longest episode 27 minutes. V rates controlled. She didnot notice any AF anymore. We discussed risks of RV pacing induced CMP. ECHO noted preserved LVEF.     12/13/2019: Seen by Neelima Lake. She was seen by Dr. Mcneill after our last visit and began diuresis. He initiated bumex 0.5 mg BID.     1/2022: Seen in follow-up. Had no complaints. ECHO 3/2022 noted preserved LV function with moderate AS.     6/30/22: Mrs. Mackenzie returns for overdue follow-up. She reports intermittently her PPM bothers her depending on her position. She has lost some weight.  In clinic device interrogation notes stable lead parameters with 10% RA and 99% RV pacing with 100% AF burden since January 2022, 2.5 years of estimated battery longevity.    Interval (08/08/2023):  ED visit 5 months ago (3/2023) for sepsis 2/2 pneumonia. Now recovered. Normal EF during hospital stay. No worsening SOB/SMITH. Device with 98.1% AFib burden. No ventricular arrhythmias. AP: 21 %; : 99.7 %. Undersensing noted in RA lead-- sensing adjustment made.  Battery life: 1.5 years.      Echo    Result Date:  3/13/2023  · The left ventricle is normal in size with concentric remodeling and normal systolic function. The estimated ejection fraction is 60%.· Normal right ventricular size with normal right ventricular systolic function.· Indeterminate left ventricular diastolic function.· Severe biatrial enlargement.· Moderate tricuspid regurgitation.· Marked mitral annular calcification without significant stenosis and with mild mitral regurgitation.· There is moderate aortic valve stenosis. Aortic valve area is 1.0 cm2; peak velocity is 2.50 m/s; mean gradient is 15 mmHg.· There is pulmonary hypertension. The estimated PA systolic pressure is 67 mmHg.· Intermediate central venous pressure (8 mmHg).         Review of Systems   Constitutional: Positive for malaise/fatigue. Negative for fever.   Eyes:  Negative for visual disturbance.   Cardiovascular:  Positive for dyspnea on exertion (baseline per patient). Negative for chest pain, irregular heartbeat, leg swelling, near-syncope and syncope.   Respiratory:  Positive for shortness of breath (baseline per patient). Negative for cough.    Skin:  Negative for rash.   Neurological:  Negative for focal weakness and light-headedness.       Objective:   Physical Exam  Vitals and nursing note reviewed.   Constitutional:       General: She is not in acute distress.     Comments: Ambulating per rolling walker   HENT:      Head: Normocephalic and atraumatic.   Cardiovascular:      Rate and Rhythm: Normal rate.      Pulses:           Radial pulses are 2+ on the right side and 2+ on the left side.      Heart sounds: Murmur heard.   Pulmonary:      Breath sounds: Normal breath sounds.      Comments: Slight increased work of breathing which patient states is her baseline  Musculoskeletal:         General: No tenderness.      Right lower leg: No swelling. No edema.      Left lower leg: No swelling. No edema.   Skin:     General: Skin is warm and dry.   Neurological:      Mental Status: She  is alert and oriented to person, place, and time.      Gait: Gait is intact.   Psychiatric:         Behavior: Behavior normal.     EKG: Vpaced; underlying rhythm Afib; 87 bpm  Assessment:      1. Permanent atrial fibrillation    2. Cardiac pacemaker    3. AV block, 3rd degree        Plan:     89 y.o. female here for Atrial Fibrillation and PPM device management.  Most recent Echo: 60%.  No worsening SOB/SMITH-- feels she is at her baseline from cardiac/pulmonary perspective. Device with 98.1% AFib burden. No ventricular arrhythmias. AP: 21 %; : 99.7 %. Undersensing noted in RA lead-- sensing adjustment made.  Battery life: 1.5 years.     1. Permanent atrial fibrillation  Assessment & Plan:  1) continue Eliquis for stroke prophylaxis        2. Cardiac pacemaker    3. AV block, 3rd degree  Assessment & Plan:  1) F/u in 6 months for PPM management-- continue remote monitoring as scheduled

## 2023-08-09 ENCOUNTER — TELEPHONE (OUTPATIENT)
Dept: OPHTHALMOLOGY | Facility: CLINIC | Age: 88
End: 2023-08-09
Payer: MEDICARE

## 2023-08-09 NOTE — TELEPHONE ENCOUNTER
----- Message from Molly Rodriguez sent at 8/7/2023  9:42 AM CDT -----  Consult/Advisory    Name Of Caller:Carol       Contact Preference:[phone]    Nature of call:Sukhdeep Palmer ptn called regarding her drops I'm confused on which one she wants cause she don't have the name. I see she has 2 rx please call ptn to further assist

## 2023-08-28 ENCOUNTER — OFFICE VISIT (OUTPATIENT)
Dept: OPHTHALMOLOGY | Facility: CLINIC | Age: 88
End: 2023-08-28
Payer: MEDICARE

## 2023-08-28 DIAGNOSIS — H35.372 EPIRETINAL MEMBRANE, LEFT EYE: ICD-10-CM

## 2023-08-28 DIAGNOSIS — H35.3132 NONEXUDATIVE AGE-RELATED MACULAR DEGENERATION, BILATERAL, INTERMEDIATE DRY STAGE: Primary | ICD-10-CM

## 2023-08-28 DIAGNOSIS — H43.813 POSTERIOR VITREOUS DETACHMENT, BOTH EYES: ICD-10-CM

## 2023-08-28 PROCEDURE — 92014 PR EYE EXAM, EST PATIENT,COMPREHESV: ICD-10-PCS | Mod: S$GLB,,, | Performed by: OPHTHALMOLOGY

## 2023-08-28 PROCEDURE — 92014 COMPRE OPH EXAM EST PT 1/>: CPT | Mod: S$GLB,,, | Performed by: OPHTHALMOLOGY

## 2023-08-28 PROCEDURE — 1159F MED LIST DOCD IN RCRD: CPT | Mod: CPTII,S$GLB,, | Performed by: OPHTHALMOLOGY

## 2023-08-28 PROCEDURE — 1159F PR MEDICATION LIST DOCUMENTED IN MEDICAL RECORD: ICD-10-PCS | Mod: CPTII,S$GLB,, | Performed by: OPHTHALMOLOGY

## 2023-08-28 PROCEDURE — 92134 CPTRZ OPH DX IMG PST SGM RTA: CPT | Mod: S$GLB,,, | Performed by: OPHTHALMOLOGY

## 2023-08-28 PROCEDURE — 1160F PR REVIEW ALL MEDS BY PRESCRIBER/CLIN PHARMACIST DOCUMENTED: ICD-10-PCS | Mod: CPTII,S$GLB,, | Performed by: OPHTHALMOLOGY

## 2023-08-28 PROCEDURE — 1160F RVW MEDS BY RX/DR IN RCRD: CPT | Mod: CPTII,S$GLB,, | Performed by: OPHTHALMOLOGY

## 2023-08-28 PROCEDURE — 92134 POSTERIOR SEGMENT OCT RETINA (OCULAR COHERENCE TOMOGRAPHY)-BOTH EYES: ICD-10-PCS | Mod: S$GLB,,, | Performed by: OPHTHALMOLOGY

## 2023-08-28 PROCEDURE — 92201 PR OPHTHALMOSCOPY, EXT, W/RET DRAW/SCLERAL DEPR, I&R, UNI/BI: ICD-10-PCS | Mod: S$GLB,,, | Performed by: OPHTHALMOLOGY

## 2023-08-28 PROCEDURE — 99999 PR PBB SHADOW E&M-EST. PATIENT-LVL II: CPT | Mod: PBBFAC,,, | Performed by: OPHTHALMOLOGY

## 2023-08-28 PROCEDURE — 92201 OPSCPY EXTND RTA DRAW UNI/BI: CPT | Mod: S$GLB,,, | Performed by: OPHTHALMOLOGY

## 2023-08-28 PROCEDURE — 99999 PR PBB SHADOW E&M-EST. PATIENT-LVL II: ICD-10-PCS | Mod: PBBFAC,,, | Performed by: OPHTHALMOLOGY

## 2023-08-28 NOTE — PROGRESS NOTES
HPI    Decreased Vision  Oct /Dfe     Pt is present today for decreased vision. She states he recently spoke to   [employer] and they wanted her to have an updated vision.   She states she has noticed decrease in her vision OU.   -Pain  -Flashes   -Floaters     Eye meds:   Combigan - BID OS   Azopt: BID OS - Not taking anymore insurance will no longer pay for it   Last edited by Krys Ross on 8/28/2023  2:05 PM.        HPI    Patient here today for AMD check. C/o decreasing VA ou, no pain,   occasional flashes without floaters    Combigan bid OS  Not using Azopt 2/2 insurance  Last edited by Cathy Mercado on 1/24/2023  1:53 PM.          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 CRAO OS  Was admitted for stroke work up   NVI and early NVG OS  S/p Avastin OS  with tap last 5/18  S/p PRP - completed 4/12/18    Combigan BID OS  Azopt TID  Was on diamox 500 BID - having hypotensive episodes  JDN cut back Diamox to 250mg Q day  Dr. Mcneill D/C'd Valsartan    7/18 - no NVI/NVA - Try without avastin and tap today  DC Diamox  Keep Combigan and AZOPT BID OS      2. PCIOL OD  3 piece in slight decentration   S/p YAG for PCO - pt notes improvement    NS OS    3. PVD OU    4. Dry AMD OU -   AREDS/AG    5. ERM OS  Given CRAO - nothing to do      12 months OCT

## 2023-09-12 DIAGNOSIS — I48.0 PAROXYSMAL ATRIAL FIBRILLATION: ICD-10-CM

## 2023-09-12 RX ORDER — APIXABAN 5 MG/1
TABLET, FILM COATED ORAL
Qty: 180 TABLET | Refills: 3 | Status: SHIPPED | OUTPATIENT
Start: 2023-09-12

## 2023-09-28 NOTE — ASSESSMENT & PLAN NOTE
-PCIOL OD - vision stable per patient   -NS OS   BILL to call the office to schedule hosp follow up with

## 2023-10-14 ENCOUNTER — CLINICAL SUPPORT (OUTPATIENT)
Dept: CARDIOLOGY | Facility: HOSPITAL | Age: 88
End: 2023-10-14
Payer: MEDICARE

## 2023-10-14 DIAGNOSIS — Z95.0 PRESENCE OF CARDIAC PACEMAKER: ICD-10-CM

## 2023-10-14 PROCEDURE — 93294 CARDIAC DEVICE CHECK - REMOTE: ICD-10-PCS | Mod: ,,, | Performed by: INTERNAL MEDICINE

## 2023-10-14 PROCEDURE — 93294 REM INTERROG EVL PM/LDLS PM: CPT | Mod: ,,, | Performed by: INTERNAL MEDICINE

## 2023-10-14 PROCEDURE — 93296 REM INTERROG EVL PM/IDS: CPT | Performed by: INTERNAL MEDICINE

## 2023-10-17 RX ORDER — AMLODIPINE BESYLATE 5 MG/1
TABLET ORAL
Qty: 90 TABLET | Refills: 3 | Status: SHIPPED | OUTPATIENT
Start: 2023-10-17

## 2023-10-27 ENCOUNTER — OFFICE VISIT (OUTPATIENT)
Dept: CARDIOLOGY | Facility: CLINIC | Age: 88
End: 2023-10-27
Payer: MEDICARE

## 2023-10-27 VITALS
WEIGHT: 143.31 LBS | BODY MASS INDEX: 25.39 KG/M2 | DIASTOLIC BLOOD PRESSURE: 70 MMHG | HEART RATE: 76 BPM | HEIGHT: 63 IN | OXYGEN SATURATION: 94 % | SYSTOLIC BLOOD PRESSURE: 109 MMHG

## 2023-10-27 DIAGNOSIS — R06.02 SOB (SHORTNESS OF BREATH): ICD-10-CM

## 2023-10-27 DIAGNOSIS — I51.81 TAKOTSUBO CARDIOMYOPATHY: ICD-10-CM

## 2023-10-27 DIAGNOSIS — I10 ESSENTIAL HYPERTENSION: ICD-10-CM

## 2023-10-27 DIAGNOSIS — I77.9 CAROTID ARTERY DISEASE, UNSPECIFIED LATERALITY, UNSPECIFIED TYPE: ICD-10-CM

## 2023-10-27 DIAGNOSIS — E78.5 HYPERLIPIDEMIA, UNSPECIFIED HYPERLIPIDEMIA TYPE: ICD-10-CM

## 2023-10-27 DIAGNOSIS — I48.0 PAROXYSMAL ATRIAL FIBRILLATION: ICD-10-CM

## 2023-10-27 DIAGNOSIS — I44.2 AV BLOCK, 3RD DEGREE: ICD-10-CM

## 2023-10-27 DIAGNOSIS — I65.23 BILATERAL CAROTID ARTERY STENOSIS: ICD-10-CM

## 2023-10-27 DIAGNOSIS — Z95.0 CARDIAC PACEMAKER: ICD-10-CM

## 2023-10-27 DIAGNOSIS — I50.32 CHRONIC DIASTOLIC CONGESTIVE HEART FAILURE: Primary | ICD-10-CM

## 2023-10-27 DIAGNOSIS — I51.89 DIASTOLIC DYSFUNCTION: ICD-10-CM

## 2023-10-27 PROCEDURE — 1101F PR PT FALLS ASSESS DOC 0-1 FALLS W/OUT INJ PAST YR: ICD-10-PCS | Mod: CPTII,S$GLB,, | Performed by: INTERNAL MEDICINE

## 2023-10-27 PROCEDURE — 1126F AMNT PAIN NOTED NONE PRSNT: CPT | Mod: CPTII,S$GLB,, | Performed by: INTERNAL MEDICINE

## 2023-10-27 PROCEDURE — 1159F MED LIST DOCD IN RCRD: CPT | Mod: CPTII,S$GLB,, | Performed by: INTERNAL MEDICINE

## 2023-10-27 PROCEDURE — 3288F PR FALLS RISK ASSESSMENT DOCUMENTED: ICD-10-PCS | Mod: CPTII,S$GLB,, | Performed by: INTERNAL MEDICINE

## 2023-10-27 PROCEDURE — 99999 PR PBB SHADOW E&M-EST. PATIENT-LVL IV: CPT | Mod: PBBFAC,,, | Performed by: INTERNAL MEDICINE

## 2023-10-27 PROCEDURE — 99999 PR PBB SHADOW E&M-EST. PATIENT-LVL IV: ICD-10-PCS | Mod: PBBFAC,,, | Performed by: INTERNAL MEDICINE

## 2023-10-27 PROCEDURE — 1159F PR MEDICATION LIST DOCUMENTED IN MEDICAL RECORD: ICD-10-PCS | Mod: CPTII,S$GLB,, | Performed by: INTERNAL MEDICINE

## 2023-10-27 PROCEDURE — 1126F PR PAIN SEVERITY QUANTIFIED, NO PAIN PRESENT: ICD-10-PCS | Mod: CPTII,S$GLB,, | Performed by: INTERNAL MEDICINE

## 2023-10-27 PROCEDURE — 99214 OFFICE O/P EST MOD 30 MIN: CPT | Mod: S$GLB,,, | Performed by: INTERNAL MEDICINE

## 2023-10-27 PROCEDURE — 99214 PR OFFICE/OUTPT VISIT, EST, LEVL IV, 30-39 MIN: ICD-10-PCS | Mod: S$GLB,,, | Performed by: INTERNAL MEDICINE

## 2023-10-27 PROCEDURE — 3288F FALL RISK ASSESSMENT DOCD: CPT | Mod: CPTII,S$GLB,, | Performed by: INTERNAL MEDICINE

## 2023-10-27 PROCEDURE — 1101F PT FALLS ASSESS-DOCD LE1/YR: CPT | Mod: CPTII,S$GLB,, | Performed by: INTERNAL MEDICINE

## 2023-10-27 NOTE — PROGRESS NOTES
"Subjective:    Patient ID:  Dorothy M Knobloch is a 89 y.o. 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 Last 2DE with normal function. Elevated lipid panel intolerant to statins, so started on Praluent, no longer taking due to reaction. Had been feeling weak at home, so I stopped doxyzosin, felt better after. BP stable since. Has chronic intermittent SOB.   Has episodic SMITH and fatigue. Bumex increased with improvement. Previously, CXR with RML opacity, BNP elevated >1000 and other labs remarkable for mild hyponatremia. Again developed an episode of SOB, fatigue, and "just not feeling well." Prescribed inhaler and improved.  Doing fair lately and lege edema improved. Denies CP, orthopnea, PND, syncope. Compliant with meds. Has bilateral LE edema which has improved with compression stockings. Has caregiver fatigue.    10/27/2023:  Since last visit no new complaints. Continues to have chronic SMITH, unchanged and fatigue. Compliant with meds. 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 Had been mostly staying in the apartment in the assisted living facility due to other residents with Covid, however, that has resolved. Uses rolling walker for ambulation, no longer drives, and her daughter Gemma drives them.     Review of Systems   Constitutional: Positive for malaise/fatigue.   HENT:  Negative for congestion.    Eyes:  Negative for blurred vision.   Cardiovascular:  Positive for dyspnea on exertion and leg swelling. Negative for chest pain, claudication, cyanosis, irregular heartbeat, near-syncope, orthopnea, palpitations, paroxysmal nocturnal dyspnea and " syncope.   Respiratory:  Positive for shortness of breath.    Endocrine: Negative for polyuria.   Hematologic/Lymphatic: Negative for bleeding problem.   Skin:  Negative for itching and rash.   Musculoskeletal:  Positive for back pain, joint pain and muscle weakness. Negative for joint swelling and muscle cramps.   Gastrointestinal:  Negative for abdominal pain, hematemesis, hematochezia, melena, nausea and vomiting.   Genitourinary:  Negative for dysuria and hematuria.   Neurological:  Positive for weakness. Negative for dizziness, focal weakness, headaches, light-headedness and loss of balance.   Psychiatric/Behavioral:  Negative for depression. The patient is nervous/anxious.         Objective:    Physical Exam  Constitutional:       Appearance: She is well-developed.   HENT:      Head: Normocephalic and atraumatic.   Neck:      Vascular: No JVD.   Cardiovascular:      Rate and Rhythm: Normal rate and regular rhythm.      Pulses:           Carotid pulses are 2+ on the right side and 2+ on the left side.       Radial pulses are 2+ on the right side and 2+ on the left side.        Femoral pulses are 2+ on the right side and 2+ on the left side.     Heart sounds: Normal heart sounds.   Pulmonary:      Effort: Pulmonary effort is normal.      Breath sounds: Normal breath sounds.   Abdominal:      General: Bowel sounds are normal.      Palpations: Abdomen is soft.   Musculoskeletal:      Cervical back: Neck supple.   Skin:     General: Skin is warm and dry.   Neurological:      Mental Status: She is alert and oriented to person, place, and time.   Psychiatric:         Behavior: Behavior normal.         Thought Content: Thought content normal.         Assessment:       1. Chronic diastolic congestive heart failure    2. AV block, 3rd degree    3. Takotsubo cardiomyopathy    4. Paroxysmal atrial fibrillation    5. Hyperlipidemia, unspecified hyperlipidemia type    6. Essential hypertension    7. Diastolic dysfunction     8. Carotid artery disease, unspecified laterality, unspecified type    9. Cardiac pacemaker    10. Bilateral carotid artery stenosis    11. SOB (shortness of breath)      89 y/o pt with hx and presentation as above. Continues to be SOB, mildly improved. Continues to have good UOP and symptoms may be anxiety related vs asthma. Continue other meds and close f/u. Reassurance given. Discussed the etiology, evaluation, and management of CHF, HTN, HLD, PPM, DELBERT, TIA. Discussed the importance of med compliance, heart healthy diet, and regular exercise.     Plan:       -Continue current medical management  -f/u yearly

## 2023-12-18 DIAGNOSIS — H40.52X2 NEOVASCULAR GLAUCOMA OF LEFT EYE, MODERATE STAGE: ICD-10-CM

## 2023-12-18 RX ORDER — BRINZOLAMIDE 10 MG/ML
SUSPENSION/ DROPS OPHTHALMIC
Qty: 15 ML | Refills: 4 | Status: SHIPPED | OUTPATIENT
Start: 2023-12-18 | End: 2024-01-12

## 2024-01-03 ENCOUNTER — TELEPHONE (OUTPATIENT)
Dept: CARDIOLOGY | Facility: HOSPITAL | Age: 89
End: 2024-01-03
Payer: MEDICARE

## 2024-01-03 NOTE — TELEPHONE ENCOUNTER
Returned call to patient.  She is having trouble signing into to portal to see her pacemaker report. She attempted to change password but unsuccessful and locked out of acct.  I offered the help desk number to speak to someone to someone who could assist her.  She said not at this time and she was calling because she wanted to know how much battery life she had left on her pacemaker. Her transmission on Dec. 20th showed 1.4 yrs left on the battery.  I communicated this to her and she appreciated the call.

## 2024-01-03 NOTE — TELEPHONE ENCOUNTER
----- Message from Albertina Gomez sent at 1/2/2024  2:06 PM CST -----  Regarding: Results Needed  Pt request results of pacemaker test.    Contact @ [phone]    Thanks

## 2024-01-12 ENCOUNTER — OFFICE VISIT (OUTPATIENT)
Dept: CARDIOLOGY | Facility: CLINIC | Age: 89
End: 2024-01-12
Payer: MEDICARE

## 2024-01-12 VITALS
DIASTOLIC BLOOD PRESSURE: 61 MMHG | BODY MASS INDEX: 25.35 KG/M2 | HEIGHT: 63 IN | HEART RATE: 78 BPM | WEIGHT: 143.06 LBS | SYSTOLIC BLOOD PRESSURE: 132 MMHG | OXYGEN SATURATION: 96 %

## 2024-01-12 DIAGNOSIS — I44.2 AV BLOCK, 3RD DEGREE: ICD-10-CM

## 2024-01-12 DIAGNOSIS — E78.2 MIXED HYPERLIPIDEMIA: ICD-10-CM

## 2024-01-12 DIAGNOSIS — Z86.73 HISTORY OF TIA (TRANSIENT ISCHEMIC ATTACK): ICD-10-CM

## 2024-01-12 DIAGNOSIS — I77.9 CAROTID ARTERY DISEASE, UNSPECIFIED LATERALITY, UNSPECIFIED TYPE: ICD-10-CM

## 2024-01-12 DIAGNOSIS — I10 ESSENTIAL HYPERTENSION: ICD-10-CM

## 2024-01-12 DIAGNOSIS — I50.32 CHRONIC DIASTOLIC CONGESTIVE HEART FAILURE: Primary | ICD-10-CM

## 2024-01-12 DIAGNOSIS — H34.12 CRAO (CENTRAL RETINAL ARTERY OCCLUSION), LEFT: ICD-10-CM

## 2024-01-12 DIAGNOSIS — I51.81 TAKOTSUBO CARDIOMYOPATHY: ICD-10-CM

## 2024-01-12 DIAGNOSIS — I65.23 BILATERAL CAROTID ARTERY STENOSIS: ICD-10-CM

## 2024-01-12 DIAGNOSIS — I51.89 DIASTOLIC DYSFUNCTION: ICD-10-CM

## 2024-01-12 DIAGNOSIS — I48.0 PAROXYSMAL ATRIAL FIBRILLATION: ICD-10-CM

## 2024-01-12 DIAGNOSIS — I35.0 NONRHEUMATIC AORTIC VALVE STENOSIS: ICD-10-CM

## 2024-01-12 PROCEDURE — 99999 PR PBB SHADOW E&M-EST. PATIENT-LVL III: CPT | Mod: PBBFAC,,, | Performed by: INTERNAL MEDICINE

## 2024-01-12 PROCEDURE — 99204 OFFICE O/P NEW MOD 45 MIN: CPT | Mod: S$GLB,,, | Performed by: INTERNAL MEDICINE

## 2024-01-12 RX ORDER — HYDROCHLOROTHIAZIDE 12.5 MG/1
12.5 TABLET ORAL DAILY
Qty: 90 TABLET | Refills: 3 | Status: SHIPPED | OUTPATIENT
Start: 2024-01-12

## 2024-01-12 RX ORDER — METOPROLOL SUCCINATE 25 MG/1
TABLET, EXTENDED RELEASE ORAL
Qty: 270 TABLET | Refills: 3 | Status: SHIPPED | OUTPATIENT
Start: 2024-01-12

## 2024-01-15 NOTE — PROGRESS NOTES
Subjective:   Chief Complaint: Establish Care  Last Clinic Visit: New Patient    History of Present Illness: Dorothy M Knobloch is a 89 y.o. 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 Mcneill October of 2023 who presents for follow-up.  She reports doing reasonably well, stable shortness of breath which she has had since 2017, checking her oxygen level at home running in the 96-99% range.  Takes Bumex as needed only sparingly, does not need on a regular basis.  Reports blood pressures at home running in the 130s to 140s.  Denies any GI bleeding, most recent hemoglobin no noted to be dropping from 13-10.  She appears to be relatively euvolemic, but did have an elevated BNP in March of last year.  In review of her chart she did have a CT scan in 2018 showing some ground-glass opacities, she reports she did see a pulmonologist at [facility] in 2019 but no follow-up since.  It her most recent echocardiograms she does appear to be having a increasing pulmonary artery systolic pressure.  Up into the 60s.      Dr. 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 Last 2DE with normal function. Elevated lipid panel intolerant to statins, so started on Praluent, no longer taking due to reaction. Had been feeling weak at home, so I stopped doxyzosin, felt better after. BP stable since. Has chronic intermittent SOB.   Has episodic SMITH and fatigue. Bumex increased with improvement. Previously, CXR with RML opacity, BNP elevated >1000 and other labs remarkable for  "mild hyponatremia. Again developed an episode of SOB, fatigue, and "just not feeling well." Prescribed inhaler and improved.  Doing fair lately and lege edema improved. Denies CP, orthopnea, PND, syncope. Compliant with meds. Has bilateral LE edema which has improved with compression stockings. Has caregiver fatigue.    Dx:  Sick sinus syndrome s/p PPM  -follow with Dr. Velazquez  Atrial fibrillation on DOAC   Takotsubo cardiomyopathy 2013   Hypertension   Moderate Aortic Stenosis  Moderate bilateral carotid disease   TIA 12/2013   Hyperlipidemia   Hypothyroidism  Pulmonary hypertension  Lung disease?    Medications:  Outpatient Encounter Medications as of 1/12/2024   Medication Sig Dispense Refill    amLODIPine (NORVASC) 5 MG tablet TAKE 1 TABLET BY MOUTH EVERY DAY 90 tablet 3    bumetanide (BUMEX) 0.5 MG Tab TAKE 1 TABLET BY MOUTH 2 TIMES DAILY. 180 tablet 3    cholecalciferol, vitamin D3, 50,000 unit capsule Take 50,000 Units by mouth every 7 days.  5    clopidogreL (PLAVIX) 75 mg tablet Take 1 tablet (75 mg total) by mouth once daily. 90 tablet 3    ELIQUIS 5 mg Tab TAKE 1 TABLET BY MOUTH TWICE A  tablet 3    levothyroxine (SYNTHROID) 88 MCG tablet Take 88 mcg by mouth before breakfast.       [DISCONTINUED] hydroCHLOROthiazide (HYDRODIURIL) 12.5 MG Tab TAKE 1 TABLET BY MOUTH EVERY DAY 90 tablet 3    [DISCONTINUED] metoprolol succinate (TOPROL-XL) 25 MG 24 hr tablet TAKE 2 TABLETS IN THE AM AND 1 TABLET IN THE EVENING 270 tablet 3    albuterol (PROVENTIL/VENTOLIN HFA) 90 mcg/actuation inhaler INHALE 2 PUFFS INTO THE LUNGS EVERY 6 (SIX) HOURS AS NEEDED FOR WHEEZING. RESCUE 6.7 g 3    hydroCHLOROthiazide (HYDRODIURIL) 12.5 MG Tab Take 1 tablet (12.5 mg total) by mouth once daily. 90 tablet 3    metoprolol succinate (TOPROL-XL) 25 MG 24 hr tablet Take 2 tablets in AM and 1 tablet in  tablet 3    NITROSTAT 0.4 mg SL tablet       [DISCONTINUED] benzonatate (TESSALON) 100 MG capsule Take 1 capsule (100 mg " "total) by mouth 3 (three) times daily as needed for Cough. (Patient not taking: Reported on 1/12/2024) 60 capsule 1    [DISCONTINUED] brinzolamide (AZOPT) 1 % ophthalmic suspension PLACE 1 DROP INTO THE LEFT EYE 2 TIMES A DAY. (Patient not taking: Reported on 1/12/2024) 15 mL 4    [DISCONTINUED] clopidogreL (PLAVIX) 75 mg tablet TAKE 1 TABLET BY MOUTH EVERY DAY (Patient not taking: Reported on 1/12/2024) 90 tablet 3    [DISCONTINUED] COMBIGAN 0.2-0.5 % Drop INSTILL 1 DROP INTO LEFT EYE TWICE A DAY (Patient not taking: Reported on 1/12/2024) 5 mL 3    [DISCONTINUED] erythromycin (ROMYCIN) ophthalmic ointment APPLY 1 INCH TO THE BOTTOM LID OF LEFT EYE FOR FIRST WEEK OF EVERY MONTH (Patient not taking: Reported on 1/12/2024) 3.5 g 6    [DISCONTINUED] PRALUENT PEN 75 mg/mL PnIj        No facility-administered encounter medications on file as of 1/12/2024.     Social History:  Carol reports that she has never smoked. She has never used smokeless tobacco. She reports that she does not drink alcohol and does not use drugs.    Objective:   /61   Pulse 78   Ht 5' 3" (1.6 m)   Wt 64.9 kg (143 lb 1.3 oz)   SpO2 96%   BMI 25.35 kg/m²     Physical Exam   HENT:   Head: Normocephalic and atraumatic.   Mouth/Throat: Mucous membranes are moist.   Cardiovascular: Normal rate, regular rhythm and normal pulses. Exam reveals no gallop and no friction rub.   Murmur heard.  Pulmonary/Chest: Effort normal and breath sounds normal. No stridor. No respiratory distress. She has no rhonchi. She has no rales. She exhibits no tenderness.   Abdominal: Normal appearance. She exhibits no distension.   Musculoskeletal:      Right lower leg: Edema present.      Left lower leg: Edema present.      Comments: Trace bilateral   Neurological: She is alert.   Skin: Skin is warm. Capillary refill takes less than 2 seconds.      EKG:  My independent visualization of most recent EKG is Ventricular paced.    TTE:  3/13/2023  The left ventricle is " normal in size with concentric remodeling and normal systolic function. The estimated ejection fraction is 60%.  Normal right ventricular size with normal right ventricular systolic function.  Indeterminate left ventricular diastolic function.  Severe biatrial enlargement.  Moderate tricuspid regurgitation.  Marked mitral annular calcification without significant stenosis and with mild mitral regurgitation.  There is moderate aortic valve stenosis. Aortic valve area is 1.0 cm2; peak velocity is 2.50 m/s; mean gradient is 15 mmHg.  There is pulmonary hypertension. The estimated PA systolic pressure is 67 mmHg.  Intermediate central venous pressure (8 mmHg).    3/17/2023  The estimated ejection fraction is 60%.  The left ventricle is normal in size with concentric remodeling and normal systolic function.  Grade III left ventricular diastolic dysfunction.  Normal right ventricular size with normal right ventricular systolic function.  Moderate left atrial enlargement.  Mild right atrial enlargement.  There is moderate aortic valve stenosis low flow. DI=0.3.  Aortic valve area is 1.07 cm2; peak velocity is 2.35 m/s; mean gradient is 13 mmHg.  Moderate tricuspid regurgitation.  Mild mitral regurgitation.  There is pulmonary hypertension.  The estimated PA systolic pressure is 47 mmHg.  Elevated central venous pressure (15 mmHg).    11/29/2018  CONCLUSIONS     1 - Normal left ventricular systolic function (EF 60-65%).     2 - Normal right ventricular systolic function .     3 - Moderate left atrial enlargement.     4 - Mild tricuspid regurgitation.     5 - Marked mitral annular calcification without stenosis.     6 - Aortic sclerosis without stenosis.     7 - The estimated PA systolic pressure is 33 mmHg.     Lipids:       Renal:  Recent Labs   Lab 03/13/23  0321   Creatinine 0.7   Potassium 3.1 L   CO2 24   BUN 21     Liver:  Recent Labs   Lab 03/12/23  1642   AST 23   ALT 10     Carotid u.s   3/2/2018  Impression:    RIGHT SIDE:   40 - 59 % right ICA stenosis. The Plaque at the bulb is complex and appears to be closer to the 60% range.   Complex plaque in the right internal carotid artery.   Antegrade flow in the right vertebral artery.   LEFT SIDE:   40 - 59% left ICA stenosis.   Complex plaque in the left internal carotid artery.   Antegrade flow in the left vertebral artery.     CT Chest   11/15/2019  Impression:  Nonspecific lung findings.  Mosaic attenuation diffusely seen can be seen with small airway inflammatory disease.  Can also be seen with chronic PE.  Several areas of nonspecific pleural base ground-glass opacity as detailed above.  For a ground glass nodule 6 mm or larger, Fleischner Society 2017 guidelines recommend follow up with non-contrast chest CT at 6-12 months after discovery. If this nodule persists at that time, additional follow up with non-contrast chest CT is recommended every 2 years until 5 years of stability have been documented.    Assessment:     1. Chronic diastolic congestive heart failure    2. Essential hypertension    3. Mixed hyperlipidemia    4. Carotid artery disease, unspecified laterality, unspecified type    5. Bilateral carotid artery stenosis    6. AV block, 3rd degree    7. Diastolic dysfunction    8. Paroxysmal atrial fibrillation    9. Takotsubo cardiomyopathy    10. History of TIA (transient ischemic attack)    11. CRAO (central retinal artery occlusion), left      Plan:   1. Chronic diastolic congestive heart failure  Overall appear to be relatively euvolemic today, blood pressure reasonably well-controlled upper limit of normal may have some additional room for uptitration however would like to avoid any labile blood pressure hypotension in this 89-year-old lady.  Will repeat BNP., would also like to update PA systolic pressures on echocardiogram in conjunction with BNP and determine whether we need look further into her pulmonary hypertension.  Particularly given that she did  have CT scan showing ground-glass opacities, and no follow-up with pulmonology since 2019.  Unclear what treatment options would be available to her if she did have interstitial lung disease..  Reassuring that she does have reasonably normal oxygen saturation at rest.    Could also simply be group 2 pulmonary hypertension the setting of diastolic dysfunction.  - Echo; Future  - Comprehensive Metabolic Panel; Future  - CBC Auto Differential; Future  - BNP; Future  - Magnesium; Future    2. Essential hypertension      3. Mixed hyperlipidemia      4. Carotid artery disease, unspecified laterality, unspecified type      5. Bilateral carotid artery stenosis      6. AV block, 3rd degree      7. Diastolic dysfunction      8. Paroxysmal atrial fibrillation      9. Takotsubo cardiomyopathy      10. History of TIA (transient ischemic attack)      11. CRAO (central retinal artery occlusion), left      12. Nonrheumatic aortic valve stenosis      Follow up in       Yomi Villa MD [facility]

## 2024-01-23 ENCOUNTER — HOSPITAL ENCOUNTER (OUTPATIENT)
Dept: CARDIOLOGY | Facility: HOSPITAL | Age: 89
Discharge: HOME | End: 2024-01-23
Attending: INTERNAL MEDICINE
Payer: MEDICARE

## 2024-01-23 VITALS
HEIGHT: 63 IN | SYSTOLIC BLOOD PRESSURE: 136 MMHG | HEART RATE: 62 BPM | BODY MASS INDEX: 25.34 KG/M2 | DIASTOLIC BLOOD PRESSURE: 76 MMHG | WEIGHT: 143 LBS

## 2024-01-23 DIAGNOSIS — I50.32 CHRONIC DIASTOLIC CONGESTIVE HEART FAILURE: ICD-10-CM

## 2024-01-23 LAB
ASCENDING AORTA: 2.85 CM
AV INDEX (PROSTH): 0.31
AV MEAN GRADIENT: 17 MMHG
AV PEAK GRADIENT: 27 MMHG
AV VALVE AREA BY VELOCITY RATIO: 0.68 CM²
AV VALVE AREA: 0.78 CM²
AV VELOCITY RATIO: 0.27
BSA FOR ECHO PROCEDURE: 1.7 M2
CV ECHO LV RWT: 0.43 CM
DOP CALC AO PEAK VEL: 2.62 M/S
DOP CALC AO VTI: 57 CM
DOP CALC LVOT AREA: 2.5 CM2
DOP CALC LVOT DIAMETER: 1.8 CM
DOP CALC LVOT PEAK VEL: 0.7 M/S
DOP CALC LVOT STROKE VOLUME: 44.23 CM3
DOP CALCLVOT PEAK VEL VTI: 17.39 CM
E WAVE DECELERATION TIME: 235.13 MSEC
E/A RATIO: 4.44
E/E' RATIO: 23.23 M/S
ECHO LV POSTERIOR WALL: 0.85 CM (ref 0.6–1.1)
FRACTIONAL SHORTENING: 40 % (ref 28–44)
INTERVENTRICULAR SEPTUM: 1.13 CM (ref 0.6–1.1)
LA MAJOR: 5.7 CM
LA MINOR: 6.52 CM
LA WIDTH: 4.71 CM
LEFT ATRIUM SIZE: 4.25 CM
LEFT ATRIUM VOLUME INDEX MOD: 55.2 ML/M2
LEFT ATRIUM VOLUME INDEX: 61.6 ML/M2
LEFT ATRIUM VOLUME MOD: 92.81 CM3
LEFT ATRIUM VOLUME: 103.49 CM3
LEFT INTERNAL DIMENSION IN SYSTOLE: 2.36 CM (ref 2.1–4)
LEFT VENTRICLE DIASTOLIC VOLUME INDEX: 40.46 ML/M2
LEFT VENTRICLE DIASTOLIC VOLUME: 67.98 ML
LEFT VENTRICLE MASS INDEX: 73 G/M2
LEFT VENTRICLE SYSTOLIC VOLUME INDEX: 11.5 ML/M2
LEFT VENTRICLE SYSTOLIC VOLUME: 19.28 ML
LEFT VENTRICULAR INTERNAL DIMENSION IN DIASTOLE: 3.95 CM (ref 3.5–6)
LEFT VENTRICULAR MASS: 122.82 G
LV LATERAL E/E' RATIO: 25.17 M/S
LV SEPTAL E/E' RATIO: 21.57 M/S
LVOT STOKE VOLUME INDEX: 25 ML/M2
MV PEAK A VEL: 0.34 M/S
MV PEAK E VEL: 1.51 M/S
MV STENOSIS PRESSURE HALF TIME: 68.19 MS
MV VALVE AREA P 1/2 METHOD: 3.23 CM2
PISA TR MAX VEL: 3.28 M/S
RA MAJOR: 6.33 CM
RA PRESSURE ESTIMATED: 8 MMHG
RA WIDTH: 4.5 CM
RIGHT ATRIAL AREA: 26 CM2
RV TB RVSP: 11 MMHG
SINUS: 2.72 CM
STJ: 2.21 CM
TDI LATERAL: 0.06 M/S
TDI SEPTAL: 0.07 M/S
TDI: 0.07 M/S
TR MAX PG: 43 MMHG
TRICUSPID ANNULAR PLANE SYSTOLIC EXCURSION: 1.5 CM
TV REST PULMONARY ARTERY PRESSURE: 51 MMHG
Z-SCORE OF LEFT VENTRICULAR DIMENSION IN END DIASTOLE: -1.71
Z-SCORE OF LEFT VENTRICULAR DIMENSION IN END SYSTOLE: -1.64

## 2024-01-23 PROCEDURE — 93306 TTE W/DOPPLER COMPLETE: CPT

## 2024-01-23 PROCEDURE — 93306 TTE W/DOPPLER COMPLETE: CPT | Mod: 26,,, | Performed by: INTERNAL MEDICINE

## 2024-02-01 DIAGNOSIS — I44.2 AV BLOCK, 3RD DEGREE: Primary | ICD-10-CM

## 2024-02-08 ENCOUNTER — CLINICAL SUPPORT (OUTPATIENT)
Dept: CARDIOLOGY | Facility: HOSPITAL | Age: 89
End: 2024-02-08
Payer: MEDICARE

## 2024-02-08 ENCOUNTER — TELEPHONE (OUTPATIENT)
Dept: CARDIOLOGY | Facility: HOSPITAL | Age: 89
End: 2024-02-08
Payer: MEDICARE

## 2024-02-08 ENCOUNTER — CLINICAL SUPPORT (OUTPATIENT)
Dept: CARDIOLOGY | Facility: HOSPITAL | Age: 89
End: 2024-02-08
Attending: INTERNAL MEDICINE
Payer: MEDICARE

## 2024-02-08 DIAGNOSIS — I44.2 ATRIOVENTRICULAR BLOCK, COMPLETE: ICD-10-CM

## 2024-02-08 DIAGNOSIS — Z95.0 PRESENCE OF CARDIAC PACEMAKER: ICD-10-CM

## 2024-02-08 PROCEDURE — 93294 REM INTERROG EVL PM/LDLS PM: CPT | Mod: ,,, | Performed by: INTERNAL MEDICINE

## 2024-02-08 PROCEDURE — 93296 REM INTERROG EVL PM/IDS: CPT | Performed by: INTERNAL MEDICINE

## 2024-02-09 ENCOUNTER — PATIENT MESSAGE (OUTPATIENT)
Dept: CARDIOLOGY | Facility: CLINIC | Age: 89
End: 2024-02-09
Payer: MEDICARE

## 2024-02-21 LAB
OHS CV AF BURDEN PERCENT: 100
OHS CV DC REMOTE DEVICE TYPE: NORMAL
OHS CV ICD SHOCK: NO
OHS CV RV PACING PERCENT: 99.61 %

## 2024-02-29 ENCOUNTER — HOSPITAL ENCOUNTER (OUTPATIENT)
Dept: CARDIOLOGY | Facility: CLINIC | Age: 89
Discharge: HOME | End: 2024-02-29
Payer: MEDICARE

## 2024-02-29 ENCOUNTER — OFFICE VISIT (OUTPATIENT)
Dept: ELECTROPHYSIOLOGY | Facility: CLINIC | Age: 89
End: 2024-02-29
Payer: MEDICARE

## 2024-02-29 ENCOUNTER — CLINICAL SUPPORT (OUTPATIENT)
Dept: CARDIOLOGY | Facility: HOSPITAL | Age: 89
End: 2024-02-29
Attending: INTERNAL MEDICINE
Payer: MEDICARE

## 2024-02-29 VITALS
HEIGHT: 63 IN | WEIGHT: 139 LBS | DIASTOLIC BLOOD PRESSURE: 62 MMHG | HEART RATE: 70 BPM | BODY MASS INDEX: 24.63 KG/M2 | SYSTOLIC BLOOD PRESSURE: 130 MMHG

## 2024-02-29 DIAGNOSIS — I44.2 AV BLOCK, 3RD DEGREE: ICD-10-CM

## 2024-02-29 DIAGNOSIS — Z95.0 CARDIAC PACEMAKER: ICD-10-CM

## 2024-02-29 DIAGNOSIS — I48.0 PAROXYSMAL ATRIAL FIBRILLATION: Primary | ICD-10-CM

## 2024-02-29 DIAGNOSIS — I10 ESSENTIAL HYPERTENSION: ICD-10-CM

## 2024-02-29 DIAGNOSIS — I50.32 CHRONIC DIASTOLIC CONGESTIVE HEART FAILURE: ICD-10-CM

## 2024-02-29 DIAGNOSIS — Z86.73 HISTORY OF TIA (TRANSIENT ISCHEMIC ATTACK): ICD-10-CM

## 2024-02-29 LAB
OHS QRS DURATION: 186 MS
OHS QTC CALCULATION: 524 MS

## 2024-02-29 PROCEDURE — 93280 PM DEVICE PROGR EVAL DUAL: CPT

## 2024-02-29 PROCEDURE — 93005 ELECTROCARDIOGRAM TRACING: CPT | Mod: S$GLB,,, | Performed by: INTERNAL MEDICINE

## 2024-02-29 PROCEDURE — 99214 OFFICE O/P EST MOD 30 MIN: CPT | Mod: S$GLB,,, | Performed by: INTERNAL MEDICINE

## 2024-02-29 PROCEDURE — 93010 ELECTROCARDIOGRAM REPORT: CPT | Mod: S$GLB,,, | Performed by: INTERNAL MEDICINE

## 2024-02-29 PROCEDURE — 93280 PM DEVICE PROGR EVAL DUAL: CPT | Mod: 26,,, | Performed by: INTERNAL MEDICINE

## 2024-02-29 PROCEDURE — 99999 PR PBB SHADOW E&M-EST. PATIENT-LVL III: CPT | Mod: PBBFAC,,, | Performed by: INTERNAL MEDICINE

## 2024-02-29 NOTE — PROGRESS NOTES
Subjective:    Patient ID:  Dorothy M Knobloch is a 89 y.o. female who presents for evaluation of Pacemaker Check    Referring Cardiologist: [place] Mcneill MD    Prior Hx:  I had the pleasure of seeing Mrs. Knobloch today in our electrophysiology clinic in consultation for her pacemaker and atrial fibrillation. As you are aware she is a pleasant 89 year-old woman with hypertension, Takotsubo CMP 3/2013, TIA 12/2013, moderate right carotid artery disease, paroxysmal AF, and high-grade AV block with bradycardia s/p dc PPM 2017 at [facility]. She is transferring care to [facility]. She feels well. Device interrogation notes normal function. She does have <1% AF burden without high-ventricular rates. She notes occasional brief fluttering sensation.    PPM interrogation: stable parameters, 52%A, 63%V paced. AF<0.8%, longest episode 3 hours.    Mrs. Mackenzie returns for  follow-up 10/2019. 75% A paced and 70% RV pacing, 18.8%AF burden, longest episode 27 minutes. V rates controlled. She didnot notice any AF anymore. We discussed risks of RV pacing induced CMP. ECHO noted preserved LVEF.    12/13/2019: Seen by [place]. She was seen by Dr. Mcneill after our last visit and began diuresis. He initiated bumex 0.5 mg BID.    1/2022: Seen in follow-up. Had no complaints. ECHO 3/2022 noted preserved LV function with moderate AS.    6/2022: Mrs. Mackenzie returned for overdue follow-up. She reports intermittently her PPM bothers her depending on her position. She has lost some weight.    8/2023: ED visit 5 months ago (3/2023) for sepsis 2/2 pneumonia. Now recovered. Normal EF during hospital stay. No worsening SOB/SMITH. Device with 98.1% AFib burden. No ventricular arrhythmias. AP: 21 %; : 99.7 %. Undersensing noted in RA lead-- sensing adjustment made. Battery life: 1.5 years.     Interim Hx:  Mrs. Knobloch returns for follow-up. ECHO in January of 2024 noted normal LV function.    In clinic device interrogation notes stable  lead parameters with no RA and 99% RV pacing with 100% AF burden since January 2022, 1 year of estimated battery longevity.    My interpretation of today's in clinic ECG is afib with RV paced rhythm at 70 bpm.    Review of Systems   Constitutional: Negative for malaise/fatigue.   HENT:  Negative for congestion and sore throat.    Eyes:  Negative for blurred vision and visual disturbance.   Cardiovascular:  Positive for dyspnea on exertion. Negative for chest pain, leg swelling, near-syncope, orthopnea and palpitations.   Respiratory:  Negative for cough and shortness of breath.    Hematologic/Lymphatic: Negative for bleeding problem. Does not bruise/bleed easily.   Skin: Negative.    Musculoskeletal:  Positive for arthritis, back pain and joint pain.   Gastrointestinal:  Negative for hematochezia and melena.   Neurological:  Negative for dizziness, light-headedness and weakness.        Objective:    Physical Exam  Vitals reviewed.   Constitutional:       General: She is not in acute distress.     Appearance: She is well-developed. She is not diaphoretic.   HENT:      Head: Normocephalic and atraumatic.   Eyes:      General:         Right eye: No discharge.         Left eye: No discharge.      Conjunctiva/sclera: Conjunctivae normal.   Neck:      Vascular: No JVD.   Cardiovascular:      Rate and Rhythm: Normal rate and regular rhythm.      Heart sounds: Murmur heard.      Harsh midsystolic murmur is present with a grade of 3/6 at the upper right sternal border radiating to the neck.      No friction rub. No gallop.   Pulmonary:      Effort: Pulmonary effort is normal. No respiratory distress.      Breath sounds: Normal breath sounds. No wheezing or rales.   Abdominal:      General: Bowel sounds are normal. There is no distension.      Palpations: Abdomen is soft.      Tenderness: There is no abdominal tenderness. There is no rebound.   Musculoskeletal:      Cervical back: Neck supple.   Skin:     General: Skin is  warm and dry.   Neurological:      Mental Status: She is alert and oriented to person, place, and time.   Psychiatric:         Behavior: Behavior normal.         Thought Content: Thought content normal.           Assessment:       1. Paroxysmal atrial fibrillation    2. Essential hypertension    3. Chronic diastolic congestive heart failure    4. AV block, 3rd degree    5. Cardiac pacemaker    6. History of TIA (transient ischemic attack)         Plan:       In summary, Mrs. Knobloch is a pleasant 89 year-old woman with hypertension, Takotsubo CMP 3/2013, TIA 12/2013, moderate right carotid artery disease, paroxysmal AF, and high-grade AV block with bradycardia s/p dc PPM 2017 at [facility]. She understands risks of AF and bleeding risks of DOAC therapy. Her [id number] score is >2 and she is on eliquis. Device is functioning appropriately. Discussed long-term potential risk of RV pacing induced cardiomyopathy. Recent ECHO noted preserved LVEF.     RTC in 8 months    Thank you for allowing me to participate in the care of this patient. Please do not hesitate to call me with any questions or concerns.    Marquis Velazquez MD, PhD  Cardiac Electrophysiology                  HPI

## 2024-03-01 ENCOUNTER — OFFICE VISIT (OUTPATIENT)
Dept: URGENT CARE | Facility: CLINIC | Age: 89
End: 2024-03-01
Payer: MEDICARE

## 2024-03-01 VITALS
HEART RATE: 87 BPM | SYSTOLIC BLOOD PRESSURE: 148 MMHG | WEIGHT: 139 LBS | TEMPERATURE: 98 F | DIASTOLIC BLOOD PRESSURE: 80 MMHG | OXYGEN SATURATION: 96 % | RESPIRATION RATE: 16 BRPM | BODY MASS INDEX: 24.62 KG/M2

## 2024-03-01 DIAGNOSIS — L23.9 ALLERGIC DERMATITIS: Primary | ICD-10-CM

## 2024-03-01 PROCEDURE — 99213 OFFICE O/P EST LOW 20 MIN: CPT | Mod: 25,S$GLB,, | Performed by: FAMILY MEDICINE

## 2024-03-01 PROCEDURE — 96372 THER/PROPH/DIAG INJ SC/IM: CPT | Mod: S$GLB,,, | Performed by: FAMILY MEDICINE

## 2024-03-01 RX ORDER — BRINZOLAMIDE 10 MG/ML
1 SUSPENSION/ DROPS OPHTHALMIC 2 TIMES DAILY
COMMUNITY

## 2024-03-01 RX ORDER — BETAMETHASONE SODIUM PHOSPHATE AND BETAMETHASONE ACETATE 3; 3 MG/ML; MG/ML
6 INJECTION, SUSPENSION INTRA-ARTICULAR; INTRALESIONAL; INTRAMUSCULAR; SOFT TISSUE
Status: COMPLETED | OUTPATIENT
Start: 2024-03-01 | End: 2024-03-01

## 2024-03-01 RX ORDER — ERYTHROMYCIN 5 MG/G
OINTMENT OPHTHALMIC
COMMUNITY

## 2024-03-01 RX ORDER — BRIMONIDINE TARTRATE AND TIMOLOL MALEATE 2; 5 MG/ML; MG/ML
SOLUTION OPHTHALMIC
COMMUNITY

## 2024-03-01 RX ORDER — HYDROCORTISONE 25 MG/G
CREAM TOPICAL 2 TIMES DAILY
Qty: 20 G | Refills: 1 | Status: SHIPPED | OUTPATIENT
Start: 2024-03-01

## 2024-03-01 RX ORDER — AMLODIPINE BESYLATE AND ATORVASTATIN CALCIUM 10; 10 MG/1; MG/1
1 TABLET, FILM COATED ORAL DAILY
COMMUNITY
End: 2024-04-04

## 2024-03-01 RX ADMIN — BETAMETHASONE SODIUM PHOSPHATE AND BETAMETHASONE ACETATE 6 MG: 3; 3 INJECTION, SUSPENSION INTRA-ARTICULAR; INTRALESIONAL; INTRAMUSCULAR; SOFT TISSUE at 06:03

## 2024-03-02 NOTE — PROGRESS NOTES
Subjective:      Patient ID: Dorothy M Knobloch is a 89 y.o. female.    Vitals:  weight is 63 kg (139 lb). Her oral temperature is 98.1 °F (36.7 °C). Her blood pressure is 148/80 (abnormal) and her pulse is 87. Her respiration is 16 and oxygen saturation is 96%.     Chief Complaint: Rash    This is a 89 y.o. female who presents today with a chief complaint of rash that began on L shoulder and spread x 2 monthss. Pt says rash itches and burns. Appears as scattered red blotches and bumps.     Home tx: benadryl (oral - caused cloudiness in R eye); cortizone cream, coconut oil, aloe vera (made it much worse)    PMH: allergic to many soaps; stroke; pacemaker, chronic SOB    Rash  This is a new problem. The current episode started more than 1 month ago. The problem has been gradually worsening since onset. The affected locations include the abdomen, torso, back, right ear, left shoulder, right shoulder, right eye, left arm and right arm. The rash is characterized by itchiness, pain and redness. It is unknown if there was an exposure to a precipitant. Pertinent negatives include no congestion, cough, diarrhea, fatigue, fever, sore throat or vomiting.       Constitution: Negative for fatigue and fever.   HENT:  Negative for congestion and sore throat.    Respiratory:  Negative for cough.    Gastrointestinal:  Negative for vomiting and diarrhea.   Skin:  Positive for rash.      Objective:     Physical Exam   Constitutional: She does not appear ill. No distress. normal  Cardiovascular: Normal rate, regular rhythm, normal heart sounds and normal pulses.   Pulmonary/Chest: Effort normal and breath sounds normal.   Abdominal: Normal appearance.   Neurological: She is alert.   Skin: Skin is rash (red papular lesions noted on back extending from shoulder to below scapular bilaterally primarily on left occasional excoriations).   Nursing note and vitals reviewed.    Assessment:     1. Allergic dermatitis        Plan:       Allergic  dermatitis  -     betamethasone acetate-betamethasone sodium phosphate injection 6 mg  -     hydrocortisone 2.5 % cream; Apply topically 2 (two) times daily.  Dispense: 20 g; Refill: 1    To review possible allergens. RTC prn worsening symptoms

## 2024-03-08 NOTE — TELEPHONE ENCOUNTER
"Per Dr. Fernandez, "So I'll send in a different topical   Make sure she is taking a probiotic   No new soaps/ detergents   And get an OTC hyaluronic  acid cream or suppository for nighttime"  " I received a remote transmission from 01/17/2022 showing patient in Afib since 01/14/2022.  I called her and asked that she send a manual transmission from her home monitor to make sure she is no longer in Afib. She stated she will send this now.

## 2024-03-12 ENCOUNTER — TELEPHONE (OUTPATIENT)
Dept: CARDIOLOGY | Facility: HOSPITAL | Age: 89
End: 2024-03-12
Payer: MEDICARE

## 2024-03-12 NOTE — TELEPHONE ENCOUNTER
Spoke with patient and informed her to send a remote pacemaker transmission on the 29th of each month since her battery is at 13 months until FAINA.  Patient stated understanding.

## 2024-03-26 LAB
OHS CV AF BURDEN PERCENT: 100
OHS CV DC REMOTE DEVICE TYPE: NORMAL
OHS CV RV PACING PERCENT: 0 %

## 2024-04-04 ENCOUNTER — HOSPITAL ENCOUNTER (INPATIENT)
Facility: HOSPITAL | Age: 89
LOS: 1 days | Discharge: HOME HEALTH | DRG: 065 | End: 2024-04-05
Attending: STUDENT IN AN ORGANIZED HEALTH CARE EDUCATION/TRAINING PROGRAM | Admitting: PSYCHIATRY & NEUROLOGY
Payer: MEDICARE

## 2024-04-04 ENCOUNTER — DOCUMENTATION ONLY (OUTPATIENT)
Dept: CARDIOLOGY | Facility: HOSPITAL | Age: 89
End: 2024-04-04
Payer: MEDICARE

## 2024-04-04 ENCOUNTER — DOCUMENTATION ONLY (OUTPATIENT)
Dept: ELECTROPHYSIOLOGY | Facility: CLINIC | Age: 89
End: 2024-04-04
Payer: MEDICARE

## 2024-04-04 DIAGNOSIS — R29.818 ACUTE FOCAL NEUROLOGICAL DEFICIT: ICD-10-CM

## 2024-04-04 DIAGNOSIS — R07.9 CHEST PAIN: ICD-10-CM

## 2024-04-04 DIAGNOSIS — R20.0 RIGHT SIDED NUMBNESS: Primary | ICD-10-CM

## 2024-04-04 DIAGNOSIS — I63.9 CVA (CEREBRAL VASCULAR ACCIDENT): ICD-10-CM

## 2024-04-04 PROBLEM — M25.511 RIGHT SHOULDER PAIN: Status: RESOLVED | Noted: 2023-03-12 | Resolved: 2024-04-04

## 2024-04-04 PROBLEM — H02.045: Status: RESOLVED | Noted: 2020-10-20 | Resolved: 2024-04-04

## 2024-04-04 PROBLEM — H02.006: Status: RESOLVED | Noted: 2022-07-12 | Resolved: 2024-04-04

## 2024-04-04 PROBLEM — I63.432 STROKE DUE TO EMBOLISM OF LEFT POSTERIOR CEREBRAL ARTERY: Status: ACTIVE | Noted: 2024-04-04

## 2024-04-04 PROBLEM — R06.02 SOB (SHORTNESS OF BREATH): Status: RESOLVED | Noted: 2019-10-31 | Resolved: 2024-04-04

## 2024-04-04 PROBLEM — I48.0 PAROXYSMAL ATRIAL FIBRILLATION: Chronic | Status: ACTIVE | Noted: 2018-09-11

## 2024-04-04 LAB
ALBUMIN SERPL BCP-MCNC: 3.8 G/DL (ref 3.5–5.2)
ALP SERPL-CCNC: 62 U/L (ref 55–135)
ALT SERPL W/O P-5'-P-CCNC: 9 U/L (ref 10–44)
ANION GAP SERPL CALC-SCNC: 8 MMOL/L (ref 8–16)
AST SERPL-CCNC: 18 U/L (ref 10–40)
BACTERIA #/AREA URNS AUTO: ABNORMAL /HPF
BASOPHILS # BLD AUTO: 0.06 K/UL (ref 0–0.2)
BASOPHILS NFR BLD: 0.9 % (ref 0–1.9)
BILIRUB SERPL-MCNC: 0.5 MG/DL (ref 0.1–1)
BILIRUB UR QL STRIP: NEGATIVE
BUN SERPL-MCNC: 29 MG/DL (ref 10–30)
CALCIUM SERPL-MCNC: 10.1 MG/DL (ref 8.7–10.5)
CHLORIDE SERPL-SCNC: 103 MMOL/L (ref 95–110)
CHOLEST SERPL-MCNC: 231 MG/DL (ref 120–199)
CHOLEST/HDLC SERPL: 3.2 {RATIO} (ref 2–5)
CLARITY UR REFRACT.AUTO: CLEAR
CO2 SERPL-SCNC: 24 MMOL/L (ref 23–29)
COLOR UR AUTO: YELLOW
CREAT SERPL-MCNC: 0.6 MG/DL (ref 0.5–1.4)
CREAT SERPL-MCNC: 0.8 MG/DL (ref 0.5–1.4)
DIFFERENTIAL METHOD BLD: ABNORMAL
EOSINOPHIL # BLD AUTO: 0.1 K/UL (ref 0–0.5)
EOSINOPHIL NFR BLD: 1.4 % (ref 0–8)
ERYTHROCYTE [DISTWIDTH] IN BLOOD BY AUTOMATED COUNT: 16.5 % (ref 11.5–14.5)
EST. GFR  (NO RACE VARIABLE): >60 ML/MIN/1.73 M^2
ESTIMATED AVG GLUCOSE: 111 MG/DL (ref 68–131)
GLUCOSE SERPL-MCNC: 92 MG/DL (ref 70–110)
GLUCOSE UR QL STRIP: ABNORMAL
HBA1C MFR BLD: 5.5 % (ref 4–5.6)
HCT VFR BLD AUTO: 40.8 % (ref 37–48.5)
HDLC SERPL-MCNC: 73 MG/DL (ref 40–75)
HDLC SERPL: 31.6 % (ref 20–50)
HGB BLD-MCNC: 13.1 G/DL (ref 12–16)
HGB UR QL STRIP: ABNORMAL
IMM GRANULOCYTES # BLD AUTO: 0.03 K/UL (ref 0–0.04)
IMM GRANULOCYTES NFR BLD AUTO: 0.4 % (ref 0–0.5)
INR PPP: 1.1 (ref 0.8–1.2)
KETONES UR QL STRIP: NEGATIVE
LDLC SERPL CALC-MCNC: 139.6 MG/DL (ref 63–159)
LEUKOCYTE ESTERASE UR QL STRIP: ABNORMAL
LYMPHOCYTES # BLD AUTO: 1.6 K/UL (ref 1–4.8)
LYMPHOCYTES NFR BLD: 22.4 % (ref 18–48)
MAGNESIUM SERPL-MCNC: 2.2 MG/DL (ref 1.6–2.6)
MCH RBC QN AUTO: 27.5 PG (ref 27–31)
MCHC RBC AUTO-ENTMCNC: 32.1 G/DL (ref 32–36)
MCV RBC AUTO: 86 FL (ref 82–98)
MICROSCOPIC COMMENT: ABNORMAL
MONOCYTES # BLD AUTO: 0.6 K/UL (ref 0.3–1)
MONOCYTES NFR BLD: 8 % (ref 4–15)
NEUTROPHILS # BLD AUTO: 4.7 K/UL (ref 1.8–7.7)
NEUTROPHILS NFR BLD: 66.9 % (ref 38–73)
NITRITE UR QL STRIP: NEGATIVE
NONHDLC SERPL-MCNC: 158 MG/DL
NRBC BLD-RTO: 0 /100 WBC
OHS QRS DURATION: 216 MS
OHS QTC CALCULATION: 555 MS
PH UR STRIP: 8 [PH] (ref 5–8)
PLATELET # BLD AUTO: 258 K/UL (ref 150–450)
PMV BLD AUTO: 9.2 FL (ref 9.2–12.9)
POC PTINR: 1.2 (ref 0.9–1.2)
POC PTWBT: 14.2 SEC (ref 9.7–14.3)
POCT GLUCOSE: 99 MG/DL (ref 70–110)
POTASSIUM SERPL-SCNC: 3.4 MMOL/L (ref 3.5–5.1)
PROT SERPL-MCNC: 7.3 G/DL (ref 6–8.4)
PROT UR QL STRIP: NEGATIVE
PROTHROMBIN TIME: 11.4 SEC (ref 9–12.5)
RBC # BLD AUTO: 4.76 M/UL (ref 4–5.4)
RBC #/AREA URNS AUTO: 5 /HPF (ref 0–4)
SAMPLE: NORMAL
SAMPLE: NORMAL
SODIUM SERPL-SCNC: 135 MMOL/L (ref 136–145)
SP GR UR STRIP: >1.03 (ref 1–1.03)
SQUAMOUS #/AREA URNS AUTO: 6 /HPF
T4 FREE SERPL-MCNC: 1.3 NG/DL (ref 0.71–1.51)
TRI-PHOS CRY UR QL COMP ASSIST: ABNORMAL
TRIGL SERPL-MCNC: 92 MG/DL (ref 30–150)
TSH SERPL DL<=0.005 MIU/L-ACNC: 4.09 UIU/ML (ref 0.4–4)
URN SPEC COLLECT METH UR: ABNORMAL
WBC # BLD AUTO: 7.02 K/UL (ref 3.9–12.7)
WBC #/AREA URNS AUTO: 1 /HPF (ref 0–5)
YEAST UR QL AUTO: ABNORMAL

## 2024-04-04 PROCEDURE — 99285 EMERGENCY DEPT VISIT HI MDM: CPT | Mod: 25

## 2024-04-04 PROCEDURE — 99223 1ST HOSP IP/OBS HIGH 75: CPT | Mod: FS,,, | Performed by: PSYCHIATRY & NEUROLOGY

## 2024-04-04 PROCEDURE — 83036 HEMOGLOBIN GLYCOSYLATED A1C: CPT | Performed by: STUDENT IN AN ORGANIZED HEALTH CARE EDUCATION/TRAINING PROGRAM

## 2024-04-04 PROCEDURE — 93010 ELECTROCARDIOGRAM REPORT: CPT | Mod: ,,, | Performed by: INTERNAL MEDICINE

## 2024-04-04 PROCEDURE — 85610 PROTHROMBIN TIME: CPT | Performed by: STUDENT IN AN ORGANIZED HEALTH CARE EDUCATION/TRAINING PROGRAM

## 2024-04-04 PROCEDURE — 99900035 HC TECH TIME PER 15 MIN (STAT)

## 2024-04-04 PROCEDURE — 81001 URINALYSIS AUTO W/SCOPE: CPT | Performed by: STUDENT IN AN ORGANIZED HEALTH CARE EDUCATION/TRAINING PROGRAM

## 2024-04-04 PROCEDURE — 93005 ELECTROCARDIOGRAM TRACING: CPT

## 2024-04-04 PROCEDURE — 84439 ASSAY OF FREE THYROXINE: CPT | Performed by: STUDENT IN AN ORGANIZED HEALTH CARE EDUCATION/TRAINING PROGRAM

## 2024-04-04 PROCEDURE — 82565 ASSAY OF CREATININE: CPT

## 2024-04-04 PROCEDURE — 85610 PROTHROMBIN TIME: CPT

## 2024-04-04 PROCEDURE — 11000001 HC ACUTE MED/SURG PRIVATE ROOM

## 2024-04-04 PROCEDURE — 85025 COMPLETE CBC W/AUTO DIFF WBC: CPT | Performed by: STUDENT IN AN ORGANIZED HEALTH CARE EDUCATION/TRAINING PROGRAM

## 2024-04-04 PROCEDURE — 80061 LIPID PANEL: CPT | Performed by: STUDENT IN AN ORGANIZED HEALTH CARE EDUCATION/TRAINING PROGRAM

## 2024-04-04 PROCEDURE — 83735 ASSAY OF MAGNESIUM: CPT | Performed by: STUDENT IN AN ORGANIZED HEALTH CARE EDUCATION/TRAINING PROGRAM

## 2024-04-04 PROCEDURE — 25500020 PHARM REV CODE 255: Performed by: STUDENT IN AN ORGANIZED HEALTH CARE EDUCATION/TRAINING PROGRAM

## 2024-04-04 PROCEDURE — 80053 COMPREHEN METABOLIC PANEL: CPT | Performed by: STUDENT IN AN ORGANIZED HEALTH CARE EDUCATION/TRAINING PROGRAM

## 2024-04-04 PROCEDURE — 99223 1ST HOSP IP/OBS HIGH 75: CPT | Mod: GC,,, | Performed by: STUDENT IN AN ORGANIZED HEALTH CARE EDUCATION/TRAINING PROGRAM

## 2024-04-04 PROCEDURE — 25000003 PHARM REV CODE 250: Performed by: STUDENT IN AN ORGANIZED HEALTH CARE EDUCATION/TRAINING PROGRAM

## 2024-04-04 PROCEDURE — 82962 GLUCOSE BLOOD TEST: CPT

## 2024-04-04 PROCEDURE — 84443 ASSAY THYROID STIM HORMONE: CPT | Performed by: STUDENT IN AN ORGANIZED HEALTH CARE EDUCATION/TRAINING PROGRAM

## 2024-04-04 RX ORDER — HYDRALAZINE HYDROCHLORIDE 20 MG/ML
10 INJECTION INTRAMUSCULAR; INTRAVENOUS EVERY 6 HOURS PRN
Status: DISCONTINUED | OUTPATIENT
Start: 2024-04-04 | End: 2024-04-05 | Stop reason: HOSPADM

## 2024-04-04 RX ORDER — AMOXICILLIN 250 MG
1 CAPSULE ORAL 2 TIMES DAILY
Status: DISCONTINUED | OUTPATIENT
Start: 2024-04-04 | End: 2024-04-05 | Stop reason: HOSPADM

## 2024-04-04 RX ORDER — ACETAMINOPHEN 500 MG
500 TABLET ORAL DAILY PRN
COMMUNITY

## 2024-04-04 RX ORDER — LABETALOL HCL 20 MG/4 ML
10 SYRINGE (ML) INTRAVENOUS EVERY 4 HOURS PRN
Status: DISCONTINUED | OUTPATIENT
Start: 2024-04-04 | End: 2024-04-04

## 2024-04-04 RX ORDER — TALC
6 POWDER (GRAM) TOPICAL NIGHTLY PRN
Status: DISCONTINUED | OUTPATIENT
Start: 2024-04-04 | End: 2024-04-05 | Stop reason: HOSPADM

## 2024-04-04 RX ORDER — POLYETHYLENE GLYCOL 3350 17 G/17G
17 POWDER, FOR SOLUTION ORAL 2 TIMES DAILY PRN
Status: DISCONTINUED | OUTPATIENT
Start: 2024-04-04 | End: 2024-04-05 | Stop reason: HOSPADM

## 2024-04-04 RX ORDER — METOPROLOL SUCCINATE 50 MG/1
50 TABLET, EXTENDED RELEASE ORAL DAILY
Status: DISCONTINUED | OUTPATIENT
Start: 2024-04-05 | End: 2024-04-05 | Stop reason: HOSPADM

## 2024-04-04 RX ORDER — SODIUM CHLORIDE 0.9 % (FLUSH) 0.9 %
10 SYRINGE (ML) INJECTION
Status: DISCONTINUED | OUTPATIENT
Start: 2024-04-04 | End: 2024-04-05 | Stop reason: HOSPADM

## 2024-04-04 RX ORDER — METOPROLOL SUCCINATE 25 MG/1
25 TABLET, EXTENDED RELEASE ORAL NIGHTLY
Status: DISCONTINUED | OUTPATIENT
Start: 2024-04-04 | End: 2024-04-05 | Stop reason: HOSPADM

## 2024-04-04 RX ORDER — BRIMONIDINE TARTRATE 1.5 MG/ML
1 SOLUTION/ DROPS OPHTHALMIC 2 TIMES DAILY
Status: DISCONTINUED | OUTPATIENT
Start: 2024-04-04 | End: 2024-04-05 | Stop reason: HOSPADM

## 2024-04-04 RX ORDER — TIMOLOL MALEATE 5 MG/ML
1 SOLUTION/ DROPS OPHTHALMIC 2 TIMES DAILY
Status: DISCONTINUED | OUTPATIENT
Start: 2024-04-04 | End: 2024-04-05 | Stop reason: HOSPADM

## 2024-04-04 RX ORDER — BRINZOLAMIDE 10 MG/ML
1 SUSPENSION/ DROPS OPHTHALMIC 2 TIMES DAILY
Status: DISCONTINUED | OUTPATIENT
Start: 2024-04-04 | End: 2024-04-05 | Stop reason: HOSPADM

## 2024-04-04 RX ORDER — ACETAMINOPHEN 325 MG/1
650 TABLET ORAL EVERY 6 HOURS PRN
Status: DISCONTINUED | OUTPATIENT
Start: 2024-04-04 | End: 2024-04-05 | Stop reason: HOSPADM

## 2024-04-04 RX ORDER — LEVOTHYROXINE SODIUM 88 UG/1
88 TABLET ORAL
Status: DISCONTINUED | OUTPATIENT
Start: 2024-04-05 | End: 2024-04-05 | Stop reason: HOSPADM

## 2024-04-04 RX ADMIN — BRIMONIDINE TARTRATE 1 DROP: 1.5 SOLUTION OPHTHALMIC at 09:04

## 2024-04-04 RX ADMIN — BRINZOLAMIDE 1 DROP: 10 SUSPENSION/ DROPS OPHTHALMIC at 09:04

## 2024-04-04 RX ADMIN — METOPROLOL SUCCINATE 25 MG: 25 TABLET, EXTENDED RELEASE ORAL at 09:04

## 2024-04-04 RX ADMIN — DOCUSATE SODIUM AND SENNOSIDES 1 TABLET: 8.6; 5 TABLET, FILM COATED ORAL at 09:04

## 2024-04-04 RX ADMIN — APIXABAN 2.5 MG: 2.5 TABLET, FILM COATED ORAL at 09:04

## 2024-04-04 RX ADMIN — TIMOLOL MALEATE 1 DROP: 5 SOLUTION OPHTHALMIC at 09:04

## 2024-04-04 RX ADMIN — IOHEXOL 75 ML: 350 INJECTION, SOLUTION INTRAVENOUS at 06:04

## 2024-04-04 NOTE — CARE UPDATE
Pt tolerated MRI well and PCM placed back in regular functioning mode by Rep. Pt to transport back to ER shortly.

## 2024-04-04 NOTE — ASSESSMENT & PLAN NOTE
"89 year old woman with HTN, Afib on Eliquis, hypothyroidism presented to ED 4/4 for odd sensation on right side of her body. Felt like her right side was "lifted off the bed" while she was lying on her back. LKW unclear. Symptoms noted 4/4 0600 on waking. Not a thrombolytic candidate given anticoagulant use. CTA no LVO. 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    Antithrombotics for secondary stroke prevention: Anticoagulants: Apixaban 2.5 mg BID     Statins for secondary stroke prevention and hyperlipidemia, if present: Statins: None: Reason: reported intolerance to statins and possibly to PCSK-9 inhibitors in the past    Aggressive risk factor modification: HTN, HLD, A-Fib     Rehab efforts: The patient has been evaluated by a stroke team provider and the therapy needs have been fully considered based off the presenting complaints and exam findings. The following therapy evaluations are needed: PT evaluate and treat, OT evaluate and treat, SLP evaluate and treat    Diagnostics ordered/pending: None     VTE prophylaxis: None: Reason for No Pharmacological VTE Prophylaxis: Currently on anticoagulation    BP parameters: Infarct: No intervention, SBP <220  "

## 2024-04-04 NOTE — SUBJECTIVE & OBJECTIVE
Past Medical History:   Diagnosis Date    Antiplatelet or antithrombotic long-term use     Arthritis     Bilateral nonexudative age-related macular degeneration 08/27/2013    Blood transfusion     Cataract     CHF (congestive heart failure)     Enlarged heart     High cholesterol     Hypertension     Sick sinus syndrome     Stroke     Thyroid disease      Past Surgical History:   Procedure Laterality Date    CARDIAC PACEMAKER PLACEMENT      [employer] Device Model: Juli  YOHAN [id number] Device Type: PACEMAKER Device S\N: [id number]    CARPAL TUNNEL RELEASE Bilateral 1991    CATARACT EXTRACTION Right     JOINT REPLACEMENT      ME TRANSCRAN DOPP INTRACRAN, EMBOLI W/O INJ  01/29/2018         SKIN BIOPSY      TONSILLECTOMY      TOTAL HIP ARTHROPLASTY  11/01/2000    right/[facility]     Social History     Tobacco Use    Smoking status: Never     Passive exposure: Never    Smokeless tobacco: Never   Substance Use Topics    Alcohol use: No     Alcohol/week: 0.0 standard drinks of alcohol    Drug use: No     Review of patient's allergies indicates:   Allergen Reactions    Aspartame Swelling     equal       Medications: I have reviewed the current medication administration record.    (Not in a hospital admission)      Review of Systems   Constitutional:  Negative for chills and fever.   HENT:  Negative for drooling and ear discharge.    Eyes:  Negative for pain.   Respiratory:  Negative for cough and shortness of breath.    Cardiovascular:  Negative for chest pain and leg swelling.   Gastrointestinal:  Negative for abdominal distention and abdominal pain.   Endocrine: Negative for polydipsia and polyphagia.   Genitourinary:  Negative for dysuria and enuresis.   Musculoskeletal:  Positive for arthralgias and back pain.   Skin:  Negative for rash and wound.   Allergic/Immunologic: Positive for immunocompromised state.   Neurological:  Negative for speech difficulty, weakness, numbness and headaches.   Hematological:   Bruises/bleeds easily.   Psychiatric/Behavioral:  Negative for agitation and confusion.      Objective:     Vital Signs (Most Recent):  Temp: 98.6 °F (37 °C) (04/04/24 0641)  Pulse: 86 (04/04/24 0641)  Resp: 18 (04/04/24 0641)  BP: (!) 166/72 (04/04/24 0641)  SpO2: 98 % (04/04/24 0641)    Vital Signs Range (Last 24H):  Temp:  [98.6 °F (37 °C)]   Pulse:  [86]   Resp:  [18]   BP: (166)/(72)   SpO2:  [98 %]        Physical Exam  Vitals and nursing note reviewed.   Constitutional:       Appearance: Normal appearance. She is normal weight.   HENT:      Head: Normocephalic and atraumatic.   Eyes:      Extraocular Movements: Extraocular movements intact.      Conjunctiva/sclera: Conjunctivae normal.      Pupils: Pupils are equal, round, and reactive to light.   Cardiovascular:      Rate and Rhythm: Normal rate and regular rhythm.   Pulmonary:      Effort: Pulmonary effort is normal.      Breath sounds: Normal breath sounds.   Abdominal:      General: Abdomen is flat. Bowel sounds are normal.      Palpations: Abdomen is soft.   Musculoskeletal:         General: Normal range of motion.      Cervical back: Normal range of motion.   Skin:     General: Skin is warm and dry.   Neurological:      General: No focal deficit present.      Mental Status: She is alert and oriented to person, place, and time.      Sensory: No sensory deficit.      Motor: No weakness.              Neurological Exam:   LOC: alert  Attention Span: Good   Language: No aphasia  Articulation: No dysarthria  Orientation: Person, Place, Time   Visual Fields: Full  EOM (CN III, IV, VI): Full/intact  Pupils (CN II, III): PERRL  Facial Sensation (CN V): Normal  Facial Movement (CN VII): Symmetric facial expression    Gag Reflex: present  Reflexes: 2+ throughout  Motor: Arm left  Normal 5/5  Leg left  Normal 5/5  Arm right  Normal 5/5  Leg right Normal 5/5  Cerebellum: No evidence of appendicular or axial ataxia  Sensation: Intact to light touch, temperature and  "vibration  Tone: Normal tone throughout      Laboratory:  CMP:   Recent Labs   Lab 04/04/24  0651   CALCIUM 10.1   ALBUMIN 3.8   PROT 7.3   *   K 3.4*   CO2 24      BUN 29   CREATININE 0.8   ALKPHOS 62   ALT 9*   AST 18   BILITOT 0.5     CBC:   Recent Labs   Lab 04/04/24  0651   WBC 7.02   RBC 4.76   HGB 13.1   HCT 40.8      MCV 86   MCH 27.5   MCHC 32.1     Lipid Panel:   Recent Labs   Lab 04/04/24  0651   CHOL 231*   LDLCALC 139.6   HDL 73   TRIG 92     Coagulation:   Recent Labs   Lab 04/04/24  0651   INR 1.1     Hgb A1C: No results for input(s): "HGBA1C" in the last 168 hours.  TSH:   Recent Labs   Lab 04/04/24  0651   TSH 4.086*       Diagnostic Results:      Brain imaging:      Vessel Imaging:  CTA Head and neck multiphase 4-4-24 results:  1. No definite evidence of acute transcortical infarct by CT.  No evidence of acute intracranial hemorrhage, mass effect, or midline shift.  2. Small lacunar type infarct in the right basal ganglia/internal capsule and patchy hypoattenuation in the deep white matter of the left frontal parietal lobes appear new when compared to 01/28/2018 head CT, but are otherwise technically age indeterminate.  MRI could be performed for more sensitive and specific assessment.  3. There is abrupt truncation of the left PCA at the level of the P1-P2 junction on the initial series with some degree of distal opacification of the P2 segment on the 2 delayed series, however, there is persistent suggested non opacification of distal P2 and P3 branches on the final phase.  These findings are new when compared to the 01/28/2018 CTA.  Clinical correlation recommended.  4. Since prior CTA 01/28/2018, progressed atheromatous narrowing at the proximal left ICA in the neck, now resulting in least 60-70% stenosis by NASCET style criteria.  5. Redemonstrated multilobulated irregular beaded appearance of the bilateral cervical internal carotid arteries with short segment outpouchings, " possibly on the basis of underlying connective tissue disease such as fibromuscular dysplasia versus sequela prior dissections.  Overall appearance is felt grossly similar to the prior CTA allowing for slight differences in technique.  6. Chronic findings in the lungs felt grossly similar to 11/15/2019 chest CT    Cardiac Evaluation:   2D Echo 1-23-24 results:    Left Ventricle: The left ventricle is normal in size. Ventricular mass is normal. Normal wall thickness. Normal wall motion. There is normal systolic function with a visually estimated ejection fraction of 60 - 65%. Grade III diastolic dysfunction.    Right Ventricle: Normal right ventricular cavity size. Wall thickness is normal. Right ventricle wall motion  is normal. Systolic function is normal.    Left Atrium: Left atrium is severely dilated.    Right Atrium: Right atrium is severely dilated.    Aortic Valve: There is severe aortic valve sclerosis. There is severe annular calcification present. Severely restricted motion. There is moderate ow flow stenosis. Aortic valve area by VTI is 0.78 cm². Aortic valve peak velocity is 2.62 m/s. Mean gradient is 17 mmHg. The dimensionless index is 0.31.    Aortic Valve: There is moderate stenosis. Aortic valve area by VTI is 0.78 cm². Aortic valve peak velocity is 2.62 m/s. Mean gradient is 17 mmHg. The dimensionless index is 0.31. DEMARCUS is underestimated.    Mitral Valve: There is severe mitral annular calcification present.    Tricuspid Valve: There is moderate regurgitation.    Aorta: Aortic root is normal in size measuring 2.72 cm. Ascending aorta is normal measuring 2.85 cm.    Pulmonary Artery: The estimated pulmonary artery systolic pressure is 51 mmHg.    IVC/SVC: Intermediate venous pressure at 8 mmHg.

## 2024-04-04 NOTE — ED NOTES
Telemetry Verification   Patient placed on Telemetry Box  Verified with War Room  Box # [id number]   Monitor Tech Damion   Rate 82   Rhythm NSR

## 2024-04-04 NOTE — HPI
"89 y/o female who is a resident at [facility] assisted living with her . She has history of afib on Eliquis. She tells me that she did not go to sleep much last night due to some bad news and went to the BR around 0300 and was found. She finally fell asleep around 0430 and woke up at 0600 feeling funny. She feels like her whole right side is "lifted off of the bed". She is able to move all extremities well, follow commands, answer questions appropriately, patient with no real focal neuro deficit.  Her story is inconsistent with what she told the EMS and ED staff. Per note "R sided numbness that began at 5am, LKN 4:30am, no hx strokes, +eliquis. Dr. Carvalho and Ani at bedside to eval for stroke "    Patient re-evaluated 4/4 1600 after MRI showed acute strokes. Patient reports resolution of sensation that her right side was raised off the bed around 1400. Currently feels fine. Chronically has difficulty raising her right leg s/p right hip replacement, has pain with movement. Reports feeling her speech is a bit slurred, which her daughter at bedside agrees with.  "

## 2024-04-04 NOTE — PROGRESS NOTES
Received a call/message from John in the MRI Department in relation to this patient needing to be scheduled for a MRI and has a [employer] Pacemaker.  As per [employer]'s MR-Conditional product listing site patient's Device is MRI compatible/conditional.  To meet protocol the Pacemaker/ICD must have been implanted no less than 6 weeks prior to scheduled date of Scan.  ICD/PPM and leads must be from same .  MRI informed ordering MD must input a Cardiac Device Check in clinic and hospital order, patient must have an xray within 6 months or less prior to MRI reprogramming.  Chest xray to be reviewed by Radiologist.     MRI has been adde on for today at 3 PM.   Device Rep has agreed to be available for the MRI.      [employer] Pacemaker:  [id number]  RA [id number]  RV [id number]

## 2024-04-04 NOTE — ASSESSMENT & PLAN NOTE
Stroke risk factor  Recent 2 D Echo 1-23-24    Left Ventricle: The left ventricle is normal in size. Ventricular mass is normal. Normal wall thickness. Normal wall motion. There is normal systolic function with a visually estimated ejection fraction of 60 - 65%. Grade III diastolic dysfunction.    Right Ventricle: Normal right ventricular cavity size. Wall thickness is normal. Right ventricle wall motion  is normal. Systolic function is normal.    Left Atrium: Left atrium is severely dilated.    Right Atrium: Right atrium is severely dilated.    Aortic Valve: There is severe aortic valve sclerosis. There is severe annular calcification present. Severely restricted motion. There is moderate ow flow stenosis. Aortic valve area by VTI is 0.78 cm². Aortic valve peak velocity is 2.62 m/s. Mean gradient is 17 mmHg. The dimensionless index is 0.31.    Aortic Valve: There is moderate stenosis. Aortic valve area by VTI is 0.78 cm². Aortic valve peak velocity is 2.62 m/s. Mean gradient is 17 mmHg. The dimensionless index is 0.31. DEMARCUS is underestimated.    Mitral Valve: There is severe mitral annular calcification present.    Tricuspid Valve: There is moderate regurgitation.    Aorta: Aortic root is normal in size measuring 2.72 cm. Ascending aorta is normal measuring 2.85 cm.    Pulmonary Artery: The estimated pulmonary artery systolic pressure is 51 mmHg.    IVC/SVC: Intermediate venous pressure at 8 mmHg.

## 2024-04-04 NOTE — ASSESSMENT & PLAN NOTE
Stroke risk factor. EF 60-65% on TTE in 1/2024. Takes PRN bumex 0.5 mg BID at home.  - monitor for evidence of fluid overload

## 2024-04-04 NOTE — H&P
"    [facility] - [facility] Dept  Vascular Neurology  [facility]  History & Physical    Consults  Assessment/Plan:     Patient is a 89 y.o. year old female with:    * Stroke due to embolism of left posterior cerebral artery  89 year old woman with HTN, Afib on Eliquis, hypothyroidism presented to ED 4/4 for odd sensation on right side of her body. Felt like her right side was "lifted off the bed" while she was lying on her back. LKW unclear. Symptoms noted 4/4 0600 on waking. Not a thrombolytic candidate given anticoagulant use. CTA no LVO. 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    Antithrombotics for secondary stroke prevention: Anticoagulants: Apixaban 2.5 mg BID     Statins for secondary stroke prevention and hyperlipidemia, if present: Statins: None: Reason: reported intolerance to statins and possibly to PCSK-9 inhibitors in the past    Aggressive risk factor modification: HTN, HLD, A-Fib     Rehab efforts: The patient has been evaluated by a stroke team provider and the therapy needs have been fully considered based off the presenting complaints and exam findings. The following therapy evaluations are needed: PT evaluate and treat, OT evaluate and treat, SLP evaluate and treat    Diagnostics ordered/pending: None     VTE prophylaxis: None: Reason for No Pharmacological VTE Prophylaxis: Currently on anticoagulation    BP parameters: Infarct: No intervention, SBP <220    Transient neurological symptoms  See Stroke due to embolism of left posterior cerebral artery    Paroxysmal atrial fibrillation  Stroke risk factor. Reportedly taking Eliquis 5 mg once a day at home.  - Eliquis 2.5 mg BID based on age and weight    Hypothyroidism  Stroke risk factor. TSH 4.086 and fT4 WNL.  - continue home levothyroxine    Diastolic CHF, chronic  Stroke risk factor. EF 60-65% on TTE in 1/2024. Takes PRN bumex 0.5 mg BID at home.  - monitor for evidence of fluid overload    HLD " (hyperlipidemia)  Stroke risk factor. .6. Not on statin due to prior intolerance (lower extremity myalgias). Reportedly has tried Repatha in the past and had similar reaction.  - discuss options for cholesterol lowering therapy with Pharmacy      Essential hypertension  Stroke risk factor. Home meds are amlodipine 5 mg and HCTZ 12.5 mg QD  - goal SBP <220  - resume home meds when appropriate        STROKE DOCUMENTATION     Acute Stroke Times   Last Known Normal Date: 04/04/24  Last Known Normal Time:  (conflicting times from pateint)  Unknown Normal Time: Unknown Time  Symptom Onset Date: 04/04/20  Symptom Onset Time:  (conflicting times from pateint)  Unknown Symptom Onset Time: Unknown Time  Stroke Team Called Date: 04/04/20  Stroke Team Called Time: 0640  Stroke Team Arrival Date: 04/04/20  Stroke Team Arrival Time: 0648  Thrombolytic Therapy Recommended: No  CTA Interpretation Time: 0716 (images reviewed by Dr Lopez)  Thrombectomy Recommended: No    NIH Scale:  1a. Level of Consciousness: 0-->Alert, keenly responsive  1b. LOC Questions: 0-->Answers both questions correctly  1c. LOC Commands: 0-->Performs both tasks correctly  2. Best Gaze: 0-->Normal  3. Visual: 0-->No visual loss  4. Facial Palsy: 0-->Normal symmetrical movements  5a. Motor Arm, Left: 0-->No drift, limb holds 90 (or 45) degrees for full 10 secs  5b. Motor Arm, Right: 0-->No drift, limb holds 90 (or 45) degrees for full 10 secs  6a. Motor Leg, Left: 0-->No drift, leg holds 30 degree position for full 5 secs  6b. Motor Leg, Right: 1-->Drift, leg falls by the end of the 5-sec period but does not hit bed  7. Limb Ataxia: 0-->Absent  8. Sensory: 1-->Mild-to-moderate sensory loss, patient feels pinprick is less sharp or is dull on the affected side, or there is a loss of superficial pain with pinprick, but patient is aware of being touched  9. Best Language: 0-->No aphasia, normal  10. Dysarthria: 0-->Normal  11. Extinction and Inattention  "(formerly Neglect): 0-->No abnormality  Total (NIH Stroke Scale): 2     Modified Justine Score: 2  John Coma Scale:    ABCD2 Score:    [id number] Score:8  HAS -BLED Score:   ICH Score:   Hunt & Keys Classification:      Thrombolysis Candidate? No, Out of window - Symptom onset > 4.5 hours    Delays to Thrombolysis?  Not Applicable    Interventional Revascularization Candidate?   Is the patient eligible for mechanical endovascular reperfusion ([facility])?  No; No large vessel occlusion identified on imaging     Delays to Thrombectomy? Not Applicable    Hemorrhagic change of an Ischemic Stroke: Does this patient have an ischemic stroke with hemorrhagic changes? No     Subjective:     History of Present Illness:  89 y/o female who is a resident at [facility] assisted living with her . She has history of afib on Eliquis. She tells me that she did not go to sleep much last night due to some bad news and went to the BR around 0300 and was found. She finally fell asleep around 0430 and woke up at 0600 feeling funny. She feels like her whole right side is "lifted off of the bed". She is able to move all extremities well, follow commands, answer questions appropriately, patient with no real focal neuro deficit.  Her story is inconsistent with what she told the EMS and ED staff. Per note "R sided numbness that began at 5am, LKN 4:30am, no hx strokes, +eliquis. Dr. Carvalho and Ani at bedside to eval for stroke "    Patient re-evaluated 4/4 1600 after MRI showed acute strokes. Patient reports resolution of sensation that her right side was raised off the bed around 1400. Currently feels fine. Chronically has difficulty raising her right leg s/p right hip replacement, has pain with movement. Reports feeling her speech is a bit slurred, which her daughter at bedside agrees with.          Past Medical History:   Diagnosis Date    Antiplatelet or antithrombotic long-term use     Arthritis     Bilateral nonexudative " age-related macular degeneration 08/27/2013    Blood transfusion     Cataract     CHF (congestive heart failure)     Enlarged heart     High cholesterol     Hypertension     Sick sinus syndrome     Stroke     Thyroid disease      Past Surgical History:   Procedure Laterality Date    CARDIAC PACEMAKER PLACEMENT      [employer] Device Model: Juli  YOHAN A2DR01 Device Type: PACEMAKER Device S\N: [id number]    CARPAL TUNNEL RELEASE Bilateral 1991    CATARACT EXTRACTION Right     JOINT REPLACEMENT      [place] TRANSCRAN DOPP INTRACRAN, EMBOLI W/O INJ  01/29/2018         SKIN BIOPSY      TONSILLECTOMY      TOTAL HIP ARTHROPLASTY  11/01/2000    right/[facility]     Social History     Tobacco Use    Smoking status: Never     Passive exposure: Never    Smokeless tobacco: Never   Substance Use Topics    Alcohol use: No     Alcohol/week: 0.0 standard drinks of alcohol    Drug use: No     Review of patient's allergies indicates:   Allergen Reactions    Aspartame Swelling     equal       Medications: I have reviewed the current medication administration record.    (Not in a hospital admission)      Review of Systems   Constitutional:  Negative for diaphoresis and fever.   HENT:  Negative for nosebleeds and rhinorrhea.    Eyes:  Negative for discharge and redness.   Respiratory:  Negative for cough and wheezing.    Cardiovascular:  Negative for leg swelling.   Gastrointestinal:  Negative for abdominal distention and vomiting.   Genitourinary:  Negative for difficulty urinating and hematuria.   Neurological:  Positive for speech difficulty. Negative for tremors and facial asymmetry.     Objective:     Vital Signs (Most Recent):  Temp: 97.8 °F (36.6 °C) (04/04/24 1347)  Pulse: 80 (04/04/24 1526)  Resp: (!) 22 (04/04/24 0717)  BP: (!) 152/68 (04/04/24 1347)  SpO2: (!) 92 % (04/04/24 1347)    Vital Signs Range (Last 24H):  Temp:  [97.6 °F (36.4 °C)-98.6 °F (37 °C)]   Pulse:  [60-86]   Resp:  [18-22]   BP: (146-169)/(55-73)   SpO2:   [90 %-98 %]        Physical Exam  Vitals and nursing note reviewed.   Constitutional:       General: She is not in acute distress.  HENT:      Head: Normocephalic and atraumatic.      Nose: Nose normal. No rhinorrhea.      Mouth/Throat:      Mouth: Mucous membranes are moist.      Pharynx: Oropharynx is clear.   Eyes:      General:         Right eye: No discharge.         Left eye: No discharge.      Conjunctiva/sclera: Conjunctivae normal.   Cardiovascular:      Rate and Rhythm: Normal rate.   Pulmonary:      Effort: Pulmonary effort is normal. No respiratory distress.   Musculoskeletal:         General: Normal range of motion.      Cervical back: Normal range of motion.   Skin:     General: Skin is warm and dry.   Neurological:      Mental Status: She is alert.          Neurological Exam:   LOC: alert  Attention Span: Good   Language: No aphasia  Articulation: slight dysarthria  Visual Fields: Full  EOM (CN III, IV, VI): Full/intact  Facial Sensation (CN V): Normal  Facial Movement (CN VII): Symmetric facial expression    Motor: Arm left  Normal 5/5  Leg left  Normal 5/5  Arm right  Normal 5/5  Leg right Paresis: 4/5  Cerebellum: No evidence of appendicular or axial ataxia  Sensation: light touch intact and symmetric in BUE, slightly decreased on RLE compared to LLE      Laboratory:  CMP:   Recent Labs   Lab 04/04/24  0651   CALCIUM 10.1   ALBUMIN 3.8   PROT 7.3   *   K 3.4*   CO2 24      BUN 29   CREATININE 0.8   ALKPHOS 62   ALT 9*   AST 18   BILITOT 0.5     CBC:   Recent Labs   Lab 04/04/24  0651   WBC 7.02   RBC 4.76   HGB 13.1   HCT 40.8      MCV 86   MCH 27.5   MCHC 32.1       Diagnostic Results:  Brain/Vessel imaging:  CTA Stroke Multiphase 4/4/24  1. No definite evidence of acute transcortical infarct by CT.  No evidence of acute intracranial hemorrhage, mass effect, or midline shift.  2. Small lacunar type infarct in the right basal ganglia/internal capsule and patchy hypoattenuation  in the deep white matter of the left frontal parietal lobes appear new when compared to 01/28/2018 head CT, but are otherwise technically age indeterminate.  MRI could be performed for more sensitive and specific assessment.  3. There is abrupt truncation of the left PCA at the level of the P1-P2 junction on the initial series with some degree of distal opacification of the P2 segment on the 2 delayed series, however, there is persistent suggested non opacification of distal P2 and P3 branches on the final phase.  These findings are new when compared to the 01/28/2018 CTA.  Clinical correlation recommended.  4. Since prior CTA 01/28/2018, progressed atheromatous narrowing at the proximal left ICA in the neck, now resulting in least 60-70% stenosis by NASCET style criteria.  5. Redemonstrated multilobulated irregular beaded appearance of the bilateral cervical internal carotid arteries with short segment outpouchings, possibly on the basis of underlying connective tissue disease such as fibromuscular dysplasia versus sequela prior dissections.  Overall appearance is felt grossly similar to the prior CTA allowing for slight differences in technique.  6. Chronic findings in the lungs felt grossly similar to 11/15/2019 chest CT.    MRI Brain w/o contrast 4/4/24  Acute infarcts involving the left thalamus (subcentimeter in size) and left hippocampus as detailed above without intracranial hemorrhage or mass effect.  This correlates with the marked compromise of the left posterior cerebral artery noted on the recent CTA.     Left tentorial calcified meningioma increased in size from 2018.  Please note no evidence of significant surrounding edema is noted within the adjacent brain/cerebellar parenchyma.    Cardiac Evaluation:   TTE 1/23/24    Left Ventricle: The left ventricle is normal in size. Ventricular mass is normal. Normal wall thickness. Normal wall motion. There is normal systolic function with a visually estimated  ejection fraction of 60 - 65%. Grade III diastolic dysfunction.    Right Ventricle: Normal right ventricular cavity size. Wall thickness is normal. Right ventricle wall motion  is normal. Systolic function is normal.    Left Atrium: Left atrium is severely dilated.    Right Atrium: Right atrium is severely dilated.    Aortic Valve: There is severe aortic valve sclerosis. There is severe annular calcification present. Severely restricted motion. There is moderate ow flow stenosis. Aortic valve area by VTI is 0.78 cm². Aortic valve peak velocity is 2.62 m/s. Mean gradient is 17 mmHg. The dimensionless index is 0.31.    Aortic Valve: There is moderate stenosis. Aortic valve area by VTI is 0.78 cm². Aortic valve peak velocity is 2.62 m/s. Mean gradient is 17 mmHg. The dimensionless index is 0.31. DEMARCUS is underestimated.    Mitral Valve: There is severe mitral annular calcification present.    Tricuspid Valve: There is moderate regurgitation.    Aorta: Aortic root is normal in size measuring 2.72 cm. Ascending aorta is normal measuring 2.85 cm.    Pulmonary Artery: The estimated pulmonary artery systolic pressure is 51 mmHg.    IVC/SVC: Intermediate venous pressure at 8 mmHg.        Lola Powers MD  [facility]  Department of Vascular Neurology   [facility]

## 2024-04-04 NOTE — CARE UPDATE
Pt arrived to MRI in NAD and PCM placed in safe mode by Rep. MRI safe monitor applied and exam begun.

## 2024-04-04 NOTE — CONSULTS
[facility] - Emergency Dept  Vascular Neurology  [facility]  Consult Note    Inpatient consult to Vascular (Stroke) Neurology  Consult performed by: Rosalie Grant NP  Consult ordered by: Toni Carvalho MD        Assessment/Plan:     Patient is a 89 y.o. year old female with:    * Transient neurological symptoms  89 y/o female on Eliquis for afib. Inconsistent story of time line and symptoms. C/O right side being lifted off the bed. No real focal neuro deficit. CTA Head and neck multiphase no acute process seen or LVO    MRI brain w/o contrast  F/u labs    Paroxysmal atrial fibrillation  Stroke risk factor  Continue home Eliquis    Diastolic CHF, chronic  Stroke risk factor  Recent 2 D Echo 1-23-24    Left Ventricle: The left ventricle is normal in size. Ventricular mass is normal. Normal wall thickness. Normal wall motion. There is normal systolic function with a visually estimated ejection fraction of 60 - 65%. Grade III diastolic dysfunction.    Right Ventricle: Normal right ventricular cavity size. Wall thickness is normal. Right ventricle wall motion  is normal. Systolic function is normal.    Left Atrium: Left atrium is severely dilated.    Right Atrium: Right atrium is severely dilated.    Aortic Valve: There is severe aortic valve sclerosis. There is severe annular calcification present. Severely restricted motion. There is moderate ow flow stenosis. Aortic valve area by VTI is 0.78 cm². Aortic valve peak velocity is 2.62 m/s. Mean gradient is 17 mmHg. The dimensionless index is 0.31.    Aortic Valve: There is moderate stenosis. Aortic valve area by VTI is 0.78 cm². Aortic valve peak velocity is 2.62 m/s. Mean gradient is 17 mmHg. The dimensionless index is 0.31. DEMARCUS is underestimated.    Mitral Valve: There is severe mitral annular calcification present.    Tricuspid Valve: There is moderate regurgitation.    Aorta: Aortic root is normal in size measuring 2.72 cm. Ascending aorta is  normal measuring 2.85 cm.    Pulmonary Artery: The estimated pulmonary artery systolic pressure is 51 mmHg.    IVC/SVC: Intermediate venous pressure at 8 mmHg.    Essential hypertension  Stroke risk factor  Home meds on hold till no stroke confirmed    HLD (hyperlipidemia)  Stroke risk factor  .6  Home statin    Hypothyroidism  Stroke risk factor  TSH 4.086  Home synthroid        STROKE DOCUMENTATION     Acute Stroke Times   Last Known Normal Date: 04/04/24  Last Known Normal Time:  (conflicting times from pateint)  Unknown Normal Time: Unknown Time  Symptom Onset Date: 04/04/20  Symptom Onset Time:  (conflicting times from pateint)  Unknown Symptom Onset Time: Unknown Time  Stroke Team Called Date: 04/04/20  Stroke Team Called Time: 0640  Stroke Team Arrival Date: 04/04/20  Stroke Team Arrival Time: 0648  Thrombolytic Therapy Recommended: No  CTA Interpretation Time: 0716 (images reviewed by Dr Lopez)  Thrombectomy Recommended: No    NIH Scale:  1a. Level of Consciousness: 0-->Alert, keenly responsive  1b. LOC Questions: 0-->Answers both questions correctly  1c. LOC Commands: 0-->Performs both tasks correctly  2. Best Gaze: 0-->Normal  3. Visual: 0-->No visual loss  4. Facial Palsy: 0-->Normal symmetrical movements  5a. Motor Arm, Left: 0-->No drift, limb holds 90 (or 45) degrees for full 10 secs  5b. Motor Arm, Right: 0-->No drift, limb holds 90 (or 45) degrees for full 10 secs  6a. Motor Leg, Left: 0-->No drift, leg holds 30 degree position for full 5 secs  6b. Motor Leg, Right: 1-->Drift, leg falls by the end of the 5-sec period but does not hit bed (right hip pain at baseline)  7. Limb Ataxia: 0-->Absent  8. Sensory: 0-->Normal, no sensory loss  9. Best Language: 0-->No aphasia, normal  10. Dysarthria: 0-->Normal  11. Extinction and Inattention (formerly Neglect): 0-->No abnormality  Total (NIH Stroke Scale): 1    Modified Justine Score: 2  John Coma Scale:15   ABCD2 Score:    [id number]  "Score:8  HAS -BLED Score:   ICH Score:   Hunt & Keys Classification:       Thrombolysis Candidate? No, Current use of direct thrombin inhibitors (dabigatran) or direct factor Xa inhibitors within 48 hours    Delays to Thrombolysis?  Not Applicable    Interventional Revascularization Candidate?   Is the patient eligible for mechanical endovascular reperfusion ([facility])?  No; No large vessel occlusion identified on imaging     Delays to Thrombectomy? Not Applicable    Hemorrhagic change of an Ischemic Stroke: Does this patient have an ischemic stroke with hemorrhagic changes? No     Subjective:     History of Present Illness:  89 y/o female who is a resident at [facility] assisted living with her . She has history of afib on Eliquis. She tells me that she did not go to sleep much last night due to some bad news and went to the BR around 0300 and was found. She finally fell asleep around 0430 and woke up at 0600 feeling funny. She feels like her whole right side is "lifted off of the bed". She is able to move all extremities well, follow commands, answer questions appropriately, patient with no real focal neuro deficit.  Her story is inconsistent with what she told the EMS and ED staff. Per note "R sided numbness that began at 5am, LKN 4:30am, no hx strokes, +eliquis. Dr. Carvalho and Ani at bedside to eval for stroke "    CTA head and neck multiphase complete and and acute process seen.     Case discussed with Dr Lopez and will need to get MRI brain. No thrombolytics as on Eliquis and no intervention as no LVO.          Past Medical History:   Diagnosis Date    Antiplatelet or antithrombotic long-term use     Arthritis     Bilateral nonexudative age-related macular degeneration 08/27/2013    Blood transfusion     Cataract     CHF (congestive heart failure)     Enlarged heart     High cholesterol     Hypertension     Sick sinus syndrome     Stroke     Thyroid disease      Past Surgical History:   Procedure " Laterality Date    CARDIAC PACEMAKER PLACEMENT      [employer] Device Model: Juli LOZADA MRI A2DR01 Device Type: PACEMAKER Device S\N: [id number]    CARPAL TUNNEL RELEASE Bilateral 1991    CATARACT EXTRACTION Right     JOINT REPLACEMENT      DE TRANSCRAN DOPP INTRACRAN, EMBOLI W/O INJ  01/29/2018         SKIN BIOPSY      TONSILLECTOMY      TOTAL HIP ARTHROPLASTY  11/01/2000    right/[facility]     Social History     Tobacco Use    Smoking status: Never     Passive exposure: Never    Smokeless tobacco: Never   Substance Use Topics    Alcohol use: No     Alcohol/week: 0.0 standard drinks of alcohol    Drug use: No     Review of patient's allergies indicates:   Allergen Reactions    Aspartame Swelling     equal       Medications: I have reviewed the current medication administration record.    (Not in a hospital admission)      Review of Systems   Constitutional:  Negative for chills and fever.   HENT:  Negative for drooling and ear discharge.    Eyes:  Negative for pain.   Respiratory:  Negative for cough and shortness of breath.    Cardiovascular:  Negative for chest pain and leg swelling.   Gastrointestinal:  Negative for abdominal distention and abdominal pain.   Endocrine: Negative for polydipsia and polyphagia.   Genitourinary:  Negative for dysuria and enuresis.   Musculoskeletal:  Positive for arthralgias and back pain.   Skin:  Negative for rash and wound.   Allergic/Immunologic: Positive for immunocompromised state.   Neurological:  Negative for speech difficulty, weakness, numbness and headaches.   Hematological:  Bruises/bleeds easily.   Psychiatric/Behavioral:  Negative for agitation and confusion.      Objective:     Vital Signs (Most Recent):  Temp: 98.6 °F (37 °C) (04/04/24 0641)  Pulse: 86 (04/04/24 0641)  Resp: 18 (04/04/24 0641)  BP: (!) 166/72 (04/04/24 0641)  SpO2: 98 % (04/04/24 0641)    Vital Signs Range (Last 24H):  Temp:  [98.6 °F (37 °C)]   Pulse:  [86]   Resp:  [18]   BP: (166)/(72)    SpO2:  [98 %]        Physical Exam  Vitals and nursing note reviewed.   Constitutional:       Appearance: Normal appearance. She is normal weight.   HENT:      Head: Normocephalic and atraumatic.   Eyes:      Extraocular Movements: Extraocular movements intact.      Conjunctiva/sclera: Conjunctivae normal.      Pupils: Pupils are equal, round, and reactive to light.   Cardiovascular:      Rate and Rhythm: Normal rate and regular rhythm.   Pulmonary:      Effort: Pulmonary effort is normal.      Breath sounds: Normal breath sounds.   Abdominal:      General: Abdomen is flat. Bowel sounds are normal.      Palpations: Abdomen is soft.   Musculoskeletal:         General: Normal range of motion.      Cervical back: Normal range of motion.   Skin:     General: Skin is warm and dry.   Neurological:      General: No focal deficit present.      Mental Status: She is alert and oriented to person, place, and time.      Sensory: No sensory deficit.      Motor: No weakness.              Neurological Exam:   LOC: alert  Attention Span: Good   Language: No aphasia  Articulation: No dysarthria  Orientation: Person, Place, Time   Visual Fields: Full  EOM (CN III, IV, VI): Full/intact  Pupils (CN II, III): PERRL  Facial Sensation (CN V): Normal  Facial Movement (CN VII): Symmetric facial expression    Gag Reflex: present  Reflexes: 2+ throughout  Motor: Arm left  Normal 5/5  Leg left  Normal 5/5  Arm right  Normal 5/5  Leg right Normal 5/5  Cerebellum: No evidence of appendicular or axial ataxia  Sensation: Intact to light touch, temperature and vibration  Tone: Normal tone throughout      Laboratory:  CMP:   Recent Labs   Lab 04/04/24  0651   CALCIUM 10.1   ALBUMIN 3.8   PROT 7.3   *   K 3.4*   CO2 24      BUN 29   CREATININE 0.8   ALKPHOS 62   ALT 9*   AST 18   BILITOT 0.5     CBC:   Recent Labs   Lab 04/04/24  0651   WBC 7.02   RBC 4.76   HGB 13.1   HCT 40.8      MCV 86   MCH 27.5   MCHC 32.1     Lipid Panel:  "  Recent Labs   Lab 04/04/24  0651   CHOL 231*   LDLCALC 139.6   HDL 73   TRIG 92     Coagulation:   Recent Labs   Lab 04/04/24  0651   INR 1.1     Hgb A1C: No results for input(s): "HGBA1C" in the last 168 hours.  TSH:   Recent Labs   Lab 04/04/24  0651   TSH 4.086*       Diagnostic Results:      Brain imaging:      Vessel Imaging:  CTA Head and neck multiphase 4-4-24 results:  1. No definite evidence of acute transcortical infarct by CT.  No evidence of acute intracranial hemorrhage, mass effect, or midline shift.  2. Small lacunar type infarct in the right basal ganglia/internal capsule and patchy hypoattenuation in the deep white matter of the left frontal parietal lobes appear new when compared to 01/28/2018 head CT, but are otherwise technically age indeterminate.  MRI could be performed for more sensitive and specific assessment.  3. There is abrupt truncation of the left PCA at the level of the P1-P2 junction on the initial series with some degree of distal opacification of the P2 segment on the 2 delayed series, however, there is persistent suggested non opacification of distal P2 and P3 branches on the final phase.  These findings are new when compared to the 01/28/2018 CTA.  Clinical correlation recommended.  4. Since prior CTA 01/28/2018, progressed atheromatous narrowing at the proximal left ICA in the neck, now resulting in least 60-70% stenosis by NASCET style criteria.  5. Redemonstrated multilobulated irregular beaded appearance of the bilateral cervical internal carotid arteries with short segment outpouchings, possibly on the basis of underlying connective tissue disease such as fibromuscular dysplasia versus sequela prior dissections.  Overall appearance is felt grossly similar to the prior CTA allowing for slight differences in technique.  6. Chronic findings in the lungs felt grossly similar to 11/15/2019 chest CT    Cardiac Evaluation:   2D Echo 1-23-24 results:    Left Ventricle: The left " ventricle is normal in size. Ventricular mass is normal. Normal wall thickness. Normal wall motion. There is normal systolic function with a visually estimated ejection fraction of 60 - 65%. Grade III diastolic dysfunction.    Right Ventricle: Normal right ventricular cavity size. Wall thickness is normal. Right ventricle wall motion  is normal. Systolic function is normal.    Left Atrium: Left atrium is severely dilated.    Right Atrium: Right atrium is severely dilated.    Aortic Valve: There is severe aortic valve sclerosis. There is severe annular calcification present. Severely restricted motion. There is moderate ow flow stenosis. Aortic valve area by VTI is 0.78 cm². Aortic valve peak velocity is 2.62 m/s. Mean gradient is 17 mmHg. The dimensionless index is 0.31.    Aortic Valve: There is moderate stenosis. Aortic valve area by VTI is 0.78 cm². Aortic valve peak velocity is 2.62 m/s. Mean gradient is 17 mmHg. The dimensionless index is 0.31. DEMARCUS is underestimated.    Mitral Valve: There is severe mitral annular calcification present.    Tricuspid Valve: There is moderate regurgitation.    Aorta: Aortic root is normal in size measuring 2.72 cm. Ascending aorta is normal measuring 2.85 cm.    Pulmonary Artery: The estimated pulmonary artery systolic pressure is 51 mmHg.    IVC/SVC: Intermediate venous pressure at 8 mmHg.      Rosalie Grant NP  [facility]  Department of Vascular Neurology   [facility] - Emergency Dept

## 2024-04-04 NOTE — PROVIDER PROGRESS NOTES - EMERGENCY DEPT.
Encounter Date: 4/4/2024    ED Physician Progress Notes        Physician Note:   Signed out to me.  Here with right-sided numbness since 5:00 a.m., stroke code activated, CTA as noted, patient is pending MRI for evaluation of stroke.  She has a pacemaker in the needs to be placed on MRI settings rep is coming in at 3:00 p.m. after which she will be able to obtain the MRI.  Will touch base with vascular Neurology after MRI.    Update:  MRI shows new stroke in the left thalamus left temporal medial lobe.  Enlargement of the meningioma with no edema.  Discussed with vascular Neurology.  Anticipate admission to vascular Neurology Service.

## 2024-04-04 NOTE — ASSESSMENT & PLAN NOTE
89 y/o female on Eliquis for afib. Inconsistent story of time line and symptoms. C/O right side being lifted off the bed. No real focal neuro deficit. CTA Head and neck multiphase no acute process seen or LVO    MRI brain w/o contrast  F/u labs

## 2024-04-04 NOTE — ASSESSMENT & PLAN NOTE
Stroke risk factor. Home meds are amlodipine 5 mg and HCTZ 12.5 mg QD  - goal SBP <220  - resume home meds when appropriate

## 2024-04-04 NOTE — ED PROVIDER NOTES
Encounter Date: 4/4/2024       History     Chief Complaint   Patient presents with    Numbness     R sided numbness that began at 5am, LKN 4:30am, no hx strokes, +eliquis. Dr. Carvalho and Ani at bedside to [facility] for stroke      89-year-old female with PMH of hypertension and CHF on Eliquis presents with right-sided weakness.  Patient states that she woke up around 4:30 a.m. and felt fine but then around 5:00 a.m. had acute onset right-sided numbness.  Denies difficulty with speech, new muscle weakness, or changes in vision.  She takes Eliquis.  Denies any recent falls.  Denies chest pain, shortness of breath, abdominal pain, nausea, vomiting, dysuria, hematuria, diarrhea, constipation, black/bloody stools.    The history is provided by the patient.     Review of patient's allergies indicates:   Allergen Reactions    Aspartame Swelling     equal     Past Medical History:   Diagnosis Date    Antiplatelet or antithrombotic long-term use     Arthritis     Bilateral nonexudative age-related macular degeneration 08/27/2013    Blood transfusion     Cataract     CHF (congestive heart failure)     Enlarged heart     High cholesterol     Hypertension     Sick sinus syndrome     Stroke     Thyroid disease      Past Surgical History:   Procedure Laterality Date    CARDIAC PACEMAKER PLACEMENT      [employer] Device Model: Juli LOZADA MRI A2DR01 Device Type: PACEMAKER Device S\N: [id number]    CARPAL TUNNEL RELEASE Bilateral 1991    CATARACT EXTRACTION Right     JOINT REPLACEMENT      NV TRANSCRAN DOPP INTRACRAN, EMBOLI W/O INJ  01/29/2018         SKIN BIOPSY      TONSILLECTOMY      TOTAL HIP ARTHROPLASTY  11/01/2000    right/[facility]     Family History   Problem Relation Age of Onset    Early death Mother     Early death Father     Arthritis Sister     Hearing loss Daughter     Birth defects Son     Hypertension Son     Diabetes Paternal Aunt     Cancer Paternal Aunt 80        colon  cancer    Diabetes Paternal Uncle      Arthritis Maternal Grandmother     Arthritis Paternal Grandmother     Hypertension Paternal Grandmother     Amblyopia Neg Hx     Blindness Neg Hx     Cataracts Neg Hx     Glaucoma Neg Hx     Macular degeneration Neg Hx     Retinal detachment Neg Hx     Strabismus Neg Hx     Stroke Neg Hx     Thyroid disease Neg Hx      Social History     Tobacco Use    Smoking status: Never     Passive exposure: Never    Smokeless tobacco: Never   Substance Use Topics    Alcohol use: No     Alcohol/week: 0.0 standard drinks of alcohol    Drug use: No     Review of Systems    Physical Exam     Initial Vitals [04/04/24 0641]   BP Pulse Resp Temp SpO2   (!) 166/72 86 18 98.6 °F (37 °C) 98 %      MAP       --         Physical Exam    Nursing note and vitals reviewed.  Constitutional: She appears well-developed and well-nourished. She is not diaphoretic. No distress.   HENT:   Head: Normocephalic and atraumatic.   Eyes: Conjunctivae and EOM are normal. Pupils are equal, round, and reactive to light.   Neck: Neck supple.   Normal range of motion.  Cardiovascular:  Normal rate, regular rhythm, normal heart sounds and intact distal pulses.           No murmur heard.  Pulmonary/Chest: Breath sounds normal. No respiratory distress. She has no wheezes. She has no rhonchi. She has no rales.   Abdominal: Abdomen is soft. She exhibits no distension. There is no abdominal tenderness. There is no rebound and no guarding.   Musculoskeletal:         General: No tenderness or edema.      Cervical back: Normal range of motion and neck supple.     Neurological: She is alert and oriented to person, place, and time.   NIHSS ([place] [employer] [facility] Stroke Scale)   1a  Level of consciousness: 0=alert; keenly responsive  1b. LOC questions:  0 = Answers both questions correctly  1c. LOC commands: 0=Answers both tasks correctly  2.  Best Gaze: 0=normal  3. Visual: 0=No visual loss  4. Facial Palsy: 0=Normal symmetric movement  5a. Motor left arm: 0=No  drift, limb holds 90 (or 45) degrees for full 10 seconds  5b.  Motor right arm: 0=No drift, limb holds 90 (or 45) degrees for full 10 seconds  6a. motor left le=No drift, limb holds 90 (or 45) degrees for full 10 seconds  6b  Motor right le=No drift, limb holds 90 (or 45) degrees for full 10 seconds  7. Limb Ataxia: 0=Absent  8.  Sensory: 1=Mild to moderate sensory loss; patient feels pinprick is less sharp or is dull on the affected side; there is a loss of superficial pain with pinprick but patient is aware She is being touched  9. Best Language:  0=No aphasia, normal  10. Dysarthria: 0=Normal  11. Extinction and Inattention: 1=Visual, tactile, auditory, spatial or personal inattention or extinction to bilateral simultaneous stimulation in one of the sensory modalities       Total:   2         Skin: Skin is warm and dry. Capillary refill takes less than 2 seconds.         ED Course   Procedures  Labs Reviewed   CBC W/ AUTO DIFFERENTIAL - Abnormal; Notable for the following components:       Result Value    RDW 16.5 (*)     All other components within normal limits   COMPREHENSIVE METABOLIC PANEL - Abnormal; Notable for the following components:    Sodium 135 (*)     Potassium 3.4 (*)     ALT 9 (*)     All other components within normal limits    Narrative:     add on mg per Candida Davidson RN/Toni Perez MD ordeR# [id number]   2024 @ 07:14    TSH - Abnormal; Notable for the following components:    TSH 4.086 (*)     All other components within normal limits    Narrative:     Add on GHGB per Candida Davidson RN on  2024  07:51 [id number]  add on mg per Candida Davidson RN/Toni Perez MD ordeR# [id number]   2024 @ 07:14    LIPID PANEL - Abnormal; Notable for the following components:    Cholesterol 231 (*)     All other components within normal limits    Narrative:     add on mg per Candida Davidson RN/Toni Perez MD ordeR# [id number]   2024 @ 07:14    URINALYSIS, REFLEX TO URINE CULTURE -  Abnormal; Notable for the following components:    Specific Gravity, UA >1.030 (*)     Glucose, UA 3+ (*)     Occult Blood UA Trace (*)     Leukocytes, UA Trace (*)     All other components within normal limits    Narrative:     Specimen Source->Urine   URINALYSIS MICROSCOPIC - Abnormal; Notable for the following components:    RBC, UA 5 (*)     All other components within normal limits    Narrative:     Specimen Source->Urine   PROTIME-INR   MAGNESIUM   HEMOGLOBIN A1C   MAGNESIUM    Narrative:     add on mg per Candida Davidson RN/Toni Perez MD ordeR# [id number]   04/04/2024 @ 07:14    HEMOGLOBIN A1C    Narrative:     Add on GHGB per Candida Davidson RN on  04/04/2024  07:51 [id number]  add on mg per Candida Davidson RN/Toni Perez MD ordeR# [id number]   04/04/2024 @ 07:14    T4, FREE    Narrative:     Add on GHGB per Candida Davidson RN on  04/04/2024  07:51 [id number]  add on mg per Candida Davidson RN/Toni Perez MD ordeR# [id number]   04/04/2024 @ 07:14    POCT GLUCOSE, HAND-HELD DEVICE   POCT GLUCOSE   ISTAT PROCEDURE   ISTAT CREATININE        ECG Results              ECG 12 lead (Final result)        Collection Time Result Time QRS Duration OHS QTC Calculation    04/04/24 06:58:35 04/04/24 10:24:08 216 555                     Final result by Interface, Lab In [facility] (04/04/24 10:24:15)                   Narrative:    Test Reason : R29.818,    Vent. Rate : 067 BPM     Atrial Rate : 416 BPM     P-R Int : 000 ms          QRS Dur : 216 ms      QT Int : 526 ms       P-R-T Axes : 000 -82 098 degrees     QTc Int : 555 ms    V paced with artifacyt-possibly A sensed  Abnormal ECG  When compared with ECG of 29-FEB-2024 13:28,  No significant change was found    Confirmed by Richard SYLVESTER MD ([id number]) on 4/4/2024 10:24:06 AM    Referred By: [email or web address]   SELF           Confirmed By:Richard SYLVESTER MD                                  Imaging Results              X-Ray Chest AP Portable (Final result)  Result time 04/04/24 12:37:49      Final  result by Lori Verdugo MD (04/04/24 12:37:49)                   Impression:      No acute intrathoracic process seen.      Electronically signed by: Lori Verdugo MD  Date:    04/04/2024  Time:    12:37               Narrative:    EXAMINATION:  XR CHEST AP PORTABLE    CLINICAL HISTORY:  device check for MRI clearance?;    TECHNIQUE:  Single frontal view of the chest was performed.    COMPARISON:  03/12/2023    FINDINGS:  There is a cardio stimulator device in the left upper chest with dual leads.    The cardiac silhouette is slightly prominent.    The pulmonary vascularity is normal.    No focal airspace disease.  No pleural effusion.  No pneumothorax.    The osseous structures appear within normal limits.                                        CTA STROKE MULTI-PHASE (Final result)  Result time 04/04/24 07:40:41      Final result by Jurgen Andrade MD (04/04/24 07:40:41)                   Impression:      1. No definite evidence of acute transcortical infarct by CT.  No evidence of acute intracranial hemorrhage, mass effect, or midline shift.  2. Small lacunar type infarct in the right basal ganglia/internal capsule and patchy hypoattenuation in the deep white matter of the left frontal parietal lobes appear new when compared to 01/28/2018 head CT, but are otherwise technically age indeterminate.  MRI could be performed for more sensitive and specific assessment.  3. There is abrupt truncation of the left PCA at the level of the P1-P2 junction on the initial series with some degree of distal opacification of the P2 segment on the 2 delayed series, however, there is persistent suggested non opacification of distal P2 and P3 branches on the final phase.  These findings are new when compared to the 01/28/2018 CTA.  Clinical correlation recommended.  4. Since prior CTA 01/28/2018, progressed atheromatous narrowing at the proximal left ICA in the neck, now resulting in least 60-70% stenosis by NASCET  style criteria.  5. Redemonstrated multilobulated irregular beaded appearance of the bilateral cervical internal carotid arteries with short segment outpouchings, possibly on the basis of underlying connective tissue disease such as fibromuscular dysplasia versus sequela prior dissections.  Overall appearance is felt grossly similar to the prior CTA allowing for slight differences in technique.  6. Chronic findings in the lungs felt grossly similar to 11/15/2019 chest CT.  7. Additional details, as provided in the body of report.  This report was flagged in Epic as abnormal.      Electronically signed by: Jurgen Andrade  Date:    04/04/2024  Time:    07:40               Narrative:    EXAMINATION:  CTA STROKE MULTI-PHASE    CLINICAL HISTORY:  Neuro deficit, acute, stroke suspected;    TECHNIQUE:  Non contrast low dose axial images were obtained thought the head. CT angiogram was performed from the level of the isidro to the top of the head following the IV administration of 75mL of Omnipaque 350.   Sagittal and coronal reconstructions and maximum intensity projection reconstructions were performed. Arterial stenosis percentages are based on NASCET measurement criteria.  Two additional phases of immediate post-contrast CTA images were performed through the head alone.    COMPARISON:  CTA head neck performed 01/28/2018.    FINDINGS:  CT HEAD:    Blood: No acute intracranial hemorrhage.    Parenchyma: No definite loss of gray-white differentiation to suggest acute or subacute transcortical infarct. Small lacunar type infarct in the right basal ganglia/internal capsule and patchy hypoattenuation in the deep white matter of the left frontal parietal lobes appear new when compared to 01/28/2018 head CT, but are otherwise technically age indeterminate.  Elsewhere, patchy white matter hypoattenuation appears grossly similar to prior.  Generalized pattern of age-related parenchymal volume loss is  seen.    Ventricles/Extra-axial spaces: No evidence of hydrocephalus.  Interval enlargement of calcified extra-axial mass associated with the left tentorium, suggestive of meningioma when compared to 01/28/2018 CT.  Lesion currently measures approximately 22 x 21 x 23 mm compared to 13 x 9 x 11 mm when measured in similar fashion previously.    Vessels: Moderate to heavy atherosclerotic calcification.    Orbits: Status post right lens replacement.    Scalp: Unremarkable.    Skull: There are no depressed skull fractures or destructive bone lesions.    Sinuses and mastoids: Trace opacification of the dependent mastoid air cells, possibly on the basis of effusion or mucosal thickening    Other findings: Torus palatinus.    CTA:    NECK:    Imaged aortic arch: Nonaneurysmal.  Left-sided arch.  Short segment common origin of the brachiocephalic and left common carotid arteries.  Moderate to heavy atherosclerotic calcifications.  Brachiocephalic and subclavian arteries grossly patent.  Multifocal mild and moderate atheromatous irregularity of the left subclavian artery.    Right common and cervical internal carotid arteries: CCA patent.  Heavy atherosclerotic calcification at the ECA origin, which appears patent distally.  Heavy atherosclerotic calcification at the proximal ICA results in less than 50% luminal narrowing.  Nonspecific tortuosity of the distal cervical ICA near the skull base.  Multifocal short segment luminal irregularity without discrete new outpouching.  No evidence of intimal flap.    Left common and cervical internal carotid arteries: CCA patent.  Heavy atherosclerotic calcification of the ECA origin, which appears patent distally.  Heavy atherosclerotic calcification results in at least 60-70% stenosis, has progressed since the 01/28/2018 CTA.  Tortuosity of the distal cervical ICA near the skull base.  Multifocal short segment luminal irregularity of the ICA, as before.    Right cervical vertebral  artery: There is no hemodynamically significant stenosis or dissection    Left cervical vertebral artery: There is no hemodynamically significant stenosis or dissection.    HEAD:    Right anterior circulation:Atherosclerotic disease contributes to multifocal mild-to-moderate luminal irregularity of the ICA.  Right ophthalmic artery is patent.No definite flow-limiting stenosis of the HOMERO or MCA branches.    Left anterior circulation: Atherosclerotic disease contributes to multifocal mild-to-moderate luminal air to the ICA.Left ophthalmic artery is patent.Moderate focal narrowing of the distal left M2 MCA at the bifurcation.  Elsewhere, there is multifocal mild atheromatous irregularity of the M1 and M2 segments.  No definite flow-limiting stenosis of HOMERO branches.    Posterior circulation: There is no hemodynamically significant vertebrobasilar stenosis.    There is abrupt truncation of the left PCA at the level of the P1-P2 junction on the initial series (for example as seen on series 5, image 396), with some degree of distal opacification of the P2 segment on the 2 delayed series, however, there is persistent suggested non opacification of distal P2 and P3 branches on the final phase (for example as seen on series 5, image 917).  These findings are new when compared to the 01/28/2018 CTA.    No significant right PCA stenosis.    Anterior and posterior communicating arteries: Suspect small patent anterior communicating artery. Posterior communicating arteries not well seen.    NON-ANGIOGRAPHIC FINDINGS:    Visualized intracranial contents: As above.    Soft tissues of the neck: Fluid collections associated with both shoulder joints/per se with associated calcifications.    Visualized sinuses: As above.    Dentition: Unremarkable.    Spine: Degenerative findings in the spine.    Visualized lungs: Smooth interlobular septal thickening could indicate interstitial edema.  Biapical pleural scarring.  Patchy areas of  geographic mosaic type attenuation may reflect small vessel and/or small airways disease.  Partially calcified soft tissue lesion at the right lung apex appears grossly similar to remote CTA performed 01/28/2018.  Partially imaged 23 mm soft tissue in the visualized right lung (series 5, image 1), this likely corresponds to finding on 11/15/2019 CT..                                       Medications   iohexoL (OMNIPAQUE 350) injection 75 mL (75 mLs Intravenous Given 4/4/24 0655)     Medical Decision Making  Differential diagnoses considered include CVA, TIA, ICH, SAH, electrolyte abnormality, SVETLANA, UTI    Code stroke activated after initially evaluating the patient in EMS hallway.  Discussed the case with vascular neurology who will evaluate the patient.  See ED course for additional information.    Amount and/or Complexity of Data Reviewed  External Data Reviewed: radiology.     Details: === Results for orders placed during the hospital encounter of 01/23/24 ===    Echo    - Interpretation Summary -  ·  Left Ventricle: The left ventricle is normal in size. Ventricular mass is normal. Normal wall thickness. Normal wall motion. There is normal systolic function with a visually estimated ejection fraction of 60 - 65%. Grade III diastolic dysfunction.  ·  Right Ventricle: Normal right ventricular cavity size. Wall thickness is normal. Right ventricle wall motion  is normal. Systolic function is normal.  ·  Left Atrium: Left atrium is severely dilated.  ·  Right Atrium: Right atrium is severely dilated.  ·  Aortic Valve: There is severe aortic valve sclerosis. There is severe annular calcification present. Severely restricted motion. There is moderate ow flow stenosis. Aortic valve area by VTI is 0.78 cm². Aortic valve peak velocity is 2.62 m/s. Mean gradient is 17 mmHg. The dimensionless index is 0.31.  ·  Aortic Valve: There is moderate stenosis. Aortic valve area by VTI is 0.78 cm². Aortic valve peak velocity is 2.62  m/s. Mean gradient is 17 mmHg. The dimensionless index is 0.31. DEMARCUS is underestimated.  ·  Mitral Valve: There is severe mitral annular calcification present.  ·  Tricuspid Valve: There is moderate regurgitation.  ·  Aorta: Aortic root is normal in size measuring 2.72 cm. Ascending aorta is normal measuring 2.85 cm.  ·  Pulmonary Artery: The estimated pulmonary artery systolic pressure is 51 mmHg.  ·  IVC/SVC: Intermediate venous pressure at 8 mmHg.      Labs: ordered. Decision-making details documented in ED Course.  Radiology: ordered and independent interpretation performed. Decision-making details documented in ED Course.  ECG/medicine tests: ordered and independent interpretation performed. Decision-making details documented in ED Course.    Critical Care  Total time providing critical care: minutes (Procedure: Critical Care  Please put in 30 minutes of critical care due to patient having a high risk of neurological failure.       )               ED Course as of 04/04/24 1330   Thu Apr 04, 2024   0754 CTA STROKE MULTI-PHASE(!)  CT Head unremarkable without evidence of large-vessel infarct or intracranial hemorrhage    CTA per rads: CTA shows small lacunar type infarct in the right basal ganglia that is age indeterminate.  Other narrowing noted.  No obvious acute infarct [BD]   0754 CBC W/ AUTO DIFFERENTIAL(!)  CBC unremarkable without leukocytosis, significant anemia, or decreased platelets   [BD]   0754 Comprehensive metabolic panel(!)  CMP unremarkable without significant electrolyte derangement, impaired renal function, or elevated LFTs   [BD]   0759 ECG 12 lead  EKG independently interpreted by me shows bifascicular block with a wide QRS, rate 67, no STEMI [BD]   1314 Urinalysis, Reflex to Urine Culture Urine, Clean Catch(!)  6 squamous cells so suspect poor sample. Inconsistent with infection.  [BD]   1317 X-Ray Chest AP Portable  Chest x-ray independently interpreted by me shows no acute process such as  pneumonia, pneumothorax, or pulmonary edema.    [BD]   1320 Patient care will be handed off to oncoming team at 2:00 p.m., pending MRI and ultimate Vascular Neuro recs.  [BD]      ED Course User Index  [BD] Toni Carvalho MD                             Clinical Impression:  Final diagnoses:  [R29.818] Acute focal neurological deficit  [R20.0] Right sided numbness (Primary)                 Toni Carvalho MD  04/04/24 [id number]

## 2024-04-04 NOTE — PHARMACY MED REC
"Admission Medication History     The home medication history was taken by Jason Alex.    You may go to "Admission" then "Reconcile Home Medications" tabs to review and/or act upon these items.     The home medication list has been updated by the Pharmacy department.   Please read ALL comments highlighted in yellow.   Please address this information as you see fit.    Feel free to contact us if you have any questions or require assistance.      The medications listed below were removed from the home medication list. Please reorder if appropriate:  Patient reports no longer taking the following medication(s):  ALBUTEROL HFA 90 MCG/ACT INH  AMLODIPINE-ATORVASTATIN 10-10 MG    Medications listed below were obtained from: Patient/family  Current Outpatient Medications on File Prior to Encounter   Medication Sig    acetaminophen (TYLENOL) 500 MG tablet Take 500 mg by mouth daily as needed for Pain.    amLODIPine (NORVASC) 5 MG tablet Take 1 tablet by mouth every day.    brimonidine-timoloL (COMBIGAN) 0.2-0.5 % Drop Instill 1 drop into affected eye(s) INSTILL 1 DROP INTO AFFECTED EYE(S) BY OPHTHALMIC ROUTE EVERY 12 HOURS    brinzolamide (AZOPT) 1 % ophthalmic suspension Place 1 drop into the left eye 2 (two) times daily.    bumetanide (BUMEX) 0.5 MG Tab Take 0.5 mg by mouth 2 (two) times daily as needed (Fluid).    cholecalciferol, vitamin D3, 50,000 unit capsule Take 50,000 Units by mouth every 14 (fourteen) days.    clopidogreL (PLAVIX) 75 mg tablet Take 1 tablet (75 mg total) by mouth once daily.    ELIQUIS 5 mg Tab Take 1 tablet by mouth twice a day.    erythromycin (ROMYCIN) ophthalmic ointment Apply 1 inch to bottom lid of left eye for first week of every month.    hydroCHLOROthiazide (HYDRODIURIL) 12.5 MG Tab Take 1 tablet (12.5 mg total) by mouth once daily.    hydrocortisone 2.5 % cream Apply topically 2 (two) times daily.    levothyroxine (SYNTHROID) 88 MCG tablet Take 88 mcg by mouth before " breakfast.     methyl salicylate/menth/camph (SALONPAS TOP) Apply 1 patch topically daily as needed (Pain).    metoprolol succinate (TOPROL-XL) 25 MG 24 hr tablet Take 2 tablets in AM and 1 tablet in PM    NITROSTAT 0.4 mg SL tablet Place o.4 mg under the tongue every 5 (five) minutes as needed for Chest pain. Has on hand if needed.             Jason Alex  EXT [id number]                  .

## 2024-04-04 NOTE — Clinical Note
Diagnosis: Right sided numbness [[id number]]   Future Attending Provider: GARRY YEPEZ [[id number]]   Bed request comments: NPU please

## 2024-04-04 NOTE — ASSESSMENT & PLAN NOTE
Stroke risk factor. Reportedly taking Eliquis 5 mg once a day at home.  - Eliquis 2.5 mg BID based on age and weight

## 2024-04-04 NOTE — ED TRIAGE NOTES
Dorothy M Knobloch, a 89 y.o. female presents to the ED w/ complaint of R sided numbness, LKN 5am, on eliquis for afib    Triage note:  Chief Complaint   Patient presents with    Numbness     R sided numbness that began at 5am, LKN 4:30am, no hx strokes, +eliquis. Dr. Carvalho and Ani at bedside to [facility] for stroke      Review of patient's allergies indicates:   Allergen Reactions    Aspartame Swelling     equal     Past Medical History:   Diagnosis Date    Antiplatelet or antithrombotic long-term use     Arthritis     Bilateral nonexudative age-related macular degeneration 08/27/2013    Blood transfusion     Cataract     CHF (congestive heart failure)     Enlarged heart     High cholesterol     Hypertension     Sick sinus syndrome     Stroke     Thyroid disease

## 2024-04-04 NOTE — ASSESSMENT & PLAN NOTE
Stroke risk factor. .6. Not on statin due to prior intolerance (lower extremity myalgias). Reportedly has tried Repatha in the past and had similar reaction.  - discuss options for cholesterol lowering therapy with Pharmacy

## 2024-04-04 NOTE — SUBJECTIVE & OBJECTIVE
Past Medical History:   Diagnosis Date    Antiplatelet or antithrombotic long-term use     Arthritis     Bilateral nonexudative age-related macular degeneration 08/27/2013    Blood transfusion     Cataract     CHF (congestive heart failure)     Enlarged heart     High cholesterol     Hypertension     Sick sinus syndrome     Stroke     Thyroid disease      Past Surgical History:   Procedure Laterality Date    CARDIAC PACEMAKER PLACEMENT      MEDTR[employer]IC Device Model: Juli  YOHAN A2DR01 Device Type: PACEMAKER Device S\N: [id number]    CARPAL TUNNEL RELEASE Bilateral 1991    CATARACT EXTRACTION Right     JOINT REPLACEMENT      ID TRANSCRAN DOPP INTRACRAN, EMBOLI W/O INJ  01/29/2018         SKIN BIOPSY      TONSILLECTOMY      TOTAL HIP ARTHROPLASTY  11/01/2000    right/[facility]     Social History     Tobacco Use    Smoking status: Never     Passive exposure: Never    Smokeless tobacco: Never   Substance Use Topics    Alcohol use: No     Alcohol/week: 0.0 standard drinks of alcohol    Drug use: No     Review of patient's allergies indicates:   Allergen Reactions    Aspartame Swelling     equal       Medications: I have reviewed the current medication administration record.    (Not in a hospital admission)      Review of Systems   Constitutional:  Negative for diaphoresis and fever.   HENT:  Negative for nosebleeds and rhinorrhea.    Eyes:  Negative for discharge and redness.   Respiratory:  Negative for cough and wheezing.    Cardiovascular:  Negative for leg swelling.   Gastrointestinal:  Negative for abdominal distention and vomiting.   Genitourinary:  Negative for difficulty urinating and hematuria.   Neurological:  Positive for speech difficulty. Negative for tremors and facial asymmetry.     Objective:     Vital Signs (Most Recent):  Temp: 97.8 °F (36.6 °C) (04/04/24 1347)  Pulse: 80 (04/04/24 1526)  Resp: (!) 22 (04/04/24 0717)  BP: (!) 152/68 (04/04/24 1347)  SpO2: (!) 92 % (04/04/24 1347)    Vital  Signs Range (Last 24H):  Temp:  [97.6 °F (36.4 °C)-98.6 °F (37 °C)]   Pulse:  [60-86]   Resp:  [18-22]   BP: (146-169)/(55-73)   SpO2:  [90 %-98 %]        Physical Exam  Vitals and nursing note reviewed.   Constitutional:       General: She is not in acute distress.  HENT:      Head: Normocephalic and atraumatic.      Nose: Nose normal. No rhinorrhea.      Mouth/Throat:      Mouth: Mucous membranes are moist.      Pharynx: Oropharynx is clear.   Eyes:      General:         Right eye: No discharge.         Left eye: No discharge.      Conjunctiva/sclera: Conjunctivae normal.   Cardiovascular:      Rate and Rhythm: Normal rate.   Pulmonary:      Effort: Pulmonary effort is normal. No respiratory distress.   Musculoskeletal:         General: Normal range of motion.      Cervical back: Normal range of motion.   Skin:     General: Skin is warm and dry.   Neurological:      Mental Status: She is alert.          Neurological Exam:   LOC: alert  Attention Span: Good   Language: No aphasia  Articulation: slight dysarthria  Visual Fields: Full  EOM (CN III, IV, VI): Full/intact  Facial Sensation (CN V): Normal  Facial Movement (CN VII): Symmetric facial expression    Motor: Arm left  Normal 5/5  Leg left  Normal 5/5  Arm right  Normal 5/5  Leg right Paresis: 4/5  Cerebellum: No evidence of appendicular or axial ataxia  Sensation: light touch intact and symmetric in BUE, slightly decreased on RLE compared to LLE      Laboratory:  CMP:   Recent Labs   Lab 04/04/24  0651   CALCIUM 10.1   ALBUMIN 3.8   PROT 7.3   *   K 3.4*   CO2 24      BUN 29   CREATININE 0.8   ALKPHOS 62   ALT 9*   AST 18   BILITOT 0.5     CBC:   Recent Labs   Lab 04/04/24  0651   WBC 7.02   RBC 4.76   HGB 13.1   HCT 40.8      MCV 86   MCH 27.5   MCHC 32.1       Diagnostic Results:  Brain/Vessel imaging:  CTA Stroke Multiphase 4/4/24  1. No definite evidence of acute transcortical infarct by CT.  No evidence of acute intracranial hemorrhage,  mass effect, or midline shift.  2. Small lacunar type infarct in the right basal ganglia/internal capsule and patchy hypoattenuation in the deep white matter of the left frontal parietal lobes appear new when compared to 01/28/2018 head CT, but are otherwise technically age indeterminate.  MRI could be performed for more sensitive and specific assessment.  3. There is abrupt truncation of the left PCA at the level of the P1-P2 junction on the initial series with some degree of distal opacification of the P2 segment on the 2 delayed series, however, there is persistent suggested non opacification of distal P2 and P3 branches on the final phase.  These findings are new when compared to the 01/28/2018 CTA.  Clinical correlation recommended.  4. Since prior CTA 01/28/2018, progressed atheromatous narrowing at the proximal left ICA in the neck, now resulting in least 60-70% stenosis by NASCET style criteria.  5. Redemonstrated multilobulated irregular beaded appearance of the bilateral cervical internal carotid arteries with short segment outpouchings, possibly on the basis of underlying connective tissue disease such as fibromuscular dysplasia versus sequela prior dissections.  Overall appearance is felt grossly similar to the prior CTA allowing for slight differences in technique.  6. Chronic findings in the lungs felt grossly similar to 11/15/2019 chest CT.    MRI Brain w/o contrast 4/4/24  Acute infarcts involving the left thalamus (subcentimeter in size) and left hippocampus as detailed above without intracranial hemorrhage or mass effect.  This correlates with the marked compromise of the left posterior cerebral artery noted on the recent CTA.     Left tentorial calcified meningioma increased in size from 2018.  Please note no evidence of significant surrounding edema is noted within the adjacent brain/cerebellar parenchyma.    Cardiac Evaluation:   TTE 1/23/24    Left Ventricle: The left ventricle is normal in  size. Ventricular mass is normal. Normal wall thickness. Normal wall motion. There is normal systolic function with a visually estimated ejection fraction of 60 - 65%. Grade III diastolic dysfunction.    Right Ventricle: Normal right ventricular cavity size. Wall thickness is normal. Right ventricle wall motion  is normal. Systolic function is normal.    Left Atrium: Left atrium is severely dilated.    Right Atrium: Right atrium is severely dilated.    Aortic Valve: There is severe aortic valve sclerosis. There is severe annular calcification present. Severely restricted motion. There is moderate ow flow stenosis. Aortic valve area by VTI is 0.78 cm². Aortic valve peak velocity is 2.62 m/s. Mean gradient is 17 mmHg. The dimensionless index is 0.31.    Aortic Valve: There is moderate stenosis. Aortic valve area by VTI is 0.78 cm². Aortic valve peak velocity is 2.62 m/s. Mean gradient is 17 mmHg. The dimensionless index is 0.31. DEMARCUS is underestimated.    Mitral Valve: There is severe mitral annular calcification present.    Tricuspid Valve: There is moderate regurgitation.    Aorta: Aortic root is normal in size measuring 2.72 cm. Ascending aorta is normal measuring 2.85 cm.    Pulmonary Artery: The estimated pulmonary artery systolic pressure is 51 mmHg.    IVC/SVC: Intermediate venous pressure at 8 mmHg.

## 2024-04-05 VITALS
HEIGHT: 63 IN | RESPIRATION RATE: 18 BRPM | HEART RATE: 92 BPM | DIASTOLIC BLOOD PRESSURE: 62 MMHG | OXYGEN SATURATION: 97 % | SYSTOLIC BLOOD PRESSURE: 133 MMHG | WEIGHT: 131.19 LBS | BODY MASS INDEX: 23.25 KG/M2 | TEMPERATURE: 98 F

## 2024-04-05 PROBLEM — E87.6 HYPOKALEMIA: Status: ACTIVE | Noted: 2024-04-05

## 2024-04-05 PROBLEM — I35.0 AORTIC VALVAR STENOSIS: Status: ACTIVE | Noted: 2024-04-05

## 2024-04-05 PROBLEM — I27.20 PULMONARY HYPERTENSION: Status: ACTIVE | Noted: 2024-04-05

## 2024-04-05 PROBLEM — R47.1 DYSARTHRIA: Status: ACTIVE | Noted: 2024-04-05

## 2024-04-05 PROBLEM — E88.09 HYPOALBUMINEMIA: Status: ACTIVE | Noted: 2024-04-05

## 2024-04-05 PROBLEM — R54 AGE-RELATED PHYSICAL DEBILITY: Status: ACTIVE | Noted: 2024-04-05

## 2024-04-05 PROBLEM — E87.1 HYPONATREMIA: Status: ACTIVE | Noted: 2024-04-05

## 2024-04-05 LAB
ALBUMIN SERPL BCP-MCNC: 3.3 G/DL (ref 3.5–5.2)
ALP SERPL-CCNC: 59 U/L (ref 55–135)
ALT SERPL W/O P-5'-P-CCNC: 6 U/L (ref 10–44)
ANION GAP SERPL CALC-SCNC: 9 MMOL/L (ref 8–16)
APTT PPP: 31.1 SEC (ref 21–32)
AST SERPL-CCNC: 15 U/L (ref 10–40)
BASOPHILS # BLD AUTO: 0.08 K/UL (ref 0–0.2)
BASOPHILS NFR BLD: 1 % (ref 0–1.9)
BILIRUB SERPL-MCNC: 0.5 MG/DL (ref 0.1–1)
BUN SERPL-MCNC: 21 MG/DL (ref 10–30)
CALCIUM SERPL-MCNC: 9.4 MG/DL (ref 8.7–10.5)
CHLORIDE SERPL-SCNC: 103 MMOL/L (ref 95–110)
CO2 SERPL-SCNC: 23 MMOL/L (ref 23–29)
CREAT SERPL-MCNC: 0.8 MG/DL (ref 0.5–1.4)
DIFFERENTIAL METHOD BLD: ABNORMAL
EOSINOPHIL # BLD AUTO: 0.1 K/UL (ref 0–0.5)
EOSINOPHIL NFR BLD: 1 % (ref 0–8)
ERYTHROCYTE [DISTWIDTH] IN BLOOD BY AUTOMATED COUNT: 16.4 % (ref 11.5–14.5)
EST. GFR  (NO RACE VARIABLE): >60 ML/MIN/1.73 M^2
GLUCOSE SERPL-MCNC: 94 MG/DL (ref 70–110)
HCT VFR BLD AUTO: 37.6 % (ref 37–48.5)
HGB BLD-MCNC: 12.4 G/DL (ref 12–16)
IMM GRANULOCYTES # BLD AUTO: 0.03 K/UL (ref 0–0.04)
IMM GRANULOCYTES NFR BLD AUTO: 0.4 % (ref 0–0.5)
INR PPP: 1.1 (ref 0.8–1.2)
LYMPHOCYTES # BLD AUTO: 1.5 K/UL (ref 1–4.8)
LYMPHOCYTES NFR BLD: 19.2 % (ref 18–48)
MAGNESIUM SERPL-MCNC: 2.2 MG/DL (ref 1.6–2.6)
MCH RBC QN AUTO: 27.9 PG (ref 27–31)
MCHC RBC AUTO-ENTMCNC: 33 G/DL (ref 32–36)
MCV RBC AUTO: 85 FL (ref 82–98)
MONOCYTES # BLD AUTO: 0.6 K/UL (ref 0.3–1)
MONOCYTES NFR BLD: 7.8 % (ref 4–15)
NEUTROPHILS # BLD AUTO: 5.5 K/UL (ref 1.8–7.7)
NEUTROPHILS NFR BLD: 70.6 % (ref 38–73)
NRBC BLD-RTO: 0 /100 WBC
PHOSPHATE SERPL-MCNC: 2.7 MG/DL (ref 2.7–4.5)
PLATELET # BLD AUTO: 233 K/UL (ref 150–450)
PMV BLD AUTO: 9 FL (ref 9.2–12.9)
POTASSIUM SERPL-SCNC: 3.2 MMOL/L (ref 3.5–5.1)
PROT SERPL-MCNC: 6.4 G/DL (ref 6–8.4)
PROTHROMBIN TIME: 11.5 SEC (ref 9–12.5)
RBC # BLD AUTO: 4.44 M/UL (ref 4–5.4)
SODIUM SERPL-SCNC: 135 MMOL/L (ref 136–145)
TROPONIN I SERPL DL<=0.01 NG/ML-MCNC: 0.01 NG/ML (ref 0–0.03)
WBC # BLD AUTO: 7.78 K/UL (ref 3.9–12.7)

## 2024-04-05 PROCEDURE — 25000003 PHARM REV CODE 250

## 2024-04-05 PROCEDURE — 97166 OT EVAL MOD COMPLEX 45 MIN: CPT

## 2024-04-05 PROCEDURE — 84484 ASSAY OF TROPONIN QUANT: CPT | Performed by: STUDENT IN AN ORGANIZED HEALTH CARE EDUCATION/TRAINING PROGRAM

## 2024-04-05 PROCEDURE — 83735 ASSAY OF MAGNESIUM: CPT | Performed by: STUDENT IN AN ORGANIZED HEALTH CARE EDUCATION/TRAINING PROGRAM

## 2024-04-05 PROCEDURE — 25000003 PHARM REV CODE 250: Performed by: STUDENT IN AN ORGANIZED HEALTH CARE EDUCATION/TRAINING PROGRAM

## 2024-04-05 PROCEDURE — 92523 SPEECH SOUND LANG COMPREHEN: CPT

## 2024-04-05 PROCEDURE — 99233 SBSQ HOSP IP/OBS HIGH 50: CPT | Mod: GC,,, | Performed by: STUDENT IN AN ORGANIZED HEALTH CARE EDUCATION/TRAINING PROGRAM

## 2024-04-05 PROCEDURE — 97530 THERAPEUTIC ACTIVITIES: CPT

## 2024-04-05 PROCEDURE — 97110 THERAPEUTIC EXERCISES: CPT

## 2024-04-05 PROCEDURE — 80053 COMPREHEN METABOLIC PANEL: CPT | Performed by: STUDENT IN AN ORGANIZED HEALTH CARE EDUCATION/TRAINING PROGRAM

## 2024-04-05 PROCEDURE — 85025 COMPLETE CBC W/AUTO DIFF WBC: CPT | Performed by: STUDENT IN AN ORGANIZED HEALTH CARE EDUCATION/TRAINING PROGRAM

## 2024-04-05 PROCEDURE — 85610 PROTHROMBIN TIME: CPT | Performed by: STUDENT IN AN ORGANIZED HEALTH CARE EDUCATION/TRAINING PROGRAM

## 2024-04-05 PROCEDURE — 92610 EVALUATE SWALLOWING FUNCTION: CPT

## 2024-04-05 PROCEDURE — 97162 PT EVAL MOD COMPLEX 30 MIN: CPT

## 2024-04-05 PROCEDURE — 36415 COLL VENOUS BLD VENIPUNCTURE: CPT | Performed by: STUDENT IN AN ORGANIZED HEALTH CARE EDUCATION/TRAINING PROGRAM

## 2024-04-05 PROCEDURE — 84100 ASSAY OF PHOSPHORUS: CPT | Performed by: STUDENT IN AN ORGANIZED HEALTH CARE EDUCATION/TRAINING PROGRAM

## 2024-04-05 PROCEDURE — 85730 THROMBOPLASTIN TIME PARTIAL: CPT | Performed by: STUDENT IN AN ORGANIZED HEALTH CARE EDUCATION/TRAINING PROGRAM

## 2024-04-05 RX ORDER — NALOXONE HCL 0.4 MG/ML
0.02 VIAL (ML) INJECTION
Status: DISCONTINUED | OUTPATIENT
Start: 2024-04-05 | End: 2024-04-05 | Stop reason: HOSPADM

## 2024-04-05 RX ORDER — EZETIMIBE 10 MG/1
10 TABLET ORAL NIGHTLY
Status: DISCONTINUED | OUTPATIENT
Start: 2024-04-05 | End: 2024-04-05

## 2024-04-05 RX ADMIN — APIXABAN 2.5 MG: 2.5 TABLET, FILM COATED ORAL at 08:04

## 2024-04-05 RX ADMIN — POTASSIUM BICARBONATE 40 MEQ: 391 TABLET, EFFERVESCENT ORAL at 12:04

## 2024-04-05 RX ADMIN — BRIMONIDINE TARTRATE 1 DROP: 1.5 SOLUTION OPHTHALMIC at 08:04

## 2024-04-05 RX ADMIN — METOPROLOL SUCCINATE 50 MG: 50 TABLET, EXTENDED RELEASE ORAL at 08:04

## 2024-04-05 RX ADMIN — ACETAMINOPHEN 650 MG: 325 TABLET ORAL at 05:04

## 2024-04-05 RX ADMIN — TIMOLOL MALEATE 1 DROP: 5 SOLUTION OPHTHALMIC at 08:04

## 2024-04-05 RX ADMIN — LEVOTHYROXINE SODIUM 88 MCG: 88 TABLET ORAL at 05:04

## 2024-04-05 RX ADMIN — DOCUSATE SODIUM AND SENNOSIDES 1 TABLET: 8.6; 5 TABLET, FILM COATED ORAL at 08:04

## 2024-04-05 RX ADMIN — BRINZOLAMIDE 1 DROP: 10 SUSPENSION/ DROPS OPHTHALMIC at 08:04

## 2024-04-05 RX ADMIN — DIBASIC SODIUM PHOSPHATE, MONOBASIC POTASSIUM PHOSPHATE AND MONOBASIC SODIUM PHOSPHATE 2 TABLET: 852; 155; 130 TABLET ORAL at 08:04

## 2024-04-05 NOTE — ASSESSMENT & PLAN NOTE
1/23/2024 TTE    Left Ventricle: The left ventricle is normal in size. Ventricular mass is normal. Normal wall thickness. Normal wall motion. There is normal systolic function with a visually estimated ejection fraction of 60 - 65%. Grade III diastolic dysfunction.    Right Ventricle: Normal right ventricular cavity size. Wall thickness is normal. Right ventricle wall motion  is normal. Systolic function is normal.    Left Atrium: Left atrium is severely dilated.    Right Atrium: Right atrium is severely dilated.    Aortic Valve: There is severe aortic valve sclerosis. There is severe annular calcification present. Severely restricted motion. There is moderate ow flow stenosis. Aortic valve area by VTI is 0.78 cm². Aortic valve peak velocity is 2.62 m/s. Mean gradient is 17 mmHg. The dimensionless index is 0.31.    Aortic Valve: There is moderate stenosis. Aortic valve area by VTI is 0.78 cm². Aortic valve peak velocity is 2.62 m/s. Mean gradient is 17 mmHg. The dimensionless index is 0.31. DEMARCUS is underestimated.    Mitral Valve: There is severe mitral annular calcification present.    Tricuspid Valve: There is moderate regurgitation.    Aorta: Aortic root is normal in size measuring 2.72 cm. Ascending aorta is normal measuring 2.85 cm.    Pulmonary Artery: The estimated pulmonary artery systolic pressure is 51 mmHg.    IVC/SVC: Intermediate venous pressure at 8 mmHg.       Stroke risk factor. Takes PRN bumex 0.5 mg BID at home.  - monitor for evidence of fluid overload, resume Bumex as needed.

## 2024-04-05 NOTE — PT/OT/SLP EVAL
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 At this time, patient is functioning at their prior level of function and does not require further acute OT services.     Plan:     During this hospitalization, patient does not require further acute OT services.  Please re-consult if situation changes.    Plan of Care Reviewed with: patient, spouse, daughter    Subjective     Chief Complaint: none at this time   Patient/Family Comments/goals: DC    Occupational Profile:  Living Environment: [facility]   Previous level of function: Independent with Rolator   Roles and Routines: Caretaker to    Equipment Used at home: rollator, bath bench, grab bar  Assistance upon Discharge: Facility staff     Pain/Comfort:  Pain Rating 1: 0/10    Patients cultural, spiritual, Yazdanism conflicts given the current situation: no    Objective:     Communicated with: RN prior to session.  Patient found in chair with   upon OT entry to room.    General Precautions: Standard, aspiration  Orthopedic Precautions: N/A  Braces: N/A  Respiratory Status: Room air     Occupational Performance:    Functional Mobility/Transfers:  Patient completed Sit <> Stand Transfer with contact guard assistance  with  rolling walker   Functional Mobility: Patient ambulated ~50' with RW and SBA     Activities of Daily Living:  Upper Body Dressing: minimum assistance to don jayden as joanna   Lower Body Dressing: stand by assistance to don  socks     Cognitive/Visual Perceptual:  Cognitive/Psychosocial Skills:     -       Oriented to: Person, Place, Time, and Situation   -       Follows Commands/attention:Follows multistep  commands  -       Communication: clear/fluent  -       Safety awareness/insight to disability: intact   Visual/Perceptual:      -patient with L eye blind       Physical Exam:  Upper Extremity Range of Motion:     -       Right Upper Extremity: WFL  -       Left Upper Extremity: WFL  Upper Extremity Strength:    -       Right Upper Extremity: WFL  -       Left Upper Extremity: WFL   Strength:    -       Right Upper Extremity: WFL  -       Left Upper Extremity: WFL  Fine Motor Coordination:    -       Intact    AMPAC 6 Click ADL:  AMPAC Total Score:      Treatment & Education:  Co-evaluation completed due to patient medical instability and to ensure patient safety. Provided education regarding role of OT, POC, & discharge recommendations.  Pt with no further questions/concerns at this time. OT provided education on home recommendations and fall prevention in preparation for D/C.     Patient left supine with all lines intact and call button in reach    GOALS:   Multidisciplinary Problems       Occupational Therapy Goals       Not on file                    History:     Past Medical History:   Diagnosis Date    Antiplatelet or antithrombotic long-term use     Arthritis     Bilateral nonexudative age-related macular degeneration 08/27/2013    Blood transfusion     Cataract     CHF (congestive heart failure)     Enlarged heart     High cholesterol     Hypertension     Sick sinus syndrome     Stroke     Thyroid disease          Past Surgical History:   Procedure Laterality Date    CARDIAC PACEMAKER PLACEMENT      [employer]TR[employer] Device Model: Juli LOZADA MRI A2DR01 Device Type: PACEMAKER Device S\N: [id number]    CARPAL TUNNEL RELEASE Bilateral 1991    CATARACT EXTRACTION Right     JOINT REPLACEMENT      [place] TRANSCRAN DOPP INTRACRAN, EMBOLI W/O  INJ  01/29/2018         SKIN BIOPSY      TONSILLECTOMY      TOTAL HIP ARTHROPLASTY  11/01/2000    right/[facility]       Time Tracking:     OT Date of Treatment: 04/05/24  OT Start Time: 1113  OT Stop Time: 1130  OT Total Time (min): 17 min    Billable Minutes:Evaluation 7  Therapeutic Activity 10    4/5/2024

## 2024-04-05 NOTE — SUBJECTIVE & OBJECTIVE
Neurologic Chief Complaint: L PCA CVA    Subjective:     Interval History: Patient is seen for follow-up neurological assessment and treatment recommendations: NAEON, back to baseline.     HPI, Past Medical, Family, and Social History remains the same as documented in the initial encounter.     Review of Systems   Eyes:  Positive for visual disturbance (chronic).   All other systems reviewed and are negative.    Scheduled Meds:   apixaban  2.5 mg Oral BID    brimonidine 0.15 % OPTH DROP  1 drop Both Eyes BID    brinzolamide  1 drop Left Eye BID    levothyroxine  88 mcg Oral Before breakfast    metoprolol succinate  50 mg Oral Daily    And    metoprolol succinate  25 mg Oral QHS    senna-docusate 8.6-50 mg  1 tablet Oral BID    timolol maleate 0.5%  1 drop Both Eyes BID     Continuous Infusions:  PRN Meds:acetaminophen, hydrALAZINE, melatonin, polyethylene glycol, sodium chloride 0.9%    Objective:     Vital Signs (Most Recent):  Temp: 97.9 °F (36.6 °C) (04/05/24 0721)  Pulse: 61 (04/05/24 0721)  Resp: 18 (04/05/24 0721)  BP: (!) 133/58 (04/05/24 0721)  SpO2: 95 % (04/05/24 0721)  BP Location: Left arm    Vital Signs Range (Last 24H):  Temp:  [97.6 °F (36.4 °C)-98.2 °F (36.8 °C)]   Pulse:  [59-82]   Resp:  [18-20]   BP: (126-160)/(58-73)   SpO2:  [90 %-98 %]   BP Location: Left arm       Physical Exam  Vitals and nursing note reviewed.   Constitutional:       Appearance: Normal appearance. She is normal weight.   HENT:      Head: Normocephalic and atraumatic.   Cardiovascular:      Rate and Rhythm: Normal rate.   Pulmonary:      Effort: Pulmonary effort is normal. No respiratory distress.   Abdominal:      General: Abdomen is flat.      Palpations: Abdomen is soft.   Musculoskeletal:      Right lower leg: No edema.      Left lower leg: No edema.   Skin:     General: Skin is warm and dry.   Neurological:      Mental Status: She is alert.        Neurological Exam:   LOC: alert  Attention Span: Good   Language: No  aphasia  Articulation: No dysarthria  Orientation: Person, Place, Time   Motor: Arm left  Normal 5/5  Leg left  Normal 5/5  Arm right  Normal 5/5  Leg right Paresis: 4/5    Laboratory:  CMP:   Recent Labs   Lab 04/05/24 0437   CALCIUM 9.4   ALBUMIN 3.3*   PROT 6.4   *   K 3.2*   CO2 23      BUN 21   CREATININE 0.8   ALKPHOS 59   ALT 6*   AST 15   BILITOT 0.5     CBC:   Recent Labs   Lab 04/05/24 0437   WBC 7.78   RBC 4.44   HGB 12.4   HCT 37.6      MCV 85   MCH 27.9   MCHC 33.0     Lipid Panel:   Recent Labs   Lab 04/04/24 0651   CHOL 231*   LDLCALC 139.6   HDL 73   TRIG 92     Hgb A1C:   Recent Labs   Lab 04/04/24 0651   HGBA1C 5.5     TSH:   Recent Labs   Lab 04/04/24 0651   TSH 4.086*       Diagnostic Results     Brain Imaging   4/4/2024 MRI Brain  - Acute infarcts involving the left thalamus (subcentimeter in size) and left hippocampus as detailed above without intracranial hemorrhage or mass effect.  This correlates with the marked compromise of the left posterior cerebral artery noted on the recent CTA.  - Left tentorial calcified meningioma increased in size from 2018.  Please note no evidence of significant surrounding edema is noted within the adjacent brain/cerebellar parenchyma.    Vessel Imaging   4/4/2024 CTA Stroke Multi-Phase  1. No definite evidence of acute transcortical infarct by CT.  No evidence of acute intracranial hemorrhage, mass effect, or midline shift.  2. Small lacunar type infarct in the right basal ganglia/internal capsule and patchy hypoattenuation in the deep white matter of the left frontal parietal lobes appear new when compared to 01/28/2018 head CT, but are otherwise technically age indeterminate.  MRI could be performed for more sensitive and specific assessment.  3. There is abrupt truncation of the left PCA at the level of the P1-P2 junction on the initial series with some degree of distal opacification of the P2 segment on the 2 delayed series, however,  there is persistent suggested non opacification of distal P2 and P3 branches on the final phase.  These findings are new when compared to the 01/28/2018 CTA.  Clinical correlation recommended.  4. Since prior CTA 01/28/2018, progressed atheromatous narrowing at the proximal left ICA in the neck, now resulting in least 60-70% stenosis by NASCET style criteria.  5. Redemonstrated multilobulated irregular beaded appearance of the bilateral cervical internal carotid arteries with short segment outpouchings, possibly on the basis of underlying connective tissue disease such as fibromuscular dysplasia versus sequela prior dissections.  Overall appearance is felt grossly similar to the prior CTA allowing for slight differences in technique    Cardiac Imaging   4/4/2024 EKG  V paced with artifacyt-possibly A sensed   Abnormal ECG   When compared with ECG of 29-FEB-2024 13:28,   No significant change was found     1/23/2024 TTE    Left Ventricle: The left ventricle is normal in size. Ventricular mass is normal. Normal wall thickness. Normal wall motion. There is normal systolic function with a visually estimated ejection fraction of 60 - 65%. Grade III diastolic dysfunction.    Right Ventricle: Normal right ventricular cavity size. Wall thickness is normal. Right ventricle wall motion  is normal. Systolic function is normal.    Left Atrium: Left atrium is severely dilated.    Right Atrium: Right atrium is severely dilated.    Aortic Valve: There is severe aortic valve sclerosis. There is severe annular calcification present. Severely restricted motion. There is moderate ow flow stenosis. Aortic valve area by VTI is 0.78 cm². Aortic valve peak velocity is 2.62 m/s. Mean gradient is 17 mmHg. The dimensionless index is 0.31.    Aortic Valve: There is moderate stenosis. Aortic valve area by VTI is 0.78 cm². Aortic valve peak velocity is 2.62 m/s. Mean gradient is 17 mmHg. The dimensionless index is 0.31. DEMARCUS is underestimated.     Mitral Valve: There is severe mitral annular calcification present.    Tricuspid Valve: There is moderate regurgitation.    Aorta: Aortic root is normal in size measuring 2.72 cm. Ascending aorta is normal measuring 2.85 cm.    Pulmonary Artery: The estimated pulmonary artery systolic pressure is 51 mmHg.    IVC/SVC: Intermediate venous pressure at 8 mmHg.

## 2024-04-05 NOTE — ASSESSMENT & PLAN NOTE
Recent Labs     04/04/24  0651 04/05/24  0437   ALBUMIN 3.8 3.3*     Continue to monitor with daily labs

## 2024-04-05 NOTE — NURSING
Nurses Note -- 4 Eyes      4/5/2024   12:55 PM      Skin assessed during: Daily Assessment      [x] No Altered Skin Integrity Present    [x]Prevention Measures Documented      [] Yes- Altered Skin Integrity Present or Discovered   [] LDA Added if Not in Epic (Describe Wound)   [] New Altered Skin Integrity was Present on Admit and Documented in LDA   [] Wound Image Taken    Wound Care Consulted? No    Attending Nurse:  KINGSLEY Burger    Second RN/Staff Member:  Greer Kemp RN

## 2024-04-05 NOTE — PLAN OF CARE
[facility] - Neurosurgery ([facility])      HOME HEALTH ORDERS  FACE TO FACE ENCOUNTER    Patient Name: Dorothy M Knobloch  YOB: 1932    PCP: Lola Wolff MD   PCP Address: [address] / [place]  PCP Phone Number: [phone]  PCP Fax: [phone]    Encounter Date: 4/4/24    Admit to Home Health    Diagnoses:  Active Hospital Problems    Diagnosis  POA    *Stroke due to embolism of left posterior cerebral artery [I63.432]  Yes     Priority: 1 - High    Paroxysmal atrial fibrillation [I48.0]  Yes     Priority: 2      Chronic    HLD (hyperlipidemia) [E78.5]  Yes     Priority: 3      Chronic    Essential hypertension [I10]  Yes     Priority: 4      Chronic    Chronic heart failure with preserved ejection fraction [I50.32]  Yes     Priority: 5     Hypothyroidism [E03.9]  Yes     Priority: 6      Chronic    Hyponatremia [E87.1]  Yes     Priority: 7     Hypokalemia [E87.6]  Yes     Priority: 7     Hypoalbuminemia [E88.09]  No     Priority: 7     Dysarthria [R47.1]  Yes     Priority: 8     Age-related physical debility [R54]  Yes     Priority: 8     AV block, 3rd degree [I44.2]  Yes     Priority: 9     Cardiac pacemaker [Z95.0]  Yes     Priority: 9     Aortic stenosis [I35.0]  Yes    Pulmonary hypertension [I27.20]  Yes    Transient neurological symptoms [R29.818]  Yes      Resolved Hospital Problems   No resolved problems to display.       Follow Up Appointments:  No future appointments.    Allergies:  Review of patient's allergies indicates:   Allergen Reactions    Aspartame Swelling     equal       Medications: Review discharge medications with patient and family and provide education.    Current Facility-Administered Medications   Medication Dose Route Frequency Provider Last Rate Last Admin    acetaminophen tablet 650 mg  650 mg Oral Q6H PRN Lola Powers MD   650 mg at 04/05/24 0536    apixaban tablet 2.5 mg  2.5 mg Oral BID Lola Powers MD   2.5 mg at 04/05/24 0831     brimonidine 0.15 % OPTH DROP ophthalmic solution 1 drop  1 drop Both Eyes BID Lola Powers MD   1 drop at 04/05/24 0832    brinzolamide 1 % ophthalmic suspension 1 drop  1 drop Left Eye BID Lola Powers MD   1 drop at 04/05/24 0833    hydrALAZINE injection 10 mg  10 mg Intravenous Q6H PRN Lola Powers MD        levothyroxine tablet 88 mcg  88 mcg Oral Before breakfast Lola Powers MD   88 mcg at 04/05/24 0534    melatonin tablet 6 mg  6 mg Oral Nightly PRN Lola Powers MD        metoprolol succinate (TOPROL-XL) 24 hr tablet 50 mg  50 mg Oral Daily Lola Powers MD   50 mg at 04/05/24 0831    And    metoprolol succinate (TOPROL-XL) 24 hr tablet 25 mg  25 mg Oral QHS Lola Powers MD   25 mg at 04/04/24 2138    naloxone 0.4 mg/mL injection 0.02 mg  0.02 mg Intravenous PRN Olena Toro MD        polyethylene glycol packet 17 g  17 g Oral BID PRN Lola Powers MD        potassium bicarbonate disintegrating tablet 40 mEq  40 mEq Oral Q2H Olena Toro MD   40 mEq at 04/05/24 1215    senna-docusate 8.6-50 mg per tablet 1 tablet  1 tablet Oral BID Lola Powers MD   1 tablet at 04/05/24 0831    sodium chloride 0.9% flush 10 mL  10 mL Intravenous PRN Lola Powers MD        timolol maleate 0.5% ophthalmic solution 1 drop  1 drop Both Eyes BID Lola Powers MD   1 drop at 04/05/24 0833     Current Discharge Medication List        CONTINUE these medications which have CHANGED    Details   apixaban (ELIQUIS) 2.5 mg Tab Take 1 tablet (2.5 mg total) by mouth 2 (two) times daily.  Qty: 60 tablet, Refills: 5           CONTINUE these medications which have NOT CHANGED    Details   acetaminophen (TYLENOL) 500 MG tablet Take 500 mg by mouth daily as needed for Pain.      amLODIPine (NORVASC) 5 MG tablet TAKE 1 TABLET BY MOUTH EVERY DAY  Qty: 90 tablet, Refills: 3    Comments: .      brimonidine-timoloL (COMBIGAN) 0.2-0.5 % Drop INSTILL 1 DROP INTO AFFECTED EYE(S) BY OPHTHALMIC ROUTE EVERY 12 HOURS       brinzolamide (AZOPT) 1 % ophthalmic suspension Place 1 drop into the left eye 2 (two) times daily.      bumetanide (BUMEX) 0.5 MG Tab TAKE 1 TABLET BY MOUTH 2 TIMES DAILY.  Qty: 180 tablet, Refills: 3      cholecalciferol, vitamin D3, 50,000 unit capsule Take 50,000 Units by mouth every 14 (fourteen) days.  Refills: 5      clopidogreL (PLAVIX) 75 mg tablet Take 1 tablet (75 mg total) by mouth once daily.  Qty: 90 tablet, Refills: 3      erythromycin (ROMYCIN) ophthalmic ointment Apply 1 inch to bottom lid of left eye for first week of every month.      hydroCHLOROthiazide (HYDRODIURIL) 12.5 MG Tab Take 1 tablet (12.5 mg total) by mouth once daily.  Qty: 90 tablet, Refills: 3    Comments: .      hydrocortisone 2.5 % cream Apply topically 2 (two) times daily.  Qty: 20 g, Refills: 1    Associated Diagnoses: Allergic dermatitis      levothyroxine (SYNTHROID) 88 MCG tablet Take 88 mcg by mouth before breakfast.       methyl salicylate/menth/camph (SALONPAS TOP) Apply 1 patch topically daily as needed (Pain).      metoprolol succinate (TOPROL-XL) 25 MG 24 hr tablet Take 2 tablets in AM and 1 tablet in PM  Qty: 270 tablet, Refills: 3    Comments: .      NITROSTAT 0.4 mg SL tablet Place 0.4 mg under the tongue every 5 (five) minutes as needed for Chest pain.               I have seen and examined this patient within the last 30 days. My clinical findings that support the need for the home health skilled services and home bound status are the following:no   Weakness/numbness causing balance and gait disturbance due to Stroke making it taxing to leave home.     Diet:   cardiac diet    Labs:  Report Lab results to PCP.    Referrals/ Consults  Physical Therapy to evaluate and treat. Evaluate for home safety and equipment needs; Establish/upgrade home exercise program. Perform / instruct on therapeutic exercises, gait training, transfer training, and Range of Motion.  Occupational Therapy to evaluate and treat. Evaluate home  environment for safety and equipment needs. Perform/Instruct on transfers, ADL training, ROM, and therapeutic exercises.   to evaluate for community resources/long-range planning.  Aide to provide assistance with personal care, ADLs, and vital signs.    Activities:   activity as tolerated    Nursing:   Agency to admit patient within 24 hours of hospital discharge unless specified on physician order or at patient request    SN to complete comprehensive assessment including routine vital signs. Instruct on disease process and s/s of complications to report to MD. Review/verify medication list sent home with the patient at time of discharge  and instruct patient/caregiver as needed. Frequency may be adjusted depending on start of care date.     Skilled nurse to perform up to 3 visits PRN for symptoms related to diagnosis    Notify MD if SBP > 160 or < 90; DBP > 90 or < 50; HR > 120 or < 50; Temp > 101; O2 < 88%    Ok to schedule additional visits based on staff availability and patient request on consecutive days within the home health episode.    When multiple disciplines ordered:    Start of Care occurs on Sunday - Wednesday schedule remaining discipline evaluations as ordered on separate consecutive days following the start of care.    Thursday SOC -schedule subsequent evaluations Friday and Monday the following week.     Friday - Saturday SOC - schedule subsequent discipline evaluations on consecutive days starting Monday of the following week.      Miscellaneous   Heart Failure:      SN to instruct on the following:    Instruct on the definition of CHF.   Instruct on the signs/sympoms of CHF to be reported.   Instruct on and monitor daily weights.   Instruct on factors that cause exacerbation.   Instruct on action, dose, schedule, and side effects of medications.   Instruct on diet as prescribed.   Instruct on activity allowed.   Instruct on life-style modifications for life long management of  CHF   SN to assess compliance with daily weights, diet, medications, fluid retention,    safety precautions, activities permitted and life-style modifications.   Additional 1-2 SN visits per week as needed for signs and symptoms     of CHF exacerbation.      For Weight Gain > 2-3 lbs in 1 day or 4-6 lbs over 1 week notify PCP:  - Take home Bumex one time for weight > 140 lbs    Home Health Aide:  Nursing Three times weekly, Physical Therapy Three times weekly, Occupational Therapy Three times weekly, Medical Social Work Weekly, and Home Health Aide Other: up to 28 hours per week    Wound Care Orders  no    I certify that this patient is confined to her home and needs intermittent skilled nursing care, physical therapy, and occupational therapy.

## 2024-04-05 NOTE — PT/OT/SLP EVAL
Speech Language Pathology Evaluation  Cognitive/Bedside Swallow    Patient Name:  Dorothy M Knobloch   MRN:  [id number]  Admitting Diagnosis: Stroke due to embolism of left posterior cerebral artery    Recommendations:                  General Recommendations:  Follow-up not indicated  Diet recommendations:  Regular, Thin   Aspiration Precautions: Meds whole 1 at a time and Standard aspiration precautions   General Precautions: Standard, aspiration  Communication strategies:  none    Assessment:     Dorothy M Knobloch is a 89 y.o. female with oral and pharyngeal phases of swallow wfl.  Speech language and cognitive skills were wfl  History:     Past Medical History:   Diagnosis Date    Antiplatelet or antithrombotic long-term use     Arthritis     Bilateral nonexudative age-related macular degeneration 08/27/2013    Blood transfusion     Cataract     CHF (congestive heart failure)     Enlarged heart     High cholesterol     Hypertension     Sick sinus syndrome     Stroke     Thyroid disease        Past Surgical History:   Procedure Laterality Date    CARDIAC PACEMAKER PLACEMENT      [employer] Device Model: Juli LOZADA MRI [id number] Device Type: PACEMAKER Device S\N: [id number]    CARPAL TUNNEL RELEASE Bilateral 1991    CATARACT EXTRACTION Right     JOINT REPLACEMENT      ND TRANSCRAN DOPP INTRACRAN, EMBOLI W/O INJ  01/29/2018         SKIN BIOPSY      TONSILLECTOMY      TOTAL HIP ARTHROPLASTY  11/01/2000    right/[facility]       Social History: Patient lives with spouse in assisted living.        Prior diet: regular.    Occupation/hobbies/homemaking:     Subjective     Daughter reported cognition at baseline today.  Pt. Manages finances and assists in care of spouse  Patient goals: home     Pain/Comfort:  Pain Rating 1: 0/10  Pain Rating Post-Intervention 1: 0/10    Respiratory Status: Room air    Objective:     Cognitive Status:    Pt. was oriented x3 with good recall of recent and remote temporal and general  information.  Immediate/delayed verbal recall was wfl with pt. Repeating 7 digits and 5 words without difficulty.    Responses to hypothetical verbal problem solving tasks were accurate and complete.  Pt. Compared and contrasted objects and generated multiple solutions to problems.  Thought organization and categorization skills were wfl with pt. Naming 18 animals given one minute when 15-20 are wnl.  Pt. Responded to functional math and time calculations with 100% accuracy and sequenced 4 words presented auditorily on 2/2 trials.        Receptive Language:   Comprehension:   Pt. Responded to complex yes/no questions and multi step commands with 100% accuracy and no delays in responding.        Pragmatics:    Wnl.  Pt. Verbalized good insight to deficits/situation    Expressive Language:  Verbal:    Verbal language skills were wfl with no evidence of aphasia.  Pt. Expressed their thoughts coherently in conversation with no evidence of confusion or word finding deficits          Motor Speech:  wnl    Voice:   wfl    Visual-Spatial:  wfl    Reading:   WFL     Written Expression:   WFL    Oral Musculature Evaluation  Oral Musculature: WNL  Dentition: present and adequate  Secretion Management: adequate  Mucosal Quality: good  Mandibular Strength and Mobility: WFL  Oral Labial Strength and Mobility: WFL  Lingual Strength and Mobility: WFL  Velar Elevation: WFL  Buccal Strength and Mobility: WFL  Volitional Cough: strong  Volitional Swallow: no delay  Voice Prior to PO Intake: wf    Bedside Swallow Eval:   Consistencies Assessed:  Thin liquids observed self feeding 3 ounces of water via cup  Solids observed self feeding 1/2 eggs, 3 bites of grits      Oral Phase:   WNL    Pharyngeal Phase:   no overt clinical signs/symptoms of aspiration  no overt clinical signs/symptoms of pharyngeal dysphagia    Compensatory Strategies  None    Treatment: Education provided re role of slp and plan of care    Goals:   Multidisciplinary  Problems       SLP Goals       Not on file                    Plan:     Patient to be seen:      Plan of Care expires:     Plan of Care reviewed with:  patient, daughter   SLP Follow-Up:  No       Discharge recommendations:  Therapy Intensity Recommendations at Discharge: No Therapy Indicated   Barriers to Discharge:  None    Time Tracking:     SLP Treatment Date:   04/05/24  Speech Start Time:  0745  Speech Stop Time:  0805     Speech Total Time (min):  20 min    Billable Minutes: Eval 12  and Eval Swallow and Oral Function 8    04/05/2024

## 2024-04-05 NOTE — ASSESSMENT & PLAN NOTE
"89 year old woman with HTN, Afib on Eliquis, hypothyroidism presented to ED 4/4 for odd sensation on right side of her body. Felt like her right side was "lifted off the bed" while she was lying on her back. LKW unclear. Symptoms noted 4/4 0600 on waking. Not a thrombolytic candidate given anticoagulant use. CTA no LVO. 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 A1c 5.5%. TSH elevated at 4.086 but free T4 WNL. .6. TTE 1/2024 with preserved EF and Grade III diastolic dysfunction.     Antithrombotics for secondary stroke prevention: Anticoagulants: Apixaban 2.5 mg BID     Statins for secondary stroke prevention and hyperlipidemia, if present: Statins: None: Reason: reported intolerance to statins and possibly to PCSK-9 inhibitors in the past    Aggressive risk factor modification: HTN, HLD, A-Fib     Rehab efforts: The patient has been evaluated by a stroke team provider and the therapy needs have been fully considered based off the presenting complaints and exam findings. The following therapy evaluations are needed: PT evaluate and treat, OT evaluate and treat, SLP evaluate and treat    Diagnostics ordered/pending: None     VTE prophylaxis: None: Reason for No Pharmacological VTE Prophylaxis: Currently on anticoagulation    BP parameters: Infarct: No intervention, SBP <220  "

## 2024-04-05 NOTE — PLAN OF CARE
[facility] - Neurosurgery (Hospital)  Discharge Final Note    Primary Care Provider: Lola Wolff MD    Expected Discharge Date: 4/5/2024    Final Discharge Note (most recent)       Final Note - 04/05/24 1422          Final Note    Assessment Type Final Discharge Note (P)      Anticipated Discharge Disposition Home-Health Care Svc (P)         Post-Acute Status    Post-Acute Authorization Home Health (P)      Home Health Status Set-up Complete/Auth obtained (P)                  Patient discharging back to [facility] with her .  Patient will receive HH services with [facility].  SW set up WC van for transport, as dtr unable to transport patient in her truck.      Teresa Heck, LMSW [facility]  [phone]      Important Message from Medicare             Contact Info       Lola Wolff MD   Specialty: Family Medicine   Relationship: PCP - General    [address]  [address]  [place] [address]   Phone: [phone]       Next Steps: Schedule an appointment as soon as possible for a visit in 1 week(s)    Marquis Velazquez MD   Specialty: Electrophysiology, Cardiology    [address] [facility] [address]   Phone: [phone]       Next Steps: Schedule an appointment as soon as possible for a visit in 1 month(s)    Yomi Villa MD   Specialty: Cardiovascular Disease, Cardiology    [address] [facility] [address]   Phone: [phone]       Next Steps: Schedule an appointment as soon as possible for a visit in 1 month(s)    [facility]OLOG[facility]   Specialty: Vascular Neurology    [address] [facility] [address]   Phone: [phone]       Next Steps: Schedule an appointment as soon as possible for a visit in 6 week(s)    [facility]    [address]  [place] [address]  Phone: [phone]       Next Steps: Follow up    Instructions: [facility] will reach out to schedule time to come out           No future appointments.

## 2024-04-05 NOTE — HOSPITAL COURSE
Patient was admitted to NPU for further monitoring. Her NIHSS was 0 the morning after admission. Cleared for discharge by stroke team attending, PT, and OT. She is currently on Eliquis for a-fib, which we will continue, albeit a lower dose due to age > 81 yo and weight < 60 kg. In terms of secondary stroke prevention, this is sufficient. However, due to extensive cardiac history, we will continue her daily Plavix at discharge. Patient was instructed to follow-up with Cardiology regarding this. At this time, she is medically ready for discharge with follow-ups with PCP, Vascular Neurology, and Cardiology.

## 2024-04-05 NOTE — ASSESSMENT & PLAN NOTE
Recent Labs     04/04/24  0651 04/05/24  0437   K 3.4* 3.2*     Continue to monitor with daily labs. Replete as needed.

## 2024-04-05 NOTE — ASSESSMENT & PLAN NOTE
Stroke risk factor. TSH elevated at 4.086 but free T4 WNL  - continue home levothyroxine 88 mcg po before breakfast

## 2024-04-05 NOTE — PROGRESS NOTES
"[facility] - Neurosurgery ([facility])  Vascular Neurology  Comprehensive Stroke Center  Progress Note    Assessment/Plan:     * Stroke due to embolism of left posterior cerebral artery  89 year old woman with HTN, Afib on Eliquis, hypothyroidism presented to ED 4/4 for odd sensation on right side of her body. Felt like her right side was "lifted off the bed" while she was lying on her back. LKW unclear. Symptoms noted 4/4 0600 on waking. Not a thrombolytic candidate given anticoagulant use. CTA no LVO. 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 A1c 5.5%. TSH elevated at 4.086 but free T4 WNL. .6. TTE 1/2024 with preserved EF and Grade III diastolic dysfunction.     Antithrombotics for secondary stroke prevention: Anticoagulants: Apixaban 2.5 mg BID     Statins for secondary stroke prevention and hyperlipidemia, if present: Statins: None: Reason: reported intolerance to statins and possibly to PCSK-9 inhibitors in the past    Aggressive risk factor modification: HTN, HLD, A-Fib     Rehab efforts: The patient has been evaluated by a stroke team provider and the therapy needs have been fully considered based off the presenting complaints and exam findings. The following therapy evaluations are needed: PT evaluate and treat, OT evaluate and treat, SLP evaluate and treat    Diagnostics ordered/pending: None     VTE prophylaxis: None: Reason for No Pharmacological VTE Prophylaxis: Currently on anticoagulation    BP parameters: Infarct: No intervention, SBP <220    Paroxysmal atrial fibrillation  Stroke risk factor. Reportedly taking Eliquis 5 mg once a day at home.  - Eliquis 2.5 mg BID based on age and weight    HLD (hyperlipidemia)  Stroke risk factor. .6. Not on statin due to prior intolerance (lower extremity myalgias). Reportedly has tried Repatha in the past and had similar reaction.  - Start Zetia 10 mg po qhs    Essential hypertension  Stroke risk factor. Home meds are amlodipine 5 mg and " HCTZ 12.5 mg QD  - goal SBP <220  - resume home meds when appropriate    Chronic heart failure with preserved ejection fraction  1/23/2024 TTE    Left Ventricle: The left ventricle is normal in size. Ventricular mass is normal. Normal wall thickness. Normal wall motion. There is normal systolic function with a visually estimated ejection fraction of 60 - 65%. Grade III diastolic dysfunction.    Right Ventricle: Normal right ventricular cavity size. Wall thickness is normal. Right ventricle wall motion  is normal. Systolic function is normal.    Left Atrium: Left atrium is severely dilated.    Right Atrium: Right atrium is severely dilated.    Aortic Valve: There is severe aortic valve sclerosis. There is severe annular calcification present. Severely restricted motion. There is moderate ow flow stenosis. Aortic valve area by VTI is 0.78 cm². Aortic valve peak velocity is 2.62 m/s. Mean gradient is 17 mmHg. The dimensionless index is 0.31.    Aortic Valve: There is moderate stenosis. Aortic valve area by VTI is 0.78 cm². Aortic valve peak velocity is 2.62 m/s. Mean gradient is 17 mmHg. The dimensionless index is 0.31. DEMARCUS is underestimated.    Mitral Valve: There is severe mitral annular calcification present.    Tricuspid Valve: There is moderate regurgitation.    Aorta: Aortic root is normal in size measuring 2.72 cm. Ascending aorta is normal measuring 2.85 cm.    Pulmonary Artery: The estimated pulmonary artery systolic pressure is 51 mmHg.    IVC/SVC: Intermediate venous pressure at 8 mmHg.       Stroke risk factor. Takes PRN bumex 0.5 mg BID at home.  - monitor for evidence of fluid overload, resume Bumex as needed.     Hypothyroidism  Stroke risk factor. TSH elevated at 4.086 but free T4 WNL  - continue home levothyroxine 88 mcg po before breakfast    Hypoalbuminemia  Recent Labs     04/04/24  0651 04/05/24  0437   ALBUMIN 3.8 3.3*     Continue to monitor with daily labs    Hypokalemia  Recent Labs      "04/04/24  0651 04/05/24 0437   K 3.4* 3.2*     Continue to monitor with daily labs. Replete as needed.     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    Pulmonary hypertension  See "Chronic heart failure with preserved ejection fraction"    Aortic stenosis  See "Chronic heart failure with preserved ejection fraction"    Transient neurological symptoms  See "Stroke due to embolism of left posterior cerebral artery"         Patient was admitted to NPU for further monitoring. Her NIHSS was 0 the morning after admission. She is working with PT/OT and may discharge later today. She is currently on Eliquis for a-fib, which we will continue. In terms of secondary stroke prevention, this is sufficient. However, due to extensive cardiac history, we will continue her daily Plavix at discharge. Patient was instructed to follow-up with Cardiology regarding this.     STROKE DOCUMENTATION   Acute Stroke Times   Last Known Normal Date: 04/04/24  Last Known Normal Time:  (conflicting times from pateint)  Unknown Normal Time: Unknown Time  Symptom Onset Date: 04/04/20  Symptom Onset Time:  (conflicting times from pateint)  Unknown Symptom Onset Time: Unknown Time  Stroke Team Called Date: 04/04/20  Stroke Team Called Time: 0640  Stroke Team Arrival Date: 04/04/20  Stroke Team Arrival Time: 0648  Thrombolytic Therapy Recommended: No  CTA Interpretation Time: 0716 (images reviewed by Dr Lopez)  Thrombectomy Recommended: No    NIH Scale:  1a. Level of Consciousness: 0-->Alert, keenly responsive  1b. LOC Questions: 0-->Answers both questions correctly  1c. LOC Commands: 0-->Performs " both tasks correctly  2. Best Gaze: 0-->Normal  3. Visual: 0-->No visual loss (Left eye chronic blindness)  4. Facial Palsy: 0-->Normal symmetrical movements  5a. Motor Arm, Left: 0-->No drift, limb holds 90 (or 45) degrees for full 10 secs  5b. Motor Arm, Right: 0-->No drift, limb holds 90 (or 45) degrees for full 10 secs  6a. Motor Leg, Left: 0-->No drift, leg holds 30 degree position for full 5 secs  6b. Motor Leg, Right: 0-->No drift, leg holds 30 degree position for full 5 secs  7. Limb Ataxia: 0-->Absent  8. Sensory: 0-->Normal, no sensory loss  9. Best Language: 0-->No aphasia, normal  10. Dysarthria: 0-->Normal  11. Extinction and Inattention (formerly Neglect): 0-->No abnormality  Total (NIH Stroke Scale): 0       Modified Oscoda Score: 2  John Coma Scale:15   [id number] Score:8      Hemorrhagic change of an Ischemic Stroke: Does this patient have an ischemic stroke with hemorrhagic changes? No     Neurologic Chief Complaint: L PCA CVA    Subjective:     Interval History: Patient is seen for follow-up neurological assessment and treatment recommendations: WINNIEEON, back to baseline.     HPI, Past Medical, Family, and Social History remains the same as documented in the initial encounter.     Review of Systems   Eyes:  Positive for visual disturbance (chronic).   All other systems reviewed and are negative.    Scheduled Meds:   apixaban  2.5 mg Oral BID    brimonidine 0.15 % OPTH DROP  1 drop Both Eyes BID    brinzolamide  1 drop Left Eye BID    levothyroxine  88 mcg Oral Before breakfast    metoprolol succinate  50 mg Oral Daily    And    metoprolol succinate  25 mg Oral QHS    senna-docusate 8.6-50 mg  1 tablet Oral BID    timolol maleate 0.5%  1 drop Both Eyes BID     Continuous Infusions:  PRN Meds:acetaminophen, hydrALAZINE, melatonin, polyethylene glycol, sodium chloride 0.9%    Objective:     Vital Signs (Most Recent):  Temp: 97.9 °F (36.6 °C) (04/05/24 0721)  Pulse: 61 (04/05/24 0721)  Resp: 18  (04/05/24 0721)  BP: (!) 133/58 (04/05/24 0721)  SpO2: 95 % (04/05/24 0721)  BP Location: Left arm    Vital Signs Range (Last 24H):  Temp:  [97.6 °F (36.4 °C)-98.2 °F (36.8 °C)]   Pulse:  [59-82]   Resp:  [18-20]   BP: (126-160)/(58-73)   SpO2:  [90 %-98 %]   BP Location: Left arm       Physical Exam  Vitals and nursing note reviewed.   Constitutional:       Appearance: Normal appearance. She is normal weight.   HENT:      Head: Normocephalic and atraumatic.   Cardiovascular:      Rate and Rhythm: Normal rate.   Pulmonary:      Effort: Pulmonary effort is normal. No respiratory distress.   Abdominal:      General: Abdomen is flat.      Palpations: Abdomen is soft.   Musculoskeletal:      Right lower leg: No edema.      Left lower leg: No edema.   Skin:     General: Skin is warm and dry.   Neurological:      Mental Status: She is alert.        Neurological Exam:   LOC: alert  Attention Span: Good   Language: No aphasia  Articulation: No dysarthria  Orientation: Person, Place, Time   Motor: Arm left  Normal 5/5  Leg left  Normal 5/5  Arm right  Normal 5/5  Leg right Paresis: 4/5    Laboratory:  CMP:   Recent Labs   Lab 04/05/24  0437   CALCIUM 9.4   ALBUMIN 3.3*   PROT 6.4   *   K 3.2*   CO2 23      BUN 21   CREATININE 0.8   ALKPHOS 59   ALT 6*   AST 15   BILITOT 0.5     CBC:   Recent Labs   Lab 04/05/24  0437   WBC 7.78   RBC 4.44   HGB 12.4   HCT 37.6      MCV 85   MCH 27.9   MCHC 33.0     Lipid Panel:   Recent Labs   Lab 04/04/24  0651   CHOL 231*   LDLCALC 139.6   HDL 73   TRIG 92     Hgb A1C:   Recent Labs   Lab 04/04/24  0651   HGBA1C 5.5     TSH:   Recent Labs   Lab 04/04/24  0651   TSH 4.086*       Diagnostic Results     Brain Imaging   4/4/2024 MRI Brain  - Acute infarcts involving the left thalamus (subcentimeter in size) and left hippocampus as detailed above without intracranial hemorrhage or mass effect.  This correlates with the marked compromise of the left posterior cerebral artery  noted on the recent CTA.  - Left tentorial calcified meningioma increased in size from 2018.  Please note no evidence of significant surrounding edema is noted within the adjacent brain/cerebellar parenchyma.    Vessel Imaging   4/4/2024 CTA Stroke Multi-Phase  1. No definite evidence of acute transcortical infarct by CT.  No evidence of acute intracranial hemorrhage, mass effect, or midline shift.  2. Small lacunar type infarct in the right basal ganglia/internal capsule and patchy hypoattenuation in the deep white matter of the left frontal parietal lobes appear new when compared to 01/28/2018 head CT, but are otherwise technically age indeterminate.  MRI could be performed for more sensitive and specific assessment.  3. There is abrupt truncation of the left PCA at the level of the P1-P2 junction on the initial series with some degree of distal opacification of the P2 segment on the 2 delayed series, however, there is persistent suggested non opacification of distal P2 and P3 branches on the final phase.  These findings are new when compared to the 01/28/2018 CTA.  Clinical correlation recommended.  4. Since prior CTA 01/28/2018, progressed atheromatous narrowing at the proximal left ICA in the neck, now resulting in least 60-70% stenosis by NASCET style criteria.  5. Redemonstrated multilobulated irregular beaded appearance of the bilateral cervical internal carotid arteries with short segment outpouchings, possibly on the basis of underlying connective tissue disease such as fibromuscular dysplasia versus sequela prior dissections.  Overall appearance is felt grossly similar to the prior CTA allowing for slight differences in technique    Cardiac Imaging   4/4/2024 EKG  V paced with artifacyt-possibly A sensed   Abnormal ECG   When compared with ECG of 29-FEB-2024 13:28,   No significant change was found     1/23/2024 TTE    Left Ventricle: The left ventricle is normal in size. Ventricular mass is normal.  Normal wall thickness. Normal wall motion. There is normal systolic function with a visually estimated ejection fraction of 60 - 65%. Grade III diastolic dysfunction.    Right Ventricle: Normal right ventricular cavity size. Wall thickness is normal. Right ventricle wall motion  is normal. Systolic function is normal.    Left Atrium: Left atrium is severely dilated.    Right Atrium: Right atrium is severely dilated.    Aortic Valve: There is severe aortic valve sclerosis. There is severe annular calcification present. Severely restricted motion. There is moderate ow flow stenosis. Aortic valve area by VTI is 0.78 cm². Aortic valve peak velocity is 2.62 m/s. Mean gradient is 17 mmHg. The dimensionless index is 0.31.    Aortic Valve: There is moderate stenosis. Aortic valve area by VTI is 0.78 cm². Aortic valve peak velocity is 2.62 m/s. Mean gradient is 17 mmHg. The dimensionless index is 0.31. DEMARCUS is underestimated.    Mitral Valve: There is severe mitral annular calcification present.    Tricuspid Valve: There is moderate regurgitation.    Aorta: Aortic root is normal in size measuring 2.72 cm. Ascending aorta is normal measuring 2.85 cm.    Pulmonary Artery: The estimated pulmonary artery systolic pressure is 51 mmHg.    IVC/SVC: Intermediate venous pressure at 8 mmHg.    Olena Toro MD  Comprehensive Stroke Center  Department of Vascular Neurology   [facility]

## 2024-04-05 NOTE — PLAN OF CARE
Nurses Note -- 4 Eyes    4/4/2024 2000    Admit  Skin assessed during: Admit    [x] No Altered Skin Integrity Present    []Prevention Measures Documented      [] Yes- Altered Skin Integrity Present or Discovered   [] LDA Added if Not in Epic (Describe Wound)   [x] New Altered Skin Integrity was Present on Admit and Documented in LDA   [] Wound Image Taken  Slight rash on upper chest, buttock pink but blanchable and intact  Wound Care Consulted? No    Attending Nurse:  Claudia Casillas RN/Staff Member:  Claudia WYNN and Evan WYNN

## 2024-04-05 NOTE — PROGRESS NOTES
Stroke booklet given to patient and reviewed. SCD's applied. Oriented to room and fall prevention strategies reviewed. Pt VSS on room air, afebrile with NIHSS score of 1 at this time.     Problem: Adult Inpatient Plan of Care  Goal: Plan of Care Review  Outcome: Ongoing, Progressing  Goal: Optimal Comfort and Wellbeing  Outcome: Ongoing, Progressing     Problem: Cerebral Tissue Perfusion (Stroke, Ischemic/Transient Ischemic Attack)  Goal: Optimal Cerebral Tissue Perfusion  Outcome: Ongoing, Progressing     Problem: Skin Injury Risk Increased  Goal: Skin Health and Integrity  Outcome: Ongoing, Progressing     Problem: Fall Injury Risk  Goal: Absence of Fall and Fall-Related Injury  Outcome: Ongoing, Progressing

## 2024-04-05 NOTE — NURSING
Patient, spouse, and daughter educated on discharge instructions including medications and follow-up appointments. Patient and family stated that they understood all discharge instructions. Patient discharged home via wheelchair van with all belongings.

## 2024-04-05 NOTE — ASSESSMENT & PLAN NOTE
Stroke risk factor. .6. Not on statin due to prior intolerance (lower extremity myalgias). Reportedly has tried Repatha in the past and had similar reaction.  - Start Zetia 10 mg po qhs

## 2024-04-05 NOTE — PLAN OF CARE
Patient must call to schedule follow-up appointment.      Víctor Li [facility]  Case Management  [phone]

## 2024-04-05 NOTE — PLAN OF CARE
Problem: Physical Therapy  Goal: Physical Therapy Goal  Description: Patient evaluated and discharged, no goals or care plan created.      4/5/2024 1133 by Britta Mahmood, PT  Outcome: Met

## 2024-04-05 NOTE — PLAN OF CARE
[facility] - Neurosurgery (Hospital)  Initial Discharge Assessment       Primary Care Provider: Lola Wolff MD    Admission Diagnosis: CVA (cerebral vascular accident) [I63.9]  Acute focal neurological deficit [R29.818]  Right sided numbness [R20.0]    Admission Date: 4/4/2024  Expected Discharge Date:     Transition of Care Barriers: (P) None    Payor: [employer] MCARE [facility] / Plan: [employer] CHOICES / Product Type: Medicare Advantage /     Extended Emergency Contact Information  Primary Emergency Contact: Mckayla Solis  Address: [address] [place] [address] [facility]  Home Phone: [phone]  Mobile Phone: [phone]  Relation: Daughter  Secondary Emergency Contact: Knobloch,Bonnie   [place]  Mobile Phone: [phone]  Relation: Grandchild    Discharge Plan A: (P) Assisted Living         [id number] - [place], [place] - [address] [facility]  [address]  [facility] [address]  Phone: [phone] Fax: [phone]      Initial Assessment (most recent)       Adult Discharge Assessment - 04/05/24 1245          Discharge Assessment    Assessment Type Discharge Planning Assessment (P)      Confirmed/corrected address, phone number and insurance Yes (P)      Confirmed Demographics Correct on Facesheet (P)      Source of Information patient;family (P)      When was your last doctors appointment? 03/12/24 (P)      Communicated ISH with patient/caregiver Yes (P)      Reason For Admission stroke (P)      People in Home facility resident (P)      Facility Arrived From: [facility] (P)      Do you expect to return to your current living situation? Yes (P)      Do you have help at home or someone to help you manage your care at home? Yes (P)      Who are your caregiver(s) and their phone number(s)? facility staff (P)      Prior to hospitilization cognitive status: Unable to Assess (P)      Current cognitive  status: Alert/Oriented (P)      Walking or Climbing Stairs Difficulty yes (P)      Walking or Climbing Stairs ambulation difficulty, requires equipment (P)      Mobility Management rollator (P)      Dressing/Bathing Difficulty yes (P)      Dressing/Bathing bathing difficulty, requires equipment (P)      Dressing/Bathing Management Bath bench (P)      Home Accessibility wheelchair accessible (P)      Equipment Currently Used at Home rollator;bath bench;grab bar (P)      Readmission within 30 days? No (P)      Patient currently being followed by outpatient case management? No (P)      Do you currently have service(s) that help you manage your care at home? No (P)      Do you take prescription medications? Yes (P)      Do you have prescription coverage? Yes (P)      Coverage PHN (P)      Do you have any problems affording any of your prescribed medications? No (P)      Is the patient taking medications as prescribed? yes (P)      Who is going to help you get home at discharge? daughter Gemma (P)      How do you get to doctors appointments? family or friend will provide (P)      Are you on dialysis? No (P)      Do you take coumadin? No (P)      Discharge Plan A Assisted Living (P)      DME Needed Upon Discharge  none (P)      Discharge Plan discussed with: Patient;Adult children (P)      Transition of Care Barriers None (P)         Physical Activity    On average, how many days per week do you engage in moderate to strenuous exercise (like a brisk walk)? 0 days (P)      On average, how many minutes do you engage in exercise at this level? 0 min (P)         Financial Resource Strain    How hard is it for you to pay for the very basics like food, housing, medical care, and heating? Not hard at all (P)         Housing Stability    In the last 12 months, was there a time when you were not able to pay the mortgage or rent on time? No (P)      In the last 12 months, was there a time when you did not have a steady place to  sleep or slept in a shelter (including now)? No (P)         Transportation Needs    In the past 12 months, has lack of transportation kept you from medical appointments or from getting medications? No (P)      In the past 12 months, has lack of transportation kept you from meetings, work, or from getting things needed for daily living? No (P)         Food Insecurity    Within the past 12 months, you worried that your food would run out before you got the money to buy more. Never true (P)      Within the past 12 months, the food you bought just didn't last and you didn't have money to get more. Never true (P)         Stress    Do you feel stress - tense, restless, nervous, or anxious, or unable to sleep at night because your mind is troubled all the time - these days? Only a little (P)         Social Connections    In a typical week, how many times do you talk on the phone with family, friends, or neighbors? More than three times a week (P)      How often do you get together with friends or relatives? More than three times a week (P)      How often do you attend Orthodox or Islam services? More than 4 times per year (P)      Do you belong to any clubs or organizations such as Orthodox groups, unions, fraternal or athletic groups, or school groups? Yes (P)      How often do you attend meetings of the clubs or organizations you belong to? More than 4 times per year (P)      Are you , , , , never , or living with a partner?  (P)         Alcohol Use    Q1: How often do you have a drink containing alcohol? Never (P)      Q2: How many drinks containing alcohol do you have on a typical day when you are drinking? Patient does not drink (P)      Q3: How often do you have six or more drinks on one occasion? Never (P)         OTHER    Name(s) of People in Home facility resident, but lives in AL apartment with  (P)                    Patient resides at [facility].  She shares a 3rd floor apartment with her .  They have an elevator.  Patient uses a rollator at home and has a BB and grab bars.  Patient has assistance from facility staff as need, but no HH services.  Patient is not on HD or Coumadin.      Patient will return to [place] once med cleared.  Her daughter can transport.      Discharge Plan A and Plan B have been determined by review of patient's clinical status, future medical and therapeutic needs, and coverage/benefits for post-acute care in coordination with multidisciplinary team members.    Teresa Heck, SUSI  [facility]  [phone]

## 2024-04-05 NOTE — PT/OT/SLP EVAL
"Physical Therapy Evaluation and Discharge Note    Patient Name:  Dorothy M Knobloch   MRN:  [id number]    Co-evaluation with OT performed due to patient complexity and deficits, requiring two skilled therapists to appropriately and safely assess patient's strength and endurance while facilitating functional tasks in addition to accommodating for patient's activity tolerance.    Recommendations:     Discharge Recommendations: No Therapy Indicated  Discharge Equipment Recommendations: none   Barriers to discharge: None    Assessment:     Dorothy M Knobloch is a 89 y.o. 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 At this time, patient is functioning at their prior level of function and does not require further acute PT services. Patient performed all mobility and transfers with no assistance, no new acute deficits noted.      Recent Surgery: * No surgery found *      Plan:     During this hospitalization, patient does not require further acute PT services.  Please re-consult if situation changes.      Subjective     Chief Complaint: not stated  Patient/Family Comments/goals: "I'm always a little short of breath"  Pain/Comfort:  Pain Rating 1: 0/10  Pain Rating Post-Intervention 1: 0/10    Patients cultural, spiritual, Jain conflicts given the current situation: no    Living Environment:  Patient lives with  in assisted living facility with WIS, grab bars, bath bench. They live on the 3rd floor, there is an elevator.   Prior to admission, patients level of function was modified indp with rollator.  Equipment used at home: rollator, grab bar, bath bench.  DME owned (not currently used): none.  Upon discharge, patient will have assistance from facility .    Objective:     Communicated with RN prior to session.  Patient found up in chair with Other (comments) (purewick, chair check, telemetry) upon PT entry to room. Spouse and daughter present.     General " Precautions: Standard, aspiration    Orthopedic Precautions:N/A   Braces: N/A  Respiratory Status: Room air    Exams:  Cognitive Exam:  Patient is oriented to Person, Place, Time, and Situation  Sensation:    -       Impaired  light/touch BLE existing impairment   RLE ROM: WNL  RLE Strength: WNL  LLE ROM: WNL  LLE Strength: WNL    Functional Mobility:  Transfers:     Sit to Stand:  contact guard assistance with rolling walker, increased time  Gait: patient ambulated 50' with 2ww with SBA   Deviations Noted: decreased gait speed due to use of 2ww vs rollator at home  Balance:   Sitting static: independent   Sitting dynamic: independent   Standing static: supervision  Standing dynamic: supervision      AM-PAC 6 CLICK MOBILITY  Total Score:24       Treatment and Education:  Verbal and tactile cues provided during mobility for walker management and safety including hand placement on walker vs chair during transfers and positioning of walker in relation to body.    Education: patient educated on POC and discharge from therapy, safety with mobility upon return home, discharge recommendations and equipment recommendations. Patient verbalized understanding of all topics.  Ambulation as above to promote exercise tolerance and endurance with activity, patient with some fatigue and SOB following, resolved quickly.     AM-PAC 6 CLICK MOBILITY  Total Score:24     Patient left up in chair with all lines intact, call button in reach, and RN and family present.    GOALS:   Multidisciplinary Problems       Physical Therapy Goals       Not on file              Multidisciplinary Problems (Resolved)          Problem: Physical Therapy    Goal Priority Disciplines Outcome Goal Variances Interventions   Physical Therapy Goal   (Resolved)     PT, PT/OT Met     Description: Patient evaluated and discharged, no goals or care plan created.                           History:     Past Medical History:   Diagnosis Date    Antiplatelet or  antithrombotic long-term use     Arthritis     Bilateral nonexudative age-related macular degeneration 08/27/2013    Blood transfusion     Cataract     CHF (congestive heart failure)     Enlarged heart     High cholesterol     Hypertension     Sick sinus syndrome     Stroke     Thyroid disease        Past Surgical History:   Procedure Laterality Date    CARDIAC PACEMAKER PLACEMENT      [employer] Device Model: Juli ALMANZAR [id number] Device Type: PACEMAKER Device S\N: [id number]    CARPAL TUNNEL RELEASE Bilateral 1991    CATARACT EXTRACTION Right     JOINT REPLACEMENT      NE TRANSCRAN DOPP INTRACRAN, EMBOLI W/O INJ  01/29/2018         SKIN BIOPSY      TONSILLECTOMY      TOTAL HIP ARTHROPLASTY  11/01/2000    right/[facility]       Time Tracking:     PT Received On: 04/05/24  PT Start Time: 1111     PT Stop Time: 1131  PT Total Time (min): 20 min     Billable Minutes: Evaluation 10 minutes and Therapeutic Exercise 10 minutes      04/05/2024

## 2024-04-05 NOTE — DISCHARGE SUMMARY
"[facility] - Neurosurgery ([facility])  Vascular Neurology  Comprehensive Stroke Center  Discharge Summary     Summary:     Admit Date: 4/4/2024  6:40 AM    Discharge Date and Time:  04/05/2024 1:22 PM    Attending Physician: Rayne Alfaro MD     Discharge Provider: Olena Toro MD    History of Present Illness: 89 y/o female who is a resident at [facility] assisted living with her . She has history of afib on Eliquis. She tells me that she did not go to sleep much last night due to some bad news and went to the BR around 0300 and was found. She finally fell asleep around 0430 and woke up at 0600 feeling funny. She feels like her whole right side is "lifted off of the bed". She is able to move all extremities well, follow commands, answer questions appropriately, patient with no real focal neuro deficit.  Her story is inconsistent with what she told the EMS and ED staff. Per note "R sided numbness that began at 5am, LKN 4:30am, no hx strokes, +eliquis. Dr. Fontanez at bedside to [facility] for stroke "    Patient re-evaluated 4/4 1600 after MRI showed acute strokes. Patient reports resolution of sensation that her right side was raised off the bed around 1400. Currently feels fine. Chronically has difficulty raising her right leg s/p right hip replacement, has pain with movement. Reports feeling her speech is a bit slurred, which her daughter at bedside agrees with.    Hospital Course (synopsis of major diagnoses, care, treatment, and services provided during the course of the hospital stay): Patient was admitted to NPU for further monitoring. Her NIHSS was 0 the morning after admission. Cleared for discharge by stroke team attending, PT, and OT. She is currently on Eliquis for a-fib, which we will continue, albeit a lower dose due to age > 79 yo and weight < 60 kg. In terms of secondary stroke prevention, this is sufficient. However, due to extensive cardiac history, we will continue her daily Plavix at " discharge. Patient was instructed to follow-up with Cardiology regarding this. At this time, she is medically ready for discharge with follow-ups with PCP, Vascular Neurology, and Cardiology.     Goals of Care Treatment Preferences:  Code Status: Full Code      Stroke Etiology: Ischemic Cardioembolic Atrial fibrillation    STROKE DOCUMENTATION   Acute Stroke Times   Last Known Normal Date: 04/04/24  Last Known Normal Time:  (conflicting times from pateint)  Unknown Normal Time: Unknown Time  Symptom Onset Date: 04/04/20  Symptom Onset Time:  (conflicting times from pateint)  Unknown Symptom Onset Time: Unknown Time  Stroke Team Called Date: 04/04/20  Stroke Team Called Time: 0640  Stroke Team Arrival Date: 04/04/20  Stroke Team Arrival Time: 0648  Thrombolytic Therapy Recommended: No  CTA Interpretation Time: 0716 (images reviewed by Dr Lopez)  Thrombectomy Recommended: No     NIH Scale:  1a. Level of Consciousness: 0-->Alert, keenly responsive  1b. LOC Questions: 0-->Answers both questions correctly  1c. LOC Commands: 0-->Performs both tasks correctly  2. Best Gaze: 0-->Normal  3. Visual: 0-->No visual loss (Left eye chronic blindness)  4. Facial Palsy: 0-->Normal symmetrical movements  5a. Motor Arm, Left: 0-->No drift, limb holds 90 (or 45) degrees for full 10 secs  5b. Motor Arm, Right: 0-->No drift, limb holds 90 (or 45) degrees for full 10 secs  6a. Motor Leg, Left: 0-->No drift, leg holds 30 degree position for full 5 secs  6b. Motor Leg, Right: 0-->No drift, leg holds 30 degree position for full 5 secs  7. Limb Ataxia: 0-->Absent  8. Sensory: 0-->Normal, no sensory loss  9. Best Language: 0-->No aphasia, normal  10. Dysarthria: 0-->Normal  11. Extinction and Inattention (formerly Neglect): 0-->No abnormality  Total (NIH Stroke Scale): 0        Modified [place] Score: 2  [place] Coma Scale:15   [id number] Score:8    Assessment/Plan:     Diagnostic Results:    Brain Imaging   4/4/2024 MRI Brain  - Acute  infarcts involving the left thalamus (subcentimeter in size) and left hippocampus as detailed above without intracranial hemorrhage or mass effect.  This correlates with the marked compromise of the left posterior cerebral artery noted on the recent CTA.  - Left tentorial calcified meningioma increased in size from 2018.  Please note no evidence of significant surrounding edema is noted within the adjacent brain/cerebellar parenchyma.     Vessel Imaging   4/4/2024 CTA Stroke Multi-Phase  1. No definite evidence of acute transcortical infarct by CT.  No evidence of acute intracranial hemorrhage, mass effect, or midline shift.  2. Small lacunar type infarct in the right basal ganglia/internal capsule and patchy hypoattenuation in the deep white matter of the left frontal parietal lobes appear new when compared to 01/28/2018 head CT, but are otherwise technically age indeterminate.  MRI could be performed for more sensitive and specific assessment.  3. There is abrupt truncation of the left PCA at the level of the P1-P2 junction on the initial series with some degree of distal opacification of the P2 segment on the 2 delayed series, however, there is persistent suggested non opacification of distal P2 and P3 branches on the final phase.  These findings are new when compared to the 01/28/2018 CTA.  Clinical correlation recommended.  4. Since prior CTA 01/28/2018, progressed atheromatous narrowing at the proximal left ICA in the neck, now resulting in least 60-70% stenosis by NASCET style criteria.  5. Redemonstrated multilobulated irregular beaded appearance of the bilateral cervical internal carotid arteries with short segment outpouchings, possibly on the basis of underlying connective tissue disease such as fibromuscular dysplasia versus sequela prior dissections.  Overall appearance is felt grossly similar to the prior CTA allowing for slight differences in technique     Cardiac Imaging   4/4/2024 EKG  V paced with  artifacyt-possibly A sensed   Abnormal ECG   When compared with ECG of 29-FEB-2024 13:28,   No significant change was found      1/23/2024 TTE    Left Ventricle: The left ventricle is normal in size. Ventricular mass is normal. Normal wall thickness. Normal wall motion. There is normal systolic function with a visually estimated ejection fraction of 60 - 65%. Grade III diastolic dysfunction.    Right Ventricle: Normal right ventricular cavity size. Wall thickness is normal. Right ventricle wall motion  is normal. Systolic function is normal.    Left Atrium: Left atrium is severely dilated.    Right Atrium: Right atrium is severely dilated.    Aortic Valve: There is severe aortic valve sclerosis. There is severe annular calcification present. Severely restricted motion. There is moderate ow flow stenosis. Aortic valve area by VTI is 0.78 cm². Aortic valve peak velocity is 2.62 m/s. Mean gradient is 17 mmHg. The dimensionless index is 0.31.    Aortic Valve: There is moderate stenosis. Aortic valve area by VTI is 0.78 cm². Aortic valve peak velocity is 2.62 m/s. Mean gradient is 17 mmHg. The dimensionless index is 0.31. DEMARCUS is underestimated.    Mitral Valve: There is severe mitral annular calcification present.    Tricuspid Valve: There is moderate regurgitation.    Aorta: Aortic root is normal in size measuring 2.72 cm. Ascending aorta is normal measuring 2.85 cm.    Pulmonary Artery: The estimated pulmonary artery systolic pressure is 51 mmHg.    IVC/SVC: Intermediate venous pressure at 8 mmHg.    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 [I63.432] 04/04/2024 Yes    Paroxysmal atrial fibrillation [I48.0] 09/11/2018 Yes     Chronic    HLD (hyperlipidemia) [E78.5] 12/13/2013 Yes     Chronic    Essential hypertension [I10] 12/13/2013 Yes     Chronic     Chronic heart failure with preserved ejection fraction [I50.32] 12/13/2013 Yes    Hypothyroidism [E03.9] 12/13/2013 Yes     Chronic    Hyponatremia [E87.1] 04/05/2024 Yes    Hypokalemia [E87.6] 04/05/2024 Yes    Hypoalbuminemia [E88.09] 04/05/2024 No    Dysarthria [R47.1] 04/05/2024 Yes    Age-related physical debility [R54] 04/05/2024 Yes    AV block, 3rd degree [I44.2] 03/19/2019 Yes    Cardiac pacemaker [Z95.0] 08/22/2018 Yes    Aortic stenosis [I35.0] 04/05/2024 Yes    Pulmonary hypertension [I27.20] 04/05/2024 Yes    Transient neurological symptoms [R29.818] 04/04/2024 Yes      Problems Resolved During this Admission:     No new Assessment & Plan notes have been filed under this hospital service since the last note was generated.  Service: Vascular Neurology      Recommendations:     Post-discharge complication risks: None    Stroke Education given to: patient and family    Follow-up in Stroke Clinic in 4-6 weeks.     Discharge Plan:  Antithrombotics: Clopidogrel 75mg  Anticoagulant: Apixaban 2.5  Aggresive risk factor modification:  Hypertension  High Cholesterol  Atrial Fibrillation    Follow Up:   Follow-up Information       Lola Wolff MD. Schedule an appointment as soon as possible for a visit in 1 week(s).    Specialty: Family Medicine  Contact information:  [address]  [address]  [facility] [id number] [phone]               Marquis Velazquez MD. Schedule an appointment as soon as possible for a visit in 1 month(s).    Specialties: Electrophysiology, Cardiology  Contact information:  [address] [facility] [address] [address] [phone]               Yomi Villa MD. Schedule an appointment as soon as possible for a visit in 1 month(s).    Specialties: Cardiovascular Disease, Cardiology  Contact information:  [address] [facility] [address] [phone]               [facility] VASCULAR NEUROLOGY. Schedule an appointment as soon as possible for a visit in 6 week(s).     Specialty: Vascular Neurology  Contact information:  [address]  [facility] [address]  [phone]                           Patient Instructions:   No discharge procedures on file.    Medications:  Reconciled Home Medications:      Medication List        CHANGE how you take these medications      apixaban 2.5 mg Tab  Commonly known as: ELIQUIS  Take 1 tablet (2.5 mg total) by mouth 2 (two) times daily.  What changed:   medication strength  how much to take            CONTINUE taking these medications      acetaminophen 500 MG tablet  Commonly known as: TYLENOL  Take 500 mg by mouth daily as needed for Pain.     amLODIPine 5 MG tablet  Commonly known as: NORVASC  TAKE 1 TABLET BY MOUTH EVERY DAY     brinzolamide 1 % ophthalmic suspension  Commonly known as: AZOPT  Place 1 drop into the left eye 2 (two) times daily.     bumetanide 0.5 MG Tab  Commonly known as: BUMEX  TAKE 1 TABLET BY MOUTH 2 TIMES DAILY.     cholecalciferol (vitamin D3) 1,250 mcg (50,000 unit) capsule  Take 50,000 Units by mouth every 14 (fourteen) days.     clopidogreL 75 mg tablet  Commonly known as: PLAVIX  Take 1 tablet (75 mg total) by mouth once daily.     COMBIGAN 0.2-0.5 % Drop  Generic drug: brimonidine-timoloL  INSTILL 1 DROP INTO AFFECTED EYE(S) BY OPHTHALMIC ROUTE EVERY 12 HOURS     erythromycin ophthalmic ointment  Commonly known as: ROMYCIN  Apply 1 inch to bottom lid of left eye for first week of every month.     hydroCHLOROthiazide 12.5 MG Tab  Commonly known as: HYDRODIURIL  Take 1 tablet (12.5 mg total) by mouth once daily.     hydrocortisone 2.5 % cream  Apply topically 2 (two) times daily.     levothyroxine 88 MCG tablet  Commonly known as: SYNTHROID  Take 88 mcg by mouth before breakfast.     metoprolol succinate 25 MG 24 hr tablet  Commonly known as: TOPROL-XL  Take 2 tablets in AM and 1 tablet in PM     NITROSTAT 0.4 MG SL tablet  Generic drug: nitroGLYCERIN  Place 0.4 mg under the tongue every 5 (five)  minutes as needed for Chest pain.     SALONPAS TOP  Apply 1 patch topically daily as needed (Pain).              Olena Toro MD  [facility]  Department of Vascular Neurology   [facility] - Neurosurgery [facility])

## 2024-04-05 NOTE — DISCHARGE INSTRUCTIONS
Please follow-up with your PCP and Cardiologist. We have changed your Eliquis to a lower dose based on your age and weight.

## 2024-04-09 ENCOUNTER — HOME CARE VISIT (OUTPATIENT)
Dept: NEUROLOGY | Facility: HOSPITAL | Age: 89
End: 2024-04-09
Payer: MEDICARE

## 2024-04-11 DIAGNOSIS — I48.0 PAROXYSMAL ATRIAL FIBRILLATION: Primary | ICD-10-CM

## 2024-04-17 PROCEDURE — G0180 MD CERTIFICATION HHA PATIENT: HCPCS | Mod: ,,, | Performed by: STUDENT IN AN ORGANIZED HEALTH CARE EDUCATION/TRAINING PROGRAM

## 2024-05-01 ENCOUNTER — TELEPHONE (OUTPATIENT)
Dept: TRANSPLANT | Facility: CLINIC | Age: 89
End: 2024-05-01
Payer: MEDICARE

## 2024-05-01 VITALS
OXYGEN SATURATION: 97 % | RESPIRATION RATE: 16 BRPM | HEART RATE: 67 BPM | DIASTOLIC BLOOD PRESSURE: 60 MMHG | SYSTOLIC BLOOD PRESSURE: 120 MMHG

## 2024-05-01 DIAGNOSIS — I50.9 CONGESTIVE HEART FAILURE, UNSPECIFIED HF CHRONICITY, UNSPECIFIED HEART FAILURE TYPE: Primary | ICD-10-CM

## 2024-05-09 ENCOUNTER — HOME CARE VISIT (OUTPATIENT)
Dept: NEUROLOGY | Facility: HOSPITAL | Age: 89
End: 2024-05-09
Payer: MEDICARE

## 2024-05-10 ENCOUNTER — OFFICE VISIT (OUTPATIENT)
Dept: URGENT CARE | Facility: CLINIC | Age: 89
End: 2024-05-10
Payer: MEDICARE

## 2024-05-10 VITALS
HEIGHT: 63 IN | RESPIRATION RATE: 16 BRPM | TEMPERATURE: 98 F | BODY MASS INDEX: 23.21 KG/M2 | HEART RATE: 82 BPM | DIASTOLIC BLOOD PRESSURE: 57 MMHG | WEIGHT: 131 LBS | OXYGEN SATURATION: 95 % | SYSTOLIC BLOOD PRESSURE: 127 MMHG

## 2024-05-10 DIAGNOSIS — R07.89 CHEST PAIN, ATYPICAL: Primary | ICD-10-CM

## 2024-05-10 PROCEDURE — 99213 OFFICE O/P EST LOW 20 MIN: CPT | Mod: S$GLB,,, | Performed by: FAMILY MEDICINE

## 2024-05-10 PROCEDURE — 93010 ELECTROCARDIOGRAM REPORT: CPT | Mod: S$GLB,,, | Performed by: INTERNAL MEDICINE

## 2024-05-10 PROCEDURE — 93005 ELECTROCARDIOGRAM TRACING: CPT | Mod: S$GLB,,, | Performed by: FAMILY MEDICINE

## 2024-05-10 NOTE — PROGRESS NOTES
"Subjective:      Patient ID: Dorothy M Knobloch is a 89 y.o. female.    Vitals:  height is 5' 2.99" (1.6 m) and weight is 59.4 kg (131 lb). Her tympanic temperature is 97.8 °F (36.6 °C). Her blood pressure is 127/57 (abnormal) and her pulse is 82. Her respiration is 16 and oxygen saturation is 95%.     Chief Complaint: Chest Pain    This is a 89 y.o female who presents today with chief complaint of chest heaviness that started today. Reports no SOB.   Patient has a pacemaker, reports that she lives at an assisting living and takes care of her  and suspect the heaviness is associated with the stress of her living situation.     Chest Pain   This is a new problem. The current episode started today. The onset quality is sudden. The problem occurs rarely. The problem has been resolved. The pain is present in the substernal region. The pain is at a severity of 0/10. The pain is mild. The quality of the pain is described as heavy. The pain does not radiate. The pain is aggravated by nothing. She has tried nothing for the symptoms. Risk factors include stress and being elderly.       Cardiovascular:  Positive for chest pain.      Objective:     Physical Exam   Constitutional: She does not appear ill. No distress. normal  HENT:   Head: Normocephalic and atraumatic.   Neck: Neck supple.   Cardiovascular: Normal rate, regular rhythm, normal heart sounds and normal pulses.   Pulmonary/Chest: Effort normal and breath sounds normal.   Abdominal: Normal appearance. Soft.   Neurological: She is alert.   Nursing note and vitals reviewed.    Assessment:     1. Chest pain, atypical    EKG noncontributory  Currently pain free. Patient states pain is related to her stress and not similar to previous episodes of heart disease/angina. Symptom monitoring.  Plan:       Chest pain, atypical    Discussed in detail ER precautions and signs and symptoms of cardiac compromise                "

## 2024-05-11 LAB
OHS QRS DURATION: 190 MS
OHS QTC CALCULATION: 529 MS

## 2024-05-27 ENCOUNTER — TELEPHONE (OUTPATIENT)
Dept: PALLIATIVE MEDICINE | Facility: CLINIC | Age: 89
End: 2024-05-27
Payer: MEDICARE

## 2024-05-27 ENCOUNTER — OFFICE VISIT (OUTPATIENT)
Dept: TRANSPLANT | Facility: CLINIC | Age: 89
End: 2024-05-27
Payer: MEDICARE

## 2024-05-27 ENCOUNTER — LAB VISIT (OUTPATIENT)
Dept: LAB | Facility: HOSPITAL | Age: 89
End: 2024-05-27
Attending: INTERNAL MEDICINE
Payer: MEDICARE

## 2024-05-27 VITALS
DIASTOLIC BLOOD PRESSURE: 74 MMHG | SYSTOLIC BLOOD PRESSURE: 179 MMHG | HEART RATE: 71 BPM | WEIGHT: 143.06 LBS | BODY MASS INDEX: 26.33 KG/M2 | HEIGHT: 62 IN

## 2024-05-27 DIAGNOSIS — I50.32 CHRONIC DIASTOLIC CONGESTIVE HEART FAILURE: ICD-10-CM

## 2024-05-27 DIAGNOSIS — I10 ESSENTIAL HYPERTENSION: Chronic | ICD-10-CM

## 2024-05-27 DIAGNOSIS — I51.81 TAKOTSUBO CARDIOMYOPATHY: ICD-10-CM

## 2024-05-27 DIAGNOSIS — I50.9 CONGESTIVE HEART FAILURE, UNSPECIFIED HF CHRONICITY, UNSPECIFIED HEART FAILURE TYPE: ICD-10-CM

## 2024-05-27 DIAGNOSIS — R54 AGE-RELATED PHYSICAL DEBILITY: Primary | ICD-10-CM

## 2024-05-27 DIAGNOSIS — M25.559 HIP PAIN, UNSPECIFIED LATERALITY: ICD-10-CM

## 2024-05-27 LAB
ALBUMIN SERPL BCP-MCNC: 4.1 G/DL (ref 3.5–5.2)
ALP SERPL-CCNC: 64 U/L (ref 55–135)
ALT SERPL W/O P-5'-P-CCNC: 8 U/L (ref 10–44)
ANION GAP SERPL CALC-SCNC: 9 MMOL/L (ref 8–16)
AST SERPL-CCNC: 17 U/L (ref 10–40)
BILIRUB SERPL-MCNC: 0.6 MG/DL (ref 0.1–1)
BNP SERPL-MCNC: 301 PG/ML (ref 0–99)
BUN SERPL-MCNC: 25 MG/DL (ref 10–30)
CALCIUM SERPL-MCNC: 10.4 MG/DL (ref 8.7–10.5)
CHLORIDE SERPL-SCNC: 98 MMOL/L (ref 95–110)
CO2 SERPL-SCNC: 29 MMOL/L (ref 23–29)
CREAT SERPL-MCNC: 0.8 MG/DL (ref 0.5–1.4)
EST. GFR  (NO RACE VARIABLE): >60 ML/MIN/1.73 M^2
GLUCOSE SERPL-MCNC: 93 MG/DL (ref 70–110)
MAGNESIUM SERPL-MCNC: 2.2 MG/DL (ref 1.6–2.6)
POTASSIUM SERPL-SCNC: 3.6 MMOL/L (ref 3.5–5.1)
PROT SERPL-MCNC: 7.5 G/DL (ref 6–8.4)
SODIUM SERPL-SCNC: 136 MMOL/L (ref 136–145)
T4 FREE SERPL-MCNC: 1.29 NG/DL (ref 0.71–1.51)
TSH SERPL DL<=0.005 MIU/L-ACNC: 5.31 UIU/ML (ref 0.4–4)

## 2024-05-27 PROCEDURE — 1126F AMNT PAIN NOTED NONE PRSNT: CPT | Mod: CPTII,S$GLB,, | Performed by: INTERNAL MEDICINE

## 2024-05-27 PROCEDURE — 84443 ASSAY THYROID STIM HORMONE: CPT | Performed by: INTERNAL MEDICINE

## 2024-05-27 PROCEDURE — 84439 ASSAY OF FREE THYROXINE: CPT | Performed by: INTERNAL MEDICINE

## 2024-05-27 PROCEDURE — 83880 ASSAY OF NATRIURETIC PEPTIDE: CPT | Mod: 91 | Performed by: INTERNAL MEDICINE

## 2024-05-27 PROCEDURE — 83735 ASSAY OF MAGNESIUM: CPT | Performed by: INTERNAL MEDICINE

## 2024-05-27 PROCEDURE — 99999 PR PBB SHADOW E&M-EST. PATIENT-LVL V: CPT | Mod: PBBFAC,,, | Performed by: INTERNAL MEDICINE

## 2024-05-27 PROCEDURE — 3288F FALL RISK ASSESSMENT DOCD: CPT | Mod: CPTII,S$GLB,, | Performed by: INTERNAL MEDICINE

## 2024-05-27 PROCEDURE — 83880 ASSAY OF NATRIURETIC PEPTIDE: CPT | Performed by: INTERNAL MEDICINE

## 2024-05-27 PROCEDURE — 1101F PT FALLS ASSESS-DOCD LE1/YR: CPT | Mod: CPTII,S$GLB,, | Performed by: INTERNAL MEDICINE

## 2024-05-27 PROCEDURE — 99203 OFFICE O/P NEW LOW 30 MIN: CPT | Mod: S$GLB,,, | Performed by: INTERNAL MEDICINE

## 2024-05-27 PROCEDURE — 80053 COMPREHEN METABOLIC PANEL: CPT | Performed by: INTERNAL MEDICINE

## 2024-05-27 RX ORDER — BUMETANIDE 0.5 MG/1
1 TABLET ORAL DAILY
COMMUNITY

## 2024-05-27 NOTE — TELEPHONE ENCOUNTER
Reached out to patient to notify a referral was place to be seen in palliative medicine left call back number .

## 2024-05-27 NOTE — PROGRESS NOTES
Subjective:   Initial evaluation of heart failure    HPI:  Ms. Knobloch is a 89 y.o. year old White female who has presents to be considered for heart failure, PH.  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  Patient denies N/V/F/C, lightheadedness, dizziness, PND, orthopnea, LE edema, abdominal pain, abdominal pressure, chest pain, chest pressure, syncope, pre-syncope. + fatigue, some SMITH with ambulation.     Troponin 04/05/24 0.010    TTE 01/23/24:    Left Ventricle: The left ventricle is normal in size. Ventricular mass is normal. Normal wall thickness. Normal wall motion. There is normal systolic function with a visually estimated ejection fraction of 60 - 65%. Grade III diastolic dysfunction.    Right Ventricle: Normal right ventricular cavity size. Wall thickness is normal. Right ventricle wall motion  is normal. Systolic function is normal.    Left Atrium: Left atrium is severely dilated.    Right Atrium: Right atrium is severely dilated.    Aortic Valve: There is severe aortic valve sclerosis. There is severe annular calcification present. Severely restricted motion. There is moderate ow flow stenosis. Aortic valve area by VTI is 0.78 cm². Aortic valve peak velocity is 2.62 m/s. Mean gradient is 17 mmHg. The dimensionless index is 0.31.    Aortic Valve: There is moderate stenosis. Aortic valve area by VTI is 0.78 cm². Aortic valve peak velocity is 2.62 m/s. Mean gradient is 17 mmHg. The dimensionless index is 0.31. DEMARCUS is underestimated.    Mitral Valve: There is severe mitral annular calcification present.    Tricuspid Valve: There is moderate regurgitation.    Aorta: Aortic root is normal in size measuring 2.72 cm. Ascending aorta is normal measuring 2.85 cm.    Pulmonary Artery: The estimated pulmonary artery systolic pressure is 51 mmHg.     IVC/SVC: Intermediate venous pressure at 8 mmHg.     Past Medical History:   Diagnosis Date    Antiplatelet or antithrombotic long-term use     Arthritis     Bilateral nonexudative age-related macular degeneration 08/27/2013    Blood transfusion     Cataract     CHF (congestive heart failure)     Enlarged heart     High cholesterol     Hypertension     Sick sinus syndrome     Stroke     Thyroid disease      Past Surgical History:   Procedure Laterality Date    CARDIAC PACEMAKER PLACEMENT      [employer] Device Model: Juli ALMANZAR [id number] Device Type: PACEMAKER Device S\N: [id number]    CARPAL TUNNEL RELEASE Bilateral 1991    CATARACT EXTRACTION Right     JOINT REPLACEMENT      [place] TRANSCRAN DOPP INTRACRAN, EMBOLI W/O INJ  01/29/2018         SKIN BIOPSY      TONSILLECTOMY      TOTAL HIP ARTHROPLASTY  11/01/2000    right/[facility]       Family History   Problem Relation Name Age of Onset    Early death Mother      Early death Father      Arthritis Sister      Hearing loss Daughter      Birth defects Son      Hypertension Son      Diabetes Paternal Aunt      Cancer Paternal Aunt  80        colon  cancer    Diabetes Paternal Uncle      Arthritis Maternal Grandmother      Arthritis Paternal Grandmother      Hypertension Paternal Grandmother      Amblyopia Neg Hx      Blindness Neg Hx      Cataracts Neg Hx      Glaucoma Neg Hx      Macular degeneration Neg Hx      Retinal detachment Neg Hx      Strabismus Neg Hx      Stroke Neg Hx      Thyroid disease Neg Hx         Review of Systems   Constitutional: Negative for chills, decreased appetite, diaphoresis, fever, malaise/fatigue, weight gain and weight loss.   Eyes:  Negative for visual disturbance.   Cardiovascular:  Negative for chest pain, claudication, cyanosis, dyspnea on exertion, irregular heartbeat, leg swelling, near-syncope, orthopnea, palpitations, paroxysmal nocturnal dyspnea and syncope.   Respiratory:  Negative for cough, hemoptysis, shortness of breath,  "sleep disturbances due to breathing, snoring, sputum production and wheezing.    Hematologic/Lymphatic: Negative for adenopathy and bleeding problem. Does not bruise/bleed easily.   Skin:  Negative for color change, poor wound healing, rash, skin cancer and suspicious lesions.   Musculoskeletal:  Negative for back pain, falls, gout, joint pain and muscle weakness.   Gastrointestinal:  Negative for bloating, abdominal pain, anorexia, constipation, diarrhea, heartburn, hematemesis, hematochezia, hemorrhoids, melena, nausea and vomiting.   Genitourinary:  Negative for nocturia and urgency.   Neurological:  Negative for excessive daytime sleepiness, dizziness, focal weakness, headaches, light-headedness, paresthesias, tremors and weakness.   Psychiatric/Behavioral:  Negative for depression and memory loss. The patient does not have insomnia and is not nervous/anxious.        Objective:   Blood pressure (!) 179/74, pulse 71, height 5' 2" (1.575 m), weight 64.9 kg (143 lb 1.3 oz).body mass index is 26.17 kg/m².    Physical Exam  Vitals and nursing note reviewed.   Constitutional:       General: She is not in acute distress.     Appearance: She is well-developed. She is not diaphoretic.   HENT:      Head: Normocephalic and atraumatic.   Eyes:      General:         Right eye: No discharge.         Left eye: No discharge.      Conjunctiva/sclera: Conjunctivae normal.   Neck:      Vascular: No JVD.   Cardiovascular:      Rate and Rhythm: Normal rate and regular rhythm.      Heart sounds: No murmur heard.     No friction rub. No gallop.   Pulmonary:      Effort: Pulmonary effort is normal.      Breath sounds: Normal breath sounds. No wheezing or rales.   Chest:      Chest wall: No tenderness.   Abdominal:      General: Bowel sounds are normal. There is no distension.      Palpations: Abdomen is soft. There is no mass.      Tenderness: There is no abdominal tenderness. There is no guarding or rebound.   Musculoskeletal:         " General: No tenderness. Normal range of motion.      Cervical back: Normal range of motion and neck supple.   Skin:     General: Skin is warm and dry.      Findings: No erythema or rash.   Neurological:      Mental Status: She is alert and oriented to person, place, and time.   Psychiatric:         Behavior: Behavior normal.         Thought Content: Thought content normal.         Judgment: Judgment normal.         Labs:      Chemistry        Component Value Date/Time     05/27/2024 0744    K 3.6 05/27/2024 0744    CL 98 05/27/2024 0744    CO2 29 05/27/2024 0744    BUN 25 05/27/2024 0744    CREATININE 0.8 05/27/2024 0744    GLU 93 05/27/2024 0744        Component Value Date/Time    CALCIUM 10.4 05/27/2024 0744    ALKPHOS 64 05/27/2024 0744    AST 17 05/27/2024 0744    ALT 8 (L) 05/27/2024 0744    BILITOT 0.6 05/27/2024 0744    ESTGFRAFRICA >60.0 03/17/2022 1334    EGFRNONAA >60.0 03/17/2022 1334          Magnesium   Date Value Ref Range Status   05/27/2024 2.2 1.6 - 2.6 mg/dL Final     Lab Results   Component Value Date    WBC 7.78 04/05/2024    HGB 12.4 04/05/2024    HCT 37.6 04/05/2024    MCV 85 04/05/2024     04/05/2024     BNP   Date Value Ref Range Status   05/27/2024 301 (H) 0 - 99 pg/mL Final     Comment:     Values of less than 100 pg/ml are consistent with non-CHF populations.   01/23/2024 595 (H) 0 - 99 pg/mL Final     Comment:     Values of less than 100 pg/ml are consistent with non-CHF populations.   03/13/2023 908 (H) 0 - 99 pg/mL Final     Comment:     Values of less than 100 pg/ml are consistent with non-CHF populations.     No results found for this or any previous visit.      Labs were reviewed with the patient.    Assessment:      1. Age-related physical debility    2. Hip pain, unspecified laterality    3. Chronic diastolic congestive heart failure    4. Takotsubo cardiomyopathy    5. Essential hypertension        Plan:   Discussed her case, there is not very much to change from a  medication standpoint.   Did get to discuss goals of care, and she was interested in talking about her case with palliative care.   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   Not been referred for valvular evalaution, but also not sure how aggressive to be with her care as per wishes. Had a very open and honest conversation, which was good to have with her and her daughter present.   Patient is now NYHA II ACC stage B  Recommend 2 gram sodium restriction and 1500cc fluid restriction.  Encourage physical activity with graded exercise program.  Requested patient to weigh themselves daily, and to notify us if their weight increases by more than 3 lbs in 1 day or 5 lbs in 1 week.        Valarie Warren MD

## 2024-05-29 ENCOUNTER — CLINICAL SUPPORT (OUTPATIENT)
Dept: CARDIOLOGY | Facility: HOSPITAL | Age: 89
End: 2024-05-29
Payer: MEDICARE

## 2024-05-29 ENCOUNTER — EXTERNAL HOME HEALTH (OUTPATIENT)
Dept: HOME HEALTH SERVICES | Facility: HOSPITAL | Age: 89
End: 2024-05-29
Payer: MEDICARE

## 2024-05-29 ENCOUNTER — CLINICAL SUPPORT (OUTPATIENT)
Dept: CARDIOLOGY | Facility: HOSPITAL | Age: 89
End: 2024-05-29
Attending: INTERNAL MEDICINE
Payer: MEDICARE

## 2024-05-29 VITALS
HEART RATE: 67 BPM | SYSTOLIC BLOOD PRESSURE: 122 MMHG | DIASTOLIC BLOOD PRESSURE: 67 MMHG | RESPIRATION RATE: 16 BRPM | OXYGEN SATURATION: 97 %

## 2024-05-29 DIAGNOSIS — Z95.0 PRESENCE OF CARDIAC PACEMAKER: ICD-10-CM

## 2024-05-29 DIAGNOSIS — I44.2 ATRIOVENTRICULAR BLOCK, COMPLETE: ICD-10-CM

## 2024-05-29 LAB — NT-PROBNP SERPL IA-MCNC: 2178 PG/ML

## 2024-05-29 PROCEDURE — 93296 REM INTERROG EVL PM/IDS: CPT | Performed by: INTERNAL MEDICINE

## 2024-05-29 PROCEDURE — 93294 REM INTERROG EVL PM/LDLS PM: CPT | Mod: ,,, | Performed by: INTERNAL MEDICINE

## 2024-05-29 NOTE — PROGRESS NOTES
"Greeted by "come in". Pt sitting at dining table in apartment with her  who is visually impaired. Pt is very pleasant, awake , alert, oriented x3. She states she feels she has progressed well and says her memory is better but still has some word searching at times. VSS NAD. Post CVA education provided. Pt has no complaints     "

## 2024-05-30 LAB
OHS CV AF BURDEN PERCENT: 100
OHS CV DC REMOTE DEVICE TYPE: NORMAL
OHS CV ICD SHOCK: NO
OHS CV RV PACING PERCENT: 99.1 %

## 2024-06-06 ENCOUNTER — TELEPHONE (OUTPATIENT)
Dept: ELECTROPHYSIOLOGY | Facility: CLINIC | Age: 89
End: 2024-06-06
Payer: MEDICARE

## 2024-06-06 NOTE — TELEPHONE ENCOUNTER
Returned call to pt and informed 10 months battery longevity based on remote report 5/29. Questions addressed regarding monthly remote monitoring. Pt verbalized understanding.

## 2024-06-12 ENCOUNTER — HOME CARE VISIT (OUTPATIENT)
Dept: NEUROLOGY | Facility: HOSPITAL | Age: 89
End: 2024-06-12
Payer: MEDICARE

## 2024-06-13 PROBLEM — Z79.01 ON ANTICOAGULANT THERAPY: Status: ACTIVE | Noted: 2024-06-13

## 2024-06-13 NOTE — PROGRESS NOTES
Ms. Knobloch is a patient of Dr. Velazquez and was last seen in clinic 2/29/2024.      Subjective:   Patient ID:  Dorothy M Knobloch is a 89 y.o. female who presents for follow up of Atrial Fibrillation and Pacemaker Check  .     HPI:    Ms. Knobloch is a 89 y.o. female with HTN, Takotsubo CM, TIA, carotid artery disease, pAF, AVB s/p PPM (2017) here for follow up.    Background:    Referring Cardiologist: North Mcneill MD    Mrs. 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 She is transferring care to [facility]. She feels well. Device interrogation notes normal function. She does have <1% AF burden without high-ventricular rates. She notes occasional brief fluttering sensation.    PPM interrogation: stable parameters, 52%A, 63%V paced. AF<0.8%, longest episode 3 hours.    Mrs. Mackenzie returns for  follow-up 10/2019. 75% A paced and 70% RV pacing, 18.8%AF burden, longest episode 27 minutes. V rates controlled. She didnot notice any AF anymore. We discussed risks of RV pacing induced CMP. ECHO noted preserved LVEF.    12/13/2019: Seen by [place]. She was seen by Dr. Mcneill after our last visit and began diuresis. He initiated bumex 0.5 mg BID.    1/2022: Seen in follow-up. Had no complaints. ECHO 3/2022 noted preserved LV function with moderate AS.    6/2022: Mrs. Mackenzie returned for overdue follow-up. She reports intermittently her PPM bothers her depending on her position. She has lost some weight.    8/2023: ED visit 5 months ago (3/2023) for sepsis 2/2 pneumonia. Now recovered. Normal EF during hospital stay. No worsening SOB/SMITH. Device with 98.1% AFib burden. No ventricular arrhythmias. AP: 21 %; : 99.7 %. Undersensing noted in RA lead-- sensing adjustment made. Battery life: 1.5 years.     2/29/2024:   Mrs. Knobloch returns for follow-up. ECHO in  January of 2024 noted normal LV function.  In clinic device interrogation notes stable lead parameters with no RA and 99% RV pacing with 100% AF burden since January 2022, 1 year of estimated battery longevity.  ECG is afib with RV paced rhythm at 70 bpm.  In summary, Mrs. 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 She understands risks of AF and bleeding risks of DOAC therapy. Her [id number] score is >2 and she is on eliquis. Device is functioning appropriately. Discussed long-term potential risk of RV pacing induced cardiomyopathy. Recent ECHO noted preserved LVEF. RTC in 8 months    Update (06/17/2024):    4/4/2024: ED after report of right sided numbness. CTA head/neck shows a left P1/2 occlusion and left ICA stenosis. MRA brain shows an acute left PCA territory infarction. Etiology likely cardioembolic 2/2 atrial fibrillation (patient mostly compliant with medications with reported intermittent missed doses of her OAC). Eliquis reduced due to age and wt at the time. Plavix continued.    Today she reports resolution of numbness but worsening memory since CVA in April. Chronic SMITH. No exertional CP. No palps reported. She is careful with position changes - no LH or syncope. 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    She is on eliquis 2.5mg BID. She is on toprol 50mg AM, 25mg PM.     Device Interrogation (6/17/2024) reveals an intrinsic AF with stable lead and device function. 100% AF. No ventricular arrhythmias.  She paces 3% in the RA and 99.1% in the RV. Estimated battery longevity 6-10 months.     I have personally reviewed the patient's EKG today, which shows AFVP at 67bpm. QRS is 198. QTc is 536.    Relevant Cardiac Test Results:    2D Echo (1/23/2024):    Left Ventricle: The left ventricle is normal in size. Ventricular  mass is normal. Normal wall thickness. Normal wall motion. There is normal systolic function with a visually estimated ejection fraction of 60 - 65%. Grade III diastolic dysfunction.    Right Ventricle: Normal right ventricular cavity size. Wall thickness is normal. Right ventricle wall motion  is normal. Systolic function is normal.    Left Atrium: Left atrium is severely dilated.    Right Atrium: Right atrium is severely dilated.    Aortic Valve: There is severe aortic valve sclerosis. There is severe annular calcification present. Severely restricted motion. There is moderate low flow stenosis. Aortic valve area by VTI is 0.78 cm². Aortic valve peak velocity is 2.62 m/s. Mean gradient is 17 mmHg. The dimensionless index is 0.31.    Aortic Valve: There is moderate stenosis. Aortic valve area by VTI is 0.78 cm². Aortic valve peak velocity is 2.62 m/s. Mean gradient is 17 mmHg. The dimensionless index is 0.31. DEMARCUS is underestimated.    Mitral Valve: There is severe mitral annular calcification present.    Tricuspid Valve: There is moderate regurgitation.    Aorta: Aortic root is normal in size measuring 2.72 cm. Ascending aorta is normal measuring 2.85 cm.    Pulmonary Artery: The estimated pulmonary artery systolic pressure is 51 mmHg.    IVC/SVC: Intermediate venous pressure at 8 mmHg.    Current Outpatient Medications   Medication Sig    acetaminophen (TYLENOL) 500 MG tablet Take 500 mg by mouth daily as needed for Pain.    amLODIPine (NORVASC) 5 MG tablet TAKE 1 TABLET BY MOUTH EVERY DAY    apixaban (ELIQUIS) 2.5 mg Tab Take 1 tablet (2.5 mg total) by mouth 2 (two) times daily.    brimonidine-timoloL (COMBIGAN) 0.2-0.5 % Drop INSTILL 1 DROP INTO AFFECTED EYE(S) BY OPHTHALMIC ROUTE EVERY 12 HOURS    brinzolamide (AZOPT) 1 % ophthalmic suspension Place 1 drop into the left eye 2 (two) times daily.    bumetanide (BUMEX) 0.5 MG Tab Take 1 tablet by mouth once daily.    cholecalciferol, vitamin D3, 50,000 unit  "capsule Take 50,000 Units by mouth every 14 (fourteen) days.    clopidogreL (PLAVIX) 75 mg tablet Take 1 tablet (75 mg total) by mouth once daily.    erythromycin (ROMYCIN) ophthalmic ointment Apply 1 inch to bottom lid of left eye for first week of every month.    hydroCHLOROthiazide (HYDRODIURIL) 12.5 MG Tab Take 1 tablet (12.5 mg total) by mouth once daily.    hydrocortisone 2.5 % cream Apply topically 2 (two) times daily.    levothyroxine (SYNTHROID) 88 MCG tablet Take 88 mcg by mouth before breakfast.     methyl salicylate/menth/camph (SALONPAS TOP) Apply 1 patch topically daily as needed (Pain).    metoprolol succinate (TOPROL-XL) 25 MG 24 hr tablet Take 2 tablets in AM and 1 tablet in PM    NITROSTAT 0.4 mg SL tablet Place 0.4 mg under the tongue every 5 (five) minutes as needed for Chest pain.     No current facility-administered medications for this visit.       Review of Systems   Constitutional: Positive for malaise/fatigue.   Cardiovascular:  Positive for dyspnea on exertion (chronic). Negative for chest pain, irregular heartbeat, leg swelling and palpitations.   Respiratory:  Negative for shortness of breath.    Hematologic/Lymphatic: Negative for bleeding problem.   Skin:  Negative for rash.   Musculoskeletal:  Negative for myalgias.   Gastrointestinal:  Negative for hematemesis, hematochezia and nausea.   Genitourinary:  Negative for hematuria.   Neurological:  Negative for light-headedness.   Psychiatric/Behavioral:  Positive for memory loss. Negative for altered mental status.    Allergic/Immunologic: Negative for persistent infections.       Objective:          BP (!) 113/54   Pulse 67   Ht 5' 2" (1.575 m)   Wt 65.1 kg (143 lb 8.3 oz)   BMI 26.25 kg/m²     Physical Exam  Vitals and nursing note reviewed.   Constitutional:       Appearance: Normal appearance. She is well-developed.   HENT:      Head: Normocephalic.      Nose: Nose normal.   Eyes:      Pupils: Pupils are equal, round, and " reactive to light.   Cardiovascular:      Rate and Rhythm: Normal rate and regular rhythm.   Pulmonary:      Effort: No respiratory distress.      Breath sounds: Normal breath sounds.   Chest:      Comments: Device to LUCW. Incision and pocket in good repair.    Musculoskeletal:         General: Normal range of motion.   Skin:     General: Skin is warm and dry.      Findings: No erythema.   Neurological:      Mental Status: She is alert and oriented to person, place, and time.   Psychiatric:         Speech: Speech normal.         Behavior: Behavior normal.           Lab Results   Component Value Date     05/27/2024    K 3.6 05/27/2024    MG 2.2 05/27/2024    BUN 25 05/27/2024    CREATININE 0.8 05/27/2024    ALT 8 (L) 05/27/2024    AST 17 05/27/2024    HGB 12.4 04/05/2024    HCT 37.6 04/05/2024    TSH 5.314 (H) 05/27/2024    LDLCALC 139.6 04/04/2024       Recent Labs   Lab 04/04/24  0650 04/04/24  0651 04/05/24  0437   INR 1.2 1.1 1.1       Assessment:     1. Cardiac pacemaker    2. AV block, 3rd degree    3. Chronic diastolic congestive heart failure    4. Essential hypertension    5. Paroxysmal atrial fibrillation    6. Takotsubo cardiomyopathy    7. History of TIA (transient ischemic attack)    8. On anticoagulant therapy      Plan:     In summary, Ms. Knobloch is a 89 y.o. female with HTN, Takotsubo CM, TIA, carotid artery disease, pAF, AVB s/p PPM (2017) here for follow up.  She is 2.5 mo s/p hospital visit for right sided numbness. MRA brain shows an acute left PCA territory infarction. Some possible missed doses of eliquis reported. Her eliquis was reduced due to low wt but she is now >60kg so will increase dose back to 5mg BID. 100% AF burden which is stable. No ventricular arrhythmias. CHB with 100% RV pacing. Echo 1/2024 shows preserved LV function. Continue HFpEF management with general cardiology.    Eliquis 5mg BID  Continue current medication regimen and device settings.   Follow up in device  clinic as scheduled.   Follow up in EP clinic in 6 mo, sooner as needed.     *A copy of this note has been sent to Dr. Velazquez*    Follow up in about 6 months (around 12/17/2024), or or after generator change.    ------------------------------------------------------------------    SHE Martinez, NP-C  Cardiac Electrophysiology

## 2024-06-17 ENCOUNTER — CLINICAL SUPPORT (OUTPATIENT)
Dept: CARDIOLOGY | Facility: HOSPITAL | Age: 89
End: 2024-06-17
Attending: NURSE PRACTITIONER
Payer: MEDICARE

## 2024-06-17 ENCOUNTER — OFFICE VISIT (OUTPATIENT)
Dept: ELECTROPHYSIOLOGY | Facility: CLINIC | Age: 89
End: 2024-06-17
Payer: MEDICARE

## 2024-06-17 ENCOUNTER — HOSPITAL ENCOUNTER (OUTPATIENT)
Dept: CARDIOLOGY | Facility: CLINIC | Age: 89
Discharge: HOME | End: 2024-06-17
Payer: MEDICARE

## 2024-06-17 VITALS
HEART RATE: 67 BPM | BODY MASS INDEX: 26.41 KG/M2 | SYSTOLIC BLOOD PRESSURE: 113 MMHG | HEIGHT: 62 IN | DIASTOLIC BLOOD PRESSURE: 54 MMHG | WEIGHT: 143.5 LBS

## 2024-06-17 DIAGNOSIS — I10 ESSENTIAL HYPERTENSION: Chronic | ICD-10-CM

## 2024-06-17 DIAGNOSIS — I48.0 PAROXYSMAL ATRIAL FIBRILLATION: ICD-10-CM

## 2024-06-17 DIAGNOSIS — I44.2 AV BLOCK, 3RD DEGREE: ICD-10-CM

## 2024-06-17 DIAGNOSIS — Z86.73 HISTORY OF TIA (TRANSIENT ISCHEMIC ATTACK): ICD-10-CM

## 2024-06-17 DIAGNOSIS — I48.0 PAROXYSMAL ATRIAL FIBRILLATION: Chronic | ICD-10-CM

## 2024-06-17 DIAGNOSIS — Z79.01 ON ANTICOAGULANT THERAPY: ICD-10-CM

## 2024-06-17 DIAGNOSIS — I51.81 TAKOTSUBO CARDIOMYOPATHY: ICD-10-CM

## 2024-06-17 DIAGNOSIS — Z95.0 CARDIAC PACEMAKER: Primary | ICD-10-CM

## 2024-06-17 DIAGNOSIS — I50.32 CHRONIC DIASTOLIC CONGESTIVE HEART FAILURE: ICD-10-CM

## 2024-06-17 PROCEDURE — 93010 ELECTROCARDIOGRAM REPORT: CPT | Mod: S$GLB,,, | Performed by: INTERNAL MEDICINE

## 2024-06-17 PROCEDURE — 1126F AMNT PAIN NOTED NONE PRSNT: CPT | Mod: CPTII,S$GLB,, | Performed by: NURSE PRACTITIONER

## 2024-06-17 PROCEDURE — 93280 PM DEVICE PROGR EVAL DUAL: CPT

## 2024-06-17 PROCEDURE — 1159F MED LIST DOCD IN RCRD: CPT | Mod: CPTII,S$GLB,, | Performed by: NURSE PRACTITIONER

## 2024-06-17 PROCEDURE — 1160F RVW MEDS BY RX/DR IN RCRD: CPT | Mod: CPTII,S$GLB,, | Performed by: NURSE PRACTITIONER

## 2024-06-17 PROCEDURE — 93005 ELECTROCARDIOGRAM TRACING: CPT | Mod: S$GLB,,, | Performed by: NURSE PRACTITIONER

## 2024-06-17 PROCEDURE — 99214 OFFICE O/P EST MOD 30 MIN: CPT | Mod: S$GLB,,, | Performed by: NURSE PRACTITIONER

## 2024-06-17 PROCEDURE — 1101F PT FALLS ASSESS-DOCD LE1/YR: CPT | Mod: CPTII,S$GLB,, | Performed by: NURSE PRACTITIONER

## 2024-06-17 PROCEDURE — 99999 PR PBB SHADOW E&M-EST. PATIENT-LVL IV: CPT | Mod: PBBFAC,,, | Performed by: NURSE PRACTITIONER

## 2024-06-17 PROCEDURE — 3288F FALL RISK ASSESSMENT DOCD: CPT | Mod: CPTII,S$GLB,, | Performed by: NURSE PRACTITIONER

## 2024-06-18 LAB
OHS QRS DURATION: 198 MS
OHS QTC CALCULATION: 536 MS

## 2024-06-19 ENCOUNTER — OFFICE VISIT (OUTPATIENT)
Dept: CARDIOLOGY | Facility: CLINIC | Age: 89
End: 2024-06-19
Payer: MEDICARE

## 2024-06-19 VITALS
HEIGHT: 62 IN | HEART RATE: 85 BPM | BODY MASS INDEX: 26.49 KG/M2 | DIASTOLIC BLOOD PRESSURE: 73 MMHG | SYSTOLIC BLOOD PRESSURE: 123 MMHG | WEIGHT: 143.94 LBS

## 2024-06-19 DIAGNOSIS — I48.0 PAROXYSMAL ATRIAL FIBRILLATION: ICD-10-CM

## 2024-06-19 DIAGNOSIS — I27.20 PULMONARY HYPERTENSION: ICD-10-CM

## 2024-06-19 DIAGNOSIS — I10 ESSENTIAL HYPERTENSION: Chronic | ICD-10-CM

## 2024-06-19 DIAGNOSIS — I50.32 CHRONIC DIASTOLIC CONGESTIVE HEART FAILURE: Primary | ICD-10-CM

## 2024-06-19 DIAGNOSIS — I10 ESSENTIAL HYPERTENSION: ICD-10-CM

## 2024-06-19 DIAGNOSIS — E78.2 MIXED HYPERLIPIDEMIA: Chronic | ICD-10-CM

## 2024-06-19 DIAGNOSIS — I77.9 CAROTID ARTERY DISEASE, UNSPECIFIED LATERALITY, UNSPECIFIED TYPE: ICD-10-CM

## 2024-06-19 DIAGNOSIS — I44.2 AV BLOCK, 3RD DEGREE: ICD-10-CM

## 2024-06-19 DIAGNOSIS — I48.0 PAROXYSMAL ATRIAL FIBRILLATION: Chronic | ICD-10-CM

## 2024-06-19 DIAGNOSIS — I65.23 BILATERAL CAROTID ARTERY STENOSIS: ICD-10-CM

## 2024-06-19 DIAGNOSIS — I51.81 TAKOTSUBO CARDIOMYOPATHY: ICD-10-CM

## 2024-06-19 DIAGNOSIS — I50.32 CHRONIC HEART FAILURE WITH PRESERVED EJECTION FRACTION: Primary | ICD-10-CM

## 2024-06-19 DIAGNOSIS — I51.89 DIASTOLIC DYSFUNCTION: ICD-10-CM

## 2024-06-19 DIAGNOSIS — I35.0 NONRHEUMATIC AORTIC VALVE STENOSIS: ICD-10-CM

## 2024-06-19 DIAGNOSIS — Z79.01 ON ANTICOAGULANT THERAPY: ICD-10-CM

## 2024-06-19 DIAGNOSIS — Z95.0 CARDIAC PACEMAKER: ICD-10-CM

## 2024-06-19 PROCEDURE — 1125F AMNT PAIN NOTED PAIN PRSNT: CPT | Mod: CPTII,S$GLB,, | Performed by: INTERNAL MEDICINE

## 2024-06-19 PROCEDURE — 99215 OFFICE O/P EST HI 40 MIN: CPT | Mod: S$GLB,,, | Performed by: INTERNAL MEDICINE

## 2024-06-19 PROCEDURE — 1159F MED LIST DOCD IN RCRD: CPT | Mod: CPTII,S$GLB,, | Performed by: INTERNAL MEDICINE

## 2024-06-19 PROCEDURE — 99999 PR PBB SHADOW E&M-EST. PATIENT-LVL III: CPT | Mod: PBBFAC,,, | Performed by: INTERNAL MEDICINE

## 2024-06-19 RX ORDER — CLOPIDOGREL BISULFATE 75 MG/1
75 TABLET ORAL DAILY
Qty: 90 TABLET | Refills: 3 | Status: SHIPPED | OUTPATIENT
Start: 2024-06-19

## 2024-06-19 NOTE — PROGRESS NOTES
Subjective:   06/20/2024     Patient ID:  Dorothy M Knobloch is a 89 y.o. female who presents for evaulation of No chief complaint on file.              History of Present Illness: Dorothy M Knobloch is a 89 y.o. 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 Mcneill October of 2023 who presents for follow-up.  She reports doing reasonably well, stable shortness of breath which she has had since 2017, checking her oxygen level at home running in the 96-99% range.  Takes Bumex as needed only sparingly, does not need on a regular basis.  Reports blood pressures at home running in the 130s to 140s.  Denies any GI bleeding, most recent hemoglobin no noted to be dropping from 13-10.  She appears to be relatively euvolemic, but did have an elevated BNP in March of last year.  In review of her chart she did have a CT scan in 2018 showing some ground-glass opacities, she reports she did see a pulmonologist at [facility] in 2019 but no follow-up since.  It her most recent echocardiograms she does appear to be having a increasing pulmonary artery systolic pressure.  Up into the 60s.        Dr. 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 Last 2DE with normal function. Elevated lipid panel intolerant to statins, so started on Praluent, no longer taking due to reaction. Had been feeling weak at home, so I stopped doxyzosin, felt better after. BP stable since. Has chronic intermittent SOB.   Has episodic SMITH and fatigue. Bumex increased with improvement.  "Previously, CXR with RML opacity, BNP elevated >1000 and other labs remarkable for mild hyponatremia. Again developed an episode of SOB, fatigue, and "just not feeling well." Prescribed inhaler and improved.  Doing fair lately and lege edema improved. Denies CP, orthopnea, PND, syncope. Compliant with meds. Has bilateral LE edema which has improved with compression stockings. Has caregiver fatigue.     Dx:  Sick sinus syndrome s/p PPM  -follow with Dr. Velazquez  Atrial fibrillation on DOAC   Takotsubo cardiomyopathy 2013   Hypertension   Moderate Aortic Stenosis  Moderate bilateral carotid disease   TIA 12/2013   Hyperlipidemia   Hypothyroidism  Pulmonary hypertension  Lung disease?     Medications:  Encounter Medications[image]        Outpatient Encounter Medications as of 1/12/2024  Medication Sig Dispense Refill  · amLODIPine (NORVASC) 5 MG tablet TAKE 1 TABLET BY MOUTH EVERY DAY 90 tablet 3  · bumetanide (BUMEX) 0.5 MG Tab TAKE 1 TABLET BY MOUTH 2 TIMES DAILY. 180 tablet 3  · cholecalciferol, vitamin D3, 50,000 unit capsule Take 50,000 Units by mouth every 7 days.   5  · clopidogreL (PLAVIX) 75 mg tablet Take 1 tablet (75 mg total) by mouth once daily. 90 tablet 3  · ELIQUIS 5 mg Tab TAKE 1 TABLET BY MOUTH TWICE A  tablet 3  · levothyroxine (SYNTHROID) 88 MCG tablet Take 88 mcg by mouth before breakfast.       · [DISCONTINUED] hydroCHLOROthiazide (HYDRODIURIL) 12.5 MG Tab TAKE 1 TABLET BY MOUTH EVERY DAY 90 tablet 3  · [DISCONTINUED] metoprolol succinate (TOPROL-XL) 25 MG 24 hr tablet TAKE 2 TABLETS IN THE AM AND 1 TABLET IN THE EVENING 270 tablet 3  · albuterol (PROVENTIL/VENTOLIN HFA) 90 mcg/actuation inhaler INHALE 2 PUFFS INTO THE LUNGS EVERY 6 (SIX) HOURS AS NEEDED FOR WHEEZING. RESCUE 6.7 g 3  · hydroCHLOROthiazide (HYDRODIURIL) 12.5 MG Tab Take 1 tablet (12.5 mg total) by mouth once daily. 90 tablet 3  · metoprolol succinate (TOPROL-XL) 25 MG 24 hr tablet Take 2 tablets in AM and 1 tablet in  " tablet 3  · NITROSTAT 0.4 mg SL tablet        · [DISCONTINUED] benzonatate (TESSALON) 100 MG capsule Take 1 capsule (100 mg total) by mouth 3 (three) times daily as needed for Cough. (Patient not taking: Reported on 1/12/2024) 60 capsule 1  · [DISCONTINUED] brinzolamide (AZOPT) 1 % ophthalmic suspension PLACE 1 DROP INTO THE LEFT EYE 2 TIMES A DAY. (Patient not taking: Reported on 1/12/2024) 15 mL 4  · [DISCONTINUED] clopidogreL (PLAVIX) 75 mg tablet TAKE 1 TABLET BY MOUTH EVERY DAY (Patient not taking: Reported on 1/12/2024) 90 tablet 3  · [DISCONTINUED] COMBIGAN 0.2-0.5 % Drop INSTILL 1 DROP INTO LEFT EYE TWICE A DAY (Patient not taking: Reported on 1/12/2024) 5 mL 3  · [DISCONTINUED] erythromycin (ROMYCIN) ophthalmic ointment APPLY 1 INCH TO THE BOTTOM LID OF LEFT EYE FOR FIRST WEEK OF EVERY MONTH (Patient not taking: Reported on 1/12/2024) 3.5 g 6  · [DISCONTINUED] PRALUENT PEN 75 mg/mL PnIj           No facility-administered encounter medications on file as of 1/12/2024.       Social History:  Carol reports that she has never smoked. She has never used smokeless tobacco. She reports that she does not drink alcohol and does not use drugs.     EKG:  My independent visualization of most recent EKG is Ventricular paced.     TTE:  3/13/2023  The left ventricle is normal in size with concentric remodeling and normal systolic function. The estimated ejection fraction is 60%.  Normal right ventricular size with normal right ventricular systolic function.  Indeterminate left ventricular diastolic function.  Severe biatrial enlargement.  Moderate tricuspid regurgitation.  Marked mitral annular calcification without significant stenosis and with mild mitral regurgitation.  There is moderate aortic valve stenosis. Aortic valve area is 1.0 cm2; peak velocity is 2.50 m/s; mean gradient is 15 mmHg.  There is pulmonary hypertension. The estimated PA systolic pressure is 67 mmHg.  Intermediate central venous pressure (8  mmHg).     3/17/2023  The estimated ejection fraction is 60%.  The left ventricle is normal in size with concentric remodeling and normal systolic function.  Grade III left ventricular diastolic dysfunction.  Normal right ventricular size with normal right ventricular systolic function.  Moderate left atrial enlargement.  Mild right atrial enlargement.  There is moderate aortic valve stenosis low flow. DI=0.3.  Aortic valve area is 1.07 cm2; peak velocity is 2.35 m/s; mean gradient is 13 mmHg.  Moderate tricuspid regurgitation.  Mild mitral regurgitation.  There is pulmonary hypertension.  The estimated PA systolic pressure is 47 mmHg.  Elevated central venous pressure (15 mmHg).     11/29/2018  CONCLUSIONS     1 - Normal left ventricular systolic function (EF 60-65%).     2 - Normal right ventricular systolic function .     3 - Moderate left atrial enlargement.     4 - Mild tricuspid regurgitation.     5 - Marked mitral annular calcification without stenosis.     6 - Aortic sclerosis without stenosis.     7 - The estimated PA systolic pressure is 33 mmHg.      Lipids:       Renal:     Recent Labs  Lab 03/13/23  0321  Creatinine 0.7  Potassium 3.1 L  CO2 24  BUN 21     Liver:     Recent Labs  Lab 03/12/23  1642  AST 23  ALT 10     Carotid u.s   3/2/2018  Impression:   RIGHT SIDE:   40 - 59 % right ICA stenosis. The Plaque at the bulb is complex and appears to be closer to the 60% range.   Complex plaque in the right internal carotid artery.   Antegrade flow in the right vertebral artery.   LEFT SIDE:   40 - 59% left ICA stenosis.   Complex plaque in the left internal carotid artery.   Antegrade flow in the left vertebral artery.      CT Chest   11/15/2019  Impression:  Nonspecific lung findings.  Mosaic attenuation diffusely seen can be seen with small airway inflammatory disease.  Can also be seen with chronic PE.  Several areas of nonspecific pleural base ground-glass opacity as detailed above.  For a ground glass  nodule 6 mm or larger, Fleischner Society 2017 guidelines recommend follow up with non-contrast chest CT at 6-12 months after discovery. If this nodule persists at that time, additional follow up with non-contrast chest CT is recommended every 2 years until 5 years of stability have been documented.     Assessment:     1. Chronic diastolic congestive heart failure   2. Essential hypertension   3. Mixed hyperlipidemia   4. Carotid artery disease, unspecified laterality, unspecified type   5. Bilateral carotid artery stenosis   6. AV block, 3rd degree   7. Diastolic dysfunction   8. Paroxysmal atrial fibrillation   9. Takotsubo cardiomyopathy   10. History of TIA (transient ischemic attack)   11. CRAO (central retinal artery occlusion), left      Plan:  1. Chronic diastolic congestive heart failure  Overall appear to be relatively euvolemic today, blood pressure reasonably well-controlled upper limit of normal may have some additional room for uptitration however would like to avoid any labile blood pressure hypotension in this 89-year-old lady.  Will repeat BNP., would also like to update PA systolic pressures on echocardiogram in conjunction with BNP and determine whether we need look further into her pulmonary hypertension.  Particularly given that she did have CT scan showing ground-glass opacities, and no follow-up with pulmonology since 2019.  Unclear what treatment options would be available to her if she did have interstitial lung disease..  Reassuring that she does have reasonably normal oxygen saturation at rest.     Could also simply be group 2 pulmonary hypertension the setting of diastolic dysfunction.  - Echo; Future  - Comprehensive Metabolic Panel; Future  - CBC Auto Differential; Future  - BNP; Future  - Magnesium; Future     2. Essential hypertension        3. Mixed hyperlipidemia        4. Carotid artery disease, unspecified laterality, unspecified type        5. Bilateral carotid artery stenosis         6. AV block, 3rd degree        7. Diastolic dysfunction        8. Paroxysmal atrial fibrillation        9. Takotsubo cardiomyopathy        10. History of TIA (transient ischemic attack)        11. CRAO (central retinal artery occlusion), left        12. Nonrheumatic aortic valve stenosis        Follow up in 3m             Past Medical History:   Diagnosis Date    Antiplatelet or antithrombotic long-term use     Arthritis     Bilateral nonexudative age-related macular degeneration 08/27/2013    Blood transfusion     Cataract     CHF (congestive heart failure)     Enlarged heart     High cholesterol     Hypertension     Sick sinus syndrome     Stroke     Thyroid disease        Review of patient's allergies indicates:   Allergen Reactions    Aspartame Swelling     equal         Current Outpatient Medications:     acetaminophen (TYLENOL) 500 MG tablet, Take 500 mg by mouth daily as needed for Pain., Disp: , Rfl:     amLODIPine (NORVASC) 5 MG tablet, TAKE 1 TABLET BY MOUTH EVERY DAY, Disp: 90 tablet, Rfl: 3    apixaban (ELIQUIS) 5 mg Tab, Take 1 tablet (5 mg total) by mouth 2 (two) times daily., Disp: 180 tablet, Rfl: 1    brimonidine-timoloL (COMBIGAN) 0.2-0.5 % Drop, INSTILL 1 DROP INTO AFFECTED EYE(S) BY OPHTHALMIC ROUTE EVERY 12 HOURS, Disp: , Rfl:     brinzolamide (AZOPT) 1 % ophthalmic suspension, Place 1 drop into the left eye 2 (two) times daily., Disp: , Rfl:     bumetanide (BUMEX) 0.5 MG Tab, Take 1 tablet by mouth once daily., Disp: , Rfl:     cholecalciferol, vitamin D3, 50,000 unit capsule, Take 50,000 Units by mouth every 14 (fourteen) days., Disp: , Rfl: 5    erythromycin (ROMYCIN) ophthalmic ointment, Apply 1 inch to bottom lid of left eye for first week of every month., Disp: , Rfl:     hydroCHLOROthiazide (HYDRODIURIL) 12.5 MG Tab, Take 1 tablet (12.5 mg total) by mouth once daily., Disp: 90 tablet, Rfl: 3    hydrocortisone 2.5 % cream, Apply topically 2 (two) times daily., Disp: 20 g, Rfl: 1     levothyroxine (SYNTHROID) 88 MCG tablet, Take 88 mcg by mouth before breakfast. , Disp: , Rfl:     methyl salicylate/menth/camph (SALONPAS TOP), Apply 1 patch topically daily as needed (Pain)., Disp: , Rfl:     metoprolol succinate (TOPROL-XL) 25 MG 24 hr tablet, Take 2 tablets in AM and 1 tablet in PM, Disp: 270 tablet, Rfl: 3    NITROSTAT 0.4 mg SL tablet, Place 0.4 mg under the tongue every 5 (five) minutes as needed for Chest pain., Disp: , Rfl:     clopidogreL (PLAVIX) 75 mg tablet, Take 1 tablet (75 mg total) by mouth once daily., Disp: 90 tablet, Rfl: 3     Objective:   ROS      Vitals:    06/19/24 0932   BP: 123/73   Pulse: 85     Wt Readings from Last 3 Encounters:   06/20/24 64.9 kg (143 lb)   06/19/24 65.3 kg (143 lb 15.4 oz)   06/17/24 65.1 kg (143 lb 8.3 oz)     Temp Readings from Last 3 Encounters:   05/10/24 97.8 °F (36.6 °C) (Tympanic)   04/05/24 97.5 °F (36.4 °C) (Oral)   03/01/24 98.1 °F (36.7 °C) (Oral)     BP Readings from Last 3 Encounters:   06/20/24 123/73   06/19/24 123/73   06/17/24 (!) 113/54     Pulse Readings from Last 3 Encounters:   06/20/24 60   06/19/24 85   06/17/24 67             Physical Exam      Lab Results   Component Value Date    CHOL 231 (H) 04/04/2024    CHOL 277 (H) 01/28/2018    CHOL 317 (H) 12/13/2013     Lab Results   Component Value Date    HDL 73 04/04/2024    HDL 68 01/28/2018    HDL 72 12/13/2013     Lab Results   Component Value Date    LDLCALC 139.6 04/04/2024    LDLCALC 180.6 (H) 01/28/2018    LDLCALC 192.2 (H) 12/13/2013     Lab Results   Component Value Date    ALT 8 (L) 05/27/2024    AST 17 05/27/2024    AST 15 04/05/2024    AST 18 04/04/2024     Lab Results   Component Value Date    CREATININE 0.8 05/27/2024    BUN 25 05/27/2024     05/27/2024    K 3.6 05/27/2024    CO2 29 05/27/2024    CO2 23 04/05/2024    CO2 24 04/04/2024     Lab Results   Component Value Date    HGB 12.4 04/05/2024    HCT 37.6 04/05/2024    HCT 40.8 04/04/2024    HCT 38.9 01/23/2024                          Assessment and Plan:     Chronic heart failure with preserved ejection fraction  Comments:  Recheck echo    Paroxysmal atrial fibrillation  Comments:  Now appears persistent  Orders:  -     Ambulatory referral/consult to Cardiology    Mixed hyperlipidemia  Comments:  Statin and PCSK9 intolerant    Essential hypertension    Carotid artery disease, unspecified laterality, unspecified type    Diastolic dysfunction    Cardiac pacemaker    Pulmonary hypertension    Bilateral carotid artery stenosis  Comments:  Currently asymptomatic    Takotsubo cardiomyopathy    On anticoagulant therapy  Comments:  Dose appropriate    Nonrheumatic aortic valve stenosis  Comments:  ECHO to be obtained  Orders:  -     Echo; Future; Expected date: 06/26/2024         Follow up in about 7 months (around 1/19/2025).          Future Appointments   Date Time Provider Department Center   6/26/2024 11:00 AM Becki Guerrero MD [facility]   7/10/2024  2:30 PM Harshal Middleton DO [facility] [place]

## 2024-06-19 NOTE — PROGRESS NOTES
Subjective:   06/19/2024     Patient ID:  Dorothy M Knobloch is a 89 y.o. female who presents for evaulation of No chief complaint on file.      Patient with multiple cardiac problems, diastolic heart failure, atrial fibrillation, aortic stenosis, status post pacemaker, she has been doing fairly well recently.  She takes Bumex as needed for weights greater than 140.    Hypertension is treated with amlodipine, hydrochlorothiazide and metoprolol.      She is status post pacemaker, generator change to come up in 10 months or so.      She does have a history of a stroke.  She is statin intolerant, Praluent intolerant also.    Aortic stenosis is present, moderate severity, would repeat echo.    Continues on anticoagulation, dose appropriate            Prior note reviewed:   History of Present Illness: Dorothy M Knobloch is a 89 y.o. 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 Mcneill October of 2023 who presents for follow-up.  She reports doing reasonably well, stable shortness of breath which she has had since 2017, checking her oxygen level at home running in the 96-99% range.  Takes Bumex as needed only sparingly, does not need on a regular basis.  Reports blood pressures at home running in the 130s to 140s.  Denies any GI bleeding, most recent hemoglobin no noted to be dropping from 13-10.  She appears to be relatively euvolemic, but did have an elevated BNP in March of last year.  In review of her chart she did have a CT scan in 2018 showing some ground-glass opacities, she reports she did see a pulmonologist at [facility] in 2019 but no follow-up since.  It her most recent echocardiograms she does appear to be having a increasing pulmonary artery systolic pressure.  Up into the 60s.        Dr. Mcneill's Note:  89 y/o female with a hx of SSS s/p  "PPM, Afib currently on AC with Eliquis and follows with Dr Velazquez, Takutsubo Cardiomyopathy 3/2013, HTN, moderate bilateral carotid disease, TIA 12/2013, HLD, hypothyroidism, 2DE with vegetation on the MV (dental procedure?), visual deficit and diagnosed with left central retinal artery occlusion. Last 2DE with normal function. Elevated lipid panel intolerant to statins, so started on Praluent, no longer taking due to reaction. Had been feeling weak at home, so I stopped doxyzosin, felt better after. BP stable since. Has chronic intermittent SOB.   Has episodic SMITH and fatigue. Bumex increased with improvement. Previously, CXR with RML opacity, BNP elevated >1000 and other labs remarkable for mild hyponatremia. Again developed an episode of SOB, fatigue, and "just not feeling well." Prescribed inhaler and improved.  Doing fair lately and lege edema improved. Denies CP, orthopnea, PND, syncope. Compliant with meds. Has bilateral LE edema which has improved with compression stockings. Has caregiver fatigue.     Dx:  Sick sinus syndrome s/p PPM  -follow with Dr. Velazquez  Atrial fibrillation on DOAC   Takotsubo cardiomyopathy 2013   Hypertension   Moderate Aortic Stenosis  Moderate bilateral carotid disease   TIA 12/2013   Hyperlipidemia   Hypothyroidism  Pulmonary hypertension  Lung disease?     Medications:  Encounter Medications  Outpatient Encounter Medications as of 1/12/2024  Medication  Sig  Dispense  Refill  ·  amLODIPine (NORVASC) 5 MG tablet  TAKE 1 TABLET BY MOUTH EVERY DAY  90 tablet  3  ·  bumetanide (BUMEX) 0.5 MG Tab  TAKE 1 TABLET BY MOUTH 2 TIMES DAILY.  180 tablet  3  ·  cholecalciferol, vitamin D3, 50,000 unit capsule  Take 50,000 Units by mouth every 7 days.     5  ·  clopidogreL (PLAVIX) 75 mg tablet  Take 1 tablet (75 mg total) by mouth once daily.  90 tablet  3  ·  ELIQUIS 5 mg Tab  TAKE 1 TABLET BY MOUTH TWICE A DAY  180 tablet  3  ·  levothyroxine (SYNTHROID) 88 MCG tablet  Take 88 mcg by mouth " before breakfast.         ·  [DISCONTINUED] hydroCHLOROthiazide (HYDRODIURIL) 12.5 MG Tab  TAKE 1 TABLET BY MOUTH EVERY DAY  90 tablet  3  ·  [DISCONTINUED] metoprolol succinate (TOPROL-XL) 25 MG 24 hr tablet  TAKE 2 TABLETS IN THE AM AND 1 TABLET IN THE EVENING  270 tablet  3  ·  albuterol (PROVENTIL/VENTOLIN HFA) 90 mcg/actuation inhaler  INHALE 2 PUFFS INTO THE LUNGS EVERY 6 (SIX) HOURS AS NEEDED FOR WHEEZING. RESCUE  6.7 g  3  ·  hydroCHLOROthiazide (HYDRODIURIL) 12.5 MG Tab  Take 1 tablet (12.5 mg total) by mouth once daily.  90 tablet  3  ·  metoprolol succinate (TOPROL-XL) 25 MG 24 hr tablet  Take 2 tablets in AM and 1 tablet in PM  270 tablet  3  ·  NITROSTAT 0.4 mg SL tablet           ·  [DISCONTINUED] benzonatate (TESSALON) 100 MG capsule  Take 1 capsule (100 mg total) by mouth 3 (three) times daily as needed for Cough. (Patient not taking: Reported on 1/12/2024)  60 capsule  1  ·  [DISCONTINUED] brinzolamide (AZOPT) 1 % ophthalmic suspension  PLACE 1 DROP INTO THE LEFT EYE 2 TIMES A DAY. (Patient not taking: Reported on 1/12/2024)  15 mL  4  ·  [DISCONTINUED] clopidogreL (PLAVIX) 75 mg tablet  TAKE 1 TABLET BY MOUTH EVERY DAY (Patient not taking: Reported on 1/12/2024)  90 tablet  3  ·  [DISCONTINUED] COMBIGAN 0.2-0.5 % Drop  INSTILL 1 DROP INTO LEFT EYE TWICE A DAY (Patient not taking: Reported on 1/12/2024)  5 mL  3  ·  [DISCONTINUED] erythromycin (ROMYCIN) ophthalmic ointment  APPLY 1 INCH TO THE BOTTOM LID OF LEFT EYE FOR FIRST WEEK OF EVERY MONTH (Patient not taking: Reported on 1/12/2024)  3.5 g  6  ·  [DISCONTINUED] PRALUENT PEN 75 mg/mL PnIj              No facility-administered encounter medications on file as of 1/12/2024.     Social History:  Carol reports that she has never smoked. She has never used smokeless tobacco. She reports that she does not drink alcohol and does not use drugs.     EKG:  My independent visualization of most recent EKG is Ventricular paced.     TTE:  3/13/2023  The  left ventricle is normal in size with concentric remodeling and normal systolic function. The estimated ejection fraction is 60%.  Normal right ventricular size with normal right ventricular systolic function.  Indeterminate left ventricular diastolic function.  Severe biatrial enlargement.  Moderate tricuspid regurgitation.  Marked mitral annular calcification without significant stenosis and with mild mitral regurgitation.  There is moderate aortic valve stenosis. Aortic valve area is 1.0 cm2; peak velocity is 2.50 m/s; mean gradient is 15 mmHg.  There is pulmonary hypertension. The estimated PA systolic pressure is 67 mmHg.  Intermediate central venous pressure (8 mmHg).     3/17/2023  The estimated ejection fraction is 60%.  The left ventricle is normal in size with concentric remodeling and normal systolic function.  Grade III left ventricular diastolic dysfunction.  Normal right ventricular size with normal right ventricular systolic function.  Moderate left atrial enlargement.  Mild right atrial enlargement.  There is moderate aortic valve stenosis low flow. DI=0.3.  Aortic valve area is 1.07 cm2; peak velocity is 2.35 m/s; mean gradient is 13 mmHg.  Moderate tricuspid regurgitation.  Mild mitral regurgitation.  There is pulmonary hypertension.  The estimated PA systolic pressure is 47 mmHg.  Elevated central venous pressure (15 mmHg).     11/29/2018  CONCLUSIONS     1 - Normal left ventricular systolic function (EF 60-65%).     2 - Normal right ventricular systolic function .     3 - Moderate left atrial enlargement.     4 - Mild tricuspid regurgitation.     5 - Marked mitral annular calcification without stenosis.     6 - Aortic sclerosis without stenosis.     7 - The estimated PA systolic pressure is 33 mmHg.      Lipids:       Renal:  Recent Labs  Lab  03/13/23  0321  Creatinine  0.7  Potassium  3.1 L  CO2  24  BUN  21     Liver:  Recent Labs  Lab  03/12/23  1642  AST  23  ALT  10     Carotid u.s    3/2/2018  Impression:   RIGHT SIDE:   40 - 59 % right ICA stenosis. The Plaque at the bulb is complex and appears to be closer to the 60% range.   Complex plaque in the right internal carotid artery.   Antegrade flow in the right vertebral artery.   LEFT SIDE:   40 - 59% left ICA stenosis.   Complex plaque in the left internal carotid artery.   Antegrade flow in the left vertebral artery.      CT Chest   11/15/2019  Impression:  Nonspecific lung findings.  Mosaic attenuation diffusely seen can be seen with small airway inflammatory disease.  Can also be seen with chronic PE.  Several areas of nonspecific pleural base ground-glass opacity as detailed above.  For a ground glass nodule 6 mm or larger, Fleischner Society 2017 guidelines recommend follow up with non-contrast chest CT at 6-12 months after discovery. If this nodule persists at that time, additional follow up with non-contrast chest CT is recommended every 2 years until 5 years of stability have been documented.     Assessment:     1.  Chronic diastolic congestive heart failure   2.  Essential hypertension   3.  Mixed hyperlipidemia   4.  Carotid artery disease, unspecified laterality, unspecified type   5.  Bilateral carotid artery stenosis   6.  AV block, 3rd degree   7.  Diastolic dysfunction   8.  Paroxysmal atrial fibrillation   9.  Takotsubo cardiomyopathy   10.  History of TIA (transient ischemic attack)   11.  CRAO (central retinal artery occlusion), left      Plan:  1. Chronic diastolic congestive heart failure  Overall appear to be relatively euvolemic today, blood pressure reasonably well-controlled upper limit of normal may have some additional room for uptitration however would like to avoid any labile blood pressure hypotension in this 89-year-old lady.  Will repeat BNP., would also like to update PA systolic pressures on echocardiogram in conjunction with BNP and determine whether we need look further into her pulmonary hypertension.   Particularly given that she did have CT scan showing ground-glass opacities, and no follow-up with pulmonology since 2019.  Unclear what treatment options would be available to her if she did have interstitial lung disease..  Reassuring that she does have reasonably normal oxygen saturation at rest.     Could also simply be group 2 pulmonary hypertension the setting of diastolic dysfunction.  - Echo; Future  - Comprehensive Metabolic Panel; Future  - CBC Auto Differential; Future  - BNP; Future  - Magnesium; Future     2. Essential hypertension        3. Mixed hyperlipidemia        4. Carotid artery disease, unspecified laterality, unspecified type        5. Bilateral carotid artery stenosis        6. AV block, 3rd degree        7. Diastolic dysfunction        8. Paroxysmal atrial fibrillation        9. Takotsubo cardiomyopathy        10. History of TIA (transient ischemic attack)        11. CRAO (central retinal artery occlusion), left        12. Nonrheumatic aortic valve stenosis        Follow up in 3m           Past Medical History:   Diagnosis Date    Antiplatelet or antithrombotic long-term use     Arthritis     Bilateral nonexudative age-related macular degeneration 08/27/2013    Blood transfusion     Cataract     CHF (congestive heart failure)     Enlarged heart     High cholesterol     Hypertension     Sick sinus syndrome     Stroke     Thyroid disease        Review of patient's allergies indicates:   Allergen Reactions    Aspartame Swelling     equal         Current Outpatient Medications:     acetaminophen (TYLENOL) 500 MG tablet, Take 500 mg by mouth daily as needed for Pain., Disp: , Rfl:     amLODIPine (NORVASC) 5 MG tablet, TAKE 1 TABLET BY MOUTH EVERY DAY, Disp: 90 tablet, Rfl: 3    apixaban (ELIQUIS) 5 mg Tab, Take 1 tablet (5 mg total) by mouth 2 (two) times daily., Disp: 180 tablet, Rfl: 1    brimonidine-timoloL (COMBIGAN) 0.2-0.5 % Drop, INSTILL 1 DROP INTO AFFECTED EYE(S) BY OPHTHALMIC ROUTE  EVERY 12 HOURS, Disp: , Rfl:     brinzolamide (AZOPT) 1 % ophthalmic suspension, Place 1 drop into the left eye 2 (two) times daily., Disp: , Rfl:     bumetanide (BUMEX) 0.5 MG Tab, Take 1 tablet by mouth once daily., Disp: , Rfl:     cholecalciferol, vitamin D3, 50,000 unit capsule, Take 50,000 Units by mouth every 14 (fourteen) days., Disp: , Rfl: 5    clopidogreL (PLAVIX) 75 mg tablet, Take 1 tablet (75 mg total) by mouth once daily., Disp: 90 tablet, Rfl: 3    erythromycin (ROMYCIN) ophthalmic ointment, Apply 1 inch to bottom lid of left eye for first week of every month., Disp: , Rfl:     hydroCHLOROthiazide (HYDRODIURIL) 12.5 MG Tab, Take 1 tablet (12.5 mg total) by mouth once daily., Disp: 90 tablet, Rfl: 3    hydrocortisone 2.5 % cream, Apply topically 2 (two) times daily., Disp: 20 g, Rfl: 1    levothyroxine (SYNTHROID) 88 MCG tablet, Take 88 mcg by mouth before breakfast. , Disp: , Rfl:     methyl salicylate/menth/camph (SALONPAS TOP), Apply 1 patch topically daily as needed (Pain)., Disp: , Rfl:     metoprolol succinate (TOPROL-XL) 25 MG 24 hr tablet, Take 2 tablets in AM and 1 tablet in PM, Disp: 270 tablet, Rfl: 3    NITROSTAT 0.4 mg SL tablet, Place 0.4 mg under the tongue every 5 (five) minutes as needed for Chest pain., Disp: , Rfl:      Objective:   Review of Systems   Cardiovascular:  Positive for dyspnea on exertion and leg swelling. Negative for chest pain, claudication, cyanosis, irregular heartbeat, near-syncope, orthopnea, palpitations, paroxysmal nocturnal dyspnea and syncope.         Vitals:    06/19/24 0932   BP: 123/73   Pulse: 85     Wt Readings from Last 3 Encounters:   06/19/24 65.3 kg (143 lb 15.4 oz)   06/17/24 65.1 kg (143 lb 8.3 oz)   05/27/24 64.9 kg (143 lb 1.3 oz)     Temp Readings from Last 3 Encounters:   05/10/24 97.8 °F (36.6 °C) (Tympanic)   04/05/24 97.5 °F (36.4 °C) (Oral)   03/01/24 98.1 °F (36.7 °C) (Oral)     BP Readings from Last 3 Encounters:   06/19/24 123/73    06/17/24 (!) 113/54   05/27/24 (!) 179/74     Pulse Readings from Last 3 Encounters:   06/19/24 85   06/17/24 67   05/27/24 71             Physical Exam  Vitals reviewed.   Constitutional:       General: She is not in acute distress.     Appearance: She is well-developed.   HENT:      Head: Normocephalic and atraumatic.      Nose: Nose normal.   Eyes:      Conjunctiva/sclera: Conjunctivae normal.      Pupils: Pupils are equal, round, and reactive to light.   Neck:      Vascular: Carotid bruit present. No JVD.   Cardiovascular:      Rate and Rhythm: Normal rate and regular rhythm.      Pulses: Intact distal pulses.      Heart sounds: Murmur (Grade 2 systolic ejection murmur) heard.      No friction rub. No gallop.   Pulmonary:      Effort: Pulmonary effort is normal. No respiratory distress.      Breath sounds: Normal breath sounds. No wheezing or rales.   Chest:      Chest wall: No tenderness.   Abdominal:      General: Bowel sounds are normal. There is no distension.      Palpations: Abdomen is soft.      Tenderness: There is no abdominal tenderness.   Musculoskeletal:         General: No tenderness or deformity. Normal range of motion.      Cervical back: Normal range of motion and neck supple.      Right lower leg: Edema present.      Left lower leg: Edema present.   Skin:     General: Skin is warm and dry.      Findings: No erythema or rash.   Neurological:      Mental Status: She is alert and oriented to person, place, and time.      Cranial Nerves: No cranial nerve deficit.      Motor: No abnormal muscle tone.      Coordination: Coordination normal.   Psychiatric:         Behavior: Behavior normal.         Thought Content: Thought content normal.         Judgment: Judgment normal.           Lab Results   Component Value Date    CHOL 231 (H) 04/04/2024    CHOL 277 (H) 01/28/2018    CHOL 317 (H) 12/13/2013     Lab Results   Component Value Date    HDL 73 04/04/2024    HDL 68 01/28/2018    HDL 72 12/13/2013      Lab Results   Component Value Date    LDLCALC 139.6 04/04/2024    LDLCALC 180.6 (H) 01/28/2018    LDLCALC 192.2 (H) 12/13/2013     Lab Results   Component Value Date    ALT 8 (L) 05/27/2024    AST 17 05/27/2024    AST 15 04/05/2024    AST 18 04/04/2024     Lab Results   Component Value Date    CREATININE 0.8 05/27/2024    BUN 25 05/27/2024     05/27/2024    K 3.6 05/27/2024    CO2 29 05/27/2024    CO2 23 04/05/2024    CO2 24 04/04/2024     Lab Results   Component Value Date    HGB 12.4 04/05/2024    HCT 37.6 04/05/2024    HCT 40.8 04/04/2024    HCT 38.9 01/23/2024                         Assessment and Plan:     Chronic heart failure with preserved ejection fraction  Comments:  Recheck echo    Paroxysmal atrial fibrillation  Comments:  Now appears persistent  Orders:  -     Ambulatory referral/consult to Cardiology    Mixed hyperlipidemia  Comments:  Statin and PCSK9 intolerant    Essential hypertension    Carotid artery disease, unspecified laterality, unspecified type    Diastolic dysfunction    Cardiac pacemaker    Pulmonary hypertension    Bilateral carotid artery stenosis  Comments:  Currently asymptomatic    Takotsubo cardiomyopathy    On anticoagulant therapy  Comments:  Dose appropriate    Nonrheumatic aortic valve stenosis  Comments:  ECHO to be obtained  Orders:  -     Echo; Future; Expected date: 06/26/2024         Follow up in about 7 months (around 1/19/2025).          Future Appointments   Date Time Provider Department Center   6/26/2024 11:00 AM Becki Guerrero MD [facility]   7/10/2024  2:30 PM Harshal Middleton DO [facility] [place]

## 2024-06-20 ENCOUNTER — HOSPITAL ENCOUNTER (OUTPATIENT)
Dept: CARDIOLOGY | Facility: HOSPITAL | Age: 89
Discharge: HOME | End: 2024-06-20
Attending: INTERNAL MEDICINE
Payer: MEDICARE

## 2024-06-20 VITALS
DIASTOLIC BLOOD PRESSURE: 73 MMHG | HEIGHT: 62 IN | HEART RATE: 60 BPM | WEIGHT: 143 LBS | BODY MASS INDEX: 26.31 KG/M2 | SYSTOLIC BLOOD PRESSURE: 123 MMHG

## 2024-06-20 DIAGNOSIS — I35.0 NONRHEUMATIC AORTIC VALVE STENOSIS: ICD-10-CM

## 2024-06-20 LAB
ASCENDING AORTA: 2.79 CM
AV INDEX (PROSTH): 0.21
AV MEAN GRADIENT: 17 MMHG
AV PEAK GRADIENT: 32 MMHG
AV VALVE AREA BY VELOCITY RATIO: 0.6 CM²
AV VALVE AREA: 0.6 CM²
AV VELOCITY RATIO: 0.21
BSA FOR ECHO PROCEDURE: 1.68 M2
CV ECHO LV RWT: 0.49 CM
DOP CALC AO PEAK VEL: 2.81 M/S
DOP CALC AO VTI: 69.91 CM
DOP CALC LVOT AREA: 2.8 CM2
DOP CALC LVOT DIAMETER: 1.9 CM
DOP CALC LVOT PEAK VEL: 0.59 M/S
DOP CALC LVOT STROKE VOLUME: 41.8 CM3
DOP CALC MV VTI: 20.34 CM
DOP CALCLVOT PEAK VEL VTI: 14.75 CM
E WAVE DECELERATION TIME: 226.47 MSEC
ECHO LV POSTERIOR WALL: 0.9 CM (ref 0.6–1.1)
FRACTIONAL SHORTENING: 33 % (ref 28–44)
HR MV ECHO: 60 BPM
INTERVENTRICULAR SEPTUM: 1.09 CM (ref 0.6–1.1)
IVRT: 51.38 MSEC
LA MAJOR: 6 CM
LA MINOR: 6.05 CM
LA WIDTH: 4.72 CM
LEFT ATRIUM SIZE: 4.48 CM
LEFT ATRIUM VOLUME INDEX MOD: 43.8 ML/M2
LEFT ATRIUM VOLUME INDEX: 65.2 ML/M2
LEFT ATRIUM VOLUME MOD: 72.64 CM3
LEFT ATRIUM VOLUME: 108.29 CM3
LEFT INTERNAL DIMENSION IN SYSTOLE: 2.43 CM (ref 2.1–4)
LEFT VENTRICLE DIASTOLIC VOLUME INDEX: 33.73 ML/M2
LEFT VENTRICLE DIASTOLIC VOLUME: 56 ML
LEFT VENTRICLE MASS INDEX: 66 G/M2
LEFT VENTRICLE SYSTOLIC VOLUME INDEX: 12.5 ML/M2
LEFT VENTRICLE SYSTOLIC VOLUME: 20.72 ML
LEFT VENTRICULAR INTERNAL DIMENSION IN DIASTOLE: 3.64 CM (ref 3.5–6)
LEFT VENTRICULAR MASS: 108.95 G
MV MEAN GRADIENT: 1 MMHG
MV PEAK E VEL: 1.38 M/S
MV PEAK GRADIENT: 3 MMHG
MV STENOSIS PRESSURE HALF TIME: 65.68 MS
MV VALVE AREA BY CONTINUITY EQUATION: 2.06 CM2
MV VALVE AREA P 1/2 METHOD: 3.35 CM2
PISA MRMAX VEL: 0.05 M/S
PISA TR MAX VEL: 3.6 M/S
PULM VEIN S/D RATIO: 0.62
PV PEAK D VEL: 0.52 M/S
PV PEAK S VEL: 0.32 M/S
RA MAJOR: 6.61 CM
RA PRESSURE ESTIMATED: 8 MMHG
RA WIDTH: 5.1 CM
RIGHT VENTRICLE DIASTOLIC BASEL DIMENSION: 3.3 CM
RV TB RVSP: 12 MMHG
SINUS: 2.48 CM
STJ: 1.97 CM
TR MAX PG: 52 MMHG
TRICUSPID ANNULAR PLANE SYSTOLIC EXCURSION: 1.89 CM
TV REST PULMONARY ARTERY PRESSURE: 60 MMHG
Z-SCORE OF LEFT VENTRICULAR DIMENSION IN END DIASTOLE: -2.45
Z-SCORE OF LEFT VENTRICULAR DIMENSION IN END SYSTOLE: -1.35

## 2024-06-20 PROCEDURE — 93306 TTE W/DOPPLER COMPLETE: CPT

## 2024-06-20 PROCEDURE — 93306 TTE W/DOPPLER COMPLETE: CPT | Mod: 26,,, | Performed by: INTERNAL MEDICINE

## 2024-06-26 ENCOUNTER — OFFICE VISIT (OUTPATIENT)
Dept: PALLIATIVE MEDICINE | Facility: CLINIC | Age: 89
End: 2024-06-26
Payer: MEDICARE

## 2024-06-26 VITALS — HEART RATE: 63 BPM | SYSTOLIC BLOOD PRESSURE: 114 MMHG | DIASTOLIC BLOOD PRESSURE: 51 MMHG | OXYGEN SATURATION: 96 %

## 2024-06-26 DIAGNOSIS — R54 AGE-RELATED PHYSICAL DEBILITY: Primary | ICD-10-CM

## 2024-06-26 DIAGNOSIS — F41.9 ANXIETY: ICD-10-CM

## 2024-06-26 DIAGNOSIS — Z51.5 PALLIATIVE CARE ENCOUNTER: ICD-10-CM

## 2024-06-26 DIAGNOSIS — M25.551 PAIN OF RIGHT HIP: ICD-10-CM

## 2024-06-26 DIAGNOSIS — R63.4 WEIGHT LOSS: ICD-10-CM

## 2024-06-26 DIAGNOSIS — H54.10 BLINDNESS AND LOW VISION: ICD-10-CM

## 2024-06-26 DIAGNOSIS — I50.32 CHRONIC HEART FAILURE WITH PRESERVED EJECTION FRACTION: ICD-10-CM

## 2024-06-26 PROCEDURE — 99999 PR PBB SHADOW E&M-EST. PATIENT-LVL III: CPT | Mod: PBBFAC,,, | Performed by: STUDENT IN AN ORGANIZED HEALTH CARE EDUCATION/TRAINING PROGRAM

## 2024-06-26 RX ORDER — LIDOCAINE 50 MG/G
1 PATCH TOPICAL DAILY
Qty: 30 PATCH | Refills: 0 | Status: SHIPPED | OUTPATIENT
Start: 2024-06-26

## 2024-06-26 NOTE — PROGRESS NOTES
Advance Care Planning   [facility] [address]  Palliative Care   Psychosocial Assessment    Patient Name: Dorothy M Knobloch  MRN: [id number]  Palliative Care Provider: Becki Guerrero MD       Present during Interview: patient and daughter, Mckayla .    Primary Language:English   Needed: no      Past Medical Situation:   PMH:   Past Medical History:   Diagnosis Date    Antiplatelet or antithrombotic long-term use     Arthritis     Bilateral nonexudative age-related macular degeneration 08/27/2013    Blood transfusion     Cataract     CHF (congestive heart failure)     Enlarged heart     High cholesterol     Hypertension     Sick sinus syndrome     Stroke     Thyroid disease    .    Here for initial palliative clinic assessment.      Socio-Economic Factors/Resources:  Address: [address] [place] [address]  Phone Number: [phone] (home)     Marital Status:  72 years    Household composition: patient lives at [facility] facility with her      Children: patient has one daughter and two sons     Patient/Family perceptions about Caregiving Needs; availability and capacity: Patient and spouse have long term care insurance that pays for their care     Patterns/Styles of Communication and Decision-making in the Family: patient looks to Mckayla to make decisions     Activities of Daily Living: patient is mostly independent with activities of daily living     Support Systems-Family & Community (Home Health, HME etc):  [facility]     Transportation:  yes    Work/Education History: 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      History: no    Financial Resources:Medicare long term care policy         Spirituality, Culture & Coping Mechanisms:  F- Tesha and Belief: Worship     I - Importance:  yes      C - Community/Culture Values:  yes       A - Address in Care:  no        Patient/Family Strengths/Resilience: patient  presents as considerate and with a generous spirit     Patient/Family Coping Style: patient talks out problems, socializes, uses humor     Self-Care Activities/Hobbies: patient enjoys reading     Substance Use History: no      Risk of Abuse, neglect or exploitation: no      Current or Previous Trauma and/or evidence of PTSD: no      Non-traditional Health practices: none identified     Patients Mental Status:  patient is alert and oriented to person, place, and time     Risk for complicated bereavement: not identified this visit         Goals/Hopes/Expectations:  patient stated she is hoping for a miracle     Fears/Concerns: patient stated her biggest concern is that she is losing her eyesight     Understanding of diagnosis: patient has good understanding of her diagnosis     Experience/Comfort level with health care system: patient has a fair amount of experience with the healthcare system       Advance Care Planning   Advance Directives:   Goals of Care: The patient and family endorses that what is most important right now is to focus on spending time at home and remaining as independent as possible    Accordingly, we have decided that the best plan to meet the patient's goals includes continuing with treatment                Summary/Next steps: Palliative provider and this  met with patient and daughter, Mckayla, in clinic exam room. Patient explained her day to day caring for her  who is blind. Patient and Mckayla describe  and dependent on patient and can be somewhat aggressive. Provider encouraged patient to hire a sitter for a few hours per day to start and then increase as care needs increase. Patient stated being agreeable. Plan to follow up in palliative clinic. Social wroker will follow for support.     Cirilo Obrien LCSW  Palliative Medicine           Signature: Cirilo Obrien LCSW

## 2024-06-26 NOTE — PROGRESS NOTES
"Anesthesia Post Evaluation    Patient: Elpidio Haskins    Procedure(s) Performed: Procedure(s) (LRB):  CLOSURE, WOUND (Right)  DEBRIDEMENT, LOWER EXTREMITY (Right)    Final Anesthesia Type: general  Patient location during evaluation: PACU  Patient participation: Yes- Able to Participate  Level of consciousness: awake and alert  Post-procedure vital signs: reviewed and stable  Pain management: adequate  Airway patency: patent  PONV status at discharge: No PONV  Anesthetic complications: no      Cardiovascular status: blood pressure returned to baseline and stable  Respiratory status: unassisted, spontaneous ventilation and room air  Hydration status: euvolemic  Follow-up not needed.        Visit Vitals  /69 (BP Location: Right arm, Patient Position: Lying)   Pulse 71   Temp 36.4 °C (97.5 °F) (Oral)   Resp 16   Ht 5' 6" (1.676 m)   Wt 81.6 kg (180 lb)   SpO2 98%   BMI 29.05 kg/m²       Pain/Miriam Score: Pain Assessment Performed: Yes (11/12/2018 10:25 AM)  Presence of Pain: complains of pain/discomfort (11/12/2018 10:25 AM)  Pain Rating Prior to Med Admin: 6 (11/12/2018  9:11 AM)  Pain Rating Post Med Admin: 5 (states tolerable) (11/12/2018  9:17 AM)  Miriam Score: 10 (11/12/2018 10:25 AM)        " Palliative Medicine Clinic Note        Consult Requested By: Dr. Valarie Warren      Reason for Consult: ACP and sx management in the setting of Frailty     Chief Complaint:   Chief Complaint   Patient presents with    Anxiety    Depression    Fatigue        ASSESSMENT/PLAN:      Plan/Recommendations:    Carol was seen today for anxiety, depression and fatigue.    Diagnoses and all orders for this visit:    Chronic heart failure with preserved ejection fraction  Age-related physical debility / Blindness and low vision  Weight loss  -     Ambulatory referral/consult to CLINIC Palliative Care  -     Ambulatory referral/consult to Home Health; Future  - Addressing patient's concerns about caring for her  with dementia and vision loss.  - Considering home care assistance to provide respite and allow patient to engage in activities.  - Started Lexapro/escitalopram for patient's anxiety/depression about 1 month ago.  - Explained caregiver burden and need for respite to maintain patient's health.  - Contact office with name of antidepressant medication from PCP to review.  - Evaluating need for formal memory testing and dementia diagnosis for  to access resources.  - Discussed importance of socialization and having activities to look forward to for quality of life.  - Reviewed cycles of life and adjusting to new roles/needs as one ages.      Pain of right hip  -     LIDOcaine (LIDODERM) 5 %; Place 1 patch onto the skin once daily. Remove & Discard patch within 12 hours or as directed by MD  - Addressing patient's mobility issues, shortness of breath, and muscle weakness with physical therapy.  - Continued Tylenol and topical creams (e.g.  - Aspercreme) as needed for joint pain.  - Ordered lidocaine patches for joint pain.  - Continued prune juice and stool softeners as needed for constipation.  - Ordered home health physical therapy evaluation and treatment, focusing on gait and walking  "strength.        Anxiety  -     EScitalopram oxalate (LEXAPRO) 20 MG tablet; Take 1 tablet (20 mg total) by mouth once daily.        Palliative care encounter    Advance Care Planning   Advance Directives:   Living Will: No    Medical Power of : Yes    Agent's Name:  Mckayla Solis   Agent's Contact Number:  [phone]    Decision Making:  Patient answered questions  Goals of Care: The patient endorses that what is most important right now is to focus on spending time at home and quality of life, even if it means sacrificing a little time    Accordingly, we have decided that the best plan to meet the patient's goals includes continuing with treatment      Date: 07/05/2024    I initiated the process of voluntary advance care planning today and explained the importance of this process to the patient.  I introduced the concept of advance directives to the patient, as well. Then the patient received detailed information about the importance of designating a Health Care Power of  (HCPOA). She was also instructed to communicate with this person about their wishes for future healthcare, should she become sick and lose decision-making capacity. A Medical Power of  document was completed during the visit, appointing the patient's daughter Gemma as primary agent, son Edwin as first alternate, and son Malick as second alternate.   The patient expressed concern about her 's ability to handle life if she dies first, stating "I know that if I die first, he can't handle life. He just can't handle life without me. That bothers me." The patient and her  have long-term care insurance with no time limit that will cover the costs of their care at the assisted living facility. Daughter stated, "Whatever you do, I think whatever means it takes to keep them in assisted living is what will be done." The patient does not want any life-prolonging measures, expressing "I don't want anything to prolong life. " "It's really not worth living."          Follow up: 3m     Plan discussed with: referring    SUBJECTIVE:      History of Present Illness / Interval History:  Dorothy M Knobloch is 89 y.o. 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  Presents to Palliative Care Clinic for physical symptoms, advance care planning, and additional support. Please see Cardiology note note for more details on HF     6/26/24  History obtained from: patient and daughter Gemma     The patient  lives with her  in an assisted living facility. Her  has memory problems and gets lost in their small two-room apartment, leading to frustration and quick temper. He often blames her for his confusion, as if she moved the furniture around. The patient is the primary caregiver for her  and feels overwhelmed by the responsibility. She has two sons who live out of town and have their own health issues, so she gets the brunt of everything. The patient reports feeling tired all the time and jittery, especially when her  starts yelling at her. She stays quiet to avoid escalating his anger, but his words are hurtful. This behavior has been going on for years but has worsened since he went blind. The patient herself is losing vision due to a stroke in one eye and macular degeneration in the other. She expresses feeling desperate about the situation and worries about going blind and not being able to care for her . The patient also reports difficulty reading and writing checks due to her vision loss. She used to enjoy hobbies like ceramics and watercolor but can no longer do them. The patient's daughter shares that the patient's depression keeps her from seeking social interaction and activities at the assisted living facility. The patient confirms feeling guilty " about burdening her daughter but acknowledges needing more help. She also expresses dissatisfaction with the food at the facility, finding it too salty and processed, which has led to a 30-pound weight loss. The patient has a history of hip replacement in 1999 and reports current hip pain, which she manages with Tylenol and Aspercreme. She also has arthritis in her knees. The patient denies that her pain is related to her heart issues.      ROS:  Review of Systems    Review of Symptoms      Symptom Assessment (ESAS 0-10 Scale)  Pain:  0  Dyspnea:  7  Anxiety:  8  Nausea:  3  Depression:  8  Anorexia:  0  Fatigue:  8  Insomnia:  7  Restlessness:  0  Agitation:  0     CAM / Delirium:  Negative  Constipation:  Positive  Diarrhea:  Negative      Performance Status:  70    Living Arrangements:  Lives with spouse and Lives in assisted living    Psychosocial/Cultural:   See Palliative Psychosocial Note: Yes   lives with her  who is blind and has CI in an assisted living facility. Daughter Gemma lives about 5 blocks away and assists with their care. Their two sons Trang live out of town but come to help when needed, especially if a parent is hospitalized.  **Primary  to Follow**  Palliative Care  Consult: Yes    Spiritual:  F - Tesha and Belief:  Yazdanism        Medications:    Current Outpatient Medications:     acetaminophen (TYLENOL) 500 MG tablet, Take 500 mg by mouth daily as needed for Pain., Disp: , Rfl:     amLODIPine (NORVASC) 5 MG tablet, TAKE 1 TABLET BY MOUTH EVERY DAY, Disp: 90 tablet, Rfl: 3    apixaban (ELIQUIS) 5 mg Tab, Take 1 tablet (5 mg total) by mouth 2 (two) times daily., Disp: 180 tablet, Rfl: 1    brimonidine-timoloL (COMBIGAN) 0.2-0.5 % Drop, INSTILL 1 DROP INTO AFFECTED EYE(S) BY OPHTHALMIC ROUTE EVERY 12 HOURS, Disp: , Rfl:     brinzolamide (AZOPT) 1 % ophthalmic suspension, Place 1 drop into the left eye 2 (two) times daily., Disp: , Rfl:     bumetanide  (BUMEX) 0.5 MG Tab, Take 1 tablet by mouth once daily., Disp: , Rfl:     cholecalciferol, vitamin D3, 50,000 unit capsule, Take 50,000 Units by mouth every 14 (fourteen) days., Disp: , Rfl: 5    clopidogreL (PLAVIX) 75 mg tablet, Take 1 tablet (75 mg total) by mouth once daily., Disp: 90 tablet, Rfl: 3    erythromycin (ROMYCIN) ophthalmic ointment, Apply 1 inch to bottom lid of left eye for first week of every month., Disp: , Rfl:     hydroCHLOROthiazide (HYDRODIURIL) 12.5 MG Tab, Take 1 tablet (12.5 mg total) by mouth once daily., Disp: 90 tablet, Rfl: 3    hydrocortisone 2.5 % cream, Apply topically 2 (two) times daily., Disp: 20 g, Rfl: 1    levothyroxine (SYNTHROID) 88 MCG tablet, Take 88 mcg by mouth before breakfast. , Disp: , Rfl:     methyl salicylate/menth/camph (SALONPAS TOP), Apply 1 patch topically daily as needed (Pain)., Disp: , Rfl:     metoprolol succinate (TOPROL-XL) 25 MG 24 hr tablet, Take 2 tablets in AM and 1 tablet in PM, Disp: 270 tablet, Rfl: 3    NITROSTAT 0.4 mg SL tablet, Place 0.4 mg under the tongue every 5 (five) minutes as needed for Chest pain., Disp: , Rfl:     EScitalopram oxalate (LEXAPRO) 20 MG tablet, Take 1 tablet (20 mg total) by mouth once daily., Disp: 90 tablet, Rfl: 3    LIDOcaine (LIDODERM) 5 %, Place 1 patch onto the skin once daily. Remove & Discard patch within 12 hours or as directed by MD, Disp: 30 patch, Rfl: 0    External  database queried on 07/05/2024  by Becki PATEL :  none    Review of patient's allergies indicates:   Allergen Reactions    Aspartame Swelling     equal        OBJECTIVE:      Physical Exam:  Vitals: Pulse: 63 (06/26/24 1107)  BP: (!) 114/51 (06/26/24 1107)  SpO2: 96 % (06/26/24 1107)    Physical Exam  Constitutional:       General: She is not in acute distress.     Appearance: She is normal weight.      Comments: Frail   HENT:      Head: Atraumatic.   Pulmonary:      Effort: Pulmonary effort is normal. Tachypnea (occasional)  present. No respiratory distress.   Neurological:      Mental Status: She is alert and oriented to person, place, and time.   Psychiatric:         Mood and Affect: Affect normal. Mood is anxious.         Labs:24- CMP wnl CBC wnl    Imagin24 MRI Brain: Acute infarcts involving the left thalamus (subcentimeter in size) and left hippocampus as detailed above without intracranial hemorrhage or mass effect.  This correlates with the marked compromise of the left posterior cerebral artery noted on the recent CTA. Left tentorial calcified meningioma increased in size from 2018.  Please note no evidence of significant surrounding edema is noted within the adjacent brain/cerebellar parenchyma.            Additional 47min time spent on a voluntary advance care planning and /or goals of care discussion, providing emotional support, formulating and communicating prognosis and exploring burden/benefit of various approaches of treatment.     Becki Guerrero MD

## 2024-06-27 RX ORDER — ESCITALOPRAM OXALATE 20 MG/1
20 TABLET ORAL DAILY
Qty: 90 TABLET | Refills: 3 | Status: SHIPPED | OUTPATIENT
Start: 2024-06-27 | End: 2025-06-27

## 2024-07-05 VITALS
HEART RATE: 62 BPM | SYSTOLIC BLOOD PRESSURE: 118 MMHG | OXYGEN SATURATION: 96 % | DIASTOLIC BLOOD PRESSURE: 61 MMHG | RESPIRATION RATE: 16 BRPM

## 2024-07-05 NOTE — PROGRESS NOTES
Pt sitting at table in her apartment, awake, laert and oriented, very pleasant. VSS. NAD. Progressing well. No new s/s. Educated on post CVA, fall prevention and safety precautions. Pt verbalized understanding

## 2024-07-08 PROBLEM — I63.432 STROKE DUE TO EMBOLISM OF LEFT POSTERIOR CEREBRAL ARTERY: Status: RESOLVED | Noted: 2024-04-04 | Resolved: 2024-07-08

## 2024-07-10 ENCOUNTER — OFFICE VISIT (OUTPATIENT)
Dept: PAIN MEDICINE | Facility: CLINIC | Age: 89
End: 2024-07-10
Payer: MEDICARE

## 2024-07-10 VITALS
BODY MASS INDEX: 26.33 KG/M2 | HEART RATE: 76 BPM | SYSTOLIC BLOOD PRESSURE: 113 MMHG | DIASTOLIC BLOOD PRESSURE: 56 MMHG | WEIGHT: 143.06 LBS | HEIGHT: 62 IN

## 2024-07-10 DIAGNOSIS — G89.29 BILATERAL CHRONIC KNEE PAIN: Primary | ICD-10-CM

## 2024-07-10 DIAGNOSIS — M25.559 HIP PAIN, UNSPECIFIED LATERALITY: ICD-10-CM

## 2024-07-10 DIAGNOSIS — M25.562 BILATERAL CHRONIC KNEE PAIN: Primary | ICD-10-CM

## 2024-07-10 DIAGNOSIS — R54 AGE-RELATED PHYSICAL DEBILITY: ICD-10-CM

## 2024-07-10 DIAGNOSIS — M25.561 BILATERAL CHRONIC KNEE PAIN: Primary | ICD-10-CM

## 2024-07-10 PROCEDURE — 99999 PR PBB SHADOW E&M-EST. PATIENT-LVL IV: CPT | Mod: PBBFAC,,, | Performed by: STUDENT IN AN ORGANIZED HEALTH CARE EDUCATION/TRAINING PROGRAM

## 2024-07-10 PROCEDURE — 99204 OFFICE O/P NEW MOD 45 MIN: CPT | Mod: S$GLB,,, | Performed by: STUDENT IN AN ORGANIZED HEALTH CARE EDUCATION/TRAINING PROGRAM

## 2024-07-10 PROCEDURE — 1159F MED LIST DOCD IN RCRD: CPT | Mod: CPTII,S$GLB,, | Performed by: STUDENT IN AN ORGANIZED HEALTH CARE EDUCATION/TRAINING PROGRAM

## 2024-07-10 PROCEDURE — 3288F FALL RISK ASSESSMENT DOCD: CPT | Mod: CPTII,S$GLB,, | Performed by: STUDENT IN AN ORGANIZED HEALTH CARE EDUCATION/TRAINING PROGRAM

## 2024-07-10 PROCEDURE — 1101F PT FALLS ASSESS-DOCD LE1/YR: CPT | Mod: CPTII,S$GLB,, | Performed by: STUDENT IN AN ORGANIZED HEALTH CARE EDUCATION/TRAINING PROGRAM

## 2024-07-10 PROCEDURE — 1126F AMNT PAIN NOTED NONE PRSNT: CPT | Mod: CPTII,S$GLB,, | Performed by: STUDENT IN AN ORGANIZED HEALTH CARE EDUCATION/TRAINING PROGRAM

## 2024-07-10 NOTE — PROGRESS NOTES
Chronic Pain - New Consult    Referring Physician: Valarie Warren MD    Date: 07/10/2024     Re: Dorothy M Knobloch  MR#: [id number]  YOB: 1932  Age: 89 y.o.    Chief Complaint:   Chief Complaint   Patient presents with    Knee Pain     **This note is dictated using the M*Modal Fluency Direct word recognition program. There are word recognition mistakes that are occasionally missed on review.**    ASSESSMENT: 89 y.o. year old female with knee pain, consistent with     1. Bilateral chronic knee pain  X-Ray Knee 3 View Bilateral      2. Age-related physical debility  Ambulatory referral/consult to Pain Clinic      3. Hip pain, unspecified laterality  Ambulatory referral/consult to Pain Clinic        PLAN:     B/L knee pain 2/2 OA  -knee XR  -discussed CSI for the knees. She defers. They can call to schedule  -trial diclofenac gel TID-QID PRN  -outpatient PT is not feasible for her  -discussed HA injections and RFA as future options if needed    Fragility with external stressors  -following with palliative care  -lives in assisted living    - RTC 3 months  - Counseled patient regarding the importance of activity modification and physical therapy.    The above plan and management options were discussed at length with patient. Patient is in agreement with the above and verbalized understanding. It will be communicated with the referring physician via electronic record, fax, or mail.  Lab/study reports reviewed were important and necessary because subsequent medical and treatment recommendations required review of the above lab/study reports. Images viewed/reviewed above were important and necessary because subsequent medical and treatment recommendations required review of the reviewed image(s).     Electronically signed by:  Harshal Middleton DO  07/10/2024    =========================================================================================================    SUBJECTIVE:    Carol M Knobloch  is a 89 y.o. female presents to the clinic for the evaluation of bilateral knees pain. The pain started 6-8 months ago following no inciting event and symptoms have been worsening. She is not as active as she used to be.  Many of her medications make her sleepy.  She gets pain in bed. She gets pain with moving around.  She has not had any X-rays for her knees.      Pain Description:    The pain is located in the bilateral knees area and does not radiate.    At BEST  0/10   At WORST  8/10 on the WORST day.    On average pain is rated as 7/10.   Today the pain is rated as 0/10  The pain is intermittent.  The pain is described as aching and sharp    Symptoms interfere with daily activity.   Exacerbating factors: Standing, Extension, Flexing, and Getting out of bed/chair.    Mitigating factors lidocaine patches.   She reports 6 hours of sleep per night.    Physical Therapy/Home Exercise: No, not currently in physical therapy or home exercise program    Current Pain Medications:    - Lexapro, tylenol    Failed Pain Medications:    - tylenol    Pain Treatment Therapies:    Pain procedures: none  Physical Therapy: yes. Just completed PT  Chiropractor: none  Acupuncture: none  TENS unit: none  Spinal decompression: none  Joint replacement: Right MARCEL 1999 c/b remodeling of the femoral cortex    Patient denies urinary incontinence and bowel incontinence.  Patient denies any suicidal or homicidal ideations     report:  Reviewed and consistent with medication use as prescribed.    Imaging:   X-ray hip 2016:    Right hip two views.  Arthroplasty seen on the right with no changes from August 2015.  There is mild remodeling of the femoral shaft cortex next to the cement material.  No acute changes seen.     Left hip is well maintained.  SI joints are normal.  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        7/10/2024     2:27 PM   Pain Disability Index (PDI)   Family/Home Responsibilities: 7    Recreation: 7   Occupation: 7   Sexual Behavior: 7   Self Care: 7   Life-Support Activities: 7   Pain Disability Index (PDI) 49        Past Medical History:   Diagnosis Date    Antiplatelet or antithrombotic long-term use     Arthritis     Bilateral nonexudative age-related macular degeneration 08/27/2013    Blood transfusion     Cataract     CHF (congestive heart failure)     Enlarged heart     High cholesterol     Hypertension     Sick sinus syndrome     Stroke     Thyroid disease      Past Surgical History:   Procedure Laterality Date    CARDIAC PACEMAKER PLACEMENT      MEDTR[employer]IC Device Model: Advisa  MRI A2DR01 Device Type: PACEMAKER Device S\N: [id number]    CARPAL TUNNEL RELEASE Bilateral 1991    CATARACT EXTRACTION Right     JOINT REPLACEMENT      [place] TRANSCRAN DOPP INTRACRAN, EMBOLI W/O INJ  01/29/2018         SKIN BIOPSY      TONSILLECTOMY      TOTAL HIP ARTHROPLASTY  11/01/2000    right/[facility]     Social History     Socioeconomic History    Marital status:    Tobacco Use    Smoking status: Never     Passive exposure: Never    Smokeless tobacco: Never   Substance and Sexual Activity    Alcohol use: No     Alcohol/week: 0.0 standard drinks of alcohol    Drug use: No   Social History Narrative    Cares for her blind       Social Determinants of Health     Financial Resource Strain: Low Risk  (3/13/2023)    Overall Financial Resource Strain (CARDIA)     Difficulty of Paying Living Expenses: Not hard at all   Food Insecurity: No Food Insecurity (3/13/2023)    Hunger Vital Sign     Worried About Running Out of Food in the Last Year: Never true     Ran Out of Food in the Last Year: Never true   Housing Stability: Unknown (4/5/2024)    Housing Stability Vital Sign     Unable to Pay for Housing in the Last Year: No     Unstable Housing in the Last Year: No     Family History   Problem Relation Name Age of Onset    Early death Mother      Early death Father      Arthritis Sister       Hearing loss Daughter      Birth defects Son      Hypertension Son      Diabetes Paternal Aunt      Cancer Paternal Aunt  80        colon  cancer    Diabetes Paternal Uncle      Arthritis Maternal Grandmother      Arthritis Paternal Grandmother      Hypertension Paternal Grandmother      Amblyopia Neg Hx      Blindness Neg Hx      Cataracts Neg Hx      Glaucoma Neg Hx      Macular degeneration Neg Hx      Retinal detachment Neg Hx      Strabismus Neg Hx      Stroke Neg Hx      Thyroid disease Neg Hx         Review of patient's allergies indicates:   Allergen Reactions    Aspartame Swelling     equal       Current Outpatient Medications   Medication Sig    acetaminophen (TYLENOL) 500 MG tablet Take 500 mg by mouth daily as needed for Pain.    amLODIPine (NORVASC) 5 MG tablet TAKE 1 TABLET BY MOUTH EVERY DAY    apixaban (ELIQUIS) 5 mg Tab Take 1 tablet (5 mg total) by mouth 2 (two) times daily.    brimonidine-timoloL (COMBIGAN) 0.2-0.5 % Drop INSTILL 1 DROP INTO AFFECTED EYE(S) BY OPHTHALMIC ROUTE EVERY 12 HOURS    brinzolamide (AZOPT) 1 % ophthalmic suspension Place 1 drop into the left eye 2 (two) times daily.    bumetanide (BUMEX) 0.5 MG Tab Take 1 tablet by mouth once daily.    cholecalciferol, vitamin D3, 50,000 unit capsule Take 50,000 Units by mouth every 14 (fourteen) days.    clopidogreL (PLAVIX) 75 mg tablet Take 1 tablet (75 mg total) by mouth once daily.    erythromycin (ROMYCIN) ophthalmic ointment Apply 1 inch to bottom lid of left eye for first week of every month.    EScitalopram oxalate (LEXAPRO) 20 MG tablet Take 1 tablet (20 mg total) by mouth once daily.    hydroCHLOROthiazide (HYDRODIURIL) 12.5 MG Tab Take 1 tablet (12.5 mg total) by mouth once daily.    hydrocortisone 2.5 % cream Apply topically 2 (two) times daily.    levothyroxine (SYNTHROID) 88 MCG tablet Take 88 mcg by mouth before breakfast.     LIDOcaine (LIDODERM) 5 % Place 1 patch onto the skin once daily. Remove & Discard patch within 12  "hours or as directed by MD    methyl salicylate/menth/camph (SALONPAS TOP) Apply 1 patch topically daily as needed (Pain).    metoprolol succinate (TOPROL-XL) 25 MG 24 hr tablet Take 2 tablets in AM and 1 tablet in PM    NITROSTAT 0.4 mg SL tablet Place 0.4 mg under the tongue every 5 (five) minutes as needed for Chest pain.     No current facility-administered medications for this visit.       REVIEW OF SYSTEMS:    GENERAL:  No weight loss, malaise or fevers.:NO  HEENT:   No recent changes in vision or hearing:+vision +hearing  NECK:  Negative for lumps, no difficulty with swallowing.:NO  RESPIRATORY:  Negative for cough, wheezing or shortness of breath, patient denies any recent URI.:+shortness of breath  CARDIOVASCULAR:  Negative for chest pain, leg swelling or palpitations.:+ leg swelling  GI:  Negative for abdominal discomfort, blood in stools or black stools or change in bowel habits.:NO  MUSCULOSKELETAL:  See HPI.  SKIN:  Negative for lesions, rash, and itching.: +rash  PSYCH:  No mood disorder or recent psychosocial stressors.  Patients sleep is not disturbed secondary to pain.:+anxiety +depression  HEMATOLOGY/LYMPHOLOGY:  Negative for prolonged bleeding, bruising easily or swollen nodes.  Patient is not currently taking any anti-coagulants:+Eliquis +Plavix  NEURO:   No history of headaches, syncope, paralysis, seizures or tremors.:+headches  All other reviewed and negative other than HPI.    OBJECTIVE:    BP (!) 113/56 (BP Location: Left arm, Patient Position: Sitting)   Pulse 76   Ht 5' 2" (1.575 m)   Wt 64.9 kg (143 lb 1.3 oz)   BMI 26.17 kg/m²     PHYSICAL EXAMINATION:    GENERAL: Well appearing, in no acute distress, alert and oriented x3.  PSYCH:  Mood and affect appropriate.  SKIN: Skin color, texture, turgor normal, no rashes or lesions.  HEAD/FACE:  Normocephalic, atraumatic. Cranial nerves grossly intact.  CV: RRR with palpation of the radial artery.  PULM: CTAB. No evidence of respiratory " difficulty, symmetric chest rise.  GI:  Soft and non-tender.  MUSKULOSKELETAL:    EXTREMITIES:   Knee  Exam:  edema (+) b/l  TTP b/l medial and lateral joint lines  (+) crepitus  (+) decreased ROM    NEUROLOGICAL EXAM:  MENTAL STATUS: A x O x 3, good concentration, speech is fluent and goal directed  MEMORY: recent and remote are intact  CN: CN2-12 grossly intact  MOTOR: 4/5 in all muscle groups in the LE  DTRs: 0 intact symmetric  Sensation:    -no Loss of sensation in a left lower and right lower  leg  distribution.    GAIT: unable to assess.

## 2024-07-11 ENCOUNTER — HOSPITAL ENCOUNTER (OUTPATIENT)
Dept: RADIOLOGY | Facility: HOSPITAL | Age: 89
Discharge: HOME | End: 2024-07-11
Attending: STUDENT IN AN ORGANIZED HEALTH CARE EDUCATION/TRAINING PROGRAM
Payer: MEDICARE

## 2024-07-11 DIAGNOSIS — M25.562 BILATERAL CHRONIC KNEE PAIN: ICD-10-CM

## 2024-07-11 DIAGNOSIS — G89.29 BILATERAL CHRONIC KNEE PAIN: ICD-10-CM

## 2024-07-11 DIAGNOSIS — M25.561 BILATERAL CHRONIC KNEE PAIN: ICD-10-CM

## 2024-07-11 PROCEDURE — 73562 X-RAY EXAM OF KNEE 3: CPT | Mod: 26,50,, | Performed by: RADIOLOGY

## 2024-07-11 PROCEDURE — 73562 X-RAY EXAM OF KNEE 3: CPT | Mod: TC,50

## 2024-07-19 ENCOUNTER — HOME CARE VISIT (OUTPATIENT)
Dept: NEUROLOGY | Facility: HOSPITAL | Age: 89
End: 2024-07-19
Payer: MEDICARE

## 2024-07-30 ENCOUNTER — TELEPHONE (OUTPATIENT)
Dept: CARDIOLOGY | Facility: HOSPITAL | Age: 89
End: 2024-07-30
Payer: MEDICARE

## 2024-07-30 NOTE — TELEPHONE ENCOUNTER
Patient left a voice message to get help with sending a remote transmission from her [employer] home monitor.  I called the patient back, no answer. I left a voice message for her to return the call.

## 2024-08-01 DIAGNOSIS — M25.551 PAIN OF RIGHT HIP: ICD-10-CM

## 2024-08-01 DIAGNOSIS — H02.006 UNSPECIFIED ENTROPION OF LEFT EYE, UNSPECIFIED EYELID: ICD-10-CM

## 2024-08-01 NOTE — PROGRESS NOTES
Phone visit. Pt declined home visit as she and her  are feeling congested., No SOB, no fever. She reports VSS, NAD. Post stroke care education  provided. She appreciated call.

## 2024-08-02 RX ORDER — LIDOCAINE 50 MG/G
1 PATCH TOPICAL DAILY
Qty: 30 PATCH | Refills: 0 | Status: SHIPPED | OUTPATIENT
Start: 2024-08-02

## 2024-08-02 RX ORDER — ERYTHROMYCIN 5 MG/G
OINTMENT OPHTHALMIC
Qty: 3.5 G | Refills: 6 | Status: SHIPPED | OUTPATIENT
Start: 2024-08-02

## 2024-08-30 ENCOUNTER — CLINICAL SUPPORT (OUTPATIENT)
Dept: CARDIOLOGY | Facility: HOSPITAL | Age: 89
End: 2024-08-30
Payer: MEDICARE

## 2024-08-30 ENCOUNTER — CLINICAL SUPPORT (OUTPATIENT)
Dept: CARDIOLOGY | Facility: HOSPITAL | Age: 89
End: 2024-08-30
Attending: INTERNAL MEDICINE
Payer: MEDICARE

## 2024-08-30 DIAGNOSIS — I44.2 ATRIOVENTRICULAR BLOCK, COMPLETE: ICD-10-CM

## 2024-08-30 PROCEDURE — 93296 REM INTERROG EVL PM/IDS: CPT | Performed by: INTERNAL MEDICINE

## 2024-08-30 PROCEDURE — 93294 REM INTERROG EVL PM/LDLS PM: CPT | Mod: ,,, | Performed by: INTERNAL MEDICINE

## 2024-09-11 LAB
OHS CV AF BURDEN PERCENT: 2.3
OHS CV DC REMOTE DEVICE TYPE: NORMAL
OHS CV ICD SHOCK: NO
OHS CV RV PACING PERCENT: 99.78 %

## 2024-09-18 ENCOUNTER — HOME CARE VISIT (OUTPATIENT)
Dept: NEUROLOGY | Facility: HOSPITAL | Age: 89
End: 2024-09-18
Payer: MEDICARE

## 2024-10-20 NOTE — PROGRESS NOTES
"Pt preferred phone visit today since she has "sinus infection" or mild cold-like s/s, nasal congestion, no fever or cough. I educated on s/s of covid, flu, PNA and when to seek care. Post stroke education provided. She states her  fell once again in shower. Fall prevention and safety precation education provided    "

## 2024-10-21 ENCOUNTER — TELEPHONE (OUTPATIENT)
Dept: ELECTROPHYSIOLOGY | Facility: CLINIC | Age: 89
End: 2024-10-21
Payer: MEDICARE

## 2024-10-21 ENCOUNTER — PATIENT MESSAGE (OUTPATIENT)
Dept: ELECTROPHYSIOLOGY | Facility: CLINIC | Age: 89
End: 2024-10-21
Payer: MEDICARE

## 2024-10-21 NOTE — TELEPHONE ENCOUNTER
LVM regarding battery life of MDT PPM.  Last remote transmission received on 9/28/2024 showing an average of 9 months (<5 - 13 months) left till [facility]INA. Reminded patient to send monthly manual transmissions.  Call back number given.  Will also send a portal message.

## 2024-11-14 ENCOUNTER — HOME CARE VISIT (OUTPATIENT)
Dept: NEUROLOGY | Facility: HOSPITAL | Age: 89
End: 2024-11-14
Payer: MEDICARE

## 2024-11-29 ENCOUNTER — CLINICAL SUPPORT (OUTPATIENT)
Dept: CARDIOLOGY | Facility: HOSPITAL | Age: 89
End: 2024-11-29
Attending: INTERNAL MEDICINE
Payer: MEDICARE

## 2024-11-29 ENCOUNTER — CLINICAL SUPPORT (OUTPATIENT)
Dept: CARDIOLOGY | Facility: HOSPITAL | Age: 89
End: 2024-11-29
Payer: MEDICARE

## 2024-11-29 DIAGNOSIS — I44.2 ATRIOVENTRICULAR BLOCK, COMPLETE: ICD-10-CM

## 2024-11-29 PROCEDURE — 93294 REM INTERROG EVL PM/LDLS PM: CPT | Mod: ,,, | Performed by: INTERNAL MEDICINE

## 2024-11-29 PROCEDURE — 93296 REM INTERROG EVL PM/IDS: CPT | Performed by: INTERNAL MEDICINE

## 2024-12-13 LAB
OHS CV AF BURDEN PERCENT: < 1
OHS CV DC REMOTE DEVICE TYPE: NORMAL
OHS CV ICD SHOCK: NO
OHS CV RV PACING PERCENT: 99.75 %

## 2025-01-03 ENCOUNTER — HOSPITAL ENCOUNTER (EMERGENCY)
Facility: HOSPITAL | Age: OVER 89
Discharge: HOME | End: 2025-01-03
Attending: EMERGENCY MEDICINE
Payer: MEDICARE

## 2025-01-03 VITALS
WEIGHT: 143 LBS | OXYGEN SATURATION: 98 % | TEMPERATURE: 98 F | DIASTOLIC BLOOD PRESSURE: 81 MMHG | RESPIRATION RATE: 18 BRPM | SYSTOLIC BLOOD PRESSURE: 135 MMHG | HEART RATE: 60 BPM | BODY MASS INDEX: 26.16 KG/M2

## 2025-01-03 DIAGNOSIS — D32.9 MENINGIOMA: Primary | ICD-10-CM

## 2025-01-03 DIAGNOSIS — S01.01XA LACERATION OF SCALP, INITIAL ENCOUNTER: ICD-10-CM
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
## 2025-01-03 LAB
ALBUMIN SERPL BCP-MCNC: 3.8 G/DL (ref 3.5–5.2)
ALP SERPL-CCNC: 61 U/L (ref 40–150)
ALT SERPL W/O P-5'-P-CCNC: 8 U/L (ref 10–44)
ANION GAP SERPL CALC-SCNC: 11 MMOL/L (ref 8–16)
APTT PPP: 31.3 SEC (ref 21–32)
AST SERPL-CCNC: 17 U/L (ref 10–40)
BASOPHILS # BLD AUTO: 0.06 K/UL (ref 0–0.2)
BASOPHILS NFR BLD: 0.8 % (ref 0–1.9)
BILIRUB SERPL-MCNC: 0.4 MG/DL (ref 0.1–1)
BUN SERPL-MCNC: 37 MG/DL (ref 10–30)
CALCIUM SERPL-MCNC: 9.9 MG/DL (ref 8.7–10.5)
CHLORIDE SERPL-SCNC: 102 MMOL/L (ref 95–110)
CO2 SERPL-SCNC: 25 MMOL/L (ref 23–29)
CREAT SERPL-MCNC: 0.8 MG/DL (ref 0.5–1.4)
DIFFERENTIAL METHOD BLD: ABNORMAL
EOSINOPHIL # BLD AUTO: 0.1 K/UL (ref 0–0.5)
EOSINOPHIL NFR BLD: 1.8 % (ref 0–8)
ERYTHROCYTE [DISTWIDTH] IN BLOOD BY AUTOMATED COUNT: 14.5 % (ref 11.5–14.5)
EST. GFR  (NO RACE VARIABLE): >60 ML/MIN/1.73 M^2
GLUCOSE SERPL-MCNC: 104 MG/DL (ref 70–110)
HCT VFR BLD AUTO: 37.3 % (ref 37–48.5)
HGB BLD-MCNC: 12.5 G/DL (ref 12–16)
IMM GRANULOCYTES # BLD AUTO: 0.03 K/UL (ref 0–0.04)
IMM GRANULOCYTES NFR BLD AUTO: 0.4 % (ref 0–0.5)
INR PPP: 1 (ref 0.8–1.2)
LYMPHOCYTES # BLD AUTO: 1.3 K/UL (ref 1–4.8)
LYMPHOCYTES NFR BLD: 16.8 % (ref 18–48)
MCH RBC QN AUTO: 30.9 PG (ref 27–31)
MCHC RBC AUTO-ENTMCNC: 33.5 G/DL (ref 32–36)
MCV RBC AUTO: 92 FL (ref 82–98)
MONOCYTES # BLD AUTO: 0.7 K/UL (ref 0.3–1)
MONOCYTES NFR BLD: 9.1 % (ref 4–15)
NEUTROPHILS # BLD AUTO: 5.7 K/UL (ref 1.8–7.7)
NEUTROPHILS NFR BLD: 71.1 % (ref 38–73)
NRBC BLD-RTO: 0 /100 WBC
OHS QRS DURATION: 198 MS
OHS QTC CALCULATION: 550 MS
PLATELET # BLD AUTO: 220 K/UL (ref 150–450)
PMV BLD AUTO: 9.3 FL (ref 9.2–12.9)
POCT GLUCOSE: 111 MG/DL (ref 70–110)
POTASSIUM SERPL-SCNC: 3.5 MMOL/L (ref 3.5–5.1)
PROT SERPL-MCNC: 7.5 G/DL (ref 6–8.4)
PROTHROMBIN TIME: 11.4 SEC (ref 9–12.5)
RBC # BLD AUTO: 4.04 M/UL (ref 4–5.4)
SODIUM SERPL-SCNC: 138 MMOL/L (ref 136–145)
WBC # BLD AUTO: 7.93 K/UL (ref 3.9–12.7)

## 2025-01-03 PROCEDURE — 85610 PROTHROMBIN TIME: CPT

## 2025-01-03 PROCEDURE — 82962 GLUCOSE BLOOD TEST: CPT

## 2025-01-03 PROCEDURE — 85730 THROMBOPLASTIN TIME PARTIAL: CPT

## 2025-01-03 PROCEDURE — 99285 EMERGENCY DEPT VISIT HI MDM: CPT | Mod: 25

## 2025-01-03 PROCEDURE — 25000003 PHARM REV CODE 250

## 2025-01-03 PROCEDURE — 12002 RPR S/N/AX/GEN/TRNK2.6-7.5CM: CPT

## 2025-01-03 PROCEDURE — 93005 ELECTROCARDIOGRAM TRACING: CPT

## 2025-01-03 PROCEDURE — 85025 COMPLETE CBC W/AUTO DIFF WBC: CPT

## 2025-01-03 PROCEDURE — 93010 ELECTROCARDIOGRAM REPORT: CPT | Mod: ,,, | Performed by: INTERNAL MEDICINE

## 2025-01-03 PROCEDURE — 80053 COMPREHEN METABOLIC PANEL: CPT

## 2025-01-03 RX ADMIN — Medication: at 08:01

## 2025-01-03 NOTE — ED NOTES
"Pt reports she tripped and fell over her "walker leg" in the bathroom. Pt stated she tried to grab something to catch herself, but there was nothing to grab which caused her to fall down. Pt arrived with head bandaged due to having a head lac to back of head. +blood thinner  "

## 2025-01-03 NOTE — ED PROVIDER NOTES
Encounter Date: 1/3/2025       History     Chief Complaint   Patient presents with    Fall     Trip and fall this morning. +head trauma, -LOC. +eliquis and plavix.      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  She states she was quickly getting ready to come to the emergency department because her  also fell.  She said she stepped backwards tripped over her feet and fell and hit her head on corner of counter.  Did not lose consciousness no seizures,    She is complaining of some bleeding of the scalp.  She was able to stopped the bleeding with direct pressure.    Denies other complaint including chest pain shortness of breath nausea vomiting or prodromal symptoms.  She is otherwise feels well.       Review of patient's allergies indicates:   Allergen Reactions    Aspartame Swelling     equal     Past Medical History:   Diagnosis Date    Antiplatelet or antithrombotic long-term use     Arthritis     Bilateral nonexudative age-related macular degeneration 08/27/2013    Blood transfusion     Cataract     CHF (congestive heart failure)     Enlarged heart     High cholesterol     Hypertension     Sick sinus syndrome     Stroke     Thyroid disease      Past Surgical History:   Procedure Laterality Date    CARDIAC PACEMAKER PLACEMENT      [employer] Device Model: Juli LOZADA MRI A2DR01 Device Type: PACEMAKER Device S\N: [id number]    CARPAL TUNNEL RELEASE Bilateral 1991    CATARACT EXTRACTION Right     JOINT REPLACEMENT      MS TRANSCRAN DOPP INTRACRAN, EMBOLI W/O INJ  01/29/2018         SKIN BIOPSY      TONSILLECTOMY      TOTAL HIP ARTHROPLASTY  11/01/2000    right/[facility]     Family History   Problem Relation Name Age of Onset    Early death Mother      Early death Father      Arthritis Sister      Hearing loss Daughter      Birth defects  Son      Hypertension Son      Diabetes Paternal Aunt      Cancer Paternal Aunt  80        colon  cancer    Diabetes Paternal Uncle      Arthritis Maternal Grandmother      Arthritis Paternal Grandmother      Hypertension Paternal Grandmother      Amblyopia Neg Hx      Blindness Neg Hx      Cataracts Neg Hx      Glaucoma Neg Hx      Macular degeneration Neg Hx      Retinal detachment Neg Hx      Strabismus Neg Hx      Stroke Neg Hx      Thyroid disease Neg Hx       Social History     Tobacco Use    Smoking status: Never     Passive exposure: Never    Smokeless tobacco: Never   Substance Use Topics    Alcohol use: No     Alcohol/week: 0.0 standard drinks of alcohol    Drug use: No     Review of Systems    Physical Exam     Initial Vitals [01/03/25 0548]   BP Pulse Resp Temp SpO2   (!) 181/74 60 16 97.4 °F (36.3 °C) 98 %      MAP       --         Physical Exam    Nursing note and vitals reviewed.  Constitutional: She appears well-developed and well-nourished.   HENT:   Head: Normocephalic and atraumatic.   Eyes: EOM are normal.   Left eye is fixed however patient has previously documented stroke in the left eye.  Pupil is reactive on the right.   Neck: Neck supple. No tracheal deviation present. No JVD present.   Cardiovascular:  Normal heart sounds and intact distal pulses.     Exam reveals no gallop and no friction rub.       No murmur heard.  Pulmonary/Chest: Breath sounds normal. No stridor. No respiratory distress. She has no wheezes. She has no rhonchi. She has no rales. She exhibits no tenderness.   Abdominal: Abdomen is soft. She exhibits no distension and no mass. There is no abdominal tenderness. There is no rebound and no guarding.   Musculoskeletal:      Cervical back: Neck supple.      Comments: I press down all long bones and extremities, no pain.  Stable pelvis.  Patient was undressed by nursing and placing gown.     Lymphadenopathy:     She has no cervical adenopathy.   Neurological: She is alert and  oriented to person, place, and time. She has normal strength. No cranial nerve deficit or sensory deficit. GCS score is 15.   No pain on palpation to the spine.  5/5 strength upper and lower extremities.   Skin: Skin is warm and dry. Capillary refill takes less than 2 seconds.   Psychiatric: She has a normal mood and affect. Her behavior is normal. Judgment and thought content normal.         ED Course   Lac Repair    Date/Time: 1/3/2025 8:37 AM    Performed by: Daniel Demarco MD  Authorized by: Janel Squires MD    Consent:     Consent obtained:  Verbal    Consent given by:  Patient    Risks, benefits, and alternatives were discussed: yes      Risks discussed:  Infection, pain, need for additional repair and poor cosmetic result    Alternatives discussed:  No treatment  Universal protocol:     Patient identity confirmed:  Verbally with patient  Anesthesia:     Anesthesia method:  Topical application  Laceration details:     Location:  Scalp    Scalp location:  R parietal    Length (cm):  3    Depth (mm):  3  Exploration:     Limited defect created (wound extended): no      Hemostasis achieved with:  LET    Imaging outcome: foreign body not noted      Wound exploration: wound explored through full range of motion and entire depth of wound visualized      Wound extent: no areolar tissue violation noted, no fascia violation noted, no foreign bodies/material noted, no muscle damage noted, no nerve damage noted, no tendon damage noted, no underlying fracture noted and no vascular damage noted      Contaminated: yes    Treatment:     Amount of cleaning:  Standard    Irrigation solution:  Sterile water    Irrigation volume:  500ml    Irrigation method:  Pressure wash    Debridement:  None    Undermining:  Minimal    Scar revision: no    Skin repair:     Repair method:  Staples    Number of staples:  3  Approximation:     Approximation:  Close  Repair type:     Repair type:  Intermediate  Post-procedure details:      Dressing:  Antibiotic ointment    Procedure completion:  Tolerated well, no immediate complications    Labs Reviewed   CBC W/ AUTO DIFFERENTIAL - Abnormal       Result Value    WBC 7.93      RBC 4.04      Hemoglobin 12.5      Hematocrit 37.3      MCV 92      MCH 30.9      MCHC 33.5      RDW 14.5      Platelets 220      MPV 9.3      Immature Granulocytes 0.4      Gran # (ANC) 5.7      Immature Grans (Abs) 0.03      Lymph # 1.3      Mono # 0.7      Eos # 0.1      Baso # 0.06      nRBC 0      Gran % 71.1      Lymph % 16.8 (*)     Mono % 9.1      Eosinophil % 1.8      Basophil % 0.8      Differential Method Automated     COMPREHENSIVE METABOLIC PANEL - Abnormal    Sodium 138      Potassium 3.5      Chloride 102      CO2 25      Glucose 104      BUN 37 (*)     Creatinine 0.8      Calcium 9.9      Total Protein 7.5      Albumin 3.8      Total Bilirubin 0.4      Alkaline Phosphatase 61      AST 17      ALT 8 (*)     eGFR >60.0      Anion Gap 11     POCT GLUCOSE - Abnormal    POCT Glucose 111 (*)    PROTIME-INR    Prothrombin Time 11.4      INR 1.0     APTT    aPTT 31.3          ECG Results              EKG 12-lead (Final result)        Collection Time Result Time QRS Duration OHS QTC Calculation    01/03/25 06:37:05 01/03/25 08:16:36 198 550                     Final result by Interface, Lab In [facility] (01/03/25 08:16:43)                   Narrative:    Test Reason : W19.XXXA,    Vent. Rate :  60 BPM     Atrial Rate :  57 BPM     P-R Int :    ms          QRS Dur : 198 ms      QT Int : 550 ms       P-R-T Axes :    -78  99 degrees    QTcB Int : 550 ms    Dual-chamber pacemaker (DDD), normally functioning  Biventricular pacemaker detected  Abnormal ECG  When compared with ECG of 17-Jun-2024 12:28,  No significant change was found  Confirmed by Rogelio Cartwright ([id number]) on 1/3/2025 8:16:33 AM    Referred By: AAAREFERRAL SELF           Confirmed By: Rogelio Cartwright                                  Imaging Results              CT  Head Without Contrast (Final result)  Result time 01/03/25 08:06:52      Final result by Stewart Sargent MD (01/03/25 08:06:52)                   Impression:      No evidence for acute intracranial pathology.    Sequela of chronic microvascular ischemic change.  Remote lacunar type infarct about the right basal ganglia.    Soft tissue swelling and induration about the left posterior parietal scalp.  No obvious underlying calvarial or skull base fracture.    Stable appearance of a calcified extra-axial lesion about the left internally fluid extending along the superior cerebellar convexity, likely representing a meningioma.      Electronically signed by: Stewart Sargent  Date:    01/03/2025  Time:    08:06               Narrative:    EXAMINATION:  CT HEAD WITHOUT CONTRAST    CLINICAL HISTORY:  Head trauma, minor (Age >= 65y);    TECHNIQUE:  Low dose axial images were obtained through the head.  Coronal and sagittal reformations were also performed. Contrast was not administered.    COMPARISON:  MRI brain without contrast 04/04/2024, CTA stroke multiphase 04/04/2024.    FINDINGS:  Ventricles are unchanged in size without evidence for new or worsening hydrocephalus.  No new or enlarging extra-axial blood or fluid collections.  Calcified extra-axial lesion about the left tentorial leaflet extending about the superior convexity of the cerebellum measuring approximately 2.2 x 1.8 x 2.2 cm (series 2, image 14), similar from comparison MRI 04/04/2024.  Additional scattered dural calcifications.    Brain parenchyma appears unchanged.  Scattered regions of subcortical and periventricular hypoattenuation, overall nonspecific, but can be seen in the setting of microvascular ischemic change.  Remote lacunar type infarct about the right basal ganglia.  No parenchymal mass, worsening edema, hemorrhage, or major vascular territory infarct.    No calvarial or skull base fracture or osseous destructive lesion.  Paranasal sinuses and  mastoid air cells are essentially clear.    Globes are symmetric.  Soft tissue swelling and induration about the left posterior parietal scalp.                                       CT Cervical Spine Without Contrast (Final result)  Result time 01/03/25 08:22:42      Final result by Stewart Sargent MD (01/03/25 08:22:42)                   Impression:      No evidence for acute fracture or traumatic malalignment of the cervical spine.      Electronically signed by: Stewart Sargent  Date:    01/03/2025  Time:    08:22               Narrative:    EXAMINATION:  CT CERVICAL SPINE WITHOUT CONTRAST    CLINICAL HISTORY:  Neck trauma (Age >= 65y);    TECHNIQUE:  Low dose axial images, sagittal and coronal reformations were performed though the cervical spine.  Contrast was not administered.    COMPARISON:  None    FINDINGS:  Grade 1 anterolisthesis of C3 on C4 and C4 on C5.    Vertebral body heights are maintained without evidence for acute fracture or osseous destructive lesion.    Dens is intact, noting marked degenerative change surrounding the dens and C1-C2 articulation.  Craniocervical junction is unremarkable.    Advanced multilevel intervertebral disc space height loss most prominent C5-C6 and C6-C7.    Multilevel cervical spondylosis, worst C5-C6, which contributes to moderate spinal canal stenosis as as moderate bilateral neural foraminal narrowing.    Paraspinal musculature demonstrates mild atrophy.  Advanced calcific atherosclerosis about the carotid bulbs.  Soft tissues of the neck and aero digestive structures are unremarkable.  Lung apices demonstrate mild biapical fibronodular scarring.                                       Medications   LETS (LIDOcaine-TETRAcaine-EPINEPHrine) gel solution ( Topical (Top) Given 1/3/25 0830)     Medical Decision Making  Amount and/or Complexity of Data Reviewed  Labs: ordered.  Radiology: ordered.    Risk  Prescription drug management.      89-year-old female mechanical fall with  laceration.    Differential diagnosis for this patient includes but isn't limited to intracranial hemorrhage, fractures of bones of the skull, cervical spine injury.    Life-threatening diagnosis considered in this patient includes cervical spine injury and fracture of bones of the skull.    Head CT ordered as well as neck CT for nexus criteria due to patient age.    Head CT independently interpreted no fracture, there was a laceration repaired as above patient tolerated well.    Neck CT independently interpreted, no fracture.    Patient informed of incidental findings in neck CT, patient has a normal neurologic exam today, at this time there was no concern for clinically significant narrowing of the spinal canal.  There was also an incidental meningioma on head CT, patient was informed that this is well, patient will follow up with the primary care physician for this.    After visit summary printed and given to patient, patient vital signs stable at time of discharge.    Patient Tdap updated.    EKG independently interpreted wide QRS complexes appears to be bifascicular block, similar to prior EKG from June 17, 2024.  No STEMI.  Poor QRS progression in precordial leads however this is similar to prior.                                                    Clinical Impression:  Final diagnoses:  [W19.XXXA] Fall  [D32.9] Meningioma - Found on CT (Primary)  [S01.01XA] Laceration of scalp, initial encounter          ED Disposition Condition    Discharge Stable          ED Prescriptions    None       Follow-up Information       Follow up With Specialties Details Why Contact Info Additional Information    Lola Wolff MD Family Medicine   [address]  [address]  [facility] [id number] [phone]       [facility] - Neurosurgery 8th Fl Neurosurgery   [address] [address] [phone] 8th Floor [facility] [place] Please park in [facility]. Check in desk is located in the lobby. Please take  the C elevator to 8th floor which opens to the lobby.             Daniel Demarco MD  Resident  01/03/25 [id number]

## 2025-01-03 NOTE — DISCHARGE INSTRUCTIONS
On your CT, we found what could be a meningioma, please follow up with Neurosurgery.    Please return to the emergency department if you have ongoing falls.  You are  on anticoagulation which makes you high-risk for bleeding.    Please get your staples out in 7 days.

## 2025-01-07 RX ORDER — APIXABAN 5 MG/1
5 TABLET, FILM COATED ORAL 2 TIMES DAILY
Qty: 180 TABLET | Refills: 1 | Status: SHIPPED | OUTPATIENT
Start: 2025-01-07

## 2025-01-11 ENCOUNTER — OFFICE VISIT (OUTPATIENT)
Dept: URGENT CARE | Facility: CLINIC | Age: OVER 89
End: 2025-01-11
Payer: MEDICARE

## 2025-01-11 VITALS
SYSTOLIC BLOOD PRESSURE: 113 MMHG | HEIGHT: 62 IN | WEIGHT: 142 LBS | TEMPERATURE: 98 F | HEART RATE: 57 BPM | BODY MASS INDEX: 26.13 KG/M2 | OXYGEN SATURATION: 96 % | RESPIRATION RATE: 19 BRPM | DIASTOLIC BLOOD PRESSURE: 47 MMHG

## 2025-01-11 DIAGNOSIS — Z48.02 ENCOUNTER FOR STAPLE REMOVAL: Primary | ICD-10-CM

## 2025-01-11 PROCEDURE — 99499 UNLISTED E&M SERVICE: CPT | Mod: S$GLB,,, | Performed by: NURSE PRACTITIONER

## 2025-01-11 NOTE — PROCEDURES
Suture Removal    Date/Time: 1/11/2025 11:30 AM  Location procedure was performed: [facility]    Performed by: Catrachita Godinez NP  Authorized by: Catrachita Godinez NP  Body area: head/neck  Location details: scalp  Description of findings: Well healed, underlying hematoma   Wound Appearance: clean, well healed and no drainage  Sutures Removed: 0  Staples Removed: 3  Post-removal: no dressing applied  Complications: No  Specimens: No  Implants: No  Patient tolerance: Patient tolerated the procedure well with no immediate complications

## 2025-01-11 NOTE — PROGRESS NOTES
"Subjective:      Patient ID: Dorothy M Knobloch is a 89 y.o. female.    Vitals:  height is 5' 2" (1.575 m) and weight is 64.4 kg (142 lb). Her oral temperature is 97.6 °F (36.4 °C). Her blood pressure is 113/47 (abnormal) and her pulse is 57 (abnormal). Her respiration is 19 and oxygen saturation is 96%.     Chief Complaint: Suture / Staple Removal    This is a 89 y.o. female who presents today with a chief complaint of staple removal, they were placed 8 days ago at [facility]. Site is itchy, and only bothersome when she touches it.  Pt has taken OTC asprin to help when she has had discomfort.   Provider note begins below    Review of chart shows that staples were placed on January 3rd.  She is on coagulants.    Suture / Staple Removal  The sutures were placed 7 to 10 days ago. She tried nothing since the wound repair. The treatment provided significant relief. There has been no drainage from the wound. There is no redness present. There is no swelling present. There is no pain present.       Constitution: Negative for sweating, fatigue and fever.   Cardiovascular:  Negative for chest pain and sob on exertion.   Respiratory:  Negative for shortness of breath.    Skin:  Positive for laceration.      Objective:     Physical Exam   Constitutional: She is oriented to person, place, and time.   HENT:   Head: Normocephalic.       Cardiovascular: Normal rate.   Pulmonary/Chest: Effort normal. No respiratory distress.   Abdominal: Normal appearance.   Neurological: She is alert and oriented to person, place, and time.   Skin: Skin is warm and dry.   Psychiatric: Her behavior is normal. Mood normal.       Assessment:     1. Encounter for staple removal        Plan:   Remove 3 staples   Patient tolerated well   Follow up as needed           Encounter for staple removal  -     Suture Removal                    "

## 2025-01-15 NOTE — PROGRESS NOTES
Phone visit. Pt is progressing well. Tired. No new neuro s/s . Declines need for home visit today. Post CVA education provided as well as fall prevention ans safety measures. Pt appreciated call .

## 2025-01-30 RX ORDER — AMLODIPINE BESYLATE 5 MG/1
5 TABLET ORAL DAILY
Qty: 90 TABLET | Refills: 3 | Status: SHIPPED | OUTPATIENT
Start: 2025-01-30

## 2025-02-03 RX ORDER — METOPROLOL SUCCINATE 25 MG/1
TABLET, EXTENDED RELEASE ORAL
Qty: 270 TABLET | Refills: 3 | Status: SHIPPED | OUTPATIENT
Start: 2025-02-03

## 2025-02-11 RX ORDER — HYDROCHLOROTHIAZIDE 12.5 MG/1
12.5 TABLET ORAL
Qty: 90 TABLET | Refills: 3 | Status: SHIPPED | OUTPATIENT
Start: 2025-02-11

## 2025-02-19 ENCOUNTER — TELEPHONE (OUTPATIENT)
Dept: CARDIOLOGY | Facility: HOSPITAL | Age: OVER 89
End: 2025-02-19
Payer: MEDICARE

## 2025-02-19 NOTE — TELEPHONE ENCOUNTER
Returned pt call at number listed below, no answer. I was unable to leave a voice message.  ----- Message from Radhika sent at 2/19/2025 10:34 AM CST -----    ----- Message -----  From: Zelalem Mackey MA  Sent: 2/19/2025  10:28 AM CST  To: Nadine Christianson RN      ----- Message -----  From: Torey Hodgson  Sent: 2/19/2025   9:29 AM CST  To: Dashawn Marcano Staff    Patient is calling to get a reading from her device. She can be reached at [phone].Thank you

## 2025-02-20 ENCOUNTER — TELEPHONE (OUTPATIENT)
Dept: CARDIOLOGY | Facility: HOSPITAL | Age: OVER 89
End: 2025-02-20
Payer: MEDICARE

## 2025-02-20 NOTE — TELEPHONE ENCOUNTER
I spoke with the patient's daughter yesterday afternoon. The patient received an error code 3230 on her carelink monitor when she attempted to send her monthly transmission and were concerned because her pacemaker is nearing FAINA.      I explained the error code is either because the reader has malfunctioned or the battery needs replacement on the home monitor.  I provided her with the number to [employer]. She is going to call today for assistance.      Ms. Knobloch's last transmission received on 12/29 revealed her pacemaker was 5 months to FAINA.  I communicated this to her. If she needs a replacement monitor and since she has not sent a transmission since Dec., I offered to schedule an in-clinic device appt while waiting for the monitor to be delivered.  Her daughter will let me know after she calls [employer] in am.      Prior to ending the call, I provided her with the device clinic number and my office number if she needed additional assistance.  Her daughter appreciated the call and felt like all of her questions were answered.

## 2025-02-21 ENCOUNTER — TELEPHONE (OUTPATIENT)
Dept: CARDIOLOGY | Facility: HOSPITAL | Age: OVER 89
End: 2025-02-21
Payer: MEDICARE

## 2025-02-21 ENCOUNTER — HOME CARE VISIT (OUTPATIENT)
Dept: NEUROLOGY | Facility: HOSPITAL | Age: OVER 89
End: 2025-02-21
Payer: MEDICARE

## 2025-02-21 NOTE — TELEPHONE ENCOUNTER
Follow up call to daughter fore an update on the status of Ms. Knobloch's home monitor.  She states she did not have the opportunity to call [employer] yesterday due to other appts. She is planning to call today.  She thanked me for the call and appreciated the follow-up call.

## 2025-03-03 ENCOUNTER — CLINICAL SUPPORT (OUTPATIENT)
Dept: CARDIOLOGY | Facility: HOSPITAL | Age: OVER 89
End: 2025-03-03
Attending: INTERNAL MEDICINE
Payer: MEDICARE

## 2025-03-03 ENCOUNTER — CLINICAL SUPPORT (OUTPATIENT)
Dept: CARDIOLOGY | Facility: HOSPITAL | Age: OVER 89
End: 2025-03-03
Payer: MEDICARE

## 2025-03-03 DIAGNOSIS — I44.2 ATRIOVENTRICULAR BLOCK, COMPLETE: ICD-10-CM

## 2025-03-03 DIAGNOSIS — Z95.0 PRESENCE OF CARDIAC PACEMAKER: ICD-10-CM

## 2025-03-03 PROCEDURE — 93294 REM INTERROG EVL PM/LDLS PM: CPT | Mod: ,,, | Performed by: INTERNAL MEDICINE

## 2025-03-03 PROCEDURE — 93296 REM INTERROG EVL PM/IDS: CPT | Performed by: INTERNAL MEDICINE

## 2025-03-07 ENCOUNTER — ANESTHESIA EVENT (OUTPATIENT)
Dept: SURGERY | Facility: HOSPITAL | Age: OVER 89
DRG: 481 | End: 2025-03-07
Payer: MEDICARE

## 2025-03-07 ENCOUNTER — HOSPITAL ENCOUNTER (INPATIENT)
Facility: HOSPITAL | Age: OVER 89
LOS: 6 days | Discharge: NURSING, REHAB OR LONG-TERM CARE | DRG: 481 | End: 2025-03-13
Attending: EMERGENCY MEDICINE | Admitting: INTERNAL MEDICINE
Payer: MEDICARE

## 2025-03-07 DIAGNOSIS — M97.8XXA PERIPROSTHETIC FRACTURE OF SHAFT OF FEMUR: Primary | ICD-10-CM

## 2025-03-07 DIAGNOSIS — I50.32 CHRONIC DIASTOLIC HEART FAILURE: ICD-10-CM

## 2025-03-07 DIAGNOSIS — E78.00 PURE HYPERCHOLESTEROLEMIA: ICD-10-CM
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
## 2025-03-07 DIAGNOSIS — I10 ESSENTIAL HYPERTENSION: ICD-10-CM

## 2025-03-07 DIAGNOSIS — E03.9 ACQUIRED HYPOTHYROIDISM: ICD-10-CM

## 2025-03-07 DIAGNOSIS — Z95.0 CARDIAC PACEMAKER: ICD-10-CM

## 2025-03-07 DIAGNOSIS — D64.9 NORMOCHROMIC NORMOCYTIC ANEMIA: ICD-10-CM

## 2025-03-07 DIAGNOSIS — I48.0 PAROXYSMAL ATRIAL FIBRILLATION: ICD-10-CM

## 2025-03-07 DIAGNOSIS — R07.9 CHEST PAIN: ICD-10-CM

## 2025-03-07 DIAGNOSIS — Z96.649 PERIPROSTHETIC FRACTURE OF SHAFT OF FEMUR: Primary | ICD-10-CM

## 2025-03-07 DIAGNOSIS — E87.6 HYPOKALEMIA: ICD-10-CM

## 2025-03-07 DIAGNOSIS — S72.91XA CLOSED FRACTURE OF RIGHT FEMUR, UNSPECIFIED FRACTURE MORPHOLOGY, UNSPECIFIED PORTION OF FEMUR, INITIAL ENCOUNTER: ICD-10-CM

## 2025-03-07 PROBLEM — R47.1 DYSARTHRIA: Status: RESOLVED | Noted: 2024-04-05 | Resolved: 2025-03-07

## 2025-03-07 PROBLEM — E87.1 HYPONATREMIA: Status: RESOLVED | Noted: 2024-04-05 | Resolved: 2025-03-07

## 2025-03-07 PROBLEM — E88.09 HYPOALBUMINEMIA: Status: RESOLVED | Noted: 2024-04-05 | Resolved: 2025-03-07

## 2025-03-07 PROBLEM — R29.818 TRANSIENT NEUROLOGICAL SYMPTOMS: Status: RESOLVED | Noted: 2024-04-04 | Resolved: 2025-03-07

## 2025-03-07 PROBLEM — H57.11: Status: RESOLVED | Noted: 2018-08-10 | Resolved: 2025-03-07

## 2025-03-07 LAB
ABO + RH BLD: NORMAL
ALBUMIN SERPL BCP-MCNC: 3.5 G/DL (ref 3.5–5.2)
ALP SERPL-CCNC: 55 U/L (ref 40–150)
ALT SERPL W/O P-5'-P-CCNC: 7 U/L (ref 10–44)
ANION GAP SERPL CALC-SCNC: 11 MMOL/L (ref 8–16)
AST SERPL-CCNC: 20 U/L (ref 10–40)
BACTERIA #/AREA URNS AUTO: ABNORMAL /HPF
BASOPHILS # BLD AUTO: 0.07 K/UL (ref 0–0.2)
BASOPHILS NFR BLD: 0.8 % (ref 0–1.9)
BILIRUB SERPL-MCNC: 0.6 MG/DL (ref 0.1–1)
BILIRUB UR QL STRIP: NEGATIVE
BLD GP AB SCN CELLS X3 SERPL QL: NORMAL
BUN SERPL-MCNC: 33 MG/DL (ref 10–30)
CALCIUM SERPL-MCNC: 9.2 MG/DL (ref 8.7–10.5)
CHLORIDE SERPL-SCNC: 101 MMOL/L (ref 95–110)
CLARITY UR REFRACT.AUTO: CLEAR
CO2 SERPL-SCNC: 26 MMOL/L (ref 23–29)
COLOR UR AUTO: YELLOW
CREAT SERPL-MCNC: 0.7 MG/DL (ref 0.5–1.4)
CRP SERPL-MCNC: 4 MG/L (ref 0–8.2)
DIFFERENTIAL METHOD BLD: ABNORMAL
EOSINOPHIL # BLD AUTO: 0.1 K/UL (ref 0–0.5)
EOSINOPHIL NFR BLD: 1.4 % (ref 0–8)
ERYTHROCYTE [DISTWIDTH] IN BLOOD BY AUTOMATED COUNT: 14.6 % (ref 11.5–14.5)
ERYTHROCYTE [SEDIMENTATION RATE] IN BLOOD BY PHOTOMETRIC METHOD: 19 MM/HR (ref 0–36)
EST. GFR  (NO RACE VARIABLE): >60 ML/MIN/1.73 M^2
ESTIMATED AVG GLUCOSE: 100 MG/DL (ref 68–131)
GLUCOSE SERPL-MCNC: 121 MG/DL (ref 70–110)
GLUCOSE UR QL STRIP: ABNORMAL
HBA1C MFR BLD: 5.1 % (ref 4–5.6)
HCT VFR BLD AUTO: 30.1 % (ref 37–48.5)
HGB BLD-MCNC: 9.8 G/DL (ref 12–16)
HGB UR QL STRIP: NEGATIVE
IMM GRANULOCYTES # BLD AUTO: 0.07 K/UL (ref 0–0.04)
IMM GRANULOCYTES NFR BLD AUTO: 0.8 % (ref 0–0.5)
INR PPP: 1.1 (ref 0.8–1.2)
KETONES UR QL STRIP: NEGATIVE
LEUKOCYTE ESTERASE UR QL STRIP: NEGATIVE
LYMPHOCYTES # BLD AUTO: 1 K/UL (ref 1–4.8)
LYMPHOCYTES NFR BLD: 11.4 % (ref 18–48)
MAGNESIUM SERPL-MCNC: 2.1 MG/DL (ref 1.6–2.6)
MCH RBC QN AUTO: 31.1 PG (ref 27–31)
MCHC RBC AUTO-ENTMCNC: 32.6 G/DL (ref 32–36)
MCV RBC AUTO: 96 FL (ref 82–98)
MICROSCOPIC COMMENT: ABNORMAL
MONOCYTES # BLD AUTO: 0.6 K/UL (ref 0.3–1)
MONOCYTES NFR BLD: 7.1 % (ref 4–15)
NEUTROPHILS # BLD AUTO: 6.8 K/UL (ref 1.8–7.7)
NEUTROPHILS NFR BLD: 78.5 % (ref 38–73)
NITRITE UR QL STRIP: NEGATIVE
NRBC BLD-RTO: 0 /100 WBC
OHS QRS DURATION: 196 MS
OHS QTC CALCULATION: 544 MS
PH UR STRIP: 7 [PH] (ref 5–8)
PLATELET # BLD AUTO: 192 K/UL (ref 150–450)
PMV BLD AUTO: 9.2 FL (ref 9.2–12.9)
POTASSIUM SERPL-SCNC: 3.4 MMOL/L (ref 3.5–5.1)
PREALB SERPL-MCNC: 8 MG/DL (ref 20–43)
PROT SERPL-MCNC: 6.5 G/DL (ref 6–8.4)
PROT UR QL STRIP: NEGATIVE
PROTHROMBIN TIME: 11.9 SEC (ref 9–12.5)
RBC # BLD AUTO: 3.15 M/UL (ref 4–5.4)
RBC #/AREA URNS AUTO: 3 /HPF (ref 0–4)
SODIUM SERPL-SCNC: 138 MMOL/L (ref 136–145)
SP GR UR STRIP: 1.02 (ref 1–1.03)
SPECIMEN OUTDATE: NORMAL
SQUAMOUS #/AREA URNS AUTO: 1 /HPF
TRANSFERRIN SERPL-MCNC: 259 MG/DL (ref 200–375)
URN SPEC COLLECT METH UR: ABNORMAL
WBC # BLD AUTO: 8.69 K/UL (ref 3.9–12.7)
WBC #/AREA URNS AUTO: 2 /HPF (ref 0–5)
YEAST UR QL AUTO: ABNORMAL

## 2025-03-07 PROCEDURE — 63600175 PHARM REV CODE 636 W HCPCS: Performed by: EMERGENCY MEDICINE

## 2025-03-07 PROCEDURE — 85652 RBC SED RATE AUTOMATED: CPT

## 2025-03-07 PROCEDURE — 25000003 PHARM REV CODE 250: Performed by: EMERGENCY MEDICINE

## 2025-03-07 PROCEDURE — 86920 COMPATIBILITY TEST SPIN: CPT

## 2025-03-07 PROCEDURE — 25000003 PHARM REV CODE 250

## 2025-03-07 PROCEDURE — 85610 PROTHROMBIN TIME: CPT

## 2025-03-07 PROCEDURE — 86901 BLOOD TYPING SEROLOGIC RH(D): CPT

## 2025-03-07 PROCEDURE — 80053 COMPREHEN METABOLIC PANEL: CPT | Performed by: EMERGENCY MEDICINE

## 2025-03-07 PROCEDURE — 83036 HEMOGLOBIN GLYCOSYLATED A1C: CPT

## 2025-03-07 PROCEDURE — 93010 ELECTROCARDIOGRAM REPORT: CPT | Mod: ,,, | Performed by: STUDENT IN AN ORGANIZED HEALTH CARE EDUCATION/TRAINING PROGRAM

## 2025-03-07 PROCEDURE — 85025 COMPLETE CBC W/AUTO DIFF WBC: CPT | Performed by: EMERGENCY MEDICINE

## 2025-03-07 PROCEDURE — 21400001 HC TELEMETRY ROOM

## 2025-03-07 PROCEDURE — 83735 ASSAY OF MAGNESIUM: CPT

## 2025-03-07 PROCEDURE — 99223 1ST HOSP IP/OBS HIGH 75: CPT | Mod: FS,57,GC, | Performed by: STUDENT IN AN ORGANIZED HEALTH CARE EDUCATION/TRAINING PROGRAM

## 2025-03-07 PROCEDURE — 93005 ELECTROCARDIOGRAM TRACING: CPT

## 2025-03-07 PROCEDURE — 84134 ASSAY OF PREALBUMIN: CPT

## 2025-03-07 PROCEDURE — 84466 ASSAY OF TRANSFERRIN: CPT

## 2025-03-07 PROCEDURE — 81001 URINALYSIS AUTO W/SCOPE: CPT | Performed by: EMERGENCY MEDICINE

## 2025-03-07 PROCEDURE — 86140 C-REACTIVE PROTEIN: CPT

## 2025-03-07 RX ORDER — METOPROLOL SUCCINATE 25 MG/1
25 TABLET, EXTENDED RELEASE ORAL 2 TIMES DAILY
Status: DISCONTINUED | OUTPATIENT
Start: 2025-03-07 | End: 2025-03-13 | Stop reason: HOSPADM

## 2025-03-07 RX ORDER — AMLODIPINE BESYLATE 5 MG/1
5 TABLET ORAL DAILY
Status: DISCONTINUED | OUTPATIENT
Start: 2025-03-08 | End: 2025-03-13 | Stop reason: HOSPADM

## 2025-03-07 RX ORDER — IBUPROFEN 200 MG
24 TABLET ORAL
Status: DISCONTINUED | OUTPATIENT
Start: 2025-03-07 | End: 2025-03-13 | Stop reason: HOSPADM

## 2025-03-07 RX ORDER — HYDROCHLOROTHIAZIDE 12.5 MG/1
12.5 TABLET ORAL DAILY
Status: DISCONTINUED | OUTPATIENT
Start: 2025-03-08 | End: 2025-03-13 | Stop reason: HOSPADM

## 2025-03-07 RX ORDER — GLUCAGON 1 MG
1 KIT INJECTION
Status: DISCONTINUED | OUTPATIENT
Start: 2025-03-07 | End: 2025-03-13 | Stop reason: HOSPADM

## 2025-03-07 RX ORDER — ACETAMINOPHEN 500 MG
1000 TABLET ORAL
Status: COMPLETED | OUTPATIENT
Start: 2025-03-07 | End: 2025-03-07

## 2025-03-07 RX ORDER — ONDANSETRON 8 MG/1
8 TABLET, ORALLY DISINTEGRATING ORAL EVERY 8 HOURS PRN
Status: DISCONTINUED | OUTPATIENT
Start: 2025-03-07 | End: 2025-03-07

## 2025-03-07 RX ORDER — METHOCARBAMOL 500 MG/1
500 TABLET, FILM COATED ORAL 3 TIMES DAILY
Status: DISCONTINUED | OUTPATIENT
Start: 2025-03-07 | End: 2025-03-08

## 2025-03-07 RX ORDER — ACETAMINOPHEN 500 MG
1000 TABLET ORAL EVERY 8 HOURS
Status: DISCONTINUED | OUTPATIENT
Start: 2025-03-08 | End: 2025-03-13 | Stop reason: HOSPADM

## 2025-03-07 RX ORDER — LEVOTHYROXINE SODIUM 88 UG/1
88 TABLET ORAL
Status: DISCONTINUED | OUTPATIENT
Start: 2025-03-08 | End: 2025-03-13 | Stop reason: HOSPADM

## 2025-03-07 RX ORDER — POTASSIUM CHLORIDE 20 MEQ/1
40 TABLET, EXTENDED RELEASE ORAL ONCE
Status: COMPLETED | OUTPATIENT
Start: 2025-03-07 | End: 2025-03-07

## 2025-03-07 RX ORDER — ESCITALOPRAM OXALATE 20 MG/1
20 TABLET ORAL DAILY
Status: DISCONTINUED | OUTPATIENT
Start: 2025-03-08 | End: 2025-03-13 | Stop reason: HOSPADM

## 2025-03-07 RX ORDER — OXYCODONE HYDROCHLORIDE 5 MG/1
5 TABLET ORAL EVERY 6 HOURS PRN
Refills: 0 | Status: DISCONTINUED | OUTPATIENT
Start: 2025-03-07 | End: 2025-03-10

## 2025-03-07 RX ORDER — SODIUM CHLORIDE 0.9 % (FLUSH) 0.9 %
10 SYRINGE (ML) INJECTION
Status: DISCONTINUED | OUTPATIENT
Start: 2025-03-07 | End: 2025-03-13 | Stop reason: HOSPADM

## 2025-03-07 RX ORDER — SODIUM CHLORIDE 0.9 % (FLUSH) 0.9 %
10 SYRINGE (ML) INJECTION
Status: DISCONTINUED | OUTPATIENT
Start: 2025-03-07 | End: 2025-03-09

## 2025-03-07 RX ORDER — TALC
6 POWDER (GRAM) TOPICAL NIGHTLY PRN
Status: DISCONTINUED | OUTPATIENT
Start: 2025-03-07 | End: 2025-03-13 | Stop reason: HOSPADM

## 2025-03-07 RX ORDER — ACETAMINOPHEN 500 MG
1000 TABLET ORAL EVERY 8 HOURS
Status: DISCONTINUED | OUTPATIENT
Start: 2025-03-07 | End: 2025-03-07

## 2025-03-07 RX ORDER — MORPHINE SULFATE 2 MG/ML
2 INJECTION, SOLUTION INTRAMUSCULAR; INTRAVENOUS
Status: COMPLETED | OUTPATIENT
Start: 2025-03-07 | End: 2025-03-07

## 2025-03-07 RX ORDER — PROMETHAZINE HYDROCHLORIDE 25 MG/1
25 TABLET ORAL EVERY 6 HOURS PRN
Status: DISCONTINUED | OUTPATIENT
Start: 2025-03-07 | End: 2025-03-07

## 2025-03-07 RX ORDER — BUMETANIDE 0.5 MG/1
0.5 TABLET ORAL DAILY
Status: DISCONTINUED | OUTPATIENT
Start: 2025-03-08 | End: 2025-03-13 | Stop reason: HOSPADM

## 2025-03-07 RX ORDER — BISACODYL 10 MG/1
10 SUPPOSITORY RECTAL DAILY PRN
Status: DISCONTINUED | OUTPATIENT
Start: 2025-03-07 | End: 2025-03-13 | Stop reason: HOSPADM

## 2025-03-07 RX ORDER — IBUPROFEN 200 MG
16 TABLET ORAL
Status: DISCONTINUED | OUTPATIENT
Start: 2025-03-07 | End: 2025-03-13 | Stop reason: HOSPADM

## 2025-03-07 RX ORDER — IPRATROPIUM BROMIDE AND ALBUTEROL SULFATE 2.5; .5 MG/3ML; MG/3ML
3 SOLUTION RESPIRATORY (INHALATION) EVERY 4 HOURS PRN
Status: DISCONTINUED | OUTPATIENT
Start: 2025-03-07 | End: 2025-03-13 | Stop reason: HOSPADM

## 2025-03-07 RX ADMIN — ACETAMINOPHEN 1000 MG: 500 TABLET ORAL at 02:03

## 2025-03-07 RX ADMIN — POTASSIUM CHLORIDE 40 MEQ: 1500 TABLET, EXTENDED RELEASE ORAL at 04:03

## 2025-03-07 RX ADMIN — METHOCARBAMOL 500 MG: 500 TABLET ORAL at 02:03

## 2025-03-07 RX ADMIN — METOPROLOL SUCCINATE 25 MG: 25 TABLET, EXTENDED RELEASE ORAL at 09:03

## 2025-03-07 RX ADMIN — ACETAMINOPHEN 1000 MG: 500 TABLET ORAL at 10:03

## 2025-03-07 RX ADMIN — OXYCODONE 5 MG: 5 TABLET ORAL at 09:03

## 2025-03-07 RX ADMIN — METHOCARBAMOL 500 MG: 500 TABLET ORAL at 09:03

## 2025-03-07 RX ADMIN — MORPHINE SULFATE 2 MG: 2 INJECTION, SOLUTION INTRAMUSCULAR; INTRAVENOUS at 10:03

## 2025-03-07 NOTE — ASSESSMENT & PLAN NOTE
Patient's blood pressure range in the last 24 hours was: BP  Min: 100/50  Max: 140/80.The patient's inpatient anti-hypertensive regimen is listed below:  Current Antihypertensives  amLODIPine tablet 5 mg, Daily, Oral  hydroCHLOROthiazide tablet 12.5 mg, Daily, Oral  metoprolol succinate (TOPROL-XL) 24 hr tablet 25 mg, 2 times daily, Oral  bumetanide tablet 0.5 mg, Daily, Oral    Plan  - BP is controlled, no changes needed to their regimen

## 2025-03-07 NOTE — ED PROVIDER NOTES
Encounter Date: 3/7/2025       History     Chief Complaint   Patient presents with    Fall     Pt arrived to ED via EMS from home with complaint of fall. Pt did hit head and is on blood thinners. Pt reports some pain to right thigh area upon arrival. Hx of afib/chf and has pacemaker.      HPI  89-year-old female with past medical history as noted below coming in after a fall.  She says she was in her usual state of health she was trying to let her aide into the house she was walking towards the walker but can quite reach the door so leaned over but then the walker got loose and she fell.  Says she feels backwards, did hit her head but no LOC and fell onto her right hip.  She has a history of a right hip replacement.  She was not able to get up.    Currently she is only complaining of significant right hip pain worse with movement.  She denies headache neck or back pain, upper extremity pain, chest pain, shortness breath, abdominal pain.    Previous to this episode/fall she was in her usual state of health.  Denies any dizziness for or after the fall.    Review of patient's allergies indicates:   Allergen Reactions    Aspartame Swelling     equal     Past Medical History:   Diagnosis Date    Acquired hypothyroidism 12/13/2013    Age-related physical debility 04/05/2024    Antiplatelet or antithrombotic long-term use     AV block, 3rd degree 03/19/2019    Bilateral nonexudative age-related macular degeneration 08/27/2013    Blindness and low vision 08/10/2018    Blood transfusion     Cardiac pacemaker 08/22/2018    Carotid artery disease 08/10/2016    Chronic diastolic congestive heart failure 11/24/2019    CRAO (central retinal artery occlusion), left 01/29/2018    Dry eye syndrome of both eyes 08/10/2018    Essential hypertension 12/13/2013    History of TIA (transient ischemic attack) 12/13/2013    Nonexudative age-related macular degeneration, bilateral, intermediate dry stage 07/12/2018    Nuclear sclerotic  cataract of left eye 08/10/2018    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 12/13/2013    On anticoagulant therapy 06/13/2024    Osteoarthritis     Paroxysmal atrial fibrillation 09/11/2018    Pulmonary hypertension 04/05/2024    Pure hypercholesterolemia 12/13/2013    Sick sinus syndrome     Stroke     Takotsubo cardiomyopathy 11/17/2016     Past Surgical History:   Procedure Laterality Date    CARDIAC PACEMAKER PLACEMENT      [employer] Device Model: Juli LOZADA MRI A2DR01 Device Type: PACEMAKER Device S\N: [id number]    CARPAL TUNNEL RELEASE Bilateral 1991    CATARACT EXTRACTION Right     JOINT REPLACEMENT      AR TRANSCRAN DOPP INTRACRAN, EMBOLI W/O INJ  01/29/2018         SKIN BIOPSY      TONSILLECTOMY      TOTAL HIP ARTHROPLASTY  11/01/2000    right/[facility]     Family History   Problem Relation Name Age of Onset    Early death Mother      Early death Father      Arthritis Sister      Hearing loss Daughter      Birth defects Son      Hypertension Son      Diabetes Paternal Aunt      Cancer Paternal Aunt  80        colon  cancer    Diabetes Paternal Uncle      Arthritis Maternal Grandmother      Arthritis Paternal Grandmother      Hypertension Paternal Grandmother      Amblyopia Neg Hx      Blindness Neg Hx      Cataracts Neg Hx      Glaucoma Neg Hx      Macular degeneration Neg Hx      Retinal detachment Neg Hx      Strabismus Neg Hx      Stroke Neg Hx      Thyroid disease Neg Hx       Social History[1]  Review of Systems    Physical Exam     Initial Vitals [03/07/25 1021]   BP Pulse Resp Temp SpO2   (!) 140/80 60 (!) 22 97.8 °F (36.6 °C) (!) 94 %      MAP       --         Physical Exam    Physical Exam:  CONSTITUTIONAL: Well developed, well nourished, in no acute distress.  HENT: Normocephalic, atraumatic    EYES: Sclerae anicteric   NECK: Supple, no thyroid enlargement  CARDIOVASCULAR: Regular rate and rhythm without any murmurs, gallops, rubs.  RESPIRATORY: Speaking in  full sentences. Breathing comfortably. Auscultation of the lungs revealed normal breath sounds b/l, no wheezing, no rales, no rhonchi.  ABDOMEN: Soft and nontender, no masses, no rebound or guarding   NEUROLOGIC: Alert, interacting normally. No facial droop. Voice is clear. Speech is fluent.  MSK:  There is no C, T or L-spine tenderness, right lower extremity is shortened and rotated with tenderness of the right hip.  Appropriate cap refill to the right lower extremity.  Bilateral upper extremities with normal range motion and no deformities or tenderness to palpation.  Left lower extremity with no deformity or tenderness to palpation with normal range motion.  SKIN: Warm and dry. No visible rash on exposed areas of skin.    Psych: Mood and affect normal.       ED Course   Procedures  Labs Reviewed   CBC W/ AUTO DIFFERENTIAL - Abnormal       Result Value    WBC 8.69      RBC 3.15 (*)     Hemoglobin 9.8 (*)     Hematocrit 30.1 (*)     MCV 96      MCH 31.1 (*)     MCHC 32.6      RDW 14.6 (*)     Platelets 192      MPV 9.2      Immature Granulocytes 0.8 (*)     Gran # (ANC) 6.8      Immature Grans (Abs) 0.07 (*)     Lymph # 1.0      Mono # 0.6      Eos # 0.1      Baso # 0.07      nRBC 0      Gran % 78.5 (*)     Lymph % 11.4 (*)     Mono % 7.1      Eosinophil % 1.4      Basophil % 0.8      Differential Method Automated     COMPREHENSIVE METABOLIC PANEL - Abnormal    Sodium 138      Potassium 3.4 (*)     Chloride 101      CO2 26      Glucose 121 (*)     BUN 33 (*)     Creatinine 0.7      Calcium 9.2      Total Protein 6.5      Albumin 3.5      Total Bilirubin 0.6      Alkaline Phosphatase 55      AST 20      ALT 7 (*)     eGFR >60.0      Anion Gap 11     HEMOGLOBIN A1C    Hemoglobin A1C 5.1      Estimated Avg Glucose 100     MAGNESIUM    Magnesium 2.1     PROTIME-INR    Prothrombin Time 11.9      INR 1.1     TRANSFERRIN    Transferrin 259     SEDIMENTATION RATE    Sed Rate 19     C-REACTIVE PROTEIN    CRP 4.0      URINALYSIS, REFLEX TO URINE CULTURE   PREALBUMIN   TYPE & SCREEN   PREPARE RBC SOFT        ECG Results              EKG 12-lead (Final result)        Collection Time Result Time QRS Duration OHS QTC Calculation    03/07/25 11:47:37 03/07/25 13:17:38 196 544                     Final result by Interface, Lab In [facility] (03/07/25 13:17:45)                   Narrative:    Test Reason : W19.XXXA,    Vent. Rate :  60 BPM     Atrial Rate :  60 BPM     P-R Int : 218 ms          QRS Dur : 196 ms      QT Int : 544 ms       P-R-T Axes :    -80 102 degrees    QTcB Int : 544 ms    AV dual-paced rhythm with prolonged AV conduction  Abnormal ECG  When compared with ECG of 03-Jan-2025 06:37,  Electronic ventricular pacemaker has replaced Wide QRS rhythm  Confirmed by Froilan Salazar ([id number]) on 3/7/2025 1:17:33 PM    Referred By: AAAREFERRAL SELF           Confirmed By: Froilan Salazar                                  Imaging Results              CT 3D Rendering WO Independent Workstation (In process)                      CT Thigh Without Contrast Right (In process)                      X-Ray Chest AP Portable (Final result)  Result time 03/07/25 12:01:04      Final result by Jerrell Neri MD (03/07/25 12:01:04)                   Impression:      See above      Electronically signed by: Jerrell Neri MD  Date:    03/07/2025  Time:    12:01               Narrative:    EXAMINATION:  XR CHEST AP PORTABLE    CLINICAL HISTORY:  pre-operative;    TECHNIQUE:  Single frontal view of the chest was performed.    COMPARISON:  Non 04/04/2024 e    FINDINGS:  Pacemaker identified as before.  The heart is not enlarged.  Calcification overlying the heart probably mitral  annulus calcifications.  Mild accentuation of the basilar interstitial markings and/or mild edema.  No significant airspace consolidation or pleural effusion identified                                       X-Ray Femur 2 View Right (Final result)  Result time 03/07/25  11:58:18      Final result by Jerrell Neri MD (03/07/25 11:58:18)                   Impression:      Femoral shaft fracture      Electronically signed by: Jerrell Neri MD  Date:    03/07/2025  Time:    11:58               Narrative:    EXAMINATION:  XR FEMUR 2 VIEW RIGHT    CLINICAL HISTORY:  right hip injury;    TECHNIQUE:  AP and lateral views of the right femur were performed.    COMPARISON:  None    FINDINGS:  There is a displaced and angulated fracture identified involving the distal femoral shaft.  Right hip arthroplasty noted in a satisfactory position alignment.                                       X-Ray Knee 2 View Right (Final result)  Result time 03/07/25 12:03:27      Final result by Dc Damon Jr., MD (03/07/25 12:03:27)                   Impression:      Partially visualized fracture distal right femur.  Probable right effusion.      Electronically signed by: Dc Damon MD  Date:    03/07/2025  Time:    12:03               Narrative:    EXAMINATION:  XR KNEE 1 OR 2 VIEW RIGHT    CLINICAL HISTORY:  right hip injury;    TECHNIQUE:  AP and lateral views of the right knee were performed.    COMPARISON:  Bilateral knees July 2024    FINDINGS:  Bones are demineralized.  Partially visualized comminuted fracture distal right femur.  Vascular calcifications are noted.  Narrowing right lateral femoral tibial compartment.  Probable right suprapatellar effusion.                                       X-Ray Pelvis Routine AP (Final result)  Result time 03/07/25 12:04:59      Final result by Dc Damon Jr., MD (03/07/25 12:04:59)                   Impression:      See above      Electronically signed by: Dc Damon MD  Date:    03/07/2025  Time:    12:04               Narrative:    EXAMINATION:  XR PELVIS ROUTINE AP    CLINICAL HISTORY:  right hip injury;    TECHNIQUE:  AP view of the pelvis was performed.    COMPARISON:  None.    FINDINGS:  Postop change right hip replacement.  Bones are  demineralized.  Degenerative change in the spine.                                       CT Cervical Spine Without Contrast (Final result)  Result time 03/07/25 11:24:49      Final result by Jurgen Andrade MD (03/07/25 11:24:49)                   Impression:      1. No acute intracranial findings.  2. No acute cervical spine fracture.      Electronically signed by: Jurgen Andrade  Date:    03/07/2025  Time:    11:24               Narrative:    EXAMINATION:  CT HEAD WITHOUT CONTRAST; CT CERVICAL SPINE WITHOUT CONTRAST    CLINICAL HISTORY:  Head trauma, minor (Age >= 65y);fall with head trauma;; Neck trauma (Age >= 65y);fall;    TECHNIQUE:  Low dose axial images were obtained through the head and cervical spine.  Coronal and sagittal reformations were also performed. Contrast was not administered.    COMPARISON:  CT head and cervical spine 01/03/2025    FINDINGS:  Head:    Blood: No acute intracranial hemorrhage.    Parenchyma: No definite loss of gray-white differentiation to suggest acute or subacute transcortical infarct.  Remote lacunar type infarct right basal ganglia.  Generalized pattern age-related parenchymal volume loss.  Nonspecific areas of white matter hypoattenuation may reflect sequela of chronic small vessel ischemic disease.    Ventricles/Extra-axial spaces: No abnormal extra-axial fluid collection. Basal cisterns are patent.  Relative to recent head CT performed 01/03/2025, no short-term change in the calcified extra-axial lesion associated with the inferior margin of left tentorial leaflet, suggesting meningioma.  Similar mass effect upon the superior cerebellum.  Mass is substantially increased in size when compared to remote CT of 12/13/2013.    Vessels: Moderate atherosclerotic calcifications.    Orbits: Status post bilateral lens replacements.    Scalp: Unremarkable.    Skull: There are no depressed skull fractures or destructive bone lesions.    Sinuses and mastoids: Minimal paranasal sinus  mucosal thickening.  Dependent opacification left greater than right mastoid air cells, possibly on the basis of mucosal thickening and or effusion.    Other findings: Torus palatinus.    Cervical spine:    Fractures: No acute fractures    Alignment: Unchanged relative to prior CT performed 01/03/2025.  Reversal cervical lordosis is noted, with mild focal kyphosis centered at the C4-5 level.  Grade 1 anterolisthesis of C3 on C4.  Minimal retrolisthesis of C5 on C6 and of C6 on C7.  Atlanto-axial and atlanto-occipital joints: Atlanto-axial and atlanto-occipital intervals are not widened.  Facet joints: There is no traumatic facet joint widening.  Multilevel degenerative facet arthropathy.  Vertebral bodies: Query osseous demineralization.  Multilevel degenerative endplate change.  Discs: Degenerative disc osteophyte complex formation.  Spinal canal and foraminal narrowing: Although CT does not optimally evaluate the soft tissue contents of the spinal canal and foramina, no critical stenosis is suggested.  No short-term change in degree of acquired degenerative spinal canal or foraminal narrowing relative to the 01/03/2025 CT.  Moderate spinal canal stenosis and moderate bilateral foraminal narrowing at the C5-6 level again noted.  Paraspinal soft tissues: Unremarkable.    Upper Lungs:No acute abnormality.  Biapical pleuroparenchymal scarring.                                       CT Head Without Contrast (Final result)  Result time 03/07/25 11:24:49      Final result by Jurgen Andrade MD (03/07/25 11:24:49)                   Impression:      1. No acute intracranial findings.  2. No acute cervical spine fracture.      Electronically signed by: Jurgen Andrade  Date:    03/07/2025  Time:    11:24               Narrative:    EXAMINATION:  CT HEAD WITHOUT CONTRAST; CT CERVICAL SPINE WITHOUT CONTRAST    CLINICAL HISTORY:  Head trauma, minor (Age >= 65y);fall with head trauma;; Neck trauma (Age >=  65y);fall;    TECHNIQUE:  Low dose axial images were obtained through the head and cervical spine.  Coronal and sagittal reformations were also performed. Contrast was not administered.    COMPARISON:  CT head and cervical spine 01/03/2025    FINDINGS:  Head:    Blood: No acute intracranial hemorrhage.    Parenchyma: No definite loss of gray-white differentiation to suggest acute or subacute transcortical infarct.  Remote lacunar type infarct right basal ganglia.  Generalized pattern age-related parenchymal volume loss.  Nonspecific areas of white matter hypoattenuation may reflect sequela of chronic small vessel ischemic disease.    Ventricles/Extra-axial spaces: No abnormal extra-axial fluid collection. Basal cisterns are patent.  Relative to recent head CT performed 01/03/2025, no short-term change in the calcified extra-axial lesion associated with the inferior margin of left tentorial leaflet, suggesting meningioma.  Similar mass effect upon the superior cerebellum.  Mass is substantially increased in size when compared to remote CT of 12/13/2013.    Vessels: Moderate atherosclerotic calcifications.    Orbits: Status post bilateral lens replacements.    Scalp: Unremarkable.    Skull: There are no depressed skull fractures or destructive bone lesions.    Sinuses and mastoids: Minimal paranasal sinus mucosal thickening.  Dependent opacification left greater than right mastoid air cells, possibly on the basis of mucosal thickening and or effusion.    Other findings: Torus palatinus.    Cervical spine:    Fractures: No acute fractures    Alignment: Unchanged relative to prior CT performed 01/03/2025.  Reversal cervical lordosis is noted, with mild focal kyphosis centered at the C4-5 level.  Grade 1 anterolisthesis of C3 on C4.  Minimal retrolisthesis of C5 on C6 and of C6 on C7.  Atlanto-axial and atlanto-occipital joints: Atlanto-axial and atlanto-occipital intervals are not widened.  Facet joints: There is no  traumatic facet joint widening.  Multilevel degenerative facet arthropathy.  Vertebral bodies: Query osseous demineralization.  Multilevel degenerative endplate change.  Discs: Degenerative disc osteophyte complex formation.  Spinal canal and foraminal narrowing: Although CT does not optimally evaluate the soft tissue contents of the spinal canal and foramina, no critical stenosis is suggested.  No short-term change in degree of acquired degenerative spinal canal or foraminal narrowing relative to the 01/03/2025 CT.  Moderate spinal canal stenosis and moderate bilateral foraminal narrowing at the C5-6 level again noted.  Paraspinal soft tissues: Unremarkable.    Upper Lungs:No acute abnormality.  Biapical pleuroparenchymal scarring.                                       Medications   sodium chloride 0.9% flush 10 mL (has no administration in time range)   melatonin tablet 6 mg (has no administration in time range)   sodium chloride 0.9% flush 10 mL (has no administration in time range)   albuterol-ipratropium 2.5 mg-0.5 mg/3 mL nebulizer solution 3 mL (has no administration in time range)   bisacodyL suppository 10 mg (has no administration in time range)   glucose chewable tablet 16 g (has no administration in time range)   glucose chewable tablet 24 g (has no administration in time range)   dextrose 50% injection 12.5 g (has no administration in time range)   dextrose 50% injection 25 g (has no administration in time range)   glucagon (human recombinant) injection 1 mg (has no administration in time range)   methocarbamoL tablet 500 mg (500 mg Oral Given 3/7/25 1401)   oxyCODONE immediate release tablet 5 mg (has no administration in time range)   acetaminophen tablet 1,000 mg (has no administration in time range)   amLODIPine tablet 5 mg (has no administration in time range)   EScitalopram oxalate tablet 20 mg (has no administration in time range)   hydroCHLOROthiazide tablet 12.5 mg (has no administration in  time range)   levothyroxine tablet 88 mcg (has no administration in time range)   metoprolol succinate (TOPROL-XL) 24 hr tablet 25 mg (has no administration in time range)   bumetanide tablet 0.5 mg (has no administration in time range)   potassium chloride SA CR tablet 40 mEq (has no administration in time range)   morphine injection 2 mg (2 mg Intravenous Given 3/7/25 1049)   acetaminophen tablet 1,000 mg (1,000 mg Oral Given 3/7/25 1049)     Medical Decision Making  Amount and/or Complexity of Data Reviewed  Labs: ordered.  Radiology: ordered.    Risk  OTC drugs.  Prescription drug management.  Decision regarding hospitalization.        89-year-old female with history consistent with mechanical fall with significant right hip pain and deformity concern for right hip fracture.    Exam as above.    She normally walks with a walker.    Given her age will also scan of the head and neck given her fall and head trauma.  Low suspicion for intracranial pathology or neck pathology at this time.  She has no T or L-spine pain or tenderness not consistent with fracture there.    No other injuries seen on exam besides the right hip.    Will obtain x-rays of the pelvis, right femur and knee.  Screening labs, EKG, chest x-ray in anticipation of likely admission and surgical repair.    She got some pain meds with EMS but pain is recurring will give 2 of morphine and Tylenol as she is 89 will proceed to higher doses if she tolerates these well.    Anticipate admission to the hospital with hospital medicine.               ED Course as of 03/07/25 1439   Fri Mar 07, 2025   1219 EKG, independently interpreted, AV dual paced rhythm at a rate of 60 with negative for Sgarbossa criteria. [BA]      ED Course User Index  [BA] Per Frye MD            Update:  In the ED she remains hemodynamically stable.    Labs as noted, CT head and neck unremarkable.  There is a femoral shaft fracture just proximal to the prostatic.  Orthopedic  consulted.  She is surgical candidate.    Discussed with hospital medicine who will admit the patient for further care, monitoring and evaluation.    Decision was made to hospitalize the patient.  Acute illness with threat to bodily function.  Discussed with hospital medicine.                     Clinical Impression:  Final diagnoses:  [E78.00] Pure hypercholesterolemia  [I48.0] Paroxysmal atrial fibrillation  [D64.9] Normochromic normocytic anemia  [E87.6] Hypokalemia  [I10] Essential hypertension  [I50.32] Chronic diastolic heart failure  [Z95.0] Cardiac pacemaker  [E03.9] Acquired hypothyroidism  [W19.XXXA] Fall  [S72.91XA] Closed fracture of right femur, unspecified fracture morphology, unspecified portion of femur, initial encounter  [R07.9] Chest pain          ED Disposition Condition    Admit Stable                    [1]   Social History  Tobacco Use    Smoking status: Never     Passive exposure: Never    Smokeless tobacco: Never   Substance Use Topics    Alcohol use: No     Alcohol/week: 0.0 standard drinks of alcohol    Drug use: No        Per Frye MD  03/07/25 [id number]

## 2025-03-07 NOTE — ED NOTES
Pt arrived to [facility] at this time. Report received and assumed care of patient at this time. Pt presents to ED w/ c/o fall. Patient is AAOx4 with a calm affect and aware of environment. Airway is open and patent, respirations are spontaneous, normal effort and rate noted, placed on 3L NC by EMS, room air at baseline. Pt denies chest pain at this time. Skin warm and dry. Obvious rotation to RLE. Bed placed in low position, side rails up x 2, daughter at bedside. Explanation of care provided to patient. Patient offers no complaints at this time. Awaiting further MD orders and bed assignment, POC continues.

## 2025-03-07 NOTE — ASSESSMENT & PLAN NOTE
Patient presents tot he ED s/p mechanical fall over her walker  - Xray R femur showed femoral shaft fracture  - CT R leg pending  - MM pain regimen with tylenol and robaxin. Oxy PRN  - NPO  - holding DVT ppx until POC confirmed  - PT/OT once POC confirmed   - Ortho consulted, appreciate assistance

## 2025-03-07 NOTE — SUBJECTIVE & OBJECTIVE
Past Medical History:   Diagnosis Date    Acquired hypothyroidism 12/13/2013    Age-related physical debility 04/05/2024    Antiplatelet or antithrombotic long-term use     AV block, 3rd degree 03/19/2019    Bilateral nonexudative age-related macular degeneration 08/27/2013    Blindness and low vision 08/10/2018    Blood transfusion     Cardiac pacemaker 08/22/2018    Carotid artery disease 08/10/2016    Chronic diastolic congestive heart failure 11/24/2019    CRAO (central retinal artery occlusion), left 01/29/2018    Dry eye syndrome of both eyes 08/10/2018    Essential hypertension 12/13/2013    History of TIA (transient ischemic attack) 12/13/2013    Nonexudative age-related macular degeneration, bilateral, intermediate dry stage 07/12/2018    Nuclear sclerotic cataract of left eye 08/10/2018    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 12/13/2013    On anticoagulant therapy 06/13/2024    Osteoarthritis     Paroxysmal atrial fibrillation 09/11/2018    Pulmonary hypertension 04/05/2024    Pure hypercholesterolemia 12/13/2013    Sick sinus syndrome     Stroke     Takotsubo cardiomyopathy 11/17/2016       Past Surgical History:   Procedure Laterality Date    CARDIAC PACEMAKER PLACEMENT      [employer] Device Model: Juli LOZADA MRI A2DR01 Device Type: PACEMAKER Device S\N: [id number]    CARPAL TUNNEL RELEASE Bilateral 1991    CATARACT EXTRACTION Right     JOINT REPLACEMENT      LA TRANSCRAN DOPP INTRACRAN, EMBOLI W/O INJ  01/29/2018         SKIN BIOPSY      TONSILLECTOMY      TOTAL HIP ARTHROPLASTY  11/01/2000    right/[facility]       Review of patient's allergies indicates:   Allergen Reactions    Aspartame Swelling     equal       No current facility-administered medications on file prior to encounter.     Current Outpatient Medications on File Prior to Encounter   Medication Sig    acetaminophen (TYLENOL) 500 MG tablet Take 500 mg by mouth daily as needed for Pain.    amLODIPine  (NORVASC) 5 MG tablet Take 1 tablet (5 mg total) by mouth once daily.    brimonidine-timoloL (COMBIGAN) 0.2-0.5 % Drop INSTILL 1 DROP INTO AFFECTED EYE(S) BY OPHTHALMIC ROUTE EVERY 12 HOURS    brinzolamide (AZOPT) 1 % ophthalmic suspension Place 1 drop into the left eye 2 (two) times daily.    bumetanide (BUMEX) 0.5 MG Tab Take 1 tablet by mouth once daily.    cholecalciferol, vitamin D3, 50,000 unit capsule Take 50,000 Units by mouth every 14 (fourteen) days.    clopidogreL (PLAVIX) 75 mg tablet Take 1 tablet (75 mg total) by mouth once daily.    ELIQUIS 5 mg Tab TAKE 1 TABLET BY MOUTH TWICE DAILY    erythromycin (ROMYCIN) ophthalmic ointment APPLY 1 INCH TO THE BOTTOM LID OF LEFT EYE FOR FIRST WEEK OF EVERY MONTH    EScitalopram oxalate (LEXAPRO) 20 MG tablet Take 1 tablet (20 mg total) by mouth once daily.    hydroCHLOROthiazide 12.5 MG Tab TAKE 1 TABLET BY MOUTH EVERY DAY    hydrocortisone 2.5 % cream Apply topically 2 (two) times daily.    levothyroxine (SYNTHROID) 88 MCG tablet Take 88 mcg by mouth before breakfast.     LIDOcaine (LIDODERM) 5 % PLACE 1 PATCH ONTO THE SKIN ONCE DAILY. REMOVE & DISCARD PATCH WITHIN 12 HOURS OR AS DIRECTED BY MD    methyl salicylate/geraldine/camph (SALONPAS TOP) Apply 1 patch topically daily as needed (Pain).    metoprolol succinate (TOPROL-XL) 25 MG 24 hr tablet TAKE 2 TABLETS IN AM AND 1 TABLET IN PM    NITROSTAT 0.4 mg SL tablet Place 0.4 mg under the tongue every 5 (five) minutes as needed for Chest pain.     Family History       Problem Relation (Age of Onset)    Arthritis Sister, Maternal Grandmother, Paternal Grandmother    Birth defects Son    Cancer Paternal Aunt (80)    Diabetes Paternal Aunt, Paternal Uncle    Early death Mother, Father    Hearing loss Daughter    Hypertension Son, Paternal Grandmother          Tobacco Use    Smoking status: Never     Passive exposure: Never    Smokeless tobacco: Never   Substance and Sexual Activity    Alcohol use: No     Alcohol/week:  0.0 standard drinks of alcohol    Drug use: No    Sexual activity: Not on file     Review of Systems   Constitutional:  Positive for activity change. Negative for chills, fatigue and fever.   Respiratory:  Negative for chest tightness and shortness of breath.    Cardiovascular:  Negative for chest pain and leg swelling.   Gastrointestinal:  Negative for abdominal pain, constipation, diarrhea, nausea and vomiting.   Genitourinary:  Negative for dysuria, frequency and urgency.   Musculoskeletal:  Positive for gait problem and myalgias. Negative for back pain.   Neurological:  Negative for dizziness and weakness.     Objective:     Vital Signs (Most Recent):  Temp: 98.7 °F (37.1 °C) (03/07/25 1209)  Pulse: 60 (03/07/25 1209)  Resp: 18 (03/07/25 1209)  BP: (!) 100/50 (03/07/25 1209)  SpO2: 99 % (03/07/25 1209) Vital Signs (24h Range):  Temp:  [97.8 °F (36.6 °C)-98.7 °F (37.1 °C)] 98.7 °F (37.1 °C)  Pulse:  [60] 60  Resp:  [16-22] 18  SpO2:  [94 %-99 %] 99 %  BP: (100-140)/(50-80) 100/50     Weight: 59 kg (130 lb)  Body mass index is 23.78 kg/m².     Physical Exam  Vitals and nursing note reviewed.   Constitutional:       Appearance: She is well-developed. She is not ill-appearing.   HENT:      Head: Normocephalic and atraumatic.   Eyes:      Pupils: Pupils are equal, round, and reactive to light.   Cardiovascular:      Rate and Rhythm: Normal rate and regular rhythm.   Pulmonary:      Effort: Pulmonary effort is normal. No respiratory distress.      Breath sounds: Normal breath sounds. No wheezing or rales.   Abdominal:      Palpations: Abdomen is soft.      Tenderness: There is no abdominal tenderness.   Musculoskeletal:         General: Tenderness (RLE) present.      Comments: Sensation intact  Full ROM to toes and ankle of RLE  Unable to assess ROM to R hip 2/2 pain   Skin:     General: Skin is warm and dry.   Neurological:      Mental Status: She is alert and oriented to person, place, and time.   Psychiatric:          Behavior: Behavior normal.              CRANIAL NERVES     CN III, IV, VI   Pupils are equal, round, and reactive to light.       Significant Labs: All pertinent labs within the past 24 hours have been reviewed.  BMP:   Recent Labs   Lab 03/07/25  1050 03/07/25  1324   *  --      --    K 3.4*  --      --    CO2 26  --    BUN 33*  --    CREATININE 0.7  --    CALCIUM 9.2  --    MG  --  2.1     CBC:   Recent Labs   Lab 03/07/25  1050   WBC 8.69   HGB 9.8*   HCT 30.1*          Significant Imaging: I have reviewed all pertinent imaging results/findings within the past 24 hours.    Imaging Results              CT 3D Rendering WO Independent Workstation (In process)                      CT Thigh Without Contrast Right (In process)                      X-Ray Chest AP Portable (Final result)  Result time 03/07/25 12:01:04      Final result by Jerrell Neri MD (03/07/25 12:01:04)                   Impression:      See above      Electronically signed by: Jerrell Neri MD  Date:    03/07/2025  Time:    12:01               Narrative:    EXAMINATION:  XR CHEST AP PORTABLE    CLINICAL HISTORY:  pre-operative;    TECHNIQUE:  Single frontal view of the chest was performed.    COMPARISON:  Non 04/04/2024 e    FINDINGS:  Pacemaker identified as before.  The heart is not enlarged.  Calcification overlying the heart probably mitral  annulus calcifications.  Mild accentuation of the basilar interstitial markings and/or mild edema.  No significant airspace consolidation or pleural effusion identified                                       X-Ray Femur 2 View Right (Final result)  Result time 03/07/25 11:58:18      Final result by Jerrell Neri MD (03/07/25 11:58:18)                   Impression:      Femoral shaft fracture      Electronically signed by: Jerrell Neri MD  Date:    03/07/2025  Time:    11:58               Narrative:    EXAMINATION:  XR FEMUR 2 VIEW RIGHT    CLINICAL HISTORY:  right hip  injury;    TECHNIQUE:  AP and lateral views of the right femur were performed.    COMPARISON:  None    FINDINGS:  There is a displaced and angulated fracture identified involving the distal femoral shaft.  Right hip arthroplasty noted in a satisfactory position alignment.                                       X-Ray Knee 2 View Right (Final result)  Result time 03/07/25 12:03:27      Final result by Dc Damon Jr., MD (03/07/25 12:03:27)                   Impression:      Partially visualized fracture distal right femur.  Probable right effusion.      Electronically signed by: Dc Damon MD  Date:    03/07/2025  Time:    12:03               Narrative:    EXAMINATION:  XR KNEE 1 OR 2 VIEW RIGHT    CLINICAL HISTORY:  right hip injury;    TECHNIQUE:  AP and lateral views of the right knee were performed.    COMPARISON:  Bilateral knees July 2024    FINDINGS:  Bones are demineralized.  Partially visualized comminuted fracture distal right femur.  Vascular calcifications are noted.  Narrowing right lateral femoral tibial compartment.  Probable right suprapatellar effusion.                                       X-Ray Pelvis Routine AP (Final result)  Result time 03/07/25 12:04:59      Final result by Dc Damon Jr., MD (03/07/25 12:04:59)                   Impression:      See above      Electronically signed by: Dc Damon MD  Date:    03/07/2025  Time:    12:04               Narrative:    EXAMINATION:  XR PELVIS ROUTINE AP    CLINICAL HISTORY:  right hip injury;    TECHNIQUE:  AP view of the pelvis was performed.    COMPARISON:  None.    FINDINGS:  Postop change right hip replacement.  Bones are demineralized.  Degenerative change in the spine.                                       CT Cervical Spine Without Contrast (Final result)  Result time 03/07/25 11:24:49      Final result by Jurgen Andrade MD (03/07/25 11:24:49)                   Impression:      1. No acute intracranial findings.  2. No  acute cervical spine fracture.      Electronically signed by: Jurgen Andrade  Date:    03/07/2025  Time:    11:24               Narrative:    EXAMINATION:  CT HEAD WITHOUT CONTRAST; CT CERVICAL SPINE WITHOUT CONTRAST    CLINICAL HISTORY:  Head trauma, minor (Age >= 65y);fall with head trauma;; Neck trauma (Age >= 65y);fall;    TECHNIQUE:  Low dose axial images were obtained through the head and cervical spine.  Coronal and sagittal reformations were also performed. Contrast was not administered.    COMPARISON:  CT head and cervical spine 01/03/2025    FINDINGS:  Head:    Blood: No acute intracranial hemorrhage.    Parenchyma: No definite loss of gray-white differentiation to suggest acute or subacute transcortical infarct.  Remote lacunar type infarct right basal ganglia.  Generalized pattern age-related parenchymal volume loss.  Nonspecific areas of white matter hypoattenuation may reflect sequela of chronic small vessel ischemic disease.    Ventricles/Extra-axial spaces: No abnormal extra-axial fluid collection. Basal cisterns are patent.  Relative to recent head CT performed 01/03/2025, no short-term change in the calcified extra-axial lesion associated with the inferior margin of left tentorial leaflet, suggesting meningioma.  Similar mass effect upon the superior cerebellum.  Mass is substantially increased in size when compared to remote CT of 12/13/2013.    Vessels: Moderate atherosclerotic calcifications.    Orbits: Status post bilateral lens replacements.    Scalp: Unremarkable.    Skull: There are no depressed skull fractures or destructive bone lesions.    Sinuses and mastoids: Minimal paranasal sinus mucosal thickening.  Dependent opacification left greater than right mastoid air cells, possibly on the basis of mucosal thickening and or effusion.    Other findings: Torus palatinus.    Cervical spine:    Fractures: No acute fractures    Alignment: Unchanged relative to prior CT performed 01/03/2025.   Reversal cervical lordosis is noted, with mild focal kyphosis centered at the C4-5 level.  Grade 1 anterolisthesis of C3 on C4.  Minimal retrolisthesis of C5 on C6 and of C6 on C7.  Atlanto-axial and atlanto-occipital joints: Atlanto-axial and atlanto-occipital intervals are not widened.  Facet joints: There is no traumatic facet joint widening.  Multilevel degenerative facet arthropathy.  Vertebral bodies: Query osseous demineralization.  Multilevel degenerative endplate change.  Discs: Degenerative disc osteophyte complex formation.  Spinal canal and foraminal narrowing: Although CT does not optimally evaluate the soft tissue contents of the spinal canal and foramina, no critical stenosis is suggested.  No short-term change in degree of acquired degenerative spinal canal or foraminal narrowing relative to the 01/03/2025 CT.  Moderate spinal canal stenosis and moderate bilateral foraminal narrowing at the C5-6 level again noted.  Paraspinal soft tissues: Unremarkable.    Upper Lungs:No acute abnormality.  Biapical pleuroparenchymal scarring.                                       CT Head Without Contrast (Final result)  Result time 03/07/25 11:24:49      Final result by Jurgen Andrade MD (03/07/25 11:24:49)                   Impression:      1. No acute intracranial findings.  2. No acute cervical spine fracture.      Electronically signed by: Jurgen Andrade  Date:    03/07/2025  Time:    11:24               Narrative:    EXAMINATION:  CT HEAD WITHOUT CONTRAST; CT CERVICAL SPINE WITHOUT CONTRAST    CLINICAL HISTORY:  Head trauma, minor (Age >= 65y);fall with head trauma;; Neck trauma (Age >= 65y);fall;    TECHNIQUE:  Low dose axial images were obtained through the head and cervical spine.  Coronal and sagittal reformations were also performed. Contrast was not administered.    COMPARISON:  CT head and cervical spine 01/03/2025    FINDINGS:  Head:    Blood: No acute intracranial hemorrhage.    Parenchyma: No  definite loss of gray-white differentiation to suggest acute or subacute transcortical infarct.  Remote lacunar type infarct right basal ganglia.  Generalized pattern age-related parenchymal volume loss.  Nonspecific areas of white matter hypoattenuation may reflect sequela of chronic small vessel ischemic disease.    Ventricles/Extra-axial spaces: No abnormal extra-axial fluid collection. Basal cisterns are patent.  Relative to recent head CT performed 01/03/2025, no short-term change in the calcified extra-axial lesion associated with the inferior margin of left tentorial leaflet, suggesting meningioma.  Similar mass effect upon the superior cerebellum.  Mass is substantially increased in size when compared to remote CT of 12/13/2013.    Vessels: Moderate atherosclerotic calcifications.    Orbits: Status post bilateral lens replacements.    Scalp: Unremarkable.    Skull: There are no depressed skull fractures or destructive bone lesions.    Sinuses and mastoids: Minimal paranasal sinus mucosal thickening.  Dependent opacification left greater than right mastoid air cells, possibly on the basis of mucosal thickening and or effusion.    Other findings: Torus palatinus.    Cervical spine:    Fractures: No acute fractures    Alignment: Unchanged relative to prior CT performed 01/03/2025.  Reversal cervical lordosis is noted, with mild focal kyphosis centered at the C4-5 level.  Grade 1 anterolisthesis of C3 on C4.  Minimal retrolisthesis of C5 on C6 and of C6 on C7.  Atlanto-axial and atlanto-occipital joints: Atlanto-axial and atlanto-occipital intervals are not widened.  Facet joints: There is no traumatic facet joint widening.  Multilevel degenerative facet arthropathy.  Vertebral bodies: Query osseous demineralization.  Multilevel degenerative endplate change.  Discs: Degenerative disc osteophyte complex formation.  Spinal canal and foraminal narrowing: Although CT does not optimally evaluate the soft tissue  contents of the spinal canal and foramina, no critical stenosis is suggested.  No short-term change in degree of acquired degenerative spinal canal or foraminal narrowing relative to the 01/03/2025 CT.  Moderate spinal canal stenosis and moderate bilateral foraminal narrowing at the C5-6 level again noted.  Paraspinal soft tissues: Unremarkable.    Upper Lungs:No acute abnormality.  Biapical pleuroparenchymal scarring.

## 2025-03-07 NOTE — ED NOTES
Pt does not use oxygen at baseline; pt maintaining sats on room air, removed from oxygen. Provider notified.

## 2025-03-07 NOTE — ASSESSMENT & PLAN NOTE
On anticoagulant therapy   Patient has paroxysmal (<7 days) atrial fibrillation. Patient is currently in sinus rhythm. [id number] Score: 4. The patients heart rate in the last 24 hours is as follows:  Pulse  Min: 60  Max: 60     Antiarrhythmics  metoprolol succinate (TOPROL-XL) 24 hr tablet 25 mg, 2 times daily, Oral    Plan  - Replete lytes with a goal of K>4, Mg >2  - Patient is anticoagulated, see medications listed above. Holding in setting of hip fracture with likely OR fixation   - Patient's afib is currently controlled

## 2025-03-07 NOTE — ASSESSMENT & PLAN NOTE
Patient's most recent potassium results are listed below.   Recent Labs     03/07/25  1050   K 3.4*     Plan  - Replete potassium per protocol  - Monitor potassium Daily  - Patient's hypokalemia is stable

## 2025-03-07 NOTE — ASSESSMENT & PLAN NOTE
"Patient has Diastolic (HFpEF) heart failure that is Chronic. On presentation their CHF was well compensated. Most recent BNP and echo results are listed below.  No results for input(s): "BNP" in the last 72 hours.  Latest ECHO  Results for orders placed during the hospital encounter of 06/20/24    Echo    Interpretation Summary    Left Ventricle: The left ventricle is normal in size. Normal wall thickness. There is concentric hypertrophy. There is normal systolic function with a visually estimated ejection fraction of 65 - 70%. Unable to assess diastolic function due to atrial fibrillation.    Right Ventricle: Normal right ventricular cavity size. Wall thickness is normal. Systolic function is normal.    Left Atrium: Left atrium is severely dilated.    Right Atrium: Right atrium is severely dilated.    Mitral Valve: There is moderate posterior leaflet sclerosis. There is no stenosis. The mean pressure gradient across the mitral valve is 1 mmHg at a heart rate of 60 bpm. There is moderate regurgitation.    Tricuspid Valve: There is moderate to severe regurgitation.    Pulmonic Valve: There is no stenosis.    Pulmonary Artery: There is moderate pulmonary hypertension. The estimated pulmonary artery systolic pressure is 60 mmHg.    IVC/SVC: Intermediate venous pressure at 8 mmHg.    Current Heart Failure Medications  hydroCHLOROthiazide tablet 12.5 mg, Daily, Oral  metoprolol succinate (TOPROL-XL) 24 hr tablet 25 mg, 2 times daily, Oral  bumetanide tablet 0.5 mg, Daily, Oral    Plan  - Monitor strict I&Os and daily weights.    - Monitor on telemetry  - Cardiac diet  - Cardiology has not been consulted.  - The patient's volume status is at their baseline.    "

## 2025-03-07 NOTE — H&P
[facility] - Emergency Dept  [facility] Medicine  History & Physical    Patient Name: Dorothy M Knobloch  MRN: [id number]  Patient Class: IP- Inpatient  Admission Date: 3/7/2025  Attending Physician: Grisel Liao MD   Primary Care Provider: Lola Wolff MD         Patient information was obtained from patient and ER records.     Subjective:     Principal Problem:Periprosthetic fracture of shaft of femur    Chief Complaint:   Chief Complaint   Patient presents with    Fall     Pt arrived to ED via EMS from home with complaint of fall. Pt did hit head and is on blood thinners. Pt reports some pain to right thigh area upon arrival. Hx of afib/chf and has pacemaker.         HPI: Dorothy Knobloch is a 89 y.o. female with a hx of Afib, HTN, CHF, anemia an hypothyroidism presents to the ED s/p fall. Patient reports she was walking to the door with her walker to open it for their sitter when she leaned her walker and subsequently fell over it. States she missed the door handle which caused her to fall. Reports she hit her head twice on the floor but did not lose consciousness. She notes immediate pain to her RLE and was unable to move it/ get up off the ground. Reports she is usually ambulatory without difficulty with her walker. Last dose of her blood thinners was yesterday. Denies fever, chills, chest pain, SOB, abdominal pain and urinary symptoms. Denies numbness and tingling.     ED: AF, VSS. K 3.4. CT Head w/o acute process. CT C spine showed no acute fracture. Xray femur showed femoral shaft fracture. Given tylenol and robaxin in the ED. Ortho consulted. Admitted to .     Past Medical History:   Diagnosis Date    Acquired hypothyroidism 12/13/2013    Age-related physical debility 04/05/2024    Antiplatelet or antithrombotic long-term use     AV block, 3rd degree 03/19/2019    Bilateral nonexudative age-related macular degeneration 08/27/2013    Blindness and low vision 08/10/2018    Blood transfusion      Cardiac pacemaker 08/22/2018    Carotid artery disease 08/10/2016    Chronic diastolic congestive heart failure 11/24/2019    CRAO (central retinal artery occlusion), left 01/29/2018    Dry eye syndrome of both eyes 08/10/2018    Essential hypertension 12/13/2013    History of TIA (transient ischemic attack) 12/13/2013    Nonexudative age-related macular degeneration, bilateral, intermediate dry stage 07/12/2018    Nuclear sclerotic cataract of left eye 08/10/2018    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 12/13/2013    On anticoagulant therapy 06/13/2024    Osteoarthritis     Paroxysmal atrial fibrillation 09/11/2018    Pulmonary hypertension 04/05/2024    Pure hypercholesterolemia 12/13/2013    Sick sinus syndrome     Stroke     Takotsubo cardiomyopathy 11/17/2016       Past Surgical History:   Procedure Laterality Date    CARDIAC PACEMAKER PLACEMENT      [employer] Device Model: Juli ALMANZAR [id number] Device Type: PACEMAKER Device S\N: [id number]    CARPAL TUNNEL RELEASE Bilateral 1991    CATARACT EXTRACTION Right     JOINT REPLACEMENT      VA TRANSCRAN DOPP INTRACRAN, EMBOLI W/O INJ  01/29/2018         SKIN BIOPSY      TONSILLECTOMY      TOTAL HIP ARTHROPLASTY  11/01/2000    right/[facility]       Review of patient's allergies indicates:   Allergen Reactions    Aspartame Swelling     equal       No current facility-administered medications on file prior to encounter.     Current Outpatient Medications on File Prior to Encounter   Medication Sig    acetaminophen (TYLENOL) 500 MG tablet Take 500 mg by mouth daily as needed for Pain.    amLODIPine (NORVASC) 5 MG tablet Take 1 tablet (5 mg total) by mouth once daily.    brimonidine-timoloL (COMBIGAN) 0.2-0.5 % Drop INSTILL 1 DROP INTO AFFECTED EYE(S) BY OPHTHALMIC ROUTE EVERY 12 HOURS    brinzolamide (AZOPT) 1 % ophthalmic suspension Place 1 drop into the left eye 2 (two) times daily.    bumetanide (BUMEX) 0.5 MG Tab Take 1 tablet  by mouth once daily.    cholecalciferol, vitamin D3, 50,000 unit capsule Take 50,000 Units by mouth every 14 (fourteen) days.    clopidogreL (PLAVIX) 75 mg tablet Take 1 tablet (75 mg total) by mouth once daily.    ELIQUIS 5 mg Tab TAKE 1 TABLET BY MOUTH TWICE DAILY    erythromycin (ROMYCIN) ophthalmic ointment APPLY 1 INCH TO THE BOTTOM LID OF LEFT EYE FOR FIRST WEEK OF EVERY MONTH    EScitalopram oxalate (LEXAPRO) 20 MG tablet Take 1 tablet (20 mg total) by mouth once daily.    hydroCHLOROthiazide 12.5 MG Tab TAKE 1 TABLET BY MOUTH EVERY DAY    hydrocortisone 2.5 % cream Apply topically 2 (two) times daily.    levothyroxine (SYNTHROID) 88 MCG tablet Take 88 mcg by mouth before breakfast.     LIDOcaine (LIDODERM) 5 % PLACE 1 PATCH ONTO THE SKIN ONCE DAILY. REMOVE & DISCARD PATCH WITHIN 12 HOURS OR AS DIRECTED BY MD    methyl salicylate/menth/camph (SALONPAS TOP) Apply 1 patch topically daily as needed (Pain).    metoprolol succinate (TOPROL-XL) 25 MG 24 hr tablet TAKE 2 TABLETS IN AM AND 1 TABLET IN PM    NITROSTAT 0.4 mg SL tablet Place 0.4 mg under the tongue every 5 (five) minutes as needed for Chest pain.     Family History       Problem Relation (Age of Onset)    Arthritis Sister, Maternal Grandmother, Paternal Grandmother    Birth defects Son    Cancer Paternal Aunt (80)    Diabetes Paternal Aunt, Paternal Uncle    Early death Mother, Father    Hearing loss Daughter    Hypertension Son, Paternal Grandmother          Tobacco Use    Smoking status: Never     Passive exposure: Never    Smokeless tobacco: Never   Substance and Sexual Activity    Alcohol use: No     Alcohol/week: 0.0 standard drinks of alcohol    Drug use: No    Sexual activity: Not on file     Review of Systems   Constitutional:  Positive for activity change. Negative for chills, fatigue and fever.   Respiratory:  Negative for chest tightness and shortness of breath.    Cardiovascular:  Negative for chest pain and leg swelling.    Gastrointestinal:  Negative for abdominal pain, constipation, diarrhea, nausea and vomiting.   Genitourinary:  Negative for dysuria, frequency and urgency.   Musculoskeletal:  Positive for gait problem and myalgias. Negative for back pain.   Neurological:  Negative for dizziness and weakness.     Objective:     Vital Signs (Most Recent):  Temp: 98.7 °F (37.1 °C) (03/07/25 1209)  Pulse: 60 (03/07/25 1209)  Resp: 18 (03/07/25 1209)  BP: (!) 100/50 (03/07/25 1209)  SpO2: 99 % (03/07/25 1209) Vital Signs (24h Range):  Temp:  [97.8 °F (36.6 °C)-98.7 °F (37.1 °C)] 98.7 °F (37.1 °C)  Pulse:  [60] 60  Resp:  [16-22] 18  SpO2:  [94 %-99 %] 99 %  BP: (100-140)/(50-80) 100/50     Weight: 59 kg (130 lb)  Body mass index is 23.78 kg/m².     Physical Exam  Vitals and nursing note reviewed.   Constitutional:       Appearance: She is well-developed. She is not ill-appearing.   HENT:      Head: Normocephalic and atraumatic.   Eyes:      Pupils: Pupils are equal, round, and reactive to light.   Cardiovascular:      Rate and Rhythm: Normal rate and regular rhythm.   Pulmonary:      Effort: Pulmonary effort is normal. No respiratory distress.      Breath sounds: Normal breath sounds. No wheezing or rales.   Abdominal:      Palpations: Abdomen is soft.      Tenderness: There is no abdominal tenderness.   Musculoskeletal:         General: Tenderness (RLE) present.      Comments: Sensation intact  Full ROM to toes and ankle of RLE  Unable to assess ROM to R hip 2/2 pain   Skin:     General: Skin is warm and dry.   Neurological:      Mental Status: She is alert and oriented to person, place, and time.   Psychiatric:         Behavior: Behavior normal.              CRANIAL NERVES     CN III, IV, VI   Pupils are equal, round, and reactive to light.       Significant Labs: All pertinent labs within the past 24 hours have been reviewed.  BMP:   Recent Labs   Lab 03/07/25  1050 03/07/25  1324   *  --      --    K 3.4*  --    CL  101  --    CO2 26  --    BUN 33*  --    CREATININE 0.7  --    CALCIUM 9.2  --    MG  --  2.1     CBC:   Recent Labs   Lab 03/07/25  1050   WBC 8.69   HGB 9.8*   HCT 30.1*          Significant Imaging: I have reviewed all pertinent imaging results/findings within the past 24 hours.    Imaging Results              CT 3D Rendering WO Independent Workstation (In process)                      CT Thigh Without Contrast Right (In process)                      X-Ray Chest AP Portable (Final result)  Result time 03/07/25 12:01:04      Final result by Jerrell Neri MD (03/07/25 12:01:04)                   Impression:      See above      Electronically signed by: Jerrell Neri MD  Date:    03/07/2025  Time:    12:01               Narrative:    EXAMINATION:  XR CHEST AP PORTABLE    CLINICAL HISTORY:  pre-operative;    TECHNIQUE:  Single frontal view of the chest was performed.    COMPARISON:  Non 04/04/2024 e    FINDINGS:  Pacemaker identified as before.  The heart is not enlarged.  Calcification overlying the heart probably mitral  annulus calcifications.  Mild accentuation of the basilar interstitial markings and/or mild edema.  No significant airspace consolidation or pleural effusion identified                                       X-Ray Femur 2 View Right (Final result)  Result time 03/07/25 11:58:18      Final result by Jerrell Neri MD (03/07/25 11:58:18)                   Impression:      Femoral shaft fracture      Electronically signed by: Jerrell Neri MD  Date:    03/07/2025  Time:    11:58               Narrative:    EXAMINATION:  XR FEMUR 2 VIEW RIGHT    CLINICAL HISTORY:  right hip injury;    TECHNIQUE:  AP and lateral views of the right femur were performed.    COMPARISON:  None    FINDINGS:  There is a displaced and angulated fracture identified involving the distal femoral shaft.  Right hip arthroplasty noted in a satisfactory position alignment.                                       X-Ray Knee 2 View  Right (Final result)  Result time 03/07/25 12:03:27      Final result by Dc Damon Jr., MD (03/07/25 12:03:27)                   Impression:      Partially visualized fracture distal right femur.  Probable right effusion.      Electronically signed by: Dc Damon MD  Date:    03/07/2025  Time:    12:03               Narrative:    EXAMINATION:  XR KNEE 1 OR 2 VIEW RIGHT    CLINICAL HISTORY:  right hip injury;    TECHNIQUE:  AP and lateral views of the right knee were performed.    COMPARISON:  Bilateral knees July 2024    FINDINGS:  Bones are demineralized.  Partially visualized comminuted fracture distal right femur.  Vascular calcifications are noted.  Narrowing right lateral femoral tibial compartment.  Probable right suprapatellar effusion.                                       X-Ray Pelvis Routine AP (Final result)  Result time 03/07/25 12:04:59      Final result by Dc Damon Jr., MD (03/07/25 12:04:59)                   Impression:      See above      Electronically signed by: Dc Damon MD  Date:    03/07/2025  Time:    12:04               Narrative:    EXAMINATION:  XR PELVIS ROUTINE AP    CLINICAL HISTORY:  right hip injury;    TECHNIQUE:  AP view of the pelvis was performed.    COMPARISON:  None.    FINDINGS:  Postop change right hip replacement.  Bones are demineralized.  Degenerative change in the spine.                                       CT Cervical Spine Without Contrast (Final result)  Result time 03/07/25 11:24:49      Final result by Jurgen Andrade MD (03/07/25 11:24:49)                   Impression:      1. No acute intracranial findings.  2. No acute cervical spine fracture.      Electronically signed by: Jurgen Andrade  Date:    03/07/2025  Time:    11:24               Narrative:    EXAMINATION:  CT HEAD WITHOUT CONTRAST; CT CERVICAL SPINE WITHOUT CONTRAST    CLINICAL HISTORY:  Head trauma, minor (Age >= 65y);fall with head trauma;; Neck trauma (Age >=  65y);fall;    TECHNIQUE:  Low dose axial images were obtained through the head and cervical spine.  Coronal and sagittal reformations were also performed. Contrast was not administered.    COMPARISON:  CT head and cervical spine 01/03/2025    FINDINGS:  Head:    Blood: No acute intracranial hemorrhage.    Parenchyma: No definite loss of gray-white differentiation to suggest acute or subacute transcortical infarct.  Remote lacunar type infarct right basal ganglia.  Generalized pattern age-related parenchymal volume loss.  Nonspecific areas of white matter hypoattenuation may reflect sequela of chronic small vessel ischemic disease.    Ventricles/Extra-axial spaces: No abnormal extra-axial fluid collection. Basal cisterns are patent.  Relative to recent head CT performed 01/03/2025, no short-term change in the calcified extra-axial lesion associated with the inferior margin of left tentorial leaflet, suggesting meningioma.  Similar mass effect upon the superior cerebellum.  Mass is substantially increased in size when compared to remote CT of 12/13/2013.    Vessels: Moderate atherosclerotic calcifications.    Orbits: Status post bilateral lens replacements.    Scalp: Unremarkable.    Skull: There are no depressed skull fractures or destructive bone lesions.    Sinuses and mastoids: Minimal paranasal sinus mucosal thickening.  Dependent opacification left greater than right mastoid air cells, possibly on the basis of mucosal thickening and or effusion.    Other findings: Torus palatinus.    Cervical spine:    Fractures: No acute fractures    Alignment: Unchanged relative to prior CT performed 01/03/2025.  Reversal cervical lordosis is noted, with mild focal kyphosis centered at the C4-5 level.  Grade 1 anterolisthesis of C3 on C4.  Minimal retrolisthesis of C5 on C6 and of C6 on C7.  Atlanto-axial and atlanto-occipital joints: Atlanto-axial and atlanto-occipital intervals are not widened.  Facet joints: There is no  traumatic facet joint widening.  Multilevel degenerative facet arthropathy.  Vertebral bodies: Query osseous demineralization.  Multilevel degenerative endplate change.  Discs: Degenerative disc osteophyte complex formation.  Spinal canal and foraminal narrowing: Although CT does not optimally evaluate the soft tissue contents of the spinal canal and foramina, no critical stenosis is suggested.  No short-term change in degree of acquired degenerative spinal canal or foraminal narrowing relative to the 01/03/2025 CT.  Moderate spinal canal stenosis and moderate bilateral foraminal narrowing at the C5-6 level again noted.  Paraspinal soft tissues: Unremarkable.    Upper Lungs:No acute abnormality.  Biapical pleuroparenchymal scarring.                                       CT Head Without Contrast (Final result)  Result time 03/07/25 11:24:49      Final result by Jurgen Andrade MD (03/07/25 11:24:49)                   Impression:      1. No acute intracranial findings.  2. No acute cervical spine fracture.      Electronically signed by: Jurgen Andrade  Date:    03/07/2025  Time:    11:24               Narrative:    EXAMINATION:  CT HEAD WITHOUT CONTRAST; CT CERVICAL SPINE WITHOUT CONTRAST    CLINICAL HISTORY:  Head trauma, minor (Age >= 65y);fall with head trauma;; Neck trauma (Age >= 65y);fall;    TECHNIQUE:  Low dose axial images were obtained through the head and cervical spine.  Coronal and sagittal reformations were also performed. Contrast was not administered.    COMPARISON:  CT head and cervical spine 01/03/2025    FINDINGS:  Head:    Blood: No acute intracranial hemorrhage.    Parenchyma: No definite loss of gray-white differentiation to suggest acute or subacute transcortical infarct.  Remote lacunar type infarct right basal ganglia.  Generalized pattern age-related parenchymal volume loss.  Nonspecific areas of white matter hypoattenuation may reflect sequela of chronic small vessel ischemic  disease.    Ventricles/Extra-axial spaces: No abnormal extra-axial fluid collection. Basal cisterns are patent.  Relative to recent head CT performed 01/03/2025, no short-term change in the calcified extra-axial lesion associated with the inferior margin of left tentorial leaflet, suggesting meningioma.  Similar mass effect upon the superior cerebellum.  Mass is substantially increased in size when compared to remote CT of 12/13/2013.    Vessels: Moderate atherosclerotic calcifications.    Orbits: Status post bilateral lens replacements.    Scalp: Unremarkable.    Skull: There are no depressed skull fractures or destructive bone lesions.    Sinuses and mastoids: Minimal paranasal sinus mucosal thickening.  Dependent opacification left greater than right mastoid air cells, possibly on the basis of mucosal thickening and or effusion.    Other findings: Torus palatinus.    Cervical spine:    Fractures: No acute fractures    Alignment: Unchanged relative to prior CT performed 01/03/2025.  Reversal cervical lordosis is noted, with mild focal kyphosis centered at the C4-5 level.  Grade 1 anterolisthesis of C3 on C4.  Minimal retrolisthesis of C5 on C6 and of C6 on C7.  Atlanto-axial and atlanto-occipital joints: Atlanto-axial and atlanto-occipital intervals are not widened.  Facet joints: There is no traumatic facet joint widening.  Multilevel degenerative facet arthropathy.  Vertebral bodies: Query osseous demineralization.  Multilevel degenerative endplate change.  Discs: Degenerative disc osteophyte complex formation.  Spinal canal and foraminal narrowing: Although CT does not optimally evaluate the soft tissue contents of the spinal canal and foramina, no critical stenosis is suggested.  No short-term change in degree of acquired degenerative spinal canal or foraminal narrowing relative to the 01/03/2025 CT.  Moderate spinal canal stenosis and moderate bilateral foraminal narrowing at the C5-6 level again  noted.  Paraspinal soft tissues: Unremarkable.    Upper Lungs:No acute abnormality.  Biapical pleuroparenchymal scarring.                                      Assessment/Plan:     Assessment & Plan  Periprosthetic fracture of shaft of femur  Patient presents tot he ED s/p mechanical fall over her walker  - Xray R femur showed femoral shaft fracture  - CT R leg pending  - MM pain regimen with tylenol and robaxin. Oxy PRN  - NPO  - holding DVT ppx until POC confirmed  - PT/OT once POC confirmed   - Ortho consulted, appreciate assistance   Pure hypercholesterolemia  - hx noted, currently not on a statin  Paroxysmal atrial fibrillation  On anticoagulant therapy   Patient has paroxysmal (<7 days) atrial fibrillation. Patient is currently in sinus rhythm. [id number] Score: 4. The patients heart rate in the last 24 hours is as follows:  Pulse  Min: 60  Max: 60     Antiarrhythmics  metoprolol succinate (TOPROL-XL) 24 hr tablet 25 mg, 2 times daily, Oral    Plan  - Replete lytes with a goal of K>4, Mg >2  - Patient is anticoagulated, see medications listed above. Holding in setting of hip fracture with likely OR fixation   - Patient's afib is currently controlled  Normochromic normocytic anemia  Most recent hemoglobin and hematocrit are listed below.  Recent Labs     03/07/25  1050   HGB 9.8*   HCT 30.1*     Plan  - Monitor serial CBC: Daily  - Transfuse PRBC if patient becomes hemodynamically unstable, symptomatic or H/H drops below 7/21.  - Patient has not received any PRBC transfusions to date  - Patient's anemia is currently stable  Hypokalemia  Patient's most recent potassium results are listed below.   Recent Labs     03/07/25  1050   K 3.4*     Plan  - Replete potassium per protocol  - Monitor potassium Daily  - Patient's hypokalemia is stable  Essential hypertension  Patient's blood pressure range in the last 24 hours was: BP  Min: 100/50  Max: 140/80.The patient's inpatient anti-hypertensive regimen is listed  below:  Current Antihypertensives  amLODIPine tablet 5 mg, Daily, Oral  hydroCHLOROthiazide tablet 12.5 mg, Daily, Oral  metoprolol succinate (TOPROL-XL) 24 hr tablet 25 mg, 2 times daily, Oral  bumetanide tablet 0.5 mg, Daily, Oral    Plan  - BP is controlled, no changes needed to their regimen  Chronic diastolic heart failure  - euvolemic on exam  - continue home bumex  Results for orders placed during the hospital encounter of 06/20/24    Echo    Interpretation Summary    Left Ventricle: The left ventricle is normal in size. Normal wall thickness. There is concentric hypertrophy. There is normal systolic function with a visually estimated ejection fraction of 65 - 70%. Unable to assess diastolic function due to atrial fibrillation.    Right Ventricle: Normal right ventricular cavity size. Wall thickness is normal. Systolic function is normal.    Left Atrium: Left atrium is severely dilated.    Right Atrium: Right atrium is severely dilated.    Mitral Valve: There is moderate posterior leaflet sclerosis. There is no stenosis. The mean pressure gradient across the mitral valve is 1 mmHg at a heart rate of 60 bpm. There is moderate regurgitation.    Tricuspid Valve: There is moderate to severe regurgitation.    Pulmonic Valve: There is no stenosis.    Pulmonary Artery: There is moderate pulmonary hypertension. The estimated pulmonary artery systolic pressure is 60 mmHg.    IVC/SVC: Intermediate venous pressure at 8 mmHg.    Cardiac pacemaker  - hx noted  Acquired hypothyroidism  - continue home synthroid    VTE Risk Mitigation (From admission, onward)           Ordered     IP VTE HIGH RISK PATIENT  Once         03/07/25 1318     Place sequential compression device  Until discontinued         03/07/25 1318     Place SHANEL hose  Until discontinued         03/07/25 1318     Reason for No Pharmacological VTE Prophylaxis  Once        Question:  Reasons:  Answer:  Risk of Bleeding    03/07/25 1318     Place sequential  compression device  Until discontinued         03/07/25 1318                     This patient was reviewed in collaboration with Dr. Grisel Liao.            Shagufta Cordero PA-C  Department of Hospital Medicine  [facility] - Emergency Dept

## 2025-03-07 NOTE — ED NOTES
Telemetry Verification   Patient placed on Telemetry Box  Verified with War Room  Box # [id number]   Monitor Tech    Rate 62   Rhythm paced

## 2025-03-07 NOTE — ED NOTES
Dorothy M Knobloch, a 89 y.o. female presents to the ED via ems w/ complaint of mechanical fall this am. -cp/sob, arrives on 3L NC (room air at baseline). +hit head -loc +bloodthinners. Obv rotation to RLE, endorses pain to RLE. Denies medication changes. Pmhx afib/chf +pacemaker, stroke    Triage note:  Chief Complaint   Patient presents with    Fall     Pt arrived to ED via EMS from home with complaint of fall. Pt did hit head and is on blood thinners. Pt reports some pain to right thigh area upon arrival. Hx of afib/chf and has pacemaker.      Review of patient's allergies indicates:   Allergen Reactions    Aspartame Swelling     equal

## 2025-03-07 NOTE — ED NOTES
Bed: [facility]  Expected date:   Expected time:   Means of arrival:   Comments:

## 2025-03-07 NOTE — ASSESSMENT & PLAN NOTE
Most recent hemoglobin and hematocrit are listed below.  Recent Labs     03/07/25  1050   HGB 9.8*   HCT 30.1*     Plan  - Monitor serial CBC: Daily  - Transfuse PRBC if patient becomes hemodynamically unstable, symptomatic or H/H drops below 7/21.  - Patient has not received any PRBC transfusions to date  - Patient's anemia is currently stable

## 2025-03-07 NOTE — ANESTHESIA PREPROCEDURE EVALUATION
[facility]  Anesthesia Pre-Operative Evaluation         Patient Name: Dorothy M Knobloch  YOB: 1932  MRN: [id number]    SUBJECTIVE:     Pre-operative evaluation for Procedure(s) (LRB):  ORIF, FRACTURE, FEMUR, lateral peg board, trios table (Right)     03/07/2025    Dorothy M Knobloch is a 89 y.o. female with a significant medical history of HTN, prior CVA, Afib on eliquis, takotsubo CM, diastolic heart failure, 3rd degree AV block s/p pacemaker, pHTN, hypothyroid who presents for the above procedure.    Results for orders placed during the hospital encounter of 06/20/24    Echo    Interpretation Summary    Left Ventricle: The left ventricle is normal in size. Normal wall thickness. There is concentric hypertrophy. There is normal systolic function with a visually estimated ejection fraction of 65 - 70%. Unable to assess diastolic function due to atrial fibrillation.    Right Ventricle: Normal right ventricular cavity size. Wall thickness is normal. Systolic function is normal.    Left Atrium: Left atrium is severely dilated.    Right Atrium: Right atrium is severely dilated.    Mitral Valve: There is moderate posterior leaflet sclerosis. There is no stenosis. The mean pressure gradient across the mitral valve is 1 mmHg at a heart rate of 60 bpm. There is moderate regurgitation.    Tricuspid Valve: There is moderate to severe regurgitation.    Pulmonic Valve: There is no stenosis.    Pulmonary Artery: There is moderate pulmonary hypertension. The estimated pulmonary artery systolic pressure is 60 mmHg.    IVC/SVC: Intermediate venous pressure at 8 mmHg.      LDA:        Peripheral IV - Single Lumen 03/07/25 1023 20 G Right Antecubital (Active)   Site Assessment Clean;Dry;Intact 03/07/25 1200   Extremity Assessment Distal to IV No abnormal discoloration;No redness;No swelling 03/07/25 1200   Dressing Status Clean;Dry;Intact 03/07/25 1200   Dressing Intervention Integrity maintained  "03/07/25 1200   Number of days: 0       Prev airway: None documented.  Drips: None documented.      Problem List[1]    Review of patient's allergies indicates:   Allergen Reactions    Aspartame Swelling     equal       Current Inpatient Medications:      Medications Ordered Prior to Encounter[2]    Past Surgical History:   Procedure Laterality Date    CARDIAC PACEMAKER PLACEMENT      MEDTRONIC Device Model: Juli ALMANZAR [id number] Device Type: PACEMAKER Device S\N: [id number]    CARPAL TUNNEL RELEASE Bilateral 1991    CATARACT EXTRACTION Right     JOINT REPLACEMENT      IL TRANSCRAN DOPP INTRACRAN, EMBOLI W/O INJ  01/29/2018         SKIN BIOPSY      TONSILLECTOMY      TOTAL HIP ARTHROPLASTY  11/01/2000    right/[facility]       Social History[3]    OBJECTIVE:     Vital Signs Range:      1/3/2025     5:48 AM 1/11/2025    11:36 AM 3/7/2025    10:21 AM   Vitals - 1 value per visit   SYSTOLIC 181 113 140   DIASTOLIC 74 47 80   Pulse 60 57 60   Temp 36.3 °C (97.4 °F) 36.4 °C (97.6 °F) 36.6 °C (97.8 °F)   Resp 16 19 22   SPO2 98 % 96 % 94 %   Weight (lb) 143 142 130   Weight (kg) 64.864 64.411 58.968   Height  5' 2" (1.575 m) 5' 2" (1.575 m)   BMI (Calculated)  26 23.8   Pain Score  Zero          CBC:   Lab Results   Component Value Date    WBC 8.69 03/07/2025    HGB 9.8 (L) 03/07/2025    HCT 30.1 (L) 03/07/2025    MCV 96 03/07/2025     03/07/2025         CMP:   Sodium   Date Value Ref Range Status   03/07/2025 138 136 - 145 mmol/L Final     Potassium   Date Value Ref Range Status   03/07/2025 3.4 (L) 3.5 - 5.1 mmol/L Final     Chloride   Date Value Ref Range Status   03/07/2025 101 95 - 110 mmol/L Final     CO2   Date Value Ref Range Status   03/07/2025 26 23 - 29 mmol/L Final     Glucose   Date Value Ref Range Status   03/07/2025 121 (H) 70 - 110 mg/dL Final     BUN   Date Value Ref Range Status   03/07/2025 33 (H) 10 - 30 mg/dL Final     Creatinine   Date Value Ref Range Status   03/07/2025 0.7 0.5 - 1.4 " mg/dL Final     Calcium   Date Value Ref Range Status   03/07/2025 9.2 8.7 - 10.5 mg/dL Final     Total Protein   Date Value Ref Range Status   03/07/2025 6.5 6.0 - 8.4 g/dL Final     Albumin   Date Value Ref Range Status   03/07/2025 3.5 3.5 - 5.2 g/dL Final     Total Bilirubin   Date Value Ref Range Status   03/07/2025 0.6 0.1 - 1.0 mg/dL Final     Comment:     For infants and newborns, interpretation of results should be based  on gestational age, weight and in agreement with clinical  observations.    Premature Infant recommended reference ranges:  Up to 24 hours.............<8.0 mg/dL  Up to 48 hours............<12.0 mg/dL  3-5 days..................<15.0 mg/dL  6-29 days.................<15.0 mg/dL       Alkaline Phosphatase   Date Value Ref Range Status   03/07/2025 55 40 - 150 U/L Final     AST   Date Value Ref Range Status   03/07/2025 20 10 - 40 U/L Final     ALT   Date Value Ref Range Status   03/07/2025 7 (L) 10 - 44 U/L Final     Anion Gap   Date Value Ref Range Status   03/07/2025 11 8 - 16 mmol/L Final     eGFR if    Date Value Ref Range Status   03/17/2022 >60.0 >60 mL/min/1.73 m^2 Final     eGFR if non    Date Value Ref Range Status   03/17/2022 >60.0 >60 mL/min/1.73 m^2 Final     Comment:     Calculation used to obtain the estimated glomerular filtration  rate (eGFR) is the CKD-EPI equation.          INR:  Lab Results   Component Value Date    INR 1.0 01/03/2025    INR 1.1 04/05/2024    INR 1.1 04/04/2024       EKG:   Results for orders placed or performed during the hospital encounter of 01/03/25   EKG 12-lead    Collection Time: 01/03/25  6:37 AM   Result Value Ref Range    QRS Duration 198 ms    [facility] QTC Calculation 550 ms    Narrative    Test Reason : W19.XXXA,    Vent. Rate :  60 BPM     Atrial Rate :  57 BPM     P-R Int :    ms          QRS Dur : 198 ms      QT Int : 550 ms       P-R-T Axes :    -78  99 degrees    QTcB Int : 550 ms    Dual-chamber pacemaker  (DDD), normally functioning  Biventricular pacemaker detected  Abnormal ECG  When compared with ECG of 17-Jun-2024 12:28,  No significant change was found  Confirmed by Rogelio Cartwright ([id number]) on 1/3/2025 8:16:33 AM    Referred By: REYNAERRAL SELF           Confirmed By: Rogelio Cartwright        2D ECHO:   Results for orders placed during the hospital encounter of 06/20/24    Echo    Interpretation Summary    Left Ventricle: The left ventricle is normal in size. Normal wall thickness. There is concentric hypertrophy. There is normal systolic function with a visually estimated ejection fraction of 65 - 70%. Unable to assess diastolic function due to atrial fibrillation.    Right Ventricle: Normal right ventricular cavity size. Wall thickness is normal. Systolic function is normal.    Left Atrium: Left atrium is severely dilated.    Right Atrium: Right atrium is severely dilated.    Mitral Valve: There is moderate posterior leaflet sclerosis. There is no stenosis. The mean pressure gradient across the mitral valve is 1 mmHg at a heart rate of 60 bpm. There is moderate regurgitation.    Tricuspid Valve: There is moderate to severe regurgitation.    Pulmonic Valve: There is no stenosis.    Pulmonary Artery: There is moderate pulmonary hypertension. The estimated pulmonary artery systolic pressure is 60 mmHg.    IVC/SVC: Intermediate venous pressure at 8 mmHg.         ASSESSMENT/PLAN:           Pre-op Assessment    I have reviewed the Patient Summary Reports.     I have reviewed the Nursing Notes. I have reviewed the NPO Status.   I have reviewed the Medications.     Review of Systems  Anesthesia Hx:   History of prior surgery of interest to airway management or planning:  Previous anesthesia: General, MAC        Denies Family Hx of Anesthesia complications.    Denies Personal Hx of Anesthesia complications.                    Cardiovascular:     Hypertension    Dysrhythmias (3rd degree AV block s/p pacemaker placement)  atrial fibrillation  CHF   PVD                                Pulmonary:    Denies COPD.                     Renal/:   Denies Chronic Renal Disease.                Hepatic/GI:      Denies GERD.                Musculoskeletal:  Arthritis               Neurological:  TIA, CVA (L PCA)                                    Endocrine:   Hypothyroidism              Physical Exam  General: Well nourished, Cooperative, Alert and Oriented    Airway:  Mallampati: III / II  Mouth Opening: Normal  TM Distance: Normal  Tongue: Normal  Neck ROM: Normal ROM    Dental:  Periodontal disease  Mass on upper soft palate  Chest/Lungs:  Normal Respiratory Rate    Heart:  Rate: Normal        Anesthesia Plan  Type of Anesthesia, risks & benefits discussed:    Anesthesia Type: Gen ETT, Regional  Intra-op Monitoring Plan: Standard ASA Monitors and Art Line  Post Op Pain Control Plan: multimodal analgesia, IV/PO Opioids PRN and peripheral nerve block  Induction:  IV  Airway Plan: Direct and Video, Post-Induction  Informed Consent: Informed consent signed with the Patient and all parties understand the risks and agree with anesthesia plan.  All questions answered. Patient consented to blood products? Yes  ASA Score: 3  Day of Surgery Review of History & Physical: H&P Update referred to the surgeon/provider.  Anesthesia Plan Notes: Normal biventricular function, moderate AS, elevated PASP, pacemaker dependent AV paced almost 100%.   On eliquis and plavix until admit  Loose bottom teeth, discussed we will be careful but there is a chance teeth could be inadvertently removed    Ready For Surgery From Anesthesia Perspective.     .           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    Constipation -  functional    Carotid artery disease    Takotsubo cardiomyopathy    CRAO (central retinal artery occlusion), left    Meningioma    Bilateral carotid artery stenosis    Iris neovascularization, left    Neovascular glaucoma, left eye    Nonexudative age-related macular degeneration, bilateral, intermediate dry stage    Status post cataract extraction and insertion of intraocular lens    Blindness and low vision    Dry eye syndrome of both eyes    Nuclear sclerotic cataract of left eye    Cardiac pacemaker    Paroxysmal atrial fibrillation    AV block, 3rd degree    Chronic diastolic congestive heart failure    Hypokalemia    Age-related physical debility    Aortic stenosis    Pulmonary hypertension    On anticoagulant therapy    Periprosthetic fracture of shaft of right femur    Normochromic normocytic anemia   [2]   No current facility-administered medications on file prior to encounter.     Current Outpatient Medications on File Prior to Encounter   Medication Sig Dispense Refill    acetaminophen (TYLENOL) 500 MG tablet Take 500 mg by mouth daily as needed for Pain.      amLODIPine (NORVASC) 5 MG tablet Take 1 tablet (5 mg total) by mouth once daily. 90 tablet 3    brimonidine-timoloL (COMBIGAN) 0.2-0.5 % Drop INSTILL 1 DROP INTO AFFECTED EYE(S) BY OPHTHALMIC ROUTE EVERY 12 HOURS      brinzolamide (AZOPT) 1 % ophthalmic suspension Place 1 drop into the left eye 2 (two) times daily.      bumetanide (BUMEX) 0.5 MG Tab Take 1 tablet by mouth once daily.      cholecalciferol, vitamin D3, 50,000 unit capsule Take 50,000 Units by mouth every 14 (fourteen) days.  5    clopidogreL (PLAVIX) 75 mg tablet Take 1 tablet (75 mg total) by mouth once daily. 90 tablet 3    ELIQUIS 5 mg Tab TAKE 1 TABLET BY MOUTH TWICE DAILY 180 tablet 1    erythromycin (ROMYCIN) ophthalmic ointment APPLY 1 INCH TO THE BOTTOM LID OF LEFT EYE FOR FIRST WEEK OF EVERY MONTH 3.5 g 6    EScitalopram oxalate (LEXAPRO) 20 MG tablet Take 1 tablet (20 mg  total) by mouth once daily. 90 tablet 3    hydroCHLOROthiazide 12.5 MG Tab TAKE 1 TABLET BY MOUTH EVERY DAY 90 tablet 3    hydrocortisone 2.5 % cream Apply topically 2 (two) times daily. 20 g 1    levothyroxine (SYNTHROID) 88 MCG tablet Take 88 mcg by mouth before breakfast.       LIDOcaine (LIDODERM) 5 % PLACE 1 PATCH ONTO THE SKIN ONCE DAILY. REMOVE & DISCARD PATCH WITHIN 12 HOURS OR AS DIRECTED BY MD 30 patch 0    methyl salicylate/menth/camph (SALONPAS TOP) Apply 1 patch topically daily as needed (Pain).      metoprolol succinate (TOPROL-XL) 25 MG 24 hr tablet TAKE 2 TABLETS IN AM AND 1 TABLET IN  tablet 3    NITROSTAT 0.4 mg SL tablet Place 0.4 mg under the tongue every 5 (five) minutes as needed for Chest pain.     [3]   Social History  Socioeconomic History    Marital status:    Tobacco Use    Smoking status: Never     Passive exposure: Never    Smokeless tobacco: Never   Substance and Sexual Activity    Alcohol use: No     Alcohol/week: 0.0 standard drinks of alcohol    Drug use: No   Social History Narrative    Cares for her blind       Social Drivers of Health     Financial Resource Strain: Low Risk  (3/13/2023)    Overall Financial Resource Strain (CARDIA)     Difficulty of Paying Living Expenses: Not hard at all   Food Insecurity: No Food Insecurity (3/13/2023)    Hunger Vital Sign     Worried About Running Out of Food in the Last Year: Never true     Ran Out of Food in the Last Year: Never true   Housing Stability: Unknown (4/5/2024)    Housing Stability Vital Sign     Unable to Pay for Housing in the Last Year: No     Unstable Housing in the Last Year: No

## 2025-03-08 ENCOUNTER — ANESTHESIA (OUTPATIENT)
Dept: SURGERY | Facility: HOSPITAL | Age: OVER 89
DRG: 481 | End: 2025-03-08
Payer: MEDICARE

## 2025-03-08 PROBLEM — D62 ACUTE BLOOD LOSS ANEMIA: Status: ACTIVE | Noted: 2025-03-07

## 2025-03-08 LAB
ALBUMIN SERPL BCP-MCNC: 3.2 G/DL (ref 3.5–5.2)
ALP SERPL-CCNC: 50 U/L (ref 40–150)
ALT SERPL W/O P-5'-P-CCNC: 7 U/L (ref 10–44)
ANION GAP SERPL CALC-SCNC: 9 MMOL/L (ref 8–16)
AST SERPL-CCNC: 21 U/L (ref 10–40)
BASOPHILS # BLD AUTO: 0.04 K/UL (ref 0–0.2)
BASOPHILS NFR BLD: 0.4 % (ref 0–1.9)
BILIRUB SERPL-MCNC: 0.5 MG/DL (ref 0.1–1)
BUN SERPL-MCNC: 43 MG/DL (ref 10–30)
CALCIUM SERPL-MCNC: 9 MG/DL (ref 8.7–10.5)
CHLORIDE SERPL-SCNC: 104 MMOL/L (ref 95–110)
CO2 SERPL-SCNC: 24 MMOL/L (ref 23–29)
CREAT SERPL-MCNC: 0.7 MG/DL (ref 0.5–1.4)
DIFFERENTIAL METHOD BLD: ABNORMAL
EOSINOPHIL # BLD AUTO: 0 K/UL (ref 0–0.5)
EOSINOPHIL NFR BLD: 0.4 % (ref 0–8)
ERYTHROCYTE [DISTWIDTH] IN BLOOD BY AUTOMATED COUNT: 14.7 % (ref 11.5–14.5)
EST. GFR  (NO RACE VARIABLE): >60 ML/MIN/1.73 M^2
GLUCOSE SERPL-MCNC: 128 MG/DL (ref 70–110)
GLUCOSE SERPL-MCNC: 166 MG/DL (ref 70–110)
HCO3 UR-SCNC: 23.6 MMOL/L (ref 24–28)
HCT VFR BLD AUTO: 26.3 % (ref 37–48.5)
HCT VFR BLD CALC: 27 %PCV (ref 36–54)
HGB BLD-MCNC: 8.7 G/DL (ref 12–16)
IMM GRANULOCYTES # BLD AUTO: 0.06 K/UL (ref 0–0.04)
IMM GRANULOCYTES NFR BLD AUTO: 0.6 % (ref 0–0.5)
LYMPHOCYTES # BLD AUTO: 1.5 K/UL (ref 1–4.8)
LYMPHOCYTES NFR BLD: 15.7 % (ref 18–48)
MAGNESIUM SERPL-MCNC: 2.1 MG/DL (ref 1.6–2.6)
MCH RBC QN AUTO: 31.5 PG (ref 27–31)
MCHC RBC AUTO-ENTMCNC: 33.1 G/DL (ref 32–36)
MCV RBC AUTO: 95 FL (ref 82–98)
MONOCYTES # BLD AUTO: 1 K/UL (ref 0.3–1)
MONOCYTES NFR BLD: 10.7 % (ref 4–15)
NEUTROPHILS # BLD AUTO: 7 K/UL (ref 1.8–7.7)
NEUTROPHILS NFR BLD: 72.2 % (ref 38–73)
NRBC BLD-RTO: 0 /100 WBC
OHS QRS DURATION: 182 MS
OHS QTC CALCULATION: 532 MS
PCO2 BLDA: 36.9 MMHG (ref 35–45)
PH SMN: 7.41 [PH] (ref 7.35–7.45)
PHOSPHATE SERPL-MCNC: 3.3 MG/DL (ref 2.7–4.5)
PLATELET # BLD AUTO: 237 K/UL (ref 150–450)
PMV BLD AUTO: 10 FL (ref 9.2–12.9)
PO2 BLDA: 315 MMHG (ref 80–100)
POC BE: -1 MMOL/L
POC IONIZED CALCIUM: 1.15 MMOL/L (ref 1.06–1.42)
POC SATURATED O2: 100 % (ref 95–100)
POC TCO2: 25 MMOL/L (ref 23–27)
POTASSIUM BLD-SCNC: 3.9 MMOL/L (ref 3.5–5.1)
POTASSIUM SERPL-SCNC: 4.2 MMOL/L (ref 3.5–5.1)
PROT SERPL-MCNC: 5.9 G/DL (ref 6–8.4)
RBC # BLD AUTO: 2.76 M/UL (ref 4–5.4)
SAMPLE: ABNORMAL
SODIUM BLD-SCNC: 136 MMOL/L (ref 136–145)
SODIUM SERPL-SCNC: 137 MMOL/L (ref 136–145)
TROPONIN I SERPL DL<=0.01 NG/ML-MCNC: 13 NG/L (ref 0–14)
WBC # BLD AUTO: 9.68 K/UL (ref 3.9–12.7)

## 2025-03-08 PROCEDURE — 71000016 HC POSTOP RECOV ADDL HR: Performed by: STUDENT IN AN ORGANIZED HEALTH CARE EDUCATION/TRAINING PROGRAM

## 2025-03-08 PROCEDURE — 83735 ASSAY OF MAGNESIUM: CPT

## 2025-03-08 PROCEDURE — 84100 ASSAY OF PHOSPHORUS: CPT

## 2025-03-08 PROCEDURE — 63600175 PHARM REV CODE 636 W HCPCS: Performed by: STUDENT IN AN ORGANIZED HEALTH CARE EDUCATION/TRAINING PROGRAM

## 2025-03-08 PROCEDURE — 36415 COLL VENOUS BLD VENIPUNCTURE: CPT

## 2025-03-08 PROCEDURE — 21400001 HC TELEMETRY ROOM

## 2025-03-08 PROCEDURE — 64448 NJX AA&/STRD FEM NRV NFS IMG: CPT

## 2025-03-08 PROCEDURE — 30233N1 TRANSFUSION OF NONAUTOLOGOUS RED BLOOD CELLS INTO PERIPHERAL VEIN, PERCUTANEOUS APPROACH: ICD-10-PCS | Performed by: INTERNAL MEDICINE

## 2025-03-08 PROCEDURE — 27507 TREATMENT OF THIGH FRACTURE: CPT | Mod: RT,,, | Performed by: STUDENT IN AN ORGANIZED HEALTH CARE EDUCATION/TRAINING PROGRAM

## 2025-03-08 PROCEDURE — 71000015 HC POSTOP RECOV 1ST HR: Performed by: STUDENT IN AN ORGANIZED HEALTH CARE EDUCATION/TRAINING PROGRAM

## 2025-03-08 PROCEDURE — 63600175 PHARM REV CODE 636 W HCPCS

## 2025-03-08 PROCEDURE — P9021 RED BLOOD CELLS UNIT: HCPCS

## 2025-03-08 PROCEDURE — 71000033 HC RECOVERY, INTIAL HOUR: Performed by: STUDENT IN AN ORGANIZED HEALTH CARE EDUCATION/TRAINING PROGRAM

## 2025-03-08 PROCEDURE — 37000009 HC ANESTHESIA EA ADD 15 MINS: Performed by: STUDENT IN AN ORGANIZED HEALTH CARE EDUCATION/TRAINING PROGRAM

## 2025-03-08 PROCEDURE — 25000003 PHARM REV CODE 250

## 2025-03-08 PROCEDURE — 93010 ELECTROCARDIOGRAM REPORT: CPT | Mod: ,,, | Performed by: INTERNAL MEDICINE

## 2025-03-08 PROCEDURE — 80053 COMPREHEN METABOLIC PANEL: CPT

## 2025-03-08 PROCEDURE — 0QS804Z REPOSITION RIGHT FEMORAL SHAFT WITH INTERNAL FIXATION DEVICE, OPEN APPROACH: ICD-10-PCS | Performed by: STUDENT IN AN ORGANIZED HEALTH CARE EDUCATION/TRAINING PROGRAM

## 2025-03-08 PROCEDURE — 63600175 PHARM REV CODE 636 W HCPCS: Performed by: NURSE ANESTHETIST, CERTIFIED REGISTERED

## 2025-03-08 PROCEDURE — C1769 GUIDE WIRE: HCPCS | Performed by: STUDENT IN AN ORGANIZED HEALTH CARE EDUCATION/TRAINING PROGRAM

## 2025-03-08 PROCEDURE — 25000003 PHARM REV CODE 250: Performed by: INTERNAL MEDICINE

## 2025-03-08 PROCEDURE — C1713 ANCHOR/SCREW BN/BN,TIS/BN: HCPCS | Performed by: STUDENT IN AN ORGANIZED HEALTH CARE EDUCATION/TRAINING PROGRAM

## 2025-03-08 PROCEDURE — D9220A PRA ANESTHESIA: Mod: CRNA,,, | Performed by: NURSE ANESTHETIST, CERTIFIED REGISTERED

## 2025-03-08 PROCEDURE — 84484 ASSAY OF TROPONIN QUANT: CPT

## 2025-03-08 PROCEDURE — 25000003 PHARM REV CODE 250: Performed by: STUDENT IN AN ORGANIZED HEALTH CARE EDUCATION/TRAINING PROGRAM

## 2025-03-08 PROCEDURE — 93005 ELECTROCARDIOGRAM TRACING: CPT

## 2025-03-08 PROCEDURE — 25000003 PHARM REV CODE 250: Performed by: NURSE ANESTHETIST, CERTIFIED REGISTERED

## 2025-03-08 PROCEDURE — 85025 COMPLETE CBC W/AUTO DIFF WBC: CPT

## 2025-03-08 PROCEDURE — 27201423 OPTIME MED/SURG SUP & DEVICES STERILE SUPPLY: Performed by: STUDENT IN AN ORGANIZED HEALTH CARE EDUCATION/TRAINING PROGRAM

## 2025-03-08 PROCEDURE — 37000008 HC ANESTHESIA 1ST 15 MINUTES: Performed by: STUDENT IN AN ORGANIZED HEALTH CARE EDUCATION/TRAINING PROGRAM

## 2025-03-08 PROCEDURE — D9220A PRA ANESTHESIA: Mod: ANES,,, | Performed by: STUDENT IN AN ORGANIZED HEALTH CARE EDUCATION/TRAINING PROGRAM

## 2025-03-08 PROCEDURE — 36000711: Performed by: STUDENT IN AN ORGANIZED HEALTH CARE EDUCATION/TRAINING PROGRAM

## 2025-03-08 PROCEDURE — 64448 NJX AA&/STRD FEM NRV NFS IMG: CPT | Mod: 59,RT,, | Performed by: STUDENT IN AN ORGANIZED HEALTH CARE EDUCATION/TRAINING PROGRAM

## 2025-03-08 PROCEDURE — 36000710: Performed by: STUDENT IN AN ORGANIZED HEALTH CARE EDUCATION/TRAINING PROGRAM

## 2025-03-08 DEVICE — SCREW CORTEX 4.5 50M: Type: IMPLANTABLE DEVICE | Site: FEMUR | Status: FUNCTIONAL

## 2025-03-08 DEVICE — SCREW VA LOCKING ST 5X70MM: Type: IMPLANTABLE DEVICE | Site: FEMUR | Status: FUNCTIONAL

## 2025-03-08 DEVICE — CABLE W/CRIMP STRL 1.7MM: Type: IMPLANTABLE DEVICE | Site: FEMUR | Status: FUNCTIONAL

## 2025-03-08 DEVICE — SCREW CORTEX 4.5 46M: Type: IMPLANTABLE DEVICE | Site: FEMUR | Status: FUNCTIONAL

## 2025-03-08 DEVICE — SCREW CORTEX 4.5 38M: Type: IMPLANTABLE DEVICE | Site: FEMUR | Status: FUNCTIONAL

## 2025-03-08 DEVICE — SCREW CORTEX 4.5 42M: Type: IMPLANTABLE DEVICE | Site: FEMUR | Status: FUNCTIONAL

## 2025-03-08 DEVICE — SCREW VA LOCKING ST 5X75MM: Type: IMPLANTABLE DEVICE | Site: FEMUR | Status: FUNCTIONAL

## 2025-03-08 DEVICE — SCREW VA LOCKING ST 5X80MM: Type: IMPLANTABLE DEVICE | Site: FEMUR | Status: FUNCTIONAL

## 2025-03-08 DEVICE — SCREW CORTEX 4.5 44M: Type: IMPLANTABLE DEVICE | Site: FEMUR | Status: FUNCTIONAL

## 2025-03-08 DEVICE — SCREW CORTEX 4.5 32M: Type: IMPLANTABLE DEVICE | Site: FEMUR | Status: FUNCTIONAL

## 2025-03-08 DEVICE — SCREW VA ANG LOK ST 3.5X30MM: Type: IMPLANTABLE DEVICE | Site: FEMUR | Status: FUNCTIONAL

## 2025-03-08 RX ORDER — FENTANYL CITRATE 50 UG/ML
25 INJECTION, SOLUTION INTRAMUSCULAR; INTRAVENOUS EVERY 5 MIN PRN
Status: DISCONTINUED | OUTPATIENT
Start: 2025-03-08 | End: 2025-03-08 | Stop reason: HOSPADM

## 2025-03-08 RX ORDER — TRANEXAMIC ACID 100 MG/ML
INJECTION, SOLUTION INTRAVENOUS
Status: DISCONTINUED | OUTPATIENT
Start: 2025-03-08 | End: 2025-03-08 | Stop reason: HOSPADM

## 2025-03-08 RX ORDER — CEFAZOLIN SODIUM 1 G/3ML
INJECTION, POWDER, FOR SOLUTION INTRAMUSCULAR; INTRAVENOUS
Status: DISCONTINUED | OUTPATIENT
Start: 2025-03-08 | End: 2025-03-08

## 2025-03-08 RX ORDER — CEFAZOLIN 2 G/1
2 INJECTION, POWDER, FOR SOLUTION INTRAMUSCULAR; INTRAVENOUS
Status: DISPENSED | OUTPATIENT
Start: 2025-03-08 | End: 2025-03-09

## 2025-03-08 RX ORDER — SODIUM CHLORIDE 0.9 % (FLUSH) 0.9 %
10 SYRINGE (ML) INJECTION
Status: DISCONTINUED | OUTPATIENT
Start: 2025-03-08 | End: 2025-03-08 | Stop reason: HOSPADM

## 2025-03-08 RX ORDER — FENTANYL CITRATE 50 UG/ML
25-200 INJECTION, SOLUTION INTRAMUSCULAR; INTRAVENOUS
Refills: 0 | Status: DISCONTINUED | OUTPATIENT
Start: 2025-03-08 | End: 2025-03-08 | Stop reason: HOSPADM

## 2025-03-08 RX ORDER — EPINEPHRINE 1 MG/ML
INJECTION, SOLUTION, CONCENTRATE INTRAVENOUS
Status: DISPENSED
Start: 2025-03-08 | End: 2025-03-08

## 2025-03-08 RX ORDER — ROPIVACAINE HYDROCHLORIDE 2 MG/ML
INJECTION, SOLUTION EPIDURAL; INFILTRATION; PERINEURAL CONTINUOUS
Status: DISCONTINUED | OUTPATIENT
Start: 2025-03-08 | End: 2025-03-11

## 2025-03-08 RX ORDER — METHOCARBAMOL 500 MG/1
500 TABLET, FILM COATED ORAL EVERY 8 HOURS
Status: DISCONTINUED | OUTPATIENT
Start: 2025-03-08 | End: 2025-03-13 | Stop reason: HOSPADM

## 2025-03-08 RX ORDER — EPINEPHRINE 1 MG/ML
INJECTION, SOLUTION, CONCENTRATE INTRAVENOUS
Status: DISCONTINUED | OUTPATIENT
Start: 2025-03-08 | End: 2025-03-08

## 2025-03-08 RX ORDER — MIDAZOLAM HYDROCHLORIDE 1 MG/ML
.5-4 INJECTION, SOLUTION INTRAMUSCULAR; INTRAVENOUS
Status: DISCONTINUED | OUTPATIENT
Start: 2025-03-08 | End: 2025-03-08 | Stop reason: HOSPADM

## 2025-03-08 RX ORDER — TRANEXAMIC ACID 100 MG/ML
INJECTION, SOLUTION INTRAVENOUS
Status: DISCONTINUED | OUTPATIENT
Start: 2025-03-08 | End: 2025-03-08

## 2025-03-08 RX ORDER — CEFADROXIL 500 MG/1
500 CAPSULE ORAL EVERY 12 HOURS
Status: DISCONTINUED | OUTPATIENT
Start: 2025-03-09 | End: 2025-03-13 | Stop reason: HOSPADM

## 2025-03-08 RX ORDER — ROCURONIUM BROMIDE 10 MG/ML
INJECTION, SOLUTION INTRAVENOUS
Status: DISCONTINUED | OUTPATIENT
Start: 2025-03-08 | End: 2025-03-08

## 2025-03-08 RX ORDER — DEXAMETHASONE SODIUM PHOSPHATE 4 MG/ML
INJECTION, SOLUTION INTRA-ARTICULAR; INTRALESIONAL; INTRAMUSCULAR; INTRAVENOUS; SOFT TISSUE
Status: DISCONTINUED | OUTPATIENT
Start: 2025-03-08 | End: 2025-03-08

## 2025-03-08 RX ORDER — HALOPERIDOL LACTATE 5 MG/ML
0.5 INJECTION, SOLUTION INTRAMUSCULAR EVERY 10 MIN PRN
Status: DISCONTINUED | OUTPATIENT
Start: 2025-03-08 | End: 2025-03-08 | Stop reason: HOSPADM

## 2025-03-08 RX ORDER — LIDOCAINE HYDROCHLORIDE 20 MG/ML
INJECTION, SOLUTION EPIDURAL; INFILTRATION; INTRACAUDAL; PERINEURAL
Status: DISCONTINUED | OUTPATIENT
Start: 2025-03-08 | End: 2025-03-08

## 2025-03-08 RX ORDER — ONDANSETRON HYDROCHLORIDE 2 MG/ML
INJECTION, SOLUTION INTRAVENOUS
Status: DISCONTINUED | OUTPATIENT
Start: 2025-03-08 | End: 2025-03-08

## 2025-03-08 RX ORDER — BUPIVACAINE HYDROCHLORIDE AND EPINEPHRINE 2.5; 5 MG/ML; UG/ML
INJECTION, SOLUTION EPIDURAL; INFILTRATION; INTRACAUDAL; PERINEURAL
Status: COMPLETED | OUTPATIENT
Start: 2025-03-08 | End: 2025-03-08

## 2025-03-08 RX ORDER — CHOLECALCIFEROL (VITAMIN D3) 25 MCG
1000 TABLET ORAL DAILY
Status: DISCONTINUED | OUTPATIENT
Start: 2025-03-08 | End: 2025-03-13 | Stop reason: HOSPADM

## 2025-03-08 RX ORDER — SODIUM CHLORIDE 0.9 % (FLUSH) 0.9 %
10 SYRINGE (ML) INJECTION
Status: DISCONTINUED | OUTPATIENT
Start: 2025-03-08 | End: 2025-03-09

## 2025-03-08 RX ORDER — PROPOFOL 10 MG/ML
VIAL (ML) INTRAVENOUS
Status: DISCONTINUED | OUTPATIENT
Start: 2025-03-08 | End: 2025-03-08

## 2025-03-08 RX ORDER — MUPIROCIN 20 MG/G
OINTMENT TOPICAL
Status: DISCONTINUED | OUTPATIENT
Start: 2025-03-08 | End: 2025-03-08 | Stop reason: HOSPADM

## 2025-03-08 RX ORDER — PHENYLEPHRINE HYDROCHLORIDE 10 MG/ML
INJECTION INTRAVENOUS
Status: DISCONTINUED | OUTPATIENT
Start: 2025-03-08 | End: 2025-03-08

## 2025-03-08 RX ORDER — CEFAZOLIN 2 G/1
2 INJECTION, POWDER, FOR SOLUTION INTRAMUSCULAR; INTRAVENOUS
Status: DISCONTINUED | OUTPATIENT
Start: 2025-03-08 | End: 2025-03-08 | Stop reason: HOSPADM

## 2025-03-08 RX ORDER — FENTANYL CITRATE 50 UG/ML
INJECTION, SOLUTION INTRAMUSCULAR; INTRAVENOUS
Status: DISCONTINUED | OUTPATIENT
Start: 2025-03-08 | End: 2025-03-08

## 2025-03-08 RX ORDER — BUPIVACAINE HYDROCHLORIDE 2.5 MG/ML
INJECTION, SOLUTION EPIDURAL; INFILTRATION; INTRACAUDAL; PERINEURAL
Status: DISPENSED
Start: 2025-03-08 | End: 2025-03-08

## 2025-03-08 RX ORDER — VASOPRESSIN 20 [USP'U]/ML
INJECTION, SOLUTION INTRAMUSCULAR; SUBCUTANEOUS
Status: DISCONTINUED | OUTPATIENT
Start: 2025-03-08 | End: 2025-03-08

## 2025-03-08 RX ADMIN — ONDANSETRON 4 MG: 2 INJECTION INTRAMUSCULAR; INTRAVENOUS at 12:03

## 2025-03-08 RX ADMIN — VANCOMYCIN HYDROCHLORIDE 1000 MG: 1 INJECTION, POWDER, LYOPHILIZED, FOR SOLUTION INTRAVENOUS at 06:03

## 2025-03-08 RX ADMIN — FENTANYL CITRATE 25 MCG: 50 INJECTION, SOLUTION INTRAMUSCULAR; INTRAVENOUS at 12:03

## 2025-03-08 RX ADMIN — PHENYLEPHRINE HYDROCHLORIDE 50 MCG: 10 INJECTION INTRAVENOUS at 11:03

## 2025-03-08 RX ADMIN — METHOCARBAMOL 500 MG: 500 TABLET ORAL at 09:03

## 2025-03-08 RX ADMIN — SUGAMMADEX 200 MG: 100 INJECTION, SOLUTION INTRAVENOUS at 01:03

## 2025-03-08 RX ADMIN — ROCURONIUM BROMIDE 10 MG: 10 INJECTION, SOLUTION INTRAVENOUS at 10:03

## 2025-03-08 RX ADMIN — APIXABAN 5 MG: 5 TABLET, FILM COATED ORAL at 09:03

## 2025-03-08 RX ADMIN — DEXAMETHASONE SODIUM PHOSPHATE 4 MG: 4 INJECTION, SOLUTION INTRAMUSCULAR; INTRAVENOUS at 09:03

## 2025-03-08 RX ADMIN — ROCURONIUM BROMIDE 10 MG: 10 INJECTION, SOLUTION INTRAVENOUS at 11:03

## 2025-03-08 RX ADMIN — CEFAZOLIN 2 G: 2 INJECTION, POWDER, FOR SOLUTION INTRAMUSCULAR; INTRAVENOUS at 09:03

## 2025-03-08 RX ADMIN — PHENYLEPHRINE HYDROCHLORIDE 0.4 MCG/KG/MIN: 10 INJECTION INTRAVENOUS at 08:03

## 2025-03-08 RX ADMIN — Medication 1000 UNITS: at 03:03

## 2025-03-08 RX ADMIN — VASOPRESSIN 1 UNITS: 20 INJECTION INTRAVENOUS at 09:03

## 2025-03-08 RX ADMIN — METHOCARBAMOL 500 MG: 500 TABLET ORAL at 03:03

## 2025-03-08 RX ADMIN — MUPIROCIN: 20 OINTMENT TOPICAL at 06:03

## 2025-03-08 RX ADMIN — CEFAZOLIN 2 G: 330 INJECTION, POWDER, FOR SOLUTION INTRAMUSCULAR; INTRAVENOUS at 01:03

## 2025-03-08 RX ADMIN — ROCURONIUM BROMIDE 20 MG: 10 INJECTION, SOLUTION INTRAVENOUS at 09:03

## 2025-03-08 RX ADMIN — VASOPRESSIN 1 UNITS: 20 INJECTION INTRAVENOUS at 08:03

## 2025-03-08 RX ADMIN — PHENYLEPHRINE HYDROCHLORIDE 200 MCG: 10 INJECTION INTRAVENOUS at 08:03

## 2025-03-08 RX ADMIN — FENTANYL CITRATE 25 MCG: 50 INJECTION, SOLUTION INTRAMUSCULAR; INTRAVENOUS at 01:03

## 2025-03-08 RX ADMIN — EPINEPHRINE 10 MCG: 1 INJECTION, SOLUTION, CONCENTRATE INTRAVENOUS at 01:03

## 2025-03-08 RX ADMIN — FENTANYL CITRATE 25 MCG: 50 INJECTION, SOLUTION INTRAMUSCULAR; INTRAVENOUS at 09:03

## 2025-03-08 RX ADMIN — ROCURONIUM BROMIDE 50 MG: 10 INJECTION, SOLUTION INTRAVENOUS at 08:03

## 2025-03-08 RX ADMIN — BUPIVACAINE HYDROCHLORIDE AND EPINEPHRINE BITARTRATE 30 ML: 2.5; .0091 INJECTION, SOLUTION EPIDURAL; INFILTRATION; INTRACAUDAL; PERINEURAL at 01:03

## 2025-03-08 RX ADMIN — EPINEPHRINE 10 MCG: 1 INJECTION, SOLUTION, CONCENTRATE INTRAVENOUS at 12:03

## 2025-03-08 RX ADMIN — METOPROLOL SUCCINATE 25 MG: 25 TABLET, EXTENDED RELEASE ORAL at 09:03

## 2025-03-08 RX ADMIN — PROPOFOL 60 MG: 10 INJECTION, EMULSION INTRAVENOUS at 08:03

## 2025-03-08 RX ADMIN — VASOPRESSIN 2 UNITS: 20 INJECTION INTRAVENOUS at 12:03

## 2025-03-08 RX ADMIN — Medication: at 09:03

## 2025-03-08 RX ADMIN — EPINEPHRINE 10 MCG: 1 INJECTION, SOLUTION, CONCENTRATE INTRAVENOUS at 08:03

## 2025-03-08 RX ADMIN — TRANEXAMIC ACID 1000 MG: 100 INJECTION INTRAVENOUS at 08:03

## 2025-03-08 RX ADMIN — LIDOCAINE HYDROCHLORIDE 60 MG: 20 INJECTION, SOLUTION EPIDURAL; INFILTRATION; INTRACAUDAL; PERINEURAL at 08:03

## 2025-03-08 RX ADMIN — CEFAZOLIN 2 G: 330 INJECTION, POWDER, FOR SOLUTION INTRAMUSCULAR; INTRAVENOUS at 09:03

## 2025-03-08 RX ADMIN — VASOPRESSIN 2 UNITS: 20 INJECTION INTRAVENOUS at 08:03

## 2025-03-08 RX ADMIN — FENTANYL CITRATE 75 MCG: 50 INJECTION, SOLUTION INTRAMUSCULAR; INTRAVENOUS at 08:03

## 2025-03-08 RX ADMIN — SODIUM CHLORIDE: 0.9 INJECTION, SOLUTION INTRAVENOUS at 08:03

## 2025-03-08 RX ADMIN — ACETAMINOPHEN 1000 MG: 500 TABLET ORAL at 03:03

## 2025-03-08 RX ADMIN — TRANEXAMIC ACID 1000 MG: 100 INJECTION INTRAVENOUS at 11:03

## 2025-03-08 RX ADMIN — LEVOTHYROXINE SODIUM 88 MCG: 50 TABLET ORAL at 05:03

## 2025-03-08 RX ADMIN — PHENYLEPHRINE HYDROCHLORIDE 100 MCG: 10 INJECTION INTRAVENOUS at 08:03

## 2025-03-08 RX ADMIN — Medication: at 02:03

## 2025-03-08 RX ADMIN — SODIUM CHLORIDE, SODIUM GLUCONATE, SODIUM ACETATE, POTASSIUM CHLORIDE, MAGNESIUM CHLORIDE, SODIUM PHOSPHATE, DIBASIC, AND POTASSIUM PHOSPHATE: .53; .5; .37; .037; .03; .012; .00082 INJECTION, SOLUTION INTRAVENOUS at 08:03

## 2025-03-08 RX ADMIN — ACETAMINOPHEN 1000 MG: 500 TABLET ORAL at 09:03

## 2025-03-08 RX ADMIN — ACETAMINOPHEN 1000 MG: 500 TABLET ORAL at 05:03

## 2025-03-08 NOTE — BRIEF OP NOTE
[facility] - Surgery ([facility])  Brief Operative Note    SUMMARY     Surgery Date: 3/8/2025     Surgeons and Role:     * Albino William MD - Primary     * ZEESHAN Lanza MD - Resident - Assisting     * BARRY Warren MD - Resident - Assisting     * Kay Ramirez MD - Resident - Chief        Pre-op Diagnosis:  Periprosthetic fracture of shaft of femur [M97.8XXA, Z96.649]    Post-op Diagnosis:  Post-Op Diagnosis Codes:     * Periprosthetic fracture of shaft of femur [M97.8XXA, Z96.649]    Procedure(s) (LRB):  OPEN REDUCTION INTERNAL FIXATION FEMORAL SHAFT FRACTURE (Right)    Anesthesia: Choice    Implants:  Implant Name Type Inv. Item Serial No.  Lot No. LRB No. Used Action   VARIABLE ANGLE PLATE      Right 1 Implanted   SCREW VA LOCKING ST 5X70MM - [id number]  SCREW VA LOCKING ST 5X70MM  SYNTHES  Right 1 Implanted   SCREW VA LOCKING ST 5X75MM - [id number]  SCREW VA LOCKING ST 5X75MM  SYNTHES  Right 2 Implanted   SCREW VA LOCKING ST 5X80MM - [id number]  SCREW VA LOCKING ST 5X80MM  SYNTHES  Right 1 Implanted   SCREW CORTEX 4.5 42M - [id number]  SCREW CORTEX 4.5 42M  SYNTHES  Right 1 Implanted   SCREW CORTEX 4.5 46M - [id number]  SCREW CORTEX 4.5 46M  SYNTHES  Right 1 Implanted   SCREW CORTEX 4.5 50M - [id number]  SCREW CORTEX 4.5 50M  SYNTHES  Right 1 Implanted   SCREW CORTEX 4.5 44M - [id number]  SCREW CORTEX 4.5 44M  SYNTHES  Right 1 Implanted   VA LOCKING ATTACHMENT PLATE      Right 1 Implanted   CONNECTING SCREW      Right 1 Implanted   SCREW VA ANG DINA ST 3.5X30MM - [id number]  SCREW VA ANG DINA ST 3.5X30MM  SYNTHES  Right 2 Implanted   3.5MM CORTEX SCREW STAR DRIVE      Right 1 Implanted   VA LOCKING SCREW      Right 2 Implanted   SCREW CORTEX 4.5 38M - [id number]  SCREW CORTEX 4.5 38M  SYNTHES  Right 1 Implanted   CABLE W/CRIMP STRL 1.7MM - [id number]  CABLE W/CRIMP STRL 1.7MM  SYNTHES  Right 4 Implanted   5.0MM VA POSITIONING PIN      Right 1 Implanted   SCREW CORTEX 4.5 32M - [id number]   SCREW CORTEX 4.5 32M  SYNTHES  Right 1 Implanted   5.0MM VA LOCKING SCREW      Right 1 Implanted       Operative Findings: The above procedures were performed.  No intra-operative complications were noted.  Skin was in good condition and well-approximated at closure.       Estimated Blood Loss: 550 mL    Estimated Blood Loss has been documented.         Specimens:   Specimen (24h ago, onward)      None            [id number]

## 2025-03-08 NOTE — ASSESSMENT & PLAN NOTE
Patient with cardiac pacemaker in situ and has paced rhythm on telen\mtry. Pacemaker in place for sick sinus syndrome.

## 2025-03-08 NOTE — TRANSFER OF CARE
"Anesthesia Transfer of Care Note    Patient: Dorothy M Knobloch    Procedure(s) Performed: Procedure(s) (LRB):  OPEN REDUCTION INTERNAL FIXATION FEMORAL SHAFT FRACTURE (Right)    Patient location: PACU    Anesthesia Type: general    Transport from OR: Transported from OR on 6-10 L/min O2 by face mask with adequate spontaneous ventilation    Post pain: adequate analgesia    Post assessment: no apparent anesthetic complications and tolerated procedure well    Post vital signs: stable    Level of consciousness: awake    Nausea/Vomiting: no nausea/vomiting    Complications: none    Transfer of care protocol was followed      Last vitals: Visit Vitals  BP (!) 114/54 (BP Location: Right arm, Patient Position: Sitting)   Pulse 69   Temp 36.3 °C (97.3 °F) (Temporal)   Resp 18   Ht 5' 2" (1.575 m)   Wt 59 kg (130 lb 1.1 oz)   SpO2 99%   Breastfeeding No   BMI 23.79 kg/m²     "

## 2025-03-08 NOTE — NURSING
"@0408: Pt reported not feeling well. Pt denied nausea. Pt c/o chest pain with pointing just middle of the chest that doesn't radiated to anywhere. Pt said pt has never had chest pain before. Noticed that pt has SOB. Obtained VS. VSS. Notified to PA [place]. PA ordered stat EKG and troponin.    @0422: EKG at bedside. Pt still SOB and c/o chest pain. Reobtained VSS except O2 Sat 93 % with room air. 2L oxygen NC on for comfort. Pt states "I have taken nitro when I had chest pain before. But I dont have nitro at this time." Nurse asked pt to confirm two different statements. Pt states "I had chest pain a while ago. I have taken nitro three times for my whole life." Notified to PA regarding pt's statement and EKG result. Will continue to monitor.     @0505: Pt no c/o chest pain. Will continue to monitor.  "

## 2025-03-08 NOTE — ASSESSMENT & PLAN NOTE
Patient with Hgb 9.8 on admit and normally her Hgb is around 12 so suspect acute blood loss on admit related to blood loss from her significant right femur fracture. Hgb pre-op on 3/8 was 8.7 and transfused 1 unit of PRBCs intra-op on 3/8. Most recent hemoglobin and hematocrit are listed below.  Recent Labs     03/07/25  1050 03/08/25  0301   HGB 9.8* 8.7*   HCT 30.1* 26.3*     Plan  - Monitor serial CBC: Daily  - Transfuse PRBC if patient becomes hemodynamically unstable, symptomatic or H/H drops below 7/21.  - Patient has received 1 units of PRBCs on 3/8/2025 intra-op.  - Patient's anemia is currently worsening. Will monitor Hgb for now. No clinical signs of bleeding but had a complex fracture repair surgery today, 3/8 so would suspect further blood loss with surgery today. Vital signs stable.

## 2025-03-08 NOTE — NURSING TRANSFER
Nursing Transfer Note      3/8/2025   2:19 PM    Nurse giving handoff:Carl LOVE Rn  Nurse receiving handoff: Hang    Reason patient is being transferred: postop    Transfer To: [id number]    Transfer via bed    Transfer with cardiac monitoring    Transported by transport    Transfer Vital Signs:  Blood Pressure:see flowsheet  Heart Rate:  O2:  Temperature:  Respirations:    Telemetry: yes  Order for Tele Monitor? Yes    Additional Lines: Iqbal Catheter    Medicines sent: Ropivacaine    Any special needs or follow-up needed:     Patient belongings transferred with patient: No    Chart send with patient: Yes    Notified: family    Patient reassessed at: 3/8/25  1  Upon arrival to floor: bed in lowest position

## 2025-03-08 NOTE — PROGRESS NOTES
[facility] Medicine  Progress Note    Patient Name: Dorothy M Knobloch  MRN: [id number]  Patient Class: IP- Inpatient   Admission Date: 3/7/2025  Length of Stay: 1 days  Attending Physician: Grisel Liao MD  Primary Care Provider: Lola Wolff MD        Subjective     Principal Problem:Periprosthetic fracture of shaft of femur        HPI:  Dorothy Knobloch is a 89 y.o. female with a hx of Afib, HTN, CHF, anemia an hypothyroidism presents to the ED s/p fall. Patient reports she was walking to the door with her walker to open it for their sitter when she leaned her walker and subsequently fell over it. States she missed the door handle which caused her to fall. Reports she hit her head twice on the floor but did not lose consciousness. She notes immediate pain to her RLE and was unable to move it/ get up off the ground. Reports she is usually ambulatory without difficulty with her walker. Last dose of her anticoagulation medication was yesterday. Denies fever, chills, chest pain, SOB, abdominal pain and urinary symptoms. Denies numbness and tingling.     ED: AF, VSS. K 3.4. CT Head w/o acute process. CT C spine showed no acute fracture. Xray femur showed femoral shaft fracture. Given tylenol and robaxin in the ED. Ortho consulted. Admitted to .     Overview/Hospital Course:  89 y/o female with PAF on long term Apixiban at home for anticoagulation, HTN, chronic diastolic heart failure, chronic anemia, cardiac pacemaker due to sick sinus syndrome and hypothyroidism presents to the ED after fall at home. Patient was walking to the door with her walker to open it for their sitter when she leaned her walker and subsequently fell over it. States she missed the door handle which caused her to fall. Reports she hit her head twice on the floor but did not lose consciousness. She notes immediate pain to her RLE and was unable to move it/ get up off the ground. EMS called and patient brought to ED  where X-ray imaging of right leg revealed a closed periprosthetic right displaced femoral shaft fracture.Orthopedic surgery consult for closed periprosthetic right femoral shaft fracture in patient with previous right MARCEL . Patient was seen and evaluated by Orthopedic surgery who recommended operative repair of fracture. Patient was medically optimized prior to surgery and was taken to OR after optimization on 3/8/2025. Patient underwent right femur  ORIF with long femoral plate and screws and cables to repair fracture by Dr. Albino William . Post-op patient WBAT to the left lower extremity as per Orthopedics recommendation. Patient restarted on her home Apixiban 5 mg po BID post-op that she takes for long term anticoagulation for her PAF so no other DVT prophylaxis needed post-op. Perineural pain catheter placed by Anesthesia Pain Service with continuous infusion of Ropivacaine to help with pain control post-op and Anesthesia Pain Service managing while patient in the hospital. Patient placed on multimodal pain management post-op with Tylenol 1000 mg po every 8 hours and Robaxin 500 mg po every 8 hours post-op and will continue. PT/OT consulted post-op. Patient placed on IV Cefazolin post-op for 24 hours for antibiotic prophylaxis post-op and to be followed by Cefadroxil 500 mg po BID for 7 days to extended antibiotic prophylaxis post-op to prevent post-op wound infection at surgical site.       Interval History: 89 y/o female with PAF on long term Apixiban at home for anticoagulation, HTN, chronic diastolic heart failure, chronic anemia, cardiac pacemaker due to sick sinus syndrome and hypothyroidism admitted yesterday after fall at fall and found to have a closed periprosthetic right displaced femoral shaft fracture. Orthopedic surgery consult for closed periprosthetic rightfemoral shaft fracture in patient with previous right MARCEL. Patient was seen and evaluated by Orthopedic surgery who recommended operative  repair of fracture. Patient taken to OR today and underwent right femur ORIF with long femoral plate and screws and cables to repair fracture by Dr. Albino William. Post-op patient weight bear as tolerated to the left lower extremity as per Orthopedics recommendation. Patient restarted on her home Apixiban 5 mg po BID post-op that she takes for long term anticoagulation for her PAF so no other DVT prophylaxis needed post-op. Perineural pain catheter placed by Anesthesia Pain Service with continuous infusion of Ropivacaine to help with pain control post-op and Anesthesia Pain Service managing while patient in the hospital. Patient placed on multimodal pain management post-op with Tylenol 1000 mg po every 8 hours and Robaxin 500 mg po every 8 hours post-op and will continue. PT/OT consulted post-op and to work with patient tomorrow.   Patient back from surgery and in her hospital room. Patient's  and children at bedside. I showed her children post-op X-ray on my phone as they were asking what Orthopedics did today and I showed them the plate and screws and cables that Ortho placed today. Patient appears very comfortable on exam and in no pain or distress. Patient sleepy but arousable. Family states  was just in hospital recently with a broken hip that he is recovering from and he went to [facility] for therapy post-op and hoping if patient needs post acute inpatient therapy to go to an [facility] facility. I told them if therapy recommends inpatient post acute therapy then will try to get her into [facility] facility. Family states their father went to [facility] about 2 years ago and they had a horrible experience so very leery of patient going to a non [facility] facility. Labs reviewed. Hgb 8,7 this am pre-op and down from 9.8 on admit. Baseline Hgb is usually around 12 so suspect anemia on admit and pre-op related to acute blood loss from her complex femur fracture. Potassium improved to 4.2 today  from 3.4 yesterday on admit after oral replacement.     Review of Systems   Constitutional:  Negative for fever.   Respiratory:  Negative for cough and shortness of breath.    Cardiovascular:  Negative for chest pain.   Gastrointestinal:  Negative for nausea.   Musculoskeletal:  Positive for arthralgias (Right hip) and myalgias (Right thigh).   Psychiatric/Behavioral:  Negative for agitation and confusion.      Objective:     Vital Signs (Most Recent):  Temp: 97.7 °F (36.5 °C) (03/08/25 1550)  Pulse: 64 (03/08/25 1615)  Resp: 18 (03/08/25 1600)  BP: 134/63 (03/08/25 1613)  SpO2: 95 % (03/08/25 1615) on room air Vital Signs (24h Range):  Temp:  [97.3 °F (36.3 °C)-98.4 °F (36.9 °C)] 97.7 °F (36.5 °C)  Pulse:  [59-79] 64  Resp:  [15-20] 18  SpO2:  [91 %-100 %] 95 %  BP: ()/(41-63) 134/63     Weight: 59 kg (130 lb 1.1 oz)  Body mass index is 23.79 kg/m².    Intake/Output Summary (Last 24 hours) at 3/8/2025 1704  Last data filed at 3/8/2025 1550  Gross per 24 hour   Intake 2560 ml   Output 1275 ml   Net 1285 ml         Physical Exam  Vitals and nursing note reviewed.   Constitutional:       General: She is sleeping. She is not in acute distress.     Appearance: Normal appearance. She is normal weight. She is not ill-appearing.      Comments: Frail elderly female in no distress laying in bed and appears very comfortable. Family at bedside. Patient on room air.    Eyes:      Conjunctiva/sclera: Conjunctivae normal.   Cardiovascular:      Rate and Rhythm: Normal rate and regular rhythm.      Heart sounds: Normal heart sounds. No murmur heard.  Pulmonary:      Effort: Pulmonary effort is normal. No respiratory distress.      Breath sounds: Normal breath sounds. No wheezing.   Abdominal:      General: Abdomen is flat. Bowel sounds are normal. There is no distension.      Palpations: Abdomen is soft.      Tenderness: There is no abdominal tenderness.   Skin:     General: Skin is warm.      Findings: No erythema.       Comments: Surgical bandages in place all along right lateral thigh and hip area. Bandages are  clean and dry and intact.   Neurological:      Mental Status: She is oriented to person, place, and time and easily aroused.   Psychiatric:         Mood and Affect: Mood normal.         Behavior: Behavior normal. Behavior is cooperative.               Significant Labs: BMP:   Recent Labs   Lab 03/08/25  0301   *      K 4.2      CO2 24   BUN 43*   CREATININE 0.7   CALCIUM 9.0   MG 2.1     CBC:   Recent Labs   Lab 03/07/25  1050 03/08/25  0301   WBC 8.69 9.68   HGB 9.8* 8.7*   HCT 30.1* 26.3*    237     Significant Imaging: I have reviewed all pertinent imaging results/findings within the past 24 hours.      Assessment & Plan  Periprosthetic fracture of shaft of right femur s/p ORIF on 3/8/2025  89 y/o female with PAF on long term Apixiban at home for anticoagulation, HTN, chronic diastolic heart failure, chronic anemia, cardiac pacemaker due to sick sinus syndrome and hypothyroidism presents to the ED after fall at home. Patient was walking to the door with her walker to open it for their sitter when she leaned her walker and subsequently fell over it. States she missed the door handle which caused her to fall. Patient noted immediate pain to her RLE and was unable to move it/ get up off the ground. EMS called and patient brought to ED where X-ray imaging of right leg revealed a closed periprosthetic right displaced femoral shaft fracture. Orthopedic surgery consult for closed periprosthetic right femoral shaft fracture in patient with previous right MARCEL. Patient was seen and evaluated by Orthopedic surgery who recommended operative repair of fracture.     - Patient taken to OR on 3/8/2025 and underwent right femur ORIF with long femoral plate and screws and cables to repair fracture by Dr. Albino William.   - Post-op, patient weight bear as tolerate to the left lower extremity as per Orthopedics  recommendation.   - Patient restarted on her home Apixiban 5 mg po BID post-op that she takes for long term anticoagulation for her PAF so no other DVT prophylaxis needed post-op.   - Perineural pain catheter placed by Anesthesia Pain Service with continuous infusion of Ropivacaine to help with pain control post-op and Anesthesia Pain Service managing while patient in the hospital.   - Patient placed on multimodal pain management post-op with Tylenol 1000 mg po every 8 hours and Robaxin 500 mg po every 8 hours post-op and will continue.   - PT/OT consulted post-op and to work with patient tomorrow, 3/9.  - Patient placed on IV Cefazolin post-op for 24 hours for antibiotic prophylaxis post-op and to be followed by Cefadroxil 500 mg po BID for 7 days to extended antibiotic prophylaxis post-op to prevent post-op wound infection at surgical site.   - Surgical bandages to remain in place to right thigh and thigh area until Orthopedic clinic follow-up.     Acute blood loss anemia  Patient with Hgb 9.8 on admit and normally her Hgb is around 12 so suspect acute blood loss on admit related to blood loss from her significant right femur fracture. Hgb pre-op on 3/8 was 8.7 and transfused 1 unit of PRBCs intra-op on 3/8. Most recent hemoglobin and hematocrit are listed below.  Recent Labs     03/07/25  1050 03/08/25  0301   HGB 9.8* 8.7*   HCT 30.1* 26.3*     Plan  - Monitor serial CBC: Daily  - Transfuse PRBC if patient becomes hemodynamically unstable, symptomatic or H/H drops below 7/21.  - Patient has received 1 units of PRBCs on 3/8/2025 intra-op.  - Patient's anemia is currently worsening. Will monitor Hgb for now. No clinical signs of bleeding but had a complex fracture repair surgery today, 3/8 so would suspect further blood loss  with surgery today. Vital signs stable.    Paroxysmal atrial fibrillation  On anticoagulant therapy  On anticoagulant therapy   Patient has paroxysmal (<7 days) atrial fibrillation. Patient  "is currently in sinus rhythm. [id number] Score: 4. The patients heart rate in the last 24 hours is as follows:  Pulse  Min: 59  Max: 79     Antiarrhythmics  metoprolol succinate (TOPROL-XL) 24 hr tablet 25 mg, 2 times daily, Oral    Plan  - Replete lytes with a goal of K>4, Mg >2  - Patient is anticoagulated at home with Apixiban 5 mg po BID and held pre-op but resumed post-op on 3/8.   - Patient's afib is currently controlled. Patient in paced rhythm. Continue home Toprol XL for rate control.       Essential hypertension  Patient's blood pressure range in the last 24 hours was: BP  Min: 89/42  Max: 134/63.The patient's inpatient anti-hypertensive regimen is listed below:  Current Antihypertensives  amLODIPine tablet 5 mg, Daily, Oral  hydroCHLOROthiazide tablet 12.5 mg, Daily, Oral  metoprolol succinate (TOPROL-XL) 24 hr tablet 25 mg, 2 times daily, Oral  bumetanide tablet 0.5 mg, Daily, Oral    Plan  - BP is controlled, no changes needed to their regimen.  - Goal BP < 140/90.    Pure hypercholesterolemia  Chronic and controlled and not on any outpatient therapy to treat.     Chronic diastolic congestive heart failure  Patient has Diastolic (HFpEF) heart failure that is Chronic. On presentation their CHF was well compensated. Most recent BNP and echo results are listed below.  No results for input(s): "BNP" in the last 72 hours.  Latest ECHO  Results for orders placed during the hospital encounter of 06/20/24    Echo    Interpretation Summary    Left Ventricle: The left ventricle is normal in size. Normal wall thickness. There is concentric hypertrophy. There is normal systolic function with a visually estimated ejection fraction of 65 - 70%. Unable to assess diastolic function due to atrial fibrillation.    Right Ventricle: Normal right ventricular cavity size. Wall thickness is normal. Systolic function is normal.    Left Atrium: Left atrium is severely dilated.    Right Atrium: Right atrium is severely " dilated.    Mitral Valve: There is moderate posterior leaflet sclerosis. There is no stenosis. The mean pressure gradient across the mitral valve is 1 mmHg at a heart rate of 60 bpm. There is moderate regurgitation.    Tricuspid Valve: There is moderate to severe regurgitation.    Pulmonic Valve: There is no stenosis.    Pulmonary Artery: There is moderate pulmonary hypertension. The estimated pulmonary artery systolic pressure is 60 mmHg.    IVC/SVC: Intermediate venous pressure at 8 mmHg.    Current Heart Failure Medications  hydroCHLOROthiazide tablet 12.5 mg, Daily, Oral  metoprolol succinate (TOPROL-XL) 24 hr tablet 25 mg, 2 times daily, Oral  bumetanide tablet 0.5 mg, Daily, Oral    Plan  - Monitor strict I&Os and daily weights.    - Monitor on telemetry  - Cardiac diet  - Cardiology has not been consulted.  - The patient's volume status is at their baseline.    Hypokalemia  Resolved on 3/8 with oral replacement on 3/7. Patient's most recent potassium results are listed below.   Recent Labs     03/07/25  1050 03/08/25  0301   K 3.4* 4.2     Plan  - Replete potassium per protocol  - Monitor potassium Daily  - Patient's hypokalemia is resolved  Acquired hypothyroidism  - Chronic and controlled. Continue home Synthroid to treat.     Cardiac pacemaker  Patient with cardiac pacemaker in situ and has paced rhythm on telen\mtry. Pacemaker in place for sick sinus syndrome.    VTE Risk Mitigation (From admission, onward)           Ordered     apixaban tablet 5 mg  2 times daily         03/08/25 1357     IP VTE HIGH RISK PATIENT  Once         03/08/25 0624     Place sequential compression device  Until discontinued         03/08/25 0624     Place sequential compression device  Until discontinued         03/07/25 1318     Place SHANEL hose  Until discontinued         03/07/25 1318     Reason for No Pharmacological VTE Prophylaxis  Once        Question:  Reasons:  Answer:  Risk of Bleeding    03/07/25 1318     Place  sequential compression device  Until discontinued         03/07/25 1318                    Discharge Planning   ISH: 3/11/2025     Code Status: Full Code       Grisel Liao MD  Department of Hospital Medicine   [facility] - Surgery

## 2025-03-08 NOTE — ASSESSMENT & PLAN NOTE
Dorothy M Knobloch is a 89 y.o. female with right periprosthetic [place] C femur fracture, closed, NVI. They take eliquis anticoagulation at home. They required a walker prior to this injury.     -Admitted to medicine hip fracture service for pre-operative clearance and medical evaluation  -To OR 3/8 for operative fixation of right periprosthetic femur fracture  -Pt marked, booked, and consented for surgery  -DVT PPx: Hold anticoagulation  -Abx: Preop abx ordered  -Labs: Prealbumin 8, UA abnormal, Glucose 121, Albumin 3.5, Hemoglobin 9.8, Hematocrit 30.1, Platelets 192, White blood cell count 8.69, A1c 5.1, INR 1.1. Other lab results pending.  -Bed rest, norris, NPO at midnight  -Iv: ordered for contralateral arm    Patient was explained in detail the severity of the injury that was suffered. Patient was explained the risks/benefits/and alternatives to operative management in detail including infection, bleeding, pain, nerve and vascular damage, heterotopic ossification, leg length discrepancies, rotational deformities and they express full understanding.  We discussed the need for early mobilization, and the expected rehab course.  She expresses full understanding of the condition and expresses that they want to proceed with surgery. The patient is admitted to the medicine hip fracture service for optimization of medical comorbidities. Will plan for OR tomorrow. No guarantees were made, informed consent was obtained. All questions were answered to patient's and family's satisfaction.

## 2025-03-08 NOTE — PLAN OF CARE
Patient transported to unit from hospital floor in bed. Pre procedure completed. Perioperative period discussed, all questions and concerns addressed.

## 2025-03-08 NOTE — ASSESSMENT & PLAN NOTE
On anticoagulant therapy   Patient has paroxysmal (<7 days) atrial fibrillation. Patient is currently in sinus rhythm. [id number] Score: 4. The patients heart rate in the last 24 hours is as follows:  Pulse  Min: 59  Max: 79     Antiarrhythmics  metoprolol succinate (TOPROL-XL) 24 hr tablet 25 mg, 2 times daily, Oral    Plan  - Replete lytes with a goal of K>4, Mg >2  - Patient is anticoagulated at home with Apixiban 5 mg po BID and held pre-op but resumed post-op on 3/8.   - Patient's afib is currently controlled. Patient in paced rhythm. Continue home Toprol XL for rate control.

## 2025-03-08 NOTE — HOSPITAL COURSE
89 y/o female with PAF on long term Apixiban at home for anticoagulation, HTN, chronic diastolic heart failure, chronic anemia, cardiac pacemaker due to sick sinus syndrome and hypothyroidism presents to the ED after fall at home. Patient was walking to the door with her walker to open it for their sitter when she leaned her walker and subsequently fell over it. States she missed the door handle which caused her to fall. Reports she hit her head twice on the floor but did not lose consciousness. She notes immediate pain to her RLE and was unable to move it/ get up off the ground. EMS called and patient brought to ED where X-ray imaging of right leg revealed a closed periprosthetic right displaced femoral shaft fracture.Orthopedic surgery consult for closed periprosthetic right femoral shaft fracture in patient with previous right MARCEL . Patient was seen and evaluated by Orthopedic surgery who recommended operative repair of fracture. Patient was medically optimized prior to surgery and was taken to OR after optimization on 3/8/2025. Patient underwent right femur ORIF with long femoral plate and screws and cables to repair fracture byDr. Albino William. Post-op patient weight bear as tolerated to the left lower extremity as per Orthopedics recommendation. Patient restarted on home Apixiban post-op but at reduced dosing of 2.5 mg po BID as per request of Orthopedics (Dr. William) as concerned about bleeding into surgical site and requesting decreased dose for 1 week and then can return back to home dosing of 5 mg po BID after 1 week on 3/15. No other DVT prophylaxis needed post-op as on oral anticoagulation. Perineural pain catheter placed by Anesthesia Pain Service with continuous infusion of Ropivacaine to help with pain control post-op and Anesthesia Pain Service managing while patient in the hospital. Patient placed on multimodal pain management post-op with Tylenol 1000 mg po every 8 hours and Robaxin 500 mg po  every 8 hours post-op and will continue. PT/OT consulted post-op and recommending moderate intensity therapy and SNF referrals sent. Patient placed on IV Cefazolin post-op for 24 hours for antibiotic prophylaxis post-op and received 24 hours of Cefazolin and now on Cefadroxil 500 mg po BID for 7 days for extended antibiotic prophylaxis post-op to prevent post-op wound infection at surgical site. Hgb post-op down to 7.8 on 3/9 and patient having chest pain so having symptomatic anemia so transfused 1 unit of PRBCs on 3/9. Hgb remained the same at 7.8 on 3/10 despite 1 unit of PRBCs transfused on 3/9 so transfused another 1 unit of PRBCs on 3/10. Patient with no clinical signs of bleeding and no recurrence of chest pain on 3/10. Pain controlled on 3/11 to right hip. Hgb improved to 9.8 on 3/11 after transfused 1 unit of PRBCs on 3/9 and 3/10. Patient improving with therapy and participating and doing better. Patient accepted to [facility] as long as medically stable for 3/12. Perineural pain catheter removed by Anesthesia on 3/11. [facility] not able to accept patient on 3/12 as bed fell through and now she does not have a bed at [facility] so case management working on alternative SNF placement for 3/13. Patient medically ready for discharge on 3/12. Hgb stable at 9.0 on 3/12. Vital signs stable on 3/12.     3/13-  right periprosthetic [place] C femur fracture; now  s/p ORIF (3/8/25). S/p transfusion 2 units PRBCs, - PT/OT: WBAT LLE- AC: Eliquis 2.5 mg BID x 7 d and then return to home dose of 5 mg BID on 3/15, Plavix 75 mg daily, FCDs at all times when not ambulating.  MRDC pending SNF.  Na 133.

## 2025-03-08 NOTE — ASSESSMENT & PLAN NOTE
Dorothy M Knobloch is a 89 y.o. female with right periprosthetic [place] C femur fracture, closed, NVI. They take eliquis anticoagulation at home. They required a walker prior to this injury.     -Admitted to medicine hip fracture service for pre-operative clearance and medical evaluation. Per  patient optimized for surgery  -To OR this AM for operative fixation of right periprosthetic femur fracture (NPO)  -Pt marked, booked, and consented for surgery  -DVT PPx: SCDs. Start chemoppx after surgery  -Abx: Preop abx ordered  -Labs: AM Hb 8.7. prbc 2u on hold  -Bed rest, jr

## 2025-03-08 NOTE — ANESTHESIA PROCEDURE NOTES
Intubation    Date/Time: 3/8/2025 8:33 AM    Performed by: Abby Caceres CRNA  Authorized by: Anthony Reed DO    Intubation:     Induction:  Intravenous    Intubated:  Postinduction    Mask Ventilation:  2 handed mask ventilation with 2 providers (due to room set up)    Attempts:  1    Attempted By:  CRNA    Method of Intubation:  Video laryngoscopy    Blade:  Flores 3    Laryngeal View Grade: Grade IIA - cords partially seen      Difficult Airway Encountered?: No      Complications:  None    Airway Device:  Oral endotracheal tube    Airway Device Size:  7.0    Style/Cuff Inflation:  Cuffed    Secured at:  The lips    Placement Verified By:  Capnometry    Complicating Factors:  None    Findings Post-Intubation:  BS equal bilateral and atraumatic/condition of teeth unchanged

## 2025-03-08 NOTE — PROGRESS NOTES
[facility] - [facility]  Orthopedics  Progress Note    Patient Name: Dorothy M Knobloch  MRN: [id number]  Admission Date: 3/7/2025  Hospital Length of Stay: 1 days  Attending Provider: Grisel Liao MD  Primary Care Provider: Lola Wolff MD  Follow-up For: Procedure(s) (LRB):  ORIF, FRACTURE, FEMUR, lateral peg board, trios table (Right)    Post-Operative Day: * Day of Surgery *  Subjective:     Principal Problem:Periprosthetic fracture of shaft of femur    Principal Orthopedic Problem: same    Interval History: seen and evaluated. MEGA VSS. Hb 8.7 this AM. Ready for surgery this AM    Review of patient's allergies indicates:   Allergen Reactions    Aspartame Swelling     equal       Current Facility-Administered Medications   Medication    acetaminophen tablet 1,000 mg    albuterol-ipratropium 2.5 mg-0.5 mg/3 mL nebulizer solution 3 mL    amLODIPine tablet 5 mg    bisacodyL suppository 10 mg    bumetanide tablet 0.5 mg    ceFAZolin 2 g    dextrose 50% injection 12.5 g    dextrose 50% injection 25 g    EScitalopram oxalate tablet 20 mg    glucagon (human recombinant) injection 1 mg    glucose chewable tablet 16 g    glucose chewable tablet 24 g    hydroCHLOROthiazide tablet 12.5 mg    levothyroxine tablet 88 mcg    melatonin tablet 6 mg    methocarbamoL tablet 500 mg    metoprolol succinate (TOPROL-XL) 24 hr tablet 25 mg    mupirocin 2 % ointment    oxyCODONE immediate release tablet 5 mg    sodium chloride 0.9% flush 10 mL    sodium chloride 0.9% flush 10 mL    sodium chloride 0.9% flush 10 mL    sodium chloride 0.9% flush 10 mL    tranexamic acid (CYKLOKAPRON) 1,000 mg in 0.9% NaCl 100 mL IVPB (MB+)    vancomycin (VANCOCIN) 1,000 mg in 0.9% NaCl 250 mL IVPB (admixture device)     Objective:     Vital Signs (Most Recent):  Temp: 97.4 °F (36.3 °C) (03/08/25 0422)  Pulse: 73 (03/08/25 0422)  Resp: 18 (03/08/25 0422)  BP: (!) 127/58 (03/08/25 0422)  SpO2: (!) 93 % (03/08/25 0422) Vital Signs (24h  "Range):  Temp:  [97.4 °F (36.3 °C)-98.7 °F (37.1 °C)] 97.4 °F (36.3 °C)  Pulse:  [59-79] 73  Resp:  [16-22] 18  SpO2:  [93 %-99 %] 93 %  BP: (100-140)/(50-80) 127/58     Weight: 59 kg (130 lb)  Height: 5' 2" (157.5 cm)  Body mass index is 23.78 kg/m².      Intake/Output Summary (Last 24 hours) at 3/8/2025 0652  Last data filed at 3/8/2025 0400  Gross per 24 hour   Intake --   Output 300 ml   Net -300 ml        Ortho/SPM Exam  General appearance: A&Ox3. NAD.   HEENT: Normocephalic, head atraumatic. Conjuntiva normal. EOMI. External appearance of nose, mouth, ears wnl.  Neck: Supple. Trachea midline.  Respiratory: Breathing unlabored on room air. Symmetric chest rise.   CV: Extremities warm and well perfused.  Psychatric: A&Ox3. Appropriate mood and affect.  Skin: Warm, dry, well perfused. No rash.    RLE  Immobilizer in place over thigh  ROM deferred 2/2 pain and known fx  Motor intact distally, foot & ankle PF/DF  SILT throughout to baseline  DP & PT pulse 2+, foot warm and well perfused           Significant Labs: BMP:   Recent Labs   Lab 03/08/25  0301   *      K 4.2      CO2 24   BUN 43*   CREATININE 0.7   CALCIUM 9.0   MG 2.1     CBC:   Recent Labs   Lab 03/07/25  1050 03/08/25  0301   WBC 8.69 9.68   HGB 9.8* 8.7*   HCT 30.1* 26.3*    237     All pertinent labs within the past 24 hours have been reviewed.    Significant Imaging: I have reviewed and interpreted all pertinent imaging results/findings.  Assessment/Plan:     * Periprosthetic fracture of shaft of right femur  Dorothy M Knobloch is a 89 y.o. female with right periprosthetic [place] C femur fracture, closed, NVI. They take eliquis anticoagulation at home. They required a walker prior to this injury.     -Admitted to medicine hip fracture service for pre-operative clearance and medical evaluation. Per  patient optimized for surgery  -To OR this AM for operative fixation of right periprosthetic femur fracture (NPO)  -Pt " marked, booked, and consented for surgery  -DVT PPx: SCDs. Start chemoppx after surgery  -Abx: Preop abx ordered  -Labs: AM Hb 8.7. prbc 2u on hold  -Bed rest, jr Ramirez MD  Orthopedics  [facility] - Surgery

## 2025-03-08 NOTE — CONSULTS
[facility]  Orthopedics  Consult Note    Patient Name: Dorothy M Knobloch  MRN: [id number]  Admission Date: 3/7/2025  Hospital Length of Stay: 0 days  Attending Provider: Grisel Liao MD  Primary Care Provider: Lola Wolff MD      Inpatient consult to Orthopedic Surgery  Consult performed by: Froilan Olivares MD  Consult ordered by: Per Frye MD        Subjective:     Principal Problem:Periprosthetic fracture of shaft of femur    Chief Complaint:   Chief Complaint   Patient presents with    Fall     Pt arrived to ED via EMS from home with complaint of fall. Pt did hit head and is on blood thinners. Pt reports some pain to right thigh area upon arrival. Hx of afib/chf and has pacemaker.         HPI: Dorothy M Knobloch is a 89 y.o. female with PMH significant for Paroxysmal Afib, HTN, CHF, anemia, hypothyroidism, and R MARCEL in 1999 presenting with a right periprosthetic femur fx.  Patient states that this morning around 9:00 a.m. she got up to use her walker to answer the door, where she suffered a fall onto the right hope.  She felt immediate pain and noted obvious deformity of the right thigh.  She was unable to bear weight at this time before being taken to a nearby emergency department by EMS.  Patient states she bumped her head and also fell onto the right shoulder.  Patient denies LOC. The patient denies prior hx of falls. Patient denies numbness and tingling.  Only prior orthopedic surgery was MARCEL in 1999.     Walks w/a walker at baseline. Takes eliquis twice daily.    They deny IV drug use.   They deny tobacco use.   They deny alcohol use.   They deny immunosuppressant medications.  They deny chemotherapy.  They deny radiation therapy.      Past Medical History:   Diagnosis Date    Acquired hypothyroidism 12/13/2013    Age-related physical debility 04/05/2024    Antiplatelet or antithrombotic long-term use     AV block, 3rd degree 03/19/2019    Bilateral nonexudative  age-related macular degeneration 08/27/2013    Blindness and low vision 08/10/2018    Blood transfusion     Cardiac pacemaker 08/22/2018    Carotid artery disease 08/10/2016    Chronic diastolic congestive heart failure 11/24/2019    CRAO (central retinal artery occlusion), left 01/29/2018    Dry eye syndrome of both eyes 08/10/2018    Essential hypertension 12/13/2013    History of TIA (transient ischemic attack) 12/13/2013    Nonexudative age-related macular degeneration, bilateral, intermediate dry stage 07/12/2018    Nuclear sclerotic cataract of left eye 08/10/2018    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 12/13/2013    On anticoagulant therapy 06/13/2024    Osteoarthritis     Paroxysmal atrial fibrillation 09/11/2018    Pulmonary hypertension 04/05/2024    Pure hypercholesterolemia 12/13/2013    Sick sinus syndrome     Stroke     Takotsubo cardiomyopathy 11/17/2016       Past Surgical History:   Procedure Laterality Date    CARDIAC PACEMAKER PLACEMENT      [employer] Device Model: Juli LOZADA MRI [id number] Device Type: PACEMAKER Device S\N: [id number]    CARPAL TUNNEL RELEASE Bilateral 1991    CATARACT EXTRACTION Right     JOINT REPLACEMENT      SC TRANSCRAN DOPP INTRACRAN, EMBOLI W/O INJ  01/29/2018         SKIN BIOPSY      TONSILLECTOMY      TOTAL HIP ARTHROPLASTY  11/01/2000    right/[facility]       Review of patient's allergies indicates:   Allergen Reactions    Aspartame Swelling     equal       Current Facility-Administered Medications   Medication    [START ON 3/8/2025] acetaminophen tablet 1,000 mg    albuterol-ipratropium 2.5 mg-0.5 mg/3 mL nebulizer solution 3 mL    [START ON 3/8/2025] amLODIPine tablet 5 mg    bisacodyL suppository 10 mg    [START ON 3/8/2025] bumetanide tablet 0.5 mg    dextrose 50% injection 12.5 g    dextrose 50% injection 25 g    [START ON 3/8/2025] EScitalopram oxalate tablet 20 mg    glucagon (human recombinant) injection 1 mg    glucose  "chewable tablet 16 g    glucose chewable tablet 24 g    [START ON 3/8/2025] hydroCHLOROthiazide tablet 12.5 mg    [START ON 3/8/2025] levothyroxine tablet 88 mcg    melatonin tablet 6 mg    methocarbamoL tablet 500 mg    metoprolol succinate (TOPROL-XL) 24 hr tablet 25 mg    oxyCODONE immediate release tablet 5 mg    sodium chloride 0.9% flush 10 mL    sodium chloride 0.9% flush 10 mL     Family History       Problem Relation (Age of Onset)    Arthritis Sister, Maternal Grandmother, Paternal Grandmother    Birth defects Son    Cancer Paternal Aunt (80)    Diabetes Paternal Aunt, Paternal Uncle    Early death Mother, Father    Hearing loss Daughter    Hypertension Son, Paternal Grandmother          Tobacco Use    Smoking status: Never     Passive exposure: Never    Smokeless tobacco: Never   Substance and Sexual Activity    Alcohol use: No     Alcohol/week: 0.0 standard drinks of alcohol    Drug use: No    Sexual activity: Not on file     ROS  Constitutional: negative for fevers  Eyes: negative visual changes  ENT: negative for hearing loss  Respiratory: negative for dyspnea  Cardiovascular: negative for chest pain  Gastrointestinal: negative for abdominal pain  Genitourinary: negative for dysuria  Neurological: negative for headaches  Behavioral/Psych: negative for hallucinations  Endocrine: negative for temperature intolerance    Objective:     Vital Signs (Most Recent):  Temp: 98.2 °F (36.8 °C) (03/07/25 1719)  Pulse: 79 (03/07/25 1719)  Resp: 18 (03/07/25 1719)  BP: (!) 115/58 (03/07/25 1719)  SpO2: 96 % (03/07/25 1719) Vital Signs (24h Range):  Temp:  [97.7 °F (36.5 °C)-98.7 °F (37.1 °C)] 98.2 °F (36.8 °C)  Pulse:  [59-79] 79  Resp:  [16-22] 18  SpO2:  [94 %-99 %] 96 %  BP: (100-140)/(50-80) 115/58     Weight: 59 kg (130 lb)  Height: 5' 2" (157.5 cm)  Body mass index is 23.78 kg/m².    No intake or output data in the 24 hours ending 03/07/25 1816     Ortho/SPM Exam  General:  no acute distress, appears stated " age   Neuro: alert and oriented x3  Psych: normal mood  Head: normocephalic, atraumatic.  Eyes: no scleral icterus  Mouth: moist mucous membranes  Cardiovascular: extremities warm and well perfused  Lungs: breathing comfortably, equal chest rise bilat  Skin: clean, dry, intact (any exceptions noted in below musculoskeletal exam)    MSK:  RUE:  - Skin intact throughout, no open wounds  - No swelling  - No ecchymosis, erythema, or signs of cellulitis  - NonTTP throughout  - AROM and PROM of the shoulder, elbow, wrist, and hand intact without pain  - Axillary/AIN/PIN/Radial/Median/Ulnar Nerves assessed in isolation without deficit  - SILT throughout  - Compartments soft  - Radial artery palpated   - Capillary Refill <3s    LUE:  - Skin intact throughout, no open wounds  - No swelling  - No ecchymosis, erythema, or signs of cellulitis  - NonTTP throughout  - AROM and PROM of the shoulder, elbow, wrist, and hand intact without pain  - Axillary/AIN/PIN/Radial/Median/Ulnar Nerves assessed in isolation without deficit  - SILT throughout  - Compartments soft  - Radial artery palpated   - Capillary Refill <3s    RLE:  - Skin intact throughout, no open wounds  - Swelling at middle third of thigh   - No ecchymosis, erythema, or signs of cellulitis  - TTP at proximal, middle and distal aspects of femur. NonTTP at the hip  - AROM and PROM of the ankle, and foot intact without pain  - AROM/PROM of hip and knee deferred 2/2 pain   - TA/EHL/Gastroc/FHL assessed in isolation without deficit  - SILT throughout  - Compartments soft  - DP and PT palpated  - Capillary Refill <3s    LLE:  - Skin intact throughout, no open wounds  - No swelling  - No ecchymosis, erythema, or signs of cellulitis  - NonTTP throughout  - AROM and PROM of the hip, knee, ankle, and foot intact without pain  - TA/EHL/Gastroc/FHL assessed in isolation without deficit  - SILT throughout  - Compartments soft  - DP and PT palpated  - Capillary Refill <3s  - Negative  Log roll  - Negative [facility]    Spine/pelvis/axial body:  No tenderness to palpation of cervical, thoracic, or lumbar spine  No pain with compression of pelvis  No chest wall or abdominal tenderness       Significant Labs: All pertinent labs within the past 24 hours have been reviewed.    Significant Imaging: X-Ray: I have reviewed all pertinent results/findings and my personal findings are:  XR demonstrate a displaced, oblique fracture inferior to prior MARCEL implant stem. No other fractures, acute bony abnormalities or dislocation.   Assessment/Plan:     * Periprosthetic fracture of shaft of right femur  Dorothy M Knobloch is a 89 y.o. female with right periprosthetic [place] C femur fracture, closed, NVI. They take eliquis anticoagulation at home. They required a walker prior to this injury.     -Admitted to medicine hip fracture service for pre-operative clearance and medical evaluation  -To OR 3/8 for operative fixation of right periprosthetic femur fracture  -Pt marked, booked, and consented for surgery  -DVT PPx: Hold anticoagulation  -Abx: Preop abx ordered  -Labs: Prealbumin 8, UA abnormal, Glucose 121, Albumin 3.5, Hemoglobin 9.8, Hematocrit 30.1, Platelets 192, White blood cell count 8.69, A1c 5.1, INR 1.1. Other lab results pending.  -Bed rest, norris, NPO at midnight  -Iv: ordered for contralateral arm    Patient was explained in detail the severity of the injury that was suffered. Patient was explained the risks/benefits/and alternatives to operative management in detail including infection, bleeding, pain, nerve and vascular damage, heterotopic ossification, leg length discrepancies, rotational deformities and they express full understanding.  We discussed the need for early mobilization, and the expected rehab course.  She expresses full understanding of the condition and expresses that they want to proceed with surgery. The patient is admitted to the medicine hip fracture service for optimization  of medical comorbidities. Will plan for OR tomorrow. No guarantees were made, informed consent was obtained. All questions were answered to patient's and family's satisfaction.          KEERTHI Olivares MD  Orthopaedic Surgery   Resident Physician, PGY-1  03/07/2025

## 2025-03-08 NOTE — ANESTHESIA PROCEDURE NOTES
Peripheral Block    Patient location during procedure: pre-op   Block not for primary anesthetic.  Reason for block: at surgeon's request and post-op pain management   Post-op Pain Location: right femur   Timeout: 3/8/2025 1:40 PM     Staffing  Authorizing Provider: Alexis Herrera MD  Performing Provider: Uma Harmon MD    Staffing  Performed by: Uma Harmon MD  Authorized by: Alexis Herrera MD    Preanesthetic Checklist  Completed: patient identified, IV checked, site marked, risks and benefits discussed, surgical consent, monitors and equipment checked, pre-op evaluation and timeout performed  Peripheral Block  Patient position: supine  Prep: ChloraPrep and site prepped and draped  Patient monitoring: heart rate, cardiac monitor, continuous pulse ox, continuous capnometry and frequent blood pressure checks  Block type: femoral  Laterality: left  Injection technique: continuous  Needle  Needle type: Tuohy   Needle gauge: 17 G  Needle length: 3.5 in  Needle localization: anatomical landmarks and ultrasound guidance  Catheter type: spring wound  Catheter size: 19 G  Test dose: lidocaine 1.5% with Epi 1-to-200,000 and negative   -ultrasound image captured on disc.  Assessment  Injection assessment: negative aspiration, negative parasthesia and local visualized surrounding nerve  Paresthesia pain: none  Heart rate change: no  Slow fractionated injection: yes    Medications:    Medications: bupivacaine 0.25%-EPINEPHrine (PF) 1:200,000 injection - Other   30 mL - 3/8/2025 1:44:00 PM    Additional Notes  VSS.  [facility] RN monitoring vitals throughout procedure.  Patient tolerated procedure well.

## 2025-03-08 NOTE — ASSESSMENT & PLAN NOTE
On anticoagulant therapy   Patient has paroxysmal (<7 days) atrial fibrillation. Patient is currently in sinus rhythm. [id number] Score: 4. The patients heart rate in the last 24 hours is as follows:  Pulse  Min: 59  Max: 79     Antiarrhythmics  metoprolol succinate (TOPROL-XL) 24 hr tablet 25 mg, 2 times daily, Oral    Plan  - Replete lytes with a goal of K>4, Mg >2  - Patient is anticoagulated at home with Apixiban 5 mg po BID and held pre-op but resumed post-op on 3/8.   - Patient's afib is currently controlled. Patient in paced rhythm. Continue home Toprol XL for rate control.

## 2025-03-08 NOTE — PLAN OF CARE
Brianna, anesthesiology MD notified of patients pacemaker status. Patient unsure of , no implant history in chart. Anesthesia made aware and notified RN no deprogramming needed.

## 2025-03-08 NOTE — ASSESSMENT & PLAN NOTE
Resolved on 3/8 with oral replacement on 3/7. Patient's most recent potassium results are listed below.   Recent Labs     03/07/25  1050 03/08/25  0301   K 3.4* 4.2     Plan  - Replete potassium per protocol  - Monitor potassium Daily  - Patient's hypokalemia is resolved

## 2025-03-08 NOTE — ASSESSMENT & PLAN NOTE
Patient's blood pressure range in the last 24 hours was: BP  Min: 89/42  Max: 134/63.The patient's inpatient anti-hypertensive regimen is listed below:  Current Antihypertensives  amLODIPine tablet 5 mg, Daily, Oral  hydroCHLOROthiazide tablet 12.5 mg, Daily, Oral  metoprolol succinate (TOPROL-XL) 24 hr tablet 25 mg, 2 times daily, Oral  bumetanide tablet 0.5 mg, Daily, Oral    Plan  - BP is controlled, no changes needed to their regimen.  - Goal BP < 140/90.

## 2025-03-08 NOTE — PLAN OF CARE
Problem: Skin Injury Risk Increased  Goal: Skin Health and Integrity  Outcome: Progressing     Problem: Infection  Goal: Absence of Infection Signs and Symptoms  Outcome: Progressing     Problem: Adult Inpatient Plan of Care  Goal: Plan of Care Review  Outcome: Progressing  Goal: Patient-Specific Goal (Individualized)  Outcome: Progressing  Goal: Absence of Hospital-Acquired Illness or Injury  Outcome: Progressing  Goal: Optimal Comfort and Wellbeing  Outcome: Progressing  Goal: Readiness for Transition of Care  Outcome: Progressing     Problem: Wound  Goal: Optimal Coping  Outcome: Progressing  Goal: Optimal Functional Ability  Outcome: Progressing  Goal: Absence of Infection Signs and Symptoms  Outcome: Progressing  Goal: Improved Oral Intake  Outcome: Progressing  Goal: Optimal Pain Control and Function  Outcome: Progressing  Goal: Skin Health and Integrity  Outcome: Progressing  Goal: Optimal Wound Healing  Outcome: Progressing     Problem: Fall Injury Risk  Goal: Absence of Fall and Fall-Related Injury  Outcome: Progressing

## 2025-03-08 NOTE — HPI
Dorothy Knobloch is a 89 y.o. female with a hx of Afib, HTN, CHF, anemia and hypothyroidism presents to the ED s/p fall. Patient reports she was walking to the door with her walker to open it for their sitter when she leaned her walker and subsequently fell over it. States she missed the door handle which caused her to fall. Reports she hit her head twice on the floor but did not lose consciousness. She notes immediate pain to her RLE and was unable to move it/ get up off the ground. Reports she is usually ambulatory without difficulty with her walker. Last dose of her anticoagulation medication was yesterday. Denies fever, chills, chest pain, SOB, abdominal pain and urinary symptoms. Denies numbness and tingling.     ED: AF, VSS. K 3.4. CT Head w/o acute process. CT C spine showed no acute fracture. Xray femur showed RIGHT femoral shaft fracture. Given tylenol and robaxin in the ED. Ortho consulted. Admitted to .   
Dorothy M Knobloch is a 89 y.o. female with PMH significant for Paroxysmal Afib, HTN, CHF, anemia, hypothyroidism, and R MARCEL in 1999 presenting with a right periprosthetic femur fx.  Patient states that this morning around 9:00 a.m. she got up to use her walker to answer the door, where she suffered a fall onto the right hope.  She felt immediate pain and noted obvious deformity of the right thigh.  She was unable to bear weight at this time before being taken to a nearby emergency department by EMS.  Patient states she bumped her head and also fell onto the right shoulder.  Patient denies LOC. The patient denies prior hx of falls. Patient denies numbness and tingling.  Only prior orthopedic surgery was MARCEL in 1999.     Walks w/a walker at baseline. Takes eliquis twice daily.    They deny IV drug use.   They deny tobacco use.   They deny alcohol use.   They deny immunosuppressant medications.  They deny chemotherapy.  They deny radiation therapy.    
former smoker, quit 40 yrs ago. no ETOH,drugs

## 2025-03-08 NOTE — SUBJECTIVE & OBJECTIVE
Interval History: 89 y/o female with PAF on long term Apixiban at home for anticoagulation, HTN, chronic diastolic heart failure, chronic anemia, cardiac pacemaker due to sick sinus syndrome and hypothyroidism admitted yesterday after fall at fall and found to have a closed periprosthetic right displaced femoral shaft fracture. Orthopedic surgery consult for closed periprosthetic rightfemoral shaft fracture in patient with previous right MARCEL. Patient was seen and evaluated by Orthopedic surgery who recommended operative repair of fracture. Patient taken to OR today and underwent right femur ORIF with long femoral plate and screws and cables to repair fracture by Dr. Albino William. Post-op patient weight bear as tolerated to the left lower extremity as per Orthopedics recommendation. Patient restarted on her home Apixiban 5 mg po BID post-op that she takes for long term anticoagulation for her PAF so no other DVT prophylaxis needed post-op. Perineural pain catheter placed by Anesthesia Pain Service with continuous infusion of Ropivacaine to help with pain control post-op and Anesthesia Pain Service managing while patient in the hospital. Patient placed on multimodal pain management post-op with Tylenol 1000 mg po every 8 hours and Robaxin 500 mg po every 8 hours post-op and will continue. PT/OT consulted post-op and to work with patient tomorrow.   Patient back from surgery and in her hospital room. Patient's  and children at bedside. I showed her children post-op X-ray on my phone as they were asking what Orthopedics did today and I showed them the plate and screws and cables that Ortho placed today. Patient appears very comfortable on exam and in no pain or distress. Patient sleepy but arousable. Family states  was just in hospital recently with a broken hip that he is recovering from and he went to [facility] for therapy post-op and hoping if patient needs post acute inpatient therapy to go to an  [facility] facility. I told them if therapy recommends inpatient post acute therapy then will try to get her into [facility] facility. Family states their father went to [facility] about 2 years ago and they had a horrible experience so very leery of patient going to a non [facility] facility. Labs reviewed. Hgb 8,7 this am pre-op and down from 9.8 on admit. Baseline Hgb is usually around 12 so suspect anemia on admit and pre-op related to acute blood loss from her complex femur fracture. Potassium improved to 4.2 today from 3.4 yesterday on admit after oral replacement.     Review of Systems   Constitutional:  Negative for fever.   Respiratory:  Negative for cough and shortness of breath.    Cardiovascular:  Negative for chest pain.   Gastrointestinal:  Negative for nausea.   Musculoskeletal:  Positive for arthralgias (Right hip) and myalgias (Right thigh).   Psychiatric/Behavioral:  Negative for agitation and confusion.      Objective:     Vital Signs (Most Recent):  Temp: 97.7 °F (36.5 °C) (03/08/25 1550)  Pulse: 64 (03/08/25 1615)  Resp: 18 (03/08/25 1600)  BP: 134/63 (03/08/25 1613)  SpO2: 95 % (03/08/25 1615) on room air Vital Signs (24h Range):  Temp:  [97.3 °F (36.3 °C)-98.4 °F (36.9 °C)] 97.7 °F (36.5 °C)  Pulse:  [59-79] 64  Resp:  [15-20] 18  SpO2:  [91 %-100 %] 95 %  BP: ()/(41-63) 134/63     Weight: 59 kg (130 lb 1.1 oz)  Body mass index is 23.79 kg/m².    Intake/Output Summary (Last 24 hours) at 3/8/2025 1704  Last data filed at 3/8/2025 1550  Gross per 24 hour   Intake 2560 ml   Output 1275 ml   Net 1285 ml         Physical Exam  Vitals and nursing note reviewed.   Constitutional:       General: She is sleeping. She is not in acute distress.     Appearance: Normal appearance. She is normal weight. She is not ill-appearing.      Comments: Frail elderly female in no distress laying in bed and appears very comfortable. Family at bedside. Patient on room air.    Eyes:      Conjunctiva/sclera:  Conjunctivae normal.   Cardiovascular:      Rate and Rhythm: Normal rate and regular rhythm.      Heart sounds: Normal heart sounds. No murmur heard.  Pulmonary:      Effort: Pulmonary effort is normal. No respiratory distress.      Breath sounds: Normal breath sounds. No wheezing.   Abdominal:      General: Abdomen is flat. Bowel sounds are normal. There is no distension.      Palpations: Abdomen is soft.      Tenderness: There is no abdominal tenderness.   Skin:     General: Skin is warm.      Findings: No erythema.      Comments: Surgical bandages in place all along right lateral thigh and hip area. Bandages are  clean and dry and intact.   Neurological:      Mental Status: She is oriented to person, place, and time and easily aroused.   Psychiatric:         Mood and Affect: Mood normal.         Behavior: Behavior normal. Behavior is cooperative.               Significant Labs: BMP:   Recent Labs   Lab 03/08/25  0301   *      K 4.2      CO2 24   BUN 43*   CREATININE 0.7   CALCIUM 9.0   MG 2.1     CBC:   Recent Labs   Lab 03/07/25  1050 03/08/25  0301   WBC 8.69 9.68   HGB 9.8* 8.7*   HCT 30.1* 26.3*    237     Significant Imaging: I have reviewed all pertinent imaging results/findings within the past 24 hours.

## 2025-03-08 NOTE — PLAN OF CARE
Pt AAOx4 and VSS . Progressing with plan of care. Free of skin breakdown as the pt positioned/repositioned well independently. Knee immobilizer in place on RLE. Iqbal in place. SCDs in place at all times. CHG bath done for surgery.  NPO maintained after midnight. Incentive spirometer at bedside and pt instructed on its use. Pain controlled well with PRN meds. Frequent rounds made to assess pain and safety and no complaints at this time noted. Side rails up x 2. Bed locked. Call light within reach. No falls noted. Will continue to monitor.    Problem: Skin Injury Risk Increased  Goal: Skin Health and Integrity  Outcome: Progressing     Problem: Infection  Goal: Absence of Infection Signs and Symptoms  Outcome: Progressing     Problem: Adult Inpatient Plan of Care  Goal: Plan of Care Review  Outcome: Progressing  Goal: Patient-Specific Goal (Individualized)  Outcome: Progressing  Goal: Absence of Hospital-Acquired Illness or Injury  Outcome: Progressing  Goal: Optimal Comfort and Wellbeing  Outcome: Progressing  Goal: Readiness for Transition of Care  Outcome: Progressing     Problem: Wound  Goal: Optimal Coping  Outcome: Progressing  Goal: Optimal Functional Ability  Outcome: Progressing  Goal: Absence of Infection Signs and Symptoms  Outcome: Progressing  Goal: Improved Oral Intake  Outcome: Progressing  Goal: Optimal Pain Control and Function  Outcome: Progressing  Goal: Skin Health and Integrity  Outcome: Progressing  Goal: Optimal Wound Healing  Outcome: Progressing     Problem: Fall Injury Risk  Goal: Absence of Fall and Fall-Related Injury  Outcome: Progressing

## 2025-03-08 NOTE — ANESTHESIA PROCEDURE NOTES
Arterial    Diagnosis: AS, PHTN, TG    Patient location during procedure: done in OR    Staffing  Authorizing Provider: Anthony Reed DO  Performing Provider: Abby Caceres CRNA    Staffing  Performed by: Abby Caceres CRNA  Authorized by: Anthony Reed DO    Arterial

## 2025-03-08 NOTE — SUBJECTIVE & OBJECTIVE
Past Medical History:   Diagnosis Date    Acquired hypothyroidism 12/13/2013    Age-related physical debility 04/05/2024    Antiplatelet or antithrombotic long-term use     AV block, 3rd degree 03/19/2019    Bilateral nonexudative age-related macular degeneration 08/27/2013    Blindness and low vision 08/10/2018    Blood transfusion     Cardiac pacemaker 08/22/2018    Carotid artery disease 08/10/2016    Chronic diastolic congestive heart failure 11/24/2019    CRAO (central retinal artery occlusion), left 01/29/2018    Dry eye syndrome of both eyes 08/10/2018    Essential hypertension 12/13/2013    History of TIA (transient ischemic attack) 12/13/2013    Nonexudative age-related macular degeneration, bilateral, intermediate dry stage 07/12/2018    Nuclear sclerotic cataract of left eye 08/10/2018    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 12/13/2013    On anticoagulant therapy 06/13/2024    Osteoarthritis     Paroxysmal atrial fibrillation 09/11/2018    Pulmonary hypertension 04/05/2024    Pure hypercholesterolemia 12/13/2013    Sick sinus syndrome     Stroke     Takotsubo cardiomyopathy 11/17/2016       Past Surgical History:   Procedure Laterality Date    CARDIAC PACEMAKER PLACEMENT      [employer]IC Device Model: Juli LOZADA MRI A2DR01 Device Type: PACEMAKER Device S\N: [id number]    CARPAL TUNNEL RELEASE Bilateral 1991    CATARACT EXTRACTION Right     JOINT REPLACEMENT      MS TRANSCRAN DOPP INTRACRAN, EMBOLI W/O INJ  01/29/2018         SKIN BIOPSY      TONSILLECTOMY      TOTAL HIP ARTHROPLASTY  11/01/2000    right/[facility]       Review of patient's allergies indicates:   Allergen Reactions    Aspartame Swelling     equal       Current Facility-Administered Medications   Medication    [START ON 3/8/2025] acetaminophen tablet 1,000 mg    albuterol-ipratropium 2.5 mg-0.5 mg/3 mL nebulizer solution 3 mL    [START ON 3/8/2025] amLODIPine tablet 5 mg    bisacodyL suppository 10 mg     [START ON 3/8/2025] bumetanide tablet 0.5 mg    dextrose 50% injection 12.5 g    dextrose 50% injection 25 g    [START ON 3/8/2025] EScitalopram oxalate tablet 20 mg    glucagon (human recombinant) injection 1 mg    glucose chewable tablet 16 g    glucose chewable tablet 24 g    [START ON 3/8/2025] hydroCHLOROthiazide tablet 12.5 mg    [START ON 3/8/2025] levothyroxine tablet 88 mcg    melatonin tablet 6 mg    methocarbamoL tablet 500 mg    metoprolol succinate (TOPROL-XL) 24 hr tablet 25 mg    oxyCODONE immediate release tablet 5 mg    sodium chloride 0.9% flush 10 mL    sodium chloride 0.9% flush 10 mL     Family History       Problem Relation (Age of Onset)    Arthritis Sister, Maternal Grandmother, Paternal Grandmother    Birth defects Son    Cancer Paternal Aunt (80)    Diabetes Paternal Aunt, Paternal Uncle    Early death Mother, Father    Hearing loss Daughter    Hypertension Son, Paternal Grandmother          Tobacco Use    Smoking status: Never     Passive exposure: Never    Smokeless tobacco: Never   Substance and Sexual Activity    Alcohol use: No     Alcohol/week: 0.0 standard drinks of alcohol    Drug use: No    Sexual activity: Not on file     ROS  Constitutional: negative for fevers  Eyes: negative visual changes  ENT: negative for hearing loss  Respiratory: negative for dyspnea  Cardiovascular: negative for chest pain  Gastrointestinal: negative for abdominal pain  Genitourinary: negative for dysuria  Neurological: negative for headaches  Behavioral/Psych: negative for hallucinations  Endocrine: negative for temperature intolerance    Objective:     Vital Signs (Most Recent):  Temp: 98.2 °F (36.8 °C) (03/07/25 1719)  Pulse: 79 (03/07/25 1719)  Resp: 18 (03/07/25 1719)  BP: (!) 115/58 (03/07/25 1719)  SpO2: 96 % (03/07/25 1719) Vital Signs (24h Range):  Temp:  [97.7 °F (36.5 °C)-98.7 °F (37.1 °C)] 98.2 °F (36.8 °C)  Pulse:  [59-79] 79  Resp:  [16-22] 18  SpO2:  [94 %-99 %] 96 %  BP: (100-140)/(50-80)  "115/58     Weight: 59 kg (130 lb)  Height: 5' 2" (157.5 cm)  Body mass index is 23.78 kg/m².    No intake or output data in the 24 hours ending 03/07/25 1816     Ortho/SPM Exam  General:  no acute distress, appears stated age   Neuro: alert and oriented x3  Psych: normal mood  Head: normocephalic, atraumatic.  Eyes: no scleral icterus  Mouth: moist mucous membranes  Cardiovascular: extremities warm and well perfused  Lungs: breathing comfortably, equal chest rise bilat  Skin: clean, dry, intact (any exceptions noted in below musculoskeletal exam)    MSK:  RUE:  - Skin intact throughout, no open wounds  - No swelling  - No ecchymosis, erythema, or signs of cellulitis  - NonTTP throughout  - AROM and PROM of the shoulder, elbow, wrist, and hand intact without pain  - Axillary/AIN/PIN/Radial/Median/Ulnar Nerves assessed in isolation without deficit  - SILT throughout  - Compartments soft  - Radial artery palpated   - Capillary Refill <3s    LUE:  - Skin intact throughout, no open wounds  - No swelling  - No ecchymosis, erythema, or signs of cellulitis  - NonTTP throughout  - AROM and PROM of the shoulder, elbow, wrist, and hand intact without pain  - Axillary/AIN/PIN/Radial/Median/Ulnar Nerves assessed in isolation without deficit  - SILT throughout  - Compartments soft  - Radial artery palpated   - Capillary Refill <3s    RLE:  - Skin intact throughout, no open wounds  - Swelling at middle third of thigh   - No ecchymosis, erythema, or signs of cellulitis  - TTP at proximal, middle and distal aspects of femur. NonTTP at the hip  - AROM and PROM of the ankle, and foot intact without pain  - AROM/PROM of hip and knee deferred 2/2 pain   - TA/EHL/Gastroc/FHL assessed in isolation without deficit  - SILT throughout  - Compartments soft  - DP and PT palpated  - Capillary Refill <3s    LLE:  - Skin intact throughout, no open wounds  - No swelling  - No ecchymosis, erythema, or signs of cellulitis  - NonTTP throughout  - " AROM and PROM of the hip, knee, ankle, and foot intact without pain  - TA/EHL/Gastroc/FHL assessed in isolation without deficit  - SILT throughout  - Compartments soft  - DP and PT palpated  - Capillary Refill <3s  - Negative Log roll  - [facility]    Spine/pelvis/axial body:  No tenderness to palpation of cervical, thoracic, or lumbar spine  No pain with compression of pelvis  No chest wall or abdominal tenderness       Significant Labs: All pertinent labs within the past 24 hours have been reviewed.    Significant Imaging: X-Ray: I have reviewed all pertinent results/findings and my personal findings are:  XR demonstrate a displaced, oblique fracture inferior to prior MARCEL implant stem. No other fractures, acute bony abnormalities or dislocation.

## 2025-03-08 NOTE — ASSESSMENT & PLAN NOTE
89 y/o female with PAF on long term Apixiban at home for anticoagulation, HTN, chronic diastolic heart failure, chronic anemia, cardiac pacemaker due to sick sinus syndrome and hypothyroidism presents to the ED after fall at home. Patient was walking to the door with her walker to open it for their sitter when she leaned her walker and subsequently fell over it. States she missed the door handle which caused her to fall. Patient noted immediate pain to her RLE and was unable to move it/ get up off the ground. EMS called and patient brought to ED where X-ray imaging of right leg revealed a closed periprosthetic right displaced femoral shaft fracture. Orthopedic surgery consult for closed periprosthetic right femoral shaft fracture in patient with previous right MARCEL. Patient was seen and evaluated by Orthopedic surgery who recommended operative repair of fracture.     - Patient taken to OR on 3/8/2025 and underwent right femur ORIF with long femoral plate and screws and cables to repair fracture by Dr. Albino William.   - Post-op, patient weight bear as tolerate to the left lower extremity as per Orthopedics recommendation.   - Patient restarted on her home Apixiban 5 mg po BID post-op that she takes for long term anticoagulation for her PAF so no other DVT prophylaxis needed post-op.   - Perineural pain catheter placed by Anesthesia Pain Service with continuous infusion of Ropivacaine to help with pain control post-op and Anesthesia Pain Service managing while patient in the hospital.   - Patient placed on multimodal pain management post-op with Tylenol 1000 mg po every 8 hours and Robaxin 500 mg po every 8 hours post-op and will continue.   - PT/OT consulted post-op and to work with patient tomorrow, 3/9.  - Patient placed on IV Cefazolin post-op for 24 hours for antibiotic prophylaxis post-op and to be followed by Cefadroxil 500 mg po BID for 7 days to extended antibiotic prophylaxis post-op to prevent post-op wound  infection at surgical site.   - Surgical bandages to remain in place to right thigh and thigh area until Orthopedic clinic follow-up.

## 2025-03-09 PROBLEM — E87.6 HYPOKALEMIA: Status: RESOLVED | Noted: 2024-04-05 | Resolved: 2025-03-09

## 2025-03-09 PROBLEM — R07.89 OTHER CHEST PAIN: Status: ACTIVE | Noted: 2025-03-09

## 2025-03-09 LAB
25(OH)D3+25(OH)D2 SERPL-MCNC: 56 NG/ML (ref 30–96)
ALBUMIN SERPL BCP-MCNC: 3 G/DL (ref 3.5–5.2)
ALP SERPL-CCNC: 40 U/L (ref 40–150)
ALT SERPL W/O P-5'-P-CCNC: 10 U/L (ref 10–44)
ANION GAP SERPL CALC-SCNC: 9 MMOL/L (ref 8–16)
AST SERPL-CCNC: 38 U/L (ref 10–40)
BASOPHILS # BLD AUTO: 0.03 K/UL (ref 0–0.2)
BASOPHILS NFR BLD: 0.2 % (ref 0–1.9)
BILIRUB SERPL-MCNC: 0.4 MG/DL (ref 0.1–1)
BLD PROD TYP BPU: NORMAL
BLOOD UNIT EXPIRATION DATE: NORMAL
BLOOD UNIT TYPE CODE: 6200
BLOOD UNIT TYPE: NORMAL
BUN SERPL-MCNC: 38 MG/DL (ref 10–30)
CALCIUM SERPL-MCNC: 8.5 MG/DL (ref 8.7–10.5)
CHLORIDE SERPL-SCNC: 102 MMOL/L (ref 95–110)
CO2 SERPL-SCNC: 22 MMOL/L (ref 23–29)
CODING SYSTEM: NORMAL
CREAT SERPL-MCNC: 0.9 MG/DL (ref 0.5–1.4)
CROSSMATCH INTERPRETATION: NORMAL
DIFFERENTIAL METHOD BLD: ABNORMAL
DISPENSE STATUS: NORMAL
EOSINOPHIL # BLD AUTO: 0 K/UL (ref 0–0.5)
EOSINOPHIL NFR BLD: 0 % (ref 0–8)
ERYTHROCYTE [DISTWIDTH] IN BLOOD BY AUTOMATED COUNT: 14.7 % (ref 11.5–14.5)
EST. GFR  (NO RACE VARIABLE): 60 ML/MIN/1.73 M^2
GLUCOSE SERPL-MCNC: 122 MG/DL (ref 70–110)
HCT VFR BLD AUTO: 24.7 % (ref 37–48.5)
HGB BLD-MCNC: 7.8 G/DL (ref 12–16)
IMM GRANULOCYTES # BLD AUTO: 0.11 K/UL (ref 0–0.04)
IMM GRANULOCYTES NFR BLD AUTO: 0.9 % (ref 0–0.5)
LYMPHOCYTES # BLD AUTO: 1.5 K/UL (ref 1–4.8)
LYMPHOCYTES NFR BLD: 11.9 % (ref 18–48)
MCH RBC QN AUTO: 30.7 PG (ref 27–31)
MCHC RBC AUTO-ENTMCNC: 31.6 G/DL (ref 32–36)
MCV RBC AUTO: 97 FL (ref 82–98)
MONOCYTES # BLD AUTO: 1.4 K/UL (ref 0.3–1)
MONOCYTES NFR BLD: 11.3 % (ref 4–15)
NEUTROPHILS # BLD AUTO: 9.4 K/UL (ref 1.8–7.7)
NEUTROPHILS NFR BLD: 75.7 % (ref 38–73)
NRBC BLD-RTO: 0 /100 WBC
PLATELET # BLD AUTO: 190 K/UL (ref 150–450)
PMV BLD AUTO: 10.7 FL (ref 9.2–12.9)
POTASSIUM SERPL-SCNC: 4.8 MMOL/L (ref 3.5–5.1)
PROT SERPL-MCNC: 5.7 G/DL (ref 6–8.4)
RBC # BLD AUTO: 2.54 M/UL (ref 4–5.4)
SODIUM SERPL-SCNC: 133 MMOL/L (ref 136–145)
TRANS ERYTHROCYTES VOL PATIENT: NORMAL ML
WBC # BLD AUTO: 12.46 K/UL (ref 3.9–12.7)

## 2025-03-09 PROCEDURE — 36415 COLL VENOUS BLD VENIPUNCTURE: CPT | Performed by: INTERNAL MEDICINE

## 2025-03-09 PROCEDURE — 99231 SBSQ HOSP IP/OBS SF/LOW 25: CPT | Mod: ,,, | Performed by: STUDENT IN AN ORGANIZED HEALTH CARE EDUCATION/TRAINING PROGRAM

## 2025-03-09 PROCEDURE — 21400001 HC TELEMETRY ROOM

## 2025-03-09 PROCEDURE — 25000003 PHARM REV CODE 250: Performed by: STUDENT IN AN ORGANIZED HEALTH CARE EDUCATION/TRAINING PROGRAM

## 2025-03-09 PROCEDURE — 25000003 PHARM REV CODE 250: Performed by: EMERGENCY MEDICINE

## 2025-03-09 PROCEDURE — 63600175 PHARM REV CODE 636 W HCPCS

## 2025-03-09 PROCEDURE — 80053 COMPREHEN METABOLIC PANEL: CPT

## 2025-03-09 PROCEDURE — 97165 OT EVAL LOW COMPLEX 30 MIN: CPT

## 2025-03-09 PROCEDURE — 25000003 PHARM REV CODE 250

## 2025-03-09 PROCEDURE — 82306 VITAMIN D 25 HYDROXY: CPT | Performed by: INTERNAL MEDICINE

## 2025-03-09 PROCEDURE — 25000003 PHARM REV CODE 250: Performed by: INTERNAL MEDICINE

## 2025-03-09 PROCEDURE — 86920 COMPATIBILITY TEST SPIN: CPT | Performed by: INTERNAL MEDICINE

## 2025-03-09 PROCEDURE — 97112 NEUROMUSCULAR REEDUCATION: CPT

## 2025-03-09 PROCEDURE — 97162 PT EVAL MOD COMPLEX 30 MIN: CPT

## 2025-03-09 PROCEDURE — 85025 COMPLETE CBC W/AUTO DIFF WBC: CPT

## 2025-03-09 PROCEDURE — 97530 THERAPEUTIC ACTIVITIES: CPT

## 2025-03-09 PROCEDURE — P9021 RED BLOOD CELLS UNIT: HCPCS | Performed by: INTERNAL MEDICINE

## 2025-03-09 RX ORDER — HYDROCODONE BITARTRATE AND ACETAMINOPHEN 500; 5 MG/1; MG/1
TABLET ORAL
Status: DISCONTINUED | OUTPATIENT
Start: 2025-03-09 | End: 2025-03-11

## 2025-03-09 RX ORDER — NITROGLYCERIN 0.4 MG/1
0.4 TABLET SUBLINGUAL EVERY 5 MIN PRN
Status: DISCONTINUED | OUTPATIENT
Start: 2025-03-09 | End: 2025-03-13 | Stop reason: HOSPADM

## 2025-03-09 RX ORDER — CLOPIDOGREL BISULFATE 75 MG/1
75 TABLET ORAL DAILY
Status: DISCONTINUED | OUTPATIENT
Start: 2025-03-09 | End: 2025-03-13 | Stop reason: HOSPADM

## 2025-03-09 RX ADMIN — CLOPIDOGREL BISULFATE 75 MG: 75 TABLET ORAL at 09:03

## 2025-03-09 RX ADMIN — ACETAMINOPHEN 1000 MG: 500 TABLET ORAL at 02:03

## 2025-03-09 RX ADMIN — HYDROCHLOROTHIAZIDE 12.5 MG: 12.5 TABLET ORAL at 08:03

## 2025-03-09 RX ADMIN — Medication 6 MG: at 10:03

## 2025-03-09 RX ADMIN — APIXABAN 5 MG: 5 TABLET, FILM COATED ORAL at 08:03

## 2025-03-09 RX ADMIN — LEVOTHYROXINE SODIUM 88 MCG: 50 TABLET ORAL at 05:03

## 2025-03-09 RX ADMIN — APIXABAN 2.5 MG: 2.5 TABLET, FILM COATED ORAL at 09:03

## 2025-03-09 RX ADMIN — CEFADROXIL 500 MG: 500 CAPSULE ORAL at 08:03

## 2025-03-09 RX ADMIN — BUMETANIDE 0.5 MG: 0.5 TABLET ORAL at 08:03

## 2025-03-09 RX ADMIN — AMLODIPINE BESYLATE 5 MG: 5 TABLET ORAL at 08:03

## 2025-03-09 RX ADMIN — OXYCODONE 5 MG: 5 TABLET ORAL at 03:03

## 2025-03-09 RX ADMIN — ESCITALOPRAM OXALATE 20 MG: 20 TABLET ORAL at 08:03

## 2025-03-09 RX ADMIN — METOPROLOL SUCCINATE 25 MG: 25 TABLET, EXTENDED RELEASE ORAL at 08:03

## 2025-03-09 RX ADMIN — METHOCARBAMOL 500 MG: 500 TABLET ORAL at 05:03

## 2025-03-09 RX ADMIN — ACETAMINOPHEN 1000 MG: 500 TABLET ORAL at 10:03

## 2025-03-09 RX ADMIN — CEFAZOLIN 2 G: 2 INJECTION, POWDER, FOR SOLUTION INTRAMUSCULAR; INTRAVENOUS at 05:03

## 2025-03-09 RX ADMIN — ACETAMINOPHEN 1000 MG: 500 TABLET ORAL at 05:03

## 2025-03-09 RX ADMIN — METHOCARBAMOL 500 MG: 500 TABLET ORAL at 10:03

## 2025-03-09 RX ADMIN — Medication 1000 UNITS: at 08:03

## 2025-03-09 RX ADMIN — METHOCARBAMOL 500 MG: 500 TABLET ORAL at 02:03

## 2025-03-09 RX ADMIN — CEFADROXIL 500 MG: 500 CAPSULE ORAL at 10:03

## 2025-03-09 NOTE — ASSESSMENT & PLAN NOTE
On anticoagulant therapy   Patient has paroxysmal (<7 days) atrial fibrillation. Patient is currently in sinus rhythm. [id number] Score: 4. The patients heart rate in the last 24 hours is as follows:  Pulse  Min: 60  Max: 77     Antiarrhythmics  metoprolol succinate (TOPROL-XL) 24 hr tablet 25 mg, 2 times daily, Oral    Plan  - Replete lytes with a goal of K>4, Mg >2  - Patient is anticoagulated at home with Apixiban 5 mg po BID and held pre-op but resumed post-op on 3/8.   - Patient's afib is currently controlled. Patient in paced rhythm. Continue home Toprol XL for rate control.

## 2025-03-09 NOTE — PROGRESS NOTES
[facility] - [facility]  Orthopedics  Progress Note    Patient Name: Dorothy M Knobloch  MRN: [id number]  Admission Date: 3/7/2025  Hospital Length of Stay: 2 days  Attending Provider: Grisel Liao MD  Primary Care Provider: Lola Wolff MD  Follow-up For: Procedure(s) (LRB):  OPEN REDUCTION INTERNAL FIXATION FEMORAL SHAFT FRACTURE (Right)    Post-Operative Day: 1 Day Post-Op  Subjective:     Principal Problem:Periprosthetic fracture of shaft of femur    Principal Orthopedic Problem: as above; now s/p ORIF (3/8)    Interval History: Afebrile, VSS, NAEON.  Hgb 7.8 on AM labs after receiving 1u prbc postoperatively.  Pain has been reportedly well controlled. Dressing c/d/i.  Anticipate mobilizing with physical therapy today.            Review of patient's allergies indicates:   Allergen Reactions    Aspartame Swelling     equal       Current Facility-Administered Medications   Medication    acetaminophen tablet 1,000 mg    albuterol-ipratropium 2.5 mg-0.5 mg/3 mL nebulizer solution 3 mL    amLODIPine tablet 5 mg    apixaban tablet 5 mg    bisacodyL suppository 10 mg    bumetanide tablet 0.5 mg    cefadroxil capsule 500 mg    ceFAZolin 2 g    clopidogreL tablet 75 mg    dextrose 50% injection 12.5 g    dextrose 50% injection 25 g    EScitalopram oxalate tablet 20 mg    glucagon (human recombinant) injection 1 mg    glucose chewable tablet 16 g    glucose chewable tablet 24 g    hydroCHLOROthiazide tablet 12.5 mg    levothyroxine tablet 88 mcg    melatonin tablet 6 mg    methocarbamoL tablet 500 mg    metoprolol succinate (TOPROL-XL) 24 hr tablet 25 mg    nitroGLYCERIN SL tablet 0.4 mg    oxyCODONE immediate release tablet 5 mg    ropivacaine 0.2% Perineural Pump infusion 500 ML    sodium chloride 0.9% flush 10 mL    vitamin D 1000 units tablet 1,000 Units     Objective:     Vital Signs (Most Recent):  Temp: 97.9 °F (36.6 °C) (03/09/25 0817)  Pulse: 68 (03/09/25 0817)  Resp: 18 (03/09/25 0817)  BP: (!) 108/49  "(03/09/25 0817)  SpO2: 96 % (03/09/25 0817) Vital Signs (24h Range):  Temp:  [97.3 °F (36.3 °C)-98 °F (36.7 °C)] 97.9 °F (36.6 °C)  Pulse:  [60-71] 68  Resp:  [15-20] 18  SpO2:  [91 %-100 %] 96 %  BP: ()/(41-63) 108/49     Weight: 59 kg (130 lb 1.1 oz)  Height: 5' 2" (157.5 cm)  Body mass index is 23.79 kg/m².      Intake/Output Summary (Last 24 hours) at 3/9/2025 0901  Last data filed at 3/9/2025 0532  Gross per 24 hour   Intake 2560 ml   Output 1525 ml   Net 1035 ml        Ortho/SPM Exam     Significant Labs: All pertinent labs within the past 24 hours have been reviewed.    Significant Imaging: I have reviewed all pertinent imaging results/findings.  Assessment/Plan:     * Periprosthetic fracture of shaft of right femur s/p ORIF on 3/8/2025  Dorothy M Knobloch is a 89 y.o. female with , closed, NVI. They take eliquis anticoagulation at home. They required a walker prior to this injury.     Dorothy M Knobloch is a 89 y.o. female who presented with a right periprosthetic Drumright C femur fracture; now  s/p ORIF (3/8/25)    - Surgical dressing C/D/I  - Pain: multimodal regimen limiting narcotics  - PT/OT: WBAT LLE  - AC: Eliquis 5mg bid (h/o afib), FCDs at all times when not ambulating  - Abx: 24hr postop ancef followed by Cefadroxil protocol   - Drain: none  - Iqbal: remove today     » Dispo: f/u progress with PT/OT           EZESHAN Lanza MD  Orthopedics  [facility] - Surgery    "

## 2025-03-09 NOTE — PT/OT/SLP EVAL
"Physical Therapy Co-Evaluation    Patient Name:  Dorothy M Knobloch   MRN:  [id number]    Recommendations:     Discharge Recommendations: Moderate Intensity Therapy   Discharge Equipment Recommendations: walker, rolling, wheelchair   Barriers to discharge:  Impaired cardiopulmonary response to activity & increased level of assist required    Assessment:     Dorothy M Knobloch is a 89 y.o. female admitted with a medical diagnosis of Periprosthetic fracture of shaft of femur.  She presents with the following impairments/functional limitations: weakness, visual deficits, impaired endurance, orthopedic precautions, impaired self care skills, impaired functional mobility, decreased lower extremity function, impaired cardiopulmonary response to activity, pain. Pt limited to EoB activity 2/2 non-resolving c/o dizziness despite increased time. During stand attempt, pt stating "the room is spinning" and required return to supine. Symptoms resolved following ~1min in trendelenburg position. Patient currently demonstrates a need for moderate intensity therapy on a daily basis post acute secondary to a decline in functional status due to surgical procedure.    Rehab Prognosis: Good; patient would benefit from acute skilled PT services to address these deficits and reach maximum level of function.    Recent Surgery: Procedure(s) (LRB):  OPEN REDUCTION INTERNAL FIXATION FEMORAL SHAFT FRACTURE (Right) 1 Day Post-Op    Plan:     During this hospitalization, patient to be seen daily to address the identified rehab impairments via gait training, therapeutic activities, therapeutic exercises, neuromuscular re-education and progress toward the following goals:    Plan of Care Expires:  03/30/25    Subjective     Chief Complaint: Dizziness  Patient/Family Comments/goals: "The room is spinning"  Pain/Comfort:  Pain Rating 1: 0/10  Location - Side 1: Right  Location - Orientation 1: generalized  Location 1: leg  Pain Addressed 1: Reposition, " "Distraction, Cessation of Activity  Pain Rating Post-Intervention 1: 2/10    Patients cultural, spiritual, Orthodoxy conflicts given the current situation: no    Living Environment:  Pt lives in [place] with .  also recently fell and now uses wheelchair.  Prior to admission, patients level of function was Mod(I) using rollator.  Equipment used at home: rollator, bath bench, grab bar.  DME owned (not currently used): none.  Upon discharge, patient will have assistance from staff.    Objective:     Communicated with RN prior to session.  Patient found HOB elevated with FCD, peripheral IV, perineural catheter, telemetry, oxygen  upon PT entry to room.    General Precautions: Standard, fall  Orthopedic Precautions:RLE weight bearing as tolerated   Braces: N/A  Respiratory Status: Nasal cannula, flow 2.5 L/min    Exams:  Cognitive Exam:  Patient is oriented to Person, Place, Time, and Situation  Sensation:    -       Intact  RLE ROM: WFL on EoB; however, limited knee extension 2/2 pain  RLE Strength: Not formally tested 2/2 c/o dizziness while seated EoB  LLE ROM: WFL  LLE Strength: At least 3/5 as noted by functional mobility     Functional Mobility:  Bed Mobility:     Rolling Left:  maximal assistance and use of bed rail  Rolling Right: maximal assistance and use of bed rail  Scooting: contact guard assistance  Supine to Sit: maximal assistance and of 2 persons  Sit to Supine: total assistance and of 2 persons d/t c/o dizziness  Transfers:   Sit to Stand: Attempted with Max A x2; however, pt unable to complete stand d/t increased dizziness x2 attempts. Following second attempt, pt stating "the room is spinning" and immediately returned supine  Balance:   Static Sitting: Good; SBA      AM-PAC 6 CLICK MOBILITY  Total Score:10       Treatment & Education:  Patient educated on role of therapy, goals of session, and benefits of mobilizing.   Discussed PT plan of care during hospitalization.   Patient educated on " calling for assistance.   Patient educated on how their diagnosis impacts their mobility within PT scope of practice.   Communication board up to date.  All questions answered within PT scope of practice.      Patient left HOB elevated with all lines intact, call button in reach, and RN notified.    GOALS:   Multidisciplinary Problems       Physical Therapy Goals          Problem: Physical Therapy    Goal Priority Disciplines Outcome Interventions   Physical Therapy Goal     PT, PT/OT Progressing    Description: Goals to be met by: 3/30/2025     Patient will increase functional independence with mobility by performin. Supine to sit with Supervision  2. Sit to supine with Supervision   3. Sit to stand transfer with Stand-by Assistance  4. Bed to chair transfer with Contact Guard Assistance using LRAD  5. Gait  x 50 feet with Stand-by Assistance using LRAD.                          DME Justifications:  Dorothy Knobloch has a mobility limitation that significantly impairs her ability to participate in one or more mobility related activities of daily living (MRADLs) such as toileting, feeding, dressing, grooming, and bathing in customary locations in the home. The use of a manual wheelchair will significantly improve the patients ability to participate in MRADLS and the patient will use it on regular basis in the home.  Dorothy Knobloch has expressed her willingness to use a manual wheelchair in the home. Patients upper body strength is sufficient for propulsion.  She also has a caregiver who is available, willing, and able to provide assistance with the wheelchair when needed.       Davons mobility limitation cannot be sufficiently resolved by the use of a cane. Her functional mobility deficit can be sufficiently resolved with the use of a Rolling Walker. Patient's mobility limitation significantly impairs their ability to participate in one of more activities of daily living.  The use of a RW will  significantly improve the patient's ability to participate in MRADLS and the patient will use it on regular basis in the home.    History:     Past Medical History:   Diagnosis Date    Acquired hypothyroidism 12/13/2013    Age-related physical debility 04/05/2024    Antiplatelet or antithrombotic long-term use     AV block, 3rd degree 03/19/2019    Bilateral nonexudative age-related macular degeneration 08/27/2013    Blindness and low vision 08/10/2018    Blood transfusion     Cardiac pacemaker 08/22/2018    Carotid artery disease 08/10/2016    Chronic diastolic congestive heart failure 11/24/2019    CRAO (central retinal artery occlusion), left 01/29/2018    Dry eye syndrome of both eyes 08/10/2018    Essential hypertension 12/13/2013    History of TIA (transient ischemic attack) 12/13/2013    Nonexudative age-related macular degeneration, bilateral, intermediate dry stage 07/12/2018    Nuclear sclerotic cataract of left eye 08/10/2018    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 12/13/2013    On anticoagulant therapy 06/13/2024    Osteoarthritis     Paroxysmal atrial fibrillation 09/11/2018    Pulmonary hypertension 04/05/2024    Pure hypercholesterolemia 12/13/2013    Sick sinus syndrome     Stroke     Takotsubo cardiomyopathy 11/17/2016       Past Surgical History:   Procedure Laterality Date    CARDIAC PACEMAKER PLACEMENT      [employer] Device Model: Juli ALMANZAR [id number] Device Type: PACEMAKER Device S\N: [id number]    CARPAL TUNNEL RELEASE Bilateral 1991    CATARACT EXTRACTION Right     JOINT REPLACEMENT      OK TRANSCRAN DOPP INTRACRAN, EMBOLI W/O INJ  01/29/2018         SKIN BIOPSY      TONSILLECTOMY      TOTAL HIP ARTHROPLASTY  11/01/2000    right/[facility]       Time Tracking:     PT Received On: 03/09/25  PT Start Time: 1312     PT Stop Time: 1342  PT Total Time (min): 30 min     Billable Minutes: Evaluation 7 and Therapeutic Activity 23      03/09/2025

## 2025-03-09 NOTE — ASSESSMENT & PLAN NOTE
Patient with episode of chest pain this am. Patient reports pain just above her pacemaker on her left chest wall. Pain was sharp and lasted < 5 minutes and did partially go down her left arm. No nausea or SOB or diaphoresis associated with chest pain. Patient reports she has had chest pain in past and has SLNTG at home but rarely takes it. Patient had a similar episode previous night in hospital and EKG done and showed paced rhythm but no acute changes and Troponin checked and in normal range. Patient reports she is currently chest pain free. I restarted her home Plavix this am as has known cardiac history of cardiomyopathy, PAF and sick sinus syndrome with cardiac pacemaker in place. Patient followed by Cardiology as outpatient. Patient concerned it was an issue with her pacemaker but I reassured her I doubted related to her pacemaker. Monitor on telemetry and monitor for further chest pain. SLNTG ordered prn for chest pain. Hgb down to 7.8 this am from 8.7 yesterday. I spoke with patient;'s daughter at bedside and with patient at length this am and I suspect anemia may be playing a part in her chest pain as anemia likely causing some heart strain. I recommended to do a blood transfusion and transfuse 1 unit of PRBCs for symptomatic and patient in agreement and PRBCs ordered to be transfused today, 3/9.

## 2025-03-09 NOTE — ASSESSMENT & PLAN NOTE
Patient's blood pressure range in the last 24 hours was: BP  Min: 89/42  Max: 134/63.The patient's inpatient anti-hypertensive regimen is listed below:  Current Antihypertensives  amLODIPine tablet 5 mg, Daily, Oral  hydroCHLOROthiazide tablet 12.5 mg, Daily, Oral  metoprolol succinate (TOPROL-XL) 24 hr tablet 25 mg, 2 times daily, Oral  bumetanide tablet 0.5 mg, Daily, Oral  nitroGLYCERIN SL tablet 0.4 mg, Every 5 min PRN, Sublingual    Plan  - BP is controlled, no changes needed to their regimen.  - Goal BP < 140/90.     Detail Level: Generalized Hide Additional Notes?: No Include Location In Plan?: Yes

## 2025-03-09 NOTE — ASSESSMENT & PLAN NOTE
Chronic and controlled and not on any outpatient therapy to treat. Patient unable to tolerate statin therapy and had reaction on Praluent and unable to tolerate also.

## 2025-03-09 NOTE — SUBJECTIVE & OBJECTIVE
"Principal Problem:Periprosthetic fracture of shaft of femur    Principal Orthopedic Problem: as above; now s/p ORIF (3/8)    Interval History: Afebrile, VSS, NAEON.  Hgb 7.8 on AM labs after receiving 1u prbc postoperatively.  Pain has been reportedly well controlled. Dressing c/d/i.  Anticipate mobilizing with physical therapy today.            Review of patient's allergies indicates:   Allergen Reactions    Aspartame Swelling     equal       Current Facility-Administered Medications   Medication    acetaminophen tablet 1,000 mg    albuterol-ipratropium 2.5 mg-0.5 mg/3 mL nebulizer solution 3 mL    amLODIPine tablet 5 mg    apixaban tablet 5 mg    bisacodyL suppository 10 mg    bumetanide tablet 0.5 mg    cefadroxil capsule 500 mg    ceFAZolin 2 g    clopidogreL tablet 75 mg    dextrose 50% injection 12.5 g    dextrose 50% injection 25 g    EScitalopram oxalate tablet 20 mg    glucagon (human recombinant) injection 1 mg    glucose chewable tablet 16 g    glucose chewable tablet 24 g    hydroCHLOROthiazide tablet 12.5 mg    levothyroxine tablet 88 mcg    melatonin tablet 6 mg    methocarbamoL tablet 500 mg    metoprolol succinate (TOPROL-XL) 24 hr tablet 25 mg    nitroGLYCERIN SL tablet 0.4 mg    oxyCODONE immediate release tablet 5 mg    ropivacaine 0.2% Perineural Pump infusion 500 ML    sodium chloride 0.9% flush 10 mL    vitamin D 1000 units tablet 1,000 Units     Objective:     Vital Signs (Most Recent):  Temp: 97.9 °F (36.6 °C) (03/09/25 0817)  Pulse: 68 (03/09/25 0817)  Resp: 18 (03/09/25 0817)  BP: (!) 108/49 (03/09/25 0817)  SpO2: 96 % (03/09/25 0817) Vital Signs (24h Range):  Temp:  [97.3 °F (36.3 °C)-98 °F (36.7 °C)] 97.9 °F (36.6 °C)  Pulse:  [60-71] 68  Resp:  [15-20] 18  SpO2:  [91 %-100 %] 96 %  BP: ()/(41-63) 108/49     Weight: 59 kg (130 lb 1.1 oz)  Height: 5' 2" (157.5 cm)  Body mass index is 23.79 kg/m².      Intake/Output Summary (Last 24 hours) at 3/9/2025 0901  Last data filed at 3/9/2025 " 0532  Gross per 24 hour   Intake 2560 ml   Output 1525 ml   Net 1035 ml        Ortho/SPM Exam     Significant Labs: All pertinent labs within the past 24 hours have been reviewed.    Significant Imaging: I have reviewed all pertinent imaging results/findings.

## 2025-03-09 NOTE — ASSESSMENT & PLAN NOTE
- Anemia worsened on 3/9 and Hgb down to 7.8 from 8.7 on 3/8. Patient having chest pain and symptomatic in relation to her anemia and suspect anemia causing chest pain and heart strain so to be transfused 1 unit of PRBCs today, 3/9.   - Patient with Hgb 9.8 on admit and normally her Hgb is around 12 so suspect acute blood loss on admit related to blood loss from her significant right femur fracture. Hgb pre-op on 3/8 was 8.7 and transfused 1 unit of PRBCs intra-op on 3/8. Most recent hemoglobin and hematocrit are listed below.  Recent Labs     03/07/25  1050 03/08/25  0301 03/08/25  1111 03/09/25  0341   HGB 9.8* 8.7*  --  7.8*   HCT 30.1* 26.3* 27* 24.7*     Plan  - Monitor serial CBC: Daily  - Transfuse PRBC if patient becomes hemodynamically unstable, symptomatic or H/H drops below 7/21.  - Patient has received 1 units of PRBCs on 3/8/2025 intra-op.  - Patient's anemia is currently worsening and patient symptomatic so will transfuse 1 unit of PRBCs on 3/9.

## 2025-03-09 NOTE — ASSESSMENT & PLAN NOTE
Dorothy M Knobloch is a 89 y.o. female with , closed, NVI. They take eliquis anticoagulation at home. They required a walker prior to this injury.     Dorothy M Knobloch is a 89 y.o. female who presented with a right periprosthetic [place] C femur fracture; now  s/p ORIF (3/8/25)    - Surgical dressing C/D/I  - Pain: multimodal regimen limiting narcotics  - PT/OT: WBAT LLE  - AC: Eliquis 5mg bid (h/o afib), FCDs at all times when not ambulating  - Abx: 24hr postop ancef followed by Cefadroxil protocol   - Drain: none  - Iqbal: remove today     » Dispo: f/u progress with PT/OT

## 2025-03-09 NOTE — PLAN OF CARE
Eval completed; POC established    Problem: Physical Therapy  Goal: Physical Therapy Goal  Description: Goals to be met by: 3/30/2025     Patient will increase functional independence with mobility by performin. Supine to sit with Supervision  2. Sit to supine with Supervision   3. Sit to stand transfer with Stand-by Assistance  4. Bed to chair transfer with Contact Guard Assistance using LRAD  5. Gait  x 50 feet with Stand-by Assistance using LRAD.     Outcome: Progressing

## 2025-03-09 NOTE — ASSESSMENT & PLAN NOTE
Resolved on 3/8 with oral replacement on 3/7. Patient's most recent potassium results are listed below.   Recent Labs     03/07/25  1050 03/08/25  0301 03/09/25  0341   K 3.4* 4.2 4.8     Plan  - Replete potassium per protocol  - Monitor potassium Daily  - Patient's hypokalemia is resolved

## 2025-03-09 NOTE — NURSING
Pt reports chest pain radiating to the arm. Noted labored respirations, placed pt on 2L NC for comfort and oxygen support.  notified, who provided orders for 1 unit of RBCs and plavix.. Continue plan of care.

## 2025-03-09 NOTE — SUBJECTIVE & OBJECTIVE
Interval History: Patient with episode of chest pain this am. Patient reports pain just above her pacemaker on her left chest wall. Pain was sharp and lasted < 5 minutes and did partially go down her left arm. No nausea or SOB or diaphoresis associated with chest pain. Patient reports she has had chest pain in past and has SLNTG at home but rarely takes it. Patient had a similar episode previous night in hospital and EKG done and showed paced rhythm but no acute changes and Troponin checked and in normal range. Patient reports she is currently chest pain free. I restarted her home Plavix this am as has known cardiac history of cardiomyopathy, PAF and sick sinus syndrome with cardiac pacemaker in place. Patient followed by Cardiology as outpatient. Patient concerned it was an issue with her pacemaker but I reassured her I doubted related to her pacemaker. Labs reviewed. Hgb down to 7.8 this am from 8.7 yesterday. I spoke with patient;'s daughter at bedside and with patient at length this am and I suspect anemia may be playing a part in her chest pain as anemia likely causing some heart strain. I recommended to do a blood transfusion and transfuse 1 unit of PRBCs for symptomatic and patient in agreement and PRBCs ordered to be transfused today. Patient reports she had some bad muscle cramps in both her feet and ankles last night and her daughter massaged her feet and ankles and much improved this am. Patient reports pain in her right hip and thigh area is well controlled and only 2/10. Anesthesia came by while I was in room and pleased about patient's pain control and recommended no changes in her pain management and will continue PNC. Orthopedics also came by and happy with surgical site. Patient to start PT/OT today. Patient okay to have therapy today as chest pain has resolved and vital signs are stable. Labs reviewed. Sodium slightly decreased to 133 today from 137 today but likely related to hypovolemia from  anemia and should improve with blood transfusion today. Creatinine normal at 0.9. Vitamin D normal at 56. Plan to remove Iqbal catheter today after she works with therapy.     Review of Systems   Constitutional:  Negative for fever.   HENT:  Positive for hearing loss (Hard of hearing bilateral).    Respiratory:  Negative for cough, chest tightness and shortness of breath.    Cardiovascular:  Positive for chest pain. Negative for palpitations.   Gastrointestinal:  Negative for nausea.   Musculoskeletal:  Positive for arthralgias (Right hip) and myalgias (Right thigh).   Neurological:  Negative for dizziness.   Psychiatric/Behavioral:  Negative for agitation and confusion.      Objective:     Vital Signs (Most Recent):  Temp: 98.6 °F (37 °C) (03/09/25 1158)  Pulse: 77 (03/09/25 1158)  Resp: 18 (03/09/25 1158)  BP: 123/54 (03/09/25 1158)  SpO2: 94 % (03/09/25 1158) on room air Vital Signs (24h Range):  Temp:  [97.3 °F (36.3 °C)-98.6 °F (37 °C)] 98.6 °F (37 °C)  Pulse:  [60-77] 77  Resp:  [15-20] 18  SpO2:  [91 %-100 %] 94 %  BP: ()/(41-63) 123/54     Weight: 59 kg (130 lb 1.1 oz)  Body mass index is 23.79 kg/m².    Intake/Output Summary (Last 24 hours) at 3/9/2025 1222  Last data filed at 3/9/2025 0831  Gross per 24 hour   Intake 300 ml   Output 850 ml   Net -550 ml         Physical Exam  Vitals and nursing note reviewed.   Constitutional:       General: She is sleeping. She is not in acute distress.     Appearance: Normal appearance. She is normal weight. She is not ill-appearing.      Comments: Frail elderly female sitting up in bed in no distress. Patient appeared very comfortable on exam. Daughter at bedside. Patient oriented x 4.    Cardiovascular:      Rate and Rhythm: Normal rate and regular rhythm.      Heart sounds: Normal heart sounds. No murmur heard.  Pulmonary:      Effort: Pulmonary effort is normal. No respiratory distress.      Breath sounds: Normal breath sounds. No wheezing.   Abdominal:       General: Abdomen is flat. Bowel sounds are normal. There is no distension.      Palpations: Abdomen is soft.      Tenderness: There is no abdominal tenderness.   Musculoskeletal:      Right lower leg: No edema.      Left lower leg: No edema.   Skin:     General: Skin is warm.      Findings: No erythema.      Comments: Surgical bandages in place all along right lateral thigh and hip area. Bandages are  clean and dry and intact.   Neurological:      Mental Status: She is oriented to person, place, and time and easily aroused.   Psychiatric:         Mood and Affect: Mood normal.         Behavior: Behavior normal. Behavior is cooperative.         Thought Content: Thought content normal.         Judgment: Judgment normal.               Significant Labs: BMP:   Recent Labs   Lab 03/08/25  0301 03/09/25  0341   * 122*    133*   K 4.2 4.8    102   CO2 24 22*   BUN 43* 38*   CREATININE 0.7 0.9   CALCIUM 9.0 8.5*   MG 2.1  --      CBC:   Recent Labs   Lab 03/08/25  0301 03/08/25  1111 03/09/25  0341   WBC 9.68  --  12.46   HGB 8.7*  --  7.8*   HCT 26.3* 27* 24.7*     --  190       Significant Imaging: I have reviewed all pertinent imaging results/findings within the past 24 hours.

## 2025-03-09 NOTE — ASSESSMENT & PLAN NOTE
89 y/o female with PAF on long term Apixiban at home for anticoagulation, HTN, chronic diastolic heart failure, chronic anemia, cardiac pacemaker due to sick sinus syndrome and hypothyroidism presents to the ED after fall at home. Patient was walking to the door with her walker to open it for their sitter when she leaned her walker and subsequently fell over it. States she missed the door handle which caused her to fall. Patient noted immediate pain to her RLE and was unable to move it/ get up off the ground. EMS called and patient brought to ED where X-ray imaging of right leg revealed a closed periprosthetic right displaced femoral shaft fracture. Orthopedic surgery consult for closed periprosthetic right femoral shaft fracture in patient with previous right MARCEL. Patient was seen and evaluated by Orthopedic surgery who recommended operative repair of fracture.     - Patient taken to OR on 3/8/2025 and underwent right femur ORIF with long femoral plate and screws and cables to repair fracture by Dr. Albino William.   - Post-op, patient weight bear as tolerate to the left lower extremity as per Orthopedics recommendation.   - Patient restarted on her home Apixiban 5 mg po BID post-op that she takes for long term anticoagulation for her PAF so no other DVT prophylaxis needed post-op.   - Perineural pain catheter placed by Anesthesia Pain Service with continuous infusion of Ropivacaine to help with pain control post-op and Anesthesia Pain Service managing while patient in the hospital.   - Patient placed on multimodal pain management post-op with Tylenol 1000 mg po every 8 hours and Robaxin 500 mg po every 8 hours post-op and will continue as pain controlled.   - PT/OT consulted post-op and to work with patient today, 3/9.   - Patient placed on IV Cefazolin post-op for 24 hours for antibiotic prophylaxis post-op and to be followed by Cefadroxil 500 mg po BID for 7 days to extended antibiotic prophylaxis post-op to  prevent post-op wound infection at surgical site.   - Surgical bandages to remain in place to right thigh and thigh area until Orthopedic clinic follow-up.   - Iqbal catheter to be removed today, 3/9.

## 2025-03-09 NOTE — OP NOTE
Orthopedic Surgery  OPERATIVE NOTE    Date of Operation:   3/8/2025    Providers Performing:   Surgeon(s): Primary- Albino William MD    Resident Assistants:  Kay Ramirez MD Bigham, W. Reed, MD Volkman, T. 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  She was found to have a displaced femoral shaft fracture below a total hip replacement.  Discussed operative treatment in the form of open reduction and internal fixation. The typical perioperative and post-operative course was discussed and perioperative risks were discussed to the patient's satisfaction. Risks and complications discussed included but were not limited to the risks of anesthetic complications, infection, bleeding, wound healing complications, further surgery, periprosthetic fracture, limb length inequality, neurologic dysfunction including numbness and weakness, DVT, pulmonary embolism, perioperative medical risks (cardiac, pulmonary, renal, neurologic), and death and the patient elects to proceed. The patient and daughter expressed understanding. After discussing the risks and benefits of the procedure they would like to proceed with surgery.     Operative Notes:   The patient was identified in the pre-operative holding area and the operative site was marked.  Anesthesia was induced without  incident. Prophylactic antibiotics were administered intravenously prior to skin incision.  The patient was positioned lateral on a bean bag on the operating table with all bony prominences padded.  A time out was performed and the operative lower extremity was prepped and draped in the usual sterile fashion. Surgical time out, verifying the patient's identity, signed operative site, surgical plan, patient position, administration of preoperative antibiotics and the availability of implants was performed and all parties were in agreement.      The incision was made over the lateral aspect of the hip  using the distal aspect of the prior posterolateral hip incision and extending distally down the lateral thigh to the level of the knee.   The IT band was split longitudinally.  We then elevated the vastus from the intermuscular septum working distal to proximal and being careful to obtain hemostasis of all profunda perforators.  After elevating the vastus off of the intermuscular septum we placed cobras around the femur.  We used the Soto to developed the submuscular and extra periosteal plane.     We 1st irrigated the hematoma from the fracture to allow visualization of the fracture fragments.  There was a long medial spike on the proximal femur fragment and corresponding long lateral spike on distal femur fragment which allowed a good cortical read.  The proximal femur and the stem were noted to move as a unit, as the very distal tip of the stem was visible through the fracture.  A combination of manual traction, well placed bumps, and bone reduction forceps was used to manipulate the fragments into position and reduced the fracture.  The reduction was held with reduction forceps while two #5 FiberWire cerclage sutures were passed through the suture passer around the distal aspect of the fracture to hold the distal aspect of the medial fracture appropriately reduced.  This maneuver was repeated proximally but this  time 18 gauge cerclage wire was used to provide good circumferential compression of the fracture fragments. C-arm images in the AP and lateral planes then confirmed excellent fracture reduction.  We then placed two 3.5 mm cortex position screws from anterior lateral to posteromedial to further hold our reduction.  These were countersunk in place with anterior starting points to avoid any impingement on the planned placement of her plate.  The reduction forceps were then removed, and C-arm images in the AP and lateral planes confirmed maintenance of fracture reduction.      We then turned our attention to selection and placement of her plate.  We initially tried the periprosthetic femur shaft plate, which provided ample opportunity for proximal fixation but did not provide enough distal fixation given the distal extent of the fracture.  Therefore we used the pre contoured variable angle locking distal femur condylar plate.  Plate of appropriate length was selected and position was confirmed on C-arm.  We then held the plate with 2 K-wires and used 4.5 mm cortex screws in the distal shaft to suck the plate to the bone.  Proximally we passed 2 stainless steel cerclage cables around the bone in the plate and provisionally tensioned these.  Before fully tensioning, we cut and removed the previous placed cerclage wire that was under the plate holding our reduction.  We then placed a unicortical locking screw in the distal aspect of the plate.  We then drilled and placed 5.0 locking screws in the distal locking cluster.  Happy with our distal fixation, we turned our attention proximally.  We placed another cerclage wire around the mid shaft around the plate in the fracture and this was tensioned.  We also placed  a position screw through the plate and across the fracture just distal to the tip of the stem.  In the most proximal aspect of the plate, we then placed a cerclage button with a another cerclage wire just distal  to the lesser trochanter.  We then used the outrigger attachment to place two 3.5 mm locking screws around the anterior and posterior aspect of the femoral stem.  We then placed a final unicortical locking screw in the proximal aspect of the plate at the level of the femoral stem.    Final C-arm images were then taken in the AP and lateral planes which showed maintenance of appropriate fracture reduction, appropriate position and length of all screws, and appropriate placement of the plate and cables.                             The wound was then thoroughly irrigated with normal saline. 1 g vancomycin powder and 1 g TXA were placed deep to the fascia.  The vastus lateralis fascia was closed with 0 PDS. The IT band was closed with #1 Vicryl and #2 Quill. The subcutaneous and deep dermal layer was closed with 2-0 Vicryl, and running 2-0 prolene for the skin. A sterile Aquacel dressing was applied. The final instrument, needle and sponge counts were correct. The patient was awakened from anesthesia and taken to the PACU. The patient arrived in PACU in stable condition with no apparent complications.    I was present and scrubbed for the entire procedure    Postoperative Plan:  Weight bearing limitations: WBAT w/ a walker  ROM limitations: none   Precautions: Fall  Diet: ADAT to regular diet   Drains: d/c Iqbal morning of POD 1  Wound: 2-0 prolene, Aquacel dressing - to remain in place until initial postop clinic visit  DVT ppx: SCDs, Eliquis 2.5 mg BID x 7 d and then return to home dose of 5 mg BID  Abx: Periop Ancef, then 1 week duricef  Pain: multimodal - limit narcotics  Hospitalist primary, PT Consult  Dispo: return to [facility]- discharge dispo pending medical stability, PT eval & recs\        Signed by: Albino William MD

## 2025-03-09 NOTE — PLAN OF CARE
Problem: Adult Inpatient Plan of Care  Goal: Plan of Care Review  Outcome: Progressing  Goal: Readiness for Transition of Care  Outcome: Progressing     Problem: Fall Injury Risk  Goal: Absence of Fall and Fall-Related Injury  Outcome: Progressing     Problem: Orthopaedic Fracture  Goal: Optimal Functional Ability  Outcome: Progressing  Goal: Optimal Pain Control and Function  Outcome: Progressing     Surgical dressing to right femur remains clean, dry, and intact. Pain management per orders. Plan of care ongoing.

## 2025-03-09 NOTE — PLAN OF CARE
"  Problem: Skin Injury Risk Increased  Goal: Skin Health and Integrity  Outcome: Progressing     Problem: Infection  Goal: Absence of Infection Signs and Symptoms  Outcome: Progressing     Problem: Adult Inpatient Plan of Care  Goal: Plan of Care Review  Outcome: Progressing  Goal: Patient-Specific Goal (Individualized)  Outcome: Progressing  Goal: Absence of Hospital-Acquired Illness or Injury  Outcome: Progressing  Goal: Optimal Comfort and Wellbeing  Outcome: Progressing  Goal: Readiness for Transition of Care  Outcome: Progressing     Problem: Wound  Goal: Optimal Coping  Outcome: Progressing  Goal: Optimal Functional Ability  Outcome: Progressing  Goal: Absence of Infection Signs and Symptoms  Outcome: Progressing  Goal: Improved Oral Intake  Outcome: Progressing  Goal: Optimal Pain Control and Function  Outcome: Progressing  Goal: Skin Health and Integrity  Outcome: Progressing  Goal: Optimal Wound Healing  Outcome: Progressing     Problem: Fall Injury Risk  Goal: Absence of Fall and Fall-Related Injury  Outcome: Progressing     Problem: Orthopaedic Fracture  Goal: Optimal Functional Ability  Outcome: Progressing  Goal: Optimal Pain Control and Function  Outcome: Progressing   Pt is aaox4 reports that chest pain has resolved. Pt currently resting in bed with no signs of acute distress. PT/OT attempted to work with pt today but reported pt became dizzy and stated that the room was "spinning" so she was assisted back into bed. Pt received her 1 unit of RBC's ordered by MD and tolerated well. Vitals are stable and documented in chart. Daughter is present at bedside and attentive to pt. Safety measures are in place, bed in the lowest position with wheels locked, side rails up x2, call light and personal belongings within reach. Continue plan of care.   "

## 2025-03-09 NOTE — CARE UPDATE
Orthopedic Surgery Post-Op Care Update:     Patient s/p ORIF R femur today. Patient resting comfortably in bed. Pain has been well controlled. Small area of strikethrough over middle and distal aspect of dressing. Compartments soft and compressible. SILT, DP palpated. ROM foot and ankle intact.

## 2025-03-09 NOTE — PLAN OF CARE
Problem: Occupational Therapy  Goal: Occupational Therapy Goal  Description: Goals to be met by: 3/16/2025     Patient will increase functional independence with ADLs by performing:    UE Dressing with [place].  LE Dressing with Stand-by Assistance and Assistive Devices as needed.  Grooming while standing at sink with Supervision.  Toileting from bedside commode with Stand-by Assistance for hygiene and clothing management.   Supine to sit with Stand-by Assistance.  Step transfer with Stand-by Assistance  Toilet transfer to bedside commode with Stand-by Assistance.    Outcome: Progressing

## 2025-03-09 NOTE — ANESTHESIA POSTPROCEDURE EVALUATION
Anesthesia Post Evaluation    Patient: Dorothy M Knobloch    Procedure(s) Performed: Procedure(s) (LRB):  OPEN REDUCTION INTERNAL FIXATION FEMORAL SHAFT FRACTURE (Right)    Final Anesthesia Type: general      Patient location during evaluation: PACU  Patient participation: Yes- Able to Participate  Level of consciousness: oriented and awake and alert  Post-procedure vital signs: reviewed and stable  Pain management: adequate  Airway patency: patent    PONV status at discharge: No PONV  Anesthetic complications: no      Cardiovascular status: blood pressure returned to baseline  Respiratory status: unassisted and spontaneous ventilation  Hydration status: euvolemic  Follow-up not needed.              Vitals Value Taken Time   /54 03/09/25 11:58   Temp 37 °C (98.6 °F) 03/09/25 11:58   Pulse 77 03/09/25 11:58   Resp 18 03/09/25 11:58   SpO2 94 % 03/09/25 12:49         Event Time   Out of Recovery 03/08/2025 15:00:00         Pain/Sidney Score: Pain Rating Prior to Med Admin: 0 (3/9/2025  8:31 AM)  Pain Rating Post Med Admin: 2 (3/8/2025  6:05 AM)  Sidney Score: 10 (3/8/2025  3:50 PM)

## 2025-03-09 NOTE — ANESTHESIA POST-OP PAIN MANAGEMENT
Acute Pain Service Progress Note    Dorothy M Knobloch is a 89 y.o., female, [id number].    Surgery:  Femoral shaft ORIF R    Post Op Day #: 1    Catheter type: perineural  femoral    Infusion type: Ropivacaine 0.2%  0.5 mL/hr basal    Problem List:    Active Hospital Problems    Diagnosis  POA    *Periprosthetic fracture of shaft of right femur s/p ORIF on 3/8/2025 [M97.8XXA, Z96.649]  Yes    Acute blood loss anemia [D62]  Yes    On anticoagulant therapy [Z79.01]  Not Applicable    Hypokalemia [E87.6]  Yes    Chronic diastolic congestive heart failure [I50.32]  Yes    Paroxysmal atrial fibrillation [I48.0]  Yes     Chronic    Cardiac pacemaker [Z95.0]  Yes    Essential hypertension [I10]  Yes     Chronic    Pure hypercholesterolemia [E78.00]  Yes    Acquired hypothyroidism [E03.9]  Yes      Resolved Hospital Problems   No resolved problems to display.       Subjective:     General appearance of alert, oriented, no complaints   Pain with rest: 1    Numbers   Pain with movement: 6    Numbers   Side Effects    1. Pruritis No    2. Nausea No    3. Motor Blockade No, 1=Ability to bend knees and ankles    4. Sedation No, 1=awake and alert    Objective:     Catheter site clean, dry, intact      Vitals   Vitals:    03/09/25 0817   BP: (!) 108/49   Pulse: 68   Resp: 18   Temp: 36.6 °C (97.9 °F)        Labs    Admission on 03/07/2025   Component Date Value Ref Range Status    WBC 03/07/2025 8.69  3.90 - 12.70 K/uL Final    RBC 03/07/2025 3.15 (L)  4.00 - 5.40 M/uL Final    Hemoglobin 03/07/2025 9.8 (L)  12.0 - 16.0 g/dL Final    Hematocrit 03/07/2025 30.1 (L)  37.0 - 48.5 % Final    MCV 03/07/2025 96  82 - 98 fL Final    MCH 03/07/2025 31.1 (H)  27.0 - 31.0 pg Final    MCHC 03/07/2025 32.6  32.0 - 36.0 g/dL Final    RDW 03/07/2025 14.6 (H)  11.5 - 14.5 % Final    Platelets 03/07/2025 192  150 - 450 K/uL Final    MPV 03/07/2025 9.2  9.2 - 12.9 fL Final    Immature Granulocytes 03/07/2025 0.8 (H)  0.0 - 0.5 % Final    Gran # (ANC)  03/07/2025 6.8  1.8 - 7.7 K/uL Final    Immature Grans (Abs) 03/07/2025 0.07 (H)  0.00 - 0.04 K/uL Final    Lymph # 03/07/2025 1.0  1.0 - 4.8 K/uL Final    Mono # 03/07/2025 0.6  0.3 - 1.0 K/uL Final    Eos # 03/07/2025 0.1  0.0 - 0.5 K/uL Final    Baso # 03/07/2025 0.07  0.00 - 0.20 K/uL Final    nRBC 03/07/2025 0  0 /100 WBC Final    Gran % 03/07/2025 78.5 (H)  38.0 - 73.0 % Final    Lymph % 03/07/2025 11.4 (L)  18.0 - 48.0 % Final    Mono % 03/07/2025 7.1  4.0 - 15.0 % Final    Eosinophil % 03/07/2025 1.4  0.0 - 8.0 % Final    Basophil % 03/07/2025 0.8  0.0 - 1.9 % Final    Differential Method 03/07/2025 Automated   Final    Sodium 03/07/2025 138  136 - 145 mmol/L Final    Potassium 03/07/2025 3.4 (L)  3.5 - 5.1 mmol/L Final    Chloride 03/07/2025 101  95 - 110 mmol/L Final    CO2 03/07/2025 26  23 - 29 mmol/L Final    Glucose 03/07/2025 121 (H)  70 - 110 mg/dL Final    BUN 03/07/2025 33 (H)  10 - 30 mg/dL Final    Creatinine 03/07/2025 0.7  0.5 - 1.4 mg/dL Final    Calcium 03/07/2025 9.2  8.7 - 10.5 mg/dL Final    Total Protein 03/07/2025 6.5  6.0 - 8.4 g/dL Final    Albumin 03/07/2025 3.5  3.5 - 5.2 g/dL Final    Total Bilirubin 03/07/2025 0.6  0.1 - 1.0 mg/dL Final    Alkaline Phosphatase 03/07/2025 55  40 - 150 U/L Final    AST 03/07/2025 20  10 - 40 U/L Final    ALT 03/07/2025 7 (L)  10 - 44 U/L Final    eGFR 03/07/2025 >60.0  >60 mL/min/1.73 m^2 Final    Anion Gap 03/07/2025 11  8 - 16 mmol/L Final    QRS Duration 03/07/2025 196  ms Final    OHS QTC Calculation 03/07/2025 544  ms Final    Specimen UA 03/07/2025 Urine, Catheterized   Final    Color, UA 03/07/2025 Yellow  Yellow, Straw, [place] Final    Appearance, UA 03/07/2025 Clear  Clear Final    pH, UA 03/07/2025 7.0  5.0 - 8.0 Final    Specific Gravity, UA 03/07/2025 1.020  1.005 - 1.030 Final    Protein, UA 03/07/2025 Negative  Negative Final    Glucose, UA 03/07/2025 4+ (A)  Negative Final    Ketones, UA 03/07/2025 Negative  Negative Final    Bilirubin  (UA) 03/07/2025 Negative  Negative Final    Occult Blood UA 03/07/2025 Negative  Negative Final    Nitrite, UA 03/07/2025 Negative  Negative Final    Leukocytes, UA 03/07/2025 Negative  Negative Final    Hemoglobin A1C 03/07/2025 5.1  4.0 - 5.6 % Final    Estimated Avg Glucose 03/07/2025 100  68 - 131 mg/dL Final    Magnesium 03/07/2025 2.1  1.6 - 2.6 mg/dL Final    Prealbumin 03/07/2025 8 (L)  20 - 43 mg/dL Final    Prothrombin Time 03/07/2025 11.9  9.0 - 12.5 sec Final    INR 03/07/2025 1.1  0.8 - 1.2 Final    Transferrin 03/07/2025 259  200 - 375 mg/dL Final    Group & Rh 03/07/2025 A POS   Final    Indirect Janice 03/07/2025 NEG   Final    Specimen Outdate 03/07/2025 03/10/2025 23:59   Final    Sed Rate 03/07/2025 19  0 - 36 mm/Hr Final    CRP 03/07/2025 4.0  0.0 - 8.2 mg/L Final    UNIT NUMBER 03/07/2025 [id number]   Final    Product Code 03/07/2025 [id number]   Final    DISPENSE STATUS 03/07/2025 TRANSFUSED   Final    CODING SYSTEM 03/07/2025 [id number]   Final    Unit Blood Type Code 03/07/2025 6200   Final    Unit Blood Type 03/07/2025 A POS   Final    Unit Expiration 03/07/2025 [id number]   Final    CROSSMATCH INTERPRETATION 03/07/2025 Compatible   Final    UNIT NUMBER 03/07/2025 [id number]   Preliminary    Product Code 03/07/2025 [id number]   Preliminary    DISPENSE STATUS 03/07/2025 CROSSMATCHED   Preliminary    CODING SYSTEM 03/07/2025 [id number]   Preliminary    Unit Blood Type Code 03/07/2025 6200   Preliminary    Unit Blood Type 03/07/2025 A POS   Preliminary    Unit Expiration 03/07/2025 [id number]   Preliminary    CROSSMATCH INTERPRETATION 03/07/2025 Compatible   Preliminary    RBC, UA 03/07/2025 3  0 - 4 /hpf Final    WBC, UA 03/07/2025 2  0 - 5 /hpf Final    Bacteria 03/07/2025 Rare  None-Occ /hpf Final    Yeast, UA 03/07/2025 Rare (A)  None Final    Squam Epithel, UA 03/07/2025 1  /hpf Final    Microscopic Comment 03/07/2025 SEE COMMENT   Final    Sodium 03/08/2025 137  136 - 145 mmol/L Final     Potassium 03/08/2025 4.2  3.5 - 5.1 mmol/L Final    Chloride 03/08/2025 104  95 - 110 mmol/L Final    CO2 03/08/2025 24  23 - 29 mmol/L Final    Glucose 03/08/2025 128 (H)  70 - 110 mg/dL Final    BUN 03/08/2025 43 (H)  10 - 30 mg/dL Final    Creatinine 03/08/2025 0.7  0.5 - 1.4 mg/dL Final    Calcium 03/08/2025 9.0  8.7 - 10.5 mg/dL Final    Total Protein 03/08/2025 5.9 (L)  6.0 - 8.4 g/dL Final    Albumin 03/08/2025 3.2 (L)  3.5 - 5.2 g/dL Final    Total Bilirubin 03/08/2025 0.5  0.1 - 1.0 mg/dL Final    Alkaline Phosphatase 03/08/2025 50  40 - 150 U/L Final    AST 03/08/2025 21  10 - 40 U/L Final    ALT 03/08/2025 7 (L)  10 - 44 U/L Final    eGFR 03/08/2025 >60.0  >60 mL/min/1.73 m^2 Final    Anion Gap 03/08/2025 9  8 - 16 mmol/L Final    Magnesium 03/08/2025 2.1  1.6 - 2.6 mg/dL Final    Phosphorus 03/08/2025 3.3  2.7 - 4.5 mg/dL Final    WBC 03/08/2025 9.68  3.90 - 12.70 K/uL Final    RBC 03/08/2025 2.76 (L)  4.00 - 5.40 M/uL Final    Hemoglobin 03/08/2025 8.7 (L)  12.0 - 16.0 g/dL Final    Hematocrit 03/08/2025 26.3 (L)  37.0 - 48.5 % Final    MCV 03/08/2025 95  82 - 98 fL Final    MCH 03/08/2025 31.5 (H)  27.0 - 31.0 pg Final    MCHC 03/08/2025 33.1  32.0 - 36.0 g/dL Final    RDW 03/08/2025 14.7 (H)  11.5 - 14.5 % Final    Platelets 03/08/2025 237  150 - 450 K/uL Final    MPV 03/08/2025 10.0  9.2 - 12.9 fL Final    Immature Granulocytes 03/08/2025 0.6 (H)  0.0 - 0.5 % Final    Gran # (ANC) 03/08/2025 7.0  1.8 - 7.7 K/uL Final    Immature Grans (Abs) 03/08/2025 0.06 (H)  0.00 - 0.04 K/uL Final    Lymph # 03/08/2025 1.5  1.0 - 4.8 K/uL Final    Mono # 03/08/2025 1.0  0.3 - 1.0 K/uL Final    Eos # 03/08/2025 0.0  0.0 - 0.5 K/uL Final    Baso # 03/08/2025 0.04  0.00 - 0.20 K/uL Final    nRBC 03/08/2025 0  0 /100 WBC Final    Gran % 03/08/2025 72.2  38.0 - 73.0 % Final    Lymph % 03/08/2025 15.7 (L)  18.0 - 48.0 % Final    Mono % 03/08/2025 10.7  4.0 - 15.0 % Final    Eosinophil % 03/08/2025 0.4  0.0 - 8.0 % Final     Basophil % 03/08/2025 0.4  0.0 - 1.9 % Final    Differential Method 03/08/2025 Automated   Final    Troponin I High Sensitivity 03/08/2025 13  0 - 14 ng/L Final    QRS Duration 03/08/2025 182  ms Final    OHS QTC Calculation 03/08/2025 532  ms Final    POC PH 03/08/2025 7.414  7.35 - 7.45 Final    POC PCO2 03/08/2025 36.9  35 - 45 mmHg Final    POC PO2 03/08/2025 315 (H)  80 - 100 mmHg Final    POC HCO3 03/08/2025 23.6 (L)  24 - 28 mmol/L Final    POC BE 03/08/2025 -1  -2 to 2 mmol/L Final    POC SATURATED O2 03/08/2025 100  95 - 100 % Final    POC Glucose 03/08/2025 166 (H)  70 - 110 mg/dL Final    POC Sodium 03/08/2025 136  136 - 145 mmol/L Final    POC Potassium 03/08/2025 3.9  3.5 - 5.1 mmol/L Final    POC TCO2 03/08/2025 25  23 - 27 mmol/L Final    POC Ionized Calcium 03/08/2025 1.15  1.06 - 1.42 mmol/L Final    POC Hematocrit 03/08/2025 27 (L)  36 - 54 %PCV Final    Sample 03/08/2025 ARTERIAL   Final    Sodium 03/09/2025 133 (L)  136 - 145 mmol/L Final    Potassium 03/09/2025 4.8  3.5 - 5.1 mmol/L Final    Chloride 03/09/2025 102  95 - 110 mmol/L Final    CO2 03/09/2025 22 (L)  23 - 29 mmol/L Final    Glucose 03/09/2025 122 (H)  70 - 110 mg/dL Final    BUN 03/09/2025 38 (H)  10 - 30 mg/dL Final    Creatinine 03/09/2025 0.9  0.5 - 1.4 mg/dL Final    Calcium 03/09/2025 8.5 (L)  8.7 - 10.5 mg/dL Final    Total Protein 03/09/2025 5.7 (L)  6.0 - 8.4 g/dL Final    Albumin 03/09/2025 3.0 (L)  3.5 - 5.2 g/dL Final    Total Bilirubin 03/09/2025 0.4  0.1 - 1.0 mg/dL Final    Alkaline Phosphatase 03/09/2025 40  40 - 150 U/L Final    AST 03/09/2025 38  10 - 40 U/L Final    ALT 03/09/2025 10  10 - 44 U/L Final    eGFR 03/09/2025 60.0  >60 mL/min/1.73 m^2 Final    Anion Gap 03/09/2025 9  8 - 16 mmol/L Final    WBC 03/09/2025 12.46  3.90 - 12.70 K/uL Final    RBC 03/09/2025 2.54 (L)  4.00 - 5.40 M/uL Final    Hemoglobin 03/09/2025 7.8 (L)  12.0 - 16.0 g/dL Final    Hematocrit 03/09/2025 24.7 (L)  37.0 - 48.5 % Final     MCV 03/09/2025 97  82 - 98 fL Final    MCH 03/09/2025 30.7  27.0 - 31.0 pg Final    MCHC 03/09/2025 31.6 (L)  32.0 - 36.0 g/dL Final    RDW 03/09/2025 14.7 (H)  11.5 - 14.5 % Final    Platelets 03/09/2025 190  150 - 450 K/uL Final    MPV 03/09/2025 10.7  9.2 - 12.9 fL Final    Immature Granulocytes 03/09/2025 0.9 (H)  0.0 - 0.5 % Final    Gran # (ANC) 03/09/2025 9.4 (H)  1.8 - 7.7 K/uL Final    Immature Grans (Abs) 03/09/2025 0.11 (H)  0.00 - 0.04 K/uL Final    Lymph # 03/09/2025 1.5  1.0 - 4.8 K/uL Final    Mono # 03/09/2025 1.4 (H)  0.3 - 1.0 K/uL Final    Eos # 03/09/2025 0.0  0.0 - 0.5 K/uL Final    Baso # 03/09/2025 0.03  0.00 - 0.20 K/uL Final    nRBC 03/09/2025 0  0 /100 WBC Final    Gran % 03/09/2025 75.7 (H)  38.0 - 73.0 % Final    Lymph % 03/09/2025 11.9 (L)  18.0 - 48.0 % Final    Mono % 03/09/2025 11.3  4.0 - 15.0 % Final    Eosinophil % 03/09/2025 0.0  0.0 - 8.0 % Final    Basophil % 03/09/2025 0.2  0.0 - 1.9 % Final    Differential Method 03/09/2025 Automated   Final    Vit D, 25-Hydroxy 03/09/2025 56  30 - 96 ng/mL Final    UNIT NUMBER 03/07/2025 [id number]   Preliminary    Product Code 03/07/2025 [id number]   Preliminary    DISPENSE STATUS 03/07/2025 CROSSMATCHED   Preliminary    CODING SYSTEM 03/07/2025 [id number]   Preliminary    Unit Blood Type Code 03/07/2025 6200   Preliminary    Unit Blood Type 03/07/2025 A POS   Preliminary    Unit Expiration 03/07/2025 [id number]   Preliminary    CROSSMATCH INTERPRETATION 03/07/2025 Compatible   Preliminary        Meds Current Medications[1]     Anticoagulant dose Eliquis at 5 mg BID.    Assessment:     Pain control adequate    Plan:     Patient doing well, continue present treatment.    Continue PNC  Continue tylenol 1000 mg Q8H  Continue methocarbamol 500 mg Q8H   Condinue oxycodone 5 mg Q6H PRN    Uma Harmon MD PGY-2  Department of Anesthesiology                   [1]   Current Facility-Administered Medications   Medication Dose Route Frequency  Provider Last Rate Last Admin    0.9%  NaCl infusion (for blood administration)   Intravenous Q24H PRN Grisel Liao MD        acetaminophen tablet 1,000 mg  1,000 mg Oral Q8H Shagufta Cordero PA-C   1,000 mg at 03/09/25 0518    albuterol-ipratropium 2.5 mg-0.5 mg/3 mL nebulizer solution 3 mL  3 mL Nebulization Q4H PRN Shagufta Cordero PA-C        amLODIPine tablet 5 mg  5 mg Oral Daily Shagufta Cordero PA-C   5 mg at 03/09/25 0831    apixaban tablet 5 mg  5 mg Oral BID ZEESHAN Lanza MD   5 mg at 03/09/25 0832    bisacodyL suppository 10 mg  10 mg Rectal Daily PRN Shagufta Cordero PA-C        bumetanide tablet 0.5 mg  0.5 mg Oral Daily Shagufta Cordero PA-C   0.5 mg at 03/09/25 0831    cefadroxil capsule 500 mg  500 mg Oral Q12H Kay Ramirez MD   500 mg at 03/09/25 0831    ceFAZolin 2 g  2 g Intravenous Q8H ZEESHAN Lanza MD   2 g at 03/09/25 0519    clopidogreL tablet 75 mg  75 mg Oral Daily Grisel Liao MD   75 mg at 03/09/25 0959    dextrose 50% injection 12.5 g  12.5 g Intravenous PRN Shagufta Cordero PA-C        dextrose 50% injection 25 g  25 g Intravenous PRN Shagufta Cordero PA-C        EScitalopram oxalate tablet 20 mg  20 mg Oral Daily Shagufta Cordero PA-C   20 mg at 03/09/25 0832    glucagon (human recombinant) injection 1 mg  1 mg Intramuscular PRN Shagufta Cordero PA-C        glucose chewable tablet 16 g  16 g Oral PRN Shagufta Cordero PA-C        glucose chewable tablet 24 g  24 g Oral PRN Shagufta Cordero PA-C        hydroCHLOROthiazide tablet 12.5 mg  12.5 mg Oral Daily Shagufta Cordero PA-ASTRID   12.5 mg at 03/09/25 0832    levothyroxine tablet 88 mcg  88 mcg Oral Before breakfast Shagufta Cordero PA-C   88 mcg at 03/09/25 0518    melatonin tablet 6 mg  6 mg Oral Nightly PRN Per Frye MD        methocarbamoL tablet 500 mg  500 mg Oral Q8H Grisel Liao MD   500 mg at 03/09/25 0518    metoprolol succinate (TOPROL-XL) 24 hr  tablet 25 mg  25 mg Oral BID Shagufta Cordero PA-C   25 mg at 03/09/25 0832    nitroGLYCERIN SL tablet 0.4 mg  0.4 mg Sublingual Q5 Min PRN Grisel Liao MD        oxyCODONE immediate release tablet 5 mg  5 mg Oral Q6H PRN Shagufta Cordero PA-C   5 mg at 03/09/25 0335    ropivacaine 0.2% Perineural Pump infusion 500 ML   Perineural Continuous Alexis Herrera MD   New Bag at 03/08/25 1411    sodium chloride 0.9% flush 10 mL  10 mL Intravenous PRN Per Frye MD        vitamin D 1000 units tablet 1,000 Units  1,000 Units Oral Daily ZEESHAN Lanza MD   1,000 Units at 03/09/25 0832

## 2025-03-09 NOTE — PROGRESS NOTES
[facility] Medicine  Progress Note    Patient Name: Dorothy M Knobloch  MRN: [id number]  Patient Class: IP- Inpatient   Admission Date: 3/7/2025  Length of Stay: 2 days  Attending Physician: Grisel Liao MD  Primary Care Provider: Lola Wolff MD        Subjective     Principal Problem:Periprosthetic fracture of shaft of femur        HPI:  Dorothy Knobloch is a 89 y.o. female with a hx of Afib, HTN, CHF, anemia an hypothyroidism presents to the ED s/p fall. Patient reports she was walking to the door with her walker to open it for their sitter when she leaned her walker and subsequently fell over it. States she missed the door handle which caused her to fall. Reports she hit her head twice on the floor but did not lose consciousness. She notes immediate pain to her RLE and was unable to move it/ get up off the ground. Reports she is usually ambulatory without difficulty with her walker. Last dose of her anticoagulation medication was yesterday. Denies fever, chills, chest pain, SOB, abdominal pain and urinary symptoms. Denies numbness and tingling.     ED: AF, VSS. K 3.4. CT Head w/o acute process. CT C spine showed no acute fracture. Xray femur showed femoral shaft fracture. Given tylenol and robaxin in the ED. Ortho consulted. Admitted to .     Overview/Hospital Course:  89 y/o female with PAF on long term Apixiban at home for anticoagulation, HTN, chronic diastolic heart failure, chronic anemia, cardiac pacemaker due to sick sinus syndrome and hypothyroidism presents to the ED after fall at home. Patient was walking to the door with her walker to open it for their sitter when she leaned her walker and subsequently fell over it. States she missed the door handle which caused her to fall. Reports she hit her head twice on the floor but did not lose consciousness. She notes immediate pain to her RLE and was unable to move it/ get up off the ground. EMS called and patient brought to ED  where X-ray imaging of right leg revealed a closed periprosthetic right displaced femoral shaft fracture.Orthopedic surgery consult for closed periprosthetic right femoral shaft fracture in patient with previous right MARCEL . Patient was seen and evaluated by Orthopedic surgery who recommended operative repair of fracture. Patient was medically optimized prior to surgery and was taken to OR after optimization on 3/8/2025. Patient underwent right femur  ORIF with long femoral plate and screws and cables to repair fracture by Dr. Albino William . Post-op patient WBAT to the left lower extremity as per Orthopedics recommendation. Patient restarted on her home Apixiban 5 mg po BID post-op that she takes for long term anticoagulation for her PAF so no other DVT prophylaxis needed post-op. Perineural pain catheter placed by Anesthesia Pain Service with continuous infusion of Ropivacaine to help with pain control post-op and Anesthesia Pain Service managing while patient in the hospital. Patient placed on multimodal pain management post-op with Tylenol 1000 mg po every 8 hours and Robaxin 500 mg po every 8 hours post-op and will continue. PT/OT consulted post-op. Patient placed on IV Cefazolin post-op for 24 hours for antibiotic prophylaxis post-op and to be followed by Cefadroxil 500 mg po BID for 7 days to extended antibiotic prophylaxis post-op to prevent post-op wound infection at surgical site.       Interval History: Patient with episode of chest pain this am. Patient reports pain just above her pacemaker on her left chest wall. Pain was sharp and lasted < 5 minutes and did partially go down her left arm. No nausea or SOB or diaphoresis associated with chest pain. Patient reports she has had chest pain in past and has SLNTG at home but rarely takes it. Patient had a similar episode previous night in hospital and EKG done and showed paced rhythm but no acute changes and Troponin checked and in normal range. Patient  reports she is currently chest pain free. I restarted her home Plavix this am as has known cardiac history of cardiomyopathy, PAF and sick sinus syndrome with cardiac pacemaker in place. Patient followed by Cardiology as outpatient. Patient concerned it was an issue with her pacemaker but I reassured her I doubted related to her pacemaker. Labs reviewed. Hgb down to 7.8 this am from 8.7 yesterday. I spoke with patient;'s daughter at bedside and with patient at length this am and I suspect anemia may be playing a part in her chest pain as anemia likely causing some heart strain. I recommended to do a blood transfusion and transfuse 1 unit of PRBCs for symptomatic and patient in agreement and PRBCs ordered to be transfused today. Patient reports she had some bad muscle cramps in both her feet and ankles last night and her daughter massaged her feet and ankles and much improved this am. Patient reports pain in her right hip and thigh area is well controlled and only 2/10. Anesthesia came by while I was in room and pleased about patient's pain control and recommended no changes in her pain management and will continue PNC. Orthopedics also came by and happy with surgical site. Patient to start PT/OT today. Patient okay to have therapy today as chest pain has resolved and vital signs are stable. Labs reviewed. Sodium slightly decreased to 133 today from 137 today but likely related to hypovolemia from anemia and should improve with blood transfusion today. Creatinine normal at 0.9. Vitamin D normal at 56. Plan to remove Iqbal catheter today after she works with therapy.     Review of Systems   Constitutional:  Negative for fever.   HENT:  Positive for hearing loss (Hard of hearing bilateral).    Respiratory:  Negative for cough, chest tightness and shortness of breath.    Cardiovascular:  Positive for chest pain. Negative for palpitations.   Gastrointestinal:  Negative for nausea.   Musculoskeletal:  Positive for  arthralgias (Right hip) and myalgias (Right thigh).   Neurological:  Negative for dizziness.   Psychiatric/Behavioral:  Negative for agitation and confusion.      Objective:     Vital Signs (Most Recent):  Temp: 98.6 °F (37 °C) (03/09/25 1158)  Pulse: 77 (03/09/25 1158)  Resp: 18 (03/09/25 1158)  BP: 123/54 (03/09/25 1158)  SpO2: 94 % (03/09/25 1158) on room air Vital Signs (24h Range):  Temp:  [97.3 °F (36.3 °C)-98.6 °F (37 °C)] 98.6 °F (37 °C)  Pulse:  [60-77] 77  Resp:  [15-20] 18  SpO2:  [91 %-100 %] 94 %  BP: ()/(41-63) 123/54     Weight: 59 kg (130 lb 1.1 oz)  Body mass index is 23.79 kg/m².    Intake/Output Summary (Last 24 hours) at 3/9/2025 1222  Last data filed at 3/9/2025 0831  Gross per 24 hour   Intake 300 ml   Output 850 ml   Net -550 ml         Physical Exam  Vitals and nursing note reviewed.   Constitutional:       General: She is sleeping. She is not in acute distress.     Appearance: Normal appearance. She is normal weight. She is not ill-appearing.      Comments: Frail elderly female sitting up in bed in no distress. Patient appeared very comfortable on exam. Daughter at bedside. Patient oriented x 4.    Cardiovascular:      Rate and Rhythm: Normal rate and regular rhythm.      Heart sounds: Normal heart sounds. No murmur heard.  Pulmonary:      Effort: Pulmonary effort is normal. No respiratory distress.      Breath sounds: Normal breath sounds. No wheezing.   Abdominal:      General: Abdomen is flat. Bowel sounds are normal. There is no distension.      Palpations: Abdomen is soft.      Tenderness: There is no abdominal tenderness.   Musculoskeletal:      Right lower leg: No edema.      Left lower leg: No edema.   Skin:     General: Skin is warm.      Findings: No erythema.      Comments: Surgical bandages in place all along right lateral thigh and hip area. Bandages are  clean and dry and intact.   Neurological:      Mental Status: She is oriented to person, place, and time and easily  aroused.   Psychiatric:         Mood and Affect: Mood normal.         Behavior: Behavior normal. Behavior is cooperative.         Thought Content: Thought content normal.         Judgment: Judgment normal.               Significant Labs: BMP:   Recent Labs   Lab 03/08/25  0301 03/09/25  0341   * 122*    133*   K 4.2 4.8    102   CO2 24 22*   BUN 43* 38*   CREATININE 0.7 0.9   CALCIUM 9.0 8.5*   MG 2.1  --      CBC:   Recent Labs   Lab 03/08/25  0301 03/08/25  1111 03/09/25  0341   WBC 9.68  --  12.46   HGB 8.7*  --  7.8*   HCT 26.3* 27* 24.7*     --  190       Significant Imaging: I have reviewed all pertinent imaging results/findings within the past 24 hours.      Assessment & Plan  Periprosthetic fracture of shaft of right femur s/p ORIF on 3/8/2025  89 y/o female with PAF on long term Apixiban at home for anticoagulation, HTN, chronic diastolic heart failure, chronic anemia, cardiac pacemaker due to sick sinus syndrome and hypothyroidism presents to the ED after fall at home. Patient was walking to the door with her walker to open it for their sitter when she leaned her walker and subsequently fell over it. States she missed the door handle which caused her to fall. Patient noted immediate pain to her RLE and was unable to move it/ get up off the ground. EMS called and patient brought to ED where X-ray imaging of right leg revealed a closed periprosthetic right displaced femoral shaft fracture. Orthopedic surgery consult for closed periprosthetic right femoral shaft fracture in patient with previous right MARCEL. Patient was seen and evaluated by Orthopedic surgery who recommended operative repair of fracture.     - Patient taken to OR on 3/8/2025 and underwent right femur ORIF with long femoral plate and screws and cables to repair fracture by Dr. Albino William.   - Post-op, patient weight bear as tolerate to the left lower extremity as per Orthopedics recommendation.   - Patient restarted  on her home Apixiban 5 mg po BID post-op that she takes for long term anticoagulation for her PAF so no other DVT prophylaxis needed post-op.   - Perineural pain catheter placed by Anesthesia Pain Service with continuous infusion of Ropivacaine to help with pain control post-op and Anesthesia Pain Service managing while patient in the hospital.   - Patient placed on multimodal pain management post-op with Tylenol 1000 mg po every 8 hours and Robaxin 500 mg po every 8 hours post-op and will continue as pain controlled.   - PT/OT consulted post-op and to work with patient today, 3/9.   - Patient placed on IV Cefazolin post-op for 24 hours for antibiotic prophylaxis post-op and to be followed by Cefadroxil 500 mg po BID for 7 days to extended antibiotic prophylaxis post-op to prevent post-op wound infection at surgical site.   - Surgical bandages to remain in place to right thigh and thigh area until Orthopedic clinic follow-up.   - Iqbal catheter to be removed today, 3/9.    Other chest pain  Patient with episode of chest pain this am. Patient reports pain just above her pacemaker on her left chest wall. Pain was sharp and lasted < 5 minutes and did partially go down her left arm. No nausea or SOB or diaphoresis associated with chest pain. Patient reports she has had chest pain in past and has SLNTG at home but rarely takes it. Patient had a similar episode previous night in hospital and EKG done and showed paced rhythm but no acute changes and Troponin checked and in normal range. Patient reports she is currently chest pain free. I restarted her home Plavix this am as has known cardiac history of cardiomyopathy, PAF and sick sinus syndrome with cardiac pacemaker in place. Patient followed by Cardiology as outpatient. Patient concerned it was an issue with her pacemaker but I reassured her I doubted related to her pacemaker. Monitor on telemetry and monitor for further chest pain. SLNTG ordered prn for chest pain. Hgb  down to 7.8 this am from 8.7 yesterday. I spoke with patient;'s daughter at bedside and with patient at length this am and I suspect anemia may be playing a part in her chest pain as anemia likely causing some heart strain. I recommended to do a blood transfusion and transfuse 1 unit of PRBCs for symptomatic and patient in agreement and PRBCs ordered to be transfused today, 3/9.     Acute blood loss anemia  - Anemia worsened on 3/9 and Hgb down to 7.8 from 8.7 on 3/8. Patient having chest pain and symptomatic in relation to her anemia and suspect anemia causing chest pain and heart strain so to be transfused 1 unit of PRBCs today, 3/9.   - Patient with Hgb 9.8 on admit and normally her Hgb is around 12 so suspect acute blood loss on admit related to blood loss from her significant right femur fracture. Hgb pre-op on 3/8 was 8.7 and transfused 1 unit of PRBCs intra-op on 3/8. Most recent hemoglobin and hematocrit are listed below.  Recent Labs     03/07/25  1050 03/08/25  0301 03/08/25  1111 03/09/25  0341   HGB 9.8* 8.7*  --  7.8*   HCT 30.1* 26.3* 27* 24.7*     Plan  - Monitor serial CBC: Daily  - Transfuse PRBC if patient becomes hemodynamically unstable, symptomatic or H/H drops below 7/21.  - Patient has received 1 units of PRBCs on 3/8/2025 intra-op.  - Patient's anemia is currently worsening and patient symptomatic so will transfuse 1 unit of PRBCs on 3/9.     Paroxysmal atrial fibrillation  On anticoagulant therapy  On anticoagulant therapy   Patient has paroxysmal (<7 days) atrial fibrillation. Patient is currently in sinus rhythm. [id number] Score: 4. The patients heart rate in the last 24 hours is as follows:  Pulse  Min: 60  Max: 77     Antiarrhythmics  metoprolol succinate (TOPROL-XL) 24 hr tablet 25 mg, 2 times daily, Oral    Plan  - Replete lytes with a goal of K>4, Mg >2  - Patient is anticoagulated at home with Apixiban 5 mg po BID and held pre-op but resumed post-op on 3/8.   - Patient's afib is  "currently controlled. Patient in paced rhythm. Continue home Toprol XL for rate control.       Essential hypertension  Patient's blood pressure range in the last 24 hours was: BP  Min: 89/42  Max: 134/63.The patient's inpatient anti-hypertensive regimen is listed below:  Current Antihypertensives  amLODIPine tablet 5 mg, Daily, Oral  hydroCHLOROthiazide tablet 12.5 mg, Daily, Oral  metoprolol succinate (TOPROL-XL) 24 hr tablet 25 mg, 2 times daily, Oral  bumetanide tablet 0.5 mg, Daily, Oral  nitroGLYCERIN SL tablet 0.4 mg, Every 5 min PRN, Sublingual    Plan  - BP is controlled, no changes needed to their regimen.  - Goal BP < 140/90.    Pure hypercholesterolemia  Chronic and controlled and not on any outpatient therapy to treat. Patient unable to tolerate statin therapy and had reaction on Praluent and unable to tolerate also.     Chronic diastolic congestive heart failure  Patient has Diastolic (HFpEF) heart failure that is Chronic. On presentation their CHF was well compensated. Most recent BNP and echo results are listed below.  No results for input(s): "BNP" in the last 72 hours.  Latest ECHO  Results for orders placed during the hospital encounter of 06/20/24    Echo    Interpretation Summary    Left Ventricle: The left ventricle is normal in size. Normal wall thickness. There is concentric hypertrophy. There is normal systolic function with a visually estimated ejection fraction of 65 - 70%. Unable to assess diastolic function due to atrial fibrillation.    Right Ventricle: Normal right ventricular cavity size. Wall thickness is normal. Systolic function is normal.    Left Atrium: Left atrium is severely dilated.    Right Atrium: Right atrium is severely dilated.    Mitral Valve: There is moderate posterior leaflet sclerosis. There is no stenosis. The mean pressure gradient across the mitral valve is 1 mmHg at a heart rate of 60 bpm. There is moderate regurgitation.    Tricuspid Valve: There is moderate to " severe regurgitation.    Pulmonic Valve: There is no stenosis.    Pulmonary Artery: There is moderate pulmonary hypertension. The estimated pulmonary artery systolic pressure is 60 mmHg.    IVC/SVC: Intermediate venous pressure at 8 mmHg.    Current Heart Failure Medications  hydroCHLOROthiazide tablet 12.5 mg, Daily, Oral  metoprolol succinate (TOPROL-XL) 24 hr tablet 25 mg, 2 times daily, Oral  bumetanide tablet 0.5 mg, Daily, Oral    Plan  - Monitor strict I&Os and daily weights.    - Monitor on telemetry  - Cardiac diet  - Cardiology has not been consulted.  - The patient's volume status is at their baseline.    Hypokalemia (Resolved: 3/9/2025)  Resolved on 3/8 with oral replacement on 3/7. Patient's most recent potassium results are listed below.   Recent Labs     03/07/25  1050 03/08/25  0301 03/09/25  0341   K 3.4* 4.2 4.8     Plan  - Replete potassium per protocol  - Monitor potassium Daily  - Patient's hypokalemia is resolved  Acquired hypothyroidism  - Chronic and controlled. Continue home Synthroid to treat.     Cardiac pacemaker  Patient with cardiac pacemaker in situ and has paced rhythm on telen\mtry. Pacemaker in place for sick sinus syndrome.    VTE Risk Mitigation (From admission, onward)           Ordered     apixaban tablet 5 mg  2 times daily         03/08/25 1357     IP VTE HIGH RISK PATIENT  Once         03/08/25 0624     Place sequential compression device  Until discontinued         03/08/25 0624     Place sequential compression device  Until discontinued         03/07/25 1318     Place SHANEL hose  Until discontinued         03/07/25 1318     Reason for No Pharmacological VTE Prophylaxis  Once        Question:  Reasons:  Answer:  Risk of Bleeding    03/07/25 1318     Place sequential compression device  Until discontinued         03/07/25 1318                    Discharge Planning   ISH: 3/11/2025     Code Status: Full Code   Medical Readiness for Discharge Date: 3/11/2025           Grisel VARGHESE  MD [place]  Department of [facility]   [facility]

## 2025-03-09 NOTE — PT/OT/SLP EVAL
"Occupational Therapy   Co-Evaluation    Name: Dorothy M Knobloch  MRN: [id number]  Admitting Diagnosis: Periprosthetic fracture of shaft of femur  Recent Surgery: Procedure(s) (LRB):  OPEN REDUCTION INTERNAL FIXATION FEMORAL SHAFT FRACTURE (Right) 1 Day Post-Op    Recommendations:     Discharge Recommendations: Moderate Intensity Therapy  Discharge Equipment Recommendations:  walker, rolling  Barriers to discharge:  None    Assessment:     Dorothy M Knobloch is a 89 y.o. female with a medical diagnosis of Periprosthetic fracture of shaft of femur.  She attempted sit > stand with a RW in two trials but was unable to complete with upright posture with max A x2.  Her functional performance at evaluation was limited by complaints of dizziness and "the room spinning." Performance deficits affecting function: weakness, impaired endurance, decreased coordination, impaired self care skills, impaired functional mobility, gait instability, impaired balance, pain, impaired cardiopulmonary response to activity.  Patient continues to demonstrate the need for moderate intensity therapy on a daily basis post acute exhibited by decreased independence with self-care and functional mobility      Rehab Prognosis: Good; patient would benefit from acute skilled OT services to address these deficits and reach maximum level of function.       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  Equipment Used at Home: rollator, bath bench, grab bar  Assistance upon Discharge: " "facility staff    Pain/Comfort:  Pain Rating 1: 0/10  Location - Side 1: Right  Location 1: leg  Pain Addressed 1: Reposition, Distraction  Pain Rating Post-Intervention 1: 2/10    Patients cultural, spiritual, Mandaen conflicts given the current situation: no    Objective:   Co-treatment performed due to patient's multiple deficits requiring two skilled therapists to appropriately and safely assess patient's strength and endurance while facilitating functional tasks in addition to accommodating for patient's activity tolerance.       Communicated with: rn prior to session.  Patient found supine with peripheral IV, FCD, perineural catheter, telemetry, oxygen upon OT entry to room.    General Precautions: Standard, fall  Orthopedic Precautions: RLE weight bearing as tolerated  Braces: N/A  Respiratory Status: Nasal cannula, flow 2.5 L/min    Occupational Performance:    Bed Mobility:    Patient completed Rolling/Turning to Left with  maximal assistance and with side rail  Patient completed Rolling/Turning to Right with maximal assistance and with side rail  Patient completed Scooting/Bridging with contact guard assistance  Patient completed Supine to Sit with maximal assistance and 2 persons  Patient completed Sit to Supine with total assistance due to increased c/o dizziness    Functional Mobility/Transfers:  Functional Mobility: She attempted sit > stand with a RW in two trials but was unable to complete with upright posture with max A x2.  Her functional performance at evaluation was limited by complaints of dizziness and "the room spinning."    Activities of Daily Living:  Upper Body Dressing: moderate assistance sitting EOB    Cognitive/Visual Perceptual:  Cognitive/Psychosocial Skills:     -       Oriented to: Person, Place, Time, and Situation   -       Safety awareness/insight to disability: intact     Physical Exam:  Upper Extremity Range of Motion:     -       Right Upper Extremity: WFL  -       Left " Upper Extremity: WFL  Upper Extremity Strength:    -       Right Upper Extremity: WFL  -       Left Upper Extremity: WFL   Strength:    -       Right Upper Extremity: WFL  -       Left Upper Extremity: WFL    AMPAC 6 Click ADL:  AMPAC Total Score: 17    Treatment & Education:  -Pt engaged in weightbearing activities to increase proprioception, body awareness, and coordination.  -Pt educated on hand placement for transfers  -Pt educated on RW placement for ADLs  -Pt educated on weightbearing and surgical precautions  -Pt educated to call for assistance and to transfer with hospital staff only  -Pt educated on role of OT and plan of care s/p surgery, white board updated      Patient left supine with all lines intact, call button in reach, and rn notified    GOALS:   Multidisciplinary Problems       Occupational Therapy Goals          Problem: Occupational Therapy    Goal Priority Disciplines Outcome Interventions   Occupational Therapy Goal     OT, PT/OT Progressing    Description: Goals to be met by: 3/16/2025     Patient will increase functional independence with ADLs by performing:    UE Dressing with [place].  LE Dressing with Stand-by Assistance and Assistive Devices as needed.  Grooming while standing at sink with Supervision.  Toileting from bedside commode with Stand-by Assistance for hygiene and clothing management.   Supine to sit with Stand-by Assistance.  Step transfer with Stand-by Assistance  Toilet transfer to bedside commode with Stand-by Assistance.                         DME Justifications:   Carol's mobility limitation cannot be sufficiently resolved by the use of a cane. Her functional mobility deficit can be sufficiently resolved with the use of a Rolling Walker. Patient's mobility limitation significantly impairs their ability to participate in one of more activities of daily living.  The use of a RW will significantly improve the patient's ability to participate in MRADLS and the  patient will use it on regular basis in the home.    History:     Past Medical History:   Diagnosis Date    Acquired hypothyroidism 12/13/2013    Age-related physical debility 04/05/2024    Antiplatelet or antithrombotic long-term use     AV block, 3rd degree 03/19/2019    Bilateral nonexudative age-related macular degeneration 08/27/2013    Blindness and low vision 08/10/2018    Blood transfusion     Cardiac pacemaker 08/22/2018    Carotid artery disease 08/10/2016    Chronic diastolic congestive heart failure 11/24/2019    CRAO (central retinal artery occlusion), left 01/29/2018    Dry eye syndrome of both eyes 08/10/2018    Essential hypertension 12/13/2013    History of TIA (transient ischemic attack) 12/13/2013    Nonexudative age-related macular degeneration, bilateral, intermediate dry stage 07/12/2018    Nuclear sclerotic cataract of left eye 08/10/2018    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 12/13/2013    On anticoagulant therapy 06/13/2024    Osteoarthritis     Paroxysmal atrial fibrillation 09/11/2018    Pulmonary hypertension 04/05/2024    Pure hypercholesterolemia 12/13/2013    Sick sinus syndrome     Stroke     Takotsubo cardiomyopathy 11/17/2016         Past Surgical History:   Procedure Laterality Date    CARDIAC PACEMAKER PLACEMENT      MEDTRONIC Device Model: Juli LOZADA MRI A2DR01 Device Type: PACEMAKER Device S\N: [id number]    CARPAL TUNNEL RELEASE Bilateral 1991    CATARACT EXTRACTION Right     JOINT REPLACEMENT      AL TRANSCRAN DOPP INTRACRAN, EMBOLI W/O INJ  01/29/2018         SKIN BIOPSY      TONSILLECTOMY      TOTAL HIP ARTHROPLASTY  11/01/2000    right/[facility]       Time Tracking:     OT Date of Treatment: 03/09/25  OT Start Time: 1312  OT Stop Time: 1342  OT Total Time (min): 30 min    Billable Minutes:Evaluation 5  Neuromuscular Re-education 25    3/9/2025

## 2025-03-09 NOTE — CARE UPDATE
Spoke with Dr. William from Orthopedics and he would like to decrease her home dose of Apixiban from 5 to 2.5 mg po BID for at least 1 week post-op to decrease risk of bleeding into surgical site. I am okay with that plan and will decrease dose of Apixiban down to 2.5 mg po BID today.       TONI SHINE MD  Attending Staff Physician   Department of [facility] Medicine, [facility]  Pager: [phone]  Spectralink: [phone]

## 2025-03-09 NOTE — ASSESSMENT & PLAN NOTE
On anticoagulant therapy   Patient has paroxysmal (<7 days) atrial fibrillation. Patient is currently in sinus rhythm. DAMIO6PMBi Score: 4. The patients heart rate in the last 24 hours is as follows:  Pulse  Min: 60  Max: 77     Antiarrhythmics  metoprolol succinate (TOPROL-XL) 24 hr tablet 25 mg, 2 times daily, Oral    Plan  - Replete lytes with a goal of K>4, Mg >2  - Patient is anticoagulated at home with Apixiban 5 mg po BID and held pre-op but resumed post-op on 3/8.   - Patient's afib is currently controlled. Patient in paced rhythm. Continue home Toprol XL for rate control.

## 2025-03-10 ENCOUNTER — EPISODE CHANGES (OUTPATIENT)
Dept: CARDIOLOGY | Facility: CLINIC | Age: OVER 89
End: 2025-03-10

## 2025-03-10 LAB
ALBUMIN SERPL BCP-MCNC: 2.8 G/DL (ref 3.5–5.2)
ALP SERPL-CCNC: 43 U/L (ref 40–150)
ALT SERPL W/O P-5'-P-CCNC: 6 U/L (ref 10–44)
ANION GAP SERPL CALC-SCNC: 7 MMOL/L (ref 8–16)
AST SERPL-CCNC: 38 U/L (ref 10–40)
BASOPHILS # BLD AUTO: 0.03 K/UL (ref 0–0.2)
BASOPHILS NFR BLD: 0.2 % (ref 0–1.9)
BILIRUB SERPL-MCNC: 0.5 MG/DL (ref 0.1–1)
BLD PROD TYP BPU: NORMAL
BLD PROD TYP BPU: NORMAL
BLOOD UNIT EXPIRATION DATE: NORMAL
BLOOD UNIT EXPIRATION DATE: NORMAL
BLOOD UNIT TYPE CODE: 6200
BLOOD UNIT TYPE CODE: 6200
BLOOD UNIT TYPE: NORMAL
BLOOD UNIT TYPE: NORMAL
BUN SERPL-MCNC: 39 MG/DL (ref 10–30)
CALCIUM SERPL-MCNC: 8.6 MG/DL (ref 8.7–10.5)
CHLORIDE SERPL-SCNC: 100 MMOL/L (ref 95–110)
CO2 SERPL-SCNC: 25 MMOL/L (ref 23–29)
CODING SYSTEM: NORMAL
CODING SYSTEM: NORMAL
CREAT SERPL-MCNC: 0.6 MG/DL (ref 0.5–1.4)
CROSSMATCH INTERPRETATION: NORMAL
CROSSMATCH INTERPRETATION: NORMAL
DIFFERENTIAL METHOD BLD: ABNORMAL
DISPENSE STATUS: NORMAL
DISPENSE STATUS: NORMAL
EOSINOPHIL # BLD AUTO: 0.1 K/UL (ref 0–0.5)
EOSINOPHIL NFR BLD: 0.7 % (ref 0–8)
ERYTHROCYTE [DISTWIDTH] IN BLOOD BY AUTOMATED COUNT: 14.4 % (ref 11.5–14.5)
EST. GFR  (NO RACE VARIABLE): >60 ML/MIN/1.73 M^2
GLUCOSE SERPL-MCNC: 114 MG/DL (ref 70–110)
HCT VFR BLD AUTO: 23.7 % (ref 37–48.5)
HGB BLD-MCNC: 7.8 G/DL (ref 12–16)
IMM GRANULOCYTES # BLD AUTO: 0.16 K/UL (ref 0–0.04)
IMM GRANULOCYTES NFR BLD AUTO: 1.3 % (ref 0–0.5)
LYMPHOCYTES # BLD AUTO: 1.8 K/UL (ref 1–4.8)
LYMPHOCYTES NFR BLD: 14.9 % (ref 18–48)
MCH RBC QN AUTO: 31.2 PG (ref 27–31)
MCHC RBC AUTO-ENTMCNC: 32.9 G/DL (ref 32–36)
MCV RBC AUTO: 95 FL (ref 82–98)
MONOCYTES # BLD AUTO: 1.5 K/UL (ref 0.3–1)
MONOCYTES NFR BLD: 12.2 % (ref 4–15)
NEUTROPHILS # BLD AUTO: 8.6 K/UL (ref 1.8–7.7)
NEUTROPHILS NFR BLD: 70.7 % (ref 38–73)
NRBC BLD-RTO: 1 /100 WBC
OHS CV AF BURDEN PERCENT: < 1
OHS CV DC REMOTE DEVICE TYPE: NORMAL
OHS CV ICD SHOCK: NO
OHS CV RV PACING PERCENT: 99.85 %
PLATELET # BLD AUTO: 150 K/UL (ref 150–450)
PMV BLD AUTO: 10 FL (ref 9.2–12.9)
POTASSIUM SERPL-SCNC: 3.3 MMOL/L (ref 3.5–5.1)
PROT SERPL-MCNC: 5.3 G/DL (ref 6–8.4)
RBC # BLD AUTO: 2.5 M/UL (ref 4–5.4)
SODIUM SERPL-SCNC: 132 MMOL/L (ref 136–145)
TRANS ERYTHROCYTES VOL PATIENT: NORMAL ML
TRANS ERYTHROCYTES VOL PATIENT: NORMAL ML
WBC # BLD AUTO: 12.12 K/UL (ref 3.9–12.7)

## 2025-03-10 PROCEDURE — 97530 THERAPEUTIC ACTIVITIES: CPT

## 2025-03-10 PROCEDURE — 97110 THERAPEUTIC EXERCISES: CPT | Mod: CQ

## 2025-03-10 PROCEDURE — 97116 GAIT TRAINING THERAPY: CPT | Mod: CQ

## 2025-03-10 PROCEDURE — 21400001 HC TELEMETRY ROOM

## 2025-03-10 PROCEDURE — 85025 COMPLETE CBC W/AUTO DIFF WBC: CPT

## 2025-03-10 PROCEDURE — 36415 COLL VENOUS BLD VENIPUNCTURE: CPT

## 2025-03-10 PROCEDURE — 25000003 PHARM REV CODE 250: Performed by: STUDENT IN AN ORGANIZED HEALTH CARE EDUCATION/TRAINING PROGRAM

## 2025-03-10 PROCEDURE — 63600175 PHARM REV CODE 636 W HCPCS: Performed by: INTERNAL MEDICINE

## 2025-03-10 PROCEDURE — 25000003 PHARM REV CODE 250

## 2025-03-10 PROCEDURE — P9021 RED BLOOD CELLS UNIT: HCPCS

## 2025-03-10 PROCEDURE — 80053 COMPREHEN METABOLIC PANEL: CPT

## 2025-03-10 PROCEDURE — 99231 SBSQ HOSP IP/OBS SF/LOW 25: CPT | Mod: ,,, | Performed by: STUDENT IN AN ORGANIZED HEALTH CARE EDUCATION/TRAINING PROGRAM

## 2025-03-10 PROCEDURE — 25000003 PHARM REV CODE 250: Performed by: INTERNAL MEDICINE

## 2025-03-10 RX ORDER — ONDANSETRON HYDROCHLORIDE 2 MG/ML
4 INJECTION, SOLUTION INTRAVENOUS EVERY 8 HOURS PRN
Status: DISCONTINUED | OUTPATIENT
Start: 2025-03-10 | End: 2025-03-13 | Stop reason: HOSPADM

## 2025-03-10 RX ORDER — POLYETHYLENE GLYCOL 3350 17 G/17G
17 POWDER, FOR SOLUTION ORAL DAILY
Status: DISCONTINUED | OUTPATIENT
Start: 2025-03-10 | End: 2025-03-13 | Stop reason: HOSPADM

## 2025-03-10 RX ORDER — AMOXICILLIN 250 MG
1 CAPSULE ORAL 2 TIMES DAILY
Status: DISCONTINUED | OUTPATIENT
Start: 2025-03-10 | End: 2025-03-13 | Stop reason: HOSPADM

## 2025-03-10 RX ADMIN — APIXABAN 2.5 MG: 2.5 TABLET, FILM COATED ORAL at 09:03

## 2025-03-10 RX ADMIN — METHOCARBAMOL 500 MG: 500 TABLET ORAL at 05:03

## 2025-03-10 RX ADMIN — CEFADROXIL 500 MG: 500 CAPSULE ORAL at 09:03

## 2025-03-10 RX ADMIN — CLOPIDOGREL BISULFATE 75 MG: 75 TABLET ORAL at 09:03

## 2025-03-10 RX ADMIN — ONDANSETRON 4 MG: 2 INJECTION INTRAMUSCULAR; INTRAVENOUS at 08:03

## 2025-03-10 RX ADMIN — SENNOSIDES AND DOCUSATE SODIUM 1 TABLET: 50; 8.6 TABLET ORAL at 12:03

## 2025-03-10 RX ADMIN — ACETAMINOPHEN 1000 MG: 500 TABLET ORAL at 05:03

## 2025-03-10 RX ADMIN — METHOCARBAMOL 500 MG: 500 TABLET ORAL at 09:03

## 2025-03-10 RX ADMIN — METOPROLOL SUCCINATE 25 MG: 25 TABLET, EXTENDED RELEASE ORAL at 09:03

## 2025-03-10 RX ADMIN — ESCITALOPRAM OXALATE 20 MG: 20 TABLET ORAL at 09:03

## 2025-03-10 RX ADMIN — ACETAMINOPHEN 1000 MG: 500 TABLET ORAL at 09:03

## 2025-03-10 RX ADMIN — POLYETHYLENE GLYCOL 3350 17 G: 17 POWDER, FOR SOLUTION ORAL at 12:03

## 2025-03-10 RX ADMIN — LEVOTHYROXINE SODIUM 88 MCG: 50 TABLET ORAL at 08:03

## 2025-03-10 RX ADMIN — Medication 1000 UNITS: at 09:03

## 2025-03-10 RX ADMIN — POTASSIUM BICARBONATE 50 MEQ: 978 TABLET, EFFERVESCENT ORAL at 12:03

## 2025-03-10 RX ADMIN — SENNOSIDES AND DOCUSATE SODIUM 1 TABLET: 50; 8.6 TABLET ORAL at 09:03

## 2025-03-10 NOTE — PT/OT/SLP PROGRESS
Physical Therapy Treatment    Patient Name:  Dorothy M Knobloch   MRN:  [id number]    Recommendations:     Discharge Recommendations: Moderate Intensity Therapy  Discharge Equipment Recommendations: walker, rolling, wheelchair  Barriers to discharge: None    Assessment:     Dorothy M Knobloch is a 89 y.o. female admitted with a medical diagnosis of Periprosthetic fracture of shaft of femur.  She presents with the following impairments/functional limitations: weakness, impaired endurance, impaired self care skills, impaired functional mobility, gait instability, impaired balance, decreased lower extremity function, decreased safety awareness, pain, decreased ROM, impaired cardiopulmonary response to activity, orthopedic precautions.    Rehab Prognosis: Good; patient would benefit from acute skilled PT services to address these deficits and reach maximum level of function.    Recent Surgery: Procedure(s) (LRB):  OPEN REDUCTION INTERNAL FIXATION FEMORAL SHAFT FRACTURE (Right) 2 Days Post-Op    Plan:     During this hospitalization, patient to be seen daily to address the identified rehab impairments via gait training, therapeutic activities, therapeutic exercises, neuromuscular re-education and progress toward the following goals:    Plan of Care Expires:  03/30/25    Subjective     Chief Complaint: nausea,lightheadedness   Patient/Family Comments/goals: to get better   Pain/Comfort:  Pain Rating 1: 0/10      Objective:     Communicated with Rn prior to session.  Patient found HOB elevated with oxygen, FCD, perineural catheter, telemetry upon PT entry to room.     General Precautions: Standard, fall  Orthopedic Precautions: RLE weight bearing as tolerated  Braces: N/A  Respiratory Status: nasal cannula     Functional Mobility:  Bed Mobility:     Supine to Sit: moderate assistance and of 2 persons  Sit to Supine: maximal assistance and of 2 persons  Transfers:     Sit to Stand:  moderate assistance and of 2 persons with  rolling walker  Gait: 3 shuffling steps to HOB with mod A of 2 for foot placement and AD management       AM-PAC 6 CLICK MOBILITY  Turning over in bed (including adjusting bedclothes, sheets and blankets)?: 2  Sitting down on and standing up from a chair with arms (e.g., wheelchair, bedside commode, etc.): 2  Moving from lying on back to sitting on the side of the bed?: 2  Moving to and from a bed to a chair (including a wheelchair)?: 2  Need to walk in hospital room?: 1  Climbing 3-5 steps with a railing?: 1  Basic Mobility Total Score: 10       Treatment & Education:  Noted leg length discrepancy causing unsteadiness and increased anxiety   Bedside table in front of patient and area set up for function, convenience, and safety. RN aware of patient's mobility needs and status. Questions/concerns addressed within PTA scope of practice; patient  with no further questions. Time was provided for active listening, discussion of health disposition, and discussion of safe discharge.  OT present for cotreat due to pt's multiple medical comorbidities and functional/cognition deficits requiring two skilled therapists to appropriately progress pt's musculoskeletal strength, neuromuscular control, and endurance while taking into consideration medical acuity and pt safety.      Patient left HOB elevated with all lines intact and call button in reach..    GOALS:   Multidisciplinary Problems       Physical Therapy Goals          Problem: Physical Therapy    Goal Priority Disciplines Outcome Interventions   Physical Therapy Goal     PT, PT/OT Progressing    Description: Goals to be met by: 3/30/2025     Patient will increase functional independence with mobility by performin. Supine to sit with Supervision  2. Sit to supine with Supervision   3. Sit to stand transfer with Stand-by Assistance  4. Bed to chair transfer with Contact Guard Assistance using LRAD  5. Gait  x 50 feet with Stand-by Assistance using LRAD.                           DME Justifications:  Dorothy Knobloch has a mobility limitation that significantly impairs her ability to participate in one or more mobility related activities of daily living (MRADLs) such as toileting, feeding, dressing, grooming, and bathing in customary locations in the home.  The mobility limitation cannot be sufficiently resolved by the use of a cane or walker.   The use of a manual wheelchair will significantly improve the patients ability to participate in MRADLS and the patient will use it on regular basis in the home.  Dorothy Knobloch has expressed her willingness to use a manual wheelchair in the home. Patients upper body strength is sufficient for propulsion.  She also has a caregiver who is available, willing, and able to provide assistance with the wheelchair when needed.     Carol's mobility limitation cannot be sufficiently resolved by the use of a cane. Her functional mobility deficit can be sufficiently resolved with the use of a Rolling Walker. Patient's mobility limitation significantly impairs their ability to participate in one of more activities of daily living.  The use of a RW will significantly improve the patient's ability to participate in MRADLS and the patient will use it on regular basis in the home.    Time Tracking:     PT Received On: 03/10/25  PT Start Time: 0837     PT Stop Time: 0908  PT Total Time (min): 31 min     Billable Minutes: Gait Training 15 and Therapeutic Exercise 16    Treatment Type: Treatment  PT/PTA: PTA     Number of PTA visits since last PT visit: 1     03/10/2025

## 2025-03-10 NOTE — SUBJECTIVE & OBJECTIVE
Principal Problem:Periprosthetic fracture of shaft of femur    Principal Orthopedic Problem: as above; now s/p ORIF (3/8)    Interval History: Afebrile, VSS, NAEON.  Resting comfortably in bed this morning, daughter at bedside. Hgb 7.8 on AM labs after receiving 1u prbc yesterday.  Pain has been reportedly well controlled. Dressing c/d/i.  Anticipate continued to work with physical therapy today, discussed the importance of this.           Review of patient's allergies indicates:   Allergen Reactions    Aspartame Swelling     equal       Current Facility-Administered Medications   Medication    0.9%  NaCl infusion (for blood administration)    acetaminophen tablet 1,000 mg    albuterol-ipratropium 2.5 mg-0.5 mg/3 mL nebulizer solution 3 mL    amLODIPine tablet 5 mg    apixaban tablet 2.5 mg    bisacodyL suppository 10 mg    bumetanide tablet 0.5 mg    cefadroxil capsule 500 mg    clopidogreL tablet 75 mg    dextrose 50% injection 12.5 g    dextrose 50% injection 25 g    EScitalopram oxalate tablet 20 mg    glucagon (human recombinant) injection 1 mg    glucose chewable tablet 16 g    glucose chewable tablet 24 g    hydroCHLOROthiazide tablet 12.5 mg    levothyroxine tablet 88 mcg    melatonin tablet 6 mg    methocarbamoL tablet 500 mg    metoprolol succinate (TOPROL-XL) 24 hr tablet 25 mg    nitroGLYCERIN SL tablet 0.4 mg    oxyCODONE immediate release tablet 5 mg    ropivacaine 0.2% Perineural Pump infusion 500 ML    sodium chloride 0.9% flush 10 mL    vitamin D 1000 units tablet 1,000 Units     Objective:     Vital Signs (Most Recent):  Temp: 98.2 °F (36.8 °C) (03/10/25 0442)  Pulse: 70 (03/10/25 0442)  Resp: 18 (03/10/25 0442)  BP: (!) 108/54 (03/10/25 0442)  SpO2: (!) 92 % (03/10/25 0442) Vital Signs (24h Range):  Temp:  [97.7 °F (36.5 °C)-98.6 °F (37 °C)] 98.2 °F (36.8 °C)  Pulse:  [63-87] 70  Resp:  [16-18] 18  SpO2:  [92 %-99 %] 92 %  BP: (100-127)/(49-56) 108/54     Weight: 59 kg (130 lb 1.1 oz)  Height: 5'  "2" (157.5 cm)  Body mass index is 23.79 kg/m².      Intake/Output Summary (Last 24 hours) at 3/10/2025 0635  Last data filed at 3/10/2025 0630  Gross per 24 hour   Intake 320 ml   Output 800 ml   Net -480 ml        Ortho/SPM Exam  Alert  NAD  Reg rate  No increased WOB    Right LE:  Dressing c/d/i  SILT T/SP/DP/Kaur/Sa  Motor intact T/SP/DP  WWP extremities  FCDs in place and functioning       Significant Labs: All pertinent labs within the past 24 hours have been reviewed.    Significant Imaging: I have reviewed all pertinent imaging results/findings.  "

## 2025-03-10 NOTE — PLAN OF CARE
[facility] - Surgery  Initial Discharge Assessment       Primary Care Provider: Lola Wolff MD    Admission Diagnosis: Pure hypercholesterolemia [E78.00]  Chronic diastolic heart failure [I50.32]  Hypokalemia [E87.6]  Paroxysmal atrial fibrillation [I48.0]  Fall [W19.XXXA]  Cardiac pacemaker [Z95.0]  Acquired hypothyroidism [E03.9]  Essential hypertension [I10]  Chest pain [R07.9]  Normochromic normocytic anemia [D64.9]  Closed fracture of right femur, unspecified fracture morphology, unspecified portion of femur, initial encounter [S72.91XA]  Periprosthetic fracture of shaft of femur [M97.8XXA, Z96.649]    Admission Date: 3/7/2025  Expected Discharge Date: 3/11/2025    Transition of Care Barriers: None    [facility]: [employer] [facility] / Plan: [employer] CHO[employer] / Product Type: Medicare Advantage /     Extended Emergency Contact Information  Primary Emergency Contact: Gemma Solis  Address: [id number] [place] [address]  Home Phone: [phone]  Mobile Phone: [phone]  Relation: Daughter   needed? No    Discharge Plan A: Skilled Nursing Facility  Discharge Plan B: Home Health, Home with family      [id number] [address] [facility]  [address] [facility] [address]  Phone: [phone] Fax: [phone]    [facility] [address] [facility]  [address] [facility] [address]  Phone: [phone] Fax: [phone]      Initial Assessment (most recent)       Adult Discharge Assessment - 03/10/25 1326          Discharge Assessment    Assessment Type Discharge Planning Assessment     Confirmed/corrected address, phone number and insurance Yes     Confirmed Demographics Correct on Facesheet     Source of Information patient;family   daughterHéctor Menendez    Communicated ISH with patient/caregiver Yes     People in Home spouse     Do you expect to return to your current  living situation? Yes   [facility]    Prior to hospitilization cognitive status: Alert/Oriented     Current cognitive status: Alert/Oriented     Home Layout Able to live on 1st floor     Equipment Currently Used at Home wheelchair;walker, rolling;rollator     Do you currently have service(s) that help you manage your care at home? No     Do you take prescription medications? Yes     Do you have prescription coverage? Yes     Do you have any problems affording any of your prescribed medications? No     Is the patient taking medications as prescribed? yes     How do you get to doctors appointments? family or friend will provide     Are you on dialysis? No     Do you take coumadin? No     Discharge Plan A Skilled Nursing Facility     Discharge Plan B Home Health;Home with family     DME Needed Upon Discharge  none     Discharge Plan discussed with: Patient;Adult children   daughter Gemma    Transition of Care Barriers None                   Patient lives with her spouse at [facility].  Patient and spouse both legally blind.  Patient and daughter Gemma agree with therapy evaluations for SNF placement       Met with patient and daughter Gemma to review discharge recommendation of SNF and is agreeable to plan    Patient/family provided list of facilities in-network with patient's payor plan. Providers that are owned, operated, or affiliated with [facility] are included on the list.     Notified that referral sent to below listed facilities from in-network list based on proximity to home/family support:   1[facility]  2[facility]  3.[facility]   4.[facility]  5. [place]  6. [place]  7. [facility]  8.[place]    Patient/family instructed to identify preference.    Preferred Facility: (if more than 1, listed in order of descending preference)  [facility]    If an additional preferred facility not listed above is identified, additional referral to be sent. If above facilities unable to  accept, will send additional referrals to in-network providers.

## 2025-03-10 NOTE — ADDENDUM NOTE
Addendum  created 03/10/25 1140 by Alexis Herrera MD    Charge Capture section accepted, Cosign clinical note with attestation

## 2025-03-10 NOTE — ASSESSMENT & PLAN NOTE
Dorothy M Knobloch is a 89 y.o. female with , closed, NVI. They take eliquis anticoagulation at home. They required a walker prior to this injury.     Dorothy M Knobloch is a 89 y.o. female who presented with a right periprosthetic [place] C femur fracture; now  s/p ORIF (3/8/25)    - Surgical dressing C/D/I  - Pain: multimodal regimen limiting narcotics  - PT/OT: WBAT LLE  - AC: Eliquis 2.5 mg BID x 7 d and then return to home dose of 5 mg BID, FCDs at all times when not ambulating  - Abx: 24hr postop ancef completed, followed by Cefadroxil protocol   - Drain: none  - Iqbal: dc'd POD1     » Dispo: f/u progress with PT/OT

## 2025-03-10 NOTE — PLAN OF CARE
10:30AM- NANCYW completed LOCET with Jennifer from [facility].    10:57 AM- PASSR completed on assessmentpro     WENDI Bedolla, RSW, BSW  Case Management  [id number]

## 2025-03-10 NOTE — PT/OT/SLP PROGRESS
Occupational Therapy   Co-Treatment    Name: Dorothy M Knobloch  MRN: [id number]  Admitting Diagnosis:  Periprosthetic fracture of shaft of femur  2 Days Post-Op    Recommendations:     Discharge Recommendations: Moderate Intensity Therapy  Discharge Equipment Recommendations:  walker, rolling, wheelchair  Barriers to discharge:  None    Assessment:     Dorothy M Knobloch is a 89 y.o. female with a medical diagnosis of Periprosthetic fracture of shaft of femur.  She presents with impaired ADL and mobility performance deficits. Pt limited today 2/2 reported lightheadedness/nausea with mobility however improved from previous sessions. Pt did trial standing today with RLE blocked and better upright standing tolerance completed while using a RW. Pt on 02 and adhered to WBAT to RLE. Pt's BP reading below with pt stating symptoms resolving with timed recovery.  At this time, pt is a high fall risk and not at their baseline. Prior to hospitalizations, pt was mod I for ADLs/mobility.  Patient currently demonstrates a need for moderate intensity therapy on a daily basis post acute secondary to a decline in functional status due to illness    In bed prior to mobility :116/51 (MAP 74)  At EOB: 132/57 (MAP 82)  Standin/61 (MAP 88)     Performance deficits affecting function are weakness, gait instability, decreased upper extremity function, impaired endurance, impaired balance, decreased lower extremity function, impaired self care skills, impaired functional mobility, impaired cardiopulmonary response to activity, pain, orthopedic precautions.     Rehab Prognosis:  Good; patient would benefit from acute skilled OT services to address these deficits and reach maximum level of function.       Plan:     Patient to be seen daily to address the above listed problems via self-care/home management, therapeutic activities, therapeutic exercises, neuromuscular re-education  Plan of Care Expires: 25  Plan of Care Reviewed with:  patient, daughter    Subjective     Chief Complaint: nausea/lightheadedness initially at EOB   Patient/Family Comments/goals: to get better   Pain/Comfort:  Pain Rating 1: 0/10    Objective:   Co-treatment with PT for maximal pt participation, safety, and activity tolerance     Communicated with: RN prior to session.  Patient found HOB elevated with peripheral IV, oxygen, FCD, perineural catheter, telemetry upon OT entry to room.    General Precautions: Standard, fall    Orthopedic Precautions:RLE weight bearing as tolerated  Braces: N/A  Respiratory Status: Nasal cannula, flow 2.5 L/min     Occupational Performance:     Bed Mobility:    Patient completed Rolling/Turning to Left with  moderate assistance and 2 persons  Patient completed Scooting/Bridging with maximal assistance and 2 persons  Patient completed Supine to Sit with moderate assistance and 2 persons  Patient completed Sit to Supine with maximal assistance and 2 persons     Functional Mobility/Transfers:  Patient completed Sit <> Stand Transfer with moderate assistance and of 2 persons  with  rolling walker   Functional Mobility: Pt stood with mod A x2 with RW initially needing RLE blocked. Pt did achieve upright with cues. Pt accepted weight shifting R/L ~30 seconds prior to steps. Pt then ambulated ~2-3 small steps laterally towards HOB with mod A x2 for steps and RW management    Activities of Daily Living:  Upper Body Dressing: moderate assistance donning gown around back at EOB       [facility] 6 Click ADL: 17    Treatment & Education:  Pt educated on role of occupational therapy, POC, and safety during ADLs and functional mobility. Pt and OT discussed importance of safe, continued mobility to optimize daily living skills. Pt verbalized understanding.     White board updated during session. Pt given instruction to call for medical staff/nurse for assistance.       Patient left HOB elevated with all lines intact, call button in reach, and RN  notified    GOALS:   Multidisciplinary Problems       Occupational Therapy Goals          Problem: Occupational Therapy    Goal Priority Disciplines Outcome Interventions   Occupational Therapy Goal     OT, PT/OT Progressing    Description: Goals to be met by: 3/16/2025     Patient will increase functional independence with ADLs by performing:    UE Dressing with [place].  LE Dressing with Stand-by Assistance and Assistive Devices as needed.  Grooming while standing at sink with Supervision.  Toileting from bedside commode with Stand-by Assistance for hygiene and clothing management.   Supine to sit with Stand-by Assistance.  Step transfer with Stand-by Assistance  Toilet transfer to bedside commode with Stand-by Assistance.                         DME Justifications:  No DME recommended requiring DME justifications    Time Tracking:     OT Date of Treatment: 03/10/25  OT Start Time: 0837  OT Stop Time: 0908  OT Total Time (min): 31 min    Billable Minutes:Therapeutic Activity 31 min    OT/CORBIN: OT          3/10/2025

## 2025-03-10 NOTE — ANESTHESIA POST-OP PAIN MANAGEMENT
Acute Pain Service Progress Note    Dorothy M Knobloch is a 89 y.o., female, [id number].    Surgery:  Femoral shaft ORIF R    Post Op Day #: 2    Catheter type: perineural  femoral    Infusion type: Ropivacaine 0.2%  0.5 mL/hr basal    Problem List:    Active Hospital Problems    Diagnosis  POA    *Periprosthetic fracture of shaft of right femur s/p ORIF on 3/8/2025 [M97.8XXA, Z96.649]  Yes    Other chest pain [R07.89]  No    Acute blood loss anemia [D62]  Yes    On anticoagulant therapy [Z79.01]  Not Applicable    Chronic diastolic congestive heart failure [I50.32]  Yes    Paroxysmal atrial fibrillation [I48.0]  Yes     Chronic    Cardiac pacemaker [Z95.0]  Yes    Essential hypertension [I10]  Yes     Chronic    Pure hypercholesterolemia [E78.00]  Yes    Acquired hypothyroidism [E03.9]  Yes      Resolved Hospital Problems    Diagnosis Date Resolved POA    Hypokalemia [E87.6] 03/09/2025 Yes       Subjective:     General appearance of alert, oriented, no complaints   Pain with rest: 1    Numbers   Pain with movement: 3    Numbers   Side Effects    1. Pruritis No    2. Nausea No    3. Motor Blockade No, 1=Ability to bend knees and ankles    4. Sedation No, 1=awake and alert    Objective:     Catheter site clean, dry, intact      Vitals   Vitals:    03/10/25 0809   BP: (!) 116/56   Pulse: 67   Resp: 18   Temp: 36.9 °C (98.4 °F)        Labs    No results displayed because visit has over 200 results.           Meds Current Medications[1]     Anticoagulant dose Eliquis at 5 mg BID.    Assessment:     Pain control adequate    Plan:     Patient doing well, continue present treatment.    Continue PNC  Continue tylenol 1000 mg Q8H  Continue methocarbamol 500 mg Q8H   D/C oxycodone 5 mg Q6H PRN    Remigio Zamarripa DO  CA-2                     [1]   Current Facility-Administered Medications   Medication Dose Route Frequency Provider Last Rate Last Admin    0.9%  NaCl infusion (for blood administration)   Intravenous Q24H PRN  Grisel Liao MD        acetaminophen tablet 1,000 mg  1,000 mg Oral Q8H Shagufta Cordero PA-C   1,000 mg at 03/09/25 2221    albuterol-ipratropium 2.5 mg-0.5 mg/3 mL nebulizer solution 3 mL  3 mL Nebulization Q4H PRN Shagufta Cordero PA-C        amLODIPine tablet 5 mg  5 mg Oral Daily Shagufta Cordero PA-C   5 mg at 03/09/25 0831    apixaban tablet 2.5 mg  2.5 mg Oral BID Grisel Liao MD   2.5 mg at 03/09/25 2100    bisacodyL suppository 10 mg  10 mg Rectal Daily PRN Shagufta Cordero PA-C        bumetanide tablet 0.5 mg  0.5 mg Oral Daily Shagufta Cordero PA-C   0.5 mg at 03/09/25 0831    cefadroxil capsule 500 mg  500 mg Oral Q12H Kay Ramirez MD   500 mg at 03/09/25 2200    clopidogreL tablet 75 mg  75 mg Oral Daily Grisel Liao MD   75 mg at 03/09/25 0959    dextrose 50% injection 12.5 g  12.5 g Intravenous PRN Shagufta Cordero PA-C        dextrose 50% injection 25 g  25 g Intravenous PRN Shagufta Cordero PA-C        EScitalopram oxalate tablet 20 mg  20 mg Oral Daily Shagufta Cordero PA-C   20 mg at 03/09/25 0832    glucagon (human recombinant) injection 1 mg  1 mg Intramuscular PRN Shagufta Cordero PA-C        glucose chewable tablet 16 g  16 g Oral PRN Shagufta Cordero PA-C        glucose chewable tablet 24 g  24 g Oral PRN Shagufta Cordero PA-C        hydroCHLOROthiazide tablet 12.5 mg  12.5 mg Oral Daily Shagufta Cordero PA-C   12.5 mg at 03/09/25 0832    levothyroxine tablet 88 mcg  88 mcg Oral Before breakfast Shagufta Cordero PA-C   88 mcg at 03/10/25 0814    melatonin tablet 6 mg  6 mg Oral Nightly PRN Per Frye MD   6 mg at 03/09/25 2221    methocarbamoL tablet 500 mg  500 mg Oral Q8H Grisel Liao MD   500 mg at 03/09/25 2221    metoprolol succinate (TOPROL-XL) 24 hr tablet 25 mg  25 mg Oral BID Shagufta Cordero PA-C   25 mg at 03/09/25 2055    nitroGLYCERIN SL tablet 0.4 mg  0.4 mg Sublingual Q5 Min PRN Yanni  Grisel VARGHESE MD        ondansetron injection 4 mg  4 mg Intravenous Q8H PRN Grisle Liao MD   4 mg at 03/10/25 0809    ropivacaine 0.2% Perineural Pump infusion 500 ML   Perineural Continuous Alexis Herrera MD   New Bag at 03/08/25 1411    sodium chloride 0.9% flush 10 mL  10 mL Intravenous PRN Per Frye MD        vitamin D 1000 units tablet 1,000 Units  1,000 Units Oral Daily ZEESHAN Lanza MD   1,000 Units at 03/09/25 0832

## 2025-03-10 NOTE — PLAN OF CARE
Problem: Infection  Goal: Absence of Infection Signs and Symptoms  Outcome: Progressing     Problem: Skin Injury Risk Increased  Goal: Skin Health and Integrity  Outcome: Progressing     Problem: Wound  Goal: Absence of Infection Signs and Symptoms  Outcome: Progressing     Problem: Wound  Goal: Improved Oral Intake  Outcome: Progressing

## 2025-03-10 NOTE — PROGRESS NOTES
[facility] - [facility]  Orthopedics  Progress Note    Patient Name: Dorothy M Knobloch  MRN: [id number]  Admission Date: 3/7/2025  Hospital Length of Stay: 3 days  Attending Provider: Grisel Liao MD  Primary Care Provider: Lola Wolff MD  Follow-up For: Procedure(s) (LRB):  OPEN REDUCTION INTERNAL FIXATION FEMORAL SHAFT FRACTURE (Right)    Post-Operative Day: 2 Days Post-Op  Subjective:     Principal Problem:Periprosthetic fracture of shaft of femur    Principal Orthopedic Problem: as above; now s/p ORIF (3/8)    Interval History: Afebrile, VSS, NAEON.  Resting comfortably in bed this morning, daughter at bedside. Hgb 7.8 on AM labs after receiving 1u prbc yesterday.  Pain has been reportedly well controlled. Dressing c/d/i.  Anticipate continued to work with physical therapy today, discussed the importance of this.           Review of patient's allergies indicates:   Allergen Reactions    Aspartame Swelling     equal       Current Facility-Administered Medications   Medication    0.9%  NaCl infusion (for blood administration)    acetaminophen tablet 1,000 mg    albuterol-ipratropium 2.5 mg-0.5 mg/3 mL nebulizer solution 3 mL    amLODIPine tablet 5 mg    apixaban tablet 2.5 mg    bisacodyL suppository 10 mg    bumetanide tablet 0.5 mg    cefadroxil capsule 500 mg    clopidogreL tablet 75 mg    dextrose 50% injection 12.5 g    dextrose 50% injection 25 g    EScitalopram oxalate tablet 20 mg    glucagon (human recombinant) injection 1 mg    glucose chewable tablet 16 g    glucose chewable tablet 24 g    hydroCHLOROthiazide tablet 12.5 mg    levothyroxine tablet 88 mcg    melatonin tablet 6 mg    methocarbamoL tablet 500 mg    metoprolol succinate (TOPROL-XL) 24 hr tablet 25 mg    nitroGLYCERIN SL tablet 0.4 mg    oxyCODONE immediate release tablet 5 mg    ropivacaine 0.2% Perineural Pump infusion 500 ML    sodium chloride 0.9% flush 10 mL    vitamin D 1000 units tablet 1,000 Units     Objective:     Vital  "Signs (Most Recent):  Temp: 98.2 °F (36.8 °C) (03/10/25 0442)  Pulse: 70 (03/10/25 0442)  Resp: 18 (03/10/25 0442)  BP: (!) 108/54 (03/10/25 0442)  SpO2: (!) 92 % (03/10/25 0442) Vital Signs (24h Range):  Temp:  [97.7 °F (36.5 °C)-98.6 °F (37 °C)] 98.2 °F (36.8 °C)  Pulse:  [63-87] 70  Resp:  [16-18] 18  SpO2:  [92 %-99 %] 92 %  BP: (100-127)/(49-56) 108/54     Weight: 59 kg (130 lb 1.1 oz)  Height: 5' 2" (157.5 cm)  Body mass index is 23.79 kg/m².      Intake/Output Summary (Last 24 hours) at 3/10/2025 0635  Last data filed at 3/10/2025 0630  Gross per 24 hour   Intake 320 ml   Output 800 ml   Net -480 ml        Ortho/SPM Exam  Alert  NAD  Reg rate  No increased WOB    Right LE:  Dressing c/d/i  SILT T/SP/DP/Kaur/Sa  Motor intact T/SP/DP  WWP extremities  FCDs in place and functioning       Significant Labs: All pertinent labs within the past 24 hours have been reviewed.    Significant Imaging: I have reviewed all pertinent imaging results/findings.  Assessment/Plan:     * Periprosthetic fracture of shaft of right femur s/p ORIF on 3/8/2025  Dorothy M Knobloch is a 89 y.o. female with , closed, NVI. They take eliquis anticoagulation at home. They required a walker prior to this injury.     Dorothy M Knobloch is a 89 y.o. female who presented with a right periprosthetic [place] C femur fracture; now  s/p ORIF (3/8/25)    - Surgical dressing C/D/I  - Pain: multimodal regimen limiting narcotics  - PT/OT: WBAT LLE  - AC: Eliquis 2.5 mg BID x 7 d and then return to home dose of 5 mg BID, FCDs at all times when not ambulating  - Abx: 24hr postop ancef completed, followed by Cefadroxil protocol   - Drain: none  - Iqbal: dc'd POD1     » Dispo: f/u progress with PT/OT           Emy Mercedes MD  Orthopedics  [facility] - Surgery    "

## 2025-03-11 VITALS — HEART RATE: 75 BPM

## 2025-03-11 PROBLEM — R07.89 OTHER CHEST PAIN: Status: RESOLVED | Noted: 2025-03-09 | Resolved: 2025-03-11

## 2025-03-11 LAB
ANION GAP SERPL CALC-SCNC: 5 MMOL/L (ref 8–16)
BASOPHILS # BLD AUTO: 0.05 K/UL (ref 0–0.2)
BASOPHILS NFR BLD: 0.5 % (ref 0–1.9)
BUN SERPL-MCNC: 27 MG/DL (ref 10–30)
CALCIUM SERPL-MCNC: 8.8 MG/DL (ref 8.7–10.5)
CHLORIDE SERPL-SCNC: 102 MMOL/L (ref 95–110)
CO2 SERPL-SCNC: 28 MMOL/L (ref 23–29)
CREAT SERPL-MCNC: 0.5 MG/DL (ref 0.5–1.4)
DIFFERENTIAL METHOD BLD: ABNORMAL
EOSINOPHIL # BLD AUTO: 0.2 K/UL (ref 0–0.5)
EOSINOPHIL NFR BLD: 1.9 % (ref 0–8)
ERYTHROCYTE [DISTWIDTH] IN BLOOD BY AUTOMATED COUNT: 14.8 % (ref 11.5–14.5)
EST. GFR  (NO RACE VARIABLE): >60 ML/MIN/1.73 M^2
GLUCOSE SERPL-MCNC: 99 MG/DL (ref 70–110)
HCT VFR BLD AUTO: 29.7 % (ref 37–48.5)
HGB BLD-MCNC: 9.8 G/DL (ref 12–16)
IMM GRANULOCYTES # BLD AUTO: 0.19 K/UL (ref 0–0.04)
IMM GRANULOCYTES NFR BLD AUTO: 2 % (ref 0–0.5)
LYMPHOCYTES # BLD AUTO: 1.2 K/UL (ref 1–4.8)
LYMPHOCYTES NFR BLD: 12.4 % (ref 18–48)
MCH RBC QN AUTO: 31.5 PG (ref 27–31)
MCHC RBC AUTO-ENTMCNC: 33 G/DL (ref 32–36)
MCV RBC AUTO: 96 FL (ref 82–98)
MONOCYTES # BLD AUTO: 0.8 K/UL (ref 0.3–1)
MONOCYTES NFR BLD: 8.2 % (ref 4–15)
NEUTROPHILS # BLD AUTO: 7.3 K/UL (ref 1.8–7.7)
NEUTROPHILS NFR BLD: 75 % (ref 38–73)
NRBC BLD-RTO: 0 /100 WBC
PLATELET # BLD AUTO: 155 K/UL (ref 150–450)
PMV BLD AUTO: 9.6 FL (ref 9.2–12.9)
POTASSIUM SERPL-SCNC: 4.2 MMOL/L (ref 3.5–5.1)
RBC # BLD AUTO: 3.11 M/UL (ref 4–5.4)
SARS-COV-2 RNA RESP QL NAA+PROBE: NOT DETECTED
SODIUM SERPL-SCNC: 135 MMOL/L (ref 136–145)
WBC # BLD AUTO: 9.66 K/UL (ref 3.9–12.7)

## 2025-03-11 PROCEDURE — 25000003 PHARM REV CODE 250

## 2025-03-11 PROCEDURE — 21400001 HC TELEMETRY ROOM

## 2025-03-11 PROCEDURE — 87635 SARS-COV-2 COVID-19 AMP PRB: CPT | Performed by: INTERNAL MEDICINE

## 2025-03-11 PROCEDURE — 36415 COLL VENOUS BLD VENIPUNCTURE: CPT | Performed by: INTERNAL MEDICINE

## 2025-03-11 PROCEDURE — 97530 THERAPEUTIC ACTIVITIES: CPT

## 2025-03-11 PROCEDURE — 80048 BASIC METABOLIC PNL TOTAL CA: CPT | Performed by: INTERNAL MEDICINE

## 2025-03-11 PROCEDURE — 25000003 PHARM REV CODE 250: Performed by: STUDENT IN AN ORGANIZED HEALTH CARE EDUCATION/TRAINING PROGRAM

## 2025-03-11 PROCEDURE — 25000003 PHARM REV CODE 250: Performed by: INTERNAL MEDICINE

## 2025-03-11 PROCEDURE — 97116 GAIT TRAINING THERAPY: CPT | Mod: CQ

## 2025-03-11 PROCEDURE — 85025 COMPLETE CBC W/AUTO DIFF WBC: CPT | Performed by: INTERNAL MEDICINE

## 2025-03-11 PROCEDURE — 97112 NEUROMUSCULAR REEDUCATION: CPT | Mod: CQ

## 2025-03-11 PROCEDURE — 97535 SELF CARE MNGMENT TRAINING: CPT

## 2025-03-11 RX ORDER — TRAMADOL HYDROCHLORIDE 50 MG/1
50 TABLET ORAL EVERY 6 HOURS PRN
Status: ON HOLD
Start: 2025-03-11

## 2025-03-11 RX ORDER — AMOXICILLIN 250 MG
1 CAPSULE ORAL 2 TIMES DAILY
Status: ON HOLD
Start: 2025-03-11

## 2025-03-11 RX ORDER — TIMOLOL MALEATE 5 MG/ML
1 SOLUTION/ DROPS OPHTHALMIC 2 TIMES DAILY
Status: DISCONTINUED | OUTPATIENT
Start: 2025-03-11 | End: 2025-03-13 | Stop reason: HOSPADM

## 2025-03-11 RX ORDER — BRIMONIDINE TARTRATE AND TIMOLOL MALEATE 2; 5 MG/ML; MG/ML
1 SOLUTION OPHTHALMIC 2 TIMES DAILY
Status: ON HOLD
Start: 2025-03-11

## 2025-03-11 RX ORDER — METHOCARBAMOL 500 MG/1
500 TABLET, FILM COATED ORAL EVERY 8 HOURS
Status: ON HOLD
Start: 2025-03-11

## 2025-03-11 RX ORDER — TALC
6 POWDER (GRAM) TOPICAL NIGHTLY PRN
Status: ON HOLD
Start: 2025-03-11

## 2025-03-11 RX ORDER — CEFADROXIL 500 MG/1
500 CAPSULE ORAL EVERY 12 HOURS
Status: ON HOLD
Start: 2025-03-11 | End: 2025-03-18

## 2025-03-11 RX ORDER — BRIMONIDINE TARTRATE 1.5 MG/ML
1 SOLUTION/ DROPS OPHTHALMIC 2 TIMES DAILY
Status: DISCONTINUED | OUTPATIENT
Start: 2025-03-11 | End: 2025-03-13 | Stop reason: HOSPADM

## 2025-03-11 RX ADMIN — TIMOLOL MALEATE 1 DROP: 5 SOLUTION OPHTHALMIC at 08:03

## 2025-03-11 RX ADMIN — LEVOTHYROXINE SODIUM 88 MCG: 50 TABLET ORAL at 05:03

## 2025-03-11 RX ADMIN — METHOCARBAMOL 500 MG: 500 TABLET ORAL at 05:03

## 2025-03-11 RX ADMIN — TIMOLOL MALEATE 1 DROP: 5 SOLUTION OPHTHALMIC at 12:03

## 2025-03-11 RX ADMIN — SENNOSIDES AND DOCUSATE SODIUM 1 TABLET: 50; 8.6 TABLET ORAL at 08:03

## 2025-03-11 RX ADMIN — METHOCARBAMOL 500 MG: 500 TABLET ORAL at 01:03

## 2025-03-11 RX ADMIN — ESCITALOPRAM OXALATE 20 MG: 20 TABLET ORAL at 08:03

## 2025-03-11 RX ADMIN — HYDROCHLOROTHIAZIDE 12.5 MG: 12.5 TABLET ORAL at 08:03

## 2025-03-11 RX ADMIN — APIXABAN 2.5 MG: 2.5 TABLET, FILM COATED ORAL at 08:03

## 2025-03-11 RX ADMIN — AMLODIPINE BESYLATE 5 MG: 5 TABLET ORAL at 08:03

## 2025-03-11 RX ADMIN — BRIMONIDINE TARTRATE 1 DROP: 1.5 SOLUTION OPHTHALMIC at 08:03

## 2025-03-11 RX ADMIN — Medication 1000 UNITS: at 08:03

## 2025-03-11 RX ADMIN — METHOCARBAMOL 500 MG: 500 TABLET ORAL at 09:03

## 2025-03-11 RX ADMIN — CLOPIDOGREL BISULFATE 75 MG: 75 TABLET ORAL at 08:03

## 2025-03-11 RX ADMIN — BUMETANIDE 0.5 MG: 0.5 TABLET ORAL at 08:03

## 2025-03-11 RX ADMIN — METOPROLOL SUCCINATE 25 MG: 25 TABLET, EXTENDED RELEASE ORAL at 08:03

## 2025-03-11 RX ADMIN — CEFADROXIL 500 MG: 500 CAPSULE ORAL at 08:03

## 2025-03-11 RX ADMIN — POLYETHYLENE GLYCOL 3350 17 G: 17 POWDER, FOR SOLUTION ORAL at 09:03

## 2025-03-11 RX ADMIN — ACETAMINOPHEN 1000 MG: 500 TABLET ORAL at 01:03

## 2025-03-11 RX ADMIN — ACETAMINOPHEN 1000 MG: 500 TABLET ORAL at 05:03

## 2025-03-11 RX ADMIN — BISACODYL 10 MG: 10 SUPPOSITORY RECTAL at 01:03

## 2025-03-11 RX ADMIN — APIXABAN 2.5 MG: 2.5 TABLET, FILM COATED ORAL at 09:03

## 2025-03-11 RX ADMIN — BRIMONIDINE TARTRATE 1 DROP: 1.5 SOLUTION OPHTHALMIC at 12:03

## 2025-03-11 RX ADMIN — ACETAMINOPHEN 1000 MG: 500 TABLET ORAL at 08:03

## 2025-03-11 NOTE — SUBJECTIVE & OBJECTIVE
Interval History: Pain controlled to right hip. Patient reports pain as 3/10 this am on exam.  Hgb improved to 9.8 today after transfused 1 unit of PRBCs on 3/10 for Hgb 7.8. Patient improving with therapy and participating and doing better and now requiring min to moderate assistance for bed mobility and sit to stand ans able to take 2 steps with rolling walker today with therapy. Patient has been accepted to [facility] as long as medically stable for tomorrow, 3/12. Labs reviewed. Potassium improved to 4.2 today from 3.3 yesterday after oral replacement yesterday. Sodium improved to 135 today after blood transfusion yesterday to 132 today. Creatinine stable at 0.5. Family at bedside and patient and family updated on discharge plan.     Review of Systems   Constitutional:  Negative for fever.   HENT:  Positive for hearing loss (Hard of hearing bilateral).    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    Cardiovascular:  Negative for chest pain.   Gastrointestinal:  Negative for nausea.   Musculoskeletal:  Positive for arthralgias (Right hip) and myalgias (Right thigh).   Psychiatric/Behavioral:  Negative for agitation and confusion.      Objective:     Vital Signs (Most Recent):  Temp: 97.5 °F (36.4 °C) (03/11/25 1603)  Pulse: 61 (03/11/25 1603)  Resp: 18 (03/11/25 1603)  BP: 105/51 (03/11/25 1603)  SpO2: 94 % (03/11/25 1603) on room air Vital Signs (24h Range):  Temp:  [97.5 °F (36.4 °C)-98.1 °F (36.7 °C)] 97.5 °F (36.4 °C)  Pulse:  [61-76] 61  Resp:  [18] 18  SpO2:  [93 %-98 %] 94 %  BP: (105-143)/(51-66) 105/51     Weight: 59 kg (130 lb 1.1 oz)  Body mass index is 23.79 kg/m².    Intake/Output Summary (Last 24 hours) at 3/11/2025 1644  Last data filed at 3/11/2025 0921  Gross per 24 hour   Intake --   Output 325 ml   Net -325 ml         Physical Exam  Vitals and nursing note reviewed.    Constitutional:       General: She is sleeping. She is not in acute distress.     Appearance: Normal appearance. She is not ill-appearing.      Comments: Frail elderly female sitting up in bed in no distress. Patient appeared very comfortable on exam. Patient oriented x 4.    Cardiovascular:      Rate and Rhythm: Normal rate and regular rhythm.      Heart sounds: Normal heart sounds. No murmur heard.  Pulmonary:      Effort: Pulmonary effort is normal. No respiratory distress.      Breath sounds: Normal breath sounds. No wheezing.   Abdominal:      General: Abdomen is flat. Bowel sounds are normal. There is no distension.      Palpations: Abdomen is soft.      Tenderness: There is no abdominal tenderness.   Musculoskeletal:      Right lower leg: No edema.      Left lower leg: No edema.   Skin:     General: Skin is warm.      Findings: No erythema.      Comments: Surgical bandages in place all along right lateral thigh and hip area. Bandages are clean and dry and intact.   Neurological:      Mental Status: She is oriented to person, place, and time and easily aroused.   Psychiatric:         Mood and Affect: Mood normal.         Behavior: Behavior normal. Behavior is cooperative.         Thought Content: Thought content normal.         Judgment: Judgment normal.               Significant Labs: BMP:   Recent Labs   Lab 03/11/25  0803   GLU 99   *   K 4.2      CO2 28   BUN 27   CREATININE 0.5   CALCIUM 8.8     CBC:   Recent Labs   Lab 03/10/25  0308 03/11/25  0803   WBC 12.12 9.66   HGB 7.8* 9.8*   HCT 23.7* 29.7*    155       Significant Imaging: I have reviewed all pertinent imaging results/findings within the past 24 hours.

## 2025-03-11 NOTE — PROGRESS NOTES
Pt sitting up in bed, comfortable. Femoral catheter infusion has been paused, dressing CDI. Catheter discontinued, blue tip intact. Educated regarding continued pain management, understanding verbalized.

## 2025-03-11 NOTE — ASSESSMENT & PLAN NOTE
Patient's blood pressure range in the last 24 hours was: BP  Min: 100/49  Max: 135/63.The patient's inpatient anti-hypertensive regimen is listed below:  Current Antihypertensives  amLODIPine tablet 5 mg, Daily, Oral  hydroCHLOROthiazide tablet 12.5 mg, Daily, Oral  metoprolol succinate (TOPROL-XL) 24 hr tablet 25 mg, 2 times daily, Oral  bumetanide tablet 0.5 mg, Daily, Oral  nitroGLYCERIN SL tablet 0.4 mg, Every 5 min PRN, Sublingual    Plan  - BP is controlled, no changes needed to their regimen.  - Goal BP < 140/90.

## 2025-03-11 NOTE — ASSESSMENT & PLAN NOTE
On anticoagulant therapy   Patient has paroxysmal (<7 days) atrial fibrillation. Patient is currently in sinus rhythm. [id number] Score: 4. The patients heart rate in the last 24 hours is as follows:  Pulse  Min: 61  Max: 76     Antiarrhythmics  metoprolol succinate (TOPROL-XL) 24 hr tablet 25 mg, 2 times daily, Oral    Plan  - Replete lytes with a goal of K>4, Mg >2  - Patient is anticoagulated at home with Apixiban 5 mg po BID and held pre-op. Resumed post-op but at reduced dose of 2.5 mg po BID as per request of Orthopedics (Dr. William) as concerned about bleeding into surgical site and requesting decreased dose for 1 week and then can return back to home dosing of 5 mg po BID.   - Patient's afib is currently controlled. Patient in paced rhythm. Continue home Toprol XL for rate control.

## 2025-03-11 NOTE — ADDENDUM NOTE
Addendum  created 03/11/25 1228 by Remigio Zamarripa DO    Clinical Note Signed, Intraprocedure Event edited

## 2025-03-11 NOTE — ASSESSMENT & PLAN NOTE
Patient's blood pressure range in the last 24 hours was: BP  Min: 105/51  Max: 143/66.The patient's inpatient anti-hypertensive regimen is listed below:  Current Antihypertensives  amLODIPine tablet 5 mg, Daily, Oral  hydroCHLOROthiazide tablet 12.5 mg, Daily, Oral  metoprolol succinate (TOPROL-XL) 24 hr tablet 25 mg, 2 times daily, Oral  bumetanide tablet 0.5 mg, Daily, Oral  nitroGLYCERIN SL tablet 0.4 mg, Every 5 min PRN, Sublingual  timolol maleate 0.5% ophthalmic solution 1 drop, 2 times daily, Left Eye  , 2 times daily, Left Eye    Plan  - BP is controlled, no changes needed to their regimen.  - Goal BP < 140/90.

## 2025-03-11 NOTE — ASSESSMENT & PLAN NOTE
89 y/o female with PAF on long term Apixiban at home for anticoagulation, HTN, chronic diastolic heart failure, chronic anemia, cardiac pacemaker due to sick sinus syndrome and hypothyroidism presents to the ED after fall at home. Patient was walking to the door with her walker to open it for their sitter when she leaned her walker and subsequently fell over it. States she missed the door handle which caused her to fall. Patient noted immediate pain to her RLE and was unable to move it/ get up off the ground. EMS called and patient brought to ED where X-ray imaging of right leg revealed a closed periprosthetic right displaced femoral shaft fracture. Orthopedic surgery consult for closed periprosthetic right femoral shaft fracture in patient with previous right MARCEL. Patient was seen and evaluated by Orthopedic surgery who recommended operative repair of fracture.     - Patient taken to OR on 3/8/2025 and underwent right femur ORIF with long femoral plate and screws and cables to repair fracture by Dr. Albino William.   - Post-op, patient weight bear as tolerate to the left lower extremity as per Orthopedics recommendation.   - Patient restarted on home Apixiban post-op but at reduced dosing of 2.5 mg po BID as per request of Orthopedics (Dr. William) as concerned about bleeding into surgical site and requesting decreased dose for 1 week and then can return back to home dosing of 5 mg po BID after 1 week.   - Perineural pain catheter placed by Anesthesia Pain Service with continuous infusion of Ropivacaine to help with pain control post-op and Anesthesia Pain Service managing while patient in the hospital.   - Continue on multimodal pain management post-op with Tylenol 1000 mg po every 8 hours and Robaxin 500 mg po every 8 hours post-op and will continue as pain controlled.   - PT/OT consulted post-op and to worked with patient and recommending moderate intensity therapy on discharge when medically ready. Case  management met with patient and her daughter on 3/10 and SNF referrals sent.   - Patient completed IV Cefazolin for 24 hours post-op for antibiotic prophylaxis post-op and now on Cefadroxil 500 mg po BID for 7 days to extended antibiotic prophylaxis post-op to prevent post-op wound infection at surgical site.   - Surgical bandages to remain in place to right thigh and thigh area until Orthopedic clinic follow-up.   - Ortho following and managing surgical site. Appreciate assistance.

## 2025-03-11 NOTE — ASSESSMENT & PLAN NOTE
- Anemia unchanged on 3/10 and still with Hgb 7.8 on 3/10 after transfused 1 unit of PRBCs on 3/9 for Hgb 7.8 so to transfuse another 1 unit of PRBCs on 3/10.   - Anemia worsened on 3/9 and Hgb down to 7.8 from 8.7 on 3/8. Patient having chest pain and symptomatic in relation to her anemia and suspect anemia causing chest pain and heart strain so to be transfused 1 unit of PRBCs today, 3/9.   - Patient with Hgb 9.8 on admit and normally her Hgb is around 12 so suspect acute blood loss on admit related to blood loss from her significant right femur fracture. Hgb pre-op on 3/8 was 8.7 and transfused 1 unit of PRBCs intra-op on 3/8. Most recent hemoglobin and hematocrit are listed below.  Recent Labs     03/08/25  0301 03/08/25  1111 03/09/25  0341 03/10/25  0308   HGB 8.7*  --  7.8* 7.8*   HCT 26.3* 27* 24.7* 23.7*     Plan  - Monitor serial CBC: Daily  - Transfuse PRBC if patient becomes hemodynamically unstable, symptomatic or H/H drops below 7/21.  - Patient has received 1 units of PRBCs on 3/8/2025 intra-op.Patient received another 1 unit of PRBCs on 3/9 for symptomatic anemia with Hgb 7.8 with no improvement in Hgb so to receive another 1 unit of PRBCs on 3/10 for Hgb 7.8.   - Patient's anemia is currently worsening and patient symptomatic so will transfuse 1 unit of PRBCs on 3/10.

## 2025-03-11 NOTE — ASSESSMENT & PLAN NOTE
On anticoagulant therapy   Patient has paroxysmal (<7 days) atrial fibrillation. Patient is currently in sinus rhythm. [id number] Score: 4. The patients heart rate in the last 24 hours is as follows:  Pulse  Min: 63  Max: 80     Antiarrhythmics  metoprolol succinate (TOPROL-XL) 24 hr tablet 25 mg, 2 times daily, Oral    Plan  - Replete lytes with a goal of K>4, Mg >2  - Patient is anticoagulated at home with Apixiban 5 mg po BID and held pre-op. Resumed post-op but at reduced dose of 2.5 mg po BID as per request of Orthopedics (Dr. William) as concerned about bleeding into surgical site and requesting decreased dose for 1 week and then can return back to home dosing of 5 mg po BID.   - Patient's afib is currently controlled. Patient in paced rhythm. Continue home Toprol XL for rate control.

## 2025-03-11 NOTE — PT/OT/SLP PROGRESS
Occupational Therapy  Co- Treatment    Name: Dorothy M Knobloch  MRN: [id number]  Admitting Diagnosis:  Periprosthetic fracture of shaft of femur  3 Days Post-Op    Recommendations:     Discharge Recommendations: Moderate Intensity Therapy  Discharge Equipment Recommendations:  walker, rolling, wheelchair  Barriers to discharge:  None    Assessment:     Dorothy M Knobloch is a 89 y.o. female with a medical diagnosis of Periprosthetic fracture of shaft of femur.  She presents with Performance deficits affecting function are weakness, impaired self care skills, impaired balance, decreased coordination, decreased safety awareness, impaired endurance, gait instability, impaired functional mobility, decreased lower extremity function, impaired cardiopulmonary response to activity, edema. Pt presents in bed, agreeable to tx, A&O with no resting pain. She tolerates transition to EOB with reduced assistance of Judith x1, SBA only for seated EOB and scooting, but maintains limitations in STS with modA x2 to rise at RW and maintain balance t/o lateral and backwards steps completed. She has impaired vision and requires increased VC to efficiently complete SC tasks from tray table. Given deficits, pt remains a good candidate for moderate intensity post-acute care to address deficits impacting IND and safety in ADLs and functional mobility.     Rehab Prognosis:  Good; patient would benefit from acute skilled OT services to address these deficits and reach maximum level of function.       Plan:     Patient to be seen daily to address the above listed problems via self-care/home management, therapeutic exercises, therapeutic activities, neuromuscular re-education  Plan of Care Expires: 04/08/25  Plan of Care Reviewed with: patient    Subjective     Chief Complaint: Leg length discrepancy  Patient/Family Comments/goals: To regain IND  Pain/Comfort:  Pain Rating 1: 0/10  Location - Side 1: Right  Location - Orientation 1:  generalized  Location 1: hip  Pain Addressed 1: Reposition, Distraction, Cessation of Activity  Pain Rating Post-Intervention 1: 0/10    Objective:   Co-treatment performed due to patient's multiple deficits requiring two skilled therapists to appropriately and safely assess patient's strength and endurance while facilitating functional tasks in addition to accommodating for patient's activity tolerance.      Communicated with: Tony prior to session.  Patient found HOB elevated with FCD, telemetry, perineural catheter, oxygen upon OT entry to room.    General Precautions: Standard, fall    Orthopedic Precautions:RLE weight bearing as tolerated  Braces: N/A  Respiratory Status: Nasal cannula     Occupational Performance:     Bed Mobility:    Patient completed Scooting/Bridging with stand by assistance to EOB, total x2 to HOB  Patient completed Supine to Sit with minimum assistance  Patient completed Sit to Supine with minimum assistance   Sits SBA, able to shift weight    Functional Mobility/Transfers:  Patient completed Sit <> Stand Transfer with moderate assistance and of 2 persons  with  rolling walker   Functional Mobility: Pt requires modA x1 for static weight shifting, and modA x 2 for steps L and back at EOB with RW. Requires repeat cues for focused breathing t/o with exertional SOB even with grooming, but especially standing engagement. Skilled facilitation to weight shift and offload feet for advancement, see PT note for details.    Activities of Daily Living:  Grooming: minimum assistance to manage toothpaste d/t low contrast and visual deficits, otherwise setup and extra time for hand and facial hygiene  Lower Body Dressing: total assistance to manage footwear and SCDs  [facility] 6 Click ADL: 16    Treatment & Education:  Pt completes ~20x LAQ seated EOB, R to maximal range and L against resistance  Elevation provided to R LE during standing to minimize limb length discrepancy for improve upright posture,  tolerance, and performance  Pt re-educated on     -Education on energy conservation and task modification to maximize safety and (I) during ADLs completed and mobility  -Education on importance of OOB activity to improve overall activity tolerance and promote recovery  -Pt educated to call for assistance and to transfer with hospital staff only     Pt had no further questions & when asked whether there were any concerns pt reported none.     Patient left HOB elevated with all lines intact, call button in reach, and neice present    GOALS:   Multidisciplinary Problems       Occupational Therapy Goals          Problem: Occupational Therapy    Goal Priority Disciplines Outcome Interventions   Occupational Therapy Goal     OT, PT/OT Progressing    Description: Goals to be met by: 3/16/2025     Patient will increase functional independence with ADLs by performing:    UE Dressing with [place].  LE Dressing with Stand-by Assistance and Assistive Devices as needed.  Grooming while standing at sink with Supervision.  Toileting from bedside commode with Stand-by Assistance for hygiene and clothing management.   Supine to sit with Stand-by Assistance.  Step transfer with Stand-by Assistance  Toilet transfer to bedside commode with Stand-by Assistance.                         DME Justifications:  Dorothy Knobloch has a mobility limitation that significantly impairs her ability to participate in one or more mobility related activities of daily living (MRADLs) such as toileting, feeding, dressing, grooming, and bathing in customary locations in the home.  The mobility limitation cannot be sufficiently resolved by the use of a cane or walker.   The use of a manual wheelchair will significantly improve the patients ability to participate in MRADLS and the patient will use it on regular basis in the home.  Dorothy Knobloch has expressed her willingness to use a manual wheelchair in the home. Patients upper body strength is  sufficient for propulsion.  She also has a caregiver who is available, willing, and able to provide assistance with the wheelchair when needed.     Carol's mobility limitation cannot be sufficiently resolved by the use of a cane. Her functional mobility deficit can be sufficiently resolved with the use of a Rolling Walker. Patient's mobility limitation significantly impairs their ability to participate in one of more activities of daily living.  The use of a RW will significantly improve the patient's ability to participate in MRADLS and the patient will use it on regular basis in the home.    Time Tracking:     OT Date of Treatment: 03/11/25  OT Start Time: 1026  OT Stop Time: 1057  OT Total Time (min): 31 min    Billable Minutes:Self Care/Home Management 16  Therapeutic Activity 15    OT/CORBIN: OT          3/11/2025

## 2025-03-11 NOTE — ASSESSMENT & PLAN NOTE
On anticoagulant therapy   Patient has paroxysmal (<7 days) atrial fibrillation. Patient is currently in sinus rhythm. [id number] Score: 4. The patients heart rate in the last 24 hours is as follows:  Pulse  Min: 61  Max: 76     Antiarrhythmics  metoprolol succinate (TOPROL-XL) 24 hr tablet 25 mg, 2 times daily, Oral    Plan  - Replete lytes with a goal of K>4, Mg >2  - Patient is anticoagulated at home with Apixiban 5 mg po BID and held pre-op. Resumed post-op but at reduced dose of 2.5 mg po BID as per request of Orthopedics (Dr. William) as concerned about bleeding into surgical site and requesting decreased dose for 1 week and then can return back to home dosing of 5 mg po BID.   - Patient's afib is currently controlled. Patient in paced rhythm. Continue home Toprol XL for rate control.

## 2025-03-11 NOTE — PLAN OF CARE
[facility]     Department of Hospital Medicine     [address] [place] [address]     [phone] after hours  [phone] fax       NURSING HOME ORDERS    03/12/2025    Admit to [facility]:  Skilled Bed                                            Diagnoses:  Active Hospital Problems    Diagnosis  POA    *Periprosthetic fracture of shaft of right femur s/p ORIF on 3/8/2025 [M97.8XXA, Z96.649]  Yes     Priority: 1 - High    Acute blood loss anemia [D62]  Yes     Priority: 2     Paroxysmal atrial fibrillation [I48.0]  Yes     Priority: 3      Chronic    On anticoagulant therapy [Z79.01]  Not Applicable     Priority: 4     Essential hypertension [I10]  Yes     Priority: 5      Chronic    Pure hypercholesterolemia [E78.00]  Yes     Priority: 6     Chronic diastolic congestive heart failure [I50.32]  Yes     Priority: 7     Acquired hypothyroidism [E03.9]  Yes     Priority: 9     Cardiac pacemaker [Z95.0]  Yes     Priority: 10     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]  Yes     Priority: 11       Resolved Hospital Problems    Diagnosis Date Resolved POA    Other chest pain [R07.89] 03/11/2025 No     Priority: 2     Hypokalemia [E87.6] 03/09/2025 Yes     Priority: 8        Allergies:  Review of patient's allergies indicates:   Allergen Reactions    Aspartame Swelling     equal       Vitals: Every shift (Skilled Nursing patients)        Code Status: Full Code     Diet: regular diet      Supplement: House supplement, one can by mouth with meals                             Activities:   - Up in a chair each morning as tolerated   - Ambulate with assistance to bathroom   - May use walker, cane, or self-propelled wheelchair   - Weight bearing: Weight bear as tolerated to right lower extremity with walker; Range of motion as tolerated to right lower extremity     LABS: Per facility protocol    Nursing: Out of bed BID, Up with assistance    Nursing  Precautions:        - Fall precautions per nursing home protocol    CONSULTS:       Physical Therapy to evaluate and treat 5 times a week     Occupational Therapy to evaluate and treat 5 times a week         MISCELLANEOUS CARE:  Routine Skin for Bedridden Patients: Instruct patient/caregiver to apply moisture barrier cream to all skin folds and wet areas in perineal area daily and after baths and all bowel movements.      WOUND CARE ORDERS  Keep surgical dressings in place that was applied post-op and leave on right hip and thigh area and do not remove until Orthopedic clinic follow-up. Assess surgical dressing with each treatment. Call MD if any drainage reaches border to border of dressing horizontally, signs or symptoms of infection, temp >101 F, induration, swelling or redness.      Medications:        Medication List        START taking these medications      cefadroxil 500 MG Cap  Commonly known as: DURICEF  Take 1 capsule (500 mg total) by mouth every 12 (twelve) hours. Extended antibiotic prophylaxis after hip surgery with end date 3/15/2025.      melatonin 3 mg tablet  Commonly known as: MELATIN  Take 2 tablets (6 mg total) by mouth nightly as needed for Insomnia.     methocarbamoL 500 MG Tab  Commonly known as: Robaxin  Take 1 tablet (500 mg total) by mouth every 8 (eight) hours.     senna-docusate 8.6-50 mg 8.6-50 mg per tablet  Commonly known as: PERICOLACE  Take 1 tablet by mouth 2 (two) times daily.     traMADoL 50 mg tablet  Commonly known as: ULTRAM  Take 1 tablet (50 mg total) by mouth every 6 (six) hours as needed (Moderate to severe pain).            CHANGE how you take these medications      apixaban 5 mg Tab  Commonly known as: ELIQUIS  Patient to decrease dosing of Apixiban to 2.5 mg po twice daily for 1 week after surgery done on 3/8/2025 and then can resume back to her home dosing of 5 mg po BID on 3/15/2025.     brimonidine-timoloL 0.2-0.5 % Drop  Commonly known as: COMBIGAN  Place 1 drop  into the left eye 2 (two) times a day.            CONTINUE taking these medications      amLODIPine 5 MG tablet  Commonly known as: NORVASC  Take 1 tablet (5 mg total) by mouth once daily.     bumetanide 0.5 MG Tab  Commonly known as: BUMEX  Take 1 tablet by mouth once daily.     clopidogreL 75 mg tablet  Commonly known as: PLAVIX  Take 1 tablet (75 mg total) by mouth once daily.     EScitalopram oxalate 20 MG tablet  Commonly known as: LEXAPRO  Take 1 tablet (20 mg total) by mouth once daily.     hydroCHLOROthiazide 12.5 MG Tab  TAKE 1 TABLET BY MOUTH EVERY DAY.     levothyroxine 88 MCG tablet  Commonly known as: SYNTHROID  Take 88 mcg by mouth before breakfast.     metoprolol succinate 25 MG 24 hr tablet  Commonly known as: TOPROL-XL  TAKE 2 TABLETS BY MOUTH IN AM AND 1 TABLET BY MOUTH IN PM.     NITROSTAT 0.4 mg SL tablet  Generic drug: nitroGLYCERIN  Place 0.4 mg under the tongue every 5 (five) minutes as needed for Chest pain.            STOP taking these medications      acetaminophen 500 MG tablet  Commonly known as: TYLENOL     brinzolamide 1 % ophthalmic suspension  Commonly known as: AZOPT     cholecalciferol (vitamin D3) 1,250 mcg (50,000 unit) capsule     hydrocortisone 2.5 % cream     LIDOcaine 5 %  Commonly known as: LIDODERM     SALONPAS TOP                Follow-up:   Future Appointments   Date Time Provider Department Center   3/27/2025  8:30 AM Albino William MD [facility]       _________________________________  Grisel Liao MD  03/12/2025

## 2025-03-11 NOTE — ASSESSMENT & PLAN NOTE
Dorothy M Knobloch is a 89 y.o. female who presented with a right periprosthetic [place] C femur fracture; now  s/p ORIF (3/8/25)    - Pain: multimodal regimen limiting narcotics  - PT/OT: WBAT LLE  - AC: Eliquis 2.5 mg BID x 7 d and then return to home dose of 5 mg BID, Plavix 75 mg daily, FCDs at all times when not ambulating  - Abx: 24hr postop ancef completed, now on Cefadroxil protocol   - Drain: none  - Iqbal: dc'd POD1     » Dispo: Potential dc today pending medical stabilization

## 2025-03-11 NOTE — ASSESSMENT & PLAN NOTE
89 y/o female with PAF on long term Apixiban at home for anticoagulation, HTN, chronic diastolic heart failure, chronic anemia, cardiac pacemaker due to sick sinus syndrome and hypothyroidism presents to the ED after fall at home. Patient was walking to the door with her walker to open it for their sitter when she leaned her walker and subsequently fell over it. States she missed the door handle which caused her to fall. Patient noted immediate pain to her RLE and was unable to move it/ get up off the ground. EMS called and patient brought to ED where X-ray imaging of right leg revealed a closed periprosthetic right displaced femoral shaft fracture. Orthopedic surgery consult for closed periprosthetic right femoral shaft fracture in patient with previous right MARCEL. Patient was seen and evaluated by Orthopedic surgery who recommended operative repair of fracture.     - Patient taken to OR on 3/8/2025 and underwent right femur ORIF with long femoral plate and screws and cables to repair fracture by Dr. Albino William.   - Post-op, patient weight bear as tolerate to the left lower extremity as per Orthopedics recommendation.   - Patient restarted on home Apixiban post-op but at reduced dosing of 2.5 mg po BID as per request of Orthopedics (Dr. William) as concerned about bleeding into surgical site and requesting decreased dose for 1 week and then can return back to home dosing of 5 mg po BID after 1 week.   - Perineural pain catheter removed by Anesthesia on 3/11.   - Continue on multimodal pain management post-op with Tylenol 1000 mg po every 8 hours and Robaxin 500 mg po every 8 hours post-op and will continue as pain controlled.   - PT/OT consulted post-op and to worked with patient and recommending moderate intensity therapy on discharge when medically ready. Case management met with patient and her daughter and patient and SNF referrals sent. Patient has been accepted to [facility] for 3/12 if medically ready to  discharge.   - Patient completed IV Cefazolin for 24 hours post-op for antibiotic prophylaxis post-op and now on Cefadroxil 500 mg po BID for 7 days to extended antibiotic prophylaxis post-op to prevent post-op wound infection at surgical site.   - Surgical bandages to remain in place to right thigh and thigh area until Orthopedic clinic follow-up.   - Ortho following and managing surgical site. Appreciate assistance.

## 2025-03-11 NOTE — ADDENDUM NOTE
Addendum  created 03/11/25 1249 by Alexis Herrera MD    Charge Capture section accepted, Cosign clinical note with attestation

## 2025-03-11 NOTE — PROGRESS NOTES
"[facility] - [facility]  Orthopedics  Progress Note    Patient Name: Dorothy M Knobloch  MRN: [id number]  Admission Date: 3/7/2025  Hospital Length of Stay: 4 days  Attending Provider: Grisel Liao MD  Primary Care Provider: Lola Wolff MD  Follow-up For: Procedure(s) (LRB):  OPEN REDUCTION INTERNAL FIXATION FEMORAL SHAFT FRACTURE (Right)    Post-Operative Day: 3 Days Post-Op  Subjective:     Principal Problem:Periprosthetic fracture of shaft of femur    Principal Orthopedic Problem: as above; now s/p ORIF (3/8)    Interval History: Afebrile, VSS, NAEON.  Awake and comfortable this am. Pain has been reportedly well controlled. Pt states leg feels "heavy". Voiding and tolerating PO w/o issues. PT/OT today.          Review of patient's allergies indicates:   Allergen Reactions    Aspartame Swelling     equal       Current Facility-Administered Medications   Medication    0.9%  NaCl infusion (for blood administration)    acetaminophen tablet 1,000 mg    albuterol-ipratropium 2.5 mg-0.5 mg/3 mL nebulizer solution 3 mL    amLODIPine tablet 5 mg    apixaban tablet 2.5 mg    bisacodyL suppository 10 mg    bumetanide tablet 0.5 mg    cefadroxil capsule 500 mg    clopidogreL tablet 75 mg    dextrose 50% injection 12.5 g    dextrose 50% injection 25 g    EScitalopram oxalate tablet 20 mg    glucagon (human recombinant) injection 1 mg    glucose chewable tablet 16 g    glucose chewable tablet 24 g    hydroCHLOROthiazide tablet 12.5 mg    levothyroxine tablet 88 mcg    melatonin tablet 6 mg    methocarbamoL tablet 500 mg    metoprolol succinate (TOPROL-XL) 24 hr tablet 25 mg    nitroGLYCERIN SL tablet 0.4 mg    ondansetron injection 4 mg    polyethylene glycol packet 17 g    ropivacaine 0.2% Perineural Pump infusion 500 ML    senna-docusate 8.6-50 mg per tablet 1 tablet    sodium chloride 0.9% flush 10 mL    vitamin D 1000 units tablet 1,000 Units     Objective:     Vital Signs (Most Recent):  Temp: 97.5 °F (36.4 °C) " "(03/11/25 0801)  Pulse: 71 (03/11/25 0801)  Resp: 18 (03/11/25 0801)  BP: (!) 143/66 (03/11/25 0801)  SpO2: 98 % (03/11/25 0801) Vital Signs (24h Range):  Temp:  [96 °F (35.6 °C)-98.1 °F (36.7 °C)] 97.5 °F (36.4 °C)  Pulse:  [63-80] 71  Resp:  [16-18] 18  SpO2:  [93 %-98 %] 98 %  BP: (104-143)/(50-66) 143/66     Weight: 59 kg (130 lb 1.1 oz)  Height: 5' 2" (157.5 cm)  Body mass index is 23.79 kg/m².      Intake/Output Summary (Last 24 hours) at 3/11/2025 0809  Last data filed at 3/10/2025 1552  Gross per 24 hour   Intake 649.08 ml   Output --   Net 649.08 ml        Ortho/SPM Exam  Alert  NAD  Reg rate  No increased WOB    Right LE:  Surgical dressing with moderate strikethrough, intact   SILT T/SP/DP/Kaur/Sa  Motor intact T/SP/DP  WWP extremities  FCDs in place and functioning       Significant Labs: All pertinent labs within the past 24 hours have been reviewed.    Significant Imaging: I have reviewed all pertinent imaging results/findings.  Assessment/Plan:     * Periprosthetic fracture of shaft of right femur s/p ORIF on 3/8/2025  Dorothy M Knobloch is a 89 y.o. female who presented with a right periprosthetic [place] C femur fracture; now  s/p ORIF (3/8/25)    - Pain: multimodal regimen limiting narcotics  - PT/OT: WBAT LLE  - AC: Eliquis 2.5 mg BID x 7 d and then return to home dose of 5 mg BID, Plavix 75 mg daily, FCDs at all times when not ambulating  - Abx: 24hr postop ancef completed, now on Cefadroxil protocol   - Drain: none  - Iqbal: dc'd POD1     » Dispo: Potential dc today pending medical stabilization        KEERTHI Olivares MD  Orthopaedic Surgery   Resident Physician, PGY-1  03/11/2025    "

## 2025-03-11 NOTE — ASSESSMENT & PLAN NOTE
Patient with cardiac pacemaker in situ and has paced rhythm on telemetry. Pacemaker in place for sick sinus syndrome.

## 2025-03-11 NOTE — PROGRESS NOTES
[facility] Medicine  Progress Note    Patient Name: Dorothy M Knobloch  MRN: [id number]  Patient Class: IP- Inpatient   Admission Date: 3/7/2025  Length of Stay: 3 days  Attending Physician: Grisel Liao MD  Primary Care Provider: Lola Wolff MD        Subjective     Principal Problem:Periprosthetic fracture of shaft of femur        HPI:  Dorothy Knobloch is a 89 y.o. female with a hx of Afib, HTN, CHF, anemia an hypothyroidism presents to the ED s/p fall. Patient reports she was walking to the door with her walker to open it for their sitter when she leaned her walker and subsequently fell over it. States she missed the door handle which caused her to fall. Reports she hit her head twice on the floor but did not lose consciousness. She notes immediate pain to her RLE and was unable to move it/ get up off the ground. Reports she is usually ambulatory without difficulty with her walker. Last dose of her anticoagulation medication was yesterday. Denies fever, chills, chest pain, SOB, abdominal pain and urinary symptoms. Denies numbness and tingling.     ED: AF, VSS. K 3.4. CT Head w/o acute process. CT C spine showed no acute fracture. Xray femur showed femoral shaft fracture. Given tylenol and robaxin in the ED. Ortho consulted. Admitted to .     Overview/Hospital Course:  89 y/o female with PAF on long term Apixiban at home for anticoagulation, HTN, chronic diastolic heart failure, chronic anemia, cardiac pacemaker due to sick sinus syndrome and hypothyroidism presents to the ED after fall at home. Patient was walking to the door with her walker to open it for their sitter when she leaned her walker and subsequently fell over it. States she missed the door handle which caused her to fall. Reports she hit her head twice on the floor but did not lose consciousness. She notes immediate pain to her RLE and was unable to move it/ get up off the ground. EMS called and patient brought to ED  where X-ray imaging of right leg revealed a closed periprosthetic right displaced femoral shaft fracture.Orthopedic surgery consult for closed periprosthetic right femoral shaft fracture in patient with previous right MARCEL . Patient was seen and evaluated by Orthopedic surgery who recommended operative repair of fracture. Patient was medically optimized prior to surgery and was taken to OR after optimization on 3/8/2025. Patient underwent right femur  ORIF with long femoral plate and screws and cables to repair fracture by Dr. Albino William . Post-op patient WBAT to the left lower extremity as per Orthopedics recommendation. Patient restarted on her home Apixiban 5 mg po BID post-op that she takes for long term anticoagulation for her PAF so no other DVT prophylaxis needed post-op. Perineural pain catheter placed by Anesthesia Pain Service with continuous infusion of Ropivacaine to help with pain control post-op and Anesthesia Pain Service managing while patient in the hospital. Patient placed on multimodal pain management post-op with Tylenol 1000 mg po every 8 hours and Robaxin 500 mg po every 8 hours post-op and will continue. PT/OT consulted post-op. Patient placed on IV Cefazolin post-op for 24 hours for antibiotic prophylaxis post-op and to be followed by Cefadroxil 500 mg po BID for 7 days to extended antibiotic prophylaxis post-op to prevent post-op wound infection at surgical site.       Interval History: Patient reports pain to right leg as 1/10 at rest but 6-7/10 with movement. Patient's Hgb did not change as was 7.8 yesterday and transfused 1 unit of PRBCs and still 7.8 today so will transfuse another 1 unit of PRBCs today. Patent denies any further chest pain and last episode was yesterday morning. I updated patient and her daughter at bedside. Patient's potassium level also low today at 3.3 so replacing with oral potassium today. Sodium stable at 132 and was 133 yesterday. Creatinine stable at 0.6.  Patient requiring SNF placement on discharge and case management working on SNF placemen with patient and her daughter.     Review of Systems   Constitutional:  Negative for fever.   HENT:  Positive for hearing loss (Hard of hearing bilateral).    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    Cardiovascular:  Negative for chest pain.   Gastrointestinal:  Negative for nausea.   Musculoskeletal:  Positive for arthralgias (Right hip) and myalgias (Right thigh).   Psychiatric/Behavioral:  Negative for agitation and confusion.      Objective:     Vital Signs (Most Recent):  Temp: 98.1 °F (36.7 °C) (03/10/25 1913)  Pulse: 76 (03/10/25 1913)  Resp: 18 (03/10/25 1913)  BP: 121/58 (03/10/25 1913)  SpO2: 94 % (03/10/25 1913) on 2 liters of oxygen Vital Signs (24h Range):  Temp:  [96 °F (35.6 °C)-98.4 °F (36.9 °C)] 98.1 °F (36.7 °C)  Pulse:  [63-80] 76  Resp:  [16-18] 18  SpO2:  [92 %-98 %] 94 %  BP: (100-135)/(49-63) 121/58     Weight: 59 kg (130 lb 1.1 oz)  Body mass index is 23.79 kg/m².    Intake/Output Summary (Last 24 hours) at 3/10/2025 1920  Last data filed at 3/10/2025 1552  Gross per 24 hour   Intake 849.08 ml   Output 500 ml   Net 349.08 ml         Physical Exam  Vitals and nursing note reviewed.   Constitutional:       General: She is sleeping. She is not in acute distress.     Appearance: Normal appearance. She is normal weight. She is not ill-appearing.      Comments: Frail elderly female sitting up in bed in no distress. Patient appeared very comfortable on exam. Daughter at bedside. Patient oriented x 4.    Cardiovascular:      Rate and Rhythm: Normal rate and regular rhythm.      Heart sounds: Normal heart sounds. No murmur heard.  Pulmonary:      Effort: Pulmonary effort is normal. No respiratory distress.      Breath sounds: Normal breath sounds. No wheezing.   Abdominal:      General:  Abdomen is flat. Bowel sounds are normal. There is no distension.      Palpations: Abdomen is soft.      Tenderness: There is no abdominal tenderness.   Musculoskeletal:      Right lower leg: No edema.      Left lower leg: No edema.   Skin:     General: Skin is warm.      Findings: No erythema.      Comments: Surgical bandages in place all along right lateral thigh and hip area. Bandages are  clean and dry and intact.   Neurological:      Mental Status: She is oriented to person, place, and time and easily aroused.   Psychiatric:         Mood and Affect: Mood normal.         Behavior: Behavior normal. Behavior is cooperative.         Thought Content: Thought content normal.         Judgment: Judgment normal.               Significant Labs: BMP:   Recent Labs   Lab 03/10/25  0308   *   *   K 3.3*      CO2 25   BUN 39*   CREATININE 0.6   CALCIUM 8.6*     CBC:   Recent Labs   Lab 03/09/25  0341 03/10/25  0308   WBC 12.46 12.12   HGB 7.8* 7.8*   HCT 24.7* 23.7*    150       Significant Imaging: I have reviewed all pertinent imaging results/findings within the past 24 hours.      Assessment & Plan  Periprosthetic fracture of shaft of right femur s/p ORIF on 3/8/2025  89 y/o female with PAF on long term Apixiban at home for anticoagulation, HTN, chronic diastolic heart failure, chronic anemia, cardiac pacemaker due to sick sinus syndrome and hypothyroidism presents to the ED after fall at home. Patient was walking to the door with her walker to open it for their sitter when she leaned her walker and subsequently fell over it. States she missed the door handle which caused her to fall. Patient noted immediate pain to her RLE and was unable to move it/ get up off the ground. EMS called and patient brought to ED where X-ray imaging of right leg revealed a closed periprosthetic right displaced femoral shaft fracture. Orthopedic surgery consult for closed periprosthetic right femoral shaft fracture in  patient with previous right MARCEL. Patient was seen and evaluated by Orthopedic surgery who recommended operative repair of fracture.     - Patient taken to OR on 3/8/2025 and underwent right femur ORIF with long femoral plate and screws and cables to repair fracture by Dr. Albino William.   - Post-op, patient weight bear as tolerate to the left lower extremity as per Orthopedics recommendation.   - Patient restarted on home Apixiban post-op but at reduced dosing of 2.5 mg po BID as per request of Orthopedics (Dr. William) as concerned about bleeding into surgical site and requesting decreased dose for 1 week and then can return back to home dosing of 5 mg po BID after 1 week.   - Perineural pain catheter placed by Anesthesia Pain Service with continuous infusion of Ropivacaine to help with pain control post-op and Anesthesia Pain Service managing while patient in the hospital.   - Continue on multimodal pain management post-op with Tylenol 1000 mg po every 8 hours and Robaxin 500 mg po every 8 hours post-op and will continue as pain controlled.   - PT/OT consulted post-op and to worked with patient and recommending moderate intensity therapy on discharge when medically ready. Case management met with patient and her daughter on 3/10 and SNF referrals sent.   - Patient completed IV Cefazolin for 24 hours post-op for antibiotic prophylaxis post-op and now on Cefadroxil 500 mg po BID for 7 days to extended antibiotic prophylaxis post-op to prevent post-op wound infection at surgical site.   - Surgical bandages to remain in place to right thigh and thigh area until Orthopedic clinic follow-up.   - Ortho following and managing surgical site. Appreciate assistance.       Other chest pain  - Chest pain resolved on 3/10.   - Patient with episode of chest pain this am. Patient reports pain just above her pacemaker on her left chest wall. Pain was sharp and lasted < 5 minutes and did partially go down her left arm. No nausea or  SOB or diaphoresis associated with chest pain. Patient reports she has had chest pain in past and has SLNTG at home but rarely takes it. Patient had a similar episode previous night in hospital and EKG done and showed paced rhythm but no acute changes and Troponin checked and in normal range. Patient reports she is currently chest pain free. I restarted her home Plavix this am as has known cardiac history of cardiomyopathy, PAF and sick sinus syndrome with cardiac pacemaker in place. Patient followed by Cardiology as outpatient. Patient concerned it was an issue with her pacemaker but I reassured her I doubted related to her pacemaker. Monitor on telemetry and monitor for further chest pain. SLNTG ordered prn for chest pain. Hgb down to 7.8 this am from 8.7 yesterday. I spoke with patient;'s daughter at bedside and with patient at length this am and I suspect anemia may be playing a part in her chest pain as anemia likely causing some heart strain. I recommended to do a blood transfusion and transfuse 1 unit of PRBCs for symptomatic and patient in agreement and PRBCs ordered to be transfused today, 3/9.     Acute blood loss anemia  - Anemia unchanged on 3/10 and still with Hgb 7.8 on 3/10 after transfused 1 unit of PRBCs on 3/9 for Hgb 7.8 so to transfuse another 1 unit of PRBCs on 3/10.   - Anemia worsened on 3/9 and Hgb down to 7.8 from 8.7 on 3/8. Patient having chest pain and symptomatic in relation to her anemia and suspect anemia causing chest pain and heart strain so to be transfused 1 unit of PRBCs today, 3/9.   - Patient with Hgb 9.8 on admit and normally her Hgb is around 12 so suspect acute blood loss on admit related to blood loss from her significant right femur fracture. Hgb pre-op on 3/8 was 8.7 and transfused 1 unit of PRBCs intra-op on 3/8. Most recent hemoglobin and hematocrit are listed below.  Recent Labs     03/08/25  0301 03/08/25  1111 03/09/25  0341 03/10/25  0308   HGB 8.7*  --  7.8* 7.8*    HCT 26.3* 27* 24.7* 23.7*     Plan  - Monitor serial CBC: Daily  - Transfuse PRBC if patient becomes hemodynamically unstable, symptomatic or H/H drops below 7/21.  - Patient has received 1 units of PRBCs on 3/8/2025 intra-op. Patient received another 1 unit of PRBCs on 3/9 for symptomatic anemia with Hgb 7.8 with no improvement in Hgb so to receive another 1 unit of PRBCs on 3/10 for Hgb 7.8.   - Patient's anemia is currently worsening and patient symptomatic so will transfuse 1 unit of PRBCs on 3/10.     Paroxysmal atrial fibrillation  On anticoagulant therapy  On anticoagulant therapy   Patient has paroxysmal (<7 days) atrial fibrillation. Patient is currently in sinus rhythm. [id number] Score: 4. The patients heart rate in the last 24 hours is as follows:  Pulse  Min: 63  Max: 80     Antiarrhythmics  metoprolol succinate (TOPROL-XL) 24 hr tablet 25 mg, 2 times daily, Oral    Plan  - Replete lytes with a goal of K>4, Mg >2  - Patient is anticoagulated at home with Apixiban 5 mg po BID and held pre-op. Resumed post-op but at reduced dose of 2.5 mg po BID as per request of Orthopedics (Dr. William) as concerned about bleeding into surgical site and requesting decreased dose for 1 week and then can return back to home dosing of 5 mg po BID.   - Patient's afib is currently controlled. Patient in paced rhythm. Continue home Toprol XL for rate control.       Essential hypertension  Patient's blood pressure range in the last 24 hours was: BP  Min: 100/49  Max: 135/63.The patient's inpatient anti-hypertensive regimen is listed below:  Current Antihypertensives  amLODIPine tablet 5 mg, Daily, Oral  hydroCHLOROthiazide tablet 12.5 mg, Daily, Oral  metoprolol succinate (TOPROL-XL) 24 hr tablet 25 mg, 2 times daily, Oral  bumetanide tablet 0.5 mg, Daily, Oral  nitroGLYCERIN SL tablet 0.4 mg, Every 5 min PRN, Sublingual    Plan  - BP is controlled, no changes needed to their regimen.  - Goal BP < 140/90.    Pure  "hypercholesterolemia  Chronic and controlled and not on any outpatient therapy to treat. Patient unable to tolerate statin therapy and had reaction on Praluent and unable to tolerate also.     Chronic diastolic congestive heart failure  Patient has Diastolic (HFpEF) heart failure that is Chronic. On presentation their CHF was well compensated. Most recent BNP and echo results are listed below.  No results for input(s): "BNP" in the last 72 hours.  Latest ECHO  Results for orders placed during the hospital encounter of 06/20/24    Echo    Interpretation Summary    Left Ventricle: The left ventricle is normal in size. Normal wall thickness. There is concentric hypertrophy. There is normal systolic function with a visually estimated ejection fraction of 65 - 70%. Unable to assess diastolic function due to atrial fibrillation.    Right Ventricle: Normal right ventricular cavity size. Wall thickness is normal. Systolic function is normal.    Left Atrium: Left atrium is severely dilated.    Right Atrium: Right atrium is severely dilated.    Mitral Valve: There is moderate posterior leaflet sclerosis. There is no stenosis. The mean pressure gradient across the mitral valve is 1 mmHg at a heart rate of 60 bpm. There is moderate regurgitation.    Tricuspid Valve: There is moderate to severe regurgitation.    Pulmonic Valve: There is no stenosis.    Pulmonary Artery: There is moderate pulmonary hypertension. The estimated pulmonary artery systolic pressure is 60 mmHg.    IVC/SVC: Intermediate venous pressure at 8 mmHg.    Current Heart Failure Medications  hydroCHLOROthiazide tablet 12.5 mg, Daily, Oral  metoprolol succinate (TOPROL-XL) 24 hr tablet 25 mg, 2 times daily, Oral  bumetanide tablet 0.5 mg, Daily, Oral    Plan  - Monitor strict I&Os and daily weights.    - Monitor on telemetry  - Cardiac diet  - Cardiology has not been consulted.  - The patient's volume status is at their baseline.    Acquired hypothyroidism  - " Chronic and controlled. Continue home Synthroid to treat.     Cardiac pacemaker  Patient with cardiac pacemaker in situ and has paced rhythm on telemetry. Pacemaker in place for sick sinus syndrome.    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     VTE Risk Mitigation (From admission, onward)           Ordered     apixaban tablet 2.5 mg  2 times daily         03/09/25 1255     IP VTE HIGH RISK PATIENT  Once         03/08/25 0624     Place sequential compression device  Until discontinued         03/08/25 0624     Place sequential compression device  Until discontinued         03/07/25 1318     Place SHANEL hose  Until discontinued         03/07/25 1318     Reason for No Pharmacological VTE Prophylaxis  Once        Question:  Reasons:  Answer:  Risk of Bleeding    03/07/25 1318     Place sequential compression device  Until discontinued         03/07/25 1318                    Discharge Planning   ISH: 3/11/2025     Code Status: Full Code   Discharge Plan A: Skilled Nursing Facility        Grisel Liao MD  Department of Hospital Medicine   [facility] - Surgery

## 2025-03-11 NOTE — PLAN OF CARE
Problem: Skin Injury Risk Increased  Goal: Skin Health and Integrity  Outcome: Progressing     Problem: Infection  Goal: Absence of Infection Signs and Symptoms  Outcome: Progressing     Problem: Adult Inpatient Plan of Care  Goal: Plan of Care Review  Outcome: Progressing  Goal: Patient-Specific Goal (Individualized)  Outcome: Progressing

## 2025-03-11 NOTE — ASSESSMENT & PLAN NOTE
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 Patient on Combigan eye drops 1 drop[ BID to left eye as outpatient to treat. Continue Brimonidine and Timolol eye drops 1 drop in left eye BID in hospital.

## 2025-03-11 NOTE — SUBJECTIVE & OBJECTIVE
"Principal Problem:Periprosthetic fracture of shaft of femur    Principal Orthopedic Problem: as above; now s/p ORIF (3/8)    Interval History: Afebrile, VSS, NAEON.  Awake and comfortable this am. Pain has been reportedly well controlled. Pt states leg feels "heavy". Voiding and tolerating PO w/o issues. PT/OT today.          Review of patient's allergies indicates:   Allergen Reactions    Aspartame Swelling     equal       Current Facility-Administered Medications   Medication    0.9%  NaCl infusion (for blood administration)    acetaminophen tablet 1,000 mg    albuterol-ipratropium 2.5 mg-0.5 mg/3 mL nebulizer solution 3 mL    amLODIPine tablet 5 mg    apixaban tablet 2.5 mg    bisacodyL suppository 10 mg    bumetanide tablet 0.5 mg    cefadroxil capsule 500 mg    clopidogreL tablet 75 mg    dextrose 50% injection 12.5 g    dextrose 50% injection 25 g    EScitalopram oxalate tablet 20 mg    glucagon (human recombinant) injection 1 mg    glucose chewable tablet 16 g    glucose chewable tablet 24 g    hydroCHLOROthiazide tablet 12.5 mg    levothyroxine tablet 88 mcg    melatonin tablet 6 mg    methocarbamoL tablet 500 mg    metoprolol succinate (TOPROL-XL) 24 hr tablet 25 mg    nitroGLYCERIN SL tablet 0.4 mg    ondansetron injection 4 mg    polyethylene glycol packet 17 g    ropivacaine 0.2% Perineural Pump infusion 500 ML    senna-docusate 8.6-50 mg per tablet 1 tablet    sodium chloride 0.9% flush 10 mL    vitamin D 1000 units tablet 1,000 Units     Objective:     Vital Signs (Most Recent):  Temp: 97.5 °F (36.4 °C) (03/11/25 0801)  Pulse: 71 (03/11/25 0801)  Resp: 18 (03/11/25 0801)  BP: (!) 143/66 (03/11/25 0801)  SpO2: 98 % (03/11/25 0801) Vital Signs (24h Range):  Temp:  [96 °F (35.6 °C)-98.1 °F (36.7 °C)] 97.5 °F (36.4 °C)  Pulse:  [63-80] 71  Resp:  [16-18] 18  SpO2:  [93 %-98 %] 98 %  BP: (104-143)/(50-66) 143/66     Weight: 59 kg (130 lb 1.1 oz)  Height: 5' 2" (157.5 cm)  Body mass index is 23.79 " kg/m².      Intake/Output Summary (Last 24 hours) at 3/11/2025 0809  Last data filed at 3/10/2025 1552  Gross per 24 hour   Intake 649.08 ml   Output --   Net 649.08 ml        Ortho/SPM Exam  Alert  NAD  Reg rate  No increased WOB    Right LE:  Surgical dressing with moderate strikethrough, intact   SILT T/SP/DP/Kaur/Sa  Motor intact T/SP/DP  WWP extremities  FCDs in place and functioning       Significant Labs: All pertinent labs within the past 24 hours have been reviewed.    Significant Imaging: I have reviewed all pertinent imaging results/findings.

## 2025-03-11 NOTE — ASSESSMENT & PLAN NOTE
- Anemia improved on 3/11. Hgb up to 9.8 on 3/11 after transfused 1 unit of PRBCs on 3/10 fro Hgb 7.8.   - Anemia unchanged on 3/10 and still with Hgb 7.8 on 3/10 after transfused 1 unit of PRBCs on 3/9 for Hgb 7.8 so to transfuse another 1 unit of PRBCs on 3/10.   - Anemia worsened on 3/9 and Hgb down to 7.8 from 8.7 on 3/8. Patient having chest pain and symptomatic in relation to her anemia and suspect anemia causing chest pain and heart strain so to be transfused 1 unit of PRBCs today, 3/9.   - Patient with Hgb 9.8 on admit and normally her Hgb is around 12 so suspect acute blood loss on admit related to blood loss from her significant right femur fracture. Hgb pre-op on 3/8 was 8.7 and transfused 1 unit of PRBCs intra-op on 3/8. Most recent hemoglobin and hematocrit are listed below.  Recent Labs     03/09/25  0341 03/10/25  0308 03/11/25  0803   HGB 7.8* 7.8* 9.8*   HCT 24.7* 23.7* 29.7*     Plan  - Monitor serial CBC: Daily  - Transfuse PRBC if patient becomes hemodynamically unstable, symptomatic or H/H drops below 7/21.  - Patient received 1 units of PRBCs on 3/8/2025 intra-op. Patient received another 1 unit of PRBCs on 3/9 for symptomatic anemia with Hgb 7.8 with no improvement in Hgb so receive another 1 unit of PRBCs on 3/10 for Hgb 7.8. Hgb improved to 9.8 on 3/11.   - Patient's anemia is currently improving.

## 2025-03-11 NOTE — PT/OT/SLP PROGRESS
Physical Therapy Treatment    Patient Name:  Dorothy M Knobloch   MRN:  [id number]    Recommendations:     Discharge Recommendations: Moderate Intensity Therapy  Discharge Equipment Recommendations: walker, rolling, wheelchair  Barriers to discharge: Decreased caregiver support    Assessment:     Dorothy M Knobloch is a 89 y.o. female admitted with a medical diagnosis of Periprosthetic fracture of shaft of femur.  She presents with the following impairments/functional limitations: weakness, impaired endurance, impaired self care skills, impaired functional mobility, gait instability, impaired balance, decreased lower extremity function, decreased safety awareness, pain, decreased ROM, edema, impaired cardiopulmonary response to activity, orthopedic precautions .    Rehab Prognosis: Good; patient would benefit from acute skilled PT services to address these deficits and reach maximum level of function.    Recent Surgery: Procedure(s) (LRB):  OPEN REDUCTION INTERNAL FIXATION FEMORAL SHAFT FRACTURE (Right) 3 Days Post-Op    Plan:     During this hospitalization, patient to be seen daily to address the identified rehab impairments via gait training, therapeutic activities, therapeutic exercises, neuromuscular re-education and progress toward the following goals:    Plan of Care Expires:  03/30/25    Subjective     Chief Complaint: pt agreeable to session   Patient/Family Comments/goals: to get better   Pain/Comfort:  Pain Rating 1: 0/10      Objective:     Communicated with RN prior to session.  Patient found HOB elevated with FCD, telemetry, perineural catheter, oxygen upon PT entry to room.     General Precautions: Standard, fall  Orthopedic Precautions: RLE weight bearing as tolerated  Braces: N/A  Respiratory Status: nasal cannula     Functional Mobility:  Bed Mobility:     Supine to Sit: minimum assistance  Sit to Supine: minimum assistance  Transfers:     Sit to Stand:  moderate assistance and of 2 persons with  rolling walker  Gait: 2 steps with RW and mod A of 2 persons for balance, AD management and cueing for LE placement.   Weight shifting: mod A for lateral shift      AM-PAC 6 CLICK MOBILITY  Turning over in bed (including adjusting bedclothes, sheets and blankets)?: 3  Sitting down on and standing up from a chair with arms (e.g., wheelchair, bedside commode, etc.): 2  Moving from lying on back to sitting on the side of the bed?: 3  Moving to and from a bed to a chair (including a wheelchair)?: 1  Need to walk in hospital room?: 1  Climbing 3-5 steps with a railing?: 1  Basic Mobility Total Score: 11       Treatment & Education:  Folded blanket utilized to even leg length discrepancy. Pt requires constant cueing for breathing techniques due to pt holding breath, mouth breathing and hyperventilating.   Bedside table in front of patient and area set up for function, convenience, and safety. RN aware of patient's mobility needs and status. Questions/concerns addressed within PTA scope of practice; patient  with no further questions. Time was provided for active listening, discussion of health disposition, and discussion of safe discharge.  OT present for cotreat due to pt's multiple medical comorbidities and functional/cognition deficits requiring two skilled therapists to appropriately progress pt's musculoskeletal strength, neuromuscular control, and endurance while taking into consideration medical acuity and pt safety.       Patient left HOB elevated with all lines intact and call button in reach..    GOALS:   Multidisciplinary Problems       Physical Therapy Goals          Problem: Physical Therapy    Goal Priority Disciplines Outcome Interventions   Physical Therapy Goal     PT, PT/OT Progressing    Description: Goals to be met by: 3/30/2025     Patient will increase functional independence with mobility by performin. Supine to sit with Supervision  2. Sit to supine with Supervision   3. Sit to stand  transfer with Stand-by Assistance  4. Bed to chair transfer with Contact Guard Assistance using LRAD  5. Gait  x 50 feet with Stand-by Assistance using LRAD.                          DME Justifications:  Dorothy Knobloch has a mobility limitation that significantly impairs her ability to participate in one or more mobility related activities of daily living (MRADLs) such as toileting, feeding, dressing, grooming, and bathing in customary locations in the home.  The mobility limitation cannot be sufficiently resolved by the use of a cane or walker.   The use of a manual wheelchair will significantly improve the patients ability to participate in MRADLS and the patient will use it on regular basis in the home.  Dorothy Knobloch has expressed her willingness to use a manual wheelchair in the home. Patients upper body strength is sufficient for propulsion.  She also has a caregiver who is available, willing, and able to provide assistance with the wheelchair when needed.     Carol's mobility limitation cannot be sufficiently resolved by the use of a cane. Her functional mobility deficit can be sufficiently resolved with the use of a Rolling Walker. Patient's mobility limitation significantly impairs their ability to participate in one of more activities of daily living.  The use of a RW will significantly improve the patient's ability to participate in MRADLS and the patient will use it on regular basis in the home.    Time Tracking:     PT Received On: 03/11/25  PT Start Time: 1026     PT Stop Time: 1057  PT Total Time (min): 31 min     Billable Minutes: Gait Training 15 and Neuromuscular Re-education 17    Treatment Type: Treatment  PT/PTA: PTA     Number of PTA visits since last PT visit: 2     03/11/2025

## 2025-03-11 NOTE — SUBJECTIVE & OBJECTIVE
Interval History: Patient reports pain to right leg as 1/10 at rest but 6-7/10 with movement. Patient's Hgb did not change as was 7.8 yesterday and transfused 1 unit of PRBCs and still 7.8 today so will transfuse another 1 unit of PRBCs today. Patent denies any further chest pain and last episode was yesterday morning. I updated patient and her daughter at bedside. Patient's potassium level also low today at 3.3 so replacing with oral potassium today. Sodium stable at 132 and was 133 yesterday. Creatinine stable at 0.6. Patient requiring SNF placement on discharge and case management working on SNF placemen with patient and her daughter.     Review of Systems   Constitutional:  Negative for fever.   HENT:  Positive for hearing loss (Hard of hearing bilateral).    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    Cardiovascular:  Negative for chest pain.   Gastrointestinal:  Negative for nausea.   Musculoskeletal:  Positive for arthralgias (Right hip) and myalgias (Right thigh).   Psychiatric/Behavioral:  Negative for agitation and confusion.      Objective:     Vital Signs (Most Recent):  Temp: 98.1 °F (36.7 °C) (03/10/25 1913)  Pulse: 76 (03/10/25 1913)  Resp: 18 (03/10/25 1913)  BP: 121/58 (03/10/25 1913)  SpO2: 94 % (03/10/25 1913) on 2 liters of oxygen Vital Signs (24h Range):  Temp:  [96 °F (35.6 °C)-98.4 °F (36.9 °C)] 98.1 °F (36.7 °C)  Pulse:  [63-80] 76  Resp:  [16-18] 18  SpO2:  [92 %-98 %] 94 %  BP: (100-135)/(49-63) 121/58     Weight: 59 kg (130 lb 1.1 oz)  Body mass index is 23.79 kg/m².    Intake/Output Summary (Last 24 hours) at 3/10/2025 1920  Last data filed at 3/10/2025 1552  Gross per 24 hour   Intake 849.08 ml   Output 500 ml   Net 349.08 ml         Physical Exam  Vitals and nursing note reviewed.   Constitutional:       General: She is sleeping. She is not in acute distress.      Appearance: Normal appearance. She is normal weight. She is not ill-appearing.      Comments: Frail elderly female sitting up in bed in no distress. Patient appeared very comfortable on exam. Daughter at bedside. Patient oriented x 4.    Cardiovascular:      Rate and Rhythm: Normal rate and regular rhythm.      Heart sounds: Normal heart sounds. No murmur heard.  Pulmonary:      Effort: Pulmonary effort is normal. No respiratory distress.      Breath sounds: Normal breath sounds. No wheezing.   Abdominal:      General: Abdomen is flat. Bowel sounds are normal. There is no distension.      Palpations: Abdomen is soft.      Tenderness: There is no abdominal tenderness.   Musculoskeletal:      Right lower leg: No edema.      Left lower leg: No edema.   Skin:     General: Skin is warm.      Findings: No erythema.      Comments: Surgical bandages in place all along right lateral thigh and hip area. Bandages are  clean and dry and intact.   Neurological:      Mental Status: She is oriented to person, place, and time and easily aroused.   Psychiatric:         Mood and Affect: Mood normal.         Behavior: Behavior normal. Behavior is cooperative.         Thought Content: Thought content normal.         Judgment: Judgment normal.               Significant Labs: BMP:   Recent Labs   Lab 03/10/25  0308   *   *   K 3.3*      CO2 25   BUN 39*   CREATININE 0.6   CALCIUM 8.6*     CBC:   Recent Labs   Lab 03/09/25  0341 03/10/25  0308   WBC 12.46 12.12   HGB 7.8* 7.8*   HCT 24.7* 23.7*    150       Significant Imaging: I have reviewed all pertinent imaging results/findings within the past 24 hours.

## 2025-03-11 NOTE — ANESTHESIA POST-OP PAIN MANAGEMENT
Acute Pain Service Progress Note    Dorothy M Knobloch is a 89 y.o., female, [id number].    Surgery:  Femoral shaft ORIF R    Post Op Day #: 3    Catheter type: perineural  femoral    Infusion type: Ropivacaine 0.2%  0.5 mL/hr basal    Problem List:    Active Hospital Problems    Diagnosis  POA    *Periprosthetic fracture of shaft of right femur s/p ORIF on 3/8/2025 [M97.8XXA, Z96.649]  Yes    Other chest pain [R07.89]  No    Acute blood loss anemia [D62]  Yes    On anticoagulant therapy [Z79.01]  Not Applicable    Chronic diastolic congestive heart failure [I50.32]  Yes    Paroxysmal atrial fibrillation [I48.0]  Yes     Chronic    Cardiac pacemaker [Z95.0]  Yes    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]  Yes    Essential hypertension [I10]  Yes     Chronic    Pure hypercholesterolemia [E78.00]  Yes    Acquired hypothyroidism [E03.9]  Yes      Resolved Hospital Problems    Diagnosis Date Resolved POA    Hypokalemia [E87.6] 03/09/2025 Yes       Subjective:     General appearance of alert, oriented, no complaints   Pain with rest: 1    Numbers   Pain with movement: 3    Numbers   Side Effects    1. Pruritis No    2. Nausea No    3. Motor Blockade No, 1=Ability to bend knees and ankles    4. Sedation No, 1=awake and alert    Objective:     Catheter site clean, dry, intact      Vitals   Vitals:    03/11/25 0801   BP: (!) 143/66   Pulse: 71   Resp: 18   Temp: 36.4 °C (97.5 °F)        Labs    No results displayed because visit has over 200 results.           Meds Current Medications[1]     Anticoagulant dose Eliquis at 5 mg BID.    Assessment:     Pain control adequate    Plan:     Patient doing well, continue present treatment. APS will sign off at this time. Please reach out for any questions.     D/C P[facility]  Continue tylenol 1000 mg Q8H  Continue methocarbamol 500 mg Q8H     Remigio Zamarripa, DO  [facility]                     [1]   Current Facility-Administered Medications   Medication Dose Route Frequency Provider Last Rate Last Admin     acetaminophen tablet 1,000 mg  1,000 mg Oral Q8H Shagufta Cordero PA-C   1,000 mg at 03/11/25 0529    albuterol-ipratropium 2.5 mg-0.5 mg/3 mL nebulizer solution 3 mL  3 mL Nebulization Q4H PRN Shagufta Cordero PA-C        amLODIPine tablet 5 mg  5 mg Oral Daily Shagufta Cordero PA-C   5 mg at 03/11/25 0856    apixaban tablet 2.5 mg  2.5 mg Oral BID Grisel Liao MD   2.5 mg at 03/11/25 0900    bisacodyL suppository 10 mg  10 mg Rectal Daily PRN Shagufta Cordero PA-C        bumetanide tablet 0.5 mg  0.5 mg Oral Daily Shagufta Cordero PA-C   0.5 mg at 03/11/25 0856    cefadroxil capsule 500 mg  500 mg Oral Q12H Kay Ramirez MD   500 mg at 03/11/25 0856    clopidogreL tablet 75 mg  75 mg Oral Daily Grisel Liao MD   75 mg at 03/11/25 0857    dextrose 50% injection 12.5 g  12.5 g Intravenous PRN Shagufta Cordero PA-C        dextrose 50% injection 25 g  25 g Intravenous PRN Shagufta Cordero PA-C        EScitalopram oxalate tablet 20 mg  20 mg Oral Daily Shagufta Cordero PA-C   20 mg at 03/11/25 0857    glucagon (human recombinant) injection 1 mg  1 mg Intramuscular PRN Shagufta Cordero PA-C        glucose chewable tablet 16 g  16 g Oral PRN Shagufta Cordero PA-C        glucose chewable tablet 24 g  24 g Oral PRN Shagufta Cordero PA-C        hydroCHLOROthiazide tablet 12.5 mg  12.5 mg Oral Daily Shagufta Cordero PA-C   12.5 mg at 03/11/25 0859    levothyroxine tablet 88 mcg  88 mcg Oral Before breakfast Shagufta Cordero PA-C   88 mcg at 03/11/25 0529    melatonin tablet 6 mg  6 mg Oral Nightly PRN Per Frye MD   6 mg at 03/09/25 2221    methocarbamoL tablet 500 mg  500 mg Oral Q8H Grisel Liao MD   500 mg at 03/11/25 0529    metoprolol succinate (TOPROL-XL) 24 hr tablet 25 mg  25 mg Oral BID Shagufta Cordero PA-C   25 mg at 03/11/25 0859    nitroGLYCERIN SL tablet 0.4 mg  0.4 mg Sublingual Q5 Min PRN Grisel Liao MD         ondansetron injection 4 mg  4 mg Intravenous Q8H PRN Grisel Liao MD   4 mg at 03/10/25 0809    polyethylene glycol packet 17 g  17 g Oral Daily Grisel Liao MD   17 g at 03/11/25 0900    ropivacaine 0.2% Perineural Pump infusion 500 ML   Perineural Continuous Alexis Herrera MD   New Bag at 03/08/25 1411    senna-docusate 8.6-50 mg per tablet 1 tablet  1 tablet Oral BID Grisel Liao MD   1 tablet at 03/11/25 0857    sodium chloride 0.9% flush 10 mL  10 mL Intravenous PRN Per Frye MD        vitamin D 1000 units tablet 1,000 Units  1,000 Units Oral Daily ZEESHAN Lanza MD   1,000 Units at 03/11/25 0856

## 2025-03-11 NOTE — ASSESSMENT & PLAN NOTE
On anticoagulant therapy   Patient has paroxysmal (<7 days) atrial fibrillation. Patient is currently in sinus rhythm. [id number] Score: 4. The patients heart rate in the last 24 hours is as follows:  Pulse  Min: 63  Max: 80     Antiarrhythmics  metoprolol succinate (TOPROL-XL) 24 hr tablet 25 mg, 2 times daily, Oral    Plan  - Replete lytes with a goal of K>4, Mg >2  - Patient is anticoagulated at home with Apixiban 5 mg po BID and held pre-op. Resumed post-op but at reduced dose of 2.5 mg po BID as per request of Orthopedics (Dr. William) as concerned about bleeding into surgical site and requesting decreased dose for 1 week and then can return back to home dosing of 5 mg po BID.   - Patient's afib is currently controlled. Patient in paced rhythm. Continue home Toprol XL for rate control.

## 2025-03-12 LAB
ANION GAP SERPL CALC-SCNC: 6 MMOL/L (ref 8–16)
BASOPHILS # BLD AUTO: 0.06 K/UL (ref 0–0.2)
BASOPHILS NFR BLD: 0.6 % (ref 0–1.9)
BUN SERPL-MCNC: 29 MG/DL (ref 10–30)
CALCIUM SERPL-MCNC: 8.7 MG/DL (ref 8.7–10.5)
CHLORIDE SERPL-SCNC: 100 MMOL/L (ref 95–110)
CO2 SERPL-SCNC: 27 MMOL/L (ref 23–29)
CREAT SERPL-MCNC: 0.6 MG/DL (ref 0.5–1.4)
DIFFERENTIAL METHOD BLD: ABNORMAL
EOSINOPHIL # BLD AUTO: 0.3 K/UL (ref 0–0.5)
EOSINOPHIL NFR BLD: 2.7 % (ref 0–8)
ERYTHROCYTE [DISTWIDTH] IN BLOOD BY AUTOMATED COUNT: 14.8 % (ref 11.5–14.5)
EST. GFR  (NO RACE VARIABLE): >60 ML/MIN/1.73 M^2
GLUCOSE SERPL-MCNC: 91 MG/DL (ref 70–110)
HCT VFR BLD AUTO: 28 % (ref 37–48.5)
HGB BLD-MCNC: 9 G/DL (ref 12–16)
IMM GRANULOCYTES # BLD AUTO: 0.27 K/UL (ref 0–0.04)
IMM GRANULOCYTES NFR BLD AUTO: 2.9 % (ref 0–0.5)
LYMPHOCYTES # BLD AUTO: 1.3 K/UL (ref 1–4.8)
LYMPHOCYTES NFR BLD: 13.9 % (ref 18–48)
MCH RBC QN AUTO: 30.8 PG (ref 27–31)
MCHC RBC AUTO-ENTMCNC: 32.1 G/DL (ref 32–36)
MCV RBC AUTO: 96 FL (ref 82–98)
MONOCYTES # BLD AUTO: 0.7 K/UL (ref 0.3–1)
MONOCYTES NFR BLD: 7.7 % (ref 4–15)
NEUTROPHILS # BLD AUTO: 6.8 K/UL (ref 1.8–7.7)
NEUTROPHILS NFR BLD: 72.2 % (ref 38–73)
NRBC BLD-RTO: 0 /100 WBC
PLATELET # BLD AUTO: 181 K/UL (ref 150–450)
PMV BLD AUTO: 9.7 FL (ref 9.2–12.9)
POTASSIUM SERPL-SCNC: 3.9 MMOL/L (ref 3.5–5.1)
RBC # BLD AUTO: 2.92 M/UL (ref 4–5.4)
SODIUM SERPL-SCNC: 133 MMOL/L (ref 136–145)
WBC # BLD AUTO: 9.35 K/UL (ref 3.9–12.7)

## 2025-03-12 PROCEDURE — 21400001 HC TELEMETRY ROOM

## 2025-03-12 PROCEDURE — 36415 COLL VENOUS BLD VENIPUNCTURE: CPT | Performed by: INTERNAL MEDICINE

## 2025-03-12 PROCEDURE — 97116 GAIT TRAINING THERAPY: CPT

## 2025-03-12 PROCEDURE — 97535 SELF CARE MNGMENT TRAINING: CPT

## 2025-03-12 PROCEDURE — 25000003 PHARM REV CODE 250

## 2025-03-12 PROCEDURE — 97530 THERAPEUTIC ACTIVITIES: CPT

## 2025-03-12 PROCEDURE — 80048 BASIC METABOLIC PNL TOTAL CA: CPT | Performed by: INTERNAL MEDICINE

## 2025-03-12 PROCEDURE — 85025 COMPLETE CBC W/AUTO DIFF WBC: CPT | Performed by: INTERNAL MEDICINE

## 2025-03-12 PROCEDURE — 25000003 PHARM REV CODE 250: Performed by: INTERNAL MEDICINE

## 2025-03-12 PROCEDURE — 25000003 PHARM REV CODE 250: Performed by: STUDENT IN AN ORGANIZED HEALTH CARE EDUCATION/TRAINING PROGRAM

## 2025-03-12 PROCEDURE — 97110 THERAPEUTIC EXERCISES: CPT

## 2025-03-12 RX ADMIN — TIMOLOL MALEATE 1 DROP: 5 SOLUTION OPHTHALMIC at 08:03

## 2025-03-12 RX ADMIN — METOPROLOL SUCCINATE 25 MG: 25 TABLET, EXTENDED RELEASE ORAL at 08:03

## 2025-03-12 RX ADMIN — LEVOTHYROXINE SODIUM 88 MCG: 50 TABLET ORAL at 05:03

## 2025-03-12 RX ADMIN — ESCITALOPRAM OXALATE 20 MG: 20 TABLET ORAL at 08:03

## 2025-03-12 RX ADMIN — BRIMONIDINE TARTRATE 1 DROP: 1.5 SOLUTION OPHTHALMIC at 08:03

## 2025-03-12 RX ADMIN — BUMETANIDE 0.5 MG: 0.5 TABLET ORAL at 08:03

## 2025-03-12 RX ADMIN — CLOPIDOGREL BISULFATE 75 MG: 75 TABLET ORAL at 08:03

## 2025-03-12 RX ADMIN — SENNOSIDES AND DOCUSATE SODIUM 1 TABLET: 50; 8.6 TABLET ORAL at 08:03

## 2025-03-12 RX ADMIN — AMLODIPINE BESYLATE 5 MG: 5 TABLET ORAL at 08:03

## 2025-03-12 RX ADMIN — METOPROLOL SUCCINATE 25 MG: 25 TABLET, EXTENDED RELEASE ORAL at 09:03

## 2025-03-12 RX ADMIN — METHOCARBAMOL 500 MG: 500 TABLET ORAL at 05:03

## 2025-03-12 RX ADMIN — HYDROCHLOROTHIAZIDE 12.5 MG: 12.5 TABLET ORAL at 08:03

## 2025-03-12 RX ADMIN — ACETAMINOPHEN 1000 MG: 500 TABLET ORAL at 05:03

## 2025-03-12 RX ADMIN — CEFADROXIL 500 MG: 500 CAPSULE ORAL at 08:03

## 2025-03-12 RX ADMIN — METHOCARBAMOL 500 MG: 500 TABLET ORAL at 08:03

## 2025-03-12 RX ADMIN — APIXABAN 2.5 MG: 2.5 TABLET, FILM COATED ORAL at 08:03

## 2025-03-12 RX ADMIN — ACETAMINOPHEN 1000 MG: 500 TABLET ORAL at 08:03

## 2025-03-12 RX ADMIN — ACETAMINOPHEN 1000 MG: 500 TABLET ORAL at 02:03

## 2025-03-12 RX ADMIN — METHOCARBAMOL 500 MG: 500 TABLET ORAL at 02:03

## 2025-03-12 RX ADMIN — Medication 1000 UNITS: at 08:03

## 2025-03-12 NOTE — ASSESSMENT & PLAN NOTE
Patient's blood pressure range in the last 24 hours was: BP  Min: 117/55  Max: 138/62.The patient's inpatient anti-hypertensive regimen is listed below:  Current Antihypertensives  amLODIPine tablet 5 mg, Daily, Oral  hydroCHLOROthiazide tablet 12.5 mg, Daily, Oral  metoprolol succinate (TOPROL-XL) 24 hr tablet 25 mg, 2 times daily, Oral  bumetanide tablet 0.5 mg, Daily, Oral  nitroGLYCERIN SL tablet 0.4 mg, Every 5 min PRN, Sublingual  timolol maleate 0.5% ophthalmic solution 1 drop, 2 times daily, Left Eye  , 2 times daily, Left Eye    Plan  - BP is controlled, no changes needed to their regimen.  - Goal BP < 140/90.

## 2025-03-12 NOTE — PLAN OF CARE
Problem: Skin Injury Risk Increased  Goal: Skin Health and Integrity  Outcome: Progressing     Problem: Adult Inpatient Plan of Care  Goal: Plan of Care Review  Outcome: Progressing     Problem: Adult Inpatient Plan of Care  Goal: Optimal Comfort and Wellbeing  Outcome: Progressing     Problem: Wound  Goal: Optimal Coping  Outcome: Progressing     Problem: Wound  Goal: Optimal Pain Control and Function  Outcome: Progressing     Problem: Fall Injury Risk  Goal: Absence of Fall and Fall-Related Injury  Outcome: Progressing     Problem: Orthopaedic Fracture  Goal: Optimal Functional Ability  Outcome: Progressing   Patient AAOX4, lying supine in bed. NAD noted on 1 liter nasal cannula. Right femur fracture dressing, clean, dry, intact. 20g PIV to left upper arm no redness, swelling or discoloration. Purewick in place. No complaints or concerns voiced at this time. Safety measures in place.

## 2025-03-12 NOTE — SUBJECTIVE & OBJECTIVE
"Principal Problem:Periprosthetic fracture of shaft of femur    Principal Orthopedic Problem: as above; now s/p ORIF (3/8)    Interval History: Afebrile, VSS, NAEON.  Awake and comfortable this am. Pain has been reportedly well controlled. Pt states leg feels "heavy". Voiding and tolerating PO w/o issues. Pt reports BM yesterday. Walked "2 steps with RW and mod A of 2 persons for balance" with PT/OT yesterday. Hgb 9 this am.          Review of patient's allergies indicates:   Allergen Reactions    Aspartame Swelling     equal       Current Facility-Administered Medications   Medication    acetaminophen tablet 1,000 mg    albuterol-ipratropium 2.5 mg-0.5 mg/3 mL nebulizer solution 3 mL    amLODIPine tablet 5 mg    apixaban tablet 2.5 mg    bisacodyL suppository 10 mg    brimonidine 0.15 % OPTH DROP ophthalmic solution 1 drop    bumetanide tablet 0.5 mg    cefadroxil capsule 500 mg    clopidogreL tablet 75 mg    dextrose 50% injection 12.5 g    dextrose 50% injection 25 g    EScitalopram oxalate tablet 20 mg    glucagon (human recombinant) injection 1 mg    glucose chewable tablet 16 g    glucose chewable tablet 24 g    hydroCHLOROthiazide tablet 12.5 mg    levothyroxine tablet 88 mcg    melatonin tablet 6 mg    methocarbamoL tablet 500 mg    metoprolol succinate (TOPROL-XL) 24 hr tablet 25 mg    nitroGLYCERIN SL tablet 0.4 mg    ondansetron injection 4 mg    polyethylene glycol packet 17 g    senna-docusate 8.6-50 mg per tablet 1 tablet    sodium chloride 0.9% flush 10 mL    timolol maleate 0.5% ophthalmic solution 1 drop    vitamin D 1000 units tablet 1,000 Units     Objective:     Vital Signs (Most Recent):  Temp: 97.7 °F (36.5 °C) (03/12/25 0444)  Pulse: 62 (03/12/25 0509)  Resp: 18 (03/12/25 0444)  BP: (!) 131/59 (03/12/25 0444)  SpO2: 96 % (03/12/25 0444) Vital Signs (24h Range):  Temp:  [97.5 °F (36.4 °C)-97.8 °F (36.6 °C)] 97.7 °F (36.5 °C)  Pulse:  [59-71] 62  Resp:  [18] 18  SpO2:  [92 %-98 %] 96 %  BP: " "(105-143)/(51-66) 131/59     Weight: 59 kg (130 lb 1.1 oz)  Height: 5' 2" (157.5 cm)  Body mass index is 23.79 kg/m².      Intake/Output Summary (Last 24 hours) at 3/12/2025 0612  Last data filed at 3/11/2025 1814  Gross per 24 hour   Intake 702 ml   Output 600 ml   Net 102 ml        Ortho/SPM Exam  Alert  NAD  Reg rate  No increased WOB    Right LE:  Surgical dressing with moderate strikethrough, intact   SILT T/SP/DP/Kaur/Sa  Motor intact T/SP/DP  WWP extremities  FCDs in place and functioning       Significant Labs: All pertinent labs within the past 24 hours have been reviewed.    Significant Imaging: I have reviewed all pertinent imaging results/findings.  "

## 2025-03-12 NOTE — PLAN OF CARE
[facility]  Discharge Reassessment    Primary Care Provider: Lola Wolff MD    Expected Discharge Date: 3/12/2025    Reassessment (most recent)       Discharge Reassessment - 03/12/25 0750          Discharge Reassessment    Assessment Type Discharge Planning Reassessment     Did the patient's condition or plan change since previous assessment? No     Discharge Plan discussed with: Patient;Adult children     Communicated ISH with patient/caregiver Yes     Discharge Plan A Skilled Nursing Facility     DME Needed Upon Discharge  none     Transition of Care Barriers None                   Patient to discharge to [facility] today

## 2025-03-12 NOTE — ASSESSMENT & PLAN NOTE
- Anemia stable at 9.0 on 3/12.   - Anemia improved on 3/11. Hgb up to 9.8 on 3/11 after transfused 1 unit of PRBCs on 3/10 fro Hgb 7.8.   - Anemia unchanged on 3/10 and still with Hgb 7.8 on 3/10 after transfused 1 unit of PRBCs on 3/9 for Hgb 7.8 so to transfuse another 1 unit of PRBCs on 3/10.   - Anemia worsened on 3/9 and Hgb down to 7.8 from 8.7 on 3/8. Patient having chest pain and symptomatic in relation to her anemia and suspect anemia causing chest pain and heart strain so to be transfused 1 unit of PRBCs today, 3/9.   - Patient with Hgb 9.8 on admit and normally her Hgb is around 12 so suspect acute blood loss on admit related to blood loss from her significant right femur fracture. Hgb pre-op on 3/8 was 8.7 and transfused 1 unit of PRBCs intra-op on 3/8. Most recent hemoglobin and hematocrit are listed below.  Recent Labs     03/10/25  0308 03/11/25  0803 03/12/25  0409   HGB 7.8* 9.8* 9.0*   HCT 23.7* 29.7* 28.0*     Plan  - Monitor serial CBC: Daily  - Transfuse PRBC if patient becomes hemodynamically unstable, symptomatic or H/H drops below 7/21.  - Patient received 1 units of PRBCs on 3/8/2025 intra-op. Patient received another 1 unit of PRBCs on 3/9 for symptomatic anemia with Hgb 7.8 with no improvement in Hgb so receive another 1 unit of PRBCs on 3/10 for Hgb 7.8. Hgb improved to 9.8 on 3/11.   - Patient's anemia is currently stable.

## 2025-03-12 NOTE — PT/OT/SLP PROGRESS
Occupational Therapy   Co-Treatment    Name: Dorothy M Knobloch  MRN: [id number]  Admitting Diagnosis:  Periprosthetic fracture of shaft of femur  4 Days Post-Op    Recommendations:     Discharge Recommendations: Moderate Intensity Therapy  Discharge Equipment Recommendations:  walker, rolling, wheelchair  Barriers to discharge:  None    Assessment:     Dorothy M Knobloch is a 89 y.o. female with a medical diagnosis of Periprosthetic fracture of shaft of femur.  She took 4 side steps with rolling walker and moderate assistance. 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 Performance deficits affecting function are weakness, impaired endurance, impaired self care skills, impaired functional mobility, gait instability, impaired balance, visual deficits, orthopedic precautions, pain, impaired cardiopulmonary response to activity, decreased lower extremity function, decreased coordination. Patient continues to demonstrate the need for moderate intensity therapy on a daily basis post acute exhibited by decreased independence with self-care and functional mobility      Rehab Prognosis:  Good; patient would benefit from acute skilled OT services to address these deficits and reach maximum level of function.       Plan:     Patient to be seen daily to address the above listed problems via self-care/home management, therapeutic exercises, therapeutic activities, neuromuscular re-education  Plan of Care Expires: 04/08/25  Plan of Care Reviewed with: patient    Subjective     Chief Complaint: Pt agreeable to therapy  Patient/Family Comments/goals: Return to PLOF  Pain/Comfort:  Pain Rating 1: 0/10    Objective:   Co-treatment performed due to patient's multiple deficits requiring two skilled therapists to appropriately and safely assess patient's strength and endurance while facilitating functional tasks in addition to accommodating for patient's activity tolerance.        Communicated with: rn prior to session.  Patient found HOB elevated with peripheral IV, PureWick, oxygen, FCD, telemetry upon OT entry to room.    General Precautions: Standard, fall, vision impaired    Orthopedic Precautions:RLE weight bearing as tolerated  Braces: N/A  Respiratory Status: Nasal cannula, flow 1 L/min     Occupational Performance:     Bed Mobility:    Patient completed Supine to Sit with minimum assistance and 2 persons     Functional Mobility/Transfers:  Patient completed Sit <> Stand Transfer with moderate assistance and of 2 persons  with  rolling walker   Patient completed Bed <> Chair Transfer using Step Transfer technique with moderate assistance with rolling walker  Functional Mobility: She took 4 side steps with rolling walker and moderate assistance.    Activities of Daily Living:  Grooming: stand by assistance seated in chair  Upper Body Dressing: maximal assistance to don gown seated EOB      [facility] 6 Click ADL: 16    Treatment & Education:  --Pt educated on hand placement for transfers  -Pt educated on RW placement for ADLs  -Pt educated on positioning of sx LE during performance of functional activities vs rest/sleep  -Pt educated on weightbearing and surgical precautions   -Pt educated to call for assistance and to transfer with hospital staff only  -Pt educated on role of OT and plan of care s/p surgery, white board updated      Patient left up in chair with all lines intact, call button in reach, and rn notified    GOALS:   Multidisciplinary Problems       Occupational Therapy Goals          Problem: Occupational Therapy    Goal Priority Disciplines Outcome Interventions   Occupational Therapy Goal     OT, PT/OT Progressing    Description: Goals to be met by: 3/16/2025     Patient will increase functional independence with ADLs by performing:    UE Dressing with Adel.  LE Dressing with Stand-by Assistance and Assistive Devices as needed.  Grooming while standing at sink  with Supervision.  Toileting from bedside commode with Stand-by Assistance for hygiene and clothing management.   Supine to sit with Stand-by Assistance.  Step transfer with Stand-by Assistance  Toilet transfer to bedside commode with Stand-by Assistance.                           Time Tracking:     OT Date of Treatment: 03/12/25  OT Start Time: 0907  OT Stop Time: 0936  OT Total Time (min): 29 min    Billable Minutes:Self Care/Home Management 10  Therapeutic Activity 19    OT/CORBIN: OT          3/12/2025

## 2025-03-12 NOTE — ASSESSMENT & PLAN NOTE
On anticoagulant therapy   Patient has paroxysmal (<7 days) atrial fibrillation. Patient is currently in sinus rhythm. [id number] Score: 4. The patients heart rate in the last 24 hours is as follows:  Pulse  Min: 59  Max: 66     Antiarrhythmics  metoprolol succinate (TOPROL-XL) 24 hr tablet 25 mg, 2 times daily, Oral    Plan  - Replete lytes with a goal of K>4, Mg >2  - Patient is anticoagulated at home with Apixiban 5 mg po BID and held pre-op. Resumed post-op but at reduced dose of 2.5 mg po BID as per request of Orthopedics (Dr. William) as concerned about bleeding into surgical site and requesting decreased dose for 1 week and then can return back to home dosing of 5 mg po BID on 3/15.   - Patient's afib is currently controlled. Patient in paced rhythm. Continue home Toprol XL for rate control.

## 2025-03-12 NOTE — ASSESSMENT & PLAN NOTE
89 y/o female with PAF on long term Apixiban at home for anticoagulation, HTN, chronic diastolic heart failure, chronic anemia, cardiac pacemaker due to sick sinus syndrome and hypothyroidism presents to the ED after fall at home. Patient was walking to the door with her walker to open it for their sitter when she leaned her walker and subsequently fell over it. States she missed the door handle which caused her to fall. Patient noted immediate pain to her RLE and was unable to move it/ get up off the ground. EMS called and patient brought to ED where X-ray imaging of right leg revealed a closed periprosthetic right displaced femoral shaft fracture. Orthopedic surgery consult for closed periprosthetic right femoral shaft fracture in patient with previous right MARCEL. Patient was seen and evaluated by Orthopedic surgery who recommended operative repair of fracture.     - Patient taken to OR on 3/8/2025 and underwent right femur ORIF with long femoral plate and screws and cables to repair fracture by Dr. Albino William.   - Post-op, patient weight bear as tolerate to the left lower extremity with assistive device as per Orthopedics recommendation.   - Patient restarted on home Apixiban post-op but at reduced dosing of 2.5 mg po BID as per request of Orthopedics (Dr. William) as concerned about bleeding into surgical site and requesting decreased dose for 1 week and then can return back to home dosing of 5 mg po BID after 1 week on 3/15.   - Perineural pain catheter removed by Anesthesia on 3/11.   - Pain controlled to right hip. Continue on multimodal pain management post-op with Tylenol 1000 mg po every 8 hours and Robaxin 500 mg po every 8 hours post-op.  - PT/OT consulted post-op and to worked with patient and recommending moderate intensity therapy on discharge when medically ready. Case management met with patient and her daughter and patient and SNF referrals sent. Patient has been accepted to [facility]. [facility] not able to accept patient on 3/12 as bed fell through and now she does not have a bed at [facility] so case management working on alternative SNF placement with patient and family. Hopeful discharge for 3/13 pending SNF placement. Patient medically ready for discharge.  - Patient completed IV Cefazolin for 24 hours post-op for antibiotic prophylaxis post-op and now on Cefadroxil 500 mg po BID for 7 days to extended antibiotic prophylaxis post-op to prevent post-op wound infection at surgical site. End date 3/15/2025.   - Surgical bandages to remain in place to right thigh and thigh area until Orthopedic clinic follow-up.   - Ortho following and managing surgical site. Appreciate assistance.

## 2025-03-12 NOTE — ASSESSMENT & PLAN NOTE
Chronic and controlled and not on any outpatient therapy to treat. Patient unable to tolerate statin therapy and had reaction on Praluent and unable to tolerate also.      Dosing Month 5 (Required For Cumulative Dosing): 40mg Daily

## 2025-03-12 NOTE — SUBJECTIVE & OBJECTIVE
Interval History: [facility] not able to accept patient today as bed fell through and now she does not have a bed at [facility] so case management working on alternative SNF placement with patient and family and hopeful discharge for 3/13. Patient medically ready for discharge today. Patient reports swelling in right thigh is improving. Pain also improving to right thigh and rates pain as 3/10. Labs reviewed. Hgb stable at 9.0 today and was 9.8 yesterday. Sodium slightly decreased from 135 yesterday to 133 today. Potasium normal at 3.9.     Review of Systems   Constitutional:  Negative for fever.   HENT:  Positive for hearing loss (Hard of hearing bilateral).    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    Cardiovascular:  Negative for chest pain.   Gastrointestinal:  Negative for nausea.   Musculoskeletal:  Positive for arthralgias (Right hip) and myalgias (Right thigh).   Psychiatric/Behavioral:  Negative for agitation and confusion.      Objective:     Vital Signs (Most Recent):  Temp: 98.1 °F (36.7 °C) (03/12/25 1214)  Pulse: 61 (03/12/25 1529)  Resp: 18 (03/12/25 1214)  BP: 117/55 (03/12/25 1214)  SpO2: 95 % (03/12/25 1500) on room air Vital Signs (24h Range):  Temp:  [97.5 °F (36.4 °C)-98.1 °F (36.7 °C)] 98.1 °F (36.7 °C)  Pulse:  [59-66] 61  Resp:  [18] 18  SpO2:  [92 %-97 %] 95 %  BP: (117-138)/(55-62) 117/55     Weight: 59 kg (130 lb 1.1 oz)  Body mass index is 23.79 kg/m².    Intake/Output Summary (Last 24 hours) at 3/12/2025 1619  Last data filed at 3/11/2025 1814  Gross per 24 hour   Intake 252 ml   Output 600 ml   Net -348 ml         Physical Exam  Vitals and nursing note reviewed.   Constitutional:       General: She is sleeping. She is not in acute distress.     Appearance: Normal appearance. She is not ill-appearing.      Comments: Frail elderly female sitting up in bed in no  distress. Patient appeared very comfortable on exam. Patient oriented x 4.    Cardiovascular:      Rate and Rhythm: Normal rate and regular rhythm.      Heart sounds: Normal heart sounds. No murmur heard.  Pulmonary:      Effort: Pulmonary effort is normal. No respiratory distress.      Breath sounds: Normal breath sounds. No wheezing.   Abdominal:      General: Abdomen is flat. Bowel sounds are normal. There is no distension.      Palpations: Abdomen is soft.      Tenderness: There is no abdominal tenderness.   Musculoskeletal:      Right lower leg: No edema.      Left lower leg: No edema.   Skin:     General: Skin is warm.      Findings: No erythema.      Comments: Surgical bandages in place all along right lateral thigh and hip area. Bandages are clean and dry and intact.   Neurological:      Mental Status: She is oriented to person, place, and time and easily aroused.   Psychiatric:         Mood and Affect: Mood normal.         Behavior: Behavior normal. Behavior is cooperative.         Thought Content: Thought content normal.         Judgment: Judgment normal.               Significant Labs: BMP:   Recent Labs   Lab 03/12/25  0409   GLU 91   *   K 3.9      CO2 27   BUN 29   CREATININE 0.6   CALCIUM 8.7     CBC:   Recent Labs   Lab 03/11/25  0803 03/12/25  0409   WBC 9.66 9.35   HGB 9.8* 9.0*   HCT 29.7* 28.0*    181       Significant Imaging: I have reviewed all pertinent imaging results/findings within the past 24 hours.

## 2025-03-12 NOTE — PROGRESS NOTES
"[facility] - Surgery  Orthopedics  Progress Note    Patient Name: Dorothy M Knobloch  MRN: [id number]  Admission Date: 3/7/2025  Hospital Length of Stay: 5 days  Attending Provider: Grisel Liao MD  Primary Care Provider: Lola Wolff MD  Follow-up For: Procedure(s) (LRB):  OPEN REDUCTION INTERNAL FIXATION FEMORAL SHAFT FRACTURE (Right)    Post-Operative Day: 4 Days Post-Op  Subjective:     Principal Problem:Periprosthetic fracture of shaft of femur    Principal Orthopedic Problem: as above; now s/p ORIF (3/8)    Interval History: Afebrile, VSS, NAEON.  Awake and comfortable this am. Pain has been reportedly well controlled. Pt states leg feels "heavy". Voiding and tolerating PO w/o issues. Pt reports BM yesterday. Walked "2 steps with RW and mod A of 2 persons for balance" with PT/OT yesterday. Hgb 9 this am.          Review of patient's allergies indicates:   Allergen Reactions    Aspartame Swelling     equal       Current Facility-Administered Medications   Medication    acetaminophen tablet 1,000 mg    albuterol-ipratropium 2.5 mg-0.5 mg/3 mL nebulizer solution 3 mL    amLODIPine tablet 5 mg    apixaban tablet 2.5 mg    bisacodyL suppository 10 mg    brimonidine 0.15 % OPTH DROP ophthalmic solution 1 drop    bumetanide tablet 0.5 mg    cefadroxil capsule 500 mg    clopidogreL tablet 75 mg    dextrose 50% injection 12.5 g    dextrose 50% injection 25 g    EScitalopram oxalate tablet 20 mg    glucagon (human recombinant) injection 1 mg    glucose chewable tablet 16 g    glucose chewable tablet 24 g    hydroCHLOROthiazide tablet 12.5 mg    levothyroxine tablet 88 mcg    melatonin tablet 6 mg    methocarbamoL tablet 500 mg    metoprolol succinate (TOPROL-XL) 24 hr tablet 25 mg    nitroGLYCERIN SL tablet 0.4 mg    ondansetron injection 4 mg    polyethylene glycol packet 17 g    senna-docusate 8.6-50 mg per tablet 1 tablet    sodium chloride 0.9% flush 10 mL    timolol maleate 0.5% ophthalmic solution 1 drop " "   vitamin D 1000 units tablet 1,000 Units     Objective:     Vital Signs (Most Recent):  Temp: 97.7 °F (36.5 °C) (03/12/25 0444)  Pulse: 62 (03/12/25 0509)  Resp: 18 (03/12/25 0444)  BP: (!) 131/59 (03/12/25 0444)  SpO2: 96 % (03/12/25 0444) Vital Signs (24h Range):  Temp:  [97.5 °F (36.4 °C)-97.8 °F (36.6 °C)] 97.7 °F (36.5 °C)  Pulse:  [59-71] 62  Resp:  [18] 18  SpO2:  [92 %-98 %] 96 %  BP: (105-143)/(51-66) 131/59     Weight: 59 kg (130 lb 1.1 oz)  Height: 5' 2" (157.5 cm)  Body mass index is 23.79 kg/m².      Intake/Output Summary (Last 24 hours) at 3/12/2025 0612  Last data filed at 3/11/2025 1814  Gross per 24 hour   Intake 702 ml   Output 600 ml   Net 102 ml        Ortho/SPM Exam  Alert  NAD  Reg rate  No increased WOB    Right LE:  Surgical dressing with moderate strikethrough, intact   SILT T/SP/DP/Kaur/Sa  Motor intact T/SP/DP  WWP extremities  FCDs in place and functioning       Significant Labs: All pertinent labs within the past 24 hours have been reviewed.    Significant Imaging: I have reviewed all pertinent imaging results/findings.  Assessment/Plan:     * Periprosthetic fracture of shaft of right femur s/p ORIF on 3/8/2025  Dorothy M Knobloch is a 89 y.o. female who presented with a right periprosthetic [place] C femur fracture; now  s/p ORIF (3/8/25)    - Pain: multimodal regimen limiting narcotics  - PT/OT: WBAT LLE  - AC: Eliquis 2.5 mg BID x 7 d and then return to home dose of 5 mg BID, Plavix 75 mg daily, FCDs at all times when not ambulating  - Abx: 24hr postop ancef completed, now on Cefadroxil protocol   - Drain: none  - Iqbal: dc'd POD1     » Dispo: Potential dc today pending medical stabilization          Emy Mercedes MD  Orthopedics  [facility] - Surgery    "

## 2025-03-12 NOTE — PT/OT/SLP PROGRESS
Physical Therapy Treatment    Patient Name:  Dorothy M Knobloch   MRN:  [id number]    Recommendations:     Discharge Recommendations: Moderate Intensity Therapy  Discharge Equipment Recommendations: walker, rolling, wheelchair  Barriers to discharge: Decreased caregiver support and increase level of assist required    Assessment:     Dorothy M Knobloch is a 89 y.o. female admitted with a medical diagnosis of Periprosthetic fracture of shaft of femur.  She presents with the following impairments/functional limitations: weakness, impaired endurance, impaired self care skills, impaired functional mobility, gait instability, impaired balance, visual deficits, decreased lower extremity function, decreased ROM, edema, impaired cardiopulmonary response to activity Pt was able to progress with PT today with increase in number of steps taken, increase activity tawanna and decrease assist required.  She amb 4 steps with RW and mod assist x 1.  She does require assist for RW management and cues for sequencing and direction. Her LLD increases her difficulty with gait and when family is able to bring in her adaptive shoes, she should improve with her gait quality.  She is motivated to progress with PT and participates well with PT. Patient continues to demonstrate the need for moderate intensity therapy on a daily basis post acute exhibited by decreased independence with self-care and functional mobility .    Co-treatment with OT for maximal safety and participation with progression of musculoskeletal strength, proprioception, balance and the level of pt's deficits with balance/safety  and medical acuity determine the need for 2 skilled therapists    Rehab Prognosis: Good; patient would benefit from acute skilled PT services to address these deficits and reach maximum level of function.    Recent Surgery: Procedure(s) (LRB):  OPEN REDUCTION INTERNAL FIXATION FEMORAL SHAFT FRACTURE (Right) 4 Days Post-Op    Plan:     During this  "hospitalization, patient to be seen daily to address the identified rehab impairments via gait training, therapeutic activities, therapeutic exercises, neuromuscular re-education and progress toward the following goals:    Plan of Care Expires:  03/30/25    Subjective     Chief Complaint: "I am kind of worn out. I didn't sleep well last night b/c the bed was broken"  Patient/Family Comments/goals: to get stronger  Pain/Comfort:  Pain Rating 1: 0/10  Location - Side 1: Right  Location 1: hip  Pain Addressed 1: Pre-medicate for activity  Pain Rating Post-Intervention 1: 0/10      Objective:     Communicated with RN prior to session.  Patient found HOB elevated with peripheral IV, PureWick, oxygen, FCD, telemetry upon PT entry to room.  Pt had family member in the room but she moved to the empty side of the room during therapy session.     General Precautions: Standard, fall, vision impaired  Orthopedic Precautions: RLE weight bearing as tolerated  Braces: N/A  Respiratory Status: Nasal cannula, flow 1 L/min     Functional Mobility:  Bed Mobility:     Scooting: minimum assistance  Supine to Sit: minimum assistance  at R LE and at trunk with increase time to perform  Transfers:     Sit to Stand:  moderate assistance and of 2 persons with rolling walker  Gait: Pt amb 4 steps with RW and mod assist for balance and management of AD. She required cues for direction, sequencing, posture and wt shifting to increase safety and indep with gait. Due to LLD noted below, pt has difficulty advancing L LE during R LE stance phase of gait.  Pt performed pre-gait of standing with RW  and min assist x 1 and cues for upright posture and wt shifting prior to gait above.  Noted LLE R<L in sitting EOB and in standing  Balance: sitting : CGA  standing with RW: Min assist     AM-PAC 6 CLICK MOBILITY  Turning over in bed (including adjusting bedclothes, sheets and blankets)?: 3  Sitting down on and standing up from a chair with arms (e.g., " wheelchair, bedside commode, etc.): 2  Moving from lying on back to sitting on the side of the bed?: 3  Moving to and from a bed to a chair (including a wheelchair)?: 2  Need to walk in hospital room?: 2  Climbing 3-5 steps with a railing?: 1  Basic Mobility Total Score: 13       Treatment & Education:  Patient educated on role of acute care PT and PT POC, safety while in hospital including calling nurse for mobility, and call light usage.  Educated about weightbearing as tawanna R LE and provided cuing for adherence as appropriate during session.  Educated about importance of OOB mobility and remaining up in chair most of the day.  Patient performed 1 set(s) of 10 repetitions of the following bed level exercises: ankle pumps, quad sets, and glut sets for bilateral LE. Patient required skilled PT for instruction of exercises and appropriate cues to perform exercises safely and appropriately.  Pt instructed to perform ex's 3 more times today to  increase her strength and improve balance and indep with gait and transfers.  White board updated in pt's room with therapist and current mobility/transfer level and assist required    Patient left up in chair with all lines intact, call button in reach, RN notified, and family member present..    GOALS:   Multidisciplinary Problems       Physical Therapy Goals          Problem: Physical Therapy    Goal Priority Disciplines Outcome Interventions   Physical Therapy Goal     PT, PT/OT Progressing    Description: Goals to be met by: 3/30/2025     Patient will increase functional independence with mobility by performin. Supine to sit with Supervision  2. Sit to supine with Supervision   3. Sit to stand transfer with Stand-by Assistance  4. Bed to chair transfer with Contact Guard Assistance using LRAD  5. Gait  x 50 feet with Stand-by Assistance using LRAD.                          DME Justifications:  Dorothy Knobloch has a mobility limitation that significantly impairs her  ability to participate in one or more mobility related activities of daily living (MRADLs) such as toileting, feeding, dressing, grooming, and bathing in customary locations in the home.  The mobility limitation cannot be sufficiently resolved by the use of a cane or walker.   Patients upper body strength is not sufficient to propel a standard WC. The use of a lightweight wheelchair will significantly improve the patients ability to participate in MRADLS and the patient will use it on regular basis in the home.   She has a caregiver who is available, willing, and able to provide assistance with the wheelchair when needed.    Carol's mobility limitation cannot be sufficiently resolved by the use of a cane. Her functional mobility deficit can be sufficiently resolved with the use of a Rolling Walker. Patient's mobility limitation significantly impairs their ability to participate in one of more activities of daily living.  The use of a RW will significantly improve the patient's ability to participate in MRADLS and the patient will use it on regular basis in the home.    Time Tracking:     PT Received On: 03/12/25  PT Start Time: 0906     PT Stop Time: 0936  PT Total Time (min): 30 min     Billable Minutes: Gait Training 12 and Therapeutic Exercise 13    Treatment Type: Treatment  PT/PTA: PT     Number of PTA visits since last PT visit: 0     03/12/2025

## 2025-03-12 NOTE — PROGRESS NOTES
[facility] Medicine  Progress Note    Patient Name: Dorothy M Knobloch  MRN: [id number]  Patient Class: IP- Inpatient   Admission Date: 3/7/2025  Length of Stay: 5 days  Attending Physician: Grisel Liao MD  Primary Care Provider: Lola Wloff MD        Subjective     Principal Problem:Periprosthetic fracture of shaft of femur        HPI:  Dorothy Knobloch is a 89 y.o. female with a hx of Afib, HTN, CHF, anemia an hypothyroidism presents to the ED s/p fall. Patient reports she was walking to the door with her walker to open it for their sitter when she leaned her walker and subsequently fell over it. States she missed the door handle which caused her to fall. Reports she hit her head twice on the floor but did not lose consciousness. She notes immediate pain to her RLE and was unable to move it/ get up off the ground. Reports she is usually ambulatory without difficulty with her walker. Last dose of her anticoagulation medication was yesterday. Denies fever, chills, chest pain, SOB, abdominal pain and urinary symptoms. Denies numbness and tingling.     ED: AF, VSS. K 3.4. CT Head w/o acute process. CT C spine showed no acute fracture. Xray femur showed femoral shaft fracture. Given tylenol and robaxin in the ED. Ortho consulted. Admitted to .     Overview/Hospital Course:  89 y/o female with PAF on long term Apixiban at home for anticoagulation, HTN, chronic diastolic heart failure, chronic anemia, cardiac pacemaker due to sick sinus syndrome and hypothyroidism presents to the ED after fall at home. Patient was walking to the door with her walker to open it for their sitter when she leaned her walker and subsequently fell over it. States she missed the door handle which caused her to fall. Reports she hit her head twice on the floor but did not lose consciousness. She notes immediate pain to her RLE and was unable to move it/ get up off the ground. EMS called and patient brought to ED  where X-ray imaging of right leg revealed a closed periprosthetic right displaced femoral shaft fracture.Orthopedic surgery consult for closed periprosthetic right femoral shaft fracture in patient with previous right MARCEL . Patient was seen and evaluated by Orthopedic surgery who recommended operative repair of fracture. Patient was medically optimized prior to surgery and was taken to OR after optimization on 3/8/2025. Patient underwent right femur ORIF with long femoral plate and screws and cables to repair fracture byDr. Albino William. Post-op patient weight bear as tolerated to the left lower extremity as per Orthopedics recommendation. Patient restarted on home Apixiban post-op but at reduced dosing of 2.5 mg po BID as per request of Orthopedics (Dr. William) as concerned about bleeding into surgical site and requesting decreased dose for 1 week and then can return back to home dosing of 5 mg po BID after 1 week on 3/15. No other DVT prophylaxis needed post-op as on oral anticoagulation. Perineural pain catheter placed by Anesthesia Pain Service with continuous infusion of Ropivacaine to help with pain control post-op and Anesthesia Pain Service managing while patient in the hospital. Patient placed on multimodal pain management post-op with Tylenol 1000 mg po every 8 hours and Robaxin 500 mg po every 8 hours post-op and will continue. PT/OT consulted post-op and recommending moderate intensity therapy and SNF referrals sent. Patient placed on IV Cefazolin post-op for 24 hours for antibiotic prophylaxis post-op and received 24 hours of Cefazolin and now on Cefadroxil 500 mg po BID for 7 days for extended antibiotic prophylaxis post-op to prevent post-op wound infection at surgical site. Hgb post-op down to 7.8 on 3/9 and patient having chest pain so having symptomatic anemia so transfused 1 unit of PRBCs on 3/9. Hgb remained the same at 7.8 on 3/10 despite 1 unit of PRBCs transfused on 3/9 so transfused another  1 unit of PRBCs on 3/10. Patient with no clinical signs of bleeding and no recurrence of chest pain on 3/10. Pain controlled on 3/11 to right hip. Hgb improved to 9.8 on 3/11 after transfused 1 unit of PRBCs on 3/9 and 3/10. Patient improving with therapy and participating and doing better. Patient accepted to [facility] as long as medically stable for 3/12. Perineural pain catheter removed by Anesthesia on 3/11. [facility] not able to accept patient on 3/12 as bed fell through and now she does not have a bed at [facility] so case management working on alternative SNF placement for 3/13. Patient medically ready for discharge on 3/12. Hgb stable at 9.0 on 3/12. Vital signs stable on 3/12.       Interval History: [facility] not able to accept patient today as bed fell through and now she does not have a bed at [facility] so case management working on alternative SNF placement with patient and family and hopeful discharge for 3/13. Patient medically ready for discharge today. Patient reports swelling in right thigh is improving. Pain also improving to right thigh and rates pain as 3/10. Labs reviewed. Hgb stable at 9.0 today and was 9.8 yesterday. Sodium slightly decreased from 135 yesterday to 133 today. Potasium normal at 3.9.     Review of Systems   Constitutional:  Negative for fever.   HENT:  Positive for hearing loss (Hard of hearing bilateral).    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    Cardiovascular:  Negative for chest pain.   Gastrointestinal:  Negative for nausea.   Musculoskeletal:  Positive for arthralgias (Right hip) and myalgias (Right thigh).   Psychiatric/Behavioral:  Negative for agitation and confusion.      Objective:     Vital Signs (Most Recent):  Temp: 98.1 °F (36.7 °C) (03/12/25 1214)  Pulse: 61 (03/12/25 1529)  Resp: 18 (03/12/25 1214)  BP: 117/55 (03/12/25  1214)  SpO2: 95 % (03/12/25 1500) on room air Vital Signs (24h Range):  Temp:  [97.5 °F (36.4 °C)-98.1 °F (36.7 °C)] 98.1 °F (36.7 °C)  Pulse:  [59-66] 61  Resp:  [18] 18  SpO2:  [92 %-97 %] 95 %  BP: (117-138)/(55-62) 117/55     Weight: 59 kg (130 lb 1.1 oz)  Body mass index is 23.79 kg/m².    Intake/Output Summary (Last 24 hours) at 3/12/2025 1619  Last data filed at 3/11/2025 1814  Gross per 24 hour   Intake 252 ml   Output 600 ml   Net -348 ml         Physical Exam  Vitals and nursing note reviewed.   Constitutional:       General: She is sleeping. She is not in acute distress.     Appearance: Normal appearance. She is not ill-appearing.      Comments: Frail elderly female sitting up in bed in no distress. Patient appeared very comfortable on exam. Patient oriented x 4.    Cardiovascular:      Rate and Rhythm: Normal rate and regular rhythm.      Heart sounds: Normal heart sounds. No murmur heard.  Pulmonary:      Effort: Pulmonary effort is normal. No respiratory distress.      Breath sounds: Normal breath sounds. No wheezing.   Abdominal:      General: Abdomen is flat. Bowel sounds are normal. There is no distension.      Palpations: Abdomen is soft.      Tenderness: There is no abdominal tenderness.   Musculoskeletal:      Right lower leg: No edema.      Left lower leg: No edema.   Skin:     General: Skin is warm.      Findings: No erythema.      Comments: Surgical bandages in place all along right lateral thigh and hip area. Bandages are clean and dry and intact.   Neurological:      Mental Status: She is oriented to person, place, and time and easily aroused.   Psychiatric:         Mood and Affect: Mood normal.         Behavior: Behavior normal. Behavior is cooperative.         Thought Content: Thought content normal.         Judgment: Judgment normal.               Significant Labs: BMP:   Recent Labs   Lab 03/12/25  0409   GLU 91   *   K 3.9      CO2 27   BUN 29   CREATININE 0.6   CALCIUM 8.7      CBC:   Recent Labs   Lab 03/11/25  0803 03/12/25  0409   WBC 9.66 9.35   HGB 9.8* 9.0*   HCT 29.7* 28.0*    181       Significant Imaging: I have reviewed all pertinent imaging results/findings within the past 24 hours.      Assessment & Plan  Periprosthetic fracture of shaft of right femur s/p ORIF on 3/8/2025  89 y/o female with PAF on long term Apixiban at home for anticoagulation, HTN, chronic diastolic heart failure, chronic anemia, cardiac pacemaker due to sick sinus syndrome and hypothyroidism presents to the ED after fall at home. Patient was walking to the door with her walker to open it for their sitter when she leaned her walker and subsequently fell over it. States she missed the door handle which caused her to fall. Patient noted immediate pain to her RLE and was unable to move it/ get up off the ground. EMS called and patient brought to ED where X-ray imaging of right leg revealed a closed periprosthetic right displaced femoral shaft fracture. Orthopedic surgery consult for closed periprosthetic right femoral shaft fracture in patient with previous right MARCEL. Patient was seen and evaluated by Orthopedic surgery who recommended operative repair of fracture.     - Patient taken to OR on 3/8/2025 and underwent right femur ORIF with long femoral plate and screws and cables to repair fracture by Dr. Albino William.   - Post-op, patient weight bear as tolerate to the left lower extremity with assistive device as per Orthopedics recommendation.   - Patient restarted on home Apixiban post-op but at reduced dosing of 2.5 mg po BID as per request of Orthopedics (Dr. William) as concerned about bleeding into surgical site and requesting decreased dose for 1 week and then can return back to home dosing of 5 mg po BID after 1 week on 3/15.   - Perineural pain catheter removed by Anesthesia on 3/11.   - Pain controlled to right hip. Continue on multimodal pain management post-op with Tylenol 1000 mg po  every 8 hours and Robaxin 500 mg po every 8 hours post-op.  - PT/OT consulted post-op and to worked with patient and recommending moderate intensity therapy on discharge when medically ready. Case management met with patient and her daughter and patient and SNF referrals sent. Patient has been accepted to [facility]. [facility] not able to accept patient on 3/12 as bed fell through and now she does not have a bed at [facility] so case management working on alternative SNF placement with patient and family. Hopeful discharge for 3/13 pending SNF placement. Patient medically ready for discharge.  - Patient completed IV Cefazolin for 24 hours post-op for antibiotic prophylaxis post-op and now on Cefadroxil 500 mg po BID for 7 days to extended antibiotic prophylaxis post-op to prevent post-op wound infection at surgical site. End date 3/15/2025.   - Surgical bandages to remain in place to right thigh and thigh area until Orthopedic clinic follow-up.   - Ortho following and managing surgical site. Appreciate assistance.     Acute blood loss anemia  - Anemia stable at 9.0 on 3/12.   - Anemia improved on 3/11. Hgb up to 9.8 on 3/11 after transfused 1 unit of PRBCs on 3/10 fro Hgb 7.8.   - Anemia unchanged on 3/10 and still with Hgb 7.8 on 3/10 after transfused 1 unit of PRBCs on 3/9 for Hgb 7.8 so to transfuse another 1 unit of PRBCs on 3/10.   - Anemia worsened on 3/9 and Hgb down to 7.8 from 8.7 on 3/8. Patient having chest pain and symptomatic in relation to her anemia and suspect anemia causing chest pain and heart strain so to be transfused 1 unit of PRBCs today, 3/9.   - Patient with Hgb 9.8 on admit and normally her Hgb is around 12 so suspect acute blood loss on admit related to blood loss from her significant right femur fracture. Hgb pre-op on 3/8 was 8.7 and transfused 1 unit of PRBCs intra-op on 3/8. Most recent hemoglobin and hematocrit are listed below.  Recent Labs     03/10/25  0308 03/11/25  0803  03/12/25  0409   HGB 7.8* 9.8* 9.0*   HCT 23.7* 29.7* 28.0*     Plan  - Monitor serial CBC: Daily  - Transfuse PRBC if patient becomes hemodynamically unstable, symptomatic or H/H drops below 7/21.  - Patient received 1 units of PRBCs on 3/8/2025 intra-op. Patient received another 1 unit of PRBCs on 3/9 for symptomatic anemia with Hgb 7.8 with no improvement in Hgb so receive another 1 unit of PRBCs on 3/10 for Hgb 7.8. Hgb improved to 9.8 on 3/11.   - Patient's anemia is currently stable.    Paroxysmal atrial fibrillation  On anticoagulant therapy  On anticoagulant therapy   Patient has paroxysmal (<7 days) atrial fibrillation. Patient is currently in sinus rhythm. [id number] Score: 4. The patients heart rate in the last 24 hours is as follows:  Pulse  Min: 59  Max: 66     Antiarrhythmics  metoprolol succinate (TOPROL-XL) 24 hr tablet 25 mg, 2 times daily, Oral    Plan  - Replete lytes with a goal of K>4, Mg >2  - Patient is anticoagulated at home with Apixiban 5 mg po BID and held pre-op. Resumed post-op but at reduced dose of 2.5 mg po BID as per request of Orthopedics (Dr. Willima) as concerned about bleeding into surgical site and requesting decreased dose for 1 week and then can return back to home dosing of 5 mg po BID on 3/15.   - Patient's afib is currently controlled. Patient in paced rhythm. Continue home Toprol XL for rate control.     Essential hypertension  Patient's blood pressure range in the last 24 hours was: BP  Min: 117/55  Max: 138/62.The patient's inpatient anti-hypertensive regimen is listed below:  Current Antihypertensives  amLODIPine tablet 5 mg, Daily, Oral  hydroCHLOROthiazide tablet 12.5 mg, Daily, Oral  metoprolol succinate (TOPROL-XL) 24 hr tablet 25 mg, 2 times daily, Oral  bumetanide tablet 0.5 mg, Daily, Oral  nitroGLYCERIN SL tablet 0.4 mg, Every 5 min PRN, Sublingual  timolol maleate 0.5% ophthalmic solution 1 drop, 2 times daily, Left Eye  , 2 times daily, Left Eye    Plan  - BP  "is controlled, no changes needed to their regimen.  - Goal BP < 140/90.    Pure hypercholesterolemia  Chronic and controlled and not on any outpatient therapy to treat. Patient unable to tolerate statin therapy and had reaction on Praluent and unable to tolerate also.     Chronic diastolic congestive heart failure  Patient has Diastolic (HFpEF) heart failure that is Chronic. On presentation their CHF was well compensated. Most recent BNP and echo results are listed below.  No results for input(s): "BNP" in the last 72 hours.  Latest ECHO  Results for orders placed during the hospital encounter of 06/20/24    Echo    Interpretation Summary    Left Ventricle: The left ventricle is normal in size. Normal wall thickness. There is concentric hypertrophy. There is normal systolic function with a visually estimated ejection fraction of 65 - 70%. Unable to assess diastolic function due to atrial fibrillation.    Right Ventricle: Normal right ventricular cavity size. Wall thickness is normal. Systolic function is normal.    Left Atrium: Left atrium is severely dilated.    Right Atrium: Right atrium is severely dilated.    Mitral Valve: There is moderate posterior leaflet sclerosis. There is no stenosis. The mean pressure gradient across the mitral valve is 1 mmHg at a heart rate of 60 bpm. There is moderate regurgitation.    Tricuspid Valve: There is moderate to severe regurgitation.    Pulmonic Valve: There is no stenosis.    Pulmonary Artery: There is moderate pulmonary hypertension. The estimated pulmonary artery systolic pressure is 60 mmHg.    IVC/SVC: Intermediate venous pressure at 8 mmHg.    Current Heart Failure Medications  hydroCHLOROthiazide tablet 12.5 mg, Daily, Oral  metoprolol succinate (TOPROL-XL) 24 hr tablet 25 mg, 2 times daily, Oral  bumetanide tablet 0.5 mg, Daily, Oral    Plan  - Monitor strict I&Os and daily weights.    - Monitor on telemetry  - Cardiac diet  - Cardiology has not been consulted.  - " The patient's volume status is at their baseline.    Acquired hypothyroidism  - Chronic and controlled. Continue home Synthroid to treat.     Cardiac pacemaker  Patient with cardiac pacemaker in situ and has paced rhythm on telemetry. Pacemaker in place for sick sinus syndrome.    Blindness and low vision  Nonexudative age-related macular degeneration, bilateral, intermediate dry stage  Patient reports blind out of right eye and poor vision out of left eye at baseline. Patient on Combigan eye drops 1 drop[ BID to left eye as outpatient to treat. Continue Brimonidine and Timolol eye drops 1 drop in left eye BID in hospital.       VTE Risk Mitigation (From admission, onward)           Ordered     apixaban tablet 2.5 mg  2 times daily         03/09/25 1255     IP VTE HIGH RISK PATIENT  Once         03/08/25 0624     Place sequential compression device  Until discontinued         03/08/25 0624     Place sequential compression device  Until discontinued         03/07/25 1318     Place SHANEL hose  Until discontinued         03/07/25 1318     Reason for No Pharmacological VTE Prophylaxis  Once        Question:  Reasons:  Answer:  Risk of Bleeding    03/07/25 1318     Place sequential compression device  Until discontinued         03/07/25 1318                    Discharge Planning   ISH: 3/13/2025     Code Status: Full Code   Medical Readiness for Discharge Date: 3/12/2025  Discharge Plan A: Skilled Nursing Facility          Grisel Liao MD  Department of Hospital Medicine   [facility] - Surgery

## 2025-03-12 NOTE — ASSESSMENT & PLAN NOTE
On anticoagulant therapy   Patient has paroxysmal (<7 days) atrial fibrillation. Patient is currently in sinus rhythm. [id number] Score: 4. The patients heart rate in the last 24 hours is as follows:  Pulse  Min: 59  Max: 66     Antiarrhythmics  metoprolol succinate (TOPROL-XL) 24 hr tablet 25 mg, 2 times daily, Oral    Plan  - Replete lytes with a goal of K>4, Mg >2  - Patient is anticoagulated at home with Apixiban 5 mg po BID and held pre-op. Resumed post-op but at reduced dose of 2.5 mg po BID as per request of Orthopedics (Dr. William) as concerned about bleeding into surgical site and requesting decreased dose for 1 week and then can return back to home dosing of 5 mg po BID on 3/15.   - Patient's afib is currently controlled. Patient in paced rhythm. Continue home Toprol XL for rate control.

## 2025-03-13 ENCOUNTER — HOSPITAL ENCOUNTER (INPATIENT)
Facility: HOSPITAL | Age: OVER 89
LOS: 7 days | Discharge: ANOTHER HOSPITAL | DRG: 560 | End: 2025-03-20
Attending: HOSPITALIST | Admitting: HOSPITALIST
Payer: MEDICARE

## 2025-03-13 VITALS
OXYGEN SATURATION: 96 % | HEIGHT: 62 IN | SYSTOLIC BLOOD PRESSURE: 113 MMHG | DIASTOLIC BLOOD PRESSURE: 55 MMHG | TEMPERATURE: 97 F | HEART RATE: 96 BPM | BODY MASS INDEX: 23.93 KG/M2 | RESPIRATION RATE: 16 BRPM | WEIGHT: 130.06 LBS

## 2025-03-13 DIAGNOSIS — H54.10 BLINDNESS AND LOW VISION: ICD-10-CM

## 2025-03-13 DIAGNOSIS — E87.1 HYPONATREMIA: ICD-10-CM

## 2025-03-13 DIAGNOSIS — I48.0 PAROXYSMAL ATRIAL FIBRILLATION: Chronic | ICD-10-CM

## 2025-03-13 DIAGNOSIS — M97.8XXA PERI-PROSTHETIC FRACTURE OF SHAFT OF FEMUR: ICD-10-CM

## 2025-03-13 DIAGNOSIS — Z95.0 CARDIAC PACEMAKER: ICD-10-CM

## 2025-03-13 DIAGNOSIS — E44.0 MODERATE MALNUTRITION: ICD-10-CM

## 2025-03-13 DIAGNOSIS — Z96.649 PERI-PROSTHETIC FRACTURE OF SHAFT OF FEMUR: ICD-10-CM

## 2025-03-13 DIAGNOSIS — M97.8XXA PERIPROSTHETIC FRACTURE OF SHAFT OF FEMUR: ICD-10-CM

## 2025-03-13 DIAGNOSIS — D62 ACUTE BLOOD LOSS ANEMIA: ICD-10-CM

## 2025-03-13 DIAGNOSIS — R54 AGE-RELATED PHYSICAL DEBILITY: ICD-10-CM

## 2025-03-13 DIAGNOSIS — I51.81 TAKOTSUBO CARDIOMYOPATHY: Primary | ICD-10-CM

## 2025-03-13 DIAGNOSIS — Z96.649 PERIPROSTHETIC FRACTURE OF SHAFT OF FEMUR: ICD-10-CM

## 2025-03-13 LAB — POCT GLUCOSE: 126 MG/DL (ref 70–110)

## 2025-03-13 PROCEDURE — 97110 THERAPEUTIC EXERCISES: CPT

## 2025-03-13 PROCEDURE — 97530 THERAPEUTIC ACTIVITIES: CPT

## 2025-03-13 PROCEDURE — 97535 SELF CARE MNGMENT TRAINING: CPT

## 2025-03-13 PROCEDURE — 25000003 PHARM REV CODE 250: Performed by: STUDENT IN AN ORGANIZED HEALTH CARE EDUCATION/TRAINING PROGRAM

## 2025-03-13 PROCEDURE — 25000003 PHARM REV CODE 250

## 2025-03-13 PROCEDURE — 25000003 PHARM REV CODE 250: Performed by: INTERNAL MEDICINE

## 2025-03-13 PROCEDURE — 11000004 HC SNF PRIVATE

## 2025-03-13 PROCEDURE — 25000003 PHARM REV CODE 250: Performed by: HOSPITALIST

## 2025-03-13 RX ORDER — CALCIUM CARBONATE 200(500)MG
500 TABLET,CHEWABLE ORAL 2 TIMES DAILY PRN
Status: DISCONTINUED | OUTPATIENT
Start: 2025-03-13 | End: 2025-03-23 | Stop reason: HOSPADM

## 2025-03-13 RX ORDER — TIMOLOL MALEATE 5 MG/ML
1 SOLUTION/ DROPS OPHTHALMIC 2 TIMES DAILY
Status: DISCONTINUED | OUTPATIENT
Start: 2025-03-13 | End: 2025-03-23 | Stop reason: HOSPADM

## 2025-03-13 RX ORDER — BUMETANIDE 0.5 MG/1
0.5 TABLET ORAL DAILY
Status: DISCONTINUED | OUTPATIENT
Start: 2025-03-14 | End: 2025-03-23 | Stop reason: HOSPADM

## 2025-03-13 RX ORDER — LEVOTHYROXINE SODIUM 88 UG/1
88 TABLET ORAL
Status: DISCONTINUED | OUTPATIENT
Start: 2025-03-14 | End: 2025-03-23 | Stop reason: HOSPADM

## 2025-03-13 RX ORDER — ACETAMINOPHEN 325 MG/1
650 TABLET ORAL EVERY 6 HOURS PRN
Status: DISCONTINUED | OUTPATIENT
Start: 2025-03-13 | End: 2025-03-23 | Stop reason: HOSPADM

## 2025-03-13 RX ORDER — METOPROLOL SUCCINATE 25 MG/1
25 TABLET, EXTENDED RELEASE ORAL NIGHTLY
Status: DISCONTINUED | OUTPATIENT
Start: 2025-03-13 | End: 2025-03-23 | Stop reason: HOSPADM

## 2025-03-13 RX ORDER — ONDANSETRON 4 MG/1
4 TABLET, ORALLY DISINTEGRATING ORAL EVERY 6 HOURS PRN
Status: DISCONTINUED | OUTPATIENT
Start: 2025-03-13 | End: 2025-03-23 | Stop reason: HOSPADM

## 2025-03-13 RX ORDER — TALC
6 POWDER (GRAM) TOPICAL NIGHTLY PRN
Status: DISCONTINUED | OUTPATIENT
Start: 2025-03-13 | End: 2025-03-14

## 2025-03-13 RX ORDER — AMOXICILLIN 250 MG
1 CAPSULE ORAL 2 TIMES DAILY
Status: DISCONTINUED | OUTPATIENT
Start: 2025-03-13 | End: 2025-03-17

## 2025-03-13 RX ORDER — CEFADROXIL 500 MG/1
1000 CAPSULE ORAL EVERY 12 HOURS
Status: DISCONTINUED | OUTPATIENT
Start: 2025-03-13 | End: 2025-03-14

## 2025-03-13 RX ORDER — CLOPIDOGREL BISULFATE 75 MG/1
75 TABLET ORAL DAILY
Status: DISCONTINUED | OUTPATIENT
Start: 2025-03-14 | End: 2025-03-23 | Stop reason: HOSPADM

## 2025-03-13 RX ORDER — TRAMADOL HYDROCHLORIDE 50 MG/1
50 TABLET ORAL EVERY 6 HOURS PRN
Status: DISCONTINUED | OUTPATIENT
Start: 2025-03-13 | End: 2025-03-19

## 2025-03-13 RX ORDER — AMLODIPINE BESYLATE 5 MG/1
5 TABLET ORAL DAILY
Status: DISCONTINUED | OUTPATIENT
Start: 2025-03-14 | End: 2025-03-23 | Stop reason: HOSPADM

## 2025-03-13 RX ORDER — HYDROCHLOROTHIAZIDE 12.5 MG/1
12.5 TABLET ORAL DAILY
Status: DISCONTINUED | OUTPATIENT
Start: 2025-03-14 | End: 2025-03-23 | Stop reason: HOSPADM

## 2025-03-13 RX ORDER — ACETAMINOPHEN 325 MG/1
650 TABLET ORAL EVERY 6 HOURS PRN
Status: DISCONTINUED | OUTPATIENT
Start: 2025-03-13 | End: 2025-03-19

## 2025-03-13 RX ORDER — METHOCARBAMOL 500 MG/1
500 TABLET, FILM COATED ORAL EVERY 8 HOURS
Status: DISCONTINUED | OUTPATIENT
Start: 2025-03-13 | End: 2025-03-23 | Stop reason: HOSPADM

## 2025-03-13 RX ORDER — ESCITALOPRAM OXALATE 10 MG/1
20 TABLET ORAL DAILY
Status: DISCONTINUED | OUTPATIENT
Start: 2025-03-14 | End: 2025-03-23 | Stop reason: HOSPADM

## 2025-03-13 RX ORDER — METOPROLOL SUCCINATE 50 MG/1
50 TABLET, EXTENDED RELEASE ORAL DAILY
Status: DISCONTINUED | OUTPATIENT
Start: 2025-03-14 | End: 2025-03-23 | Stop reason: HOSPADM

## 2025-03-13 RX ORDER — BRIMONIDINE TARTRATE 1.5 MG/ML
1 SOLUTION/ DROPS OPHTHALMIC 2 TIMES DAILY
Status: DISCONTINUED | OUTPATIENT
Start: 2025-03-13 | End: 2025-03-23 | Stop reason: HOSPADM

## 2025-03-13 RX ADMIN — CEFADROXIL 1000 MG: 500 CAPSULE ORAL at 10:03

## 2025-03-13 RX ADMIN — METOPROLOL SUCCINATE 25 MG: 25 TABLET, EXTENDED RELEASE ORAL at 09:03

## 2025-03-13 RX ADMIN — METHOCARBAMOL 500 MG: 500 TABLET ORAL at 01:03

## 2025-03-13 RX ADMIN — ACETAMINOPHEN 1000 MG: 500 TABLET ORAL at 01:03

## 2025-03-13 RX ADMIN — SENNOSIDES AND DOCUSATE SODIUM 1 TABLET: 50; 8.6 TABLET ORAL at 09:03

## 2025-03-13 RX ADMIN — TIMOLOL MALEATE 1 DROP: 5 SOLUTION OPHTHALMIC at 09:03

## 2025-03-13 RX ADMIN — ONDANSETRON 4 MG: 4 TABLET, ORALLY DISINTEGRATING ORAL at 08:03

## 2025-03-13 RX ADMIN — METOPROLOL SUCCINATE 25 MG: 25 TABLET, EXTENDED RELEASE ORAL at 10:03

## 2025-03-13 RX ADMIN — APIXABAN 2.5 MG: 2.5 TABLET, FILM COATED ORAL at 10:03

## 2025-03-13 RX ADMIN — METHOCARBAMOL 500 MG: 500 TABLET ORAL at 06:03

## 2025-03-13 RX ADMIN — AMLODIPINE BESYLATE 5 MG: 5 TABLET ORAL at 09:03

## 2025-03-13 RX ADMIN — ESCITALOPRAM OXALATE 20 MG: 20 TABLET ORAL at 09:03

## 2025-03-13 RX ADMIN — ACETAMINOPHEN 650 MG: 325 TABLET ORAL at 10:03

## 2025-03-13 RX ADMIN — BRIMONIDINE TARTRATE 1 DROP: 1.5 SOLUTION OPHTHALMIC at 10:03

## 2025-03-13 RX ADMIN — HYDROCHLOROTHIAZIDE 12.5 MG: 12.5 TABLET ORAL at 09:03

## 2025-03-13 RX ADMIN — ACETAMINOPHEN 1000 MG: 500 TABLET ORAL at 06:03

## 2025-03-13 RX ADMIN — SENNOSIDES AND DOCUSATE SODIUM 1 TABLET: 50; 8.6 TABLET ORAL at 10:03

## 2025-03-13 RX ADMIN — TIMOLOL MALEATE 1 DROP: 5 SOLUTION OPHTHALMIC at 10:03

## 2025-03-13 RX ADMIN — Medication 1000 UNITS: at 09:03

## 2025-03-13 RX ADMIN — LEVOTHYROXINE SODIUM 88 MCG: 50 TABLET ORAL at 06:03

## 2025-03-13 RX ADMIN — APIXABAN 2.5 MG: 2.5 TABLET, FILM COATED ORAL at 09:03

## 2025-03-13 RX ADMIN — BUMETANIDE 0.5 MG: 0.5 TABLET ORAL at 09:03

## 2025-03-13 RX ADMIN — BRIMONIDINE TARTRATE 1 DROP: 1.5 SOLUTION OPHTHALMIC at 09:03

## 2025-03-13 RX ADMIN — METHOCARBAMOL 500 MG: 500 TABLET ORAL at 10:03

## 2025-03-13 RX ADMIN — CEFADROXIL 500 MG: 500 CAPSULE ORAL at 09:03

## 2025-03-13 RX ADMIN — CLOPIDOGREL BISULFATE 75 MG: 75 TABLET ORAL at 09:03

## 2025-03-13 NOTE — ASSESSMENT & PLAN NOTE
89 y/o female with PAF on long term Apixiban at home for anticoagulation, HTN, chronic diastolic heart failure, chronic anemia, cardiac pacemaker due to sick sinus syndrome and hypothyroidism presents to the ED after fall at home. Patient was walking to the door with her walker to open it for their sitter when she leaned her walker and subsequently fell over it. States she missed the door handle which caused her to fall. Patient noted immediate pain to her RLE and was unable to move it/ get up off the ground. EMS called and patient brought to ED where X-ray imaging of right leg revealed a closed periprosthetic right displaced femoral shaft fracture. Orthopedic surgery consult for closed periprosthetic right femoral shaft fracture in patient with previous right MARCEL. Patient was seen and evaluated by Orthopedic surgery who recommended operative repair of fracture.     - Patient taken to OR on 3/8/2025 and underwent right femur ORIF with long femoral plate and screws and cables to repair fracture by Dr. Albino William.   - Post-op, patient weight bear as tolerate to the left lower extremity with assistive device as per Orthopedics recommendation.   - Patient restarted on home Apixiban post-op but at reduced dosing of 2.5 mg po BID as per request of Orthopedics (Dr. William) as concerned about bleeding into surgical site and requesting decreased dose for 1 week and then can return back to home dosing of 5 mg po BID after 1 week on 3/15.   - Perineural pain catheter removed by Anesthesia on 3/11.   - Pain controlled to right hip. Continue on multimodal pain management post-op with Tylenol 1000 mg po every 8 hours and Robaxin 500 mg po every 8 hours post-op.  - PT/OT consulted post-op and to worked with patient and recommending moderate intensity therapy on discharge when medically ready. Case management met with patient and her daughter and patient and SNF referrals sent. Patient has been accepted to [facility]. [facility] not able to accept patient on 3/12 as bed fell through and now she does not have a bed at [facility] so case management working on alternative SNF placement with patient and family. Hopeful discharge for 3/13 pending SNF placement. Patient medically ready for discharge.  - Patient completed IV Cefazolin for 24 hours post-op for antibiotic prophylaxis post-op and now on Cefadroxil 500 mg po BID for 7 days to extended antibiotic prophylaxis post-op to prevent post-op wound infection at surgical site. End date 3/15/2025.   - Surgical bandages to remain in place to right thigh and thigh area until Orthopedic clinic follow-up.   - Ortho following and managing surgical site. Appreciate assistance.

## 2025-03-13 NOTE — ASSESSMENT & PLAN NOTE
- Anemia stable at 9.0 on 3/12.   - Anemia improved on 3/11. Hgb up to 9.8 on 3/11 after transfused 1 unit of PRBCs on 3/10 fro Hgb 7.8.   - Anemia unchanged on 3/10 and still with Hgb 7.8 on 3/10 after transfused 1 unit of PRBCs on 3/9 for Hgb 7.8 so to transfuse another 1 unit of PRBCs on 3/10.   - Anemia worsened on 3/9 and Hgb down to 7.8 from 8.7 on 3/8. Patient having chest pain and symptomatic in relation to her anemia and suspect anemia causing chest pain and heart strain so to be transfused 1 unit of PRBCs today, 3/9.   - Patient with Hgb 9.8 on admit and normally her Hgb is around 12 so suspect acute blood loss on admit related to blood loss from her significant right femur fracture. Hgb pre-op on 3/8 was 8.7 and transfused 1 unit of PRBCs intra-op on 3/8. Most recent hemoglobin and hematocrit are listed below.  Recent Labs     03/11/25  0803 03/12/25  0409   HGB 9.8* 9.0*   HCT 29.7* 28.0*     Plan  - Monitor serial CBC: Daily  - Transfuse PRBC if patient becomes hemodynamically unstable, symptomatic or H/H drops below 7/21.  - Patient received 1 units of PRBCs on 3/8/2025 intra-op. Patient received another 1 unit of PRBCs on 3/9 for symptomatic anemia with Hgb 7.8 with no improvement in Hgb so receive another 1 unit of PRBCs on 3/10 for Hgb 7.8. Hgb improved to 9.8 on 3/11.   - Patient's anemia is currently stable.

## 2025-03-13 NOTE — ASSESSMENT & PLAN NOTE
Patient with cardiac pacemaker in situ and has paced rhythm on telemetry. Pacemaker in place for sick sinus syndrome.  STABLE

## 2025-03-13 NOTE — PLAN OF CARE
03/13/25 1213   Medicare Message   Important Message from Medicare regarding Discharge Appeal Rights Given to patient/caregiver;Explained to patient/caregiver   Date IMM was signed 03/13/25   Time IMM was signed 1213

## 2025-03-13 NOTE — SUBJECTIVE & OBJECTIVE
"Principal Problem:Periprosthetic fracture of shaft of femur    Principal Orthopedic Problem: as above; now s/p ORIF (3/8)    Interval History: Afebrile, VSS, NAEON.  Awake and comfortable this am. Pain has been reportedly well controlled. Voiding and tolerating PO w/o issues. Worked with PT/OT yesterday.          Review of patient's allergies indicates:   Allergen Reactions    Aspartame Swelling     equal       Current Facility-Administered Medications   Medication    acetaminophen tablet 1,000 mg    albuterol-ipratropium 2.5 mg-0.5 mg/3 mL nebulizer solution 3 mL    amLODIPine tablet 5 mg    apixaban tablet 2.5 mg    bisacodyL suppository 10 mg    brimonidine 0.15 % OPTH DROP ophthalmic solution 1 drop    bumetanide tablet 0.5 mg    cefadroxil capsule 500 mg    clopidogreL tablet 75 mg    dextrose 50% injection 12.5 g    dextrose 50% injection 25 g    EScitalopram oxalate tablet 20 mg    glucagon (human recombinant) injection 1 mg    glucose chewable tablet 16 g    glucose chewable tablet 24 g    hydroCHLOROthiazide tablet 12.5 mg    levothyroxine tablet 88 mcg    melatonin tablet 6 mg    methocarbamoL tablet 500 mg    metoprolol succinate (TOPROL-XL) 24 hr tablet 25 mg    nitroGLYCERIN SL tablet 0.4 mg    ondansetron injection 4 mg    polyethylene glycol packet 17 g    senna-docusate 8.6-50 mg per tablet 1 tablet    sodium chloride 0.9% flush 10 mL    timolol maleate 0.5% ophthalmic solution 1 drop    vitamin D 1000 units tablet 1,000 Units     Objective:     Vital Signs (Most Recent):  Temp: 97.7 °F (36.5 °C) (03/13/25 0445)  Pulse: 62 (03/13/25 0445)  Resp: 18 (03/13/25 0445)  BP: 138/63 (03/13/25 0445)  SpO2: 98 % (03/13/25 0445) Vital Signs (24h Range):  Temp:  [97.6 °F (36.4 °C)-98.2 °F (36.8 °C)] 97.7 °F (36.5 °C)  Pulse:  [60-67] 62  Resp:  [18] 18  SpO2:  [94 %-98 %] 98 %  BP: (117-138)/(55-63) 138/63     Weight: 59 kg (130 lb 1.1 oz)  Height: 5' 2" (157.5 cm)  Body mass index is 23.79 " kg/m².      Intake/Output Summary (Last 24 hours) at 3/13/2025 0613  Last data filed at 3/12/2025 1811  Gross per 24 hour   Intake --   Output 1100 ml   Net -1100 ml        Ortho/SPM Exam  Alert  NAD  Reg rate  No increased WOB    Right LE:  Surgical dressing with moderate strikethrough, intact   SILT T/SP/DP/Kaur/Sa  Motor intact T/SP/DP  WWP extremities  FCDs in place and functioning       Significant Labs: All pertinent labs within the past 24 hours have been reviewed.    Significant Imaging: I have reviewed all pertinent imaging results/findings.

## 2025-03-13 NOTE — ASSESSMENT & PLAN NOTE
89 y/o female with PAF on long term Apixiban at home for anticoagulation, HTN, chronic diastolic heart failure, chronic anemia, cardiac pacemaker due to sick sinus syndrome and hypothyroidism presents to the ED after fall at home. Patient was walking to the door with her walker to open it for their sitter when she leaned her walker and subsequently fell over it. States she missed the door handle which caused her to fall. Patient noted immediate pain to her RLE and was unable to move it/ get up off the ground. EMS called and patient brought to ED where X-ray imaging of right leg revealed a closed periprosthetic right displaced femoral shaft fracture. Orthopedic surgery consult for closed periprosthetic right femoral shaft fracture in patient with previous right MARCEL. Patient was seen and evaluated by Orthopedic surgery who recommended operative repair of fracture.     - Patient taken to OR on 3/8/2025 and underwent right femur ORIF with long femoral plate and screws and cables to repair fracture by Dr. Albino William.   - Post-op, patient weight bear as tolerate to the left lower extremity with assistive device as per Orthopedics recommendation.   - Patient restarted on home Apixiban post-op but at reduced dosing of 2.5 mg po BID as per request of Orthopedics (Dr. William) as concerned about bleeding into surgical site and requesting decreased dose for 1 week and then can return back to home dosing of 5 mg po BID after 1 week on 3/15.   - Perineural pain catheter removed by Anesthesia on 3/11.   - Pain controlled to right hip. Continue on multimodal pain management post-op with Tylenol 1000 mg po every 8 hours and Robaxin 500 mg po every 8 hours post-op.  - PT/OT consulted post-op and to worked with patient and recommending moderate intensity therapy on discharge when medically ready. Case management met with patient and her daughter and patient and SNF referrals sent. Patient has been accepted to [facility]. [facility] not able to accept patient on [facility]/12 as bed fell through and now she does not have a bed at [facility] so case management working on alternative SNF placement with patient and family. Hopeful discharge for 3/13 pending SNF placement. Patient medically ready for discharge.  - Patient completed IV Cefazolin for 24 hours post-op for antibiotic prophylaxis post-op and now on Cefadroxil 500 mg po BID for 7 days to extended antibiotic prophylaxis post-op to prevent post-op wound infection at surgical site. End date 3/15/2025.   - Surgical bandages to remain in place to right thigh and thigh area until Orthopedic clinic follow-up.   - Ortho following and managing surgical site. Appreciate assistance.     3/13-  right periprosthetic [place] C femur fracture; now  s/p ORIF (3/8/25). Stable as above. [facility] pending SNF. F/u ortho as planned

## 2025-03-13 NOTE — PT/OT/SLP PROGRESS
"Occupational Therapy   Treatment    Name: Dorothy M Knobloch  MRN: [id number]  Admitting Diagnosis:  Periprosthetic fracture of shaft of femur  5 Days Post-Op    Recommendations:     Discharge Recommendations: Moderate Intensity Therapy  Discharge Equipment Recommendations:  walker, rolling, wheelchair  Barriers to discharge:  None    Assessment:     Dorothy M Knobloch is a 89 y.o. female with a medical diagnosis of Periprosthetic fracture of shaft of femur.  She presents with good engagement with session and fair activity tolerance, session primarily limited by incontinence of bladder. Deficits currently affect independence with ADLs, ADL t/fs, and functional mobility. Performance deficits affecting function are weakness, visual deficits, impaired endurance, orthopedic precautions, impaired self care skills, impaired functional mobility, decreased lower extremity function, gait instability, decreased safety awareness, pain, impaired balance.     Rehab Prognosis:  Good; patient would benefit from acute skilled OT services to address these deficits and reach maximum level of function.       Plan:     Patient to be seen daily to address the above listed problems via self-care/home management, community/work re-entry, therapeutic activities, therapeutic exercises  Plan of Care Expires: 04/08/25  Plan of Care Reviewed with: patient, son    Subjective     Chief Complaint: "Oh I didn't do enough"  Patient/Family Comments/goals: Return home   Pain/Comfort:  Pain Rating 1:  (minimal pain in R hip with transfers, does not quantify/describe)  Pain Addressed 1: Reposition, Distraction, Cessation of Activity    Objective:     Communicated with: RN prior to session.  Patient found HOB elevated with FCD, PureWick, peripheral IV & oxygen upon OT entry to room.    General Precautions: Standard, fall, vision impaired    Orthopedic Precautions:RLE weight bearing as tolerated  Braces: N/A  Respiratory Status: Nasal cannula, flow 2 L/min   "   Occupational Performance:     Bed Mobility:    Patient completed Supine to Sit with moderate assistance  Patient completed Sit to Supine with maximal assistance     Functional Mobility/Transfers:  Patient completed Sit <> Stand Transfer with moderate assistance and maximal assistance  with  rolling walker   X3 trials   Functional Mobility: Upon achieving stand at EOB, pt initially requires min A due to L lateral lean, with time improves to SBA for static stand. Longest static stand ~1 min, other static stands ~30 sec each     Activities of Daily Living:  Lower Body Dressing: total assistance to don/doff socks   Toileting: total assistance for pericare after void at EOB       [facility] 6 Click ADL: 16    Treatment & Education:  VSS throughout session, pt does endorse mild dizziness upon sitting EOB, BP stable and w/in normal parameters. O2 >97% throughout session though pt noted to have increased SOB intermittently, may be secondary to anxiety. Pt with significant incontinence with all 3 sit<>stds this date limiting further mobility. Pt and son educated on: OT POC, OT role, general dc plan, safety with bed mobility, use of DME and staff for assist for activities. Both verbalized understanding education.     Patient left HOB elevated with all lines intact, call button in reach, and RN notified    GOALS:   Multidisciplinary Problems       Occupational Therapy Goals          Problem: Occupational Therapy    Goal Priority Disciplines Outcome Interventions   Occupational Therapy Goal     OT, PT/OT Progressing    Description: Goals to be met by: 3/16/2025     Patient will increase functional independence with ADLs by performing:    UE Dressing with [place].  LE Dressing with Stand-by Assistance and Assistive Devices as needed.  Grooming while standing at sink with Supervision.  Toileting from bedside commode with Stand-by Assistance for hygiene and clothing management.   Supine to sit with Stand-by Assistance.  Step  transfer with Stand-by Assistance  Toilet transfer to bedside commode with Stand-by Assistance.                         Time Tracking:     OT Date of Treatment: 03/13/25  OT Start Time: 1133  OT Stop Time: 1228  OT Total Time (min): 55 min    Billable Minutes:Self Care/Home Management 30  Therapeutic Activity 25    OT/CORBIN: OT          3/13/2025

## 2025-03-13 NOTE — SUBJECTIVE & OBJECTIVE
Interval History: see above    Review of Systems   Constitutional:  Positive for activity change. Negative for appetite change and fever.   HENT:  Negative for trouble swallowing.    Respiratory:  Negative for cough, choking and shortness of breath.    Cardiovascular:  Negative for chest pain and leg swelling.   Gastrointestinal:  Negative for abdominal pain, blood in stool, constipation, diarrhea and nausea.   Genitourinary:  Negative for difficulty urinating.   Musculoskeletal:  Positive for arthralgias (right hip) and gait problem.   Neurological:  Negative for numbness and headaches.   Psychiatric/Behavioral:  Negative for behavioral problems and confusion.      Objective:     Vital Signs (Most Recent):  Temp: 97.7 °F (36.5 °C) (03/13/25 0445)  Pulse: 62 (03/13/25 0445)  Resp: 18 (03/13/25 0445)  BP: 138/63 (03/13/25 0445)  SpO2: 98 % (03/13/25 0445) Vital Signs (24h Range):  Temp:  [97.6 °F (36.4 °C)-98.2 °F (36.8 °C)] 97.7 °F (36.5 °C)  Pulse:  [60-67] 62  Resp:  [18] 18  SpO2:  [94 %-98 %] 98 %  BP: (117-138)/(55-63) 138/63     Weight: 59 kg (130 lb 1.1 oz)  Body mass index is 23.79 kg/m².    Intake/Output Summary (Last 24 hours) at 3/13/2025 0750  Last data filed at 3/12/2025 1811  Gross per 24 hour   Intake --   Output 1100 ml   Net -1100 ml         Physical Exam  Constitutional:       General: She is not in acute distress.     Appearance: Normal appearance.   HENT:      Head: Normocephalic and atraumatic.      Nose: Nose normal.      Mouth/Throat:      Mouth: Mucous membranes are moist.   Eyes:      General: No scleral icterus.     Extraocular Movements: Extraocular movements intact.      Pupils: Pupils are equal, round, and reactive to light.   Cardiovascular:      Rate and Rhythm: Normal rate and regular rhythm.      Pulses: Normal pulses.      Heart sounds: Normal heart sounds.   Pulmonary:      Effort: Pulmonary effort is normal.      Breath sounds: Normal breath sounds. No wheezing or rhonchi.    Chest:      Chest wall: No tenderness.   Abdominal:      General: Abdomen is flat. Bowel sounds are normal. There is no distension.      Palpations: Abdomen is soft.      Tenderness: There is no abdominal tenderness. There is no guarding or rebound.   Musculoskeletal:         General: No swelling, tenderness or deformity. Normal range of motion.      Cervical back: Normal range of motion and neck supple. No rigidity or tenderness.      Comments: RIGHT hip bandage intact   Skin:     General: Skin is warm and dry.      Coloration: Skin is not jaundiced or pale.      Findings: No erythema or rash.   Neurological:      General: No focal deficit present.      Mental Status: She is alert. Mental status is at baseline.      Cranial Nerves: No cranial nerve deficit.      Motor: No weakness.               Significant Labs: All pertinent labs within the past 24 hours have been reviewed.  CBC:   Recent Labs   Lab 03/11/25  0803 03/12/25  0409   WBC 9.66 9.35   HGB 9.8* 9.0*   HCT 29.7* 28.0*    181     CMP:   Recent Labs   Lab 03/11/25  0803 03/12/25  0409   * 133*   K 4.2 3.9    100   CO2 28 27   GLU 99 91   BUN 27 29   CREATININE 0.5 0.6   CALCIUM 8.8 8.7   ANIONGAP 5* 6*       Significant Imaging: I have reviewed all pertinent imaging results/findings within the past 24 hours.

## 2025-03-13 NOTE — ASSESSMENT & PLAN NOTE
On anticoagulant therapy   Patient has paroxysmal (<7 days) atrial fibrillation. Patient is currently in sinus rhythm. [id number] Score: 4. The patients heart rate in the last 24 hours is as follows:  Pulse  Min: 60  Max: 67     Antiarrhythmics  metoprolol succinate (TOPROL-XL) 24 hr tablet 25 mg, 2 times daily, Oral    Plan  - Replete lytes with a goal of K>4, Mg >2  - Patient is anticoagulated at home with Apixiban 5 mg po BID and held pre-op. Resumed post-op but at reduced dose of 2.5 mg po BID as per request of Orthopedics (Dr. William) as concerned about bleeding into surgical site and requesting decreased dose for 1 week and then can return back to home dosing of 5 mg po BID on 3/15.   - Patient's afib is currently controlled. Patient in paced rhythm. Continue home Toprol XL for rate control.     3/13- on apixaban 2.5 bid to reduce bleeding risk. can return back to home dosing of 5 mg po BID on 3/15.     On anticoagulant therapy   Patient has paroxysmal (<7 days) atrial fibrillation. Patient is currently in sinus rhythm. [id number] Score: 4. The patients heart rate in the last 24 hours is as follows:  Pulse  Min: 60  Max: 67     Antiarrhythmics  metoprolol succinate (TOPROL-XL) 24 hr tablet 25 mg, 2 times daily, Oral    Plan  - Replete lytes with a goal of K>4, Mg >2  - Patient is anticoagulated at home with Apixiban 5 mg po BID and held pre-op. Resumed post-op but at reduced dose of 2.5 mg po BID as per request of Orthopedics (Dr. William) as concerned about bleeding into surgical site and requesting decreased dose for 1 week and then can return back to home dosing of 5 mg po BID on 3/15.   - Patient's afib is currently controlled. Patient in paced rhythm. Continue home Toprol XL for rate control.   - SEE ABOVE

## 2025-03-13 NOTE — PLAN OF CARE
SNF Admissions Communication    Patient Name: Dorothy M Knobloch  Patient MRN: [id number]  Date Completed: 3/13/2025  Communicated with: Gemma, patient's daughter    Insurance: Payor: [employer] [facility] / Plan: [employer] / Product Type: Medicare Advantage /   Insurance Verified/Auth approved:auth number [id number]    Discussed length of stay, insurance reviews, copays starting at day 21, IDT team meetings, therapy expectations, Medicare guidelines regarding rehospitalizations, and admissions packet, that includes other pertinent facility information, to be given to patient at time of admission.    Isolation: No active isolations  Patient's goal is to return back to [facility] to be with her spouse.

## 2025-03-13 NOTE — ASSESSMENT & PLAN NOTE
On anticoagulant therapy   Patient has paroxysmal (<7 days) atrial fibrillation. Patient is currently in sinus rhythm. [id number] Score: 4. The patients heart rate in the last 24 hours is as follows:  Pulse  Min: 60  Max: 67     Antiarrhythmics  metoprolol succinate (TOPROL-XL) 24 hr tablet 25 mg, 2 times daily, Oral    Plan  - Replete lytes with a goal of K>4, Mg >2  - Patient is anticoagulated at home with Apixiban 5 mg po BID and held pre-op. Resumed post-op but at reduced dose of 2.5 mg po BID as per request of Orthopedics (Dr. William) as concerned about bleeding into surgical site and requesting decreased dose for 1 week and then can return back to home dosing of 5 mg po BID on 3/15.   - Patient's afib is currently controlled. Patient in paced rhythm. Continue home Toprol XL for rate control.

## 2025-03-13 NOTE — ASSESSMENT & PLAN NOTE
Chronic and controlled and not on any outpatient therapy to treat. Patient unable to tolerate statin therapy and had reaction on Praluent and unable to tolerate also.   STABLE

## 2025-03-13 NOTE — DISCHARGE SUMMARY
[facility] Medicine  Discharge Summary      Patient Name: Dorothy M Knobloch  MRN: [id number]  IZABELA: [id number]  Patient Class: IP- Inpatient  Admission Date: 3/7/2025  Hospital Length of Stay: 6 days  Discharge Date and Time: No discharge date for patient encounter.  Attending Physician: Danelle Gonzales MD   Discharging Provider: Danelle Gonzales MD  Primary Care Provider: Lola Wolff MD  [facility] Medicine Team: [facility] MED  Danelle Gonzales MD  Primary Care Team: [facility]    HPI:   Dorothy Knobloch is a 89 y.o. female with a hx of Afib, HTN, CHF, anemia and hypothyroidism presents to the ED s/p fall. Patient reports she was walking to the door with her walker to open it for their sitter when she leaned her walker and subsequently fell over it. States she missed the door handle which caused her to fall. Reports she hit her head twice on the floor but did not lose consciousness. She notes immediate pain to her RLE and was unable to move it/ get up off the ground. Reports she is usually ambulatory without difficulty with her walker. Last dose of her anticoagulation medication was yesterday. Denies fever, chills, chest pain, SOB, abdominal pain and urinary symptoms. Denies numbness and tingling.     ED: AF, VSS. K 3.4. CT Head w/o acute process. CT C spine showed no acute fracture. Xray femur showed RIGHT femoral shaft fracture. Given tylenol and robaxin in the ED. Ortho consulted. Admitted to .     Procedure(s) (LRB):  OPEN REDUCTION INTERNAL FIXATION FEMORAL SHAFT FRACTURE (Right)      Hospital Course:   89 y/o female with PAF on long term Apixiban at home for anticoagulation, HTN, chronic diastolic heart failure, chronic anemia, cardiac pacemaker due to sick sinus syndrome and hypothyroidism presents to the ED after fall at home. Patient was walking to the door with her walker to open it for their sitter when she leaned her walker and subsequently fell over it. States she missed  the door handle which caused her to fall. Reports she hit her head twice on the floor but did not lose consciousness. She notes immediate pain to her RLE and was unable to move it/ get up off the ground. EMS called and patient brought to ED where X-ray imaging of right leg revealed a closed periprosthetic right displaced femoral shaft fracture.Orthopedic surgery consult for closed periprosthetic right femoral shaft fracture in patient with previous right MARCEL . Patient was seen and evaluated by Orthopedic surgery who recommended operative repair of fracture. Patient was medically optimized prior to surgery and was taken to OR after optimization on 3/8/2025. Patient underwent right femur ORIF with long femoral plate and screws and cables to repair fracture byDr. Albino William. Post-op patient weight bear as tolerated to the left lower extremity as per Orthopedics recommendation. Patient restarted on home Apixiban post-op but at reduced dosing of 2.5 mg po BID as per request of Orthopedics (Dr. William) as concerned about bleeding into surgical site and requesting decreased dose for 1 week and then can return back to home dosing of 5 mg po BID after 1 week on 3/15. No other DVT prophylaxis needed post-op as on oral anticoagulation. Perineural pain catheter placed by Anesthesia Pain Service with continuous infusion of Ropivacaine to help with pain control post-op and Anesthesia Pain Service managing while patient in the hospital. Patient placed on multimodal pain management post-op with Tylenol 1000 mg po every 8 hours and Robaxin 500 mg po every 8 hours post-op and will continue. PT/OT consulted post-op and recommending moderate intensity therapy and SNF referrals sent. Patient placed on IV Cefazolin post-op for 24 hours for antibiotic prophylaxis post-op and received 24 hours of Cefazolin and now on Cefadroxil 500 mg po BID for 7 days for extended antibiotic prophylaxis post-op to prevent post-op wound infection at  surgical site. Hgb post-op down to 7.8 on 3/9 and patient having chest pain so having symptomatic anemia so transfused 1 unit of PRBCs on 3/9. Hgb remained the same at 7.8 on 3/10 despite 1 unit of PRBCs transfused on 3/9 so transfused another 1 unit of PRBCs on 3/10. Patient with no clinical signs of bleeding and no recurrence of chest pain on 3/10. Pain controlled on 3/11 to right hip. Hgb improved to 9.8 on 3/11 after transfused 1 unit of PRBCs on 3/9 and 3/10. Patient improving with therapy and participating and doing better. Patient accepted to [facility] as long as medically stable for 3/12. Perineural pain catheter removed by Anesthesia on 3/11. [facility] not able to accept patient on 3/12 as bed fell through and now she does not have a bed at [facility] so case management working on alternative SNF placement for 3/13. Patient medically ready for discharge on 3/12. Hgb stable at 9.0 on 3/12. Vital signs stable on 3/12.     3/13-  right periprosthetic [place] C femur fracture; now  s/p ORIF (3/8/25). S/p transfusion 2 units PRBCs, - PT/OT: WBAT LLE- AC: Eliquis 2.5 mg BID x 7 d and then return to home dose of 5 mg BID on 3/15, Plavix 75 mg daily, FCDs at all times when not ambulating.  MRDC pending SNF.  Na 133.           Goals of Care Treatment Preferences:  Code Status: Full Code    Health care agent: Mckayla Solis  [facility] agent number: [phone]          What is most important right now is to focus on spending time at home, quality of life, even if it means sacrificing a little time.  Accordingly, we have decided that the best plan to meet the patient's goals includes continuing with treatment.         Consults:   Consults (From admission, onward)          Status Ordering Provider     Inpatient consult to Orthopedic Surgery  Once        Provider:  (Not yet assigned)    Completed GAVIN TSAI            Assessment & Plan  Periprosthetic fracture of shaft of right femur s/p ORIF on  3/8/2025  89 y/o female with PAF on long term Apixiban at home for anticoagulation, HTN, chronic diastolic heart failure, chronic anemia, cardiac pacemaker due to sick sinus syndrome and hypothyroidism presents to the ED after fall at home. Patient was walking to the door with her walker to open it for their sitter when she leaned her walker and subsequently fell over it. States she missed the door handle which caused her to fall. Patient noted immediate pain to her RLE and was unable to move it/ get up off the ground. EMS called and patient brought to ED where X-ray imaging of right leg revealed a closed periprosthetic right displaced femoral shaft fracture. Orthopedic surgery consult for closed periprosthetic right femoral shaft fracture in patient with previous right MARCEL. Patient was seen and evaluated by Orthopedic surgery who recommended operative repair of fracture.     - Patient taken to OR on 3/8/2025 and underwent right femur ORIF with long femoral plate and screws and cables to repair fracture by Dr. Albino William.   - Post-op, patient weight bear as tolerate to the left lower extremity with assistive device as per Orthopedics recommendation.   - Patient restarted on home Apixiban post-op but at reduced dosing of 2.5 mg po BID as per request of Orthopedics (Dr. William) as concerned about bleeding into surgical site and requesting decreased dose for 1 week and then can return back to home dosing of 5 mg po BID after 1 week on 3/15.   - Perineural pain catheter removed by Anesthesia on 3/11.   - Pain controlled to right hip. Continue on multimodal pain management post-op with Tylenol 1000 mg po every 8 hours and Robaxin 500 mg po every 8 hours post-op.  - PT/OT consulted post-op and to worked with patient and recommending moderate intensity therapy on discharge when medically ready. Case management met with patient and her daughter and patient and SNF referrals sent. Patient has been accepted to [facility]. [facility] not able to accept patient on 3/12 as bed fell through and now she does not have a bed at [facility] so case management working on alternative SNF placement with patient and family. Hopeful discharge for 3/13 pending SNF placement. Patient medically ready for discharge.  - Patient completed IV Cefazolin for 24 hours post-op for antibiotic prophylaxis post-op and now on Cefadroxil 500 mg po BID for 7 days to extended antibiotic prophylaxis post-op to prevent post-op wound infection at surgical site. End date 3/15/2025.   - Surgical bandages to remain in place to right thigh and thigh area until Orthopedic clinic follow-up.   - Ortho following and managing surgical site. Appreciate assistance.     Acute blood loss anemia  - Anemia stable at 9.0 on 3/12.   - Anemia improved on 3/11. Hgb up to 9.8 on 3/11 after transfused 1 unit of PRBCs on 3/10 fro Hgb 7.8.   - Anemia unchanged on 3/10 and still with Hgb 7.8 on 3/10 after transfused 1 unit of PRBCs on 3/9 for Hgb 7.8 so to transfuse another 1 unit of PRBCs on 3/10.   - Anemia worsened on 3/9 and Hgb down to 7.8 from 8.7 on 3/8. Patient having chest pain and symptomatic in relation to her anemia and suspect anemia causing chest pain and heart strain so to be transfused 1 unit of PRBCs today, 3/9.   - Patient with Hgb 9.8 on admit and normally her Hgb is around 12 so suspect acute blood loss on admit related to blood loss from her significant right femur fracture. Hgb pre-op on 3/8 was 8.7 and transfused 1 unit of PRBCs intra-op on 3/8. Most recent hemoglobin and hematocrit are listed below.  Recent Labs     03/11/25  0803 03/12/25  0409   HGB 9.8* 9.0*   HCT 29.7* 28.0*     Plan  - Monitor serial CBC: Daily  - Transfuse PRBC if patient becomes hemodynamically unstable, symptomatic or H/H drops below 7/21.  - Patient received 1 units of PRBCs on 3/8/2025 intra-op. Patient received another 1 unit of PRBCs on 3/9 for symptomatic anemia with Hgb 7.8 with  no improvement in Hgb so receive another 1 unit of PRBCs on 3/10 for Hgb 7.8. Hgb improved to 9.8 on 3/11.   - Patient's anemia is currently stable.    Paroxysmal atrial fibrillation  On anticoagulant therapy  On anticoagulant therapy   Patient has paroxysmal (<7 days) atrial fibrillation. Patient is currently in sinus rhythm. [id number] Score: 4. The patients heart rate in the last 24 hours is as follows:  Pulse  Min: 60  Max: 67     Antiarrhythmics  metoprolol succinate (TOPROL-XL) 24 hr tablet 25 mg, 2 times daily, Oral    Plan  - Replete lytes with a goal of K>4, Mg >2  - Patient is anticoagulated at home with Apixiban 5 mg po BID and held pre-op. Resumed post-op but at reduced dose of 2.5 mg po BID as per request of Orthopedics (Dr. William) as concerned about bleeding into surgical site and requesting decreased dose for 1 week and then can return back to home dosing of 5 mg po BID on 3/15.   - Patient's afib is currently controlled. Patient in paced rhythm. Continue home Toprol XL for rate control.     Essential hypertension  Patient's blood pressure range in the last 24 hours was: BP  Min: 117/55  Max: 138/63.The patient's inpatient anti-hypertensive regimen is listed below:  Current Antihypertensives  amLODIPine tablet 5 mg, Daily, Oral  hydroCHLOROthiazide tablet 12.5 mg, Daily, Oral  metoprolol succinate (TOPROL-XL) 24 hr tablet 25 mg, 2 times daily, Oral  bumetanide tablet 0.5 mg, Daily, Oral  nitroGLYCERIN SL tablet 0.4 mg, Every 5 min PRN, Sublingual  timolol maleate 0.5% ophthalmic solution 1 drop, 2 times daily, Left Eye  , 2 times daily, Left Eye    Plan  - BP is controlled, no changes needed to their regimen.  - Goal BP < 140/90.    Pure hypercholesterolemia  Chronic and controlled and not on any outpatient therapy to treat. Patient unable to tolerate statin therapy and had reaction on Praluent and unable to tolerate also.     Chronic diastolic congestive heart failure  Patient has Diastolic (HFpEF)  "heart failure that is Chronic. On presentation their CHF was well compensated. Most recent BNP and echo results are listed below.  No results for input(s): "BNP" in the last 72 hours.  Latest ECHO  Results for orders placed during the hospital encounter of 06/20/24    Echo    Interpretation Summary    Left Ventricle: The left ventricle is normal in size. Normal wall thickness. There is concentric hypertrophy. There is normal systolic function with a visually estimated ejection fraction of 65 - 70%. Unable to assess diastolic function due to atrial fibrillation.    Right Ventricle: Normal right ventricular cavity size. Wall thickness is normal. Systolic function is normal.    Left Atrium: Left atrium is severely dilated.    Right Atrium: Right atrium is severely dilated.    Mitral Valve: There is moderate posterior leaflet sclerosis. There is no stenosis. The mean pressure gradient across the mitral valve is 1 mmHg at a heart rate of 60 bpm. There is moderate regurgitation.    Tricuspid Valve: There is moderate to severe regurgitation.    Pulmonic Valve: There is no stenosis.    Pulmonary Artery: There is moderate pulmonary hypertension. The estimated pulmonary artery systolic pressure is 60 mmHg.    IVC/SVC: Intermediate venous pressure at 8 mmHg.    Current Heart Failure Medications  hydroCHLOROthiazide tablet 12.5 mg, Daily, Oral  metoprolol succinate (TOPROL-XL) 24 hr tablet 25 mg, 2 times daily, Oral  bumetanide tablet 0.5 mg, Daily, Oral    Plan  - Monitor strict I&Os and daily weights.    - Monitor on telemetry  - Cardiac diet  - Cardiology has not been consulted.  - The patient's volume status is at their baseline.    Acquired hypothyroidism  - Chronic and controlled. Continue home Synthroid to treat.     Cardiac pacemaker  Patient with cardiac pacemaker in situ and has paced rhythm on telemetry. Pacemaker in place for sick sinus syndrome.    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 Patient on Combigan eye drops 1 drop[ BID to left eye as outpatient to treat. Continue Brimonidine and Timolol eye drops 1 drop in left eye BID in hospital.       Final Active Diagnoses:    Diagnosis Date Noted POA    PRINCIPAL PROBLEM:  Periprosthetic fracture of shaft of right femur s/p ORIF on 3/8/2025 [M97.8XXA, Z96.649] 03/07/2025 Yes    Acute blood loss anemia [D62] 03/07/2025 Yes    Chronic diastolic congestive heart failure [I50.32] 11/24/2019 Yes    Paroxysmal atrial fibrillation [I48.0] 09/11/2018 Yes     Chronic    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] 08/10/2018 Yes    Cardiac pacemaker [Z95.0] 08/22/2018 Yes    Essential hypertension [I10] 12/13/2013 Yes     Chronic    Pure hypercholesterolemia [E78.00] 12/13/2013 Yes    Acquired hypothyroidism [E03.9] 12/13/2013 Yes    Nonexudative age-related macular degeneration, bilateral, intermediate dry stage [H35.3132] 07/12/2018 Yes    On anticoagulant therapy [Z79.01] 06/13/2024 Not Applicable      Problems Resolved During this Admission:    Diagnosis Date Noted Date Resolved POA    Other chest pain [R07.89] 03/09/2025 03/11/2025 No    Hypokalemia [E87.6] 04/05/2024 03/09/2025 Yes       Discharged Condition: good    Disposition: Skilled Nursing Facility    Follow Up:    Patient Instructions:      Diet Adult Regular     Leave dressing on - Keep it clean, dry, and intact until clinic visit     Notify your health care provider if you experience any of the following:  temperature >100.4     Notify your health care provider if you experience any of the following:  persistent nausea and vomiting or diarrhea     Notify your health care provider if you experience any of the following:  severe uncontrolled pain     Notify your health care provider if you experience any of the following:  redness, tenderness, or signs of infection (pain,  swelling, redness, odor or green/yellow discharge around incision site)     Notify your health care provider if you experience any of the following:  difficulty breathing or increased cough     Notify your health care provider if you experience any of the following:  severe persistent headache     Notify your health care provider if you experience any of the following:  worsening rash     Notify your health care provider if you experience any of the following:  persistent dizziness, light-headedness, or visual disturbances     Notify your health care provider if you experience any of the following:  increased confusion or weakness     Sponge bath only until clinic visit     Weight bearing restrictions (specify):   Order Comments: Weight bear as tolerated to right lower extremity with assistive device       Significant Diagnostic Studies: Labs: CMP   Recent Labs   Lab 03/11/25  0803 03/12/25  0409   * 133*   K 4.2 3.9    100   CO2 28 27   GLU 99 91   BUN 27 29   CREATININE 0.5 0.6   CALCIUM 8.8 8.7   ANIONGAP 5* 6*    and CBC   Recent Labs   Lab 03/11/25  0803 03/12/25  0409   WBC 9.66 9.35   HGB 9.8* 9.0*   HCT 29.7* 28.0*    181       Pending Diagnostic Studies:       None           Medications:  Reconciled Home Medications:      Medication List        START taking these medications      cefadroxil 500 MG Cap  Commonly known as: DURICEF  Take 1 capsule (500 mg total) by mouth every 12 (twelve) hours. Extended antibiotic prophylaxis after hip surgery with end date 3/15/2025. for 7 days     melatonin 3 mg tablet  Commonly known as: MELATIN  Take 2 tablets (6 mg total) by mouth nightly as needed for Insomnia.     methocarbamoL 500 MG Tab  Commonly known as: Robaxin  Take 1 tablet (500 mg total) by mouth every 8 (eight) hours.     senna-docusate 8.6-50 mg 8.6-50 mg per tablet  Commonly known as: PERICOLACE  Take 1 tablet by mouth 2 (two) times daily.     traMADoL 50 mg tablet  Commonly known as:  ULTRAM  Take 1 tablet (50 mg total) by mouth every 6 (six) hours as needed (Moderate to severe pain).            CHANGE how you take these medications      apixaban 5 mg Tab  Commonly known as: ELIQUIS  Take 1 tablet (5 mg total) by mouth 2 (two) times daily. Patient to decrease dosing of Apixiban to 2.5 mg po twice daily for 1 week after surgery done on 3/8/2025 and then can resume back to her home dosing of 5 mg po BID on 315/2025.  What changed: additional instructions     brimonidine-timoloL 0.2-0.5 % Drop  Commonly known as: COMBIGAN  Place 1 drop into the left eye 2 (two) times a day.  What changed: See the new instructions.            CONTINUE taking these medications      amLODIPine 5 MG tablet  Commonly known as: NORVASC  Take 1 tablet (5 mg total) by mouth once daily.     bumetanide 0.5 MG Tab  Commonly known as: BUMEX  Take 1 tablet by mouth once daily.     clopidogreL 75 mg tablet  Commonly known as: PLAVIX  Take 1 tablet (75 mg total) by mouth once daily.     erythromycin ophthalmic ointment  Commonly known as: ROMYCIN  APPLY 1 INCH TO THE BOTTOM LID OF LEFT EYE FOR FIRST WEEK OF EVERY MONTH     EScitalopram oxalate 20 MG tablet  Commonly known as: LEXAPRO  Take 1 tablet (20 mg total) by mouth once daily.     hydroCHLOROthiazide 12.5 MG Tab  TAKE 1 TABLET BY MOUTH EVERY DAY     levothyroxine 88 MCG tablet  Commonly known as: SYNTHROID  Take 88 mcg by mouth before breakfast.     metoprolol succinate 25 MG 24 hr tablet  Commonly known as: TOPROL-XL  TAKE 2 TABLETS IN AM AND 1 TABLET IN PM     NITROSTAT 0.4 mg SL tablet  Generic drug: nitroGLYCERIN  Place 0.4 mg under the tongue every 5 (five) minutes as needed for Chest pain.            STOP taking these medications      acetaminophen 500 MG tablet  Commonly known as: TYLENOL     brinzolamide 1 % ophthalmic suspension  Commonly known as: AZOPT     cholecalciferol (vitamin D3) 1,250 mcg (50,000 unit) capsule     hydrocortisone 2.5 % cream     LIDOcaine 5  %  Commonly known as: LIDODERM     SALONPAS TOP              Indwelling Lines/Drains at time of discharge:   Lines/Drains/Airways       None                   Time spent on the discharge of patient: 35 minutes         Danelle Gonzales MD  Department of Hospital Medicine  [facility] - Surgery

## 2025-03-13 NOTE — ASSESSMENT & PLAN NOTE
"Patient has Diastolic (HFpEF) heart failure that is Chronic. On presentation their CHF was well compensated. Most recent BNP and echo results are listed below.  No results for input(s): "BNP" in the last 72 hours.  Latest ECHO  Results for orders placed during the hospital encounter of 06/20/24    Echo    Interpretation Summary    Left Ventricle: The left ventricle is normal in size. Normal wall thickness. There is concentric hypertrophy. There is normal systolic function with a visually estimated ejection fraction of 65 - 70%. Unable to assess diastolic function due to atrial fibrillation.    Right Ventricle: Normal right ventricular cavity size. Wall thickness is normal. Systolic function is normal.    Left Atrium: Left atrium is severely dilated.    Right Atrium: Right atrium is severely dilated.    Mitral Valve: There is moderate posterior leaflet sclerosis. There is no stenosis. The mean pressure gradient across the mitral valve is 1 mmHg at a heart rate of 60 bpm. There is moderate regurgitation.    Tricuspid Valve: There is moderate to severe regurgitation.    Pulmonic Valve: There is no stenosis.    Pulmonary Artery: There is moderate pulmonary hypertension. The estimated pulmonary artery systolic pressure is 60 mmHg.    IVC/SVC: Intermediate venous pressure at 8 mmHg.    Current Heart Failure Medications  hydroCHLOROthiazide tablet 12.5 mg, Daily, Oral  metoprolol succinate (TOPROL-XL) 24 hr tablet 25 mg, 2 times daily, Oral  bumetanide tablet 0.5 mg, Daily, Oral    Plan  - Monitor strict I&Os and daily weights.    - Monitor on telemetry  - Cardiac diet  - Cardiology has not been consulted.  - The patient's volume status is at their baseline.    STABLE    "

## 2025-03-13 NOTE — PLAN OF CARE
Patient  [facility] bed for yesterday became unavailable do to [facility] patient appeal being upheld.      Per Cierra with [facility], they will have pm bed available for patient today.    DC today to [facility],  daughter, and son notified.    3:13p  [facility] wheelchair transportation set up for 6:30pm per [facility] request    Nurse requested to call report at 6pm [phone]    Nurse and patient notified of above.     3:46p  Spoke with daughter Gemma regarding above, she verbalized understanding and appreciation.

## 2025-03-13 NOTE — NURSING
Report called to nurse at [facility]. Patient awaiting transpiration via wheelchair to facility. .

## 2025-03-13 NOTE — ASSESSMENT & PLAN NOTE
On anticoagulant therapy   Patient has paroxysmal (<7 days) atrial fibrillation. Patient is currently in sinus rhythm. [id number] Score: 4. The patients heart rate in the last 24 hours is as follows:  Pulse  Min: 60  Max: 67     Antiarrhythmics  metoprolol succinate (TOPROL-XL) 24 hr tablet 25 mg, 2 times daily, Oral    Plan  - Replete lytes with a goal of K>4, Mg >2  - Patient is anticoagulated at home with Apixiban 5 mg po BID and held pre-op. Resumed post-op but at reduced dose of 2.5 mg po BID as per request of Orthopedics (Dr. William) as concerned about bleeding into surgical site and requesting decreased dose for 1 week and then can return back to home dosing of 5 mg po BID on 3/15.   - Patient's afib is currently controlled. Patient in paced rhythm. Continue home Toprol XL for rate control.     3/13- on apixaban 2.5 bid to reduce bleeding risk. can return back to home dosing of 5 mg po BID on 3/15.     On anticoagulant therapy   Patient has paroxysmal (<7 days) atrial fibrillation. Patient is currently in sinus rhythm. [id number] Score: 4. The patients heart rate in the last 24 hours is as follows:  Pulse  Min: 60  Max: 67     Antiarrhythmics  metoprolol succinate (TOPROL-XL) 24 hr tablet 25 mg, 2 times daily, Oral    Plan  - Replete lytes with a goal of K>4, Mg >2  - Patient is anticoagulated at home with Apixiban 5 mg po BID and held pre-op. Resumed post-op but at reduced dose of 2.5 mg po BID as per request of Orthopedics (Dr. William) as concerned about bleeding into surgical site and requesting decreased dose for 1 week and then can return back to home dosing of 5 mg po BID on 3/15.   - Patient's afib is currently controlled. Patient in paced rhythm. Continue home Toprol XL for rate control.   - SEE ABOVE

## 2025-03-13 NOTE — PROGRESS NOTES
[facility] - [facility]  Orthopedics  Progress Note    Patient Name: Dorothy M Knobloch  MRN: [id number]  Admission Date: 3/7/2025  Hospital Length of Stay: 6 days  Attending Provider: Grisel Liao MD  Primary Care Provider: Lola Wolff MD  Follow-up For: Procedure(s) (LRB):  OPEN REDUCTION INTERNAL FIXATION FEMORAL SHAFT FRACTURE (Right)    Post-Operative Day: 5 Days Post-Op  Subjective:     Principal Problem:Periprosthetic fracture of shaft of femur    Principal Orthopedic Problem: as above; now s/p ORIF (3/8)    Interval History: Afebrile, VSS, NAEON.  Awake and comfortable this am. Patient continues to complain of heaviness in RLE. Pain has been reportedly well controlled. Voiding and tolerating PO w/o issues. Worked with PT/OT yesterday.          Review of patient's allergies indicates:   Allergen Reactions    Aspartame Swelling     equal       Current Facility-Administered Medications   Medication    acetaminophen tablet 1,000 mg    albuterol-ipratropium 2.5 mg-0.5 mg/3 mL nebulizer solution 3 mL    amLODIPine tablet 5 mg    apixaban tablet 2.5 mg    bisacodyL suppository 10 mg    brimonidine 0.15 % OPTH DROP ophthalmic solution 1 drop    bumetanide tablet 0.5 mg    cefadroxil capsule 500 mg    clopidogreL tablet 75 mg    dextrose 50% injection 12.5 g    dextrose 50% injection 25 g    EScitalopram oxalate tablet 20 mg    glucagon (human recombinant) injection 1 mg    glucose chewable tablet 16 g    glucose chewable tablet 24 g    hydroCHLOROthiazide tablet 12.5 mg    levothyroxine tablet 88 mcg    melatonin tablet 6 mg    methocarbamoL tablet 500 mg    metoprolol succinate (TOPROL-XL) 24 hr tablet 25 mg    nitroGLYCERIN SL tablet 0.4 mg    ondansetron injection 4 mg    polyethylene glycol packet 17 g    senna-docusate 8.6-50 mg per tablet 1 tablet    sodium chloride 0.9% flush 10 mL    timolol maleate 0.5% ophthalmic solution 1 drop    vitamin D 1000 units tablet 1,000 Units     Objective:     Vital  "Signs (Most Recent):  Temp: 97.7 °F (36.5 °C) (03/13/25 0445)  Pulse: 62 (03/13/25 0445)  Resp: 18 (03/13/25 0445)  BP: 138/63 (03/13/25 0445)  SpO2: 98 % (03/13/25 0445) Vital Signs (24h Range):  Temp:  [97.6 °F (36.4 °C)-98.2 °F (36.8 °C)] 97.7 °F (36.5 °C)  Pulse:  [60-67] 62  Resp:  [18] 18  SpO2:  [94 %-98 %] 98 %  BP: (117-138)/(55-63) 138/63     Weight: 59 kg (130 lb 1.1 oz)  Height: 5' 2" (157.5 cm)  Body mass index is 23.79 kg/m².      Intake/Output Summary (Last 24 hours) at 3/13/2025 0613  Last data filed at 3/12/2025 1811  Gross per 24 hour   Intake --   Output 1100 ml   Net -1100 ml        Ortho/SPM Exam  Alert  NAD  Reg rate  No increased WOB    Right LE:  Surgical dressing with strikethrough, intact   SILT T/SP/DP/Kaur/Sa  Motor intact T/SP/DP  WWP extremities  FCDs in place and functioning       Significant Labs: All pertinent labs within the past 24 hours have been reviewed.    Significant Imaging: I have reviewed all pertinent imaging results/findings.  Assessment/Plan:     * Periprosthetic fracture of shaft of right femur s/p ORIF on 3/8/2025  Dorothy M Knobloch is a 89 y.o. female who presented with a right periprosthetic Kingfisher C femur fracture; now  s/p ORIF (3/8/25)    - Pain: multimodal regimen limiting narcotics  - PT/OT: WBAT LLE  - AC: Eliquis 2.5 mg BID x 7 d and then return to home dose of 5 mg BID, Plavix 75 mg daily, FCDs at all times when not ambulating  - Abx: 24hr postop ancef completed, now on Cefadroxil protocol   - Removed aquacel dressing and applied prevena wound vac  - Keep wound vac in place RLE until FU visit  - Farida: dc'd POD1     » Dispo: Potential dc today pending medical stabilization. Pt will FU in clinic - scheduled for 3/27, patient will be contacted with appointment details.          Emy Mercedes MD  Orthopedics  [facility] - Surgery    "

## 2025-03-13 NOTE — ASSESSMENT & PLAN NOTE
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 Patient on Combigan eye drops 1 drop[ BID to left eye as outpatient to treat. Continue Brimonidine and Timolol eye drops 1 drop in left eye BID in hospital.   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 Patient on Combigan eye drops 1 drop[ BID to left eye as outpatient to treat. Continue Brimonidine and Timolol eye drops 1 drop in left eye BID in hospital.

## 2025-03-13 NOTE — PT/OT/SLP PROGRESS
"Physical Therapy Treatment    Patient Name:  Dorothy M Knobloch   MRN:  [id number]    Recommendations:     Discharge Recommendations: Moderate Intensity Therapy  Discharge Equipment Recommendations: walker, rolling, wheelchair  Barriers to discharge: Decreased caregiver support and requiring increased level of skilled assist    Assessment:     Dorothy M Knobloch is a 89 y.o. female admitted with a medical diagnosis of Periprosthetic fracture of shaft of femur.  She presents with the following impairments/functional limitations: weakness, impaired endurance, impaired self care skills, impaired functional mobility, gait instability, impaired balance, visual deficits, decreased lower extremity function, decreased ROM, edema, impaired cardiopulmonary response to activity Patient pleasantly agreeable to therapy, tolerated the supine therex well. Given handout of therex. Patient will continue to benefit from skilled PT during this admit to address BLE strength and endurance deficits, and maximize independence with functional mobility.    Rehab Prognosis: Good; patient would benefit from acute skilled PT services to address these deficits and reach maximum level of function.    Recent Surgery: Procedure(s) (LRB):  OPEN REDUCTION INTERNAL FIXATION FEMORAL SHAFT FRACTURE (Right) 5 Days Post-Op    Plan:     During this hospitalization, patient to be seen daily to address the identified rehab impairments via gait training, therapeutic activities, therapeutic exercises, neuromuscular re-education and progress toward the following goals:    Plan of Care Expires:  03/30/25    Subjective     Chief Complaint: "I just couldn't do that well today"  Patient/Family Comments/goals: decrease caregiver burden  Pain/Comfort:  Pain Rating 1: 0/10  Pain Addressed 1: Pre-medicate for activity  Pain Rating Post-Intervention 1: 0/10      Objective:     Communicated with RN prior to session.  Patient found HOB elevated with peripheral IV, PureWick, " FCD, perineural catheter upon PT entry to room.     General Precautions: Standard, fall, vision impaired  Orthopedic Precautions: RLE weight bearing as tolerated  Braces: N/A  Respiratory Status: Nasal cannula     Functional Mobility:  Concentrated session on supine therex      AM-PAC 6 CLICK MOBILITY  Turning over in bed (including adjusting bedclothes, sheets and blankets)?: 3  Sitting down on and standing up from a chair with arms (e.g., wheelchair, bedside commode, etc.): 2  Moving from lying on back to sitting on the side of the bed?: 3  Moving to and from a bed to a chair (including a wheelchair)?: 2  Need to walk in hospital room?: 2  Climbing 3-5 steps with a railing?: 1  Basic Mobility Total Score: 13       Treatment & Education:  Patient educated on current level of function and progression towards therapeutic goals.  All items placed within reach, and notified on call light usage for assistance with any needs for fall prevention.  Patient educated on importance of EOB/OOB activity to promote overall endurance.  X15 B ankle pumps, hib add/abd, quad sets, glute sets, SAQ (assistance provided as needed)    Patient left HOB elevated with all lines intact, call button in reach, bed alarm on, and RN notified..    GOALS:   Multidisciplinary Problems       Physical Therapy Goals          Problem: Physical Therapy    Goal Priority Disciplines Outcome Interventions   Physical Therapy Goal     PT, PT/OT Progressing    Description: Goals to be met by: 3/30/2025     Patient will increase functional independence with mobility by performin. Supine to sit with Supervision  2. Sit to supine with Supervision   3. Sit to stand transfer with Stand-by Assistance  4. Bed to chair transfer with Contact Guard Assistance using LRAD  5. Gait  x 50 feet with Stand-by Assistance using LRAD.                          DME Justifications:  Dorothy Knobloch has a mobility limitation that significantly impairs her ability to  participate in one or more mobility related activities of daily living (MRADLs) such as toileting, feeding, dressing, grooming, and bathing in customary locations in the home.  The mobility limitation cannot be sufficiently resolved by the use of a cane or walker.   The use of a manual wheelchair will significantly improve the patients ability to participate in MRADLS and the patient will use it on regular basis in the home.  Dorothy Knobloch has expressed her willingness to use a manual wheelchair in the home. Patients upper body strength is sufficient for propulsion.  She also has a caregiver who is available, willing, and able to provide assistance with the wheelchair when needed.     Carol's mobility limitation cannot be sufficiently resolved by the use of a cane. Her functional mobility deficit can be sufficiently resolved with the use of a Rolling Walker. Patient's mobility limitation significantly impairs their ability to participate in one of more activities of daily living.  The use of a RW will significantly improve the patient's ability to participate in MRADLS and the patient will use it on regular basis in the home.    Time Tracking:     PT Received On: 03/13/25  PT Start Time: 1507     PT Stop Time: 1517  PT Total Time (min): 10 min     Billable Minutes: Therapeutic Exercise 10    Treatment Type: Treatment  PT/PTA: PT     Number of PTA visits since last PT visit: 0     03/13/2025

## 2025-03-13 NOTE — PROGRESS NOTES
[facility] Medicine  Progress Note    Patient Name: Dorothy M Knobloch  MRN: [id number]  Patient Class: IP- Inpatient   Admission Date: 3/7/2025  Length of Stay: 6 days  Attending Physician: Danelle Gonzales MD  Primary Care Provider: Lola Wolff MD    Subjective     Principal Problem:Periprosthetic fracture of shaft of femur        HPI:  Dorothy Knobloch is a 89 y.o. female with a hx of Afib, HTN, CHF, anemia and hypothyroidism presents to the ED s/p fall. Patient reports she was walking to the door with her walker to open it for their sitter when she leaned her walker and subsequently fell over it. States she missed the door handle which caused her to fall. Reports she hit her head twice on the floor but did not lose consciousness. She notes immediate pain to her RLE and was unable to move it/ get up off the ground. Reports she is usually ambulatory without difficulty with her walker. Last dose of her anticoagulation medication was yesterday. Denies fever, chills, chest pain, SOB, abdominal pain and urinary symptoms. Denies numbness and tingling.     ED: AF, VSS. K 3.4. CT Head w/o acute process. CT C spine showed no acute fracture. Xray femur showed RIGHT femoral shaft fracture. Given tylenol and robaxin in the ED. Ortho consulted. Admitted to .     Overview/Hospital Course:  89 y/o female with PAF on long term Apixiban at home for anticoagulation, HTN, chronic diastolic heart failure, chronic anemia, cardiac pacemaker due to sick sinus syndrome and hypothyroidism presents to the ED after fall at home. Patient was walking to the door with her walker to open it for their sitter when she leaned her walker and subsequently fell over it. States she missed the door handle which caused her to fall. Reports she hit her head twice on the floor but did not lose consciousness. She notes immediate pain to her RLE and was unable to move it/ get up off the ground. EMS called and patient brought to ED  where X-ray imaging of right leg revealed a closed periprosthetic right displaced femoral shaft fracture.Orthopedic surgery consult for closed periprosthetic right femoral shaft fracture in patient with previous right MARCEL . Patient was seen and evaluated by Orthopedic surgery who recommended operative repair of fracture. Patient was medically optimized prior to surgery and was taken to OR after optimization on 3/8/2025. Patient underwent right femur ORIF with long femoral plate and screws and cables to repair fracture byDr. Albino William. Post-op patient weight bear as tolerated to the left lower extremity as per Orthopedics recommendation. Patient restarted on home Apixiban post-op but at reduced dosing of 2.5 mg po BID as per request of Orthopedics (Dr. William) as concerned about bleeding into surgical site and requesting decreased dose for 1 week and then can return back to home dosing of 5 mg po BID after 1 week on 3/15. No other DVT prophylaxis needed post-op as on oral anticoagulation. Perineural pain catheter placed by Anesthesia Pain Service with continuous infusion of Ropivacaine to help with pain control post-op and Anesthesia Pain Service managing while patient in the hospital. Patient placed on multimodal pain management post-op with Tylenol 1000 mg po every 8 hours and Robaxin 500 mg po every 8 hours post-op and will continue. PT/OT consulted post-op and recommending moderate intensity therapy and SNF referrals sent. Patient placed on IV Cefazolin post-op for 24 hours for antibiotic prophylaxis post-op and received 24 hours of Cefazolin and now on Cefadroxil 500 mg po BID for 7 days for extended antibiotic prophylaxis post-op to prevent post-op wound infection at surgical site. Hgb post-op down to 7.8 on 3/9 and patient having chest pain so having symptomatic anemia so transfused 1 unit of PRBCs on 3/9. Hgb remained the same at 7.8 on 3/10 despite 1 unit of PRBCs transfused on 3/9 so transfused another  1 unit of PRBCs on 3/10. Patient with no clinical signs of bleeding and no recurrence of chest pain on 3/10. Pain controlled on 3/11 to right hip. Hgb improved to 9.8 on 3/11 after transfused 1 unit of PRBCs on 3/9 and 3/10. Patient improving with therapy and participating and doing better. Patient accepted to [facility] as long as medically stable for 3/12. Perineural pain catheter removed by Anesthesia on 3/11. [facility] not able to accept patient on 3/12 as bed fell through and now she does not have a bed at [facility] so case management working on alternative SNF placement for 3/13. Patient medically ready for discharge on 3/12. Hgb stable at 9.0 on 3/12. Vital signs stable on 3/12.     3/13-  right periprosthetic [place] C femur fracture; now  s/p ORIF (3/8/25). S/p transfusion 2 units PRBCs, - PT/OT: WBAT LLE- AC: Eliquis 2.5 mg BID x 7 d and then return to home dose of 5 mg BID on 3/15, Plavix 75 mg daily, FCDs at all times when not ambulating.  MRDC pending SNF.  Na 133.          Interval History: see above    Review of Systems   Constitutional:  Positive for activity change. Negative for appetite change and fever.   HENT:  Negative for trouble swallowing.    Respiratory:  Negative for cough, choking and shortness of breath.    Cardiovascular:  Negative for chest pain and leg swelling.   Gastrointestinal:  Negative for abdominal pain, blood in stool, constipation, diarrhea and nausea.   Genitourinary:  Negative for difficulty urinating.   Musculoskeletal:  Positive for arthralgias (right hip) and gait problem.   Neurological:  Negative for numbness and headaches.   Psychiatric/Behavioral:  Negative for behavioral problems and confusion.      Objective:     Vital Signs (Most Recent):  Temp: 97.7 °F (36.5 °C) (03/13/25 0445)  Pulse: 62 (03/13/25 0445)  Resp: 18 (03/13/25 0445)  BP: 138/63 (03/13/25 0445)  SpO2: 98 % (03/13/25 0445) Vital Signs (24h Range):  Temp:  [97.6 °F (36.4 °C)-98.2 °F (36.8 °C)]  97.7 °F (36.5 °C)  Pulse:  [60-67] 62  Resp:  [18] 18  SpO2:  [94 %-98 %] 98 %  BP: (117-138)/(55-63) 138/63     Weight: 59 kg (130 lb 1.1 oz)  Body mass index is 23.79 kg/m².    Intake/Output Summary (Last 24 hours) at 3/13/2025 0750  Last data filed at 3/12/2025 1811  Gross per 24 hour   Intake --   Output 1100 ml   Net -1100 ml         Physical Exam  Constitutional:       General: She is not in acute distress.     Appearance: Normal appearance.   HENT:      Head: Normocephalic and atraumatic.      Nose: Nose normal.      Mouth/Throat:      Mouth: Mucous membranes are moist.   Eyes:      General: No scleral icterus.     Extraocular Movements: Extraocular movements intact.      Pupils: Pupils are equal, round, and reactive to light.   Cardiovascular:      Rate and Rhythm: Normal rate and regular rhythm.      Pulses: Normal pulses.      Heart sounds: Normal heart sounds.   Pulmonary:      Effort: Pulmonary effort is normal.      Breath sounds: Normal breath sounds. No wheezing or rhonchi.   Chest:      Chest wall: No tenderness.   Abdominal:      General: Abdomen is flat. Bowel sounds are normal. There is no distension.      Palpations: Abdomen is soft.      Tenderness: There is no abdominal tenderness. There is no guarding or rebound.   Musculoskeletal:         General: No swelling, tenderness or deformity. Normal range of motion.      Cervical back: Normal range of motion and neck supple. No rigidity or tenderness.      Comments: RIGHT hip bandage intact   Skin:     General: Skin is warm and dry.      Coloration: Skin is not jaundiced or pale.      Findings: No erythema or rash.   Neurological:      General: No focal deficit present.      Mental Status: She is alert. Mental status is at baseline.      Cranial Nerves: No cranial nerve deficit.      Motor: No weakness.               Significant Labs: All pertinent labs within the past 24 hours have been reviewed.  CBC:   Recent Labs   Lab 03/11/25  0803  03/12/25  0409   WBC 9.66 9.35   HGB 9.8* 9.0*   HCT 29.7* 28.0*    181     CMP:   Recent Labs   Lab 03/11/25  0803 03/12/25  0409   * 133*   K 4.2 3.9    100   CO2 28 27   GLU 99 91   BUN 27 29   CREATININE 0.5 0.6   CALCIUM 8.8 8.7   ANIONGAP 5* 6*       Significant Imaging: I have reviewed all pertinent imaging results/findings within the past 24 hours.      Assessment & Plan  Periprosthetic fracture of shaft of right femur s/p ORIF on 3/8/2025  89 y/o female with PAF on long term Apixiban at home for anticoagulation, HTN, chronic diastolic heart failure, chronic anemia, cardiac pacemaker due to sick sinus syndrome and hypothyroidism presents to the ED after fall at home. Patient was walking to the door with her walker to open it for their sitter when she leaned her walker and subsequently fell over it. States she missed the door handle which caused her to fall. Patient noted immediate pain to her RLE and was unable to move it/ get up off the ground. EMS called and patient brought to ED where X-ray imaging of right leg revealed a closed periprosthetic right displaced femoral shaft fracture. Orthopedic surgery consult for closed periprosthetic right femoral shaft fracture in patient with previous right MARCEL. Patient was seen and evaluated by Orthopedic surgery who recommended operative repair of fracture.     - Patient taken to OR on 3/8/2025 and underwent right femur ORIF with long femoral plate and screws and cables to repair fracture by Dr. Albino William.   - Post-op, patient weight bear as tolerate to the left lower extremity with assistive device as per Orthopedics recommendation.   - Patient restarted on home Apixiban post-op but at reduced dosing of 2.5 mg po BID as per request of Orthopedics (Dr. William) as concerned about bleeding into surgical site and requesting decreased dose for 1 week and then can return back to home dosing of 5 mg po BID after 1 week on 3/15.   - Perineural pain  catheter removed by Anesthesia on 3/11.   - Pain controlled to right hip. Continue on multimodal pain management post-op with Tylenol 1000 mg po every 8 hours and Robaxin 500 mg po every 8 hours post-op.  - PT/OT consulted post-op and to worked with patient and recommending moderate intensity therapy on discharge when medically ready. Case management met with patient and her daughter and patient and SNF referrals sent. Patient has been accepted to [facility]. [facility] not able to accept patient on 3/12 as bed fell through and now she does not have a bed at [facility] so case management working on alternative SNF placement with patient and family. Hopeful discharge for 3/13 pending SNF placement. Patient medically ready for discharge.  - Patient completed IV Cefazolin for 24 hours post-op for antibiotic prophylaxis post-op and now on Cefadroxil 500 mg po BID for 7 days to extended antibiotic prophylaxis post-op to prevent post-op wound infection at surgical site. End date 3/15/2025.   - Surgical bandages to remain in place to right thigh and thigh area until Orthopedic clinic follow-up.   - Ortho following and managing surgical site. Appreciate assistance.     3/13-  right periprosthetic [place] C femur fracture; now  s/p ORIF (3/8/25). Stable as above. MRDC pending SNF. F/u ortho as planned         Acute blood loss anemia  - Anemia stable at 9.0 on 3/12.   - Anemia improved on 3/11. Hgb up to 9.8 on 3/11 after transfused 1 unit of PRBCs on 3/10 fro Hgb 7.8.   - Anemia unchanged on 3/10 and still with Hgb 7.8 on 3/10 after transfused 1 unit of PRBCs on 3/9 for Hgb 7.8 so to transfuse another 1 unit of PRBCs on 3/10.   - Anemia worsened on 3/9 and Hgb down to 7.8 from 8.7 on 3/8. Patient having chest pain and symptomatic in relation to her anemia and suspect anemia causing chest pain and heart strain so to be transfused 1 unit of PRBCs today, 3/9.   - Patient with Hgb 9.8 on admit and normally her Hgb is around  12 so suspect acute blood loss on admit related to blood loss from her significant right femur fracture. Hgb pre-op on 3/8 was 8.7 and transfused 1 unit of PRBCs intra-op on 3/8. Most recent hemoglobin and hematocrit are listed below.  Recent Labs     03/11/25  0803 03/12/25  0409   HGB 9.8* 9.0*   HCT 29.7* 28.0*     Plan  - Monitor serial CBC: Daily  - Transfuse PRBC if patient becomes hemodynamically unstable, symptomatic or H/H drops below 7/21.  - Patient received 1 units of PRBCs on 3/8/2025 intra-op. Patient received another 1 unit of PRBCs on 3/9 for symptomatic anemia with Hgb 7.8 with no improvement in Hgb so receive another 1 unit of PRBCs on 3/10 for Hgb 7.8. Hgb improved to 9.8 on 3/11.   - Patient's anemia is currently stable.    Paroxysmal atrial fibrillation  On anticoagulant therapy  On anticoagulant therapy   Patient has paroxysmal (<7 days) atrial fibrillation. Patient is currently in sinus rhythm. [id number] Score: 4. The patients heart rate in the last 24 hours is as follows:  Pulse  Min: 60  Max: 67     Antiarrhythmics  metoprolol succinate (TOPROL-XL) 24 hr tablet 25 mg, 2 times daily, Oral    Plan  - Replete lytes with a goal of K>4, Mg >2  - Patient is anticoagulated at home with Apixiban 5 mg po BID and held pre-op. Resumed post-op but at reduced dose of 2.5 mg po BID as per request of Orthopedics (Dr. William) as concerned about bleeding into surgical site and requesting decreased dose for 1 week and then can return back to home dosing of 5 mg po BID on 3/15.   - Patient's afib is currently controlled. Patient in paced rhythm. Continue home Toprol XL for rate control.     3/13- on apixaban 2.5 bid to reduce bleeding risk. can return back to home dosing of 5 mg po BID on 3/15.     On anticoagulant therapy   Patient has paroxysmal (<7 days) atrial fibrillation. Patient is currently in sinus rhythm. [id number] Score: 4. The patients heart rate in the last 24 hours is as follows:  Pulse  Min:  "60  Max: 67     Antiarrhythmics  metoprolol succinate (TOPROL-XL) 24 hr tablet 25 mg, 2 times daily, Oral    Plan  - Replete lytes with a goal of K>4, Mg >2  - Patient is anticoagulated at home with Apixiban 5 mg po BID and held pre-op. Resumed post-op but at reduced dose of 2.5 mg po BID as per request of Orthopedics (Dr. William) as concerned about bleeding into surgical site and requesting decreased dose for 1 week and then can return back to home dosing of 5 mg po BID on 3/15.   - Patient's afib is currently controlled. Patient in paced rhythm. Continue home Toprol XL for rate control.   - SEE ABOVE    Essential hypertension  Patient's blood pressure range in the last 24 hours was: BP  Min: 117/55  Max: 138/63.The patient's inpatient anti-hypertensive regimen is listed below:  Current Antihypertensives  amLODIPine tablet 5 mg, Daily, Oral  hydroCHLOROthiazide tablet 12.5 mg, Daily, Oral  metoprolol succinate (TOPROL-XL) 24 hr tablet 25 mg, 2 times daily, Oral  bumetanide tablet 0.5 mg, Daily, Oral  nitroGLYCERIN SL tablet 0.4 mg, Every 5 min PRN, Sublingual  timolol maleate 0.5% ophthalmic solution 1 drop, 2 times daily, Left Eye  , 2 times daily, Left Eye    Plan  - BP is controlled, no changes needed to their regimen.  - Goal BP < 140/90.    Pure hypercholesterolemia  Chronic and controlled and not on any outpatient therapy to treat. Patient unable to tolerate statin therapy and had reaction on Praluent and unable to tolerate also.   STABLE    Chronic diastolic congestive heart failure  Patient has Diastolic (HFpEF) heart failure that is Chronic. On presentation their CHF was well compensated. Most recent BNP and echo results are listed below.  No results for input(s): "BNP" in the last 72 hours.  Latest ECHO  Results for orders placed during the hospital encounter of 06/20/24    Echo    Interpretation Summary    Left Ventricle: The left ventricle is normal in size. Normal wall thickness. There is concentric " hypertrophy. There is normal systolic function with a visually estimated ejection fraction of 65 - 70%. Unable to assess diastolic function due to atrial fibrillation.    Right Ventricle: Normal right ventricular cavity size. Wall thickness is normal. Systolic function is normal.    Left Atrium: Left atrium is severely dilated.    Right Atrium: Right atrium is severely dilated.    Mitral Valve: There is moderate posterior leaflet sclerosis. There is no stenosis. The mean pressure gradient across the mitral valve is 1 mmHg at a heart rate of 60 bpm. There is moderate regurgitation.    Tricuspid Valve: There is moderate to severe regurgitation.    Pulmonic Valve: There is no stenosis.    Pulmonary Artery: There is moderate pulmonary hypertension. The estimated pulmonary artery systolic pressure is 60 mmHg.    IVC/SVC: Intermediate venous pressure at 8 mmHg.    Current Heart Failure Medications  hydroCHLOROthiazide tablet 12.5 mg, Daily, Oral  metoprolol succinate (TOPROL-XL) 24 hr tablet 25 mg, 2 times daily, Oral  bumetanide tablet 0.5 mg, Daily, Oral    Plan  - Monitor strict I&Os and daily weights.    - Monitor on telemetry  - Cardiac diet  - Cardiology has not been consulted.  - The patient's volume status is at their baseline.    STABLE    Acquired hypothyroidism  - Chronic and controlled. Continue home Synthroid to treat.     Cardiac pacemaker  Patient with cardiac pacemaker in situ and has paced rhythm on telemetry. Pacemaker in place for sick sinus syndrome.  STABLE    Blindness and low vision  Nonexudative age-related macular degeneration, bilateral, intermediate dry stage  Patient reports blind out of right eye and poor vision out of left eye at baseline. Patient on Combigan eye drops 1 drop[ BID to left eye as outpatient to treat. Continue Brimonidine and Timolol eye drops 1 drop in left eye BID in hospital.   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  Patient on Combigan eye drops 1 drop[ BID to left eye as outpatient to treat. Continue Brimonidine and Timolol eye drops 1 drop in left eye BID in hospital.         VTE Risk Mitigation (From admission, onward)           Ordered     apixaban tablet 2.5 mg  2 times daily         03/09/25 1255     IP VTE HIGH RISK PATIENT  Once         03/08/25 0624     Place sequential compression device  Until discontinued         03/08/25 0624     Place sequential compression device  Until discontinued         03/07/25 1318     Place SHANEL hose  Until discontinued         03/07/25 1318     Reason for No Pharmacological VTE Prophylaxis  Once        Question:  Reasons:  Answer:  Risk of Bleeding    03/07/25 1318     Place sequential compression device  Until discontinued         03/07/25 1318                    Discharge Planning   ISH: 3/13/2025     Code Status: Full Code   Medical Readiness for Discharge Date: 3/12/2025  Discharge Plan A: Skilled Nursing Facility          Danelle Gonzales MD  Department of Hospital Medicine   [facility] - Surgery

## 2025-03-13 NOTE — ASSESSMENT & PLAN NOTE
Dorothy M Knobloch is a 89 y.o. female who presented with a right periprosthetic [place] C femur fracture; now  s/p ORIF (3/8/25)    - Pain: multimodal regimen limiting narcotics  - PT/OT: WBAT LLE  - AC: Eliquis 2.5 mg BID x 7 d and then return to home dose of 5 mg BID, Plavix 75 mg daily, FCDs at all times when not ambulating  - Abx: 24hr postop ancef completed, now on Cefadroxil protocol   - Drain: none  - Iqbal: dc'd POD1     » Dispo: Potential dc today pending medical stabilization

## 2025-03-13 NOTE — ASSESSMENT & PLAN NOTE
Patient's blood pressure range in the last 24 hours was: BP  Min: 117/55  Max: 138/63.The patient's inpatient anti-hypertensive regimen is listed below:  Current Antihypertensives  amLODIPine tablet 5 mg, Daily, Oral  hydroCHLOROthiazide tablet 12.5 mg, Daily, Oral  metoprolol succinate (TOPROL-XL) 24 hr tablet 25 mg, 2 times daily, Oral  bumetanide tablet 0.5 mg, Daily, Oral  nitroGLYCERIN SL tablet 0.4 mg, Every 5 min PRN, Sublingual  timolol maleate 0.5% ophthalmic solution 1 drop, 2 times daily, Left Eye  , 2 times daily, Left Eye    Plan  - BP is controlled, no changes needed to their regimen.  - Goal BP < 140/90.

## 2025-03-13 NOTE — ASSESSMENT & PLAN NOTE
On anticoagulant therapy   Patient has paroxysmal (<7 days) atrial fibrillation. Patient is currently in sinus rhythm. [id number] Score: 4. The patients heart rate in the last 24 hours is as follows:  Pulse  Min: 60  Max: 67     Antiarrhythmics  metoprolol succinate (TOPROL-XL) 24 hr tablet 25 mg, 2 times daily, Oral    Plan  - Replete lytes with a goal of K>4, Mg >2  - Patient is anticoagulated at home with Apixiban 5 mg po BID and held pre-op. Resumed post-op but at reduced dose of 2.5 mg po BID as per request of Orthopedics (Dr. William) as concerned about bleeding into surgical site and requesting decreased dose for 1 week and then can return back to home dosing of 5 mg po BID on 3/15.   - Patient's afib is currently controlled. Patient in paced rhythm. Continue home Toprol XL for rate control.

## 2025-03-14 PROBLEM — E44.0 MODERATE MALNUTRITION: Status: RESOLVED | Noted: 2025-03-14 | Resolved: 2025-03-14

## 2025-03-14 PROBLEM — E87.1 HYPONATREMIA: Status: ACTIVE | Noted: 2025-03-14

## 2025-03-14 PROBLEM — E44.0 MODERATE MALNUTRITION: Status: ACTIVE | Noted: 2025-03-14

## 2025-03-14 PROCEDURE — 11000004 HC SNF PRIVATE

## 2025-03-14 PROCEDURE — 25000003 PHARM REV CODE 250: Performed by: FAMILY MEDICINE

## 2025-03-14 PROCEDURE — 97165 OT EVAL LOW COMPLEX 30 MIN: CPT

## 2025-03-14 PROCEDURE — 97535 SELF CARE MNGMENT TRAINING: CPT

## 2025-03-14 PROCEDURE — 25000003 PHARM REV CODE 250: Performed by: HOSPITALIST

## 2025-03-14 PROCEDURE — 97530 THERAPEUTIC ACTIVITIES: CPT

## 2025-03-14 RX ORDER — CEFADROXIL 500 MG/1
500 CAPSULE ORAL EVERY 12 HOURS
Status: COMPLETED | OUTPATIENT
Start: 2025-03-14 | End: 2025-03-16

## 2025-03-14 RX ORDER — TALC
6 POWDER (GRAM) TOPICAL NIGHTLY
Status: DISCONTINUED | OUTPATIENT
Start: 2025-03-14 | End: 2025-03-19

## 2025-03-14 RX ADMIN — METHOCARBAMOL 500 MG: 500 TABLET ORAL at 09:03

## 2025-03-14 RX ADMIN — LEVOTHYROXINE SODIUM 88 MCG: 112 TABLET ORAL at 06:03

## 2025-03-14 RX ADMIN — APIXABAN 2.5 MG: 2.5 TABLET, FILM COATED ORAL at 09:03

## 2025-03-14 RX ADMIN — METOPROLOL SUCCINATE 50 MG: 50 TABLET, EXTENDED RELEASE ORAL at 09:03

## 2025-03-14 RX ADMIN — TIMOLOL MALEATE 1 DROP: 5 SOLUTION OPHTHALMIC at 10:03

## 2025-03-14 RX ADMIN — AMLODIPINE BESYLATE 5 MG: 5 TABLET ORAL at 09:03

## 2025-03-14 RX ADMIN — BRIMONIDINE TARTRATE 1 DROP: 1.5 SOLUTION OPHTHALMIC at 09:03

## 2025-03-14 RX ADMIN — ESCITALOPRAM OXALATE 20 MG: 10 TABLET ORAL at 09:03

## 2025-03-14 RX ADMIN — CEFADROXIL 1000 MG: 500 CAPSULE ORAL at 09:03

## 2025-03-14 RX ADMIN — METHOCARBAMOL 500 MG: 500 TABLET ORAL at 02:03

## 2025-03-14 RX ADMIN — HYDROCHLOROTHIAZIDE 12.5 MG: 12.5 TABLET ORAL at 09:03

## 2025-03-14 RX ADMIN — Medication 6 MG: at 09:03

## 2025-03-14 RX ADMIN — METHOCARBAMOL 500 MG: 500 TABLET ORAL at 06:03

## 2025-03-14 RX ADMIN — SENNOSIDES AND DOCUSATE SODIUM 1 TABLET: 50; 8.6 TABLET ORAL at 09:03

## 2025-03-14 RX ADMIN — CLOPIDOGREL BISULFATE 75 MG: 75 TABLET ORAL at 09:03

## 2025-03-14 RX ADMIN — METOPROLOL SUCCINATE 25 MG: 25 TABLET, EXTENDED RELEASE ORAL at 09:03

## 2025-03-14 RX ADMIN — BUMETANIDE 0.5 MG: 0.5 TABLET ORAL at 09:03

## 2025-03-14 RX ADMIN — CEFADROXIL 500 MG: 500 CAPSULE ORAL at 09:03

## 2025-03-14 RX ADMIN — TIMOLOL MALEATE 1 DROP: 5 SOLUTION OPHTHALMIC at 09:03

## 2025-03-14 NOTE — CONSULTS
[facility] - Skilled Nursing  Adult Nutrition  Consult Note    SUMMARY   Recommendations  Change regular diet to Level 6 , follow daily weights, PO intake, continue boost plus but BID, RD following  Goals: PO to meet 85% of EEN/EPN with ONS by next RD follow up  Nutrition Goal Status: new  Communication of RD Recs: other (comment) (POC)    Nutrition Discharge Planning     Nutrition Discharge Planning: Oral supplement regimen (comments), General healthy diet  Oral supplement regimen (comments): patient choice    Assessment and Plan    Malnutrition Type:  Context: social/environmental circumstances  Level: moderate    Related to (etiology):   Inadequate energy and protein intake    Signs and Symptoms (as evidenced by):   Two meals per day by preference,  won't go to Assisted living dining room ;meals come to room    Malnutrition Characteristic Summary:  Weight Loss (Malnutrition): 10% in 6 months  Subcutaneous Fat (Malnutrition): mild depletion  Muscle Mass (Malnutrition): moderate depletion      Interventions/Recommendations (treatment strategy):  Continue regular diet, follow daily weights, PO intake, continue boost plus but BID, RD following    Nutrition Diagnosis Status:   New    Plan  Commercial beverage medical food supplement- Boost plus BID( protein)  Collaboration of nutrition professional with other providers  Level 6 diet    Malnutrition Assessment 3/14  Malnutrition Context: chronic illness  Malnutrition Level: moderate  Skin (Micronutrient): bruised, thinned, turgor reduced, wounds unhealed  Hair/Scalp (Micronutrient): dry  Teeth (Micronutrient): broken dentition  Tongue (Micronutrient): red  Neck/Chest (Micronutrient): muscle wasting  Musculoskeletal/Lower Extremities: muscle wasting       Weight Loss (Malnutrition): 10% in 6 months  Subcutaneous Fat (Malnutrition): mild depletion  Muscle Mass (Malnutrition): moderate depletion   Orbital Region (Subcutaneous Fat Loss): severe depletion  Upper Arm  Region (Subcutaneous Fat Loss): mild depletion  Thoracic and Lumbar Region: mild depletion   Judaism Region (Muscle Loss): mild depletion  Clavicle Bone Region (Muscle Loss): moderate depletion  Clavicle and Acromion Bone Region (Muscle Loss): moderate depletion  Dorsal Hand (Muscle Loss): moderate depletion  Patellar Region (Muscle Loss): mild depletion  Anterior Thigh Region (Muscle Loss): mild depletion  Posterior Calf Region (Muscle Loss): mild depletion                 Reason for Assessment    Reason For Assessment: consult  Diagnosis:  (R femur fx, s/p ORIF)  General Information Comments: Lives with [place] who is blind in [place], he has sitter for 6 hours per day since dc from our SNF here recently. Patient also has poor eyesight. Appetite good.she has been monitoring her weight at home for HF.  Past Medical History:   Diagnosis Date    Acquired hypothyroidism 12/13/2013    Age-related physical debility 04/05/2024    Antiplatelet or antithrombotic long-term use     AV block, 3rd degree 03/19/2019    Bilateral nonexudative age-related macular degeneration 08/27/2013    Blindness and low vision 08/10/2018    Blood transfusion     Cardiac pacemaker 08/22/2018    Carotid artery disease 08/10/2016    Chronic diastolic congestive heart failure 11/24/2019    CRAO (central retinal artery occlusion), left 01/29/2018    Dry eye syndrome of both eyes 08/10/2018    Essential hypertension 12/13/2013    History of TIA (transient ischemic attack) 12/13/2013    Nonexudative age-related macular degeneration, bilateral, intermediate dry stage 07/12/2018    Nuclear sclerotic cataract of left eye 08/10/2018    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 She has lost weight over the 3 years they have been at [place]. She would drink the boost plus but wants only two servings per day  Spoke with daughter about patient eating habits at [place]. She will not go to the dining room, as she is self conscious about her missing teeth and chewing difficulty. She is not a breakfast eater. So they are used to two meals. Daughter states the food at [place] is not salty but patient will eat from their own pantry items such as sausage biscuit. She does buy them ONS and they drink it. Patient sight is impaired and her spouse is blind.    Food Preferences: whole milk  Spiritual, Cultural Beliefs, Christian Practices, Values that Affect Care: no  Food Allergies: other (see comments) (Allergy to aspartame)  Factors Affecting Nutritional Intake: None identified at this time      Food Insecurity: No Food Insecurity (3/13/2023)    Hunger Vital Sign     Worried About Running Out of Food in the Last Year: Never true     Ran Out of Food in the Last Year: Never true      Anthropometrics    Height: 5' 2" (157.5 cm)  Height (inches): 62 in  Height Method: Stated  Weight: 57.5 kg (126 lb 12.2 oz)  Weight (lb): 126.77 lb  Weight Method: Bed Scale  Ideal Body Weight (IBW), Female: 110 lb  % Ideal Body Weight, Female (lb): 115.25 %  BMI (Calculated): 23.2  BMI Grade: 18.5-24.9 - normal  Usual Body Weight (UBW), k.4 kg  Weight Change Amount:  (10% change in less than 6 months)  % Usual Body Weight: 89.47  % Weight Change From Usual Weight: -10.72 %       Lab/Procedures/Meds    Pertinent Labs Reviewed: reviewed  Pertinent Labs Comments: Hg 9.0, Hct 28.0 , Na 133, CRP 4.0  Pertinent Medications Reviewed: reviewed  Pertinent Medications Comments: Levothyroxine, senna-docusate, Apixaban, Abx, Bumetanide, HCTZ,    Estimated/Assessed Needs    Weight Used For Calorie Calculations: 57.5 kg (126 " lb 12.2 oz)  Energy Calorie Requirements (kcal): 2450-2633  Energy Need Method: McIntosh-St Jeor (x 1.4(PAL))  Protein Requirements: 69  Weight Used For Protein Calculations: 57.5 kg (126 lb 12.2 oz) (x 1.2 (PAL))  Fluid Requirements (mL): 1313 or per MD     RDA Method (mL): 1313         Nutrition Prescription Ordered    Current Diet Order: Regular  Oral Nutrition Supplement: boost plus BID    Evaluation of Received Nutrient/Fluid Intake    I/O: no data  Energy Calories Required: meeting needs  Protein Required: meeting needs  Fluid Required: meeting needs  Comments: LBM 3/11  Tolerance: tolerating  % Intake of Estimated Energy Needs: 75 - 100 %  % Meal Intake: 75 - 100 %    Nutrition Risk    Level of Risk/Frequency of Follow-up: low       Monitor and Evaluation    Monitor and Evaluation: Skin, Food and beverage intake, Weight, Diet order, Electrolyte and renal panel, Gastrointestinal profile       Nutrition Follow-Up    RD Follow-up?: Yes

## 2025-03-14 NOTE — PLAN OF CARE
Problem: Occupational Therapy  Goal: Occupational Therapy Goal  Description: Goals to be met by: 4/13/2025     Patient will increase functional independence with ADLs by performing:  UBD: with Min A seated in w/c  LBD: with Mod A seated in w/c  Bathing: with Min A seated in w/c or bath bench  Toileting: with Mod A for hygiene/clothing mgmt   Oral: with MI seated in w/c   Grooming: with MI seated in w/c  Pt will complete 10 mins on UE ergometer seated in w/c  Pt will complete 3-5 mins standing with RW/CGA-SBA during dynamic standing activity    Outcome: Progressing  Santos Lopez Jr., OTR/L

## 2025-03-14 NOTE — NURSING
Patient complained of Nausea with no vomiting.  No PRNS ordered for nausea. Nurse notified on call HALIMA Clrak and Ramanan was ordered and administered. Medication was effective.

## 2025-03-14 NOTE — PROGRESS NOTES
[facility]                                  Skilled Nursing Facility                   Progress Note     Admit Date: 3/13/2025  ISH   [id number]  Principal Problem:  Periprosthetic fracture of shaft of femur   HPI obtained from patient interview and chart review     Chief Complaint: Establish Care/ Initial Visit     HPI:   Dorothy M Knobloch is a 89 y.o. female with history of legally blind d/t macular degeneration, Afib, HTN, CHF, anemia and hypothyroidism who presented to [facility] ED on 03/07/2025 with complaint of pain after fall. Admitted for closed periprosthetic right displaced femoral shaft fracture.Orthopedic surgery consult for closed periprosthetic right femoral shaft fracture in patient with previous right MARCEL. Patient underwent right femur ORIF with long femoral plate and screws and cables to repair fracture by Dr. Albino William on 03/08/2025. Post-op patient weight bear as tolerated to the left lower extremity per Orthopedics rec. Chest pain with post op anemia, received total of 2 units PRBC. CP resolved and Hgb stabilized at 9.8 on 3/11. Patient restarted on home Apixiban post-op but at reduced dosing of 2.5 mg po BID as per request of Orthopedics (Dr. William) as concerned about bleeding into surgical site and requesting decreased dose for 1 week and then can return back to home dosing of 5 mg po BID after 1 week on 3/15. No other DVT prophylaxis needed post-op as on oral anticoagulation. IV Cefazolin post-op for 24 hours for antibiotic prophylaxis post-op and received 24 hours of Cefazolin then transitioned to Cefadroxil 500 mg po BID for 7 days for extended antibiotic prophylaxis post-op to prevent post-op wound infection at surgical site.   Of note, patient's  was in [facility] about a month ago per patient. He is blind. Social support of adult children.  Patient will be treated at [facility] with PT and OT to  improve functional status and ability to perform ADLs.     Past Medical History: Patient has a past medical history of Acquired hypothyroidism (12/13/2013), Age-related physical debility (04/05/2024), Antiplatelet or antithrombotic long-term use, AV block, 3rd degree (03/19/2019), Bilateral nonexudative age-related macular degeneration (08/27/2013), Blindness and low vision (08/10/2018), Blood transfusion, Cardiac pacemaker (08/22/2018), Carotid artery disease (08/10/2016), Chronic diastolic congestive heart failure (11/24/2019), CRAO (central retinal artery occlusion), left (01/29/2018), Dry eye syndrome of both eyes (08/10/2018), Essential hypertension (12/13/2013), History of TIA (transient ischemic attack) (12/13/2013), Nonexudative age-related macular degeneration, bilateral, intermediate dry stage (07/12/2018), Nuclear sclerotic cataract of left eye (08/10/2018), 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    Past Surgical History: Patient has a past surgical history that includes Tonsillectomy; Joint replacement; Skin biopsy; Total hip arthroplasty (11/01/2000); Cardiac pacemaker placement; Cataract extraction (Right); pr transcran dopp intracran, emboli w/o inj (01/29/2018); Carpal tunnel release (Bilateral, 1991); and ORIF femur fracture (Right, 3/8/2025).    Social History: Patient reports that she has never smoked. She has never been exposed to tobacco smoke. She has never used smokeless tobacco. She reports that she does not drink alcohol and does not use drugs.    Family History: family history includes Arthritis in her maternal grandmother, paternal grandmother, and sister; Birth defects in her son; Cancer (age of  onset: 80) in her paternal aunt; Diabetes in her paternal aunt and paternal uncle; Early death in her father and mother; Hearing loss in her daughter; Hypertension in her paternal grandmother and son.    Allergies: Patient is allergic to aspartame.    ROS  Constitutional: Negative for fever   Eyes: Pt legally blind with no vision out of right eye and very poor vision out of left eye d/t macular degeneration  Respiratory: Negative for cough, shortness of breath   Cardiovascular: Negative for chest pain, palpitations, and leg swelling.   Gastrointestinal: Negative for abdominal pain, constipation, diarrhea, nausea, vomiting. + decreased appetite  Genitourinary: Negative for dysuria, frequency   Musculoskeletal:  + generalized weakness. Negative for back pain and myalgias.   Skin: Negative for itching and rash. + bruising  Neurological: Negative for dizziness, headaches.   Psychiatric/Behavioral: Negative for depression. The patient is not nervous/anxious.      24 hour Vital Sign Range   Temp:  [98.1 °F (36.7 °C)]   Pulse:  [66-68]   Resp:  [17]   BP: (130-132)/(63-92)   SpO2:  [96 %]     Current BMI: Body mass index is 23.19 kg/m².    PEx  Constitutional: Patient appears debilitated.  No distress noted  HENT:   Head: Normocephalic and atraumatic.   Eyes: Pupils are equal, round. Pt legally blind with no vision out of right eye and very poor vision out of left eye d/t macular degeneration  Neck: Normal range of motion. Neck supple.   Cardiovascular: Normal rate, regular rhythm. + PPM    Pulmonary/Chest: Effort normal and breath sounds are clear  Abdominal: Soft. Bowel sounds are normal.   Musculoskeletal: Normal range of motion.   Neurological: Alert and oriented to person, place, and time.   Psychiatric: Normal mood and affect. Behavior is normal.   Skin: Skin is warm, pale, and dry. Right anterior thigh, Incision Right lateral Thigh, Abrasion(s) Right anterior Thigh. Wound care Consulted. Orthopedic surgery managing  "all surgical wounds.     No results for input(s): "GLUCOSE", "NA", "K", "CL", "CO2", "BUN", "CREATININE", "CALCIUM", "MG" in the last 24 hours.  No results for input(s): "WBC", "RBC", "HGB", "HCT", "PLT", "MCV", "MCH", "MCHC" in the last 24 hours.  Recent Labs   Lab 03/13/25  0226   POCTGLUCOSE 126*        Assessment and Plan:    *Closed fracture of periprosthetic right femoral shaft fracture in patient with previous right MARCEL  Recent Fall  s/p right femur ORIF with long femoral plate and screws and cables to repair fracture by Dr. Albino William on 03/08/2025  - PT/OT, Weight bearing status WBAT LLE per Ortho rec  - Post-op DVT PPX with home Apixiban post-op but at reduced dosing of 2.5 mg po BID for 1 week d/t c/o post op bleeding then return to home dosing of 5 mg po BID on 3/15.  - Maintain surgical dressing until removed by Orthopedics  - Monitor and report drainage to incisional site  - Fall precautions  - Multimodal pain management. See "Acute postoperative pain"  - Bowel regimen in place, hold for loose or frequent stools  - Orthopedics managing follow-up with patient either in SNF or in clinic during SNF admission  - Follow-up with Orthopedics after SNF discharge. Ortho to schedule.      Acute postoperative pain  - Pain is adequately controlled per patient  - Continue Multimodal pain regimen with   - Scheduled Acetaminophen 1000 mg q.8 hours, Methocarbamol 500 mg QID  - PRN tramadol 50mg q 6H prn   - Bowel regimen in place as at risk for opoid induced constipation, hold prn loose or frequent stools  - Maintain fall precautions       Weakness   Physical Debility  Impaired functional mobility, balance, gait, and endurance   - Patient with decreased bed mobility therefore patient is at high risk for developing wounds and deterioration of any current wounds.  - Continue with PT/OT for gait training and strengthening and restoration of ADL's   - Encourage mobility, OOB in chair, and early ambulation as " appropriate  - Fall precautions   - Monitor for bowel and bladder dysfunction  - Monitor for and prevent skin breakdown and pressure ulcers  - Continue DVT prophylaxis as detailed above      At Risk for Constipation  At risk for opoid induced constipation  - Continue scheduled senokot  - Adjust bowel regimen prn to avoid diarrhea  - Encourage fluid intake  - Encourage safe physical activity as tolerated with activity restrictions if recommended by Ortho   - Monitor bowel movement regularity and consistency      Afib  - has PPM  - Continue Toprol xl 50 mg daily and 25 mg q HS  - continue home eliquis - hold if unstable anemia      Anemia  POA, Acute on Chronic  - Chest pain with post op anemia while inpt, received total of 2 units PRBC. CP resolved and Hgb stabilized at 9.8 on 3/11.   - Trend with scheduled CBC  - Transfuse if patient becomes hemodynamically unstable, symptomatic or H/H drops below 7/21.      Chronic Heart Failure  TTE 06/16/24:     Left Ventricle: The left ventricle is normal in size. Normal wall thickness. There is concentric hypertrophy. There is normal systolic function with a visually estimated ejection fraction of 65 - 70%. Unable to assess diastolic function due to atrial fibrillation.    Right Ventricle: Normal right ventricular cavity size. Wall thickness is normal. Systolic function is normal.    Left Atrium: Left atrium is severely dilated.    Right Atrium: Right atrium is severely dilated.    Mitral Valve: There is moderate posterior leaflet sclerosis. There is no stenosis. The mean pressure gradient across the mitral valve is 1 mmHg at a heart rate of 60 bpm. There is moderate regurgitation.    Tricuspid Valve: There is moderate to severe regurgitation.    Pulmonic Valve: There is no stenosis.    Pulmonary Artery: There is moderate pulmonary hypertension. The estimated pulmonary artery systolic pressure is 60 mmHg.    IVC/SVC: Intermediate venous pressure at 8 mmHg.  - SOA at baseline  "without hypoxia  - Monitor fluid balance with daily weight & strict I/O  - See "Hypertension"      - CHF education to include diet, medication, and CHF flags for MD notification      Essential Hypertension  - Chronic, stable  - Continue norvasc 5 mg daily  - Continue HCTZ 12.5 mg daily  - Continue home bumex 0.5 mg  - Continue Toprol xl 50 mg daily and 25 mg q HS  - Monitor BP  - Adjust therapy to BP goal < 150/90  - Avoid hypotension       Hypothyroidism  - Continue home synthroid 88 mcg daily before breakfast      Insomnia  - melatonin 6 mg q HS, consider increasing if insomnia persists     Psychiatric Assessment  Patient Health Questionnaire-9 (PHQ-9)    Date: 3/15/2025    1. Little interest or pleasure in doing things? no,  Not at all                       = 0    2. Feeling down, depressed, or hopeless? yes, Several days                = 1    3. Trouble falling or staying asleep, or sleeping too much? yes, Several days                = 1    4. Feeling tired or having little energy? yes, Several days                = 1    5. Poor appetite or overeating? yes, Several days                = 1    6. Feeling bad about yourself- or that you are a failure or have let yourself or your family down? no, Not at all                       = 0    7. Trouble concentrating on things, such as reading the newspaper or watching TV? no, Not at all                       = 0    8. Moving or speaking so slowly that other people could have noticed? Or the opposite- being so fidgety or restless that you have been moving around a lot more than usual? no, Not at all                       = 0    9. Thoughts that you would be better off dead or of hurting yourself in some way? no, Not at all                       = 0    Total Score: 4     How difficult have these problems made it for you to do your work, take care of things at home, or get along with other people? 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      Advance Care Planning   This was a voluntary visit in the option to decline counseling was given  Length of Conversation: 21 minutes  Topics Discussed: Disease process of advanced age and debility with comorbidities  Overview of Materials/Resources given(if applicable):  Discussed code status.  Discussed what it means to be DNR.  Discussed what resuscitative measures are in detail.  All topics recapped and questions answered.  Outcome of Discussion: Patient does want to be a DNR. DNR communicated to collaborating physician and order verified in EMR.   Individuals present:  Myself and Dorothy M Knobloch  Decision Maker: Dorothy M Knobloch      Anticipate disposition:  Home with home health    IP [facility] RISK OF UNPLANNED READMISSION Model: LOW    Follow-up needed during SNF stay- Ortho     Follow-up needed after discharge from SNF: PCP     Future Appointments   Date Time Provider Department Center   3/27/2025  8:30 AM Albino William MD [facility] [place]         I certify that SNF services are required to be given on an inpatient basis because Dorothy M Knobloch needs for skilled nursing care and/or skilled rehabilitation are required on a daily basis and such services can only practically be provided in a skilled nursing facility setting and are for an ongoing condition for which she received inpatient care in the hospital.     Total time of the visit 118 minutes (does not include ACP time)  Description of non physical exam/non charting time: counseling patient on clinical conditions and therapies provided.  Extensive chart review completed including all consultation notes.  All pertinent  laboratory and radiographical images reviewed.        Shruthi Santana NP  Department of Hospital Medicine   [facility]     DOS: 3/14/2025       Patient note was created using MModal Dictation.  Any errors in syntax or even information may not have been identified and edited on initial review prior to signing this note.

## 2025-03-14 NOTE — NURSING
Pt arrived to floor for skilled services with admitting diagnosis of Periprosthetic fracture of right femur via wheelchair. Pt required max assistance of three from wheelchair to bed. Pt is AAOx4, incontinent of B and B. Pt is on a regular diet. Skin assessment done: right incision from hip to knee with tegaderm and prevena wound vac in place with seal intact and suctioning, right anterior thigh abrasion, bruising to upper extremities, pink blanchable skin to buttocks.  Family informed of arrival. POC reviewed with patient. Pt denies pain at this time and is educated to use call light and not get OOB w/o assistance.

## 2025-03-14 NOTE — CLINICAL REVIEW
Clinical Pharmacy Chart Review Note      Admit Date: 3/13/2025   LOS: 1 day       Dorothy M Knobloch is a 89 y.o. female admitted to SNF for PT/OT after hospitalization for periprosthetic fracture of shaft of right femur s/p ORIF on 3/8/2025.    Active Hospital Problems    Diagnosis  POA    *Periprosthetic fracture of shaft of right femur s/p ORIF on 3/8/2025 [M97.8XXA, Z96.649]  Yes    Hyponatremia [E87.1]  Unknown    Acute blood loss anemia [D62]  Yes    On anticoagulant therapy [Z79.01]  Not Applicable    Age-related physical debility [R54]  Yes    Aortic stenosis [I35.0]  Yes    Pulmonary hypertension [I27.20]  Yes    Chronic diastolic congestive heart failure [I50.32]  Yes    AV block, 3rd degree [I44.2]  Yes    Paroxysmal atrial fibrillation [I48.0]  Yes     Chronic    Cardiac pacemaker [Z95.0]  Yes    Bilateral carotid artery stenosis [I65.23]  Yes    Takotsubo cardiomyopathy [I51.81]  Yes    Acquired hypothyroidism [E03.9]  Yes    Essential hypertension [I10]  Yes     Chronic    Pure hypercholesterolemia [E78.00]  Yes      Resolved Hospital Problems   No resolved problems to display.     Review of patient's allergies indicates:   Allergen Reactions    Aspartame Swelling     equal     Patient Active Problem List    Diagnosis Date Noted    Hyponatremia 03/14/2025    Periprosthetic fracture of shaft of right femur s/p ORIF on 3/8/2025 03/07/2025    Acute blood loss anemia 03/07/2025    On anticoagulant therapy 06/13/2024    Age-related physical debility 04/05/2024    Aortic stenosis 04/05/2024    Pulmonary hypertension 04/05/2024    Chronic diastolic congestive heart failure 11/24/2019    AV block, 3rd degree 03/19/2019    Paroxysmal atrial fibrillation 09/11/2018    Cardiac pacemaker 08/22/2018    Status post cataract extraction and insertion of intraocular lens 08/10/2018    Blindness and low vision 08/10/2018    Dry eye syndrome of both eyes 08/10/2018    Nuclear sclerotic cataract of left eye 08/10/2018     Nonexudative age-related macular degeneration, bilateral, intermediate dry stage 07/12/2018    Neovascular glaucoma, 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    Iris neovascularization, left 03/27/2018    Bilateral carotid artery stenosis     CRAO (central retinal artery occlusion), left 01/29/2018    Meningioma 01/29/2018    Takotsubo cardiomyopathy 11/17/2016    Carotid artery disease 08/10/2016    Constipation - functional 08/05/2015    History of TIA (transient ischemic attack) 12/13/2013    Essential hypertension 12/13/2013    Pure hypercholesterolemia 12/13/2013    Acquired hypothyroidism 12/13/2013    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 12/13/2013    Posterior vitreous detachment, both eyes 08/27/2013       Scheduled Meds:    amLODIPine  5 mg Oral Daily    apixaban  2.5 mg Oral BID    brimonidine 0.15 % OPTH DROP  1 drop Left Eye BID    bumetanide  0.5 mg Oral Daily    cefadroxil  500 mg Oral Q12H    clopidogreL  75 mg Oral Daily    EScitalopram oxalate  20 mg Oral Daily    hydroCHLOROthiazide  12.5 mg Oral Daily    levothyroxine  88 mcg Oral Before breakfast    methocarbamoL  500 mg Oral Q8H    metoprolol succinate  25 mg Oral QHS    metoprolol succinate  50 mg Oral Daily    senna-docusate 8.6-50 mg  1 tablet Oral BID    timolol maleate 0.5%  1 drop Left Eye BID     Continuous Infusions:   PRN Meds:   Current Facility-Administered Medications:     acetaminophen, 650 mg, Oral, Q6H PRN    acetaminophen, 650 mg, Oral, Q6H PRN    calcium carbonate, 500 mg, Oral, BID PRN    melatonin, 6 mg, Oral, Nightly PRN    ondansetron, 4 mg, Oral, Q6H PRN    traMADoL, 50 mg, Oral, Q6H PRN    OBJECTIVE:     Vital Signs (Last 24H)  Temp:  [97.2 °F (36.2 °C)-98.1 °F (36.7 °C)]   Pulse:  [60-96]   Resp:  [16-17]   BP: (113-132)/(55-92)   SpO2:  [96 %-99 %]     Laboratory:  CBC:   Recent Labs   Lab 03/10/25  0308 03/11/25  0803 03/12/25  0409   WBC 12.12 9.66 9.35   RBC 2.50* 3.11* 2.92*   HGB 7.8* 9.8* 9.0*   HCT  23.7* 29.7* 28.0*    155 181   MCV 95 96 96   MCH 31.2* 31.5* 30.8   MCHC 32.9 33.0 32.1     BMP:   Recent Labs   Lab 03/07/25  1324 03/08/25  0301 03/09/25  0341 03/10/25  0308 03/11/25  0803 03/12/25  0409   GLU  --  128*   < > 114* 99 91   NA  --  137   < > 132* 135* 133*   K  --  4.2   < > 3.3* 4.2 3.9   CL  --  104   < > 100 102 100   CO2  --  24   < > 25 28 27   BUN  --  43*   < > 39* 27 29   CREATININE  --  0.7   < > 0.6 0.5 0.6   CALCIUM  --  9.0   < > 8.6* 8.8 8.7   MG 2.1 2.1  --   --   --   --     < > = values in this interval not displayed.     CMP:   Recent Labs   Lab 03/08/25  0301 03/09/25  0341 03/10/25  0308 03/11/25  0803 03/12/25  0409   * 122* 114* 99 91   CALCIUM 9.0 8.5* 8.6* 8.8 8.7   ALBUMIN 3.2* 3.0* 2.8*  --   --    PROT 5.9* 5.7* 5.3*  --   --     133* 132* 135* 133*   K 4.2 4.8 3.3* 4.2 3.9   CO2 24 22* 25 28 27    102 100 102 100   BUN 43* 38* 39* 27 29   CREATININE 0.7 0.9 0.6 0.5 0.6   ALKPHOS 50 40 43  --   --    ALT 7* 10 6*  --   --    AST 21 38 38  --   --    BILITOT 0.5 0.4 0.5  --   --      LFTs:   Recent Labs   Lab 03/08/25  0301 03/09/25  0341 03/10/25  0308   ALT 7* 10 6*   AST 21 38 38   ALKPHOS 50 40 43   BILITOT 0.5 0.4 0.5   PROT 5.9* 5.7* 5.3*   ALBUMIN 3.2* 3.0* 2.8*     Others:   Recent Labs   Lab 03/09/25  0341   [id number]     Lab Results   Component Value Date    TSH 5.314 (H) 05/27/2024    FREET4 1.29 05/27/2024         ASSESSMENT/PLAN:     I have reviewed the medications in compliance with CMS Regulation F756 of the [employer]. Based on information gathered, the following items may need to be addressed:    **Patient is taking escitalopram (home med) for anxiety. A gradual dose reduction is not recommended at this time. Patient to follow-up with prescribing MD as outpatient.  Monitor: mental status for depression, suicide ideation, anxiety, serotonin syndrome, hyponatremia, dizziness, drowsiness, somnolence      Medications reviewed by  PharmD, please re-consult if needed.      Krys Larsen, Pharm. D.  Clinical Pharmacist  [facility]

## 2025-03-14 NOTE — PLAN OF CARE
Problem: Physical Therapy  Goal: Physical Therapy Goal  Description: Goals to be met by: 4/14/25     Patient will increase functional independence with mobility by performing:    . Supine to sit with Stand-by Assistance  . Sit to supine with Stand-by Assistance  . Rolling to Left and Right with Stand-by Assistance.  . Sit to stand transfer with Contact Guard Assistance  . Bed to chair transfer with Contact Guard Assistance using Rolling Walker  . Gait  x 50 feet with Contact Guard Assistance using .   . Wheelchair propulsion x50 feet with Supervision using bilateral uppper extremities    Outcome: Progressing

## 2025-03-14 NOTE — PT/OT/SLP EVAL
Physical Therapy Evaluation    Patient Name:  Dorothy M Knobloch   MRN:  [id number]  Admit Date: 3/13/2025  Recent Surgeries: Procedure(s) (LRB):  OPEN REDUCTION INTERNAL FIXATION FEMORAL SHAFT FRACTURE (Right)  General Precautions: Standard, fall, vision impaired   Orthopedic Precautions: RLE weight bearing as tolerated   Braces: N/A    Recommendations:     Discharge Recommendations: home health PT   Level of Assistance Recommended: 24 hours significant assistance  Discharge Equipment Recommendations: wheelchair, walker, rolling   Barriers to discharge: Decreased caregiver support    Assessment:     Dorothy M Knobloch is a 89 y.o. female admitted with a medical diagnosis of Periprosthetic fracture of shaft of femur .  Performance deficits affecting function  weakness, impaired endurance, impaired self care skills, impaired functional mobility, gait instability, impaired balance, decreased upper extremity function, decreased lower extremity function, decreased ROM, visual deficits, impaired skin, edema, impaired cardiopulmonary response to activity, orthopedic precautions. Pt reports Mod I with all functional mobility using a rollator. Pt lives with her spouse in an assistive living facility and pt plans to return to it after d/c from SNF. Pt is currently MaxA for bed mobility, transfers and very short Gt distances. Pt also with R l/e leg length discrepancy with recent R shoe lift that she needs for ambulation. Pt will therefore benefit from  skilled PT services during this hospital admit to continue to improve transfer ability and efficiency as well as continue to progress pt's ambulation distance and cardiopulmonary endurance to maximize pt's functional independence and return to PLOF.       Rehab Potential is good     Activity Tolerance: Good    Plan:     Patient to be seen 5 x/week to address the above listed problems via gait training, therapeutic activities, therapeutic exercises, wheelchair management/training      Plan of Care Expires: 04/13/25  Plan of Care Reviewed with: patient    Subjective     Chief Complaint: pt agreeable to work with PT  Patient/Family Comments/goals: none noted by pt  Pain/Comfort:  Pain Rating 1: 0/10  Pain Rating Post-Intervention 1: 0/10    Living Environment:   Pt lives in skilled nursing with .  also recently fell and now uses wheelchair.  Prior to admission, patients level of function was Mod(I) using rollator.  Equipment used at home: rollator, bath bench, grab bar, BSC.  DME owned (not currently used): none.  Upon discharge, patient will have assistance from staff.     Patients cultural, spiritual, Bahai conflicts given the current situation: no    Communicated with pt prior to session.  Patient found HOB elevated with wound vac  upon PT entry to room.    Exams:  Cognitive Exam:  Patient is oriented to Person, Place, Time, and Situation  RLE ROM: WFL except decrease knee flexion and hip N/T d.t surgery  RLE Strength: WFL except knee 3-/5 and Hip N/T  LLE ROM: WFL  LLE Strength: WFL    Functional Mobility:     03/14/25 1237   Prior Functioning: Everyday Activities   Self Care Independent   Indoor Mobility (Ambulation) Independent   Stairs Unknown   Functional Cognition Independent   Prior Device Use Walker   Functional Limitation in Range of Motion   Upper Extremity No impairment   Lower Extremity Impairment on one side   Mobility Devices Walker;Wheelchair (manual or electric)   Roll Left and Right   Was the activity attempted? Yes   Was the activity done independently? No   Assistance Needed Physical assistance   Physical Assistance Level More than half  (Mod A rolling to the L and Sourav rolliwng to the R with pt using BSrail)   Was adaptive equipment used? Yes   CARE Score - Roll Left and Right 2   Sit to Lying   Was the activity attempted? Yes   Was the activity done independently? No   Assistance Needed Physical assistance   Physical Assistance Level More than half  (MaxA per  nursing)   Was adaptive equipment used? No   CARE Score - Sit to Lying 2   Lying to Sitting on Side of Bed   Was the activity attempted? Yes   Was the activity done independently? No   Assistance Needed Physical assistance   Physical Assistance Level More than half  (MaxA for B L/E and trunk management with pt using BSrail)   Was adaptive equipment used? Yes   CARE Score - Lying to Sitting on Side of Bed 2   Sit to Stand   Was the activity attempted? Yes   Was the activity done independently? No   Assistance Needed Physical assistance   Physical Assistance Level More than half  (MaxA from bed and w/c with RW)   Was adaptive equipment used? Yes   CARE Score - Sit to Stand 2   Chair/Bed-to-Chair Transfer   Was the activity attempted? Yes   Was the activity done independently? No   Assistance Needed Physical assistance   Physical Assistance Level More than half  (MaxA SPT with RW)   Was adaptive equipment used? Yes   CARE Score - Chair/Bed-to-Chair Transfer 2   Car Transfer   Reason if not Attempted Environmental limitations   CARE Score - Car Transfer 10   Walk 10 Feet   Reason if not Attempted Safety concerns  (5ft with RW, Mod A, and pt needing an extensive amount of time to complete. pt with raised R shoe d.t R l/E leg length doscrepancy. pt with FFT, step to GT pattern and very slow miroslava.)   CARE Score - Walk 10 Feet 88   Walk 50 Feet with Two Turns   Reason if not Attempted Safety concerns   CARE Score - Walk 50 Feet with Two Turns 88   Walk 150 Feet   Reason if not Attempted Safety concerns   CARE Score - Walk 150 Feet 88   Walking 10 Feet on Uneven Surfaces   Reason if not Attempted Safety concerns   CARE Score - Walking 10 Feet on Uneven Surfaces 88   1 Step (Curb)   Reason if not Attempted Safety concerns   CARE Score - 1 Step (Curb) 88   4 Steps   Reason if not Attempted Safety concerns   CARE Score - 4 Steps 88   12 Steps   Reason if not Attempted Safety concerns   CARE Score - 12 Steps 88   Picking Up  Object   Reason if not Attempted Safety concerns   CARE Score - Picking Up Object 88   OTHER   Uses a Wheelchair/Scooter? Yes   Wheel 50 Feet with Two Turns   Was the activity attempted? Yes   Was the activity done independently? No   Assistance Needed Physical assistance   Physical Assistance Level More than half  (50ft with B U/Es and ModA)   Type of Wheelchair/Scooter Manual   CARE Score - Wheel 50 Feet with Two Turns 2   Wheel 150 Feet   Reason if not Attempted Safety concerns   CARE Score - Wheel 150 Feet 88       Therapeutic Activities and Exercises: Additional time spent on education and transfers.    Education:  Patient provided with daily orientation and goals of this PT session.  Patient educated to transfer to bedside chair/bedside commode/bathroom with RN/PCT present.   Patient educated on Fall risk and plan of care by explanation.   Patient Verbalized Understanding.  Time provided for therapeutic counseling and discussion of current health disposition. All questions answered to satisfaction, within scope of PT practice; voiced no other concerns. White board updated in patient's room, RN notified of session.     AM-PAC 6 CLICK MOBILITY  Total Score:10     Patient left up in chair with all lines intact and call button in reach.    GOALS:   Multidisciplinary Problems       Physical Therapy Goals          Problem: Physical Therapy    Goal Priority Disciplines Outcome Interventions   Physical Therapy Goal     PT, PT/OT Progressing    Description: Goals to be met by: 4/14/25     Patient will increase functional independence with mobility by performing:    . Supine to sit with Stand-by Assistance  . Sit to supine with Stand-by Assistance  . Rolling to Left and Right with Stand-by Assistance.  . Sit to stand transfer with Contact Guard Assistance  . Bed to chair transfer with Contact Guard Assistance using Rolling Walker  . Gait  x 50 feet with Contact Guard Assistance using .   . Wheelchair propulsion x50 feet  with Supervision using bilateral uppper extremities                         History:     Past Medical History:   Diagnosis Date    Acquired hypothyroidism 12/13/2013    Age-related physical debility 04/05/2024    Antiplatelet or antithrombotic long-term use     AV block, 3rd degree 03/19/2019    Bilateral nonexudative age-related macular degeneration 08/27/2013    Blindness and low vision 08/10/2018    Blood transfusion     Cardiac pacemaker 08/22/2018    Carotid artery disease 08/10/2016    Chronic diastolic congestive heart failure 11/24/2019    CRAO (central retinal artery occlusion), left 01/29/2018    Dry eye syndrome of both eyes 08/10/2018    Essential hypertension 12/13/2013    History of TIA (transient ischemic attack) 12/13/2013    Nonexudative age-related macular degeneration, bilateral, intermediate dry stage 07/12/2018    Nuclear sclerotic cataract of left eye 08/10/2018    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 12/13/2013    On anticoagulant therapy 06/13/2024    Osteoarthritis     Paroxysmal atrial fibrillation 09/11/2018    Pulmonary hypertension 04/05/2024    Pure hypercholesterolemia 12/13/2013    Sick sinus syndrome     Stroke     Takotsubo cardiomyopathy 11/17/2016       Past Surgical History:   Procedure Laterality Date    CARDIAC PACEMAKER PLACEMENT      [employer]IC Device Model: Juli LOZADA MRI [id number] Device Type: PACEMAKER Device S\N: [id number]    CARPAL TUNNEL RELEASE Bilateral 1991    CATARACT EXTRACTION Right     JOINT REPLACEMENT      ORIF FEMUR FRACTURE Right 3/8/2025    Procedure: OPEN REDUCTION INTERNAL FIXATION FEMORAL SHAFT FRACTURE;  Surgeon: Albino William MD;  Location: [facility] OR [address] [place] [address];  Service: Orthopedics;  Laterality: Right;    MT TRANSCRAN DOPP INTRACRAN, EMBOLI W/O INJ  01/29/2018         SKIN BIOPSY      TONSILLECTOMY      TOTAL HIP ARTHROPLASTY  11/01/2000    right/[facility]       Time Tracking:     PT Received On: 03/14/25  PT Start  Time: 1120     PT Stop Time: 1207  PT Total Time (min): 47 min     Billable Minutes: Evaluation 20 and Therapeutic Activity 27 03/14/2025

## 2025-03-14 NOTE — PLAN OF CARE
Change diet to level 6, follow daily weights, PO intake, continue boost plus but BID, RD following  Goals: PO to meet 85% of EEN/EPN with ONS by next RD follow up  Nutrition Goal Status: new  Communication of RD Recs: other (comment) (POC)     Nutrition Discharge Planning      Nutrition Discharge Planning: Oral supplement regimen (comments), General healthy diet  Oral supplement regimen (comments): patient choice     Assessment and Plan  Undesirable weight loss related to reduced intake as evideneced by 10&% weight loss in less than six months.     New     Plan  Commercial beverage medical food supplement- Boost plus BID( protein)  Collaboration of nutrition professional with other providers  Level 6 diet soft and bite sized

## 2025-03-14 NOTE — PROGRESS NOTES
Pharmacist Renal Dose Adjustment Note    Dorothy M Knobloch is a 89 y.o. female being treated with the medication cefadroxil    Patient Data:    Vital Signs (Most Recent):    Vital Signs (72h Range):  Temp:  [97.2 °F (36.2 °C)-98.2 °F (36.8 °C)]   Pulse:  [59-96]   Resp:  [16-18]   BP: (105-143)/(51-66)   SpO2:  [92 %-99 %]      Recent Labs   Lab 03/10/25  0308 03/11/25  0803 03/12/25  0409   CREATININE 0.6 0.5 0.6     Serum creatinine: 0.6 mg/dL 03/12/25 0409  Estimated creatinine clearance: 47.3 mL/min    Medication:cefadroxil dose: 500mg frequency q12h will be changed to medication:cefadroxil dose:1g frequency:q12h    Pharmacist's Name: Charlotte Pastor McArdle

## 2025-03-14 NOTE — PT/OT/SLP EVAL
Occupational Therapy   Evaluation    Name: Dorothy M Knobloch  MRN: [id number]  Admit Date: 3/13/2025  Recent Surgeries: 3/8/2025; OPEN REDUCTION INTERNAL FIXATION FEMORAL SHAFT FRACTURE     General Precautions: Standard, fall, vision impaired  Orthopedic Precautions:RLE weight bearing as tolerated   Braces: N/A    Recommendations:     Discharge Recommendations: home health OT  Level of Assistance Recommended: 24 hours significant assistance  Discharge Equipment Recommendations:  wheelchair, walker, rolling  Barriers to discharge:  Decreased caregiver support    Assessment:     Dorothy M Knobloch is a 89 y.o. female with a medical diagnosis of Periprosthetic fracture of shaft of femur. Pt tolerated today's evaluation well w/o incident. She has a hx of macular degeneration and RUE RTC. Prior to hospitalization, she required light assistance with ADLs and MI with functional mobility. Since then, she requires increased assistance with ADLs, bed mobility, and functional mobility. Performance deficits affecting function: weakness, impaired endurance, impaired self care skills, impaired functional mobility, gait instability, impaired balance, visual deficits, decreased lower extremity function, pain, orthopedic precautions, impaired cardiopulmonary response to activity, decreased upper extremity function. Pt would benefit from cont'd OT services in the SNF setting to improve safety and independence /c functional tasks and ADLs upon discharge.     Rehab Potential is good    Activity Tolerance: Fair    Plan:     Patient to be seen 5 x/week to address the above listed problems via neuromuscular re-education, cognitive retraining, community/work re-entry, sensory integration, therapeutic activities, self-care/home management, therapeutic exercises, wheelchair management/training, splinting, therapeutic groups  Plan of Care Expires: 04/13/25  Plan of Care Reviewed with: patient    Subjective     Chief Complaint:  "WEAKNESS  Patient/Family Comments/goals: "I haven't seen my  in about a week"    Occupational Profile:  Living Environment: pt lives in a assisted living facility with her ; 1 [address]HR[address]; 3rd floor with elevator access; WIS with built in bench and GBs; elevated toilet with GBs  Previous level of function: required assistance with ADLs and MI with functional mobility  Roles and Routines: retired, spouse  Equipment Used at Home: rollator, bath bench, grab bar, bedside commode (transport w/c)  Assistance upon Discharge: family, caregiver, facility staff    Pain/Comfort:  Pain Rating 1: 0/10  Location - Side 1: Right  Location - Orientation 1: generalized  Location 1: hip    Patients cultural, spiritual, Orthodoxy conflicts given the current situation: no    Objective:     Communicated with:  NSG prior to session.  Patient found up in chair with   upon OT entry to room.    Occupational Performance:   Activities of Daily Livin/14/25 1416   Eating   Was the activity attempted? Yes   Was the activity done independently? No   Assistance Needed Setup / clean-up   Was adaptive equipment used? Yes  (bed or chair level)   CARE Score - Eating 5   Oral Hygiene   Was the activity attempted? Yes   Was the activity done independently? No   Assistance Needed Setup / clean-up   Was adaptive equipment used? Yes  (seated in w/c at sink side)   CARE Score - Oral Hygiene 5   Toileting Hygiene   Was the activity attempted? No   Was the activity done independently? No   Reason if not Attempted Safety concerns   CARE Score - Toileting Hygiene -   Shower/Bathe Self   Was the activity attempted? Yes   Was the activity done independently? No   Assistance Needed Physical assistance  (Mod A BLEs mgmt)   Physical Assistance Level More than half   Was adaptive equipment used? Yes  (w/c)   CARE Score - Shower/Bathe Self 2   Upper Body Dressing   Was the activity attempted? Yes   Was the activity done independently? No   Assistance " Needed Physical assistance  (Mod A donning pull over shirt)   Physical Assistance Level Less than half   Was adaptive equipment used? Yes  (seated in w/c)   CARE Score - Upper Body Dressing 3   Lower Body Dressing   Was the activity attempted? Yes   Was the activity done independently? No   Assistance Needed Physical assistance  (Max A donning pant)   Physical Assistance Level More than half   Was adaptive equipment used? Yes  (w/c)   CARE Score - Lower Body Dressing 2   Putting On/Taking Off Footwear   Was the activity attempted? Yes   Was the activity done independently? No   Assistance Needed Physical assistance  (TA donning/doffing B socks and shoes)   Physical Assistance Level Total assistance   Was adaptive equipment used? Yes  (seated in w/c)   CARE Score - Putting On/Taking Off Footwear 1   Personal Hygiene   Was the activity attempted? Yes   Was the activity done independently? No   Assistance Needed Setup / clean-up   Was adaptive equipment used? Yes  (w/c)   CARE Score - Personal Hygiene 5   Lying to Sitting on Side of Bed   Was the activity attempted? Yes   Was the activity done independently? No   Assistance Needed Physical assistance  (Max A BLEs and trunk)   Physical Assistance Level More than half   Was adaptive equipment used? Yes  (HOB elevated)   CARE Score - Lying to Sitting on Side of Bed 2   Sit to Stand   Was the activity attempted? Yes   Was the activity done independently? No   Assistance Needed Physical assistance   Physical Assistance Level More than half   Was adaptive equipment used? Yes  (RW)   CARE Score - Sit to Stand 2   Chair/Bed-to-Chair Transfer   Was the activity attempted? Yes   Was the activity done independently? No   Assistance Needed Physical assistance   Physical Assistance Level More than half  (Max A)   Was adaptive equipment used? Yes  (RW)   CARE Score - Chair/Bed-to-Chair Transfer 2   Toilet Transfer   Was the activity attempted? No   Was the activity done  independently? No   Reason if not Attempted Safety concerns   CARE Score - Toilet Transfer -   Tub/Shower Transfer   Was the activity attempted? No   Reason if not Attempted Safety concerns   CARE Score - Tub/Shower Transfer 88       Cognitive/Visual Perceptual:  Cognitive/Psychosocial Skills:     -       Oriented to: Person and Place   -       Follows Commands/attention:Follows one-step commands  -       Communication: clear/fluent  -       Memory: No Deficits noted  -       Safety awareness/insight to disability: intact   -       Mood/Affect/Coping skills/emotional control: Appropriate to situation    Physical Exam:  Balance:    -       fair sitting and standing balance  Dominant hand:    -       R handed  Upper Extremity Range of Motion:     -       Right Upper Extremity: Deficits: due to RTC  -       Left Upper Extremity: WFL  Upper Extremity Strength:    -       Right Upper Extremity: WFL  -       Left Upper Extremity: WFL   Strength:    -       Right Upper Extremity: WFL  -       Left Upper Extremity: WFL  Fine Motor Coordination:    -       Impaired  Left hand, manipulation of objects and Right hand, manipulation of objects  Gross motor coordination:   WFL    AMPAC 6 Click ADL:  AMPAC Total Score: 16    Treatment & Education:  Role of OT  POC    Patient left up in chair with call button in reach and all needs met    GOALS:   Multidisciplinary Problems       Occupational Therapy Goals          Problem: Occupational Therapy    Goal Priority Disciplines Outcome Interventions   Occupational Therapy Goal     OT, PT/OT Progressing    Description: Goals to be met by: 4/13/2025     Patient will increase functional independence with ADLs by performing:  UBD: with Min A seated in w/c  LBD: with Mod A seated in w/c  Bathing: with Min A seated in w/c or bath bench  Toileting: with Mod A for hygiene/clothing mgmt   Oral: with MI seated in w/c   Grooming: with MI seated in w/c  Pt will complete 10 mins on UE ergometer  seated in w/c  Pt will complete 3-5 mins standing with RW/CGA-SBA during dynamic standing activity                         History:     Past Medical History:   Diagnosis Date    Acquired hypothyroidism 12/13/2013    Age-related physical debility 04/05/2024    Antiplatelet or antithrombotic long-term use     AV block, 3rd degree 03/19/2019    Bilateral nonexudative age-related macular degeneration 08/27/2013    Blindness and low vision 08/10/2018    Blood transfusion     Cardiac pacemaker 08/22/2018    Carotid artery disease 08/10/2016    Chronic diastolic congestive heart failure 11/24/2019    CRAO (central retinal artery occlusion), left 01/29/2018    Dry eye syndrome of both eyes 08/10/2018    Essential hypertension 12/13/2013    History of TIA (transient ischemic attack) 12/13/2013    Nonexudative age-related macular degeneration, bilateral, intermediate dry stage 07/12/2018    Nuclear sclerotic cataract of left eye 08/10/2018    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 12/13/2013    On anticoagulant therapy 06/13/2024    Osteoarthritis     Paroxysmal atrial fibrillation 09/11/2018    Pulmonary hypertension 04/05/2024    Pure hypercholesterolemia 12/13/2013    Sick sinus syndrome     Stroke     Takotsubo cardiomyopathy 11/17/2016         Past Surgical History:   Procedure Laterality Date    CARDIAC PACEMAKER PLACEMENT      [employer] Device Model: Juli ALMANZAR A2DR01 Device Type: PACEMAKER Device S\N: [id number]    CARPAL TUNNEL RELEASE Bilateral 1991    CATARACT EXTRACTION Right     JOINT REPLACEMENT      ORIF FEMUR FRACTURE Right 3/8/2025    Procedure: OPEN REDUCTION INTERNAL FIXATION FEMORAL SHAFT FRACTURE;  Surgeon: Albino William MD;  Location: [facility] OR [address] [place] [address];  Service: Orthopedics;  Laterality: Right;    SC TRANSCRAN DOPP INTRACRAN, EMBOLI W/O INJ  01/29/2018         SKIN BIOPSY      TONSILLECTOMY      TOTAL HIP ARTHROPLASTY  11/01/2000    right/[facility]       Time  Tracking:     OT Date of Treatment: 03/14/25  OT Start Time: 1300  OT Stop Time: 1355  OT Total Time (min): 55 min    Billable Minutes:Evaluation 10  Self Care/Home Management 45    3/14/2025  Santos Lopez Jr., OTR/L

## 2025-03-15 PROCEDURE — 11000004 HC SNF PRIVATE

## 2025-03-15 PROCEDURE — 25000003 PHARM REV CODE 250: Performed by: HOSPITALIST

## 2025-03-15 PROCEDURE — 97530 THERAPEUTIC ACTIVITIES: CPT | Mod: CO

## 2025-03-15 PROCEDURE — 97110 THERAPEUTIC EXERCISES: CPT | Mod: CO

## 2025-03-15 PROCEDURE — 25000003 PHARM REV CODE 250: Performed by: FAMILY MEDICINE

## 2025-03-15 RX ADMIN — APIXABAN 2.5 MG: 2.5 TABLET, FILM COATED ORAL at 09:03

## 2025-03-15 RX ADMIN — METOPROLOL SUCCINATE 50 MG: 50 TABLET, EXTENDED RELEASE ORAL at 09:03

## 2025-03-15 RX ADMIN — APIXABAN 5 MG: 2.5 TABLET, FILM COATED ORAL at 08:03

## 2025-03-15 RX ADMIN — METHOCARBAMOL 500 MG: 500 TABLET ORAL at 09:03

## 2025-03-15 RX ADMIN — CEFADROXIL 500 MG: 500 CAPSULE ORAL at 08:03

## 2025-03-15 RX ADMIN — LEVOTHYROXINE SODIUM 88 MCG: 112 TABLET ORAL at 06:03

## 2025-03-15 RX ADMIN — TIMOLOL MALEATE 1 DROP: 5 SOLUTION OPHTHALMIC at 08:03

## 2025-03-15 RX ADMIN — TIMOLOL MALEATE 1 DROP: 5 SOLUTION OPHTHALMIC at 09:03

## 2025-03-15 RX ADMIN — Medication 6 MG: at 09:03

## 2025-03-15 RX ADMIN — BUMETANIDE 0.5 MG: 0.5 TABLET ORAL at 09:03

## 2025-03-15 RX ADMIN — METOPROLOL SUCCINATE 25 MG: 25 TABLET, EXTENDED RELEASE ORAL at 08:03

## 2025-03-15 RX ADMIN — BRIMONIDINE TARTRATE 1 DROP: 1.5 SOLUTION OPHTHALMIC at 08:03

## 2025-03-15 RX ADMIN — CLOPIDOGREL BISULFATE 75 MG: 75 TABLET ORAL at 09:03

## 2025-03-15 RX ADMIN — TRAMADOL HYDROCHLORIDE 50 MG: 50 TABLET, COATED ORAL at 07:03

## 2025-03-15 RX ADMIN — SENNOSIDES AND DOCUSATE SODIUM 1 TABLET: 50; 8.6 TABLET ORAL at 08:03

## 2025-03-15 RX ADMIN — TRAMADOL HYDROCHLORIDE 50 MG: 50 TABLET, COATED ORAL at 12:03

## 2025-03-15 RX ADMIN — HYDROCHLOROTHIAZIDE 12.5 MG: 12.5 TABLET ORAL at 09:03

## 2025-03-15 RX ADMIN — METHOCARBAMOL 500 MG: 500 TABLET ORAL at 06:03

## 2025-03-15 RX ADMIN — ESCITALOPRAM OXALATE 20 MG: 10 TABLET ORAL at 09:03

## 2025-03-15 RX ADMIN — CEFADROXIL 500 MG: 500 CAPSULE ORAL at 09:03

## 2025-03-15 RX ADMIN — BRIMONIDINE TARTRATE 1 DROP: 1.5 SOLUTION OPHTHALMIC at 09:03

## 2025-03-15 RX ADMIN — AMLODIPINE BESYLATE 5 MG: 5 TABLET ORAL at 09:03

## 2025-03-15 RX ADMIN — SENNOSIDES AND DOCUSATE SODIUM 1 TABLET: 50; 8.6 TABLET ORAL at 09:03

## 2025-03-15 RX ADMIN — METHOCARBAMOL 500 MG: 500 TABLET ORAL at 02:03

## 2025-03-15 NOTE — ASSESSMENT & PLAN NOTE
Malnutrition Type:  Context: social/environmental circumstances  Level: moderate    Related to (etiology):   Inadequate energy and protein intake    Signs and Symptoms (as evidenced by):   Two meals per day by preference,  won't go to Assisted living dining room ;meals come to room    Malnutrition Characteristic Summary:  Weight Loss (Malnutrition): 10% in 6 months  Subcutaneous Fat (Malnutrition): mild depletion  Muscle Mass (Malnutrition): moderate depletion      Interventions/Recommendations (treatment strategy):  Continue regular diet, follow daily weights, PO intake, continue boost plus but BID, RD following    Nutrition Diagnosis Status:   New

## 2025-03-15 NOTE — PLAN OF CARE
Problem: Adult Inpatient Plan of Care  Goal: Plan of Care Review  Outcome: Progressing  Goal: Patient-Specific Goal (Individualized)  Outcome: Progressing  Goal: Absence of Hospital-Acquired Illness or Injury  Outcome: Progressing  Goal: Optimal Comfort and Wellbeing  Outcome: Progressing  Goal: Readiness for Transition of Care  Outcome: Progressing     Problem: Wound  Goal: Optimal Coping  Outcome: Progressing  Goal: Optimal Functional Ability  Outcome: Progressing  Goal: Absence of Infection Signs and Symptoms  Outcome: Progressing  Goal: Improved Oral Intake  Outcome: Progressing  Goal: Optimal Pain Control and Function  Outcome: Progressing  Goal: Skin Health and Integrity  Outcome: Progressing  Goal: Optimal Wound Healing  Outcome: Progressing     Problem: Fall Injury Risk  Goal: Absence of Fall and Fall-Related Injury  Outcome: Progressing     Problem: Skin Injury Risk Increased  Goal: Skin Health and Integrity  Outcome: Progressing     Problem: Malnutrition  Goal: Improved Nutritional Intake  Outcome: Progressing

## 2025-03-15 NOTE — PLAN OF CARE
Problem: Occupational Therapy  Goal: Occupational Therapy Goal  Description: Goals to be met by: 4/13/2025     Patient will increase functional independence with ADLs by performing:  UBD: with Min A seated in w/c  LBD: with Mod A seated in w/c  Bathing: with Min A seated in w/c or bath bench  Toileting: with Mod A for hygiene/clothing mgmt   Oral: with MI seated in w/c   Grooming: with MI seated in w/c  Pt will complete 10 mins on UE ergometer seated in w/c  Pt will complete 3-5 mins standing with RW/CGA-SBA during dynamic standing activity    Outcome: Progressing

## 2025-03-15 NOTE — PT/OT/SLP PROGRESS
"Occupational Therapy   Treatment    Name: Dorothy M Knobloch  MRN: [id number]  Admit Date: 3/13/2025  Admitting Diagnosis:  Periprosthetic fracture of shaft of femur    General Precautions: Standard, fall, vision impaired   Orthopedic Precautions: RLE weight bearing as tolerated   Braces: N/A    Recommendations:     Discharge Recommendations:  home health OT  Level of Assistance Recommended at Discharge: 24 hours physical assistance for all ADL's and home management tasks  Discharge Equipment Recommendations: wheelchair, walker, rolling  Barriers to discharge:  Decreased caregiver support    Assessment:     Dorothy M Knobloch is a 89 y.o. female with a medical diagnosis of Periprosthetic fracture of shaft of femur .  She presents with performance deficits affecting function are weakness, impaired endurance, impaired self care skills, impaired functional mobility, gait instability, impaired balance, visual deficits, decreased lower extremity function, pain, orthopedic precautions, impaired cardiopulmonary response to activity, decreased upper extremity function. Pt c/o increased pain in neck throughout session, nsg notified. Pt tolerated tx session without incident and is making progress, however, continues to demonstrate deficits with self care skills, balance, functional mobility, UB strength and endurance. Pt will benefit from continued OT services to progress towards goals.     Rehab Potential is good    Activity tolerance:  Good    Plan:     Patient to be seen 5 x/week to address the above listed problems via neuromuscular re-education, cognitive retraining, community/work re-entry, sensory integration, therapeutic activities, self-care/home management, therapeutic exercises, wheelchair management/training, splinting, therapeutic groups    Plan of Care Expires: 04/13/25  Plan of Care Reviewed with: patient    Subjective   "I will try"  Communicated with: Tony prior to session.     Pain/Comfort:  Pain Rating 1: " 8/10  Location - Side 1: Bilateral  Location - Orientation 1: generalized  Location 1: neck  Pain Addressed 1: Nurse notified, Reposition, Cessation of Activity  Pain Rating Post-Intervention 1: 8/10    Patient's cultural, spiritual, Methodist conflicts given the current situation:  no    Objective:     Patient found up in chair upon OT entry to room.    Functional Mobility/Transfers:  Pt declined due to pain.    [facility] 6 Click ADL: 16    OT Exercises: UE Ergometer 10 min with min resistance   AROM x 2 sets of 10 with 2 lb dowel. Pt perform shoulder flex/ext, forward flex motion and bicep curls.   Therex performed to improve overall endurance, ROM and UB strength required for functional transfers, ADL's and w/c propulsion.     Treatment & Education:  Pt seated at table to match washers to dowel sticks with focus on fine motor and functional reaching. Cessation of activity due to pain.     Pt educated on:  - role of OT  - level of assistance  - energy conservation and task modification to maximize independence with ADL's and mobility   -  safety while performing functional transfers and self care tasks  - orthopedic precautions.   - progress towards OT goals     Patient left up in chair with call button in reach    GOALS:   Multidisciplinary Problems       Occupational Therapy Goals          Problem: Occupational Therapy    Goal Priority Disciplines Outcome Interventions   Occupational Therapy Goal     OT, PT/OT Progressing    Description: Goals to be met by: 4/13/2025     Patient will increase functional independence with ADLs by performing:  UBD: with Min A seated in w/c  LBD: with Mod A seated in w/c  Bathing: with Min A seated in w/c or bath bench  Toileting: with Mod A for hygiene/clothing mgmt   Oral: with MI seated in w/c   Grooming: with MI seated in w/c  Pt will complete 10 mins on UE ergometer seated in w/c  Pt will complete 3-5 mins standing with RW/CGA-SBA during dynamic standing activity                          Time Tracking:     OT Date of Treatment: 03/15/25  OT Start Time: 1029    OT Stop Time: 1100  OT Total Time (min): 31 min    Billable Minutes:Therapeutic Activity 16  Therapeutic Exercise 15    3/15/2025  A client care conference was performed between the [facility] and [facility], prior to treatment by [facility], to discuss the patient's status, treatment plan and established goals.

## 2025-03-16 PROCEDURE — 25000003 PHARM REV CODE 250: Performed by: HOSPITALIST

## 2025-03-16 PROCEDURE — 25000003 PHARM REV CODE 250: Performed by: FAMILY MEDICINE

## 2025-03-16 PROCEDURE — 11000004 HC SNF PRIVATE

## 2025-03-16 RX ADMIN — METOPROLOL SUCCINATE 25 MG: 25 TABLET, EXTENDED RELEASE ORAL at 08:03

## 2025-03-16 RX ADMIN — APIXABAN 5 MG: 2.5 TABLET, FILM COATED ORAL at 08:03

## 2025-03-16 RX ADMIN — AMLODIPINE BESYLATE 5 MG: 5 TABLET ORAL at 09:03

## 2025-03-16 RX ADMIN — TRAMADOL HYDROCHLORIDE 50 MG: 50 TABLET, COATED ORAL at 01:03

## 2025-03-16 RX ADMIN — METOPROLOL SUCCINATE 50 MG: 50 TABLET, EXTENDED RELEASE ORAL at 09:03

## 2025-03-16 RX ADMIN — TIMOLOL MALEATE 1 DROP: 5 SOLUTION OPHTHALMIC at 09:03

## 2025-03-16 RX ADMIN — TRAMADOL HYDROCHLORIDE 50 MG: 50 TABLET, COATED ORAL at 08:03

## 2025-03-16 RX ADMIN — CLOPIDOGREL BISULFATE 75 MG: 75 TABLET ORAL at 09:03

## 2025-03-16 RX ADMIN — METHOCARBAMOL 500 MG: 500 TABLET ORAL at 06:03

## 2025-03-16 RX ADMIN — CEFADROXIL 500 MG: 500 CAPSULE ORAL at 09:03

## 2025-03-16 RX ADMIN — METHOCARBAMOL 500 MG: 500 TABLET ORAL at 10:03

## 2025-03-16 RX ADMIN — METHOCARBAMOL 500 MG: 500 TABLET ORAL at 02:03

## 2025-03-16 RX ADMIN — APIXABAN 5 MG: 2.5 TABLET, FILM COATED ORAL at 09:03

## 2025-03-16 RX ADMIN — BRIMONIDINE TARTRATE 1 DROP: 1.5 SOLUTION OPHTHALMIC at 09:03

## 2025-03-16 RX ADMIN — HYDROCHLOROTHIAZIDE 12.5 MG: 12.5 TABLET ORAL at 09:03

## 2025-03-16 RX ADMIN — BRIMONIDINE TARTRATE 1 DROP: 1.5 SOLUTION OPHTHALMIC at 08:03

## 2025-03-16 RX ADMIN — LEVOTHYROXINE SODIUM 88 MCG: 112 TABLET ORAL at 06:03

## 2025-03-16 RX ADMIN — ESCITALOPRAM OXALATE 20 MG: 10 TABLET ORAL at 09:03

## 2025-03-16 RX ADMIN — SENNOSIDES AND DOCUSATE SODIUM 1 TABLET: 50; 8.6 TABLET ORAL at 08:03

## 2025-03-16 RX ADMIN — Medication 6 MG: at 08:03

## 2025-03-16 RX ADMIN — SENNOSIDES AND DOCUSATE SODIUM 1 TABLET: 50; 8.6 TABLET ORAL at 09:03

## 2025-03-16 RX ADMIN — TIMOLOL MALEATE 1 DROP: 5 SOLUTION OPHTHALMIC at 08:03

## 2025-03-16 RX ADMIN — BUMETANIDE 0.5 MG: 0.5 TABLET ORAL at 09:03

## 2025-03-16 NOTE — NURSING
Pt daughter stated her mom had neck pain and asked for a rub w lidocaine. She is currently using heat packs. I explained that it would need to be prescribed, to which she said she was going to buy something OTC. I offered the patient her prn tramadol, and she wanted to take it. The daughter said that an opiate should not be given for neck pain and that the patient may not want it. I also offered tylenol. Patient requested tramadol.  Daughter asked if staff could massage pt's neck. I told her that is actually something a therapist would do and could be prescribed by them. Daughter told me she didn't understand why I couldn't just rub mom's neck and exited the room.

## 2025-03-16 NOTE — NURSING
Went in pt's room to give her robaxin and melatonin. Patient told me how her daughter filled a hot water bottle and put it in a sock, to rest on her neck. I told her I can put our heat packs in a sock, to make the edges not as sharp. Patient said everything was fine for now and that her daughter massaged her neck with icy hot prior to leaving

## 2025-03-17 LAB
ANION GAP SERPL CALC-SCNC: 11 MMOL/L (ref 8–16)
BASOPHILS # BLD AUTO: 0.07 K/UL (ref 0–0.2)
BASOPHILS NFR BLD: 0.6 % (ref 0–1.9)
BUN SERPL-MCNC: 34 MG/DL (ref 10–30)
CALCIUM SERPL-MCNC: 8.9 MG/DL (ref 8.7–10.5)
CHLORIDE SERPL-SCNC: 92 MMOL/L (ref 95–110)
CO2 SERPL-SCNC: 29 MMOL/L (ref 23–29)
CREAT SERPL-MCNC: 0.6 MG/DL (ref 0.5–1.4)
DIFFERENTIAL METHOD BLD: ABNORMAL
EOSINOPHIL # BLD AUTO: 0.2 K/UL (ref 0–0.5)
EOSINOPHIL NFR BLD: 1.3 % (ref 0–8)
ERYTHROCYTE [DISTWIDTH] IN BLOOD BY AUTOMATED COUNT: 14.9 % (ref 11.5–14.5)
EST. GFR  (NO RACE VARIABLE): >60 ML/MIN/1.73 M^2
GLUCOSE SERPL-MCNC: 103 MG/DL (ref 70–110)
HCT VFR BLD AUTO: 27.6 % (ref 37–48.5)
HGB BLD-MCNC: 8.9 G/DL (ref 12–16)
IMM GRANULOCYTES # BLD AUTO: 0.24 K/UL (ref 0–0.04)
IMM GRANULOCYTES NFR BLD AUTO: 1.9 % (ref 0–0.5)
LYMPHOCYTES # BLD AUTO: 1.2 K/UL (ref 1–4.8)
LYMPHOCYTES NFR BLD: 9.6 % (ref 18–48)
MAGNESIUM SERPL-MCNC: 2.2 MG/DL (ref 1.6–2.6)
MCH RBC QN AUTO: 31.2 PG (ref 27–31)
MCHC RBC AUTO-ENTMCNC: 32.2 G/DL (ref 32–36)
MCV RBC AUTO: 97 FL (ref 82–98)
MONOCYTES # BLD AUTO: 1.2 K/UL (ref 0.3–1)
MONOCYTES NFR BLD: 9.1 % (ref 4–15)
NEUTROPHILS # BLD AUTO: 9.8 K/UL (ref 1.8–7.7)
NEUTROPHILS NFR BLD: 77.5 % (ref 38–73)
NRBC BLD-RTO: 0 /100 WBC
PHOSPHATE SERPL-MCNC: 3.3 MG/DL (ref 2.7–4.5)
PLATELET # BLD AUTO: 391 K/UL (ref 150–450)
PMV BLD AUTO: 9.1 FL (ref 9.2–12.9)
POTASSIUM SERPL-SCNC: 3.5 MMOL/L (ref 3.5–5.1)
RBC # BLD AUTO: 2.85 M/UL (ref 4–5.4)
SODIUM SERPL-SCNC: 132 MMOL/L (ref 136–145)
WBC # BLD AUTO: 12.66 K/UL (ref 3.9–12.7)

## 2025-03-17 PROCEDURE — 25000003 PHARM REV CODE 250: Performed by: FAMILY MEDICINE

## 2025-03-17 PROCEDURE — 97116 GAIT TRAINING THERAPY: CPT

## 2025-03-17 PROCEDURE — 97530 THERAPEUTIC ACTIVITIES: CPT

## 2025-03-17 PROCEDURE — 97110 THERAPEUTIC EXERCISES: CPT | Mod: CO

## 2025-03-17 PROCEDURE — 83735 ASSAY OF MAGNESIUM: CPT | Performed by: HOSPITALIST

## 2025-03-17 PROCEDURE — 84100 ASSAY OF PHOSPHORUS: CPT | Performed by: HOSPITALIST

## 2025-03-17 PROCEDURE — 85025 COMPLETE CBC W/AUTO DIFF WBC: CPT | Performed by: HOSPITALIST

## 2025-03-17 PROCEDURE — 25000003 PHARM REV CODE 250: Performed by: HOSPITALIST

## 2025-03-17 PROCEDURE — 97535 SELF CARE MNGMENT TRAINING: CPT | Mod: CO

## 2025-03-17 PROCEDURE — 80048 BASIC METABOLIC PNL TOTAL CA: CPT | Performed by: HOSPITALIST

## 2025-03-17 PROCEDURE — 11000004 HC SNF PRIVATE

## 2025-03-17 PROCEDURE — 36415 COLL VENOUS BLD VENIPUNCTURE: CPT | Performed by: HOSPITALIST

## 2025-03-17 RX ORDER — AMOXICILLIN 250 MG
2 CAPSULE ORAL 2 TIMES DAILY
Status: DISCONTINUED | OUTPATIENT
Start: 2025-03-17 | End: 2025-03-23 | Stop reason: HOSPADM

## 2025-03-17 RX ORDER — GABAPENTIN 100 MG/1
100 CAPSULE ORAL NIGHTLY
Status: DISCONTINUED | OUTPATIENT
Start: 2025-03-17 | End: 2025-03-23 | Stop reason: HOSPADM

## 2025-03-17 RX ORDER — POLYETHYLENE GLYCOL 3350 17 G/17G
17 POWDER, FOR SOLUTION ORAL DAILY
Status: DISCONTINUED | OUTPATIENT
Start: 2025-03-18 | End: 2025-03-23 | Stop reason: HOSPADM

## 2025-03-17 RX ADMIN — TIMOLOL MALEATE 1 DROP: 5 SOLUTION OPHTHALMIC at 08:03

## 2025-03-17 RX ADMIN — TRAMADOL HYDROCHLORIDE 50 MG: 50 TABLET, COATED ORAL at 01:03

## 2025-03-17 RX ADMIN — GABAPENTIN 100 MG: 100 CAPSULE ORAL at 09:03

## 2025-03-17 RX ADMIN — ACETAMINOPHEN 650 MG: 325 TABLET ORAL at 08:03

## 2025-03-17 RX ADMIN — SENNOSIDES AND DOCUSATE SODIUM 1 TABLET: 50; 8.6 TABLET ORAL at 08:03

## 2025-03-17 RX ADMIN — HYDROCHLOROTHIAZIDE 12.5 MG: 12.5 TABLET ORAL at 08:03

## 2025-03-17 RX ADMIN — TIMOLOL MALEATE 1 DROP: 5 SOLUTION OPHTHALMIC at 09:03

## 2025-03-17 RX ADMIN — CLOPIDOGREL BISULFATE 75 MG: 75 TABLET ORAL at 08:03

## 2025-03-17 RX ADMIN — BUMETANIDE 0.5 MG: 0.5 TABLET ORAL at 08:03

## 2025-03-17 RX ADMIN — BRIMONIDINE TARTRATE 1 DROP: 1.5 SOLUTION OPHTHALMIC at 08:03

## 2025-03-17 RX ADMIN — BRIMONIDINE TARTRATE 1 DROP: 1.5 SOLUTION OPHTHALMIC at 09:03

## 2025-03-17 RX ADMIN — TRAMADOL HYDROCHLORIDE 50 MG: 50 TABLET, COATED ORAL at 02:03

## 2025-03-17 RX ADMIN — TRAMADOL HYDROCHLORIDE 50 MG: 50 TABLET, COATED ORAL at 04:03

## 2025-03-17 RX ADMIN — METOPROLOL SUCCINATE 50 MG: 50 TABLET, EXTENDED RELEASE ORAL at 08:03

## 2025-03-17 RX ADMIN — METOPROLOL SUCCINATE 25 MG: 25 TABLET, EXTENDED RELEASE ORAL at 09:03

## 2025-03-17 RX ADMIN — Medication 6 MG: at 09:03

## 2025-03-17 RX ADMIN — LEVOTHYROXINE SODIUM 88 MCG: 112 TABLET ORAL at 06:03

## 2025-03-17 RX ADMIN — ESCITALOPRAM OXALATE 20 MG: 10 TABLET ORAL at 08:03

## 2025-03-17 RX ADMIN — AMLODIPINE BESYLATE 5 MG: 5 TABLET ORAL at 08:03

## 2025-03-17 RX ADMIN — METHOCARBAMOL 500 MG: 500 TABLET ORAL at 06:03

## 2025-03-17 RX ADMIN — APIXABAN 5 MG: 2.5 TABLET, FILM COATED ORAL at 08:03

## 2025-03-17 RX ADMIN — APIXABAN 5 MG: 2.5 TABLET, FILM COATED ORAL at 09:03

## 2025-03-17 RX ADMIN — SENNOSIDES AND DOCUSATE SODIUM 2 TABLET: 50; 8.6 TABLET ORAL at 09:03

## 2025-03-17 RX ADMIN — METHOCARBAMOL 500 MG: 500 TABLET ORAL at 09:03

## 2025-03-17 RX ADMIN — METHOCARBAMOL 500 MG: 500 TABLET ORAL at 02:03

## 2025-03-17 NOTE — PT/OT/SLP PROGRESS
Occupational Therapy   Treatment    Name: Dorothy M Knobloch  MRN: [id number]  Admit Date: 3/13/2025  Admitting Diagnosis:  Periprosthetic fracture of shaft of femur    General Precautions: Standard, fall, vision impaired   Orthopedic Precautions: RLE weight bearing as tolerated   Braces: N/A    Recommendations:     Discharge Recommendations:  home health OT  Level of Assistance Recommended at Discharge: 24 hours physical assistance for all ADL's and home management tasks  Discharge Equipment Recommendations: wheelchair, walker, rolling  Barriers to discharge:  Decreased caregiver support    Assessment:     Dorothy M Knobloch is a 89 y.o. female with a medical diagnosis of Periprosthetic fracture of shaft of femur .  She presents with performance deficits affecting function are weakness, impaired endurance, impaired self care skills, impaired functional mobility, gait instability, impaired balance, visual deficits, decreased lower extremity function, pain, orthopedic precautions, impaired cardiopulmonary response to activity, decreased upper extremity function. Pt participated fairly during today's session. Pt tolerated tx session without incident and is making progress, however, continues to demonstrate deficits with self care skills, balance, functional mobility, UB strength and endurance. Pt will benefit from continued OT services to progress towards goals.     Rehab Potential is good    Activity tolerance:  Fair    Plan:     Patient to be seen 5 x/week to address the above listed problems via neuromuscular re-education, cognitive retraining, community/work re-entry, sensory integration, therapeutic activities, self-care/home management, therapeutic exercises, wheelchair management/training, splinting, therapeutic groups    Plan of Care Expires: 04/13/25  Plan of Care Reviewed with: patient, son    Subjective   Pt agreeable to session   Communicated with: Tony prior to session.    Pain/Comfort:  Pain Rating 1:  0/10  Pain Rating Post-Intervention 1: 0/10    Patient's cultural, spiritual, Mosque conflicts given the current situation:  no    Objective:     Patient found up in chair with PT in rehab gym.    Activities of Daily Living:  Oral/Facial hygiene: set up assistance seated at sink in w/c.    AMPAC 6 Click ADL: 16    OT Exercises: UE Ergometer 10 min with min resistance   AROM x 2 sets of 10 with 1 lb dowel. Pt perform shoulder flex/ext, forward flex motion and bicep curls.   Therex performed to improve overall endurance, ROM and UB strength required for functional transfers, ADL's and w/c propulsion. Pt with intermittent rest breaks.     Treatment & Education:  Pt educated on:  - role of OT  - level of assistance  - energy conservation and task modification to maximize independence with ADL's and mobility   -  safety while performing functional transfers and self care tasks  - orthopedic precautions.   - progress towards OT goals     Patient left up in chair with call button in reach and son present    GOALS:   Multidisciplinary Problems       Occupational Therapy Goals          Problem: Occupational Therapy    Goal Priority Disciplines Outcome Interventions   Occupational Therapy Goal     OT, PT/OT Progressing    Description: Goals to be met by: 4/13/2025     Patient will increase functional independence with ADLs by performing:  UBD: with Min A seated in w/c  LBD: with Mod A seated in w/c  Bathing: with Min A seated in w/c or bath bench  Toileting: with Mod A for hygiene/clothing mgmt   Oral: with MI seated in w/c   Grooming: with MI seated in w/c  Pt will complete 10 mins on UE ergometer seated in w/c  Pt will complete 3-5 mins standing with RW/CGA-SBA during dynamic standing activity                         Time Tracking:     OT Date of Treatment: 03/17/25  OT Start Time: 1417    OT Stop Time: 1451  OT Total Time (min): 34 min    Billable Minutes:Self Care/Home Management 16  Therapeutic Exercise  18    3/17/2025

## 2025-03-17 NOTE — PT/OT/SLP PROGRESS
Physical Therapy Treatment    Patient Name:  Dorothy M Knobloch   MRN:  [id number]  Admit Date: 3/13/2025  Admitting Diagnosis: Periprosthetic fracture of shaft of femur  Recent Surgeries:  Procedure(s) (LRB):  OPEN REDUCTION INTERNAL FIXATION FEMORAL SHAFT FRACTURE (Right)  General Precautions: Standard, fall, vision impaired  Orthopedic Precautions: RLE weight bearing as tolerated  Braces: N/A    Recommendations:     Discharge Recommendations: home health PT  Level of Assistance Recommended at Discharge: 24 hours significant assistance  Discharge Equipment Recommendations: wheelchair, walker, rolling  Barriers to discharge: Decreased caregiver support    Assessment:     Dorothy M Knobloch is a 89 y.o. female admitted with a medical diagnosis of Periprosthetic fracture of shaft of femur . Pt preferred GT with R shoe with lift and no shoe on L foot to assist with LLE swing through during GT. Pt currently needs.    Performance deficits affecting function: weakness, impaired endurance, impaired self care skills, impaired functional mobility, gait instability, impaired balance, decreased upper extremity function, decreased lower extremity function, pain, decreased ROM, edema, impaired skin, impaired cardiopulmonary response to activity, orthopedic precautions.    Rehab Potential is good    Activity Tolerance: Good    Plan:     Patient to be seen 5 x/week to address the above listed problems via gait training, therapeutic activities, therapeutic exercises, wheelchair management/training    Plan of Care Expires: 04/13/25  Plan of Care Reviewed with: patient, son    Subjective     Pt agreeable to work with PT.     Pain/Comfort:  Pain Rating 1: 8/10  Location - Side 1: Left  Location - Orientation 1: generalized  Location 1: foot  Pain Addressed 1: Pre-medicate for activity, Reposition, Distraction, Nurse notified  Pain Rating Post-Intervention 1: 3/10    Patient's cultural, spiritual, Holiness conflicts given the current  situation:  no    Objective:     Communicated with pt prior to session.  Patient found HOB elevated with oxygen, wound vac upon PT entry to room.     Therapeutic Activities and Exercises: Total A to allan B shoes    Functional Mobility:  Bed Mobility:     Rolling Right: moderate assistance using BSrail  Supine to Sit: maximal assistance with HOB raised and pt using BSrail  Transfers:     Sit to Stand:  maximal assistance with rolling walker  Bed to Chair: maximal assistance with  rolling walker  using  Stand Pivot  Gait: 2steps with B shoes with RW and Max/Mod Ax 2people. Pt then ambulated 10ft with RW and Mod A x 2people with pt only using R shoe.    AM-PAC 6 CLICK MOBILITY  10    Patient left up in chair with  direct hand off to OT in the gym .    GOALS:   Multidisciplinary Problems       Physical Therapy Goals          Problem: Physical Therapy    Goal Priority Disciplines Outcome Interventions   Physical Therapy Goal     PT, PT/OT Progressing    Description: Goals to be met by: 4/14/25     Patient will increase functional independence with mobility by performing:    . Supine to sit with Stand-by Assistance  . Sit to supine with Stand-by Assistance  . Rolling to Left and Right with Stand-by Assistance.  . Sit to stand transfer with Contact Guard Assistance  . Bed to chair transfer with Contact Guard Assistance using Rolling Walker  . Gait  x 50 feet with Contact Guard Assistance using .   . Wheelchair propulsion x50 feet with Supervision using bilateral uppper extremities                         Time Tracking:     PT Received On: 03/17/25  PT Start Time: 1331  PT Stop Time: 1411  PT Total Time (min): 40 min    Billable Minutes: Gait Training 16 and Therapeutic Activity 24    Treatment Type: Treatment  PT/PTA: PT     Number of PTA visits since last PT visit: 0     03/17/2025

## 2025-03-17 NOTE — PLAN OF CARE
Malnutrition Type:  Context: social/environmental circumstances  Level: moderate     Related to (etiology):   Inadequate energy and protein intake     Signs and Symptoms (as evidenced by):   Two meals per day by preference,  won't go to Assisted living dining room ;meals come to room     Malnutrition Characteristic Summary:  Weight Loss (Malnutrition): 10% in 6 months  Subcutaneous Fat (Malnutrition): mild depletion  Muscle Mass (Malnutrition): moderate depletion        Interventions/Recommendations (treatment strategy):  Continue regular diet, follow daily weights, PO intake, continue boost plus but BID, RD following     Nutrition Diagnosis Status:   New     Plan  Commercial beverage medical food supplement- Boost plus BID( protein)  Collaboration of nutrition professional with other providers  Level 6 diet

## 2025-03-17 NOTE — CONSULTS
[facility] - Skilled Nursing    Wound Care     Patient Name:  Dorothy M Knobloch  MRN:  [id number]  Date: 3/17/2025  Diagnosis: Periprosthetic fracture of shaft of femur     History:  Past Medical History:   Diagnosis Date    Acquired hypothyroidism 12/13/2013    Age-related physical debility 04/05/2024    Antiplatelet or antithrombotic long-term use     AV block, 3rd degree 03/19/2019    Bilateral nonexudative age-related macular degeneration 08/27/2013    Blindness and low vision 08/10/2018    Blood transfusion     Cardiac pacemaker 08/22/2018    Carotid artery disease 08/10/2016    Chronic diastolic congestive heart failure 11/24/2019    CRAO (central retinal artery occlusion), left 01/29/2018    Dry eye syndrome of both eyes 08/10/2018    Essential hypertension 12/13/2013    History of TIA (transient ischemic attack) 12/13/2013    Nonexudative age-related macular degeneration, bilateral, intermediate dry stage 07/12/2018    Nuclear sclerotic cataract of left eye 08/10/2018    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 Assessment Details / Treatment:    Patient seen for wound care: New Consult   Chart reviewed for this encounter.   Labs:   WBC (K/uL)   Date Value   03/17/2025 12.66   03/12/2025 9.35     Glucose (mg/dL)   Date Value   03/17/2025 103   03/12/2025 91     Albumin (g/dL)   Date Value   03/10/2025 2.8 (L)   03/09/2025 3.0 (L)       Kodak Score: 15    Narrative:  Pt seen for WC consultation and agreed to assessment. Patient laying in bed, waffle overlay on mattress.  Nurse assisted with assessment. Right upper inner thigh, moist and red with crust, periwound pink. Appears to be abrasion from Versette. Area cleansed with no foam , dried and barrier cream applied. Continue treatment and recommend to limit diaper and versette use. Buttocks MASD appears resolved.  Indentations to buttocks from tubing. Keep wound vac and versette tubing clear from under patient. WC to follow  Chart reviewed for this encounter.   See Flow Sheet for additional documentation and media.     03/17/25 1156   WOCN Assessment   WOCN Total Time (mins) 30   Visit Date 03/17/25   Visit Time 1156   Consult Type Follow Up   Intervention assessed;changed;applied;chart review;coordination of care   Teaching on-going   Skin Interventions   Pressure Reduction Devices pressure-redistributing mattress utilized        Wound 03/13/25 1910 Abrasion(s) Right anterior;upper Thigh #1   Date First Assessed/Time First Assessed: 03/13/25 1910   Present on Original Admission: Yes  Primary Wound Type: Abrasion(s)  Side: Right  Orientation: anterior;upper  Location: Thigh  Wound Number: #1   Wound Image    Dressing Appearance No dressing   Drainage Amount None   Appearance Pink;Red;Moist   Care Cleansed with:;Soap and water;Applied:;Skin Barrier                [1]   Social History  Socioeconomic History    Marital status:    Tobacco Use    Smoking status: Never     Passive exposure: Never    Smokeless tobacco: Never   Substance and Sexual Activity    Alcohol use: No     Alcohol/week: 0.0 standard drinks of alcohol    Drug use: No   Social History Narrative    Cares for her blind       Social Drivers of Health     Financial Resource Strain: Low Risk  (3/15/2025)    Overall Financial Resource Strain (CARDIA)     Difficulty of Paying Living Expenses: Not hard at all   Food Insecurity: No Food Insecurity (3/15/2025)    Hunger Vital Sign     Worried About Running Out of Food in the Last Year: Never true     Ran Out  of Food in the Last Year: Never true   Stress: No Stress Concern Present (3/15/2025)    [place] [employer] - Occupational Stress Questionnaire     Feeling of Stress : Only a little   Housing Stability: Low Risk  (3/15/2025)    Housing Stability Vital Sign     Unable to Pay for Housing in the Last Year: No     Homeless in the Last Year: No

## 2025-03-17 NOTE — PROGRESS NOTES
[facility] - Skilled Nursing  Adult Nutrition  Progress Note    SUMMARY   Recommendations  Continue soft and bites sized diet, boost plus BID, recommend MVI,RD following  Goals: PO to meet 85% of EEN/EPN with ONS by next RD follow up  Nutrition Goal Status: progressing towards goal  Communication of RD Recs: other (comment) (POC)    Nutrition Discharge Planning    Nutrition Discharge Planning: Oral supplement regimen (comments), General healthy diet  Oral supplement regimen (comments): patient choice    Assessment and Plan    Endocrine  Moderate malnutrition  Malnutrition Type:  Context: social/environmental circumstances  Level: moderate    Related to (etiology):   Inadequate energy and protein intake    Signs and Symptoms (as evidenced by):   Two meals per day by preference,  won't go to Assisted living dining room ;meals come to room    Malnutrition Characteristic Summary:  Weight Loss (Malnutrition): 10% in 6 months  Subcutaneous Fat (Malnutrition): mild depletion  Muscle Mass (Malnutrition): moderate depletion      Interventions/Recommendations (treatment strategy):  Continue regular diet, follow daily weights, PO intake, continue boost plus but BID, RD following    Nutrition Diagnosis Status:   New         Malnutrition Assessment  3/14  Malnutrition Context: social/environmental circumstances  Malnutrition Level: moderate  Skin (Micronutrient): bruised, thinned, turgor reduced, wounds unhealed  Hair/Scalp (Micronutrient): dry  Teeth (Micronutrient): broken dentition  Tongue (Micronutrient): red  Neck/Chest (Micronutrient): muscle wasting  Musculoskeletal/Lower Extremities: muscle wasting       Weight Loss (Malnutrition): 10% in 6 months  Subcutaneous Fat (Malnutrition): mild depletion  Muscle Mass (Malnutrition): moderate depletion   Orbital Region (Subcutaneous Fat Loss): severe depletion  Upper Arm Region (Subcutaneous Fat Loss): mild depletion  Thoracic and Lumbar Region: mild depletion   [place] Region  "(Muscle Loss): mild depletion  Clavicle Bone Region (Muscle Loss): moderate depletion  Clavicle and Acromion Bone Region (Muscle Loss): moderate depletion  Dorsal Hand (Muscle Loss): moderate depletion  Patellar Region (Muscle Loss): mild depletion  Anterior Thigh Region (Muscle Loss): mild depletion  Posterior Calf Region (Muscle Loss): mild depletion                 Reason for Assessment    Reason For Assessment: RD follow-up  Diagnosis:  (R femur fx, s/p ORIF)  General Information Comments: Lives with spouse who is blind in [place], he has sitter for 6 hours per day since dc from our SNF here recently. Patient also has poor eyesight. Appetite good.she has been monitoring her weight at home for HF.    Nutrition/Diet History    Nutrition Intake History: 3 meals per day, states [place] food is too salty for her. She has lost weight over the 3 years they have been at [place]. She would drink the boost plus but wants only two servings per day  Food Preferences: whole milk  Spiritual, Cultural Beliefs, Mandaen Practices, Values that Affect Care: no  Food Allergies: other (see comments) (Allergy to aspartame)  Factors Affecting Nutritional Intake: None identified at this time    Anthropometrics    Height: 5' 2" (157.5 cm)  Height (inches): 62 in  Height Method: Stated  Weight: 72.2 kg (159 lb 2.8 oz)  Weight (lb): 159.17 lb  Weight Method: Bed Scale  Ideal Body Weight (IBW), Female: 110 lb  % Ideal Body Weight, Female (lb): 115.25 %  BMI (Calculated): 29.1  BMI Grade: 18.5-24.9 - normal  Usual Body Weight (UBW), k.4 kg  Weight Change Amount:  (10% change in less than 6 months)  % Usual Body Weight: 89.47  % Weight Change From Usual Weight: -10.72 %       Lab/Procedures/Meds    Pertinent Labs Reviewed: reviewed  Pertinent Labs Comments: Hg 8.9, Hct 27.6, Na 132,BUN34  Pertinent Medications Reviewed: reviewed  Pertinent Medications Comments: Levothyroxine, senna-docusate, Apixaban, Abx, Bumetanide, HCTZ,    Estimated/Assessed " Needs    Weight Used For Calorie Calculations: 57.5 kg (126 lb 12.2 oz)  Energy Calorie Requirements (kcal): 8867-7666  Energy Need Method: Chambers-St Jeor (x 1.4(PAL))  Protein Requirements: 69  Weight Used For Protein Calculations: 57.5 kg (126 lb 12.2 oz) (x 1.2 (PAL))  Fluid Requirements (mL): 1313 or per MD     RDA Method (mL): 1313         Nutrition Prescription Ordered    Current Diet Order: soft and bite sized level 6, (requested by daughter)  Nutrition Order Comments: no diet products(sugar free)  Oral Nutrition Supplement: boost plus BID    Evaluation of Received Nutrient/Fluid Intake    I/O: +1200  Energy Calories Required: meeting needs  Protein Required: meeting needs  Fluid Required: meeting needs  Comments: LBM 3/14  Tolerance: tolerating  % Intake of Estimated Energy Needs: 75 - 100 %  % Meal Intake: 50 - 75 %    Nutrition Risk    Level of Risk/Frequency of Follow-up: low     Monitor and Evaluation    Monitor and Evaluation: Skin, Food and beverage intake, Weight, Diet order, Electrolyte and renal panel, Gastrointestinal profile     Nutrition Follow-Up    RD Follow-up?: Yes

## 2025-03-18 PROCEDURE — 11000004 HC SNF PRIVATE

## 2025-03-18 PROCEDURE — 27000221 HC OXYGEN, UP TO 24 HOURS

## 2025-03-18 PROCEDURE — 97110 THERAPEUTIC EXERCISES: CPT | Mod: CO

## 2025-03-18 PROCEDURE — 94761 N-INVAS EAR/PLS OXIMETRY MLT: CPT

## 2025-03-18 PROCEDURE — 99900035 HC TECH TIME PER 15 MIN (STAT)

## 2025-03-18 PROCEDURE — 97530 THERAPEUTIC ACTIVITIES: CPT | Mod: CO

## 2025-03-18 PROCEDURE — 25000003 PHARM REV CODE 250: Performed by: HOSPITALIST

## 2025-03-18 PROCEDURE — 97535 SELF CARE MNGMENT TRAINING: CPT | Mod: CO

## 2025-03-18 PROCEDURE — 25000003 PHARM REV CODE 250: Performed by: FAMILY MEDICINE

## 2025-03-18 PROCEDURE — 97116 GAIT TRAINING THERAPY: CPT

## 2025-03-18 PROCEDURE — 97530 THERAPEUTIC ACTIVITIES: CPT

## 2025-03-18 PROCEDURE — 97110 THERAPEUTIC EXERCISES: CPT

## 2025-03-18 RX ADMIN — ESCITALOPRAM OXALATE 20 MG: 10 TABLET ORAL at 08:03

## 2025-03-18 RX ADMIN — CLOPIDOGREL BISULFATE 75 MG: 75 TABLET ORAL at 08:03

## 2025-03-18 RX ADMIN — METHOCARBAMOL 500 MG: 500 TABLET ORAL at 06:03

## 2025-03-18 RX ADMIN — APIXABAN 5 MG: 2.5 TABLET, FILM COATED ORAL at 09:03

## 2025-03-18 RX ADMIN — HYDROCHLOROTHIAZIDE 12.5 MG: 12.5 TABLET ORAL at 08:03

## 2025-03-18 RX ADMIN — TRAMADOL HYDROCHLORIDE 50 MG: 50 TABLET, COATED ORAL at 08:03

## 2025-03-18 RX ADMIN — METOPROLOL SUCCINATE 25 MG: 25 TABLET, EXTENDED RELEASE ORAL at 09:03

## 2025-03-18 RX ADMIN — TIMOLOL MALEATE 1 DROP: 5 SOLUTION OPHTHALMIC at 09:03

## 2025-03-18 RX ADMIN — GABAPENTIN 100 MG: 100 CAPSULE ORAL at 09:03

## 2025-03-18 RX ADMIN — AMLODIPINE BESYLATE 5 MG: 5 TABLET ORAL at 08:03

## 2025-03-18 RX ADMIN — LEVOTHYROXINE SODIUM 88 MCG: 112 TABLET ORAL at 06:03

## 2025-03-18 RX ADMIN — BRIMONIDINE TARTRATE 1 DROP: 1.5 SOLUTION OPHTHALMIC at 09:03

## 2025-03-18 RX ADMIN — METHOCARBAMOL 500 MG: 500 TABLET ORAL at 02:03

## 2025-03-18 RX ADMIN — Medication 6 MG: at 09:03

## 2025-03-18 RX ADMIN — TIMOLOL MALEATE 1 DROP: 5 SOLUTION OPHTHALMIC at 08:03

## 2025-03-18 RX ADMIN — APIXABAN 5 MG: 2.5 TABLET, FILM COATED ORAL at 08:03

## 2025-03-18 RX ADMIN — METHOCARBAMOL 500 MG: 500 TABLET ORAL at 09:03

## 2025-03-18 RX ADMIN — TRAMADOL HYDROCHLORIDE 50 MG: 50 TABLET, COATED ORAL at 03:03

## 2025-03-18 RX ADMIN — THERA TABS 1 TABLET: TAB at 08:03

## 2025-03-18 RX ADMIN — BUMETANIDE 0.5 MG: 0.5 TABLET ORAL at 08:03

## 2025-03-18 RX ADMIN — SENNOSIDES AND DOCUSATE SODIUM 2 TABLET: 50; 8.6 TABLET ORAL at 08:03

## 2025-03-18 RX ADMIN — METOPROLOL SUCCINATE 50 MG: 50 TABLET, EXTENDED RELEASE ORAL at 08:03

## 2025-03-18 RX ADMIN — BRIMONIDINE TARTRATE 1 DROP: 1.5 SOLUTION OPHTHALMIC at 08:03

## 2025-03-18 RX ADMIN — TRAMADOL HYDROCHLORIDE 50 MG: 50 TABLET, COATED ORAL at 12:03

## 2025-03-18 RX ADMIN — POLYETHYLENE GLYCOL 3350 17 G: 17 POWDER, FOR SOLUTION ORAL at 08:03

## 2025-03-18 RX ADMIN — SENNOSIDES AND DOCUSATE SODIUM 2 TABLET: 50; 8.6 TABLET ORAL at 09:03

## 2025-03-18 NOTE — PT/OT/SLP PROGRESS
"Occupational Therapy   Treatment    Name: Dorothy M Knobloch  MRN: [id number]  Admit Date: 3/13/2025  Admitting Diagnosis:  Periprosthetic fracture of shaft of femur    General Precautions: Standard, fall, vision impaired   Orthopedic Precautions: RLE weight bearing as tolerated   Braces: N/A    Recommendations:     Discharge Recommendations:  home health OT  Level of Assistance Recommended at Discharge: 24 hours physical assistance for all ADL's and home management tasks  Discharge Equipment Recommendations: wheelchair, walker, rolling  Barriers to discharge:  Decreased caregiver support    Assessment:     Dorothy M Knobloch is a 89 y.o. female with a medical diagnosis of Periprosthetic fracture of shaft of femur .  She presents with performance deficits affecting function are weakness, impaired endurance, impaired self care skills, impaired functional mobility, gait instability, impaired balance, visual deficits, decreased lower extremity function, pain, orthopedic precautions, impaired cardiopulmonary response to activity, decreased upper extremity function. Pt participated well during today's session. Pt tolerated tx session without incident and is making progress, however, continues to demonstrate deficits with self care skills, balance, functional mobility, UB strength and endurance. Pt will benefit from continued OT services to progress towards goals.     Rehab Potential is good    Activity tolerance:  Good    Plan:     Patient to be seen 5 x/week to address the above listed problems via neuromuscular re-education, cognitive retraining, community/work re-entry, sensory integration, therapeutic activities, self-care/home management, therapeutic exercises, wheelchair management/training, splinting, therapeutic groups    Plan of Care Expires: 04/13/25  Plan of Care Reviewed with: patient    Subjective   "My neck is feeling better"  Communicated with: Tony prior to session.     Pain/Comfort:  Pain Rating 1: " 9/10  Location - Side 1: Left  Location - Orientation 1: generalized  Location 1: hip  Pain Addressed 1: Pre-medicate for activity, Reposition, Cessation of Activity  Pain Rating Post-Intervention 1:  (not rated)    Patient's cultural, spiritual, Pentecostal conflicts given the current situation:  no    Objective:     Patient found HOB elevated with wound vac, PureWick, oxygen (1L) upon OT entry to room.    Bed Mobility:    Patient completed Rolling/Turning to Left with  maximal assistance and with side rail  Patient completed Rolling/Turning to Right with maximal assistance and with side rail  Patient completed Scooting/Bridging with contact guard assistance and with side rail  Patient completed Supine to Sit with moderate assistance and with side rail     Functional Mobility/Transfers:  Patient completed Sit <> Stand Transfer with maximal assistance  with  rolling walker   Patient completed Bed <> Chair Transfer using Stand Pivot technique with moderate assistance and of 2 persons with rolling walker    Activities of Daily Living:  Grooming: set up assistance to perform oral hygiene seated in w/c at sink.   Bathing: moderate assistance to perform cleansing at bed level.   Upper Body Dressing: stand by assistance to doff gown and don pullover shirt seated EOB.   Lower Body Dressing: maximal assistance to thread B LE and wound vac and manage pants over hips at bed level   Toileting: maximal assistance to perform chantal hygiene and brief management at bed level.   Footwear: total assistance to don B socks.     [facility] 6 Click ADL: 16    OT Exercises: UE Ergometer 10 min with min resistance   Therex performed to improve overall endurance, ROM and UB strength required for functional transfers, ADL's and w/c propulsion.     Treatment & Education:  Pt educated on:  - role of OT  - level of assistance  - energy conservation and task modification to maximize independence with ADL's and mobility   -  safety while performing  functional transfers and self care tasks  - orthopedic precautions.   - progress towards OT goals     Patient left up in chair with call button in reach    GOALS:   Multidisciplinary Problems       Occupational Therapy Goals          Problem: Occupational Therapy    Goal Priority Disciplines Outcome Interventions   Occupational Therapy Goal     OT, PT/OT Progressing    Description: Goals to be met by: 4/13/2025     Patient will increase functional independence with ADLs by performing:  UBD: with Min A seated in w/c  LBD: with Mod A seated in w/c  Bathing: with Min A seated in w/c or bath bench  Toileting: with Mod A for hygiene/clothing mgmt   Oral: with MI seated in w/c   Grooming: with MI seated in w/c  Pt will complete 10 mins on UE ergometer seated in w/c  Pt will complete 3-5 mins standing with RW/CGA-SBA during dynamic standing activity                         Time Tracking:     OT Date of Treatment: 03/18/25  OT Start Time: 1040    OT Stop Time: 1126  OT Total Time (min): 46 min    Billable Minutes:Self Care/Home Management 21  Therapeutic Activity 15  Therapeutic Exercise 10    3/18/2025

## 2025-03-18 NOTE — PROGRESS NOTES
[facility]                                  Skilled Nursing Facility                   Progress Note     Admit Date: 3/13/2025  ISH 4/3/2025  [id number]  Principal Problem:  Periprosthetic fracture of shaft of femur   HPI obtained from patient interview and chart review     Chief Complaint: Re-evaluation of medical treatment and therapy status, lab review, BLE neuropathy    HPI:   Dorothy M Knobloch is a 89 y.o. female with history of legally blind d/t macular degeneration, Afib, HTN, CHF, anemia and hypothyroidism who presented to [facility] [place] [place] ED on 03/07/2025 with complaint of pain after fall. Admitted for closed periprosthetic right displaced femoral shaft fracture.Orthopedic surgery consult for closed periprosthetic right femoral shaft fracture in patient with previous right MARCEL. Patient underwent right femur ORIF with long femoral plate and screws and cables to repair fracture by Dr. Albino William on 03/08/2025. Post-op patient weight bear as tolerated to the left lower extremity per Orthopedics rec. Chest pain with post op anemia, received total of 2 units PRBC. CP resolved and Hgb stabilized at 9.8 on 3/11. Patient restarted on home Apixiban post-op but at reduced dosing of 2.5 mg po BID as per request of Orthopedics (Dr. William) as concerned about bleeding into surgical site and requesting decreased dose for 1 week and then can return back to home dosing of 5 mg po BID after 1 week on 3/15. No other DVT prophylaxis needed post-op as on oral anticoagulation. IV Cefazolin post-op for 24 hours for antibiotic prophylaxis post-op and received 24 hours of Cefazolin then transitioned to Cefadroxil 500 mg po BID for 7 days for extended antibiotic prophylaxis post-op to prevent post-op wound infection at surgical site.   Of note, patient's  was in [facility] about a month ago per patient. He is blind. Social support of adult  children.  Patient will be treated at [facility] with PT and OT to improve functional status and ability to perform ADLs.     Interval history:  24 hr vital sign ranges listed below. Labs reviewed and listed below. Patient denies shortness of breath, abdominal discomfort, nausea, or vomiting.  Patient reports an adequate appetite.  Patient denies dysuria.  Patient reports having regular bowel movements.  Patient progessing with PT/OT-  Gait: 2steps with B shoes with RW and Max/Mod Ax 2people. Pt then ambulated 10ft with RW and Mod A x 2people with pt only using R shoe.  Continuing to follow and treat all acute and chronic conditions.     Past Medical History: Patient has a past medical history of Acquired hypothyroidism (12/13/2013), Age-related physical debility (04/05/2024), Antiplatelet or antithrombotic long-term use, AV block, 3rd degree (03/19/2019), Bilateral nonexudative age-related macular degeneration (08/27/2013), Blindness and low vision (08/10/2018), Blood transfusion, Cardiac pacemaker (08/22/2018), Carotid artery disease (08/10/2016), Chronic diastolic congestive heart failure (11/24/2019), CRAO (central retinal artery occlusion), left (01/29/2018), Dry eye syndrome of both eyes (08/10/2018), Essential hypertension (12/13/2013), History of TIA (transient ischemic attack) (12/13/2013), Nonexudative age-related macular degeneration, bilateral, intermediate dry stage (07/12/2018), Nuclear sclerotic cataract of left eye (08/10/2018), 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    Past Surgical History: Patient has a past surgical history that includes Tonsillectomy; Joint replacement; Skin biopsy; Total hip arthroplasty  (11/01/2000); Cardiac pacemaker placement; Cataract extraction (Right); pr transcran dopp intracran, emboli w/o inj (01/29/2018); Carpal tunnel release (Bilateral, 1991); and ORIF femur fracture (Right, 3/8/2025).    Social History: Patient reports that she has never smoked. She has never been exposed to tobacco smoke. She has never used smokeless tobacco. She reports that she does not drink alcohol and does not use drugs.    Family History: family history includes Arthritis in her maternal grandmother, paternal grandmother, and sister; Birth defects in her son; Cancer (age of onset: 80) in her paternal aunt; Diabetes in her paternal aunt and paternal uncle; Early death in her father and mother; Hearing loss in her daughter; Hypertension in her paternal grandmother and son.    Allergies: Patient is allergic to aspartame.    ROS  Constitutional: Negative for fever   Eyes: Pt legally blind with no vision out of right eye and very poor vision out of left eye d/t macular degeneration  Respiratory: Negative for cough, shortness of breath   Cardiovascular: Negative for chest pain, palpitations, and leg swelling.   Gastrointestinal: Negative for abdominal pain, constipation, diarrhea, nausea, vomiting. + decreased appetite  Genitourinary: Negative for dysuria, frequency   Musculoskeletal:  + generalized weakness. +myalgias RLE during night   Skin: Negative for itching and rash. + bruising  Neurological: Negative for dizziness, headaches.   Psychiatric/Behavioral: Negative for depression. The patient is not nervous/anxious.      24 hour Vital Sign Range   Temp:  [97.8 °F (36.6 °C)]   Pulse:  [67]   Resp:  [16-20]   BP: (119)/(58)   SpO2:  [94 %]     Current BMI: Body mass index is 29.11 kg/m².    PEx  Constitutional: Patient appears debilitated.  No distress noted  HENT:   Head: Normocephalic and atraumatic.   Eyes: Pupils are equal, round. Pt legally blind with no vision out of right eye and very poor vision out of left  "eye d/t macular degeneration  Neck: Normal range of motion. Neck supple.   Cardiovascular: Normal rate, regular rhythm. + PPM    Pulmonary/Chest: Effort normal and breath sounds are clear  Abdominal: Soft. Bowel sounds are normal.   Musculoskeletal: Normal range of motion.   Neurological: Alert and oriented to person, place, and time.   Psychiatric: Normal mood and affect. Behavior is normal.   Skin: Skin is warm, pale, and dry. Right anterior thigh, Incision Right lateral Thigh, Abrasion(s) Right anterior Thigh. Wound care Consulted. Orthopedic surgery managing all surgical wounds.     Recent Labs   Lab 03/17/25  0601   *   K 3.5   CL 92*   CO2 29   BUN 34*   CREATININE 0.6   CALCIUM 8.9   MG 2.2     Recent Labs   Lab 03/17/25  0601   WBC 12.66   RBC 2.85*   HGB 8.9*   HCT 27.6*      MCV 97   MCH 31.2*   MCHC 32.2     Recent Labs   Lab 03/13/25  0226   POCTGLUCOSE 126*        Assessment and Plan:    *Closed fracture of periprosthetic right femoral shaft fracture in patient with previous right MARCEL  Recent Fall  s/p right femur ORIF with long femoral plate and screws and cables to repair fracture by Dr. Albino William on 03/08/2025  - PT/OT, Weight bearing status WBAT LLE per Ortho rec  - Post-op DVT PPX with home Apixiban post-op but at reduced dosing of 2.5 mg po BID for 1 week d/t c/o post op bleeding then return to home dosing of 5 mg po BID on 3/15.  - Maintain surgical dressing until removed by Orthopedics  - Monitor and report drainage to incisional site  - Fall precautions  - Multimodal pain management. See "Acute postoperative pain"  - Bowel regimen in place, hold for loose or frequent stools  - Orthopedics managing follow-up with patient either in SNF or in clinic during SNF admission  - Follow-up with Orthopedics after SNF discharge. Ortho to schedule.    BLE myalgia  Describes left foot "jumping" at night kept her awake and also right side, never happened prior  Not on statin  Cont robaxin " q8h  Initiate gabapentin 100mg HS  Check B12 next lab    Acute postoperative pain  - Pain is adequately controlled per patient  - Continue Multimodal pain regimen with   - Scheduled Acetaminophen 1000 mg q.8 hours, Methocarbamol 500 mg QID  - PRN tramadol 50mg q 6H prn   - Bowel regimen in place as at risk for opoid induced constipation, hold prn loose or frequent stools  - Maintain fall precautions     Weakness   Physical Debility  Impaired functional mobility, balance, gait, and endurance   - Patient with decreased bed mobility therefore patient is at high risk for developing wounds and deterioration of any current wounds.  - Continue with PT/OT for gait training and strengthening and restoration of ADL's   - Encourage mobility, OOB in chair, and early ambulation as appropriate  - Fall precautions   - Monitor for bowel and bladder dysfunction  - Monitor for and prevent skin breakdown and pressure ulcers  - Continue DVT prophylaxis as detailed above  \    Moderate Protein Calorie Malnutrition  Hypoalbuminemia   POA, unstable  - RD consulted, ongoing collaboration for optimized nutrition   - Diet: soft and bites sized diet, boost plus BID, recommend MVI,RD following   - Dietary Supplements: Boost Plus and Hi   -Multivitamin     Hyponatremia  Na 132 monitor    At Risk for Constipation  At risk for opoid induced constipation  - Continue scheduled senokot  - Adjust bowel regimen prn to avoid diarrhea  - Encourage fluid intake  - Encourage safe physical activity as tolerated with activity restrictions if recommended by Ortho   - Monitor bowel movement regularity and consistenc    Afib  - has PPM  - Continue Toprol xl 50 mg daily and 25 mg q HS  - continue home eliquis - hold if unstable anemia    Anemia  POA, Acute on Chronic  - Chest pain with post op anemia while inpt, received total of 2 units PRBC. CP resolved and Hgb stabilized at 9.8 on 3/11.   - Trend with scheduled CBC  - Transfuse if patient becomes  "hemodynamically unstable, symptomatic or H/H drops below 7/21.    Chronic Heart Failure  TTE 06/16/24:     Left Ventricle: The left ventricle is normal in size. Normal wall thickness. There is concentric hypertrophy. There is normal systolic function with a visually estimated ejection fraction of 65 - 70%. Unable to assess diastolic function due to atrial fibrillation.    Right Ventricle: Normal right ventricular cavity size. Wall thickness is normal. Systolic function is normal.    Left Atrium: Left atrium is severely dilated.    Right Atrium: Right atrium is severely dilated.    Mitral Valve: There is moderate posterior leaflet sclerosis. There is no stenosis. The mean pressure gradient across the mitral valve is 1 mmHg at a heart rate of 60 bpm. There is moderate regurgitation.    Tricuspid Valve: There is moderate to severe regurgitation.    Pulmonic Valve: There is no stenosis.    Pulmonary Artery: There is moderate pulmonary hypertension. The estimated pulmonary artery systolic pressure is 60 mmHg.    IVC/SVC: Intermediate venous pressure at 8 mmHg.  - SOA at baseline without hypoxia  - Monitor fluid balance with daily weight & strict I/O  - See "Hypertension"      - CHF education to include diet, medication, and CHF flags for MD notification    Essential Hypertension  - Chronic, stable  - Continue norvasc 5 mg daily  - Continue HCTZ 12.5 mg daily  - Continue home bumex 0.5 mg  - Continue Toprol xl 50 mg daily and 25 mg q HS  - Monitor BP  - Adjust therapy to BP goal < 150/90  - Avoid hypotension     Hypothyroidism  - Continue home synthroid 88 mcg daily before breakfast    Insomnia  - melatonin 6 mg q HS, consider increasing if insomnia persists    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    Anticipate disposition:  Home with home health    IP [facility] RISK OF UNPLANNED READMISSION Model: MOD    Follow-up needed during SNF stay- Ortho     Follow-up needed after discharge from SNF: PCP     Future Appointments   Date Time Provider Department Center   3/27/2025  8:30 AM Albino William MD [facility] ORTHO [place]         I certify that SNF services are required to be given on an inpatient basis because Dorothy M Knobloch needs for skilled nursing care and/or skilled rehabilitation are required on a daily basis and such services can only practically be provided in a skilled nursing facility setting and are for an ongoing condition for which she received inpatient care in the hospital.       SHE LEON  Department of Hospital Medicine   [facility]     DOS: 3/17/2025     Extended Visit  Total time spent: 49 minutes  Description of Time: counseling patient on clinical condition, therapies provided, plan of care, emotional support, coordinating patient care with other care team members, reviewing and interpreting labs and imaging, collaboration with physician, initiating new orders, chart review, and documentation. See interval hx and assessment/plan for details.      Patient note was created using MModal Dictation.  Any errors in syntax or even information may not have been identified and edited on initial review prior to signing this note.

## 2025-03-18 NOTE — PROGRESS NOTES
[facility]                                  Skilled Nursing Facility                   Progress Note     Admit Date: 3/13/2025  ISH 4/3/2025  [id number] A  Principal Problem:  Periprosthetic fracture of shaft of femur   HPI obtained from patient interview and chart review     Chief Complaint: Re-evaluation of medical treatment and therapy status    HPI:   Dorothy M Knobloch is a 89 y.o. female with history of legally blind d/t macular degeneration, Afib, HTN, CHF, anemia and hypothyroidism who [place] [facility] [place] with complaint of pain after fall. Admitted for closed periprosthetic right displaced femoral shaft fracture.Orthopedic surgery consult for closed periprosthetic right femoral shaft fracture in patient with previous right MARCEL. Patient underwent right femur ORIF with long femoral plate and screws and cables to repair fracture by Dr. Albino William on 03/08/2025. Post-op patient weight bear as tolerated to the left lower extremity per Orthopedics rec. Chest pain with post op anemia, received total of 2 units PRBC. CP resolved and Hgb stabilized at 9.8 on 3/11. Patient restarted on home Apixiban post-op but at reduced dosing of 2.5 mg po BID as per request of Orthopedics (Dr. William) as concerned about bleeding into surgical site and requesting decreased dose for 1 week and then can return back to home dosing of 5 mg po BID after 1 week on 3/15. No other DVT prophylaxis needed post-op as on oral anticoagulation. IV Cefazolin post-op for 24 hours for antibiotic prophylaxis post-op and received 24 hours of Cefazolin then transitioned to Cefadroxil 500 mg po BID for 7 days for extended antibiotic prophylaxis post-op to prevent post-op wound infection at surgical site.   Of note, patient's  was in [facility] about a month ago per patient. He is blind. Social support of adult children.  Patient will be treated at  [facility] with PT and OT to improve functional status and ability to perform ADLs.     Interval history:  24 hr vital sign ranges listed below. Labs reviewed and listed below. Patient denies shortness of breath, abdominal discomfort, nausea, or vomiting.  Patient reports an adequate appetite.  Patient denies dysuria.  Patient reports having regular bowel movements.  On 1L 02 pulse ox range 89-96%. Patient progessing with PT/OT-  Gait: : 9ft +6ft with RW and Mod A d.t pt with flexed trunk, L sided lean d.t pt wanting to use a raised shoe on R foot and no shoe on LLE to assist with L LE swing through d.t RLE leg length discrepancy. Continuing to follow and treat all acute and chronic conditions.     Past Medical History: Patient has a past medical history of Acquired hypothyroidism (12/13/2013), Age-related physical debility (04/05/2024), Antiplatelet or antithrombotic long-term use, AV block, 3rd degree (03/19/2019), Bilateral nonexudative age-related macular degeneration (08/27/2013), Blindness and low vision (08/10/2018), Blood transfusion, Cardiac pacemaker (08/22/2018), Carotid artery disease (08/10/2016), Chronic diastolic congestive heart failure (11/24/2019), CRAO (central retinal artery occlusion), left (01/29/2018), Dry eye syndrome of both eyes (08/10/2018), Essential hypertension (12/13/2013), History of TIA (transient ischemic attack) (12/13/2013), Nonexudative age-related macular degeneration, bilateral, intermediate dry stage (07/12/2018), Nuclear sclerotic cataract of left eye (08/10/2018), 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    Past Surgical History: Patient has a past surgical history that includes  Tonsillectomy; Joint replacement; Skin biopsy; Total hip arthroplasty (11/01/2000); Cardiac pacemaker placement; Cataract extraction (Right); pr transcran dopp intracran, emboli w/o inj (01/29/2018); Carpal tunnel release (Bilateral, 1991); and ORIF femur fracture (Right, 3/8/2025).    Social History: Patient reports that she has never smoked. She has never been exposed to tobacco smoke. She has never used smokeless tobacco. She reports that she does not drink alcohol and does not use drugs.    Family History: family history includes Arthritis in her maternal grandmother, paternal grandmother, and sister; Birth defects in her son; Cancer (age of onset: 80) in her paternal aunt; Diabetes in her paternal aunt and paternal uncle; Early death in her father and mother; Hearing loss in her daughter; Hypertension in her paternal grandmother and son.    Allergies: Patient is allergic to aspartame.    ROS  Constitutional: Negative for fever   Eyes: Pt legally blind with no vision out of right eye and very poor vision out of left eye d/t macular degeneration  Respiratory: Negative for cough, shortness of breath   Cardiovascular: Negative for chest pain, palpitations, and leg swelling.   Gastrointestinal: Negative for abdominal pain, constipation, diarrhea, nausea, vomiting. + decreased appetite  Genitourinary: Negative for dysuria, frequency   Musculoskeletal:  + generalized weakness. +myalgias BLE during night   Skin: Negative for itching and rash. + bruising  Neurological: Negative for dizziness, headaches.   Psychiatric/Behavioral: Negative for depression. The patient is not nervous/anxious.      24 hour Vital Sign Range   Temp:  [97.1 °F (36.2 °C)-97.5 °F (36.4 °C)]   Pulse:  [64-70]   Resp:  [16-18]   BP: (130-133)/(60-92)   SpO2:  [89 %-97 %]     Current BMI: Body mass index is 29.23 kg/m².    PEx  Constitutional: Patient appears debilitated.  No distress noted  HENT:   Head: Normocephalic and atraumatic.   Eyes: Pupils  "are equal, round. Pt legally blind with no vision out of right eye and very poor vision out of left eye d/t macular degeneration  Neck: Normal range of motion. Neck supple.   Cardiovascular: Normal rate, regular rhythm. + PPM    Pulmonary/Chest: Effort normal and breath sounds are clear  Abdominal: Soft. Bowel sounds are normal.   Musculoskeletal: Normal range of motion.   Neurological: Alert and oriented to person, place, and time.   Psychiatric: Normal mood and affect. Behavior is normal.   Skin: Skin is warm, pale, and dry. Right anterior thigh, Incision Right lateral Thigh, Abrasion(s) Right anterior Thigh. Wound care Consulted. Orthopedic surgery managing all surgical wounds.     No results for input(s): "GLUCOSE", "NA", "K", "CL", "CO2", "BUN", "CREATININE", "CALCIUM", "MG" in the last 24 hours.    No results for input(s): "WBC", "RBC", "HGB", "HCT", "PLT", "MCV", "MCH", "MCHC" in the last 24 hours.    Recent Labs   Lab 03/13/25  0226   POCTGLUCOSE 126*        Assessment and Plan:    *Closed fracture of periprosthetic right femoral shaft fracture in patient with previous right MARCEL  Recent Fall  s/p right femur ORIF with long femoral plate and screws and cables to repair fracture by Dr. Albino William on 03/08/2025  - PT/OT, Weight bearing status WBAT LLE per Ortho rec  - Post-op DVT PPX with home Apixiban post-op but at reduced dosing of 2.5 mg po BID for 1 week d/t c/o post op bleeding then return to home dosing of 5 mg po BID on 3/15.  - Maintain surgical dressing until removed by Orthopedics  - Monitor and report drainage to incisional site  - Fall precautions  - Multimodal pain management. See "Acute postoperative pain"  - Bowel regimen in place, hold for loose or frequent stools  - Orthopedics managing follow-up with patient either in SNF or in clinic during SNF admission  - Follow-up with Orthopedics after SNF discharge. Ortho to schedule.    BLE myalgia  Describes left foot "jumping" at night kept her " awake and also right side, never happened prior  Not on statin  Cont robaxin q8h  Initiate gabapentin 100mg HS  Check B12 next lab    Acute postoperative pain  - Pain is adequately controlled per patient  - Continue Multimodal pain regimen with   - Scheduled Acetaminophen 1000 mg q.8 hours, Methocarbamol 500 mg QID  - PRN tramadol 50mg q 6H prn   - Bowel regimen in place as at risk for opoid induced constipation, hold prn loose or frequent stools  - Maintain fall precautions     Weakness   Physical Debility  Impaired functional mobility, balance, gait, and endurance   - Patient with decreased bed mobility therefore patient is at high risk for developing wounds and deterioration of any current wounds.  - Continue with PT/OT for gait training and strengthening and restoration of ADL's   - Encourage mobility, OOB in chair, and early ambulation as appropriate  - Fall precautions   - Monitor for bowel and bladder dysfunction  - Monitor for and prevent skin breakdown and pressure ulcers  - Continue DVT prophylaxis as detailed above  \    Moderate Protein Calorie Malnutrition  Hypoalbuminemia   POA, unstable  - RD consulted, ongoing collaboration for optimized nutrition   - Diet: soft and bites sized diet, boost plus BID, recommend MVI,RD following   - Dietary Supplements: Boost Plus and Hi   -Multivitamin     Hyponatremia  Na 132 monitor    At Risk for Constipation  At risk for opoid induced constipation  - Continue scheduled senokot  - Adjust bowel regimen prn to avoid diarrhea  - Encourage fluid intake  - Encourage safe physical activity as tolerated with activity restrictions if recommended by Ortho   - Monitor bowel movement regularity and consistenc    Afib  - has PPM  - Continue Toprol xl 50 mg daily and 25 mg q HS  - continue home eliquis - hold if unstable anemia    Anemia  POA, Acute on Chronic  - Chest pain with post op anemia while inpt, received total of 2 units PRBC. CP resolved and Hgb stabilized at 9.8  "on 3/11.   - Trend with scheduled CBC  - Transfuse if patient becomes hemodynamically unstable, symptomatic or H/H drops below 7/21.    Chronic Heart Failure  TTE 06/16/24:     Left Ventricle: The left ventricle is normal in size. Normal wall thickness. There is concentric hypertrophy. There is normal systolic function with a visually estimated ejection fraction of 65 - 70%. Unable to assess diastolic function due to atrial fibrillation.    Right Ventricle: Normal right ventricular cavity size. Wall thickness is normal. Systolic function is normal.    Left Atrium: Left atrium is severely dilated.    Right Atrium: Right atrium is severely dilated.    Mitral Valve: There is moderate posterior leaflet sclerosis. There is no stenosis. The mean pressure gradient across the mitral valve is 1 mmHg at a heart rate of 60 bpm. There is moderate regurgitation.    Tricuspid Valve: There is moderate to severe regurgitation.    Pulmonic Valve: There is no stenosis.    Pulmonary Artery: There is moderate pulmonary hypertension. The estimated pulmonary artery systolic pressure is 60 mmHg.    IVC/SVC: Intermediate venous pressure at 8 mmHg.  - SOA at baseline without hypoxia  - Monitor fluid balance with daily weight & strict I/O  - See "Hypertension"      - CHF education to include diet, medication, and CHF flags for MD notification    Essential Hypertension  - Chronic, stable  - Continue norvasc 5 mg daily  - Continue HCTZ 12.5 mg daily  - Continue home bumex 0.5 mg  - Continue Toprol xl 50 mg daily and 25 mg q HS  - Monitor BP  - Adjust therapy to BP goal < 150/90  - Avoid hypotension     Hypothyroidism  - Continue home synthroid 88 mcg daily before breakfast    Insomnia  - melatonin 6 mg q HS, consider increasing if insomnia persists    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    Anticipate disposition:  Home with home health    IP [facility] RISK OF UNPLANNED READMISSION Model: MOD    Follow-up needed during SNF stay- Ortho     Follow-up needed after discharge from SNF: PCP     Future Appointments   Date Time Provider Department Center   3/27/2025  8:30 AM Albino William MD [facility] ORTHO [facility]         I certify that SNF services are required to be given on an inpatient basis because Dorothy M Knobloch needs for skilled nursing care and/or skilled rehabilitation are required on a daily basis and such services can only practically be provided in a skilled nursing facility setting and are for an ongoing condition for which she received inpatient care in the hospital.       SHE LEON  Department of Hospital Medicine   [facility]     DOS: 3/18/2025     Patient note was created using MModal Dictation.  Any errors in syntax or even information may not have been identified and edited on initial review prior to signing this note.

## 2025-03-18 NOTE — PT/OT/SLP PROGRESS
Physical Therapy Treatment    Patient Name:  Dorothy M Knobloch   MRN:  [id number]  Admit Date: 3/13/2025  Admitting Diagnosis: Periprosthetic fracture of shaft of femur  Recent Surgeries:  Procedure(s) (LRB):  OPEN REDUCTION INTERNAL FIXATION FEMORAL SHAFT FRACTURE (Right)  General Precautions: Standard, fall, vision impaired  Orthopedic Precautions: RLE weight bearing as tolerated  Braces: N/A    Recommendations:     Discharge Recommendations: home health PT  Level of Assistance Recommended at Discharge: 24 hours significant assistance  Discharge Equipment Recommendations: wheelchair, walker, rolling  Barriers to discharge: Decreased caregiver support    Assessment:     Dorothy M Knobloch is a 89 y.o. female admitted with a medical diagnosis of Periprosthetic fracture of shaft of femur . Pt remains MaxA for sit<>stand and with very slow miroslava during GT. Pt ambulated a  distance of 9ft +6ft with RW and Mod A today.    Performance deficits affecting function: weakness, impaired endurance, impaired self care skills, impaired functional mobility, gait instability, impaired balance, decreased upper extremity function, decreased lower extremity function, decreased ROM, impaired cardiopulmonary response to activity, orthopedic precautions.    Rehab Potential is good    Activity Tolerance: Good    Plan:     Patient to be seen 5 x/week to address the above listed problems via gait training, therapeutic activities, therapeutic exercises, wheelchair management/training    Plan of Care Expires: 04/13/25  Plan of Care Reviewed with: patient    Subjective     Pt agreeable to work with PT.     Pain/Comfort:  Pain Rating 1: 0/10  Pain Rating Post-Intervention 1: 0/10    Patient's cultural, spiritual, Yazdanism conflicts given the current situation:  no    Objective:     Communicated with pt prior to session.  Patient found up in chair with oxygen, wound vac upon PT entry to room.     Therapeutic Activities and Exercises: B L/E   seated active/assist therex x 20reps    Functional Mobility:  Transfers:     Sit to Stand:  maximal assistance and with L lateral lean d.t uneven leg length  with rolling walker  Gait: 9ft +6ft with RW and Mod A d.t pt with flexed trunk, L sided lean d.t pt wanting to use a raised shoe on R foot and no shoe on LLE to assist with L LE swing through d.t RLE leg length discrepancy.    AM-PAC 6 CLICK MOBILITY  10    Patient left up in chair with all lines intact, call button in reach, and pt's lunch tray set up in front of pt .    GOALS:   Multidisciplinary Problems       Physical Therapy Goals          Problem: Physical Therapy    Goal Priority Disciplines Outcome Interventions   Physical Therapy Goal     PT, PT/OT Progressing    Description: Goals to be met by: 4/14/25     Patient will increase functional independence with mobility by performing:    . Supine to sit with Stand-by Assistance  . Sit to supine with Stand-by Assistance  . Rolling to Left and Right with Stand-by Assistance.  . Sit to stand transfer with Contact Guard Assistance  . Bed to chair transfer with Contact Guard Assistance using Rolling Walker  . Gait  x 50 feet with Contact Guard Assistance using .   . Wheelchair propulsion x50 feet with Supervision using bilateral uppper extremities                         Time Tracking:     PT Received On: 03/18/25  PT Start Time: 1156  PT Stop Time: 1234  PT Total Time (min): 38 min    Billable Minutes: Gait Training 18, Therapeutic Activity 10, and Therapeutic Exercise 10    Treatment Type: Treatment  PT/PTA: PT     Number of PTA visits since last PT visit: 0     03/18/2025

## 2025-03-19 ENCOUNTER — HOME CARE VISIT (OUTPATIENT)
Dept: NEUROLOGY | Facility: HOSPITAL | Age: OVER 89
End: 2025-03-19
Payer: MEDICARE

## 2025-03-19 PROCEDURE — 25000003 PHARM REV CODE 250: Performed by: FAMILY MEDICINE

## 2025-03-19 PROCEDURE — 25000242 PHARM REV CODE 250 ALT 637 W/ HCPCS: Performed by: HOSPITALIST

## 2025-03-19 PROCEDURE — 99900035 HC TECH TIME PER 15 MIN (STAT)

## 2025-03-19 PROCEDURE — 25000003 PHARM REV CODE 250: Performed by: HOSPITALIST

## 2025-03-19 PROCEDURE — 97116 GAIT TRAINING THERAPY: CPT | Mod: CQ

## 2025-03-19 PROCEDURE — 99306 1ST NF CARE HIGH MDM 50: CPT | Mod: NSCH,,, | Performed by: HOSPITALIST

## 2025-03-19 PROCEDURE — 27000221 HC OXYGEN, UP TO 24 HOURS

## 2025-03-19 PROCEDURE — 94640 AIRWAY INHALATION TREATMENT: CPT

## 2025-03-19 PROCEDURE — 97110 THERAPEUTIC EXERCISES: CPT | Mod: CQ

## 2025-03-19 PROCEDURE — 11000004 HC SNF PRIVATE

## 2025-03-19 PROCEDURE — 94761 N-INVAS EAR/PLS OXIMETRY MLT: CPT

## 2025-03-19 RX ORDER — IPRATROPIUM BROMIDE AND ALBUTEROL SULFATE 2.5; .5 MG/3ML; MG/3ML
3 SOLUTION RESPIRATORY (INHALATION) EVERY 4 HOURS PRN
Status: DISCONTINUED | OUTPATIENT
Start: 2025-03-19 | End: 2025-03-23 | Stop reason: HOSPADM

## 2025-03-19 RX ORDER — ACETAMINOPHEN 325 MG/1
650 TABLET ORAL EVERY 4 HOURS PRN
Status: DISCONTINUED | OUTPATIENT
Start: 2025-03-19 | End: 2025-03-23 | Stop reason: HOSPADM

## 2025-03-19 RX ADMIN — THERA TABS 1 TABLET: TAB at 09:03

## 2025-03-19 RX ADMIN — BRIMONIDINE TARTRATE 1 DROP: 1.5 SOLUTION OPHTHALMIC at 09:03

## 2025-03-19 RX ADMIN — ESCITALOPRAM OXALATE 20 MG: 10 TABLET ORAL at 09:03

## 2025-03-19 RX ADMIN — TIMOLOL MALEATE 1 DROP: 5 SOLUTION OPHTHALMIC at 09:03

## 2025-03-19 RX ADMIN — TRAZODONE HYDROCHLORIDE 25 MG: 50 TABLET ORAL at 09:03

## 2025-03-19 RX ADMIN — APIXABAN 5 MG: 2.5 TABLET, FILM COATED ORAL at 09:03

## 2025-03-19 RX ADMIN — AMLODIPINE BESYLATE 5 MG: 5 TABLET ORAL at 09:03

## 2025-03-19 RX ADMIN — IPRATROPIUM BROMIDE AND ALBUTEROL SULFATE 3 ML: 2.5; .5 SOLUTION RESPIRATORY (INHALATION) at 10:03

## 2025-03-19 RX ADMIN — HYDROCHLOROTHIAZIDE 12.5 MG: 12.5 TABLET ORAL at 09:03

## 2025-03-19 RX ADMIN — POLYETHYLENE GLYCOL 3350 17 G: 17 POWDER, FOR SOLUTION ORAL at 09:03

## 2025-03-19 RX ADMIN — LEVOTHYROXINE SODIUM 88 MCG: 112 TABLET ORAL at 06:03

## 2025-03-19 RX ADMIN — METOPROLOL SUCCINATE 25 MG: 25 TABLET, EXTENDED RELEASE ORAL at 09:03

## 2025-03-19 RX ADMIN — METOPROLOL SUCCINATE 50 MG: 50 TABLET, EXTENDED RELEASE ORAL at 09:03

## 2025-03-19 RX ADMIN — BUMETANIDE 0.5 MG: 0.5 TABLET ORAL at 09:03

## 2025-03-19 RX ADMIN — SENNOSIDES AND DOCUSATE SODIUM 2 TABLET: 50; 8.6 TABLET ORAL at 09:03

## 2025-03-19 RX ADMIN — METHOCARBAMOL 500 MG: 500 TABLET ORAL at 06:03

## 2025-03-19 RX ADMIN — CLOPIDOGREL BISULFATE 75 MG: 75 TABLET ORAL at 09:03

## 2025-03-19 RX ADMIN — GABAPENTIN 100 MG: 100 CAPSULE ORAL at 09:03

## 2025-03-19 RX ADMIN — TRAMADOL HYDROCHLORIDE 50 MG: 50 TABLET, COATED ORAL at 09:03

## 2025-03-19 RX ADMIN — METHOCARBAMOL 500 MG: 500 TABLET ORAL at 09:03

## 2025-03-19 NOTE — H&P
"Hospital Medicine  Skilled Nursing Facility   History and Physical Exam      Date of Service: 03/19/2025      Patient Name: Dorothy M Knobloch  MRN: [id number]  Admission Date: 3/13/2025  Attending Physician: Justin Fleming MD  Primary Care Provider: Lola Wolff MD  Code Status: DNR (Do Not Resuscitate)      Principal problem:  Periprosthetic fracture of shaft of femur      Chief Complaint:   Chief Complaint   Patient presents with    Leg Injury     Admitted to [facility] for rehabilitation following hospital stay for a fall with right periprosthetic femoral shaft fracture s/p ORIF          HPI:   "Dorothy M Knobloch is a 89 y.o. female with history of legally blind d/t macular degeneration, Afib, HTN, CHF, anemia and hypothyroidism who presented to [facility] ED on 03/07/2025 with complaint of pain after fall. Admitted for closed periprosthetic right displaced femoral shaft fracture.Orthopedic surgery consult for closed periprosthetic right femoral shaft fracture in patient with previous right MARCEL. Patient underwent right femur ORIF with long femoral plate and screws and cables to repair fracture by Dr. Albino William on 03/08/2025. Post-op patient weight bear as tolerated to the left lower extremity per Orthopedics rec. Chest pain with post op anemia, received total of 2 units PRBC. CP resolved and Hgb stabilized at 9.8 on 3/11. Patient restarted on home Apixiban post-op but at reduced dosing of 2.5 mg po BID as per request of Orthopedics (Dr. William) as concerned about bleeding into surgical site and requesting decreased dose for 1 week and then can return back to home dosing of 5 mg po BID after 1 week on 3/15. No other DVT prophylaxis needed post-op as on oral anticoagulation. IV Cefazolin post-op for 24 hours for antibiotic prophylaxis post-op and received 24 hours of Cefazolin then transitioned to Cefadroxil 500 mg po BID for 7 days for extended antibiotic prophylaxis post-op to prevent post-op wound infection " "at surgical site.   Of note, patient's  was in [facility] about a month ago per patient. He is blind. Social support of adult children." Per Shruthi Santana NP on 3/14/25.     She is doing okay. Sons and  at bedside. Son says that she has had increased hallucinations today. He thinks it is related to the tramadol. She says that she has not slept the last 2 nights. She says that her pain is controlled. She denies any shortness of breath currently. Says that she had some brief chest pain yesterday, but has not had any recurrence. She is eating okay. Denies any nausea or abdominal pain. Says that she is having regular BMs. She is working well with therapy and walked 5 feet with modA and RW today.     Patient admitted with skilled services with PT and OT to improve functional status and ability to perform ADLs.       Past Medical History:   Past Medical History:   Diagnosis Date    Acquired hypothyroidism 12/13/2013    Age-related physical debility 04/05/2024    Antiplatelet or antithrombotic long-term use     AV block, 3rd degree 03/19/2019    Bilateral nonexudative age-related macular degeneration 08/27/2013    Blindness and low vision 08/10/2018    Blood transfusion     Cardiac pacemaker 08/22/2018    Carotid artery disease 08/10/2016    Chronic diastolic congestive heart failure 11/24/2019    CRAO (central retinal artery occlusion), left 01/29/2018    Dry eye syndrome of both eyes 08/10/2018    Essential hypertension 12/13/2013    History of TIA (transient ischemic attack) 12/13/2013    Nonexudative age-related macular degeneration, bilateral, intermediate dry stage 07/12/2018    Nuclear sclerotic cataract of left eye 08/10/2018    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 12/13/2013    On anticoagulant therapy 06/13/2024    Osteoarthritis     Paroxysmal atrial fibrillation 09/11/2018    Pulmonary hypertension 04/05/2024    Pure hypercholesterolemia 12/13/2013    Sick " sinus syndrome     Stroke     Takotsubo cardiomyopathy 11/17/2016       Past Surgical History:   Past Surgical History:   Procedure Laterality Date    CARDIAC PACEMAKER PLACEMENT      [employer] Device Model: Juli LOZADA MRI [id number] Device Type: PACEMAKER Device S\N: [id number]    CARPAL TUNNEL RELEASE Bilateral 1991    CATARACT EXTRACTION Right     JOINT REPLACEMENT      ORIF FEMUR FRACTURE Right 3/8/2025    Procedure: OPEN REDUCTION INTERNAL FIXATION FEMORAL SHAFT FRACTURE;  Surgeon: Albino William MD;  Location: [facility] OR [address] [place] [address];  Service: Orthopedics;  Laterality: Right;    IA TRANSCRAN DOPP INTRACRAN, EMBOLI W/O INJ  01/29/2018         SKIN BIOPSY      TONSILLECTOMY      TOTAL HIP ARTHROPLASTY  11/01/2000    right/[facility]       Social History:   Tobacco Use    Smoking status: Never     Passive exposure: Never    Smokeless tobacco: Never   Substance and Sexual Activity    Alcohol use: No     Alcohol/week: 0.0 standard drinks of alcohol    Drug use: No    Sexual activity: Not on file       Family History:   Family History       Problem Relation (Age of Onset)    Arthritis Sister, Maternal Grandmother, Paternal Grandmother    Birth defects Son    Cancer Paternal Aunt (80)    Diabetes Paternal Aunt, Paternal Uncle    Early death Mother, Father    Hearing loss Daughter    Hypertension Son, Paternal Grandmother            No current facility-administered medications on file prior to encounter.     Current Outpatient Medications on File Prior to Encounter   Medication Sig    amLODIPine (NORVASC) 5 MG tablet Take 1 tablet (5 mg total) by mouth once daily.    apixaban (ELIQUIS) 5 mg Tab Take 1 tablet (5 mg total) by mouth 2 (two) times daily. Patient to decrease dosing of Apixiban to 2.5 mg po twice daily for 1 week after surgery done on 3/8/2025 and then can resume back to her home dosing of 5 mg po BID on 315/2025.    brimonidine-timoloL (COMBIGAN) 0.2-0.5 % Drop Place 1 drop into the left eye 2 (two) times a  day.    bumetanide (BUMEX) 0.5 MG Tab Take 1 tablet by mouth once daily.    [] cefadroxil (DURICEF) 500 MG Cap Take 1 capsule (500 mg total) by mouth every 12 (twelve) hours. Extended antibiotic prophylaxis after hip surgery with end date 3/15/2025. for 7 days    clopidogreL (PLAVIX) 75 mg tablet Take 1 tablet (75 mg total) by mouth once daily.    erythromycin (ROMYCIN) ophthalmic ointment APPLY 1 INCH TO THE BOTTOM LID OF LEFT EYE FOR FIRST WEEK OF EVERY MONTH    EScitalopram oxalate (LEXAPRO) 20 MG tablet Take 1 tablet (20 mg total) by mouth once daily.    hydroCHLOROthiazide 12.5 MG Tab TAKE 1 TABLET BY MOUTH EVERY DAY    levothyroxine (SYNTHROID) 88 MCG tablet Take 88 mcg by mouth before breakfast.     melatonin (MELATIN) 3 mg tablet Take 2 tablets (6 mg total) by mouth nightly as needed for Insomnia.    methocarbamoL (ROBAXIN) 500 MG Tab Take 1 tablet (500 mg total) by mouth every 8 (eight) hours.    metoprolol succinate (TOPROL-XL) 25 MG 24 hr tablet TAKE 2 TABLETS IN AM AND 1 TABLET IN PM    NITROSTAT 0.4 mg SL tablet Place 0.4 mg under the tongue every 5 (five) minutes as needed for Chest pain.    senna-docusate 8.6-50 mg (PERICOLACE) 8.6-50 mg per tablet Take 1 tablet by mouth 2 (two) times daily.    traMADoL (ULTRAM) 50 mg tablet Take 1 tablet (50 mg total) by mouth every 6 (six) hours as needed (Moderate to severe pain).       Allergies:   Review of patient's allergies indicates:   Allergen Reactions    Aspartame Swelling     equal       ROS:  Review of Systems   Constitutional:  Negative for appetite change and fever.   Respiratory:  Negative for cough and shortness of breath.    Cardiovascular:  Negative for chest pain and palpitations.   Gastrointestinal:  Negative for abdominal pain, nausea and vomiting.   Genitourinary:  Negative for difficulty urinating and dysuria.   Musculoskeletal:  Positive for arthralgias and gait problem. Negative for back pain.   Neurological:  Positive for weakness.  Negative for dizziness and light-headedness.   Psychiatric/Behavioral:  Positive for confusion, hallucinations and sleep disturbance. The patient is not nervous/anxious.      Objective:  Temp:  [97.2 °F (36.2 °C)]   Pulse:  [64-97]   Resp:  [16-20]   BP: (129-134)/(59-66)   SpO2:  [93 %]     Body mass index is 28.43 kg/m².      Physical Exam  Vitals and nursing note reviewed.   Constitutional:       General: She is not in acute distress.     Appearance: She is well-developed.      Interventions: Nasal cannula in place.   Cardiovascular:      Rate and Rhythm: Normal rate and regular rhythm.      Heart sounds: S1 normal and S2 normal. No murmur heard.  Pulmonary:      Effort: Pulmonary effort is normal. No respiratory distress.      Breath sounds: Normal breath sounds. No wheezing or rales.   Abdominal:      General: Bowel sounds are normal. There is no distension.      Palpations: Abdomen is soft.      Tenderness: There is no abdominal tenderness.   Musculoskeletal:      Right upper leg: Swelling and tenderness present.      Right lower leg: No edema.      Left lower leg: No edema.   Skin:     General: Skin is warm and dry.      Findings: Bruising present.   Neurological:      Mental Status: She is alert and oriented to person, place, and time.   Psychiatric:         Mood and Affect: Mood normal.         Behavior: Behavior normal. Behavior is cooperative.         Thought Content: Thought content normal.     Significant Labs:   A1C:   Recent Labs   Lab 03/07/25  1324   HGBA1C 5.1     CBC:  Recent Labs   Lab 03/17/25  0601   WBC 12.66   HGB 8.9*   HCT 27.6*      MCV 97     BMP:  Recent Labs   Lab 03/17/25  0601      *   K 3.5   CL 92*   CO2 29   BUN 34*   CREATININE 0.6   CALCIUM 8.9   MG 2.2   PHOS 3.3       Significant Imaging: I have reviewed all pertinent imaging results/findings completed during prior hospitalization.      Assessment and Plan:  Active Diagnoses:    Diagnosis Date Noted POA     "PRINCIPAL PROBLEM:  Periprosthetic fracture of shaft of right femur s/p ORIF on 3/8/2025 [M97.8XXA, Z96.649] 03/07/2025 Yes    Hyponatremia [E87.1] 03/14/2025 Yes    Moderate malnutrition [E44.0] 03/14/2025 Yes    Acute blood loss anemia [D62] 03/07/2025 Yes    On anticoagulant therapy [Z79.01] 06/13/2024 Not Applicable    Age-related physical debility [R54] 04/05/2024 Yes    Aortic stenosis [I35.0] 04/05/2024 Yes    Pulmonary hypertension [I27.20] 04/05/2024 Yes    Chronic diastolic congestive heart failure [I50.32] 11/24/2019 Yes    AV block, 3rd degree [I44.2] 03/19/2019 Yes    Paroxysmal atrial fibrillation [I48.0] 09/11/2018 Yes     Chronic    Cardiac pacemaker [Z95.0] 08/22/2018 Yes    Bilateral carotid artery stenosis [I65.23]  Yes    Takotsubo cardiomyopathy [I51.81] 11/17/2016 Yes    Acquired hypothyroidism [E03.9] 12/13/2013 Yes    Essential hypertension [I10] 12/13/2013 Yes     Chronic    Pure hypercholesterolemia [E78.00] 12/13/2013 Yes      Problems Resolved During this Admission:       Closed fracture of periprosthetic right femoral shaft fracture   Recent Fall  s/p right femur ORIF with long femoral plate and screws and cables to repair fracture by Dr. Albino William on 03/08/2025  - PT/OT  - WBAT LLE per Ortho rec  - Post-op DVT PPX with home Apixiban 5 mg po BID.  - Maintain surgical dressing until removed by Orthopedics  - Monitor and report drainage to incisional site  - Fall precautions  - Orthopedics managing follow-up with patient either in SNF or in clinic during SNF admission  - Follow-up with Orthopedics after SNF discharge.  - Continue multimodal pain regimen with acetaminophen 1000 mg q.8 hours, Methocarbamol 500 mg q6 hrs. Stop tramadol 50mg q 6H prn      BLE myalgia  Describes left foot "jumping" at night kept her awake and also right side, never happened prior  Not on statin  Continue methocarbamol and gabapentin 100mg HS      Hallucinations  Possible delirium  Stop the tramadol and " melatonin.   Delirium precautions.     Weakness   Physical Debility  Impaired functional mobility, balance, gait, and endurance   - Patient with decreased bed mobility therefore patient is at high risk for developing wounds and deterioration of any current wounds.  - Continue with PT/OT for gait training and strengthening and restoration of ADL's   - Encourage mobility, OOB in chair, and early ambulation as appropriate  - Fall precautions   - Monitor for bowel and bladder dysfunction  - Monitor for and prevent skin breakdown and pressure ulcers  - Continue DVT prophylaxis as detailed above     Moderate Protein Calorie Malnutrition  Hypoalbuminemia   - RD consulted, ongoing collaboration for optimized nutrition   - Diet: soft and bites sized diet, boost plus BID, recommend MVI,RD following   - Dietary Supplements: Boost Plus and Hi   -Multivitamin      Hyponatremia  Na 132   Continue to monitor with twice weekly labs     At Risk for Constipation  At risk for opoid induced constipation  - Continue scheduled senokot  - Adjust bowel regimen prn to avoid diarrhea  - Encourage fluid intake  - Encourage safe physical activity as tolerated with activity restrictions if recommended by Ortho   - Monitor bowel movement regularity and consistenc     Afib  - has PPM  - Continue metoprolol succinate 50 mg daily and 25 mg q HS  - continue home apixaban 5 mg BID.      Anemia of chronic disease  - Chest pain with post op anemia while inpt, received total of 2 units PRBC.  - Trend with scheduled CBC  - Transfuse if patient becomes hemodynamically unstable, symptomatic or H/H drops below 7/21.     Chronic Diastolic Heart Failure  Essential hypertension  TTE 06/16/24:     Left Ventricle: The left ventricle is normal in size. Normal wall thickness. There is concentric hypertrophy. There is normal systolic function with a visually estimated ejection fraction of 65 - 70%. Unable to assess diastolic function due to atrial  fibrillation.    Right Ventricle: Normal right ventricular cavity size. Wall thickness is normal. Systolic function is normal.    Left Atrium: Left atrium is severely dilated.    Right Atrium: Right atrium is severely dilated.    Mitral Valve: There is moderate posterior leaflet sclerosis. There is no stenosis. The mean pressure gradient across the mitral valve is 1 mmHg at a heart rate of 60 bpm. There is moderate regurgitation.    Tricuspid Valve: There is moderate to severe regurgitation.    Pulmonic Valve: There is no stenosis.    Pulmonary Artery: There is moderate pulmonary hypertension. The estimated pulmonary artery systolic pressure is 60 mmHg.    IVC/SVC: Intermediate venous pressure at 8 mmHg.  - Monitor fluid balance with daily weight & strict I/O  - Continue metoprolol succinate. Amlodipine 5 mg daily, HCTZ 12.5 mg daily, and bumex 0.5 mg daily.     - CHF education to include diet, medication, and CHF flags for MD notification  - Monitor BP  - Adjust therapy to BP goal < 150/90  - Avoid hypotension      Hypothyroidism  Lab Results   Component Value Date    TSH 22.581 (H) 03/21/2025   - Continue home synthroid 88 mcg daily before breakfast     Insomnia  - Stop melatonin due to hallucinations.   - Start trazodone 25 mg qHS.      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       Advance care planning   - ACP discussion held on 3/14/2025 with NP and she wishes to be DNR      IP [facility] RISK OF UNPLANNED READMISSION Model: Moderate      Anticipated Disposition:  Home with home health      Future Appointments   Date Time Provider Department Center   3/27/2025  8:30 AM Albino William MD [facility]       I certify that SNF services are required to be given on an inpatient basis because Dorothy M Knobloch needs for skilled nursing care and/or skilled rehabilitation are required on a daily basis and such services can only practically be provided in a skilled nursing facility setting  and are for an ongoing condition for which she received inpatient care in the hospital.       Justin Fleming MD  Department of [facility] Medicine   [facility] - Skilled Nursing

## 2025-03-19 NOTE — PT/OT/SLP PROGRESS
"Physical Therapy Treatment    Patient Name:  Dorothy M Knobloch   MRN:  [id number]  Admit Date: 3/13/2025  Admitting Diagnosis: Periprosthetic fracture of shaft of femur  Recent Surgeries:     General Precautions: Standard, fall, vision impaired  Orthopedic Precautions: RLE weight bearing as tolerated  Braces: N/A    Recommendations:     Discharge Recommendations: home health PT  Level of Assistance Recommended at Discharge: 24 hours significant assistance  Discharge Equipment Recommendations: wheelchair, walker, rolling  Barriers to discharge: Decreased caregiver support    Assessment:     Dorothy M Knobloch is a 89 y.o. female admitted with a medical diagnosis of Periprosthetic fracture of shaft of femur . Pt agreeable for therapy, up in chair prior to session, requires max A for sit <> stand and min A for gait and extra time.      Performance deficits affecting function: weakness, impaired endurance, impaired self care skills, impaired functional mobility, gait instability, impaired balance, decreased upper extremity function, decreased lower extremity function, decreased ROM, impaired cardiopulmonary response to activity, orthopedic precautions.    Rehab Potential is fair    Activity Tolerance: Fair    Plan:     Patient to be seen 5 x/week to address the above listed problems via gait training, therapeutic activities, therapeutic exercises, wheelchair management/training    Plan of Care Expires: 04/13/25  Plan of Care Reviewed with: patient    Subjective     "I have to take my time".     Pain/Comfort:  Pain Rating 1: 0/10  Pain Rating Post-Intervention 1: 0/10    Patient's cultural, spiritual, Latter-day conflicts given the current situation:  no    Objective:     Patient found up in chair with wound vac, PureWick, oxygen (1L) upon PT entry to room.     Therapeutic Activities and Exercises: LAQ, AP x 10 in sitting    Functional Mobility:  Transfers:     Sit to Stand:  maximal assistance with rolling walker  Gait: 5' " w/mod A and RW, some weight shifting facilitation and RW management assist, pt needing to be cleaned at end of gait, returned to room    AM-PAC 6 CLICK MOBILITY  11    Patient left up in chair with all lines intact, call button in reach, and family present.    GOALS:   Multidisciplinary Problems       Physical Therapy Goals          Problem: Physical Therapy    Goal Priority Disciplines Outcome Interventions   Physical Therapy Goal     PT, PT/OT Progressing    Description: Goals to be met by: 4/14/25     Patient will increase functional independence with mobility by performing:    . Supine to sit with Stand-by Assistance  . Sit to supine with Stand-by Assistance  . Rolling to Left and Right with Stand-by Assistance.  . Sit to stand transfer with Contact Guard Assistance  . Bed to chair transfer with Contact Guard Assistance using Rolling Walker  . Gait  x 50 feet with Contact Guard Assistance using .   . Wheelchair propulsion x50 feet with Supervision using bilateral uppper extremities                         Time Tracking:     PT Received On: 03/19/25  PT Start Time: 1308  PT Stop Time: 1343  PT Total Time (min): 35 min    Billable Minutes: Gait Training 20 and Therapeutic Exercise 15    Treatment Type: Treatment  PT/PTA: PTA     Number of PTA visits since last PT visit: 1 03/19/2025

## 2025-03-20 ENCOUNTER — HOSPITAL ENCOUNTER (INPATIENT)
Facility: HOSPITAL | Age: OVER 89
LOS: 6 days | Discharge: HOME | DRG: 193 | End: 2025-03-26
Attending: EMERGENCY MEDICINE
Payer: MEDICARE

## 2025-03-20 VITALS
TEMPERATURE: 98 F | WEIGHT: 155.44 LBS | HEART RATE: 74 BPM | DIASTOLIC BLOOD PRESSURE: 67 MMHG | RESPIRATION RATE: 29 BRPM | OXYGEN SATURATION: 93 % | SYSTOLIC BLOOD PRESSURE: 152 MMHG | HEIGHT: 62 IN | BODY MASS INDEX: 28.61 KG/M2

## 2025-03-20 DIAGNOSIS — R06.02 SOB (SHORTNESS OF BREATH): ICD-10-CM

## 2025-03-20 DIAGNOSIS — R94.31 ABNORMAL EKG: ICD-10-CM

## 2025-03-20 DIAGNOSIS — I50.9 HEART FAILURE: ICD-10-CM

## 2025-03-20 DIAGNOSIS — R06.02 SHORTNESS OF BREATH: ICD-10-CM

## 2025-03-20 DIAGNOSIS — J18.9 HEALTHCARE-ASSOCIATED PNEUMONIA: ICD-10-CM

## 2025-03-20 DIAGNOSIS — I50.9 ACUTE ON CHRONIC CONGESTIVE HEART FAILURE, UNSPECIFIED HEART FAILURE TYPE: Primary | ICD-10-CM

## 2025-03-20 PROBLEM — D72.829 LEUKOCYTOSIS: Status: ACTIVE | Noted: 2025-03-20

## 2025-03-20 PROBLEM — J96.01 ACUTE HYPOXIC RESPIRATORY FAILURE: Status: ACTIVE | Noted: 2025-03-20

## 2025-03-20 LAB
ALBUMIN SERPL BCP-MCNC: 2.9 G/DL (ref 3.5–5.2)
ALLENS TEST: ABNORMAL
ALP SERPL-CCNC: 85 U/L (ref 40–150)
ALT SERPL W/O P-5'-P-CCNC: 17 U/L (ref 10–44)
ANION GAP SERPL CALC-SCNC: 11 MMOL/L (ref 8–16)
ANION GAP SERPL CALC-SCNC: 13 MMOL/L (ref 8–16)
AST SERPL-CCNC: 27 U/L (ref 10–40)
BACTERIA #/AREA URNS AUTO: NORMAL /HPF
BASOPHILS # BLD AUTO: 0.05 K/UL (ref 0–0.2)
BASOPHILS # BLD AUTO: 0.09 K/UL (ref 0–0.2)
BASOPHILS NFR BLD: 0.3 % (ref 0–1.9)
BASOPHILS NFR BLD: 0.6 % (ref 0–1.9)
BILIRUB SERPL-MCNC: 1.3 MG/DL (ref 0.1–1)
BILIRUB UR QL STRIP: NEGATIVE
BNP SERPL-MCNC: 1625 PG/ML (ref 0–99)
BUN SERPL-MCNC: 34 MG/DL (ref 10–30)
BUN SERPL-MCNC: 36 MG/DL (ref 10–30)
CALCIUM SERPL-MCNC: 9.4 MG/DL (ref 8.7–10.5)
CALCIUM SERPL-MCNC: 9.8 MG/DL (ref 8.7–10.5)
CHLORIDE SERPL-SCNC: 92 MMOL/L (ref 95–110)
CHLORIDE SERPL-SCNC: 92 MMOL/L (ref 95–110)
CLARITY UR REFRACT.AUTO: CLEAR
CO2 SERPL-SCNC: 27 MMOL/L (ref 23–29)
CO2 SERPL-SCNC: 32 MMOL/L (ref 23–29)
COLOR UR AUTO: YELLOW
CREAT SERPL-MCNC: 0.6 MG/DL (ref 0.5–1.4)
CREAT SERPL-MCNC: 0.6 MG/DL (ref 0.5–1.4)
D DIMER PPP IA.FEU-MCNC: 3.13 MG/L FEU
DIFFERENTIAL METHOD BLD: ABNORMAL
DIFFERENTIAL METHOD BLD: ABNORMAL
EOSINOPHIL # BLD AUTO: 0 K/UL (ref 0–0.5)
EOSINOPHIL # BLD AUTO: 0.1 K/UL (ref 0–0.5)
EOSINOPHIL NFR BLD: 0.2 % (ref 0–8)
EOSINOPHIL NFR BLD: 0.8 % (ref 0–8)
ERYTHROCYTE [DISTWIDTH] IN BLOOD BY AUTOMATED COUNT: 14.5 % (ref 11.5–14.5)
ERYTHROCYTE [DISTWIDTH] IN BLOOD BY AUTOMATED COUNT: 14.7 % (ref 11.5–14.5)
EST. GFR  (NO RACE VARIABLE): >60 ML/MIN/1.73 M^2
EST. GFR  (NO RACE VARIABLE): >60 ML/MIN/1.73 M^2
GLUCOSE SERPL-MCNC: 116 MG/DL (ref 70–110)
GLUCOSE SERPL-MCNC: 130 MG/DL (ref 70–110)
GLUCOSE UR QL STRIP: ABNORMAL
HCO3 UR-SCNC: 37.9 MMOL/L (ref 24–28)
HCT VFR BLD AUTO: 26.1 % (ref 37–48.5)
HCT VFR BLD AUTO: 29 % (ref 37–48.5)
HCV AB SERPL QL IA: NORMAL
HGB BLD-MCNC: 8.5 G/DL (ref 12–16)
HGB BLD-MCNC: 9.2 G/DL (ref 12–16)
HGB UR QL STRIP: NEGATIVE
HIV 1+2 AB+HIV1 P24 AG SERPL QL IA: NORMAL
IMM GRANULOCYTES # BLD AUTO: 0.12 K/UL (ref 0–0.04)
IMM GRANULOCYTES # BLD AUTO: 0.14 K/UL (ref 0–0.04)
IMM GRANULOCYTES NFR BLD AUTO: 0.8 % (ref 0–0.5)
IMM GRANULOCYTES NFR BLD AUTO: 1 % (ref 0–0.5)
KETONES UR QL STRIP: NEGATIVE
LEUKOCYTE ESTERASE UR QL STRIP: NEGATIVE
LYMPHOCYTES # BLD AUTO: 0.8 K/UL (ref 1–4.8)
LYMPHOCYTES # BLD AUTO: 1 K/UL (ref 1–4.8)
LYMPHOCYTES NFR BLD: 4.9 % (ref 18–48)
LYMPHOCYTES NFR BLD: 7 % (ref 18–48)
MAGNESIUM SERPL-MCNC: 2.3 MG/DL (ref 1.6–2.6)
MCH RBC QN AUTO: 31.1 PG (ref 27–31)
MCH RBC QN AUTO: 31.5 PG (ref 27–31)
MCHC RBC AUTO-ENTMCNC: 31.7 G/DL (ref 32–36)
MCHC RBC AUTO-ENTMCNC: 32.6 G/DL (ref 32–36)
MCV RBC AUTO: 97 FL (ref 82–98)
MCV RBC AUTO: 98 FL (ref 82–98)
MICROSCOPIC COMMENT: NORMAL
MONOCYTES # BLD AUTO: 0.9 K/UL (ref 0.3–1)
MONOCYTES # BLD AUTO: 0.9 K/UL (ref 0.3–1)
MONOCYTES NFR BLD: 5.5 % (ref 4–15)
MONOCYTES NFR BLD: 6.5 % (ref 4–15)
MRSA SCREEN BY PCR: NOT DETECTED
NEUTROPHILS # BLD AUTO: 12 K/UL (ref 1.8–7.7)
NEUTROPHILS # BLD AUTO: 13.6 K/UL (ref 1.8–7.7)
NEUTROPHILS NFR BLD: 84.1 % (ref 38–73)
NEUTROPHILS NFR BLD: 88.3 % (ref 38–73)
NITRITE UR QL STRIP: NEGATIVE
NRBC BLD-RTO: 0 /100 WBC
NRBC BLD-RTO: 0 /100 WBC
OHS QRS DURATION: 202 MS
OHS QTC CALCULATION: 536 MS
PCO2 BLDA: 52.2 MMHG (ref 35–45)
PH SMN: 7.47 [PH] (ref 7.35–7.45)
PH UR STRIP: 6 [PH] (ref 5–8)
PHOSPHATE SERPL-MCNC: 2.9 MG/DL (ref 2.7–4.5)
PLATELET # BLD AUTO: 506 K/UL (ref 150–450)
PLATELET # BLD AUTO: 550 K/UL (ref 150–450)
PMV BLD AUTO: 8.7 FL (ref 9.2–12.9)
PMV BLD AUTO: 9.1 FL (ref 9.2–12.9)
PO2 BLDA: 27 MMHG (ref 40–60)
POC BE: 14 MMOL/L
POC SATURATED O2: 53 % (ref 95–100)
POC TCO2: 39 MMOL/L (ref 24–29)
POTASSIUM SERPL-SCNC: 3.6 MMOL/L (ref 3.5–5.1)
POTASSIUM SERPL-SCNC: 3.9 MMOL/L (ref 3.5–5.1)
PROT SERPL-MCNC: 6.7 G/DL (ref 6–8.4)
PROT UR QL STRIP: NEGATIVE
RBC # BLD AUTO: 2.7 M/UL (ref 4–5.4)
RBC # BLD AUTO: 2.96 M/UL (ref 4–5.4)
RBC #/AREA URNS AUTO: 2 /HPF (ref 0–4)
SAMPLE: ABNORMAL
SITE: ABNORMAL
SODIUM SERPL-SCNC: 132 MMOL/L (ref 136–145)
SODIUM SERPL-SCNC: 135 MMOL/L (ref 136–145)
SP GR UR STRIP: >1.03 (ref 1–1.03)
SQUAMOUS #/AREA URNS AUTO: 5 /HPF
TROPONIN I SERPL DL<=0.01 NG/ML-MCNC: 34 NG/L (ref 0–14)
URN SPEC COLLECT METH UR: ABNORMAL
VIT B12 SERPL-MCNC: 914 PG/ML (ref 210–950)
WBC # BLD AUTO: 14.23 K/UL (ref 3.9–12.7)
WBC # BLD AUTO: 15.36 K/UL (ref 3.9–12.7)
WBC #/AREA URNS AUTO: 1 /HPF (ref 0–5)
YEAST UR QL AUTO: NORMAL

## 2025-03-20 PROCEDURE — 36415 COLL VENOUS BLD VENIPUNCTURE: CPT | Performed by: HOSPITALIST

## 2025-03-20 PROCEDURE — 84100 ASSAY OF PHOSPHORUS: CPT | Performed by: HOSPITALIST

## 2025-03-20 PROCEDURE — 93010 ELECTROCARDIOGRAM REPORT: CPT | Mod: ,,, | Performed by: INTERNAL MEDICINE

## 2025-03-20 PROCEDURE — 25000003 PHARM REV CODE 250: Performed by: HOSPITALIST

## 2025-03-20 PROCEDURE — 81001 URINALYSIS AUTO W/SCOPE: CPT

## 2025-03-20 PROCEDURE — 94640 AIRWAY INHALATION TREATMENT: CPT

## 2025-03-20 PROCEDURE — 63600175 PHARM REV CODE 636 W HCPCS

## 2025-03-20 PROCEDURE — 25000003 PHARM REV CODE 250

## 2025-03-20 PROCEDURE — 83880 ASSAY OF NATRIURETIC PEPTIDE: CPT

## 2025-03-20 PROCEDURE — 93005 ELECTROCARDIOGRAM TRACING: CPT

## 2025-03-20 PROCEDURE — 99285 EMERGENCY DEPT VISIT HI MDM: CPT | Mod: 25

## 2025-03-20 PROCEDURE — 12000002 HC ACUTE/MED SURGE SEMI-PRIVATE ROOM

## 2025-03-20 PROCEDURE — 27000221 HC OXYGEN, UP TO 24 HOURS

## 2025-03-20 PROCEDURE — 99900035 HC TECH TIME PER 15 MIN (STAT)

## 2025-03-20 PROCEDURE — 25000242 PHARM REV CODE 250 ALT 637 W/ HCPCS: Performed by: HOSPITALIST

## 2025-03-20 PROCEDURE — 18500000 HC LEAVE OF ABSENCE HOSPITAL SERVICES

## 2025-03-20 PROCEDURE — 80053 COMPREHEN METABOLIC PANEL: CPT

## 2025-03-20 PROCEDURE — 96374 THER/PROPH/DIAG INJ IV PUSH: CPT

## 2025-03-20 PROCEDURE — 87040 BLOOD CULTURE FOR BACTERIA: CPT | Mod: 59

## 2025-03-20 PROCEDURE — 94761 N-INVAS EAR/PLS OXIMETRY MLT: CPT

## 2025-03-20 PROCEDURE — 85025 COMPLETE CBC W/AUTO DIFF WBC: CPT | Mod: 91 | Performed by: HOSPITALIST

## 2025-03-20 PROCEDURE — 83735 ASSAY OF MAGNESIUM: CPT | Performed by: HOSPITALIST

## 2025-03-20 PROCEDURE — 94761 N-INVAS EAR/PLS OXIMETRY MLT: CPT | Mod: XB

## 2025-03-20 PROCEDURE — 85025 COMPLETE CBC W/AUTO DIFF WBC: CPT

## 2025-03-20 PROCEDURE — 87389 HIV-1 AG W/HIV-1&-2 AB AG IA: CPT | Performed by: PHYSICIAN ASSISTANT

## 2025-03-20 PROCEDURE — 82803 BLOOD GASES ANY COMBINATION: CPT

## 2025-03-20 PROCEDURE — 85379 FIBRIN DEGRADATION QUANT: CPT

## 2025-03-20 PROCEDURE — 25500020 PHARM REV CODE 255: Performed by: EMERGENCY MEDICINE

## 2025-03-20 PROCEDURE — 84484 ASSAY OF TROPONIN QUANT: CPT

## 2025-03-20 PROCEDURE — 86803 HEPATITIS C AB TEST: CPT | Performed by: PHYSICIAN ASSISTANT

## 2025-03-20 PROCEDURE — 87641 MR-STAPH DNA AMP PROBE: CPT

## 2025-03-20 PROCEDURE — 80048 BASIC METABOLIC PNL TOTAL CA: CPT | Mod: XB | Performed by: HOSPITALIST

## 2025-03-20 PROCEDURE — 82607 VITAMIN B-12: CPT | Performed by: HOSPITALIST

## 2025-03-20 RX ORDER — SODIUM CHLORIDE 0.9 % (FLUSH) 0.9 %
10 SYRINGE (ML) INJECTION
Status: DISCONTINUED | OUTPATIENT
Start: 2025-03-20 | End: 2025-03-26 | Stop reason: HOSPADM

## 2025-03-20 RX ORDER — CLOPIDOGREL BISULFATE 75 MG/1
75 TABLET ORAL DAILY
Status: DISCONTINUED | OUTPATIENT
Start: 2025-03-21 | End: 2025-03-26 | Stop reason: HOSPADM

## 2025-03-20 RX ORDER — ACETAMINOPHEN 325 MG/1
650 TABLET ORAL EVERY 4 HOURS PRN
Status: DISCONTINUED | OUTPATIENT
Start: 2025-03-20 | End: 2025-03-26 | Stop reason: HOSPADM

## 2025-03-20 RX ORDER — AMLODIPINE BESYLATE 5 MG/1
5 TABLET ORAL DAILY
Status: DISCONTINUED | OUTPATIENT
Start: 2025-03-21 | End: 2025-03-26 | Stop reason: HOSPADM

## 2025-03-20 RX ORDER — POLYETHYLENE GLYCOL 3350 17 G/17G
17 POWDER, FOR SOLUTION ORAL DAILY
Status: DISCONTINUED | OUTPATIENT
Start: 2025-03-21 | End: 2025-03-26 | Stop reason: HOSPADM

## 2025-03-20 RX ORDER — ONDANSETRON HYDROCHLORIDE 2 MG/ML
4 INJECTION, SOLUTION INTRAVENOUS EVERY 8 HOURS PRN
Status: DISCONTINUED | OUTPATIENT
Start: 2025-03-20 | End: 2025-03-20

## 2025-03-20 RX ORDER — ESCITALOPRAM OXALATE 10 MG/1
20 TABLET ORAL DAILY
Status: DISCONTINUED | OUTPATIENT
Start: 2025-03-21 | End: 2025-03-26 | Stop reason: HOSPADM

## 2025-03-20 RX ORDER — LEVOTHYROXINE SODIUM 88 UG/1
88 TABLET ORAL
Status: DISCONTINUED | OUTPATIENT
Start: 2025-03-21 | End: 2025-03-26 | Stop reason: HOSPADM

## 2025-03-20 RX ORDER — FUROSEMIDE 10 MG/ML
40 INJECTION INTRAMUSCULAR; INTRAVENOUS EVERY 12 HOURS
Status: DISCONTINUED | OUTPATIENT
Start: 2025-03-20 | End: 2025-03-23

## 2025-03-20 RX ORDER — DOXYCYCLINE HYCLATE 100 MG
100 TABLET ORAL EVERY 12 HOURS
Status: DISCONTINUED | OUTPATIENT
Start: 2025-03-20 | End: 2025-03-26 | Stop reason: HOSPADM

## 2025-03-20 RX ORDER — TALC
6 POWDER (GRAM) TOPICAL NIGHTLY PRN
Status: DISCONTINUED | OUTPATIENT
Start: 2025-03-20 | End: 2025-03-26 | Stop reason: HOSPADM

## 2025-03-20 RX ORDER — PROCHLORPERAZINE MALEATE 5 MG
5 TABLET ORAL 3 TIMES DAILY PRN
Status: DISCONTINUED | OUTPATIENT
Start: 2025-03-20 | End: 2025-03-26 | Stop reason: HOSPADM

## 2025-03-20 RX ORDER — CEFTRIAXONE 1 G/1
1 INJECTION, POWDER, FOR SOLUTION INTRAMUSCULAR; INTRAVENOUS
Status: DISCONTINUED | OUTPATIENT
Start: 2025-03-20 | End: 2025-03-20

## 2025-03-20 RX ORDER — FUROSEMIDE 10 MG/ML
40 INJECTION INTRAMUSCULAR; INTRAVENOUS
Status: COMPLETED | OUTPATIENT
Start: 2025-03-20 | End: 2025-03-20

## 2025-03-20 RX ORDER — HYDROCHLOROTHIAZIDE 12.5 MG/1
12.5 TABLET ORAL DAILY
Status: DISCONTINUED | OUTPATIENT
Start: 2025-03-21 | End: 2025-03-26 | Stop reason: HOSPADM

## 2025-03-20 RX ADMIN — METHOCARBAMOL 500 MG: 500 TABLET ORAL at 05:03

## 2025-03-20 RX ADMIN — APIXABAN 5 MG: 5 TABLET, FILM COATED ORAL at 09:03

## 2025-03-20 RX ADMIN — VANCOMYCIN HYDROCHLORIDE 1500 MG: 1.5 INJECTION, POWDER, LYOPHILIZED, FOR SOLUTION INTRAVENOUS at 04:03

## 2025-03-20 RX ADMIN — DOXYCYCLINE HYCLATE 100 MG: 100 TABLET, COATED ORAL at 09:03

## 2025-03-20 RX ADMIN — PIPERACILLIN SODIUM AND TAZOBACTAM SODIUM 4.5 G: 4; .5 INJECTION, POWDER, FOR SOLUTION INTRAVENOUS at 10:03

## 2025-03-20 RX ADMIN — PIPERACILLIN SODIUM AND TAZOBACTAM SODIUM 4.5 G: 4; .5 INJECTION, POWDER, FOR SOLUTION INTRAVENOUS at 03:03

## 2025-03-20 RX ADMIN — FUROSEMIDE 40 MG: 10 INJECTION, SOLUTION INTRAVENOUS at 09:03

## 2025-03-20 RX ADMIN — FUROSEMIDE 40 MG: 10 INJECTION, SOLUTION INTRAVENOUS at 12:03

## 2025-03-20 RX ADMIN — IPRATROPIUM BROMIDE AND ALBUTEROL SULFATE 3 ML: 2.5; .5 SOLUTION RESPIRATORY (INHALATION) at 06:03

## 2025-03-20 RX ADMIN — LEVOTHYROXINE SODIUM 88 MCG: 112 TABLET ORAL at 05:03

## 2025-03-20 RX ADMIN — IOHEXOL 75 ML: 350 INJECTION, SOLUTION INTRAVENOUS at 01:03

## 2025-03-20 NOTE — PT/OT/SLP PROGRESS
Occupational Therapy      Patient Name:  Dorothy M Knobloch   MRN:  [id number]    Patient not seen today secondary to patient sent to ED. Will follow-up POC.    3/20/2025

## 2025-03-20 NOTE — ED TRIAGE NOTES
Dorothy M Knobloch, a 89 y.o. female presents to the ED w/ complaint of SOB.    Triage note:  Chief Complaint   Patient presents with    Shortness of Breath     SOB starting at three days ago, patient denies chest pain. The patients nurse also endorsing patient having some wheezing and crackles. Per EMS patients nurse states she was also increasingly confused. Patient disoriented to situation.      Review of patient's allergies indicates:   Allergen Reactions    Aspartame Swelling     equal     Past Medical History:   Diagnosis Date    Acquired hypothyroidism 12/13/2013    Age-related physical debility 04/05/2024    Antiplatelet or antithrombotic long-term use     AV block, 3rd degree 03/19/2019    Bilateral nonexudative age-related macular degeneration 08/27/2013    Blindness and low vision 08/10/2018    Blood transfusion     Cardiac pacemaker 08/22/2018    Carotid artery disease 08/10/2016    Chronic diastolic congestive heart failure 11/24/2019    CRAO (central retinal artery occlusion), left 01/29/2018    Dry eye syndrome of both eyes 08/10/2018    Essential hypertension 12/13/2013    History of TIA (transient ischemic attack) 12/13/2013    Nonexudative age-related macular degeneration, bilateral, intermediate dry stage 07/12/2018    Nuclear sclerotic cataract of left eye 08/10/2018    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 12/13/2013    On anticoagulant therapy 06/13/2024    Osteoarthritis     Paroxysmal atrial fibrillation 09/11/2018    Pulmonary hypertension 04/05/2024    Pure hypercholesterolemia 12/13/2013    Sick sinus syndrome     Stroke     Takotsubo cardiomyopathy 11/17/2016

## 2025-03-20 NOTE — PT/OT/SLP PROGRESS
Physical Therapy      Patient Name:  Dorothy M Knobloch   MRN:  [id number]    Patient not seen today secondary to  transfer to the ED. Will follow-up when pt returns to the SNF unit.    Rosanna Alcantara, PT  3/20/2025

## 2025-03-20 NOTE — ED PROVIDER NOTES
"Encounter Date: 3/20/2025       History     Chief Complaint   Patient presents with    Shortness of Breath     SOB starting at three days ago, patient denies chest pain. The patients nurse also endorsing patient having some wheezing and crackles. Per EMS patients nurse states she was also increasingly confused. Patient disoriented to situation.      89 y.o. female with a hx of Afib, HTN, CHF, anemia, and hypothyroidism transferred from SNF for concerns over shortness of breath and altered mentation.  Per EMS, patient has been "somewhat altered" with the past 5 days with hallucinations and speaking to people who in the room with her.  Patient has also been having intermittent shortness of breath with wheezes and crackles, per nursing home staff sign-out to EMS.  Today, patient hypoxic at 88% on room air prior to EMS arrival.  Patient received breathing treatment prior to EMS evaluation; no wheezing heard by EMS.  Patient denying chest pain, fevers/chills, abdominal pain.  Endorses shortness of breath.    The history is provided by the patient, the EMS personnel and medical records. No  was used.     Review of patient's allergies indicates:   Allergen Reactions    Aspartame Swelling     equal     Past Medical History:   Diagnosis Date    Acquired hypothyroidism 12/13/2013    Age-related physical debility 04/05/2024    Antiplatelet or antithrombotic long-term use     AV block, 3rd degree 03/19/2019    Bilateral nonexudative age-related macular degeneration 08/27/2013    Blindness and low vision 08/10/2018    Blood transfusion     Cardiac pacemaker 08/22/2018    Carotid artery disease 08/10/2016    Chronic diastolic congestive heart failure 11/24/2019    CRAO (central retinal artery occlusion), left 01/29/2018    Dry eye syndrome of both eyes 08/10/2018    Essential hypertension 12/13/2013    History of TIA (transient ischemic attack) 12/13/2013    Nonexudative age-related macular degeneration, " bilateral, intermediate dry stage 07/12/2018    Nuclear sclerotic cataract of left eye 08/10/2018    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 12/13/2013    On anticoagulant therapy 06/13/2024    Osteoarthritis     Paroxysmal atrial fibrillation 09/11/2018    Pulmonary hypertension 04/05/2024    Pure hypercholesterolemia 12/13/2013    Sick sinus syndrome     Stroke     Takotsubo cardiomyopathy 11/17/2016     Past Surgical History:   Procedure Laterality Date    CARDIAC PACEMAKER PLACEMENT      [employer] Device Model: Juli LOZADA MRI A2DR01 Device Type: PACEMAKER Device S\N: [id number]    CARPAL TUNNEL RELEASE Bilateral 1991    CATARACT EXTRACTION Right     JOINT REPLACEMENT      ORIF FEMUR FRACTURE Right 3/8/2025    Procedure: OPEN REDUCTION INTERNAL FIXATION FEMORAL SHAFT FRACTURE;  Surgeon: Albino William MD;  Location: [facility] OR [address] [place] [address];  Service: Orthopedics;  Laterality: Right;    AL TRANSCRAN DOPP INTRACRAN, EMBOLI W/O INJ  01/29/2018         SKIN BIOPSY      TONSILLECTOMY      TOTAL HIP ARTHROPLASTY  11/01/2000    right/[facility]     Family History   Problem Relation Name Age of Onset    Early death Mother      Early death Father      Arthritis Sister      Hearing loss Daughter      Birth defects Son      Hypertension Son      Diabetes Paternal Aunt      Cancer Paternal Aunt  80        colon  cancer    Diabetes Paternal Uncle      Arthritis Maternal Grandmother      Arthritis Paternal Grandmother      Hypertension Paternal Grandmother      Amblyopia Neg Hx      Blindness Neg Hx      Cataracts Neg Hx      Glaucoma Neg Hx      Macular degeneration Neg Hx      Retinal detachment Neg Hx      Strabismus Neg Hx      Stroke Neg Hx      Thyroid disease Neg Hx       Social History[1]      Physical Exam     Initial Vitals [03/20/25 0859]   BP Pulse Resp Temp SpO2   (!) 149/83 60 (!) 25 97.5 °F (36.4 °C) 98 %      MAP       --         Physical Exam    Nursing note and vitals  reviewed.  Constitutional:   Chronically ill-appearing   Eyes:   Right eye round and reactive to light with consensual pupillary light reflex   Left eye not reactive to light, no consensual pupillary light reflex with shining light and left   Cardiovascular:  Normal rate and regular rhythm.           Murmur heard.  Systolic murmur is present with a grade of 4/6.  Pulmonary/Chest: No accessory muscle usage. No tachypnea. No respiratory distress. She has decreased breath sounds in the right lower field and the left lower field. She has rales in the right lower field and the left lower field.   Abdominal: Abdomen is soft. There is no abdominal tenderness.   Musculoskeletal:      Comments: Healing surgical scar over right hip and lateral thigh  Pitting edema of right thigh that extends to the proximal tibia     Neurological: She is alert and oriented to person, place, and time.   Follows commands         ED Course   Procedures  Labs Reviewed   CBC W/ AUTO DIFFERENTIAL - Abnormal       Result Value    WBC 15.36 (*)     RBC 2.96 (*)     Hemoglobin 9.2 (*)     Hematocrit 29.0 (*)     MCV 98      MCH 31.1 (*)     MCHC 31.7 (*)     RDW 14.5      Platelets 550 (*)     MPV 8.7 (*)     Immature Granulocytes 0.8 (*)     Gran # (ANC) 13.6 (*)     Immature Grans (Abs) 0.12 (*)     Lymph # 0.8 (*)     Mono # 0.9      Eos # 0.0      Baso # 0.05      nRBC 0      Gran % 88.3 (*)     Lymph % 4.9 (*)     Mono % 5.5      Eosinophil % 0.2      Basophil % 0.3      Differential Method Automated     COMPREHENSIVE METABOLIC PANEL - Abnormal    Sodium 135 (*)     Potassium 3.6      Chloride 92 (*)     CO2 32 (*)     Glucose 130 (*)     BUN 34 (*)     Creatinine 0.6      Calcium 9.8      Total Protein 6.7      Albumin 2.9 (*)     Total Bilirubin 1.3 (*)     Alkaline Phosphatase 85      AST 27      ALT 17      eGFR >60.0      Anion Gap 11     TROPONIN I HIGH SENSITIVITY - Abnormal    Troponin I High Sensitivity 34 (*)    B-TYPE NATRIURETIC  PEPTIDE - Abnormal    BNP 1,625 (*)    D DIMER, QUANTITATIVE - Abnormal    D-Dimer 3.13 (*)    ISTAT PROCEDURE - Abnormal    POC PH 7.469 (*)     POC PCO2 52.2 (*)     POC PO2 27 (*)     POC HCO3 37.9 (*)     POC BE 14 (*)     POC SATURATED O2 53      POC TCO2 39 (*)     Sample VENOUS      Site Other      Allens Test N/A     CULTURE, BLOOD   CULTURE, BLOOD   HEPATITIS C ANTIBODY    Hepatitis C Ab Non-reactive      Narrative:     Release to patient->Immediate   HIV 1 / 2 ANTIBODY    HIV 1/2 Ag/Ab Non-reactive      Narrative:     Release to patient->Immediate   URINALYSIS, REFLEX TO URINE CULTURE     EKG Readings: (Independently Interpreted)   Initial Reading: No STEMI. Previous EKG: Compared with most recent EKG Previous EKG Date: 03/08/2025. Rhythm: Paced Rhythm. Heart Rate: 66.   ST elevations in II, III, aVF, depressions in aVL; unchanged from previous EKG     ECG Results              EKG 12-lead (Final result)        Collection Time Result Time QRS Duration OHS QTC Calculation    03/20/25 09:16:07 03/20/25 11:01:54 202 536                     Final result by Interface, Lab In [facility] (03/20/25 11:02:03)                   Narrative:    Test Reason : R06.02,    Vent. Rate :  66 BPM     Atrial Rate : 340 BPM     P-R Int :    ms          QRS Dur : 202 ms      QT Int : 512 ms       P-R-T Axes :    -74 106 degrees    QTcB Int : 536 ms    V paced rare dual  Abnormal ECG  When compared with ECG of 08-Mar-2025 04:17,  Minor change  Confirmed by Richard Swenson ([id number]) on 3/20/2025 11:01:48 AM    Referred By: AAAREFERRAL SELF           Confirmed By: Richard Swenson                                  Imaging Results              CTA Chest Non-Coronary (PE Studies) (In process)                      X-Ray Chest 1 View (Final result)  Result time 03/20/25 10:46:21      Final result by Jerrell Acosta MD (03/20/25 10:46:21)                   Impression:      Detrimental interval change in the appearance of the chest since 03/07/2025 is  appreciated as discussed above, which may represent developing pulmonary edema, with clinical correlation essential.      Electronically signed by: Jerrell Acosta MD  Date:    03/20/2025  Time:    10:46               Narrative:    EXAMINATION:  XR CHEST 1 VIEW    CLINICAL HISTORY:  shortness of breath;    TECHNIQUE:  One view    COMPARISON:  Comparison is made to 03/07/2025.  Clinical information obtained from the electronic medical record indicates a 3 day history of shortness of breath.    FINDINGS:  Cardiac pacemaker again observed.  The heart is modestly enlarged, with the appearance of the cardiomediastinal silhouette unchanged since the prior exam.  There has been a detrimental change since that time, however, as the pulmonary interstitial markings appear very slightly more prominent bilaterally and some minimal airspace consolidation has developed in both perihilar lung zones.  Clinical correlation is essential, but the findings may represent the interval development of some pulmonary edema.  No pleural fluid of any substantial volume is seen on either side.  No pneumothorax.  Dense calcification of the mitral annulus and some calcification in the wall of the aortic arch are again incidentally observed.                                       CT Head Without Contrast (Final result)  Result time 03/20/25 10:26:11      Final result by Kj Love MD (03/20/25 10:26:11)                   Impression:      1. No evidence of acute intracranial pathology.  2. Unchanged presumed meningioma along the left tentorial leaflet.    Electronically signed by resident: Kj Guillermo  Date:    03/20/2025  Time:    10:00    Electronically signed by: Kj Love  Date:    03/20/2025  Time:    10:26               Narrative:    EXAMINATION:  CT HEAD WITHOUT CONTRAST    CLINICAL HISTORY:  Mental status change, unknown cause;    TECHNIQUE:  Low dose axial CT images obtained throughout the head without the use of intravenous  contrast.  Axial, sagittal and coronal reconstructions were performed.    COMPARISON:  CT head 03/07/2025, MR brain 04/04/2024    FINDINGS:  Intracranial compartment:    Mild generalized cerebral volume loss.  Ventricles are stable in size without evidence of hydrocephalus.    Patchy and confluent hypoattenuation throughout the supratentorial white matter, nonspecific but may be seen the setting of mild chronic microvascular ischemic change.  Remote lacunar type infarct in the right corona radiata, stable.    No parenchymal  hemorrhage, edema, mass effect or major vascular distribution infarct.    Unchanged 1.9 x 2.0 cm calcified extra-axial mass along the inferior aspect of the left tentorial leaflet, presumably a meningioma.  No extra-axial blood or fluid collections.    Skull/extracranial contents (limited evaluation):    No displaced calvarial fracture.    Small left mastoid effusion inferiorly, similar prior.  Right mastoid air cells are clear.  Paranasal sinuses are essentially clear.                                       Medications   vancomycin 1,500 mg in 0.9% NaCl 250 mL IVPB (admixture device) (has no administration in time range)   piperacillin-tazobactam (ZOSYN) 4.5 g in D5W 100 mL IVPB (MB+) (has no administration in time range)   furosemide injection 40 mg (40 mg Intravenous Given 3/20/25 1251)   iohexoL (OMNIPAQUE 350) injection 75 mL (75 mLs Intravenous Given 3/20/25 1325)     Medical Decision Making  89 y.o. female with a hx of Afib, HTN, CHF, anemia, and hypothyroidism transferred from SNF for concerns over shortness of breath and altered mentation.    Differential diagnosis includes, but is not limited to, delirium, dementia, stroke, UTI, pneumonia, metabolic acidosis, CHF, viral illness.    In emergency department, patient initially hypoxic, placed on non-rebreather.  Exam notable for significant right lower extremity swelling around the site of her recent surgery.  Patient without any pain  currently.  Patient is alert and oriented x3, answering questions appropriately.  Patient denies any recent cough or fevers/chills.  BNP and chest x-ray concerning for fluid overload, chest x-ray also concerning for possible pneumonia.  D-dimer elevated, so CTA chest ordered.  No massive or submassive pulmonary embolism appreciated on imaging, but diffuse interstitial markings concerning for pneumonia.  Patient started on diuretics and vanc and Zosyn for healthcare associated pneumonia.  Patient transitioned to nasal cannula with maintenance of oxygen saturations.  Admitted patient to Hospital Medicine for CHF, pneumonia.    Amount and/or Complexity of Data Reviewed  External Data Reviewed: radiology.     Details: === Results for orders placed during the hospital encounter of 06/20/24 ===    Echo    - Interpretation Summary -  ·  Left Ventricle: The left ventricle is normal in size. Normal wall thickness. There is concentric hypertrophy. There is normal systolic function with a visually estimated ejection fraction of 65 - 70%. Unable to assess diastolic function due to atrial fibrillation.  ·  Right Ventricle: Normal right ventricular cavity size. Wall thickness is normal. Systolic function is normal.  ·  Left Atrium: Left atrium is severely dilated.  ·  Right Atrium: Right atrium is severely dilated.  ·  Mitral Valve: There is moderate posterior leaflet sclerosis. There is no stenosis. The mean pressure gradient across the mitral valve is 1 mmHg at a heart rate of 60 bpm. There is moderate regurgitation.  ·  Tricuspid Valve: There is moderate to severe regurgitation.  ·  Pulmonic Valve: There is no stenosis.  ·  Pulmonary Artery: There is moderate pulmonary hypertension. The estimated pulmonary artery systolic pressure is 60 mmHg.  ·  IVC/SVC: Intermediate venous pressure at 8 mmHg.      Labs: ordered. Decision-making details documented in ED Course.  Radiology: ordered and independent interpretation performed.  Decision-making details documented in ED Course.  ECG/medicine tests: ordered and independent interpretation performed. Decision-making details documented in ED Course.    Risk  Prescription drug management.  Decision regarding hospitalization.  Risk Details: Patient received Lasix, vancomycin, Zosyn while in the emergency department.  Admitted patient to Hospital Medicine for healthcare associated pneumonia, CHF.              Attending Attestation:   Physician Attestation Statement for Resident:  As the supervising MD   Physician Attestation Statement: I have personally seen and examined this patient.   I agree with the above history.  -: 89-year-old woman with extensive past medical history including CHF, pulmonary hypertension, AFib presents from [facility] rehab after recent right lower extremity surgery present with dyspnea.  Also episodes of confusion and hallucinations at rehab over the past few days.  On exam patient is anxious appearing and hypoxic.  She has a non-rebreather but it is not on her mouth.  Chest x-ray with pulmonary edema.  Troponin and BNP are elevated.  She has a leukocytosis and is suffering from delirium.  Will obtain CTA for further reassessment.   As the supervising MD I agree with the above PE.     As the supervising MD I agree with the above treatment, course, plan, and disposition.                 [unfilled]   ED Course as of 03/20/25 1433   Thu Mar 20, 2025   1006 CT Head Without Contrast  Independent interpretation of this head CT:  No detrimental change from previous CT head 03/07/2025 [BH]   1055 ISTAT PROCEDURE(!)  Hypercapnia   Elevated bicarb [BH]   1122 CBC Auto Differential(!)  Leukocytosis, most recent 14.2  Anemia, most recent 8.5 [BH]   1152 BNP(!)  Significantly elevated BNP [BH]   1153 Comprehensive Metabolic Panel(!)  BUN elevated, most recent 36 [BH]   1154 Troponin I High Sensitivity(!)  Elevated. No chest pain currently. Will trend [BH]   1154 D dimer,  quantitative(!)  Elevated, will obtain CTA chest [BH]      ED Course User Index  [BH] Toni Álvarez MD                           Clinical Impression:  Final diagnoses:  [R06.02] SOB (shortness of breath)  [R06.02] Shortness of breath  [I50.9] Acute on chronic congestive heart failure, unspecified heart failure type (Primary)  [J18.9] Healthcare-associated pneumonia          ED Disposition Condition    Admit Stable                    [1]   Social History  Tobacco Use    Smoking status: Never     Passive exposure: Never    Smokeless tobacco: Never   Substance Use Topics    Alcohol use: No     Alcohol/week: 0.0 standard drinks of alcohol    Drug use: No        Toni Álvarez MD  Resident  03/20/25 [id number]

## 2025-03-20 NOTE — ASSESSMENT & PLAN NOTE
Likely reactive in the setting of CHF vs pna  - will treat with abx as ordered in the ED  - MRSA nasal swab ordered  - continue zosyn  - vancomycin pending nasal swab  - blood and urine cultures pending  - will add doxy for atypical coverage, due to prolonged QTC  Respiratory culture ordered

## 2025-03-20 NOTE — ASSESSMENT & PLAN NOTE
Patient's blood pressure range in the last 24 hours was: BP  Min: 113/54  Max: 160/82.The patient's inpatient anti-hypertensive regimen is listed below:  Current Antihypertensives  amLODIPine tablet 5 mg, Daily, Oral  hydroCHLOROthiazide tablet 12.5 mg, Daily, Oral  furosemide injection 40 mg, Every 12 hours, Intravenous    Plan  - BP is controlled, no changes needed to their regimen

## 2025-03-20 NOTE — ASSESSMENT & PLAN NOTE
Patient has persistent (7 days or more) atrial fibrillation. Patient is currently in sinus rhythm. [id number] Score: 4. The patients heart rate in the last 24 hours is as follows:  Pulse  Min: 60  Max: 74     Antiarrhythmics       Anticoagulants  apixaban tablet 5 mg, 2 times daily, Oral    Plan  - Replete lytes with a goal of K>4, Mg >2  - Patient is anticoagulated, see medications listed above.  - Patient's afib is currently controlled  - holding BB due to CHF exacerbation. Likely to restart in the next day or two

## 2025-03-20 NOTE — ASSESSMENT & PLAN NOTE
Patient with Hypoxic Respiratory failure which is Acute.  she is not on home oxygen. Supplemental oxygen was provided and noted-      .   Signs/symptoms of respiratory failure include- increased work of breathing, respiratory distress, and use of accessory muscles. Contributing diagnoses includes - CHF and Pneumonia Labs and images were reviewed. Patient Has not had a recent ABG. Will treat underlying causes and adjust management of respiratory failure as follows-     - 2/2 pna vs chf  - titrate oxygen as tolerated

## 2025-03-20 NOTE — ASSESSMENT & PLAN NOTE
Hyponatremia is likely due to Heart Failure. The patient's most recent sodium results are listed below.  Recent Labs     03/20/25  0427 03/20/25  1021   * 135*     Plan  - Correct the sodium by 4-6mEq in 24 hours.   - Obtain the following studies: TSH, T4.  - Will treat the hyponatremia with Fluid restriction of:  1.5 liter per day  - Monitor sodium Daily.   - Patient hyponatremia is stable  - acute on chronic in the setting of CHF exacerbation

## 2025-03-20 NOTE — PROGRESS NOTES
Went in to room o remove the wound vac, but it was it was still active, wound vac left in to place.   Notified NP who will emove tomorrow and place a dry dressing over the incision.

## 2025-03-20 NOTE — HPI
89 y/o female with PAF on long term Apixiban at home for anticoagulation, HTN, chronic diastolic heart failure, chronic anemia, cardiac pacemaker due to sick sinus syndrome and hypothyroidism. She was recently admitted from 3/7 to 3/13 after fall at home and femur fracture for which she received operative management. She was discharged to SNF for wound care and PT/OT.     For the last few days she has had increasing sob and work of breathing, family also noticed increased lower extremity swelling. She was also having a change in mental status with hallucinations and confusion. These symptoms prompted her to be transferred to the ED for evaluation.     In the ED she was found to have an elevated BNP (much greater than prior values), cxr showed fluid accumulation. CTA PE showed no evidence of large PE, did show signs of volume overload and possible infection. She was started on lasix with improvement in oxygenation in the ED (non-re breather to 4L NC). Cultures were collected and patient started on vancomycin and zosyn for possible HCAP as well given her symptoms.       Patient pleasantly confused on exam. Son present in the room and said she is normally very sharp mentally. He is not aware of most of her medical history including past medications.

## 2025-03-20 NOTE — ASSESSMENT & PLAN NOTE
The patient's most recent echocardiogram result is listed below.     Echo  Result Date: 6/20/2024    Left Ventricle: The left ventricle is normal in size. Normal wall   thickness. There is concentric hypertrophy. There is normal systolic   function with a visually estimated ejection fraction of 65 - 70%. Unable   to assess diastolic function due to atrial fibrillation.    Right Ventricle: Normal right ventricular cavity size. Wall thickness   is normal. Systolic function is normal.    Left Atrium: Left atrium is severely dilated.    Right Atrium: Right atrium is severely dilated.    Mitral Valve: There is moderate posterior leaflet sclerosis. There is   no stenosis. The mean pressure gradient across the mitral valve is 1 mmHg   at a heart rate of 60 bpm. There is moderate regurgitation.    Tricuspid Valve: There is moderate to severe regurgitation.    Pulmonic Valve: There is no stenosis.    Pulmonary Artery: There is moderate pulmonary hypertension. The   estimated pulmonary artery systolic pressure is 60 mmHg.    IVC/SVC: Intermediate venous pressure at 8 mmHg.         Will get repeat echo

## 2025-03-20 NOTE — NURSING
Per [place], sending patient to ED.   Family mentioned to the Ortho PA. Respirations 30/min, sat 90-92 on 4 L O2, having crackles and wheezing

## 2025-03-20 NOTE — H&P
[facility] - Emergency Dept  [facility] Medicine  History & Physical    Patient Name: Dorothy M Knobloch  MRN: [id number]  Patient Class: IP- Inpatient  Admission Date: 3/20/2025  Attending Physician: Esteban Jimenez DO   Primary Care Provider: Lola Wolff MD         Patient information was obtained from patient, relative(s), and ER records.     Subjective:     Principal Problem:Acute on chronic congestive heart failure    Chief Complaint:   Chief Complaint   Patient presents with    Shortness of Breath     SOB starting at three days ago, patient denies chest pain. The patients nurse also endorsing patient having some wheezing and crackles. Per EMS patients nurse states she was also increasingly confused. Patient disoriented to situation.         HPI: 89 y/o female with PAF on long term Apixiban at home for anticoagulation, HTN, chronic diastolic heart failure, chronic anemia, cardiac pacemaker due to sick sinus syndrome and hypothyroidism. She was recently admitted from 3/7 to 3/13 after fall at home and femur fracture for which she received operative management. She was discharged to SNF for wound care and PT/OT.     For the last few days she has had increasing sob and work of breathing, family also noticed increased lower extremity swelling. She was also having a change in mental status with hallucinations and confusion. These symptoms prompted her to be transferred to the ED for evaluation.     In the ED she was found to have an elevated BNP (much greater than prior values), cxr showed fluid accumulation. CTA PE showed no evidence of large PE, did show signs of volume overload and possible infection. She was started on lasix with improvement in oxygenation in the ED (non-re breather to 4L NC). Cultures were collected and patient started on vancomycin and zosyn for possible HCAP as well given her symptoms.       Patient pleasantly confused on exam. Son present in the room and said she is normally very sharp  mentally. He is not aware of most of her medical history including past medications.     Past Medical History:   Diagnosis Date    Acquired hypothyroidism 12/13/2013    Age-related physical debility 04/05/2024    Antiplatelet or antithrombotic long-term use     AV block, 3rd degree 03/19/2019    Bilateral nonexudative age-related macular degeneration 08/27/2013    Blindness and low vision 08/10/2018    Blood transfusion     Cardiac pacemaker 08/22/2018    Carotid artery disease 08/10/2016    Chronic diastolic congestive heart failure 11/24/2019    CRAO (central retinal artery occlusion), left 01/29/2018    Dry eye syndrome of both eyes 08/10/2018    Essential hypertension 12/13/2013    History of TIA (transient ischemic attack) 12/13/2013    Nonexudative age-related macular degeneration, bilateral, intermediate dry stage 07/12/2018    Nuclear sclerotic cataract of left eye 08/10/2018    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 12/13/2013    On anticoagulant therapy 06/13/2024    Osteoarthritis     Paroxysmal atrial fibrillation 09/11/2018    Pulmonary hypertension 04/05/2024    Pure hypercholesterolemia 12/13/2013    Sick sinus syndrome     Stroke     Takotsubo cardiomyopathy 11/17/2016       Past Surgical History:   Procedure Laterality Date    CARDIAC PACEMAKER PLACEMENT      MED[employer]IC Device Model: Juli ALMANZAR [id number] Device Type: PACEMAKER Device S\N: [id number]    CARPAL TUNNEL RELEASE Bilateral 1991    CATARACT EXTRACTION Right     JOINT REPLACEMENT      ORIF FEMUR FRACTURE Right 3/8/2025    Procedure: OPEN REDUCTION INTERNAL FIXATION FEMORAL SHAFT FRACTURE;  Surgeon: Albino William MD;  Location: [facility] OR [address] [place] [address];  Service: Orthopedics;  Laterality: Right;    CA TRANSCRAN DOPP INTRACRAN, EMBOLI W/O INJ  01/29/2018         SKIN BIOPSY      TONSILLECTOMY      TOTAL HIP ARTHROPLASTY  11/01/2000    right/[facility]       Review of patient's allergies indicates:   Allergen  Reactions    Aspartame Swelling     equal       Current Facility-Administered Medications on File Prior to Encounter   Medication    [ - Suspended Admission] acetaminophen tablet 650 mg    [ - Suspended Admission] acetaminophen tablet 650 mg    [ - Suspended Admission] albuterol-ipratropium 2.5 mg-0.5 mg/3 mL nebulizer solution 3 mL    [ - Suspended Admission] amLODIPine tablet 5 mg    [ - Suspended Admission] apixaban tablet 5 mg    [ - Suspended Admission] brimonidine 0.15 % OPTH DROP ophthalmic solution 1 drop    [ - Suspended Admission] bumetanide tablet 0.5 mg    [ - Suspended Admission] calcium carbonate 200 mg calcium (500 mg) chewable tablet 500 mg    [ - Suspended Admission] clopidogreL tablet 75 mg    [ - Suspended Admission] EScitalopram oxalate tablet 20 mg    [ - Suspended Admission] gabapentin capsule 100 mg    [ - Suspended Admission] hydroCHLOROthiazide tablet 12.5 mg    [ - Suspended Admission] levothyroxine tablet 88 mcg    [ - Suspended Admission] methocarbamoL tablet 500 mg    [ - Suspended Admission] metoprolol succinate (TOPROL-XL) 24 hr tablet 25 mg    [ - Suspended Admission] metoprolol succinate (TOPROL-XL) 24 hr tablet 50 mg    [ - Suspended Admission] multivitamin tablet    [ - Suspended Admission] ondansetron disintegrating tablet 4 mg    [ - Suspended Admission] polyethylene glycol packet 17 g    [ - Suspended Admission] senna-docusate 8.6-50 mg per tablet 2 tablet    [ - Suspended Admission] timolol maleate 0.5% ophthalmic solution 1 drop    [ - Suspended Admission] trazodone split tablet 25 mg    [DISCONTINUED] acetaminophen tablet 650 mg    [DISCONTINUED] melatonin tablet 6 mg    [DISCONTINUED] traMADoL tablet 50 mg     Current Outpatient Medications on File Prior to Encounter   Medication Sig    amLODIPine (NORVASC) 5 MG tablet Take 1 tablet (5 mg total) by mouth once daily.    apixaban (ELIQUIS) 5 mg Tab Take 1 tablet (5 mg total) by mouth 2 (two) times daily. Patient to decrease  dosing of Apixiban to 2.5 mg po twice daily for 1 week after surgery done on 3/8/2025 and then can resume back to her home dosing of 5 mg po BID on 315/2025.    bumetanide (BUMEX) 0.5 MG Tab Take 1 tablet by mouth once daily.    clopidogreL (PLAVIX) 75 mg tablet Take 1 tablet (75 mg total) by mouth once daily.    EScitalopram oxalate (LEXAPRO) 20 MG tablet Take 1 tablet (20 mg total) by mouth once daily.    hydroCHLOROthiazide 12.5 MG Tab TAKE 1 TABLET BY MOUTH EVERY DAY    levothyroxine (SYNTHROID) 88 MCG tablet Take 88 mcg by mouth before breakfast.     metoprolol succinate (TOPROL-XL) 25 MG 24 hr tablet TAKE 2 TABLETS IN AM AND 1 TABLET IN PM    brimonidine-timoloL (COMBIGAN) 0.2-0.5 % Drop Place 1 drop into the left eye 2 (two) times a day.    erythromycin (ROMYCIN) ophthalmic ointment APPLY 1 INCH TO THE BOTTOM LID OF LEFT EYE FOR FIRST WEEK OF EVERY MONTH    melatonin (MELATIN) 3 mg tablet Take 2 tablets (6 mg total) by mouth nightly as needed for Insomnia.    methocarbamoL (ROBAXIN) 500 MG Tab Take 1 tablet (500 mg total) by mouth every 8 (eight) hours.    NITROSTAT 0.4 mg SL tablet Place 0.4 mg under the tongue every 5 (five) minutes as needed for Chest pain.    senna-docusate 8.6-50 mg (PERICOLACE) 8.6-50 mg per tablet Take 1 tablet by mouth 2 (two) times daily.    traMADoL (ULTRAM) 50 mg tablet Take 1 tablet (50 mg total) by mouth every 6 (six) hours as needed (Moderate to severe pain).     Family History       Problem Relation (Age of Onset)    Arthritis Sister, Maternal Grandmother, Paternal Grandmother    Birth defects Son    Cancer Paternal Aunt (80)    Diabetes Paternal Aunt, Paternal Uncle    Early death Mother, Father    Hearing loss Daughter    Hypertension Son, Paternal Grandmother          Tobacco Use    Smoking status: Never     Passive exposure: Never    Smokeless tobacco: Never   Substance and Sexual Activity    Alcohol use: No     Alcohol/week: 0.0 standard drinks of alcohol    Drug use: No     Sexual activity: Not on file     Review of Systems   Unable to perform ROS: Mental status change     Objective:     Vital Signs (Most Recent):  Temp: 97.5 °F (36.4 °C) (03/20/25 0859)  Pulse: 66 (03/20/25 1400)  Resp: 20 (03/20/25 1400)  BP: (!) 113/54 (03/20/25 1400)  SpO2: 96 % (03/20/25 1400) Vital Signs (24h Range):  Temp:  [97.5 °F (36.4 °C)-98.2 °F (36.8 °C)] 97.5 °F (36.4 °C)  Pulse:  [60-74] 66  Resp:  [16-29] 20  SpO2:  [92 %-100 %] 96 %  BP: (113-160)/() 113/54     Weight: 70.3 kg (155 lb)  Body mass index is 28.35 kg/m².     Physical Exam  Constitutional:       General: She is not in acute distress.  HENT:      Head: Normocephalic and atraumatic.   Cardiovascular:      Rate and Rhythm: Normal rate and regular rhythm.      Pulses: Normal pulses.      Heart sounds: No murmur heard.     Comments: Paced rhythm on monitor   Pulmonary:      Effort: Pulmonary effort is normal. No respiratory distress.      Breath sounds: Rhonchi present.   Abdominal:      General: Abdomen is flat. Bowel sounds are normal. There is no distension.      Tenderness: There is no abdominal tenderness.   Musculoskeletal:      Comments: RLE wound vac in place  R>L pitting edema (R at 3+ and L at 1+)  Wound vac site clean and dry, no signs of edema or erythema around the area.   Skin:     General: Skin is warm and dry.   Neurological:      General: No focal deficit present.      Mental Status: She is alert and oriented to person, place, and time.   Psychiatric:         Mood and Affect: Mood normal.         Behavior: Behavior normal.                Significant Labs: All pertinent labs within the past 24 hours have been reviewed.    Significant Imaging: I have reviewed all pertinent imaging results/findings within the past 24 hours.  Assessment/Plan:     * Acute on chronic congestive heart failure  Patient has  diastolic  heart failure that is Acute on chronic. On presentation their CHF was decompensated. Evidence of decompensated  CHF on presentation includes: edema, crackles on lung auscultation, dyspnea on exertion (SMITH), and shortness of breath. The etiology of their decompensation is likely increased fluid intake. Most recent BNP and echo results are listed below.  Recent Labs     03/20/25  1021   BNP 1,625*     Latest ECHO  Results for orders placed during the hospital encounter of 06/20/24    Echo    Interpretation Summary    Left Ventricle: The left ventricle is normal in size. Normal wall thickness. There is concentric hypertrophy. There is normal systolic function with a visually estimated ejection fraction of 65 - 70%. Unable to assess diastolic function due to atrial fibrillation.    Right Ventricle: Normal right ventricular cavity size. Wall thickness is normal. Systolic function is normal.    Left Atrium: Left atrium is severely dilated.    Right Atrium: Right atrium is severely dilated.    Mitral Valve: There is moderate posterior leaflet sclerosis. There is no stenosis. The mean pressure gradient across the mitral valve is 1 mmHg at a heart rate of 60 bpm. There is moderate regurgitation.    Tricuspid Valve: There is moderate to severe regurgitation.    Pulmonic Valve: There is no stenosis.    Pulmonary Artery: There is moderate pulmonary hypertension. The estimated pulmonary artery systolic pressure is 60 mmHg.    IVC/SVC: Intermediate venous pressure at 8 mmHg.    Current Heart Failure Medications  hydroCHLOROthiazide tablet 12.5 mg, Daily, Oral  furosemide injection 40 mg, Every 12 hours, Intravenous    Plan  - Monitor strict I&Os and daily weights.    - Place on telemetry  - Low sodium diet  - Place on fluid restriction of 1.5 L.   - Cardiology has not been consulted  - The patient's volume status is  increased  - BID lasix  - patient was confused on exam, but did say she was drinking more water to help with digestion. Unclear if accurate or not but would explain presentation  - could also need increased bumex on discharge  with sliding scale diuretics to prevent relapse  - echo pending, cards consult pending echo results     Acute hypoxic respiratory failure  Patient with Hypoxic Respiratory failure which is Acute.  she is not on home oxygen. Supplemental oxygen was provided and noted-      .   Signs/symptoms of respiratory failure include- increased work of breathing, respiratory distress, and use of accessory muscles. Contributing diagnoses includes - CHF and Pneumonia Labs and images were reviewed. Patient Has not had a recent ABG. Will treat underlying causes and adjust management of respiratory failure as follows-     - 2/2 pna vs chf  - titrate oxygen as tolerated     Leukocytosis  Likely reactive in the setting of CHF vs pna  - will treat with abx as ordered in the ED  - MRSA nasal swab ordered  - continue zosyn  - vancomycin pending nasal swab  - blood and urine cultures pending  - will add doxy for atypical coverage, due to prolonged QTC  Respiratory culture ordered      Hyponatremia  Hyponatremia is likely due to Heart Failure. The patient's most recent sodium results are listed below.  Recent Labs     03/20/25  0427 03/20/25  1021   * 135*     Plan  - Correct the sodium by 4-6mEq in 24 hours.   - Obtain the following studies: TSH, T4.  - Will treat the hyponatremia with Fluid restriction of:  1.5 liter per day  - Monitor sodium Daily.   - Patient hyponatremia is stable  - acute on chronic in the setting of CHF exacerbation     Periprosthetic fracture of shaft of right femur s/p ORIF on 3/8/2025    H/o with recent surgery  - wound vac in place. Orthopedics planning to remove in the next day or two      Pulmonary hypertension  H/o on prior echo  Get repeat echo to see if still present   - diuresis for CHF already in ordered      Aortic stenosis  The patient's most recent echocardiogram result is listed below.     Echo  Result Date: 6/20/2024    Left Ventricle: The left ventricle is normal in size. Normal wall    thickness. There is concentric hypertrophy. There is normal systolic   function with a visually estimated ejection fraction of 65 - 70%. Unable   to assess diastolic function due to atrial fibrillation.    Right Ventricle: Normal right ventricular cavity size. Wall thickness   is normal. Systolic function is normal.    Left Atrium: Left atrium is severely dilated.    Right Atrium: Right atrium is severely dilated.    Mitral Valve: There is moderate posterior leaflet sclerosis. There is   no stenosis. The mean pressure gradient across the mitral valve is 1 mmHg   at a heart rate of 60 bpm. There is moderate regurgitation.    Tricuspid Valve: There is moderate to severe regurgitation.    Pulmonic Valve: There is no stenosis.    Pulmonary Artery: There is moderate pulmonary hypertension. The   estimated pulmonary artery systolic pressure is 60 mmHg.    IVC/SVC: Intermediate venous pressure at 8 mmHg.         Will get repeat echo    Paroxysmal atrial fibrillation  Patient has persistent (7 days or more) atrial fibrillation. Patient is currently in sinus rhythm. [id number] Score: 4. The patients heart rate in the last 24 hours is as follows:  Pulse  Min: 60  Max: 74     Antiarrhythmics       Anticoagulants  apixaban tablet 5 mg, 2 times daily, Oral    Plan  - Replete lytes with a goal of K>4, Mg >2  - Patient is anticoagulated, see medications listed above.  - Patient's afib is currently controlled  - holding BB due to CHF exacerbation. Likely to restart in the next day or two    Cardiac pacemaker  H/o  Paced rhythm on telemetry currently    Acquired hypothyroidism  Continue with synthroid at current dose  - recheck TSH and T4, last check done in 05/2024      Essential hypertension  Patient's blood pressure range in the last 24 hours was: BP  Min: 113/54  Max: 160/82.The patient's inpatient anti-hypertensive regimen is listed below:  Current Antihypertensives  amLODIPine tablet 5 mg, Daily, Oral  hydroCHLOROthiazide  tablet 12.5 mg, Daily, Oral  furosemide injection 40 mg, Every 12 hours, Intravenous    Plan  - BP is controlled, no changes needed to their regimen      VTE Risk Mitigation (From admission, onward)           Ordered     apixaban tablet 5 mg  2 times daily         03/20/25 1506                                    Esteban Jimenez DO  Department of Hospital Medicine  [facility] - Emergency Dept

## 2025-03-20 NOTE — PLAN OF CARE
Skilled Nursing IDT Note  Date: 3/19/25       Patient Name:  Dorothy M Knobloch       Medical Record Number: [id number]   YOB: 1932  Sex: Female          Room/Bed:  TLNF450/XVGT068 A  Payor Info:  Payor: [employer] [facility] / Plan: [employer] CHOICES / Product Type: Medicare Advantage /      Admitting Diagnosis:   Periprosthetic fracture around other internal prosthetic joint, initial encounter [M97.8XXA]  Nannette-prosthetic fracture of shaft of femur [M97.8XXA, Z96.649]   Admit Date/Time:  3/13/2025  7:22 PM    Primary Diagnosis:  Periprosthetic fracture of shaft of femur  Principal Problem: Periprosthetic fracture of shaft of femur    Patient Active Problem List    Diagnosis Date Noted    Hyponatremia 03/14/2025    Moderate malnutrition 03/14/2025    Periprosthetic fracture of shaft of right femur s/p ORIF on 3/8/2025 03/07/2025    Acute blood loss anemia 03/07/2025    On anticoagulant therapy 06/13/2024    Age-related physical debility 04/05/2024    Aortic stenosis 04/05/2024    Pulmonary hypertension 04/05/2024    Chronic diastolic congestive heart failure 11/24/2019    AV block, 3rd degree 03/19/2019    Paroxysmal atrial fibrillation 09/11/2018    Cardiac pacemaker 08/22/2018    Status post cataract extraction and insertion of intraocular lens 08/10/2018    Blindness and low vision 08/10/2018    Dry eye syndrome of both eyes 08/10/2018    Nuclear sclerotic cataract of left eye 08/10/2018    Nonexudative age-related macular degeneration, bilateral, intermediate dry stage 07/12/2018    Neovascular glaucoma, 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    Iris neovascularization, left 03/27/2018    Bilateral carotid artery stenosis     CRAO (central retinal artery occlusion), left 01/29/2018    Meningioma 01/29/2018    Takotsubo cardiomyopathy 11/17/2016    Carotid artery disease 08/10/2016    Constipation - functional 08/05/2015    History of TIA (transient ischemic attack) 12/13/2013    Essential hypertension  12/13/2013    Pure hypercholesterolemia 12/13/2013    Acquired hypothyroidism 12/13/2013    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 12/13/2013    Posterior vitreous detachment, both eyes 08/27/2013       Team Members Present: Je Lamb, RN Charge Nurse, Cierra Boyer LPN, , Rosanna Alcantara, PT, Rehab, and Rosenda Gong, Activities    Patient/Family Present: Patient and son, Chaparro                                                Issues/Consults: None    Caregiver at Discharge: Facility staff    Living Setting at Discharge:  Pt lives in [facility] with     Anticipated Discharge Date:  4/3/25    Supervision Recommended at Discharge: 24 hours significant assistance    Follow-up Services:   Home Health  physical therapy and occupational therapy     Preferred Home Health: [facility]    Preferred Pharmacy: [facility]

## 2025-03-20 NOTE — ASSESSMENT & PLAN NOTE
H/o with recent surgery  - wound vac in place. Orthopedics planning to remove in the next day or two

## 2025-03-20 NOTE — SUBJECTIVE & OBJECTIVE
Past Medical History:   Diagnosis Date    Acquired hypothyroidism 12/13/2013    Age-related physical debility 04/05/2024    Antiplatelet or antithrombotic long-term use     AV block, 3rd degree 03/19/2019    Bilateral nonexudative age-related macular degeneration 08/27/2013    Blindness and low vision 08/10/2018    Blood transfusion     Cardiac pacemaker 08/22/2018    Carotid artery disease 08/10/2016    Chronic diastolic congestive heart failure 11/24/2019    CRAO (central retinal artery occlusion), left 01/29/2018    Dry eye syndrome of both eyes 08/10/2018    Essential hypertension 12/13/2013    History of TIA (transient ischemic attack) 12/13/2013    Nonexudative age-related macular degeneration, bilateral, intermediate dry stage 07/12/2018    Nuclear sclerotic cataract of left eye 08/10/2018    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 12/13/2013    On anticoagulant therapy 06/13/2024    Osteoarthritis     Paroxysmal atrial fibrillation 09/11/2018    Pulmonary hypertension 04/05/2024    Pure hypercholesterolemia 12/13/2013    Sick sinus syndrome     Stroke     Takotsubo cardiomyopathy 11/17/2016       Past Surgical History:   Procedure Laterality Date    CARDIAC PACEMAKER PLACEMENT      [employer] Device Model: Juli ALMANZAR [id number] Device Type: PACEMAKER Device S\N: [id number]    CARPAL TUNNEL RELEASE Bilateral 1991    CATARACT EXTRACTION Right     JOINT REPLACEMENT      ORIF FEMUR FRACTURE Right 3/8/2025    Procedure: OPEN REDUCTION INTERNAL FIXATION FEMORAL SHAFT FRACTURE;  Surgeon: Albino William MD;  Location: [facility] OR [address] [place] [address];  Service: Orthopedics;  Laterality: Right;    NH TRANSCRAN DOPP INTRACRAN, EMBOLI W/O INJ  01/29/2018         SKIN BIOPSY      TONSILLECTOMY      TOTAL HIP ARTHROPLASTY  11/01/2000    right/[facility]       Review of patient's allergies indicates:   Allergen Reactions    Aspartame Swelling     equal       Current Facility-Administered Medications  on File Prior to Encounter   Medication    [ - Suspended Admission] acetaminophen tablet 650 mg    [ - Suspended Admission] acetaminophen tablet 650 mg    [ - Suspended Admission] albuterol-ipratropium 2.5 mg-0.5 mg/3 mL nebulizer solution 3 mL    [ - Suspended Admission] amLODIPine tablet 5 mg    [ - Suspended Admission] apixaban tablet 5 mg    [ - Suspended Admission] brimonidine 0.15 % OPTH DROP ophthalmic solution 1 drop    [ - Suspended Admission] bumetanide tablet 0.5 mg    [ - Suspended Admission] calcium carbonate 200 mg calcium (500 mg) chewable tablet 500 mg    [ - Suspended Admission] clopidogreL tablet 75 mg    [ - Suspended Admission] EScitalopram oxalate tablet 20 mg    [ - Suspended Admission] gabapentin capsule 100 mg    [ - Suspended Admission] hydroCHLOROthiazide tablet 12.5 mg    [ - Suspended Admission] levothyroxine tablet 88 mcg    [ - Suspended Admission] methocarbamoL tablet 500 mg    [ - Suspended Admission] metoprolol succinate (TOPROL-XL) 24 hr tablet 25 mg    [ - Suspended Admission] metoprolol succinate (TOPROL-XL) 24 hr tablet 50 mg    [ - Suspended Admission] multivitamin tablet    [ - Suspended Admission] ondansetron disintegrating tablet 4 mg    [ - Suspended Admission] polyethylene glycol packet 17 g    [ - Suspended Admission] senna-docusate 8.6-50 mg per tablet 2 tablet    [ - Suspended Admission] timolol maleate 0.5% ophthalmic solution 1 drop    [ - Suspended Admission] trazodone split tablet 25 mg    [DISCONTINUED] acetaminophen tablet 650 mg    [DISCONTINUED] melatonin tablet 6 mg    [DISCONTINUED] traMADoL tablet 50 mg     Current Outpatient Medications on File Prior to Encounter   Medication Sig    amLODIPine (NORVASC) 5 MG tablet Take 1 tablet (5 mg total) by mouth once daily.    apixaban (ELIQUIS) 5 mg Tab Take 1 tablet (5 mg total) by mouth 2 (two) times daily. Patient to decrease dosing of Apixiban to 2.5 mg po twice daily for 1 week after surgery done on 3/8/2025 and  then can resume back to her home dosing of 5 mg po BID on 315/2025.    bumetanide (BUMEX) 0.5 MG Tab Take 1 tablet by mouth once daily.    clopidogreL (PLAVIX) 75 mg tablet Take 1 tablet (75 mg total) by mouth once daily.    EScitalopram oxalate (LEXAPRO) 20 MG tablet Take 1 tablet (20 mg total) by mouth once daily.    hydroCHLOROthiazide 12.5 MG Tab TAKE 1 TABLET BY MOUTH EVERY DAY    levothyroxine (SYNTHROID) 88 MCG tablet Take 88 mcg by mouth before breakfast.     metoprolol succinate (TOPROL-XL) 25 MG 24 hr tablet TAKE 2 TABLETS IN AM AND 1 TABLET IN PM    brimonidine-timoloL (COMBIGAN) 0.2-0.5 % Drop Place 1 drop into the left eye 2 (two) times a day.    erythromycin (ROMYCIN) ophthalmic ointment APPLY 1 INCH TO THE BOTTOM LID OF LEFT EYE FOR FIRST WEEK OF EVERY MONTH    melatonin (MELATIN) 3 mg tablet Take 2 tablets (6 mg total) by mouth nightly as needed for Insomnia.    methocarbamoL (ROBAXIN) 500 MG Tab Take 1 tablet (500 mg total) by mouth every 8 (eight) hours.    NITROSTAT 0.4 mg SL tablet Place 0.4 mg under the tongue every 5 (five) minutes as needed for Chest pain.    senna-docusate 8.6-50 mg (PERICOLACE) 8.6-50 mg per tablet Take 1 tablet by mouth 2 (two) times daily.    traMADoL (ULTRAM) 50 mg tablet Take 1 tablet (50 mg total) by mouth every 6 (six) hours as needed (Moderate to severe pain).     Family History       Problem Relation (Age of Onset)    Arthritis Sister, Maternal Grandmother, Paternal Grandmother    Birth defects Son    Cancer Paternal Aunt (80)    Diabetes Paternal Aunt, Paternal Uncle    Early death Mother, Father    Hearing loss Daughter    Hypertension Son, Paternal Grandmother          Tobacco Use    Smoking status: Never     Passive exposure: Never    Smokeless tobacco: Never   Substance and Sexual Activity    Alcohol use: No     Alcohol/week: 0.0 standard drinks of alcohol    Drug use: No    Sexual activity: Not on file     Review of Systems   Unable to perform ROS: Mental  status change     Objective:     Vital Signs (Most Recent):  Temp: 97.5 °F (36.4 °C) (03/20/25 0859)  Pulse: 66 (03/20/25 1400)  Resp: 20 (03/20/25 1400)  BP: (!) 113/54 (03/20/25 1400)  SpO2: 96 % (03/20/25 1400) Vital Signs (24h Range):  Temp:  [97.5 °F (36.4 °C)-98.2 °F (36.8 °C)] 97.5 °F (36.4 °C)  Pulse:  [60-74] 66  Resp:  [16-29] 20  SpO2:  [92 %-100 %] 96 %  BP: (113-160)/() 113/54     Weight: 70.3 kg (155 lb)  Body mass index is 28.35 kg/m².     Physical Exam  Constitutional:       General: She is not in acute distress.  HENT:      Head: Normocephalic and atraumatic.   Cardiovascular:      Rate and Rhythm: Normal rate and regular rhythm.      Pulses: Normal pulses.      Heart sounds: No murmur heard.     Comments: Paced rhythm on monitor   Pulmonary:      Effort: Pulmonary effort is normal. No respiratory distress.      Breath sounds: Rhonchi present.   Abdominal:      General: Abdomen is flat. Bowel sounds are normal. There is no distension.      Tenderness: There is no abdominal tenderness.   Musculoskeletal:      Comments: RLE wound vac in place  R>L pitting edema (R at 3+ and L at 1+)  Wound vac site clean and dry, no signs of edema or erythema around the area.   Skin:     General: Skin is warm and dry.   Neurological:      General: No focal deficit present.      Mental Status: She is alert and oriented to person, place, and time.   Psychiatric:         Mood and Affect: Mood normal.         Behavior: Behavior normal.                Significant Labs: All pertinent labs within the past 24 hours have been reviewed.    Significant Imaging: I have reviewed all pertinent imaging results/findings within the past 24 hours.

## 2025-03-20 NOTE — ASSESSMENT & PLAN NOTE
Patient has  diastolic  heart failure that is Acute on chronic. On presentation their CHF was decompensated. Evidence of decompensated CHF on presentation includes: edema, crackles on lung auscultation, dyspnea on exertion (SMITH), and shortness of breath. The etiology of their decompensation is likely increased fluid intake. Most recent BNP and echo results are listed below.  Recent Labs     03/20/25  1021   BNP 1,625*     Latest ECHO  Results for orders placed during the hospital encounter of 06/20/24    Echo    Interpretation Summary    Left Ventricle: The left ventricle is normal in size. Normal wall thickness. There is concentric hypertrophy. There is normal systolic function with a visually estimated ejection fraction of 65 - 70%. Unable to assess diastolic function due to atrial fibrillation.    Right Ventricle: Normal right ventricular cavity size. Wall thickness is normal. Systolic function is normal.    Left Atrium: Left atrium is severely dilated.    Right Atrium: Right atrium is severely dilated.    Mitral Valve: There is moderate posterior leaflet sclerosis. There is no stenosis. The mean pressure gradient across the mitral valve is 1 mmHg at a heart rate of 60 bpm. There is moderate regurgitation.    Tricuspid Valve: There is moderate to severe regurgitation.    Pulmonic Valve: There is no stenosis.    Pulmonary Artery: There is moderate pulmonary hypertension. The estimated pulmonary artery systolic pressure is 60 mmHg.    IVC/SVC: Intermediate venous pressure at 8 mmHg.    Current Heart Failure Medications  hydroCHLOROthiazide tablet 12.5 mg, Daily, Oral  furosemide injection 40 mg, Every 12 hours, Intravenous    Plan  - Monitor strict I&Os and daily weights.    - Place on telemetry  - Low sodium diet  - Place on fluid restriction of 1.5 L.   - Cardiology has not been consulted  - The patient's volume status is  increased  - BID lasix  - patient was confused on exam, but did say she was drinking more  water to help with digestion. Unclear if accurate or not but would explain presentation  - could also need increased bumex on discharge with sliding scale diuretics to prevent relapse  - echo pending, cards consult pending echo results    Detail Level: Generalized

## 2025-03-20 NOTE — ASSESSMENT & PLAN NOTE
H/o on prior echo  Get repeat echo to see if still present   - diuresis for CHF already in ordered

## 2025-03-21 PROBLEM — D64.9 ANEMIA: Status: ACTIVE | Noted: 2025-03-21

## 2025-03-21 PROBLEM — E87.6 HYPOKALEMIA: Status: ACTIVE | Noted: 2025-03-21

## 2025-03-21 LAB
ANION GAP SERPL CALC-SCNC: 12 MMOL/L (ref 8–16)
ASCENDING AORTA: 2.62 CM
AV AREA BY CONTINUOUS VTI: 0.8 CM2
AV INDEX (PROSTH): 0.27
AV LVOT MEAN GRADIENT: 2 MMHG
AV LVOT PEAK GRADIENT: 3 MMHG
AV MEAN GRADIENT: 25 MMHG
AV PEAK GRADIENT: 46 MMHG
AV VALVE AREA BY VELOCITY RATIO: 0.8 CM²
AV VALVE AREA: 0.8 CM2
AV VELOCITY RATIO: 0.26
BASOPHILS # BLD AUTO: 0.08 K/UL (ref 0–0.2)
BASOPHILS NFR BLD: 0.7 % (ref 0–1.9)
BSA FOR ECHO PROCEDURE: 1.75 M2
BUN SERPL-MCNC: 31 MG/DL (ref 10–30)
CALCIUM SERPL-MCNC: 9.5 MG/DL (ref 8.7–10.5)
CHLORIDE SERPL-SCNC: 90 MMOL/L (ref 95–110)
CO2 SERPL-SCNC: 33 MMOL/L (ref 23–29)
CREAT SERPL-MCNC: 0.6 MG/DL (ref 0.5–1.4)
CV ECHO LV RWT: 0.47 CM
DIFFERENTIAL METHOD BLD: ABNORMAL
DOP CALC AO PEAK VEL: 3.4 M/S
DOP CALC AO VTI: 72.8 CM
DOP CALC LVOT AREA: 3.1 CM2
DOP CALC LVOT DIAMETER: 2 CM
DOP CALC LVOT PEAK VEL: 0.9 M/S
DOP CALC LVOT STROKE VOLUME: 60.6 CM3
DOP CALCLVOT PEAK VEL VTI: 19.3 CM
E WAVE DECELERATION TIME: 214 MS
E/A RATIO: 4.25
E/E' RATIO: 23 M/S
ECHO EF ESTIMATED: 57 %
ECHO LV POSTERIOR WALL: 1 CM (ref 0.6–1.1)
EOSINOPHIL # BLD AUTO: 0.1 K/UL (ref 0–0.5)
EOSINOPHIL NFR BLD: 0.6 % (ref 0–8)
ERYTHROCYTE [DISTWIDTH] IN BLOOD BY AUTOMATED COUNT: 14.7 % (ref 11.5–14.5)
EST. GFR  (NO RACE VARIABLE): >60 ML/MIN/1.73 M^2
FRACTIONAL SHORTENING: 30.2 % (ref 28–44)
GLUCOSE SERPL-MCNC: 121 MG/DL (ref 70–110)
HCT VFR BLD AUTO: 25.2 % (ref 37–48.5)
HGB BLD-MCNC: 8.2 G/DL (ref 12–16)
IMM GRANULOCYTES # BLD AUTO: 0.09 K/UL (ref 0–0.04)
IMM GRANULOCYTES NFR BLD AUTO: 0.8 % (ref 0–0.5)
INTERVENTRICULAR SEPTUM: 1.2 CM (ref 0.6–1.1)
LA MAJOR: 6.6 CM
LA MINOR: 6.4 CM
LA WIDTH: 5.2 CM
LEFT ATRIUM SIZE: 5 CM
LEFT ATRIUM VOLUME INDEX MOD: 77 ML/M2
LEFT ATRIUM VOLUME INDEX: 84 ML/M2
LEFT ATRIUM VOLUME MOD: 131 ML
LEFT ATRIUM VOLUME: 144 CM3
LEFT INTERNAL DIMENSION IN SYSTOLE: 3 CM (ref 2.1–4)
LEFT VENTRICLE DIASTOLIC VOLUME INDEX: 48.54 ML/M2
LEFT VENTRICLE DIASTOLIC VOLUME: 83 ML
LEFT VENTRICLE MASS INDEX: 95.3 G/M2
LEFT VENTRICLE SYSTOLIC VOLUME INDEX: 21.1 ML/M2
LEFT VENTRICLE SYSTOLIC VOLUME: 36 ML
LEFT VENTRICULAR INTERNAL DIMENSION IN DIASTOLE: 4.3 CM (ref 3.5–6)
LEFT VENTRICULAR MASS: 162.9 G
LV LATERAL E/E' RATIO: 22.7
LV SEPTAL E/E' RATIO: 22.7
LYMPHOCYTES # BLD AUTO: 0.8 K/UL (ref 1–4.8)
LYMPHOCYTES NFR BLD: 6.6 % (ref 18–48)
MAGNESIUM SERPL-MCNC: 2.2 MG/DL (ref 1.6–2.6)
MCH RBC QN AUTO: 31.8 PG (ref 27–31)
MCHC RBC AUTO-ENTMCNC: 32.5 G/DL (ref 32–36)
MCV RBC AUTO: 98 FL (ref 82–98)
MONOCYTES # BLD AUTO: 0.8 K/UL (ref 0.3–1)
MONOCYTES NFR BLD: 6.7 % (ref 4–15)
MV MEAN GRADIENT: 2 MMHG
MV PEAK A VEL: 0.32 M/S
MV PEAK E VEL: 1.36 M/S
MV PEAK GRADIENT: 7 MMHG
NEUTROPHILS # BLD AUTO: 9.5 K/UL (ref 1.8–7.7)
NEUTROPHILS NFR BLD: 84.6 % (ref 38–73)
NRBC BLD-RTO: 0 /100 WBC
OHS CV RV/LV RATIO: 0.91 CM
OHS QRS DURATION: 184 MS
OHS QTC CALCULATION: 543 MS
PISA TR MAX VEL: 3.2 M/S
PLATELET # BLD AUTO: 491 K/UL (ref 150–450)
PMV BLD AUTO: 8.9 FL (ref 9.2–12.9)
POTASSIUM SERPL-SCNC: 3.1 MMOL/L (ref 3.5–5.1)
RA MAJOR: 6.02 CM
RA PRESSURE ESTIMATED: 3 MMHG
RA WIDTH: 4 CM
RBC # BLD AUTO: 2.58 M/UL (ref 4–5.4)
RIGHT VENTRICLE DIASTOLIC BASEL DIMENSION: 3.9 CM
RV TB RVSP: 6 MMHG
SINUS: 2.62 CM
SODIUM SERPL-SCNC: 135 MMOL/L (ref 136–145)
STJ: 2.04 CM
T4 FREE SERPL-MCNC: 0.83 NG/DL (ref 0.71–1.51)
T4 SERPL-MCNC: 4.1 UG/DL (ref 4.5–11.5)
TDI LATERAL: 0.06 M/S
TDI SEPTAL: 0.06 M/S
TDI: 0.06 M/S
TRICUSPID ANNULAR PLANE SYSTOLIC EXCURSION: 1.81 CM
TSH SERPL DL<=0.005 MIU/L-ACNC: 22.58 UIU/ML (ref 0.4–4)
TV PEAK GRADIENT: 40 MMHG
TV REST PULMONARY ARTERY PRESSURE: 44 MMHG
WBC # BLD AUTO: 11.29 K/UL (ref 3.9–12.7)
Z-SCORE OF LEFT VENTRICULAR DIMENSION IN END DIASTOLE: -0.99
Z-SCORE OF LEFT VENTRICULAR DIMENSION IN END SYSTOLE: 0.16

## 2025-03-21 PROCEDURE — 63600175 PHARM REV CODE 636 W HCPCS

## 2025-03-21 PROCEDURE — 18500000 HC LEAVE OF ABSENCE HOSPITAL SERVICES

## 2025-03-21 PROCEDURE — 94761 N-INVAS EAR/PLS OXIMETRY MLT: CPT

## 2025-03-21 PROCEDURE — 97165 OT EVAL LOW COMPLEX 30 MIN: CPT

## 2025-03-21 PROCEDURE — 85025 COMPLETE CBC W/AUTO DIFF WBC: CPT

## 2025-03-21 PROCEDURE — 36415 COLL VENOUS BLD VENIPUNCTURE: CPT

## 2025-03-21 PROCEDURE — 97116 GAIT TRAINING THERAPY: CPT

## 2025-03-21 PROCEDURE — 97535 SELF CARE MNGMENT TRAINING: CPT

## 2025-03-21 PROCEDURE — 97530 THERAPEUTIC ACTIVITIES: CPT

## 2025-03-21 PROCEDURE — 97162 PT EVAL MOD COMPLEX 30 MIN: CPT

## 2025-03-21 PROCEDURE — 83735 ASSAY OF MAGNESIUM: CPT

## 2025-03-21 PROCEDURE — 25000003 PHARM REV CODE 250: Performed by: INTERNAL MEDICINE

## 2025-03-21 PROCEDURE — 27000221 HC OXYGEN, UP TO 24 HOURS

## 2025-03-21 PROCEDURE — 94799 UNLISTED PULMONARY SVC/PX: CPT

## 2025-03-21 PROCEDURE — 99900035 HC TECH TIME PER 15 MIN (STAT)

## 2025-03-21 PROCEDURE — 21400001 HC TELEMETRY ROOM

## 2025-03-21 PROCEDURE — 84439 ASSAY OF FREE THYROXINE: CPT

## 2025-03-21 PROCEDURE — 25000003 PHARM REV CODE 250

## 2025-03-21 PROCEDURE — 80048 BASIC METABOLIC PNL TOTAL CA: CPT

## 2025-03-21 PROCEDURE — 84443 ASSAY THYROID STIM HORMONE: CPT

## 2025-03-21 PROCEDURE — 84436 ASSAY OF TOTAL THYROXINE: CPT

## 2025-03-21 RX ORDER — POTASSIUM CHLORIDE 20 MEQ/1
40 TABLET, EXTENDED RELEASE ORAL ONCE
Status: COMPLETED | OUTPATIENT
Start: 2025-03-21 | End: 2025-03-21

## 2025-03-21 RX ADMIN — PIPERACILLIN SODIUM AND TAZOBACTAM SODIUM 4.5 G: 4; .5 INJECTION, POWDER, FOR SOLUTION INTRAVENOUS at 06:03

## 2025-03-21 RX ADMIN — POTASSIUM CHLORIDE 40 MEQ: 1500 TABLET, EXTENDED RELEASE ORAL at 08:03

## 2025-03-21 RX ADMIN — HYDROCHLOROTHIAZIDE 12.5 MG: 12.5 TABLET ORAL at 08:03

## 2025-03-21 RX ADMIN — CLOPIDOGREL 75 MG: 75 TABLET ORAL at 08:03

## 2025-03-21 RX ADMIN — AMLODIPINE BESYLATE 5 MG: 5 TABLET ORAL at 08:03

## 2025-03-21 RX ADMIN — POLYETHYLENE GLYCOL 3350 17 G: 17 POWDER, FOR SOLUTION ORAL at 08:03

## 2025-03-21 RX ADMIN — DOXYCYCLINE HYCLATE 100 MG: 100 TABLET, COATED ORAL at 08:03

## 2025-03-21 RX ADMIN — LEVOTHYROXINE SODIUM 88 MCG: 0.05 TABLET ORAL at 06:03

## 2025-03-21 RX ADMIN — APIXABAN 5 MG: 5 TABLET, FILM COATED ORAL at 08:03

## 2025-03-21 RX ADMIN — APIXABAN 5 MG: 5 TABLET, FILM COATED ORAL at 09:03

## 2025-03-21 RX ADMIN — DOXYCYCLINE HYCLATE 100 MG: 100 TABLET, COATED ORAL at 09:03

## 2025-03-21 RX ADMIN — FUROSEMIDE 40 MG: 10 INJECTION, SOLUTION INTRAVENOUS at 08:03

## 2025-03-21 RX ADMIN — FUROSEMIDE 40 MG: 10 INJECTION, SOLUTION INTRAVENOUS at 09:03

## 2025-03-21 RX ADMIN — ESCITALOPRAM OXALATE 20 MG: 10 TABLET ORAL at 08:03

## 2025-03-21 RX ADMIN — PIPERACILLIN SODIUM AND TAZOBACTAM SODIUM 4.5 G: 4; .5 INJECTION, POWDER, FOR SOLUTION INTRAVENOUS at 10:03

## 2025-03-21 RX ADMIN — PIPERACILLIN SODIUM AND TAZOBACTAM SODIUM 4.5 G: 4; .5 INJECTION, POWDER, FOR SOLUTION INTRAVENOUS at 02:03

## 2025-03-21 NOTE — PROGRESS NOTES
[facility] - Internal Medicine [facility] Medicine  Progress Note    Patient Name: Dorothy M Knobloch  MRN: [id number]  Patient Class: IP- Inpatient   Admission Date: 3/20/2025  Length of Stay: 1 days  Attending Physician: Lake Tate MD  Primary Care Provider: Lola Wolff MD        Subjective     Principal Problem:Acute on chronic congestive heart failure        HPI:  89 y/o female with PAF on long term Apixiban at home for anticoagulation, HTN, chronic diastolic heart failure, chronic anemia, cardiac pacemaker due to sick sinus syndrome and hypothyroidism. She was recently admitted from 3/7 to 3/13 after fall at home and femur fracture for which she received operative management. She was discharged to SNF for wound care and PT/OT.     For the last few days she has had increasing sob and work of breathing, family also noticed increased lower extremity swelling. She was also having a change in mental status with hallucinations and confusion. These symptoms prompted her to be transferred to the ED for evaluation.     In the ED she was found to have an elevated BNP (much greater than prior values), cxr showed fluid accumulation. CTA PE showed no evidence of large PE, did show signs of volume overload and possible infection. She was started on lasix with improvement in oxygenation in the ED (non-re breather to 4L NC). Cultures were collected and patient started on vancomycin and zosyn for possible HCAP as well given her symptoms.       Patient pleasantly confused on exam. Son present in the room and said she is normally very sharp mentally. He is not aware of most of her medical history including past medications.     Overview/Hospital Course:  3/21: Echo unremarkable. Intermittent confusion noted. Continue Lasix and Zosyn/Doxy    Interval History: Pt with intermittent confusion/agitation overnight. Appears stable on exam today. Family at bedside and questions answered.     Review of Systems    Constitutional:  Positive for fatigue. Negative for chills and fever.   HENT:  Negative for congestion, trouble swallowing and voice change.    Eyes:  Negative for pain and visual disturbance.   Respiratory:  Positive for shortness of breath. Negative for cough.    Cardiovascular:  Positive for leg swelling. Negative for chest pain.   Gastrointestinal:  Negative for abdominal pain, diarrhea, nausea and vomiting.   Musculoskeletal:  Negative for arthralgias.   Skin:  Negative for pallor and rash.   Neurological:  Positive for weakness. Negative for dizziness, light-headedness and headaches.   Psychiatric/Behavioral:  Positive for agitation and confusion.      Objective:     Vital Signs (Most Recent):  Temp: 97.7 °F (36.5 °C) (03/21/25 1159)  Pulse: 75 (03/21/25 1513)  Resp: 17 (03/21/25 1159)  BP: (!) 148/61 (03/21/25 1159)  SpO2: 96 % (03/21/25 1159) Vital Signs (24h Range):  Temp:  [97.4 °F (36.3 °C)-98.1 °F (36.7 °C)] 97.7 °F (36.5 °C)  Pulse:  [60-79] 75  Resp:  [17-20] 17  SpO2:  [93 %-97 %] 96 %  BP: ()/(58-72) 148/61     Weight: 70 kg (154 lb 5.2 oz)  Body mass index is 28.23 kg/m².    Intake/Output Summary (Last 24 hours) at 3/21/2025 1514  Last data filed at 3/20/2025 1709  Gross per 24 hour   Intake --   Output 500 ml   Net -500 ml      Physical Exam  Vitals and nursing note reviewed.   Constitutional:       General: She is not in acute distress.  HENT:      Head: Normocephalic and atraumatic.      Nose: Nose normal.      Mouth/Throat:      Mouth: Mucous membranes are moist.   Eyes:      General: No scleral icterus.     Extraocular Movements: Extraocular movements intact.   Cardiovascular:      Rate and Rhythm: Normal rate and regular rhythm.      Pulses: Normal pulses.      Heart sounds: No murmur heard.     No friction rub. No gallop.   Pulmonary:      Effort: Pulmonary effort is normal.      Breath sounds: No wheezing, rhonchi or rales.   Abdominal:      General: There is no distension.       "Palpations: Abdomen is soft.      Tenderness: There is no abdominal tenderness. There is no guarding or rebound.   Musculoskeletal:         General: Normal range of motion.      Cervical back: Neck supple. No rigidity.      Right lower leg: Edema present.      Left lower leg: Edema present.   Skin:     General: Skin is warm and dry.      Capillary Refill: Capillary refill takes less than 2 seconds.      Findings: No lesion (RLE wound w/ wound vac in place).   Neurological:      General: No focal deficit present.      Mental Status: She is alert.   Psychiatric:         Mood and Affect: Mood normal.         MELD 3.0: 12 at 3/9/2025  3:41 AM  MELD-Na: 7 at 3/9/2025  3:41 AM  Calculated from:  Serum Creatinine: 0.9 mg/dL (Using min of 1 mg/dL) at 3/9/2025  3:41 AM  Serum Sodium: 133 mmol/L at 3/9/2025  3:41 AM  Total Bilirubin: 0.4 mg/dL (Using min of 1 mg/dL) at 3/9/2025  3:41 AM  Serum Albumin: 3 g/dL at 3/9/2025  3:41 AM  INR(ratio): 1.1 at 3/7/2025  1:24 PM  Age at listing (hypothetical): 89 years  Sex: Female at 3/9/2025  3:41 AM      Significant Labs:  CBC:  Recent Labs   Lab 03/20/25 0427 03/20/25  1021 03/21/25  0549   WBC 14.23* 15.36* 11.29   HGB 8.5* 9.2* 8.2*   HCT 26.1* 29.0* 25.2*   * 550* 491*     CMP:  Recent Labs   Lab 03/20/25  0427 03/20/25  1021 03/21/25  0549   * 135* 135*   K 3.9 3.6 3.1*   CL 92* 92* 90*   CO2 27 32* 33*   * 130* 121*   BUN 36* 34* 31*   CREATININE 0.6 0.6 0.6   CALCIUM 9.4 9.8 9.5   PROT  --  6.7  --    ALBUMIN  --  2.9*  --    BILITOT  --  1.3*  --    ALKPHOS  --  85  --    AST  --  27  --    ALT  --  17  --    ANIONGAP 13 11 12     PTINR:  No results for input(s): "INR" in the last 48 hours.    Significant Procedures:   Dobutamine Stress Test with Color Flow: No results found for this or any previous visit.    None      Assessment & Plan  Acute on chronic congestive heart failure  Patient has  diastolic  heart failure that is Acute on chronic. On " presentation their CHF was decompensated. Evidence of decompensated CHF on presentation includes: edema, crackles on lung auscultation, dyspnea on exertion (SMITH), and shortness of breath. The etiology of their decompensation is likely increased fluid intake. Most recent BNP and echo results are listed below.  Recent Labs     03/20/25  1021   BNP 1,625*     Latest ECHO  Results for orders placed during the hospital encounter of 06/20/24    Echo    Interpretation Summary    Left Ventricle: The left ventricle is normal in size. Normal wall thickness. There is concentric hypertrophy. There is normal systolic function with a visually estimated ejection fraction of 65 - 70%. Unable to assess diastolic function due to atrial fibrillation.    Right Ventricle: Normal right ventricular cavity size. Wall thickness is normal. Systolic function is normal.    Left Atrium: Left atrium is severely dilated.    Right Atrium: Right atrium is severely dilated.    Mitral Valve: There is moderate posterior leaflet sclerosis. There is no stenosis. The mean pressure gradient across the mitral valve is 1 mmHg at a heart rate of 60 bpm. There is moderate regurgitation.    Tricuspid Valve: There is moderate to severe regurgitation.    Pulmonic Valve: There is no stenosis.    Pulmonary Artery: There is moderate pulmonary hypertension. The estimated pulmonary artery systolic pressure is 60 mmHg.    IVC/SVC: Intermediate venous pressure at 8 mmHg.    Current Heart Failure Medications  hydroCHLOROthiazide tablet 12.5 mg, Daily, Oral  furosemide injection 40 mg, Every 12 hours, Intravenous    Plan  - Monitor strict I&Os and daily weights.    - Place on telemetry  - Low sodium diet  - Place on fluid restriction of 1.5 L.   - Cardiology has not been consulted  - The patient's volume status is  increased  - BID lasix  - patient was confused on exam, but did say she was drinking more water to help with digestion. Unclear if accurate or not but would  explain presentation  - could also need increased bumex on discharge with sliding scale diuretics to prevent relapse  - echo pending, cards consult pending echo results   Essential hypertension  Patient's blood pressure range in the last 24 hours was: BP  Min: 91/61  Max: 154/68.The patient's inpatient anti-hypertensive regimen is listed below:  Current Antihypertensives  amLODIPine tablet 5 mg, Daily, Oral  hydroCHLOROthiazide tablet 12.5 mg, Daily, Oral  furosemide injection 40 mg, Every 12 hours, Intravenous    Plan  - BP is controlled, no changes needed to their regimen  Acquired hypothyroidism  Continue with synthroid at current dose  - recheck TSH and T4, last check done in 05/2024    Cardiac pacemaker  H/o  Paced rhythm on telemetry currently  Paroxysmal atrial fibrillation  Patient has persistent (7 days or more) atrial fibrillation. Patient is currently in sinus rhythm. [id number] Score: 4. The patients heart rate in the last 24 hours is as follows:  Pulse  Min: 60  Max: 79     Antiarrhythmics       Anticoagulants  apixaban tablet 5 mg, 2 times daily, Oral    Plan  - Replete lytes with a goal of K>4, Mg >2  - Patient is anticoagulated, see medications listed above.  - Patient's afib is currently controlled  - holding BB due to CHF exacerbation. Likely to restart in the next day or two  Aortic stenosis  The patient's most recent echocardiogram result is listed below.     Echo  Result Date: 3/21/2025    Left Ventricle: The left ventricle is normal in size. Mildly increased   ventricular mass. Normal wall thickness. There is mild concentric   hypertrophy. Normal wall motion. There is normal systolic function with a   visually estimated ejection fraction of 65 - 70%. There is normal   diastolic function.    Right Ventricle: The right ventricle is normal in size. Wall thickness   is normal. Systolic function is normal. Pacemaker lead present in the   ventricle.    Left Atrium: Severely dilated    Right Atrium:  Right atrium is moderately dilated.    Aortic Valve: The aortic valve is a trileaflet valve. There is severe   aortic valve sclerosis. There is moderate annular calcification present.   Moderately restricted motion. There is moderate to severe low flow   stenosis. Aortic valve area by VTI is 0.8 cm2. Aortic valve peak velocity   is 3.4 m/s. Mean gradient is 25 mmHg. The dimensionless index is 0.27. AS   severity has increased compared to study 1/24    Mitral Valve: There is severe posterior mitral annular calcification.    Aorta: Aortic root is normal in size measuring 2.62 cm. Aortic root at   ST junction is normal. Ascending aorta is normal measuring 2.62 cm.    Pulmonary Artery: There is mild pulmonary hypertension. The estimated   pulmonary artery systolic pressure is 44 mmHg.    IVC/SVC: Normal venous pressure at 3 mmHg.    Pericardium: There is no pericardial effusion.        Echo  Result Date: 6/20/2024    Left Ventricle: The left ventricle is normal in size. Normal wall   thickness. There is concentric hypertrophy. There is normal systolic   function with a visually estimated ejection fraction of 65 - 70%. Unable   to assess diastolic function due to atrial fibrillation.    Right Ventricle: Normal right ventricular cavity size. Wall thickness   is normal. Systolic function is normal.    Left Atrium: Left atrium is severely dilated.    Right Atrium: Right atrium is severely dilated.    Mitral Valve: There is moderate posterior leaflet sclerosis. There is   no stenosis. The mean pressure gradient across the mitral valve is 1 mmHg   at a heart rate of 60 bpm. There is moderate regurgitation.    Tricuspid Valve: There is moderate to severe regurgitation.    Pulmonic Valve: There is no stenosis.    Pulmonary Artery: There is moderate pulmonary hypertension. The   estimated pulmonary artery systolic pressure is 60 mmHg.    IVC/SVC: Intermediate venous pressure at 8 mmHg.         Will get repeat echo  Pulmonary  hypertension  H/o on prior echo  Get repeat echo to see if still present   - diuresis for CHF already in ordered    Periprosthetic fracture of shaft of right femur s/p ORIF on 3/8/2025    H/o with recent surgery  - wound vac in place. Orthopedics planning to remove in the next day or two    Hyponatremia  Hyponatremia is likely due to Heart Failure. The patient's most recent sodium results are listed below.  Recent Labs     03/20/25  0427 03/20/25  1021 03/21/25  0549   * 135* 135*     Plan  - Correct the sodium by 4-6mEq in 24 hours.   - Obtain the following studies: TSH, T4.  - Will treat the hyponatremia with Fluid restriction of:  1.5 liter per day  - Monitor sodium Daily.   - Patient hyponatremia is stable  - acute on chronic in the setting of CHF exacerbation   Moderate protein-calorie malnutrition  Nutrition consulted. Most recent weight and BMI monitored-     Measurements:  Wt Readings from Last 1 Encounters:   03/21/25 70 kg (154 lb 5.2 oz)   Body mass index is 28.23 kg/m².    Patient has been screened and assessed by RD.    Malnutrition Type:  Context: chronic illness  Level: moderate    Malnutrition Characteristic Summary:  Energy Intake (Malnutrition): less than 75% for greater than or equal to 1 month  Subcutaneous Fat (Malnutrition): moderate depletion  Muscle Mass (Malnutrition): moderate depletion    Interventions/Recommendations (treatment strategy):  1. Continue low sodium diet 2. Add Boost plus TID if medically warranted 3. RD following    Leukocytosis  Likely reactive in the setting of CHF vs pna. Awaiting blood/respiratory cultures.   - Continue Zosyn/Doxycycline for now.  - blood and urine cultures pending    Acute hypoxic respiratory failure  Patient with Hypoxic Respiratory failure which is Acute.  she is not on home oxygen. Supplemental oxygen was provided and noted-      .   Signs/symptoms of respiratory failure include- increased work of breathing, respiratory distress, and use of  accessory muscles. Contributing diagnoses includes - CHF and Pneumonia Labs and images were reviewed. Patient Has not had a recent ABG. Will treat underlying causes and adjust management of respiratory failure as follows-     - 2/2 pna vs chf  - titrate oxygen as tolerated   Anemia  Anemia is likely due to chronic disease due to heart failure . Most recent hemoglobin and hematocrit are listed below.  Recent Labs     03/20/25  0427 03/20/25  1021 03/21/25  0549   HGB 8.5* 9.2* 8.2*   HCT 26.1* 29.0* 25.2*     Plan  - Monitor serial CBC: Daily  - Transfuse PRBC if patient becomes hemodynamically unstable, symptomatic or H/H drops below 7/21.  - Patient has not received any PRBC transfusions to date  - Patient's anemia is currently stable  -   Hypokalemia  Patient's most recent potassium results are listed below.   Recent Labs     03/20/25 0427 03/20/25  1021 03/21/25  0549   K 3.9 3.6 3.1*     Plan  - Replete potassium per protocol  - Monitor potassium Daily  - Patient's hypokalemia is worsening. Will start oral replacement    VTE Risk Mitigation (From admission, onward)           Ordered     apixaban tablet 5 mg  2 times daily         03/20/25 1509                    Discharge Planning   ISH: 3/24/2025     Code Status: Full Code   Medical Readiness for Discharge Date:                    Please place Justification for DME        Lake Tate MD  Department of Hospital Medicine   [facility] - Internal Medicine Telemetry

## 2025-03-21 NOTE — ASSESSMENT & PLAN NOTE
Hyponatremia is likely due to Heart Failure. The patient's most recent sodium results are listed below.  Recent Labs     03/20/25  0427 03/20/25  1021 03/21/25  0549   * 135* 135*     Plan  - Correct the sodium by 4-6mEq in 24 hours.   - Obtain the following studies: TSH, T4.  - Will treat the hyponatremia with Fluid restriction of:  1.5 liter per day  - Monitor sodium Daily.   - Patient hyponatremia is stable  - acute on chronic in the setting of CHF exacerbation

## 2025-03-21 NOTE — PLAN OF CARE
Problem: Occupational Therapy  Goal: Occupational Therapy Goal  Description: Goals to be met by: 4/20/25     Patient will increase functional independence with ADLs by performing:    UE Dressing with Stand-by Assistance.  LE Dressing with Minimal Assistance.  Grooming while standing at sink with Contact Guard Assistance.  Toileting from toilet with Minimal Assistance for hygiene and clothing management.   All functional transfers performed with CGA    Outcome: Progressing

## 2025-03-21 NOTE — PLAN OF CARE
[facility] - Internal Medicine Telemetry  Initial Discharge Assessment       Primary Care Provider: Lola Wolff MD    Admission Diagnosis: Shortness of breath [R06.02]  SOB (shortness of breath) [R06.02]  Abnormal EKG [R94.31]  Healthcare-associated pneumonia [J18.9]  Heart failure [I50.9]  Acute on chronic congestive heart failure, unspecified heart failure type [I50.9]    Admission Date: 3/20/2025  Expected Discharge Date: 3/24/2025    Transition of Care Barriers: (P) None    Payor: [employer] [facility] / Plan: [employer] CHO[employer] / Product Type: Medicare Advantage /     Extended Emergency Contact Information  Primary Emergency Contact: Gemma Solis  Address: [address] [place], [place] [id number] [place] [address]  Home Phone: [phone]  Mobile Phone: [phone]  Relation: Daughter   needed? No  Secondary Emergency Contact: Knobloch,Jim  Mobile Phone: [phone]  Relation: Son    Discharge Plan A: (P) Skilled Nursing Facility (Re-admit [facility])  Discharge Plan B: (P) Home Health, Assisted Living      [id number] - [place], [place] - [address]  [address] [facility] [address] [address]  Phone: [phone] Fax: [phone]    [facility] [place], [place] - [address]  [address] [address]  Phone: [phone] Fax: [phone]      Initial Assessment (most recent)       Adult Discharge Assessment - 03/21/25 1620          Discharge Assessment    Assessment Type Discharge Planning Assessment     Confirmed/corrected address, phone number and insurance Yes     Confirmed Demographics Correct on Facesheet     Source of Information family     If unable to respond/provide information was family/caregiver contacted? Yes (P)      Contact Name/Number KnoblochChaparro (son) [phone] (P)      Communicated ISH with patient/caregiver Yes (P)      People in Home facility resident (P)    Lives at [facility]  [facility]; recently admitted to [facility]    Facility Arrived From: [facility] (P)      Do you expect to return to your current living situation? Yes (P)      Prior to hospitilization cognitive status: Alert/Oriented (P)      Current cognitive status: Unable to Assess (P)      Walking or Climbing Stairs Difficulty yes (P)      Walking or Climbing Stairs ambulation difficulty, requires equipment (P)      Mobility Management walker (P)      Dressing/Bathing Difficulty yes (P)      Equipment Currently Used at Home walker, rolling (P)      Readmission within 30 days? Yes (P)      Patient currently being followed by outpatient case management? No (P)      Do you take prescription medications? Yes (P)      Do you have prescription coverage? Yes (P)      Coverage [employer] [facility] - (P)      Do you have any problems affording any of your prescribed medications? No (P)      Is the patient taking medications as prescribed? yes (P)      Who is going to help you get home at discharge? Knobloch,Jim (son) [phone] and daughter (P)      How do you get to doctors appointments? family or friend will provide (P)      Are you on dialysis? No (P)      Discharge Plan A Skilled Nursing Facility (P)    Re-admit OSNF    Discharge Plan B Home Health;Assisted Living (P)      DME Needed Upon Discharge  none (P)      Discharge Plan discussed with: Adult children (P)      Transition of Care Barriers None (P)         Financial Resource Strain    How hard is it for you to pay for the very basics like food, housing, medical care, and heating? Not hard at all (P)         Housing Stability    In the last 12 months, was there a time when you were not able to pay the mortgage or rent on time? No (P)      At any time in the past 12 months, were you homeless or living in a shelter (including now)? No (P)         Transportation Needs    In the past 12 months, has lack of transportation kept you from medical appointments or  "from getting medications? No (P)      In the past 12 months, has lack of transportation kept you from meetings, work, or from getting things needed for daily living? No (P)         Food Insecurity    Within the past 12 months, you worried that your food would run out before you got the money to buy more. Never true (P)      Within the past 12 months, the food you bought just didn't last and you didn't have money to get more. Never true (P)         Alcohol Use    Q1: How often do you have a drink containing alcohol? Never (P)      Q2: How many drinks containing alcohol do you have on a typical day when you are drinking? Patient does not drink (P)      Q3: How often do you have six or more drinks on one occasion? Never (P)         Health Literacy    How often do you need to have someone help you when you read instructions, pamphlets, or other written material from your doctor or pharmacy? Sometimes (P)                      Readmission Assessment (most recent)       Readmission Assessment - 03/21/25 1616          Readmission    Was this a planned readmission? No     Why were you hospitalized in the last 30 days? A fall     Why were you readmitted? New medical problem     When you left the hospital how did you feel? Per son Chaparro, "good enough, strong enough to go to the facility"     When you left the hospital where did you go? SNF     Did patient/caregiver refused recommended DC plan? No     Tell me about what happened between when you left the hospital and the day you returned. Dc'd to [facility], readmitted due to SOB                 Discharge Plan A and Plan B have been determined by review of patient's clinical status, future medical and therapeutic needs, and coverage/benefits for post-acute care in coordination with multidisciplinary team members.                   CASEY Young, LMSW  [facility]  Case Management  Ext. [phone]           "

## 2025-03-21 NOTE — CONSULTS
[facility] - Internal Medicine Telemetry  Adult Nutrition  Consult Note    SUMMARY     Recommendations    1. Continue low sodium diet- texture per SLP   2. Add Boost plus TID if medically warranted   3. RD following    Goals: Meet % of EEN/EPN by RD f/u date  Nutrition Goal Status: goal not met  Communication of RD Recs: POC    Nutrition Discharge Planning     Nutrition Discharge Planning: Too early to determine, pending clinical course    Assessment and Plan    Endocrine  Moderate protein-calorie malnutrition  Malnutrition Type:  Context: chronic illness  Level: moderate    Related to (etiology):   Decreased ability to consume sufficient energy 2/2 poor oral intake     Signs and Symptoms (as evidenced by):  Malnutrition Characteristic Summary:  Energy Intake (Malnutrition): less than 75% for greater than or equal to 1 month  Subcutaneous Fat (Malnutrition): moderate depletion  Muscle Mass (Malnutrition): moderate depletion      Interventions/Recommendations (treatment strategy):  1. Continue low sodium diet   2. Add Boost plus TID if medically warranted   3. RD following    Nutrition Diagnosis Status:   New          Malnutrition Assessment  Malnutrition Context: chronic illness  Malnutrition Level: moderate          Energy Intake (Malnutrition): less than 75% for greater than or equal to 1 month  Subcutaneous Fat (Malnutrition): moderate depletion  Muscle Mass (Malnutrition): moderate depletion   Upper Arm Region (Subcutaneous Fat Loss): moderate depletion  Thoracic and Lumbar Region: moderate depletion   Lutheran Region (Muscle Loss): moderate depletion  Clavicle Bone Region (Muscle Loss): moderate depletion                 Reason for Assessment    Reason For Assessment: consult  Diagnosis: other (see comments) (HF)  General Information Comments: RD consulted for HF education. Limited information acquired from pt per visit. Poor appetite PTA. Hx of difficulty chewing and noted missing teeth.- texture per SLP.  "NFPE completed, pt meets lindateira for moderate calorie malnutrition in the context of chronic illness per ASPEN guidelines. HF education provided. RD following. LBM noted-3/19  Nutrition Related Social Determinants of Health: SDOH: Unable to assess at this time.     Nutrition/Diet History    Spiritual, Cultural Beliefs, Mormon Practices, Values that Affect Care: no  Food Allergies: other (see comments) (aspartame)    Anthropometrics    Height: 5' 2" (157.5 cm)  Height (inches): 62 in  Height Method: Stated  Weight: 70.3 kg (155 lb)  Weight (lb): 155 lb  Weight Method: Stated  Ideal Body Weight (IBW), Female: 110 lb  % Ideal Body Weight, Female (lb): 140.91 %  BMI (Calculated): 28.3  BMI Grade: 25 - 29.9 - overweight       Lab/Procedures/Meds    Pertinent Labs Reviewed: reviewed  Pertinent Labs Comments: Sodium 135, Glucose 121  Pertinent Medications Reviewed: reviewed  Pertinent Medications Comments: -    Estimated/Assessed Needs    Weight Used For Calorie Calculations: 70.3 kg (154 lb 15.7 oz)  Energy Calorie Requirements (kcal): 1757  Energy Need Method: Kcal/kg (25 kcal/kg)  Protein Requirements: 84 g (1.2 g/kg)  Weight Used For Protein Calculations: 70.3 kg (154 lb 15.7 oz)  RDA Method (mL): 1757     Nutrition Prescription Ordered    Current Diet Order: Low sodium diet    Evaluation of Received Nutrient/Fluid Intake    I/O: -  Energy Calories Required: not meeting needs  Protein Required: not meeting needs  Fluid Required: not meeting needs  Total Fluid Intake (mL/kg): 1 ml or fluid per MD  Tolerance: tolerating  % Intake of Estimated Energy Needs: 0-50%  % Meal Intake: 0-50%    Nutrition Risk    Level of Risk/Frequency of Follow-up: low ((1-2x/week))       Monitor and Evaluation    Monitor and Evaluation: Energy intake, Food and beverage intake, Protein intake, Carbohydrate intake       Nutrition Follow-Up    RD Follow-up?: Yes    "

## 2025-03-21 NOTE — ASSESSMENT & PLAN NOTE
Malnutrition Type:  Context: chronic illness  Level: moderate    Related to (etiology):   Decreased ability to consume sufficient energy 2/2 poor oral intake     Signs and Symptoms (as evidenced by):  Malnutrition Characteristic Summary:  Energy Intake (Malnutrition): less than 75% for greater than or equal to 1 month  Subcutaneous Fat (Malnutrition): moderate depletion  Muscle Mass (Malnutrition): moderate depletion      Interventions/Recommendations (treatment strategy):  1. Continue low sodium diet   2. Add Boost plus TID if medically warranted   3. RD following    Nutrition Diagnosis Status:   New

## 2025-03-21 NOTE — PLAN OF CARE
Problem: Adult Inpatient Plan of Care  Goal: Plan of Care Review  Outcome: Progressing     Problem: Adult Inpatient Plan of Care  Goal: Patient-Specific Goal (Individualized)  Outcome: Progressing     Problem: Skin Injury Risk Increased  Goal: Skin Health and Integrity  Outcome: Progressing     Problem: Adult Inpatient Plan of Care  Goal: Absence of Hospital-Acquired Illness or Injury  Outcome: Progressing     Problem: Adult Inpatient Plan of Care  Goal: Optimal Comfort and Wellbeing  Outcome: Progressing

## 2025-03-21 NOTE — ASSESSMENT & PLAN NOTE
Patient's blood pressure range in the last 24 hours was: BP  Min: 91/61  Max: 154/68.The patient's inpatient anti-hypertensive regimen is listed below:  Current Antihypertensives  amLODIPine tablet 5 mg, Daily, Oral  hydroCHLOROthiazide tablet 12.5 mg, Daily, Oral  furosemide injection 40 mg, Every 12 hours, Intravenous    Plan  - BP is controlled, no changes needed to their regimen

## 2025-03-21 NOTE — ASSESSMENT & PLAN NOTE
Patient has persistent (7 days or more) atrial fibrillation. Patient is currently in sinus rhythm. [id number] Score: 4. The patients heart rate in the last 24 hours is as follows:  Pulse  Min: 60  Max: 79     Antiarrhythmics       Anticoagulants  apixaban tablet 5 mg, 2 times daily, Oral    Plan  - Replete lytes with a goal of K>4, Mg >2  - Patient is anticoagulated, see medications listed above.  - Patient's afib is currently controlled  - holding BB due to CHF exacerbation. Likely to restart in the next day or two

## 2025-03-21 NOTE — ASSESSMENT & PLAN NOTE
Patient's most recent potassium results are listed below.   Recent Labs     03/20/25  0427 03/20/25  1021 03/21/25  0549   K 3.9 3.6 3.1*     Plan  - Replete potassium per protocol  - Monitor potassium Daily  - Patient's hypokalemia is worsening. Will start oral replacement

## 2025-03-21 NOTE — SUBJECTIVE & OBJECTIVE
Interval History: Pt with intermittent confusion/agitation overnight. Appears stable on exam today. Family at bedside and questions answered.     Review of Systems   Constitutional:  Positive for fatigue. Negative for chills and fever.   HENT:  Negative for congestion, trouble swallowing and voice change.    Eyes:  Negative for pain and visual disturbance.   Respiratory:  Positive for shortness of breath. Negative for cough.    Cardiovascular:  Positive for leg swelling. Negative for chest pain.   Gastrointestinal:  Negative for abdominal pain, diarrhea, nausea and vomiting.   Musculoskeletal:  Negative for arthralgias.   Skin:  Negative for pallor and rash.   Neurological:  Positive for weakness. Negative for dizziness, light-headedness and headaches.   Psychiatric/Behavioral:  Positive for agitation and confusion.      Objective:     Vital Signs (Most Recent):  Temp: 97.7 °F (36.5 °C) (03/21/25 1159)  Pulse: 75 (03/21/25 1513)  Resp: 17 (03/21/25 1159)  BP: (!) 148/61 (03/21/25 1159)  SpO2: 96 % (03/21/25 1159) Vital Signs (24h Range):  Temp:  [97.4 °F (36.3 °C)-98.1 °F (36.7 °C)] 97.7 °F (36.5 °C)  Pulse:  [60-79] 75  Resp:  [17-20] 17  SpO2:  [93 %-97 %] 96 %  BP: ()/(58-72) 148/61     Weight: 70 kg (154 lb 5.2 oz)  Body mass index is 28.23 kg/m².    Intake/Output Summary (Last 24 hours) at 3/21/2025 1514  Last data filed at 3/20/2025 1709  Gross per 24 hour   Intake --   Output 500 ml   Net -500 ml      Physical Exam  Vitals and nursing note reviewed.   Constitutional:       General: She is not in acute distress.  HENT:      Head: Normocephalic and atraumatic.      Nose: Nose normal.      Mouth/Throat:      Mouth: Mucous membranes are moist.   Eyes:      General: No scleral icterus.     Extraocular Movements: Extraocular movements intact.   Cardiovascular:      Rate and Rhythm: Normal rate and regular rhythm.      Pulses: Normal pulses.      Heart sounds: No murmur heard.     No friction rub. No gallop.  "  Pulmonary:      Effort: Pulmonary effort is normal.      Breath sounds: No wheezing, rhonchi or rales.   Abdominal:      General: There is no distension.      Palpations: Abdomen is soft.      Tenderness: There is no abdominal tenderness. There is no guarding or rebound.   Musculoskeletal:         General: Normal range of motion.      Cervical back: Neck supple. No rigidity.      Right lower leg: Edema present.      Left lower leg: Edema present.   Skin:     General: Skin is warm and dry.      Capillary Refill: Capillary refill takes less than 2 seconds.      Findings: No lesion (RLE wound w/ wound vac in place).   Neurological:      General: No focal deficit present.      Mental Status: She is alert.   Psychiatric:         Mood and Affect: Mood normal.         MELD 3.0: 12 at 3/9/2025  3:41 AM  MELD-Na: 7 at 3/9/2025  3:41 AM  Calculated from:  Serum Creatinine: 0.9 mg/dL (Using min of 1 mg/dL) at 3/9/2025  3:41 AM  Serum Sodium: 133 mmol/L at 3/9/2025  3:41 AM  Total Bilirubin: 0.4 mg/dL (Using min of 1 mg/dL) at 3/9/2025  3:41 AM  Serum Albumin: 3 g/dL at 3/9/2025  3:41 AM  INR(ratio): 1.1 at 3/7/2025  1:24 PM  Age at listing (hypothetical): 89 years  Sex: Female at 3/9/2025  3:41 AM      Significant Labs:  CBC:  Recent Labs   Lab 03/20/25  0427 03/20/25  1021 03/21/25  0549   WBC 14.23* 15.36* 11.29   HGB 8.5* 9.2* 8.2*   HCT 26.1* 29.0* 25.2*   * 550* 491*     CMP:  Recent Labs   Lab 03/20/25  0427 03/20/25  1021 03/21/25  0549   * 135* 135*   K 3.9 3.6 3.1*   CL 92* 92* 90*   CO2 27 32* 33*   * 130* 121*   BUN 36* 34* 31*   CREATININE 0.6 0.6 0.6   CALCIUM 9.4 9.8 9.5   PROT  --  6.7  --    ALBUMIN  --  2.9*  --    BILITOT  --  1.3*  --    ALKPHOS  --  85  --    AST  --  27  --    ALT  --  17  --    ANIONGAP 13 11 12     PTINR:  No results for input(s): "INR" in the last 48 hours.    Significant Procedures:   Dobutamine Stress Test with Color Flow: No results found for this or any " previous visit.    None

## 2025-03-21 NOTE — PLAN OF CARE
Problem: Physical Therapy  Goal: Physical Therapy Goal  Description: Goals to be met by: 25     Patient will increase functional independence with mobility by performin. Supine to sit with [place]  2. Sit to supine with [place]  3. Sit to stand transfer with [place]  4. Bed to chair transfer with Modified [place] using Rolling Walker  5. Gait  x 150 feet with Modified [place] using Rolling Walker.   6. Lower extremity exercise program x15 reps per handout, with independence    Outcome: Progressing   PT eval completed and goals established. Pt will begin PT POC, progressing as tolerated.

## 2025-03-21 NOTE — ASSESSMENT & PLAN NOTE
The patient's most recent echocardiogram result is listed below.     Echo  Result Date: 3/21/2025    Left Ventricle: The left ventricle is normal in size. Mildly increased   ventricular mass. Normal wall thickness. There is mild concentric   hypertrophy. Normal wall motion. There is normal systolic function with a   visually estimated ejection fraction of 65 - 70%. There is normal   diastolic function.    Right Ventricle: The right ventricle is normal in size. Wall thickness   is normal. Systolic function is normal. Pacemaker lead present in the   ventricle.    Left Atrium: Severely dilated    Right Atrium: Right atrium is moderately dilated.    Aortic Valve: The aortic valve is a trileaflet valve. There is severe   aortic valve sclerosis. There is moderate annular calcification present.   Moderately restricted motion. There is moderate to severe low flow   stenosis. Aortic valve area by VTI is 0.8 cm2. Aortic valve peak velocity   is 3.4 m/s. Mean gradient is 25 mmHg. The dimensionless index is 0.27. AS   severity has increased compared to study 1/24    Mitral Valve: There is severe posterior mitral annular calcification.    Aorta: Aortic root is normal in size measuring 2.62 cm. Aortic root at   ST junction is normal. Ascending aorta is normal measuring 2.62 cm.    Pulmonary Artery: There is mild pulmonary hypertension. The estimated   pulmonary artery systolic pressure is 44 mmHg.    IVC/SVC: Normal venous pressure at 3 mmHg.    Pericardium: There is no pericardial effusion.        Echo  Result Date: 6/20/2024    Left Ventricle: The left ventricle is normal in size. Normal wall   thickness. There is concentric hypertrophy. There is normal systolic   function with a visually estimated ejection fraction of 65 - 70%. Unable   to assess diastolic function due to atrial fibrillation.    Right Ventricle: Normal right ventricular cavity size. Wall thickness   is normal. Systolic function is normal.    Left Atrium: Left  atrium is severely dilated.    Right Atrium: Right atrium is severely dilated.    Mitral Valve: There is moderate posterior leaflet sclerosis. There is   no stenosis. The mean pressure gradient across the mitral valve is 1 mmHg   at a heart rate of 60 bpm. There is moderate regurgitation.    Tricuspid Valve: There is moderate to severe regurgitation.    Pulmonic Valve: There is no stenosis.    Pulmonary Artery: There is moderate pulmonary hypertension. The   estimated pulmonary artery systolic pressure is 60 mmHg.    IVC/SVC: Intermediate venous pressure at 8 mmHg.         Will get repeat echo

## 2025-03-21 NOTE — ASSESSMENT & PLAN NOTE
Likely reactive in the setting of CHF vs pna. Awaiting blood/respiratory cultures.   - Continue Zosyn/Doxycycline for now.  - blood and urine cultures pending

## 2025-03-21 NOTE — PLAN OF CARE
Recommendations     1. Continue low sodium diet- texture per SLP   2. Add Boost plus TID if medically warranted   3. RD following     Goals: Meet % of EEN/EPN by RD f/u date  Nutrition Goal Status: goal not met  Communication of RD Recs: POC

## 2025-03-21 NOTE — ASSESSMENT & PLAN NOTE
Anemia is likely due to chronic disease due to heart failure. Most recent hemoglobin and hematocrit are listed below.  Recent Labs     03/20/25  0427 03/20/25  1021 03/21/25  0549   HGB 8.5* 9.2* 8.2*   HCT 26.1* 29.0* 25.2*     Plan  - Monitor serial CBC: Daily  - Transfuse PRBC if patient becomes hemodynamically unstable, symptomatic or H/H drops below 7/21.  - Patient has not received any PRBC transfusions to date  - Patient's anemia is currently stable  -

## 2025-03-21 NOTE — NURSING
Telemetry Verification   Patient placed on Telemetry Box  Verified with War Room  Box # [id number]   Monitor Tech ray   Rate 88   Rhythm paced     Report given to 11th floor nurse.

## 2025-03-21 NOTE — PT/OT/SLP EVAL
"Occupational Therapy   Evaluation    Name: Dorothy M Knobloch  MRN: [id number]  Admitting Diagnosis: Acute on chronic congestive heart failure  Recent Surgery: * No surgery found *      Recommendations:     Discharge Recommendations: Moderate Intensity Therapy  Discharge Equipment Recommendations:  wheelchair  Barriers to discharge:  Decreased caregiver support    Assessment:   CO-TX with PT for pt safety and max participation with both disciplines.     Dorothy M Knobloch is a 89 y.o. female with a medical diagnosis of Acute on chronic congestive heart failure.  She presents with fatigue and visual impairment in L eye. Performance deficits affecting function: weakness, impaired sensation, impaired self care skills, impaired functional mobility, gait instability, impaired balance, visual deficits, decreased lower extremity function, impaired cardiopulmonary response to activity. PTA, pt reports being SBA with ADLs and mobility. Upon eval, pt Requires some assistance and cueing for safety. Pt also presents with leg length discrepancy c/ R< L LE.     Patient did report depressed mood and wanting to "go to sleep and not wake up" 2/2 current mobility status and recent hospitalizations. Appropriate emotional support was provided to patient and patient's son and RN were alerted at end of session.     Rehab Prognosis: Good; patient would benefit from acute skilled OT services to address these deficits and reach maximum level of function.       Plan:     Patient to be seen 4 x/week to address the above listed problems via self-care/home management, therapeutic activities, therapeutic exercises  Plan of Care Expires: 04/20/25  Plan of Care Reviewed with: patient, son    Subjective     Chief Complaint: "I just get so sleepy"  Patient/Family Comments/goals: return home    Occupational Profile:  Living Environment: Resident of Assisted living facility  Previous level of function: Assistance as needed at baseline  Roles and Routines: " Wife, mother  Equipment Used at Home: rollator, shower chair  Assistance upon Discharge: NH staff    Pain/Comfort:  Pain Rating 1:  (not rated)  Pain Addressed 1: Reposition, Cessation of Activity, Pre-medicate for activity    Patients cultural, spiritual, Latter day conflicts given the current situation: no    Objective:     Communicated with: Nsg prior to session.  Patient found HOB elevated with wound vac, PureWick, oxygen, telemetry upon OT entry to room.    General Precautions: Standard, fall, vision impaired  Orthopedic Precautions: RLE weight bearing as tolerated (ROMAT)  Braces: N/A  Respiratory Status: Nasal cannula, flow 5 L/min    Occupational Performance:    Bed Mobility:    Patient completed Rolling/Turning to Left with  minimum assistance  Patient completed Scooting/Bridging with minimum assistance  Patient completed Supine to Sit with moderate assistance  Patient completed Sit to Supine with minimum assistance    Functional Mobility/Transfers:  Patient completed Sit <> Stand Transfer with minimum assistance  with  rolling walker  on 2 trials  Functional Mobility: Pt taking steps from EOB to wall c/ Mod A using RW (refer to PT note)    Activities of Daily Living:  Grooming: stand by assistance facial hygiene seated EOB  Lower Body Dressing: maximal assistance don/off underwear  Toileting: dependence purewick    Cognitive/Visual Perceptual:  A&O x3    Physical Exam:  BUE WFL  (above 90* below 120*)  Balance: intact     AMPAC 6 Click ADL:  AMPAC Total Score: 16    Treatment & Education:  Pt educated on role and purpose of therapy  Pt educated on goal setting  Pt educated on benefits of OOB activity  Pt educated on self advocacy     Patient left HOB elevated with all lines intact, call button in reach, Nsg notified, and son present    GOALS:   Multidisciplinary Problems       Occupational Therapy Goals          Problem: Occupational Therapy    Goal Priority Disciplines Outcome Interventions   Occupational  Therapy Goal     OT, PT/OT Progressing    Description: Goals to be met by: 4/20/25     Patient will increase functional independence with ADLs by performing:    UE Dressing with Stand-by Assistance.  LE Dressing with Minimal Assistance.  Grooming while standing at sink with Contact Guard Assistance.  Toileting from toilet with Minimal Assistance for hygiene and clothing management.   All functional transfers performed with CGA                         DME Justifications:  Dorothy Knobloch has a mobility limitation that significantly impairs her ability to participate in one or more mobility related activities of daily living (MRADLs) such as toileting, feeding, dressing, grooming, and bathing in customary locations in the home.  The mobility limitation cannot be sufficiently resolved by the use of a cane or walker.   The use of a manual wheelchair will significantly improve the patients ability to participate in MRADLS and the patient will use it on regular basis in the home.  Dorothy Knobloch has expressed her willingness to use a manual wheelchair in the home. Patients upper body strength is sufficient for propulsion.  She also has a caregiver who is available, willing, and able to provide assistance with the wheelchair when needed.      History:     Past Medical History:   Diagnosis Date    Acquired hypothyroidism 12/13/2013    Age-related physical debility 04/05/2024    Antiplatelet or antithrombotic long-term use     AV block, 3rd degree 03/19/2019    Bilateral nonexudative age-related macular degeneration 08/27/2013    Blindness and low vision 08/10/2018    Blood transfusion     Cardiac pacemaker 08/22/2018    Carotid artery disease 08/10/2016    Chronic diastolic congestive heart failure 11/24/2019    CRAO (central retinal artery occlusion), left 01/29/2018    Dry eye syndrome of both eyes 08/10/2018    Essential hypertension 12/13/2013    History of TIA (transient ischemic attack) 12/13/2013     Nonexudative age-related macular degeneration, bilateral, intermediate dry stage 07/12/2018    Nuclear sclerotic cataract of left eye 08/10/2018    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 12/13/2013    On anticoagulant therapy 06/13/2024    Osteoarthritis     Paroxysmal atrial fibrillation 09/11/2018    Pulmonary hypertension 04/05/2024    Pure hypercholesterolemia 12/13/2013    Sick sinus syndrome     Stroke     Takotsubo cardiomyopathy 11/17/2016         Past Surgical History:   Procedure Laterality Date    CARDIAC PACEMAKER PLACEMENT      MEDITDatabase Device Model: Juli LOZADA MRI A2DR01 Device Type: PACEMAKER Device S\N: [id number]    CARPAL TUNNEL RELEASE Bilateral 1991    CATARACT EXTRACTION Right     JOINT REPLACEMENT      ORIF FEMUR FRACTURE Right 3/8/2025    Procedure: OPEN REDUCTION INTERNAL FIXATION FEMORAL SHAFT FRACTURE;  Surgeon: Albino William MD;  Location: [facility] OR [address] [place] [address];  Service: Orthopedics;  Laterality: Right;    ID TRANSCRAN DOPP INTRACRAN, EMBOLI W/O INJ  01/29/2018         SKIN BIOPSY      TONSILLECTOMY      TOTAL HIP ARTHROPLASTY  11/01/2000    right/[facility]       Time Tracking:     OT Date of Treatment: 03/21/25  OT Start Time: 1336  OT Stop Time: 1414  OT Total Time (min): 38 min    Billable Minutes:Evaluation 15  Self Care/Home Management 23    3/21/2025

## 2025-03-21 NOTE — HOSPITAL COURSE
3/21: Echo unremarkable. Intermittent confusion noted. Continue Lasix and Zosyn/Doxy  3/22: Mental status improved. Continue Lasix and abx   3/23: Increased dyspnea overnight. CXR w/ worsening effusions/edema.

## 2025-03-21 NOTE — PT/OT/SLP EVAL
"Physical Therapy Co-Evaluation with OT     Patient Name:  Dorothy M Knobloch   MRN:  [id number]    Co-evaluation with OT performed due to patient complexity and deficits, requiring two skilled therapists to appropriately and safely assess patient's strength and endurance while facilitating functional tasks in addition to accommodating for patient's activity tolerance.    Recommendations:     Discharge Recommendations: Moderate Intensity Therapy   Discharge Equipment Recommendations: wheelchair   Barriers to discharge: Inaccessible home and Decreased caregiver support    Assessment:     Dorothy M Knobloch is a 89 y.o. female admitted with a medical diagnosis of Acute on chronic congestive heart failure.  She presents with the following impairments/functional limitations: weakness, impaired sensation, impaired self care skills, impaired functional mobility, gait instability, impaired balance, visual deficits, decreased lower extremity function, impaired cardiopulmonary response to activity. Patient extremely lethargic at start of session however able to increase alertness and participate in session, oriented x3. She required moderate assistance for bed mobility and displayed variable seated balance with posterior lean, improved with UE support on bed. Patient was able to perform sit to stand transfer and ambulate several steps with walker, limited by RLE leg length discrepancy leading to gait deviations and balance impairment. spO2 94-98% throughout session. Based on patient's prior level of function compared to recent decline in mobility and current presentation, they would benefit from moderate intensity post-acute therapy to address deficits and continue progressing patient towards goals.   Patient is safe to ambulate/transfer with nursing (assist of 1 with walker), encouraged to sit up in chair when able.      Of note, patient did report depressed mood and wanting to "go to sleep and not wake up", with current " mobility status and recent hospitalizations as contributing factors. Appropriate emotional support was provided to patient and patient's son and RN were alerted at end of session.     Rehab Prognosis: Good; patient would benefit from acute skilled PT services to address these deficits and reach maximum level of function.    Recent Surgery: * No surgery found *      Plan:     During this hospitalization, patient to be seen 4 x/week to address the identified rehab impairments via gait training, therapeutic activities, therapeutic exercises, neuromuscular re-education and progress toward the following goals:    Plan of Care Expires:  04/18/25    Subjective     Chief Complaint: fatigue, mobility status   Patient/Family Comments/goals: patient wanting to make progress with mobility   Pain/Comfort:  Pain Rating 1: 0/10  Pain Rating Post-Intervention 1: 0/10    Patients cultural, spiritual, Latter day conflicts given the current situation: no    Living Environment: Patient lives with  in assisted living facility with [facility]. PTA was at SNF.  Prior Level of Function: modified independent with rollator, reported some assistance with bathing from staff.   DME used: rollator, shower chair  DME owned (not currently used): none  Assistance upon Discharge: facility staff     Objective:     Communicated with RN prior to session.  Patient found HOB elevated with wound vac, PureWick, oxygen, telemetry  upon PT entry to room. Son in room.    General Precautions: Standard, fall, vision impaired  Orthopedic Precautions:RLE weight bearing as tolerated (ROMAT)   Braces: N/A  Respiratory Status: Nasal cannula, flow 5 L/min    Exams:  Cognitive Exam:  Patient is oriented to Person, Place, and Time  Sensation:    -       Intact  RLE ROM: WFL  RLE Strength: WFL  LLE ROM: WFL  LLE Strength: WFL    Functional Mobility:  Bed Mobility:     Rolling Left:  minimum assistance  Scooting: minimum assistance  Supine to Sit: moderate  assistance  Sit to Supine: minimum assistance and at legs  Transfers:     Sit to Stand:  minimum assistance with rolling walker  Increased time  Gait: patient ambulated 4' with modA and 2ww  Deviations Noted: decreased gait speed, decreased step height R,L, decreased step length R, L, and flat foot contact R, L   Balance:   Sitting static: Judith-CGA  Posterior lean  Sitting dynamic: Judith  Standing static: CGA with 2ww      AM-PAC 6 CLICK MOBILITY  Total Score:15       Treatment & Education:  Education: patient educated on POC, need for therapy, safety with mobility, discharge recommendations and equipment recommendations. Patient verbalized understanding of all topics.   EOB balance with cues to engage core mm to decrease assistance needed and increase safety in sitting.  Verbal and tactile cues with assist during STS transfer for optimal [place] prior to transfer, forward weight shift to initiate, to engage LE mm, extend hips forward and bring head and chest up to achieve full upright stance.   Verbal and tactile cues provided during mobility for walker management and safety including hand placement on walker vs chair during transfers and positioning of walker in relation to body.   Cues during ambulation for sequencing, weight shifting, upright posture throughout, appropriate step length and height.   Appropriate emotional support provided to patient regarding current functional status.      Patient left HOB elevated with all lines intact, call button in reach, and son present.    GOALS:   Multidisciplinary Problems       Physical Therapy Goals          Problem: Physical Therapy    Goal Priority Disciplines Outcome Interventions   Physical Therapy Goal     PT, PT/OT Progressing    Description: Goals to be met by: 25     Patient will increase functional independence with mobility by performin. Supine to sit with [place]  2. Sit to supine with [place]  3. Sit to stand transfer with [place]  4.  Bed to chair transfer with Modified [place] using Rolling Walker  5. Gait  x 150 feet with Modified [place] using Rolling Walker.   6. Lower extremity exercise program x15 reps per handout, with independence                         History:     Past Medical History:   Diagnosis Date    Acquired hypothyroidism 12/13/2013    Age-related physical debility 04/05/2024    Antiplatelet or antithrombotic long-term use     AV block, 3rd degree 03/19/2019    Bilateral nonexudative age-related macular degeneration 08/27/2013    Blindness and low vision 08/10/2018    Blood transfusion     Cardiac pacemaker 08/22/2018    Carotid artery disease 08/10/2016    Chronic diastolic congestive heart failure 11/24/2019    CRAO (central retinal artery occlusion), left 01/29/2018    Dry eye syndrome of both eyes 08/10/2018    Essential hypertension 12/13/2013    History of TIA (transient ischemic attack) 12/13/2013    Nonexudative age-related macular degeneration, bilateral, intermediate dry stage 07/12/2018    Nuclear sclerotic cataract of left eye 08/10/2018    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 12/13/2013    On anticoagulant therapy 06/13/2024    Osteoarthritis     Paroxysmal atrial fibrillation 09/11/2018    Pulmonary hypertension 04/05/2024    Pure hypercholesterolemia 12/13/2013    Sick sinus syndrome     Stroke     Takotsubo cardiomyopathy 11/17/2016       Past Surgical History:   Procedure Laterality Date    CARDIAC PACEMAKER PLACEMENT      [employer] Device Model: Juli ALMANZAR [id number] Device Type: PACEMAKER Device S\N: [id number]    CARPAL TUNNEL RELEASE Bilateral 1991    CATARACT EXTRACTION Right     JOINT REPLACEMENT      ORIF FEMUR FRACTURE Right 3/8/2025    Procedure: OPEN REDUCTION INTERNAL FIXATION FEMORAL SHAFT FRACTURE;  Surgeon: Albino William MD;  Location: [facility] OR [address] [place] [address];  Service: Orthopedics;  Laterality: Right;    MT TRANSCRAN DOPP INTRACRAN, EMBOLI W/O INJ  01/29/2018          SKIN BIOPSY      TONSILLECTOMY      TOTAL HIP ARTHROPLASTY  11/01/2000    right/[facility]       Time Tracking:     PT Received On: 03/21/25  PT Start Time: 1318     PT Stop Time: 1412  PT Total Time (min): 54 min     Billable Minutes: Evaluation 10 minutes, Gait Training 15 minutes, and Therapeutic Activity 29 minutes      03/21/2025

## 2025-03-21 NOTE — ASSESSMENT & PLAN NOTE
Nutrition consulted. Most recent weight and BMI monitored-     Measurements:  Wt Readings from Last 1 Encounters:   03/21/25 70 kg (154 lb 5.2 oz)   Body mass index is 28.23 kg/m².    Patient has been screened and assessed by RD.    Malnutrition Type:  Context: chronic illness  Level: moderate    Malnutrition Characteristic Summary:  Energy Intake (Malnutrition): less than 75% for greater than or equal to 1 month  Subcutaneous Fat (Malnutrition): moderate depletion  Muscle Mass (Malnutrition): moderate depletion    Interventions/Recommendations (treatment strategy):  1. Continue low sodium diet 2. Add Boost plus TID if medically warranted 3. RD following

## 2025-03-21 NOTE — ASSESSMENT & PLAN NOTE
Patient has diastolic heart failure that is Acute on chronic. On presentation their CHF was decompensated. Evidence of decompensated CHF on presentation includes: edema, crackles on lung auscultation, dyspnea on exertion (SMITH), and shortness of breath. The etiology of their decompensation is likely increased fluid intake. Most recent BNP and echo results are listed below.  Recent Labs     03/20/25  1021   BNP 1,625*     Latest ECHO  Results for orders placed during the hospital encounter of 06/20/24    Echo    Interpretation Summary    Left Ventricle: The left ventricle is normal in size. Normal wall thickness. There is concentric hypertrophy. There is normal systolic function with a visually estimated ejection fraction of 65 - 70%. Unable to assess diastolic function due to atrial fibrillation.    Right Ventricle: Normal right ventricular cavity size. Wall thickness is normal. Systolic function is normal.    Left Atrium: Left atrium is severely dilated.    Right Atrium: Right atrium is severely dilated.    Mitral Valve: There is moderate posterior leaflet sclerosis. There is no stenosis. The mean pressure gradient across the mitral valve is 1 mmHg at a heart rate of 60 bpm. There is moderate regurgitation.    Tricuspid Valve: There is moderate to severe regurgitation.    Pulmonic Valve: There is no stenosis.    Pulmonary Artery: There is moderate pulmonary hypertension. The estimated pulmonary artery systolic pressure is 60 mmHg.    IVC/SVC: Intermediate venous pressure at 8 mmHg.    Current Heart Failure Medications  hydroCHLOROthiazide tablet 12.5 mg, Daily, Oral  furosemide injection 40 mg, Every 12 hours, Intravenous    Plan  - Monitor strict I&Os and daily weights.    - Place on telemetry  - Low sodium diet  - Place on fluid restriction of 1.5 L.   - Cardiology has not been consulted  - The patient's volume status is increased  - BID lasix  - patient was confused on exam, but did say she was drinking more  water to help with digestion. Unclear if accurate or not but would explain presentation  - could also need increased bumex on discharge with sliding scale diuretics to prevent relapse  - echo pending, cards consult pending echo results

## 2025-03-21 NOTE — AI DETERIORATION ALERT
"RAPID RESPONSE NURSE AI ALERT       AI alert received.    Chart Reviewed: 03/20/2025, 8:08 PM    MRN: [id number]  Bed: ED 08/08    Dx: Acute on chronic congestive heart failure    Dorothy M Knobloch has a past medical history of Acquired hypothyroidism, Age-related physical debility, Antiplatelet or antithrombotic long-term use, AV block, 3rd degree, Bilateral nonexudative age-related macular degeneration, Blindness and low vision, Blood transfusion, Cardiac pacemaker, Carotid artery disease, Chronic diastolic congestive heart failure, CRAO (central retinal artery occlusion), left, Dry eye syndrome of both eyes, Essential hypertension, History of TIA (transient ischemic attack), Nonexudative age-related macular degeneration, bilateral, intermediate dry stage, Nuclear sclerotic cataract of left eye, 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    Last VS: BP (!) 168/72   Pulse 71   Temp 97.5 °F (36.4 °C) (Oral)   Resp (!) 22   Ht 5' 2" (1.575 m)   Wt 70.3 kg (155 lb)   SpO2 96%   Breastfeeding No   BMI 28.35 kg/m²     24H Vital Sign Range:  Temp:  [97.5 °F (36.4 °C)-98 °F (36.7 °C)]   Pulse:  [60-74]   Resp:  [19-29]   BP: (113-168)/()   SpO2:  [92 %-100 %]     Level of Consciousness (AVPU): responds to voice    Recent Labs     03/20/25 0427 03/20/25  1021   WBC 14.23* 15.36*   HGB 8.5* 9.2*   HCT 26.1* 29.0*   * 550*       Recent Labs     03/20/25  0427 03/20/25  1021   * 135*   K 3.9 3.6   CL 92* 92*   CO2 27 32*   BUN 36* 34*   CREATININE 0.6 0.6   * 130*   PHOS 2.9  --    MG 2.3  --         Recent Labs     03/20/25  1039   PH 7.469*   PCO2 52.2*   PO2 27*   HCO3 37.9*   POCSATURATED 53   BE 14*        OXYGEN:  Flow (L/min) (Oxygen Therapy): 4          MEWS score: 3    Bedside " Yelitza WYNN contacted for AI reports erroneous unvalidated VS. Pt stable and in NAD on 4L NC. No additional concerns verbalized at this time. Instructed to call [phone] for further concerns or assistance.    Qamar Carbajal RN

## 2025-03-22 PROBLEM — J18.9 PNEUMONIA: Status: ACTIVE | Noted: 2025-03-20

## 2025-03-22 LAB
ANION GAP (OHS): 10 MMOL/L (ref 8–16)
BUN SERPL-MCNC: 31 MG/DL (ref 10–30)
CALCIUM SERPL-MCNC: 8.8 MG/DL (ref 8.7–10.5)
CHLORIDE SERPL-SCNC: 93 MMOL/L (ref 95–110)
CO2 SERPL-SCNC: 33 MMOL/L (ref 23–29)
CREAT SERPL-MCNC: 0.8 MG/DL (ref 0.5–1.4)
GFR SERPLBLD CREATININE-BSD FMLA CKD-EPI: >60 ML/MIN/1.73/M2
GLUCOSE SERPL-MCNC: 110 MG/DL (ref 70–110)
POTASSIUM SERPL-SCNC: 3 MMOL/L (ref 3.5–5.1)
SODIUM SERPL-SCNC: 136 MMOL/L (ref 136–145)

## 2025-03-22 PROCEDURE — 25000003 PHARM REV CODE 250: Performed by: INTERNAL MEDICINE

## 2025-03-22 PROCEDURE — 82310 ASSAY OF CALCIUM: CPT

## 2025-03-22 PROCEDURE — 18500000 HC LEAVE OF ABSENCE HOSPITAL SERVICES

## 2025-03-22 PROCEDURE — 36415 COLL VENOUS BLD VENIPUNCTURE: CPT

## 2025-03-22 PROCEDURE — 63600175 PHARM REV CODE 636 W HCPCS

## 2025-03-22 PROCEDURE — 25000003 PHARM REV CODE 250

## 2025-03-22 PROCEDURE — 21400001 HC TELEMETRY ROOM

## 2025-03-22 RX ORDER — POTASSIUM CHLORIDE 20 MEQ/1
40 TABLET, EXTENDED RELEASE ORAL ONCE
Status: COMPLETED | OUTPATIENT
Start: 2025-03-22 | End: 2025-03-22

## 2025-03-22 RX ORDER — AMOXICILLIN AND CLAVULANATE POTASSIUM 875; 125 MG/1; MG/1
1 TABLET, FILM COATED ORAL EVERY 12 HOURS
Status: DISCONTINUED | OUTPATIENT
Start: 2025-03-22 | End: 2025-03-26 | Stop reason: HOSPADM

## 2025-03-22 RX ADMIN — APIXABAN 5 MG: 5 TABLET, FILM COATED ORAL at 09:03

## 2025-03-22 RX ADMIN — POTASSIUM CHLORIDE 40 MEQ: 1500 TABLET, EXTENDED RELEASE ORAL at 06:03

## 2025-03-22 RX ADMIN — POLYETHYLENE GLYCOL 3350 17 G: 17 POWDER, FOR SOLUTION ORAL at 09:03

## 2025-03-22 RX ADMIN — APIXABAN 5 MG: 5 TABLET, FILM COATED ORAL at 10:03

## 2025-03-22 RX ADMIN — PIPERACILLIN SODIUM AND TAZOBACTAM SODIUM 4.5 G: 4; .5 INJECTION, POWDER, FOR SOLUTION INTRAVENOUS at 06:03

## 2025-03-22 RX ADMIN — AMOXICILLIN AND CLAVULANATE POTASSIUM 1 TABLET: 875; 125 TABLET, FILM COATED ORAL at 10:03

## 2025-03-22 RX ADMIN — ESCITALOPRAM OXALATE 20 MG: 10 TABLET ORAL at 09:03

## 2025-03-22 RX ADMIN — POTASSIUM CHLORIDE 40 MEQ: 1500 TABLET, EXTENDED RELEASE ORAL at 09:03

## 2025-03-22 RX ADMIN — LEVOTHYROXINE SODIUM 88 MCG: 0.05 TABLET ORAL at 06:03

## 2025-03-22 RX ADMIN — DOXYCYCLINE HYCLATE 100 MG: 100 TABLET, COATED ORAL at 10:03

## 2025-03-22 RX ADMIN — CLOPIDOGREL 75 MG: 75 TABLET ORAL at 09:03

## 2025-03-22 RX ADMIN — FUROSEMIDE 40 MG: 10 INJECTION, SOLUTION INTRAVENOUS at 10:03

## 2025-03-22 RX ADMIN — ACETAMINOPHEN 650 MG: 325 TABLET ORAL at 09:03

## 2025-03-22 RX ADMIN — DOXYCYCLINE HYCLATE 100 MG: 100 TABLET, COATED ORAL at 09:03

## 2025-03-22 NOTE — ASSESSMENT & PLAN NOTE
Patient's blood pressure range in the last 24 hours was: BP  Min: 113/49  Max: 164/64.The patient's inpatient anti-hypertensive regimen is listed below:  Current Antihypertensives  amLODIPine tablet 5 mg, Daily, Oral  hydroCHLOROthiazide tablet 12.5 mg, Daily, Oral  furosemide injection 40 mg, Every 12 hours, Intravenous    Plan  - BP is controlled, no changes needed to their regimen

## 2025-03-22 NOTE — ASSESSMENT & PLAN NOTE
Continue with synthroid at current dose. Pt will need to follow up as an outpatient  -TSH elevated at 22.581, Free T4 0.83

## 2025-03-22 NOTE — PROGRESS NOTES
[facility] - Internal Medicine [facility] Medicine  Progress Note    Patient Name: Dorothy M Knobloch  MRN: [id number]  Patient Class: IP- Inpatient   Admission Date: 3/20/2025  Length of Stay: 2 days  Attending Physician: Lake Tate MD  Primary Care Provider: Lola Wolff MD        Subjective     Principal Problem:Acute on chronic congestive heart failure        HPI:  89 y/o female with PAF on long term Apixiban at home for anticoagulation, HTN, chronic diastolic heart failure, chronic anemia, cardiac pacemaker due to sick sinus syndrome and hypothyroidism. She was recently admitted from 3/7 to 3/13 after fall at home and femur fracture for which she received operative management. She was discharged to SNF for wound care and PT/OT.     For the last few days she has had increasing sob and work of breathing, family also noticed increased lower extremity swelling. She was also having a change in mental status with hallucinations and confusion. These symptoms prompted her to be transferred to the ED for evaluation.     In the ED she was found to have an elevated BNP (much greater than prior values), cxr showed fluid accumulation. CTA PE showed no evidence of large PE, did show signs of volume overload and possible infection. She was started on lasix with improvement in oxygenation in the ED (non-re breather to 4L NC). Cultures were collected and patient started on vancomycin and zosyn for possible HCAP as well given her symptoms.       Patient pleasantly confused on exam. Son present in the room and said she is normally very sharp mentally. He is not aware of most of her medical history including past medications.     Overview/Hospital Course:  3/21: Echo unremarkable. Intermittent confusion noted. Continue Lasix and Zosyn/Doxy  3/22: Mental status improved. Continue Lasix and abx     Interval History: No acute events overnight. Mental status near baseline. Pt complains of generalized  malaise and  fatigue. Son, Ed, at bedside.     Review of Systems   Constitutional:  Positive for fatigue. Negative for chills and fever.   HENT:  Negative for congestion, hearing loss, sinus pressure, sinus pain, trouble swallowing and voice change.    Respiratory:  Positive for shortness of breath. Negative for cough.    Cardiovascular:  Positive for leg swelling.   Gastrointestinal:  Negative for abdominal pain, constipation, diarrhea, nausea and vomiting.   Genitourinary:  Negative for difficulty urinating.   Musculoskeletal:  Negative for arthralgias and back pain.   Skin:  Negative for pallor, rash and wound.   Neurological:  Positive for weakness. Negative for dizziness, light-headedness and headaches.   Psychiatric/Behavioral:  Negative for agitation and behavioral problems.      Objective:     Vital Signs (Most Recent):  Temp: 97.7 °F (36.5 °C) (03/22/25 0802)  Pulse: (!) 59 (03/22/25 0802)  Resp: 18 (03/22/25 0549)  BP: (!) 113/49 (03/22/25 0802)  SpO2: 95 % (03/22/25 0802) Vital Signs (24h Range):  Temp:  [97.6 °F (36.4 °C)-98 °F (36.7 °C)] 97.7 °F (36.5 °C)  Pulse:  [59-75] 59  Resp:  [17-18] 18  SpO2:  [95 %-99 %] 95 %  BP: (113-164)/(49-75) 113/49     Weight: 70 kg (154 lb 5.2 oz)  Body mass index is 28.23 kg/m².    Intake/Output Summary (Last 24 hours) at 3/22/2025 1114  Last data filed at 3/22/2025 0909  Gross per 24 hour   Intake 540 ml   Output 1900 ml   Net -1360 ml      Physical Exam  Vitals and nursing note reviewed.   Constitutional:       General: She is not in acute distress.  HENT:      Head: Normocephalic and atraumatic.      Nose: Nose normal.      Mouth/Throat:      Mouth: Mucous membranes are moist.   Eyes:      General: No scleral icterus.     Extraocular Movements: Extraocular movements intact.   Cardiovascular:      Rate and Rhythm: Normal rate and regular rhythm.      Heart sounds: Murmur heard.      No friction rub. No gallop.   Pulmonary:      Effort: Pulmonary effort is normal. No respiratory  "distress.      Breath sounds: No wheezing, rhonchi or rales.   Abdominal:      General: There is no distension.      Palpations: Abdomen is soft.      Tenderness: There is no abdominal tenderness. There is no guarding or rebound.   Musculoskeletal:         General: Normal range of motion.      Cervical back: Neck supple. No rigidity.      Right lower leg: Edema present.      Left lower leg: Edema present.   Skin:     General: Skin is warm and dry.      Capillary Refill: Capillary refill takes less than 2 seconds.   Neurological:      General: No focal deficit present.      Mental Status: She is alert.   Psychiatric:         Mood and Affect: Mood normal.         MELD 3.0: 12 at 3/9/2025  3:41 AM  MELD-Na: 7 at 3/9/2025  3:41 AM  Calculated from:  Serum Creatinine: 0.9 mg/dL (Using min of 1 mg/dL) at 3/9/2025  3:41 AM  Serum Sodium: 133 mmol/L at 3/9/2025  3:41 AM  Total Bilirubin: 0.4 mg/dL (Using min of 1 mg/dL) at 3/9/2025  3:41 AM  Serum Albumin: 3 g/dL at 3/9/2025  3:41 AM  INR(ratio): 1.1 at 3/7/2025  1:24 PM  Age at listing (hypothetical): 89 years  Sex: Female at 3/9/2025  3:41 AM      Significant Labs:  CBC:  Recent Labs   Lab 03/21/25  0549   WBC 11.29   HGB 8.2*   HCT 25.2*   *     CMP:  Recent Labs   Lab 03/21/25  0549 03/22/25  0558   * 136   K 3.1* 3.0*   CL 90* 93*   CO2 33* 33*   *  --    BUN 31* 31*   CREATININE 0.6 0.8   CALCIUM 9.5 8.8   ANIONGAP 12 10     PTINR:  No results for input(s): "INR" in the last 48 hours.    Significant Procedures:   Dobutamine Stress Test with Color Flow: No results found for this or any previous visit.        Assessment & Plan  Acute on chronic congestive heart failure  Patient has  diastolic  heart failure that is Acute on chronic. On presentation their CHF was decompensated. Evidence of decompensated CHF on presentation includes: edema, crackles on lung auscultation, dyspnea on exertion (SMITH), and shortness of breath. The etiology of their " decompensation is likely increased fluid intake. Most recent BNP and echo results are listed below.  Recent Labs     03/20/25  1021   BNP 1,625*     Latest ECHO  Results for orders placed during the hospital encounter of 06/20/24    Echo    Interpretation Summary    Left Ventricle: The left ventricle is normal in size. Normal wall thickness. There is concentric hypertrophy. There is normal systolic function with a visually estimated ejection fraction of 65 - 70%. Unable to assess diastolic function due to atrial fibrillation.    Right Ventricle: Normal right ventricular cavity size. Wall thickness is normal. Systolic function is normal.    Left Atrium: Left atrium is severely dilated.    Right Atrium: Right atrium is severely dilated.    Mitral Valve: There is moderate posterior leaflet sclerosis. There is no stenosis. The mean pressure gradient across the mitral valve is 1 mmHg at a heart rate of 60 bpm. There is moderate regurgitation.    Tricuspid Valve: There is moderate to severe regurgitation.    Pulmonic Valve: There is no stenosis.    Pulmonary Artery: There is moderate pulmonary hypertension. The estimated pulmonary artery systolic pressure is 60 mmHg.    IVC/SVC: Intermediate venous pressure at 8 mmHg.    Current Heart Failure Medications  hydroCHLOROthiazide tablet 12.5 mg, Daily, Oral  furosemide injection 40 mg, Every 12 hours, Intravenous    Plan  - Monitor strict I&Os and daily weights.    - Place on telemetry  - Low sodium diet  - Place on fluid restriction of 1.5 L.   - Cardiology has not been consulted  - The patient's volume status is  increased  - BID lasix  - could also need increased bumex on discharge with sliding scale diuretics to prevent relapse    Essential hypertension  Patient's blood pressure range in the last 24 hours was: BP  Min: 113/49  Max: 164/64.The patient's inpatient anti-hypertensive regimen is listed below:  Current Antihypertensives  amLODIPine tablet 5 mg, Daily,  Oral  hydroCHLOROthiazide tablet 12.5 mg, Daily, Oral  furosemide injection 40 mg, Every 12 hours, Intravenous    Plan  - BP is controlled, no changes needed to their regimen  Acquired hypothyroidism  Continue with synthroid at current dose. Pt will need to follow up as an outpatient  -TSH elevated at 22.581, Free T4 0.83      Cardiac pacemaker  H/o  Paced rhythm on telemetry currently  Paroxysmal atrial fibrillation  Patient has persistent (7 days or more) atrial fibrillation. Patient is currently in sinus rhythm. [id number] Score: 4. The patients heart rate in the last 24 hours is as follows:  Pulse  Min: 59  Max: 75     Antiarrhythmics       Anticoagulants  apixaban tablet 5 mg, 2 times daily, Oral    Plan  - Replete lytes with a goal of K>4, Mg >2  - Patient is anticoagulated, see medications listed above.  - Patient's afib is currently controlled  - holding BB due to CHF exacerbation. Likely to restart in the next day or two  Aortic stenosis  The patient's most recent echocardiogram result is listed below.     Echo  Result Date: 3/21/2025    Left Ventricle: The left ventricle is normal in size. Mildly increased   ventricular mass. Normal wall thickness. There is mild concentric   hypertrophy. Normal wall motion. There is normal systolic function with a   visually estimated ejection fraction of 65 - 70%. There is normal   diastolic function.    Right Ventricle: The right ventricle is normal in size. Wall thickness   is normal. Systolic function is normal. Pacemaker lead present in the   ventricle.    Left Atrium: Severely dilated    Right Atrium: Right atrium is moderately dilated.    Aortic Valve: The aortic valve is a trileaflet valve. There is severe   aortic valve sclerosis. There is moderate annular calcification present.   Moderately restricted motion. There is moderate to severe low flow   stenosis. Aortic valve area by VTI is 0.8 cm2. Aortic valve peak velocity   is 3.4 m/s. Mean gradient is 25 mmHg. The  dimensionless index is 0.27. AS   severity has increased compared to study 1/24    Mitral Valve: There is severe posterior mitral annular calcification.    Aorta: Aortic root is normal in size measuring 2.62 cm. Aortic root at   ST junction is normal. Ascending aorta is normal measuring 2.62 cm.    Pulmonary Artery: There is mild pulmonary hypertension. The estimated   pulmonary artery systolic pressure is 44 mmHg.    IVC/SVC: Normal venous pressure at 3 mmHg.    Pericardium: There is no pericardial effusion.        Echo  Result Date: 6/20/2024    Left Ventricle: The left ventricle is normal in size. Normal wall   thickness. There is concentric hypertrophy. There is normal systolic   function with a visually estimated ejection fraction of 65 - 70%. Unable   to assess diastolic function due to atrial fibrillation.    Right Ventricle: Normal right ventricular cavity size. Wall thickness   is normal. Systolic function is normal.    Left Atrium: Left atrium is severely dilated.    Right Atrium: Right atrium is severely dilated.    Mitral Valve: There is moderate posterior leaflet sclerosis. There is   no stenosis. The mean pressure gradient across the mitral valve is 1 mmHg   at a heart rate of 60 bpm. There is moderate regurgitation.    Tricuspid Valve: There is moderate to severe regurgitation.    Pulmonic Valve: There is no stenosis.    Pulmonary Artery: There is moderate pulmonary hypertension. The   estimated pulmonary artery systolic pressure is 60 mmHg.    IVC/SVC: Intermediate venous pressure at 8 mmHg.           Pulmonary hypertension  H/o on prior echo  - diuresis for CHF already in ordered    Periprosthetic fracture of shaft of right femur s/p ORIF on 3/8/2025    H/o with recent surgery  - wound vac in place. Orthopedics planning to remove in the next day or two    Hyponatremia  Hyponatremia is likely due to Heart Failure. The patient's most recent sodium results are listed below.  Recent Labs      03/20/25  1021 03/21/25  0549 03/22/25  0558   * 135* 136     Plan  - Correct the sodium by 4-6mEq in 24 hours.   - Will treat the hyponatremia with Fluid restriction of:  1.5 liter per day  - Monitor sodium Daily.   - Patient hyponatremia is stable  - acute on chronic in the setting of CHF exacerbation   Moderate protein-calorie malnutrition  Nutrition consulted. Most recent weight and BMI monitored-     Measurements:  Wt Readings from Last 1 Encounters:   03/21/25 70 kg (154 lb 5.2 oz)   Body mass index is 28.23 kg/m².    Patient has been screened and assessed by RD.    Malnutrition Type:  Context: chronic illness  Level: moderate    Malnutrition Characteristic Summary:  Energy Intake (Malnutrition): less than 75% for greater than or equal to 1 month  Subcutaneous Fat (Malnutrition): moderate depletion  Muscle Mass (Malnutrition): moderate depletion    Interventions/Recommendations (treatment strategy):  1. Continue low sodium diet 2. Add Boost plus TID if medically warranted 3. RD following    Pneumonia  -Blood cultures w/ NGTD.   - Will d/c Zosyn and transition to Augmentin. Continue doxycycline.     Acute hypoxic respiratory failure  Patient with Hypoxic Respiratory failure which is Acute.  she is not on home oxygen. Supplemental oxygen was provided and noted-      .   Signs/symptoms of respiratory failure include- increased work of breathing, respiratory distress, and use of accessory muscles. Contributing diagnoses includes - CHF and Pneumonia Labs and images were reviewed. Patient Has not had a recent ABG. Will treat underlying causes and adjust management of respiratory failure as follows-     - 2/2 pna vs chf  - titrate oxygen as tolerated   Anemia  Anemia is likely due to chronic disease due to heart failure . Most recent hemoglobin and hematocrit are listed below.  Recent Labs     03/20/25  0427 03/20/25  1021 03/21/25  0549   HGB 8.5* 9.2* 8.2*   HCT 26.1* 29.0* 25.2*     Plan  - Monitor serial  CBC: Daily  - Transfuse PRBC if patient becomes hemodynamically unstable, symptomatic or H/H drops below 7/21.  - Patient has not received any PRBC transfusions to date  - Patient's anemia is currently stable  -   Hypokalemia  Patient's most recent potassium results are listed below.   Recent Labs     03/20/25  1021 03/21/25  0549 03/22/25  0558   K 3.6 3.1* 3.0*     Plan  - Replete potassium per protocol  - Monitor potassium Daily  - Patient's hypokalemia is stable    VTE Risk Mitigation (From admission, onward)           Ordered     apixaban tablet 5 mg  2 times daily         03/20/25 1509                    Discharge Planning   ISH: 3/24/2025     Code Status: Full Code   Medical Readiness for Discharge Date:   Discharge Plan A: Skilled Nursing Facility (Re-admit OSNF)                Please place Justification for DME        Lake Tate MD  Department of Hospital Medicine   [facility] - Internal Medicine Telemetry

## 2025-03-22 NOTE — ASSESSMENT & PLAN NOTE
The patient's most recent echocardiogram result is listed below.     Echo  Result Date: 3/21/2025    Left Ventricle: The left ventricle is normal in size. Mildly increased   ventricular mass. Normal wall thickness. There is mild concentric   hypertrophy. Normal wall motion. There is normal systolic function with a   visually estimated ejection fraction of 65 - 70%. There is normal   diastolic function.    Right Ventricle: The right ventricle is normal in size. Wall thickness   is normal. Systolic function is normal. Pacemaker lead present in the   ventricle.    Left Atrium: Severely dilated    Right Atrium: Right atrium is moderately dilated.    Aortic Valve: The aortic valve is a trileaflet valve. There is severe   aortic valve sclerosis. There is moderate annular calcification present.   Moderately restricted motion. There is moderate to severe low flow   stenosis. Aortic valve area by VTI is 0.8 cm2. Aortic valve peak velocity   is 3.4 m/s. Mean gradient is 25 mmHg. The dimensionless index is 0.27. AS   severity has increased compared to study 1/24    Mitral Valve: There is severe posterior mitral annular calcification.    Aorta: Aortic root is normal in size measuring 2.62 cm. Aortic root at   ST junction is normal. Ascending aorta is normal measuring 2.62 cm.    Pulmonary Artery: There is mild pulmonary hypertension. The estimated   pulmonary artery systolic pressure is 44 mmHg.    IVC/SVC: Normal venous pressure at 3 mmHg.    Pericardium: There is no pericardial effusion.        Echo  Result Date: 6/20/2024    Left Ventricle: The left ventricle is normal in size. Normal wall   thickness. There is concentric hypertrophy. There is normal systolic   function with a visually estimated ejection fraction of 65 - 70%. Unable   to assess diastolic function due to atrial fibrillation.    Right Ventricle: Normal right ventricular cavity size. Wall thickness   is normal. Systolic function is normal.    Left Atrium: Left  atrium is severely dilated.    Right Atrium: Right atrium is severely dilated.    Mitral Valve: There is moderate posterior leaflet sclerosis. There is   no stenosis. The mean pressure gradient across the mitral valve is 1 mmHg   at a heart rate of 60 bpm. There is moderate regurgitation.    Tricuspid Valve: There is moderate to severe regurgitation.    Pulmonic Valve: There is no stenosis.    Pulmonary Artery: There is moderate pulmonary hypertension. The   estimated pulmonary artery systolic pressure is 60 mmHg.    IVC/SVC: Intermediate venous pressure at 8 mmHg.

## 2025-03-22 NOTE — ASSESSMENT & PLAN NOTE
Patient's most recent potassium results are listed below.   Recent Labs     03/20/25  1021 03/21/25  0549 03/22/25  0558   K 3.6 3.1* 3.0*     Plan  - Replete potassium per protocol  - Monitor potassium Daily  - Patient's hypokalemia is stable

## 2025-03-22 NOTE — SUBJECTIVE & OBJECTIVE
Interval History: No acute events overnight. Mental status near baseline. Pt complains of generalized  malaise and fatigue. Son, Ed, at bedside.     Review of Systems   Constitutional:  Positive for fatigue. Negative for chills and fever.   HENT:  Negative for congestion, hearing loss, sinus pressure, sinus pain, trouble swallowing and voice change.    Respiratory:  Positive for shortness of breath. Negative for cough.    Cardiovascular:  Positive for leg swelling.   Gastrointestinal:  Negative for abdominal pain, constipation, diarrhea, nausea and vomiting.   Genitourinary:  Negative for difficulty urinating.   Musculoskeletal:  Negative for arthralgias and back pain.   Skin:  Negative for pallor, rash and wound.   Neurological:  Positive for weakness. Negative for dizziness, light-headedness and headaches.   Psychiatric/Behavioral:  Negative for agitation and behavioral problems.      Objective:     Vital Signs (Most Recent):  Temp: 97.7 °F (36.5 °C) (03/22/25 0802)  Pulse: (!) 59 (03/22/25 0802)  Resp: 18 (03/22/25 0549)  BP: (!) 113/49 (03/22/25 0802)  SpO2: 95 % (03/22/25 0802) Vital Signs (24h Range):  Temp:  [97.6 °F (36.4 °C)-98 °F (36.7 °C)] 97.7 °F (36.5 °C)  Pulse:  [59-75] 59  Resp:  [17-18] 18  SpO2:  [95 %-99 %] 95 %  BP: (113-164)/(49-75) 113/49     Weight: 70 kg (154 lb 5.2 oz)  Body mass index is 28.23 kg/m².    Intake/Output Summary (Last 24 hours) at 3/22/2025 1114  Last data filed at 3/22/2025 0909  Gross per 24 hour   Intake 540 ml   Output 1900 ml   Net -1360 ml      Physical Exam  Vitals and nursing note reviewed.   Constitutional:       General: She is not in acute distress.  HENT:      Head: Normocephalic and atraumatic.      Nose: Nose normal.      Mouth/Throat:      Mouth: Mucous membranes are moist.   Eyes:      General: No scleral icterus.     Extraocular Movements: Extraocular movements intact.   Cardiovascular:      Rate and Rhythm: Normal rate and regular rhythm.      Heart sounds:  "Murmur heard.      No friction rub. No gallop.   Pulmonary:      Effort: Pulmonary effort is normal. No respiratory distress.      Breath sounds: No wheezing, rhonchi or rales.   Abdominal:      General: There is no distension.      Palpations: Abdomen is soft.      Tenderness: There is no abdominal tenderness. There is no guarding or rebound.   Musculoskeletal:         General: Normal range of motion.      Cervical back: Neck supple. No rigidity.      Right lower leg: Edema present.      Left lower leg: Edema present.   Skin:     General: Skin is warm and dry.      Capillary Refill: Capillary refill takes less than 2 seconds.   Neurological:      General: No focal deficit present.      Mental Status: She is alert.   Psychiatric:         Mood and Affect: Mood normal.         MELD 3.0: 12 at 3/9/2025  3:41 AM  MELD-Na: 7 at 3/9/2025  3:41 AM  Calculated from:  Serum Creatinine: 0.9 mg/dL (Using min of 1 mg/dL) at 3/9/2025  3:41 AM  Serum Sodium: 133 mmol/L at 3/9/2025  3:41 AM  Total Bilirubin: 0.4 mg/dL (Using min of 1 mg/dL) at 3/9/2025  3:41 AM  Serum Albumin: 3 g/dL at 3/9/2025  3:41 AM  INR(ratio): 1.1 at 3/7/2025  1:24 PM  Age at listing (hypothetical): 89 years  Sex: Female at 3/9/2025  3:41 AM      Significant Labs:  CBC:  Recent Labs   Lab 03/21/25  0549   WBC 11.29   HGB 8.2*   HCT 25.2*   *     CMP:  Recent Labs   Lab 03/21/25  0549 03/22/25  0558   * 136   K 3.1* 3.0*   CL 90* 93*   CO2 33* 33*   *  --    BUN 31* 31*   CREATININE 0.6 0.8   CALCIUM 9.5 8.8   ANIONGAP 12 10     PTINR:  No results for input(s): "INR" in the last 48 hours.    Significant Procedures:   Dobutamine Stress Test with Color Flow: No results found for this or any previous visit.    "

## 2025-03-22 NOTE — ASSESSMENT & PLAN NOTE
Patient has diastolic heart failure that is Acute on chronic. On presentation their CHF was decompensated. Evidence of decompensated CHF on presentation includes: edema, crackles on lung auscultation, dyspnea on exertion (SMITH), and shortness of breath. The etiology of their decompensation is likely increased fluid intake. Most recent BNP and echo results are listed below.  Recent Labs     03/20/25  1021   BNP 1,625*     Latest ECHO  Results for orders placed during the hospital encounter of 06/20/24    Echo    Interpretation Summary    Left Ventricle: The left ventricle is normal in size. Normal wall thickness. There is concentric hypertrophy. There is normal systolic function with a visually estimated ejection fraction of 65 - 70%. Unable to assess diastolic function due to atrial fibrillation.    Right Ventricle: Normal right ventricular cavity size. Wall thickness is normal. Systolic function is normal.    Left Atrium: Left atrium is severely dilated.    Right Atrium: Right atrium is severely dilated.    Mitral Valve: There is moderate posterior leaflet sclerosis. There is no stenosis. The mean pressure gradient across the mitral valve is 1 mmHg at a heart rate of 60 bpm. There is moderate regurgitation.    Tricuspid Valve: There is moderate to severe regurgitation.    Pulmonic Valve: There is no stenosis.    Pulmonary Artery: There is moderate pulmonary hypertension. The estimated pulmonary artery systolic pressure is 60 mmHg.    IVC/SVC: Intermediate venous pressure at 8 mmHg.    Current Heart Failure Medications  hydroCHLOROthiazide tablet 12.5 mg, Daily, Oral  furosemide injection 40 mg, Every 12 hours, Intravenous    Plan  - Monitor strict I&Os and daily weights.    - Place on telemetry  - Low sodium diet  - Place on fluid restriction of 1.5 L.   - Cardiology has not been consulted  - The patient's volume status is increased  - BID lasix  - could also need increased bumex on discharge with sliding scale  diuretics to prevent relapse

## 2025-03-22 NOTE — ASSESSMENT & PLAN NOTE
Patient has persistent (7 days or more) atrial fibrillation. Patient is currently in sinus rhythm. [id number] Score: 4. The patients heart rate in the last 24 hours is as follows:  Pulse  Min: 59  Max: 75     Antiarrhythmics       Anticoagulants  apixaban tablet 5 mg, 2 times daily, Oral    Plan  - Replete lytes with a goal of K>4, Mg >2  - Patient is anticoagulated, see medications listed above.  - Patient's afib is currently controlled  - holding BB due to CHF exacerbation. Likely to restart in the next day or two

## 2025-03-23 LAB
ANION GAP (OHS): 8 MMOL/L (ref 8–16)
BUN SERPL-MCNC: 24 MG/DL (ref 10–30)
CALCIUM SERPL-MCNC: 8.8 MG/DL (ref 8.7–10.5)
CHLORIDE SERPL-SCNC: 95 MMOL/L (ref 95–110)
CO2 SERPL-SCNC: 34 MMOL/L (ref 23–29)
CREAT SERPL-MCNC: 0.7 MG/DL (ref 0.5–1.4)
GFR SERPLBLD CREATININE-BSD FMLA CKD-EPI: >60 ML/MIN/1.73/M2
GLUCOSE SERPL-MCNC: 94 MG/DL (ref 70–110)
POTASSIUM SERPL-SCNC: 3.7 MMOL/L (ref 3.5–5.1)
SODIUM SERPL-SCNC: 137 MMOL/L (ref 136–145)

## 2025-03-23 PROCEDURE — 25000003 PHARM REV CODE 250

## 2025-03-23 PROCEDURE — 36415 COLL VENOUS BLD VENIPUNCTURE: CPT

## 2025-03-23 PROCEDURE — 25000003 PHARM REV CODE 250: Performed by: INTERNAL MEDICINE

## 2025-03-23 PROCEDURE — 82310 ASSAY OF CALCIUM: CPT

## 2025-03-23 PROCEDURE — 63600175 PHARM REV CODE 636 W HCPCS: Performed by: INTERNAL MEDICINE

## 2025-03-23 PROCEDURE — 21400001 HC TELEMETRY ROOM

## 2025-03-23 PROCEDURE — 63600175 PHARM REV CODE 636 W HCPCS

## 2025-03-23 RX ORDER — IPRATROPIUM BROMIDE AND ALBUTEROL SULFATE 2.5; .5 MG/3ML; MG/3ML
3 SOLUTION RESPIRATORY (INHALATION) EVERY 6 HOURS PRN
Status: DISCONTINUED | OUTPATIENT
Start: 2025-03-23 | End: 2025-03-26 | Stop reason: HOSPADM

## 2025-03-23 RX ORDER — FUROSEMIDE 10 MG/ML
60 INJECTION INTRAMUSCULAR; INTRAVENOUS EVERY 12 HOURS
Status: DISCONTINUED | OUTPATIENT
Start: 2025-03-23 | End: 2025-03-24

## 2025-03-23 RX ADMIN — APIXABAN 5 MG: 5 TABLET, FILM COATED ORAL at 09:03

## 2025-03-23 RX ADMIN — FUROSEMIDE 40 MG: 10 INJECTION, SOLUTION INTRAVENOUS at 10:03

## 2025-03-23 RX ADMIN — APIXABAN 5 MG: 5 TABLET, FILM COATED ORAL at 10:03

## 2025-03-23 RX ADMIN — DOXYCYCLINE HYCLATE 100 MG: 100 TABLET, COATED ORAL at 10:03

## 2025-03-23 RX ADMIN — CLOPIDOGREL 75 MG: 75 TABLET ORAL at 10:03

## 2025-03-23 RX ADMIN — AMLODIPINE BESYLATE 5 MG: 5 TABLET ORAL at 10:03

## 2025-03-23 RX ADMIN — AMOXICILLIN AND CLAVULANATE POTASSIUM 1 TABLET: 875; 125 TABLET, FILM COATED ORAL at 09:03

## 2025-03-23 RX ADMIN — AMOXICILLIN AND CLAVULANATE POTASSIUM 1 TABLET: 875; 125 TABLET, FILM COATED ORAL at 10:03

## 2025-03-23 RX ADMIN — LEVOTHYROXINE SODIUM 88 MCG: 0.05 TABLET ORAL at 06:03

## 2025-03-23 RX ADMIN — FUROSEMIDE 60 MG: 10 INJECTION, SOLUTION INTRAMUSCULAR; INTRAVENOUS at 09:03

## 2025-03-23 RX ADMIN — ACETAMINOPHEN 650 MG: 325 TABLET ORAL at 10:03

## 2025-03-23 RX ADMIN — ESCITALOPRAM OXALATE 20 MG: 10 TABLET ORAL at 10:03

## 2025-03-23 RX ADMIN — DOXYCYCLINE HYCLATE 100 MG: 100 TABLET, COATED ORAL at 09:03

## 2025-03-23 RX ADMIN — ACETAMINOPHEN 650 MG: 325 TABLET ORAL at 04:03

## 2025-03-23 RX ADMIN — POLYETHYLENE GLYCOL 3350 17 G: 17 POWDER, FOR SOLUTION ORAL at 10:03

## 2025-03-23 RX ADMIN — HYDROCHLOROTHIAZIDE 12.5 MG: 12.5 TABLET ORAL at 10:03

## 2025-03-23 NOTE — SUBJECTIVE & OBJECTIVE
Interval History: Pt w/ increased dyspnea overnight. Notes improvement this morning. No other concerns.     Review of Systems   Constitutional:  Positive for fatigue. Negative for chills and fever.   HENT:  Negative for congestion, hearing loss, trouble swallowing and voice change.    Eyes:  Negative for pain and visual disturbance.   Respiratory:  Positive for shortness of breath. Negative for cough.    Cardiovascular:  Positive for leg swelling. Negative for chest pain.   Gastrointestinal:  Negative for abdominal pain, constipation, diarrhea, nausea and vomiting.   Genitourinary:  Negative for difficulty urinating.   Musculoskeletal:  Negative for arthralgias.   Skin:  Negative for pallor, rash and wound.   Neurological:  Positive for weakness. Negative for dizziness, light-headedness and headaches.   Psychiatric/Behavioral:  Negative for agitation and behavioral problems.      Objective:     Vital Signs (Most Recent):  Temp: 98.2 °F (36.8 °C) (03/23/25 0803)  Pulse: 100 (03/23/25 0803)  Resp: 18 (03/23/25 0803)  BP: 130/72 (03/23/25 0803)  SpO2: 99 % (03/23/25 0803) Vital Signs (24h Range):  Temp:  [97.5 °F (36.4 °C)-98.7 °F (37.1 °C)] 98.2 °F (36.8 °C)  Pulse:  [] 100  Resp:  [18-20] 18  SpO2:  [98 %-100 %] 99 %  BP: (112-130)/(53-73) 130/72     Weight: 70 kg (154 lb 5.2 oz)  Body mass index is 28.23 kg/m².    Intake/Output Summary (Last 24 hours) at 3/23/2025 1058  Last data filed at 3/23/2025 0535  Gross per 24 hour   Intake --   Output 1650 ml   Net -1650 ml      Physical Exam  Vitals and nursing note reviewed.   Constitutional:       General: She is not in acute distress.     Appearance: She is not toxic-appearing.   HENT:      Head: Normocephalic and atraumatic.      Nose: Nose normal.      Mouth/Throat:      Mouth: Mucous membranes are moist.   Eyes:      General: No scleral icterus.     Extraocular Movements: Extraocular movements intact.   Cardiovascular:      Rate and Rhythm: Normal rate and  "regular rhythm.      Heart sounds: Murmur heard.      No friction rub. No gallop.   Pulmonary:      Effort: Pulmonary effort is normal. No respiratory distress.      Breath sounds: No rhonchi or rales.   Abdominal:      General: There is no distension.      Palpations: Abdomen is soft.      Tenderness: There is no abdominal tenderness. There is no guarding or rebound.   Musculoskeletal:         General: Normal range of motion.      Cervical back: Neck supple.   Skin:     General: Skin is warm and dry.      Capillary Refill: Capillary refill takes less than 2 seconds.   Neurological:      General: No focal deficit present.      Mental Status: She is alert.   Psychiatric:         Mood and Affect: Mood normal.         MELD 3.0: 12 at 3/9/2025  3:41 AM  MELD-Na: 7 at 3/9/2025  3:41 AM  Calculated from:  Serum Creatinine: 0.9 mg/dL (Using min of 1 mg/dL) at 3/9/2025  3:41 AM  Serum Sodium: 133 mmol/L at 3/9/2025  3:41 AM  Total Bilirubin: 0.4 mg/dL (Using min of 1 mg/dL) at 3/9/2025  3:41 AM  Serum Albumin: 3 g/dL at 3/9/2025  3:41 AM  INR(ratio): 1.1 at 3/7/2025  1:24 PM  Age at listing (hypothetical): 89 years  Sex: Female at 3/9/2025  3:41 AM      Significant Labs:  CBC:  No results for input(s): "WBC", "HGB", "HCT", "PLT" in the last 48 hours.  CMP:  Recent Labs   Lab 03/22/25  0558 03/23/25  0739    137   K 3.0* 3.7   CL 93* 95   CO2 33* 34*   BUN 31* 24   CREATININE 0.8 0.7   CALCIUM 8.8 8.8   ANIONGAP 10 8     PTINR:  No results for input(s): "INR" in the last 48 hours.    Significant Procedures:   Dobutamine Stress Test with Color Flow: No results found for this or any previous visit.    None  "

## 2025-03-23 NOTE — ASSESSMENT & PLAN NOTE
"Anemia is likely due to chronic disease due to heart failure. Most recent hemoglobin and hematocrit are listed below.  No results for input(s): "HGB", "HCT" in the last 72 hours.  Plan  - Monitor serial CBC: Daily  - Transfuse PRBC if patient becomes hemodynamically unstable, symptomatic or H/H drops below 7/21.  - Patient has not received any PRBC transfusions to date  - Patient's anemia is currently stable  -   "

## 2025-03-23 NOTE — ASSESSMENT & PLAN NOTE
Hyponatremia is likely due to Heart Failure. The patient's most recent sodium results are listed below.  Recent Labs     03/22/25  0558 03/23/25  0739 03/24/25  0600    137 137     Plan  - Will treat the hyponatremia with Fluid restriction of:  1.5 liter per day  - Monitor sodium Daily.   - Patient hyponatremia is stable  - acute on chronic in the setting of CHF exacerbation

## 2025-03-23 NOTE — ASSESSMENT & PLAN NOTE
"Patient has diastolic heart failure that is Acute on chronic. On presentation their CHF was decompensated. Evidence of decompensated CHF on presentation includes: edema, crackles on lung auscultation, dyspnea on exertion (SMITH), and shortness of breath. The etiology of their decompensation is likely increased fluid intake. Most recent BNP and echo results are listed below.  No results for input(s): "BNP" in the last 72 hours.    Latest ECHO  Results for orders placed during the hospital encounter of 06/20/24    Echo    Interpretation Summary    Left Ventricle: The left ventricle is normal in size. Normal wall thickness. There is concentric hypertrophy. There is normal systolic function with a visually estimated ejection fraction of 65 - 70%. Unable to assess diastolic function due to atrial fibrillation.    Right Ventricle: Normal right ventricular cavity size. Wall thickness is normal. Systolic function is normal.    Left Atrium: Left atrium is severely dilated.    Right Atrium: Right atrium is severely dilated.    Mitral Valve: There is moderate posterior leaflet sclerosis. There is no stenosis. The mean pressure gradient across the mitral valve is 1 mmHg at a heart rate of 60 bpm. There is moderate regurgitation.    Tricuspid Valve: There is moderate to severe regurgitation.    Pulmonic Valve: There is no stenosis.    Pulmonary Artery: There is moderate pulmonary hypertension. The estimated pulmonary artery systolic pressure is 60 mmHg.    IVC/SVC: Intermediate venous pressure at 8 mmHg.    Current Heart Failure Medications  hydroCHLOROthiazide tablet 12.5 mg, Daily, Oral  furosemide injection 60 mg, Every 12 hours, Intravenous    Plan  - Monitor strict I&Os and daily weights.    - Place on telemetry  - Low sodium diet  - Place on fluid restriction of 1.5 L.   - Cardiology has not been consulted  - The patient's volume status is increased  - Will increase Lasix to 60 mg IV BID.   - could also need increased bumex " on discharge with sliding scale diuretics to prevent relapse

## 2025-03-23 NOTE — ASSESSMENT & PLAN NOTE
Patient's blood pressure range in the last 24 hours was: BP  Min: 107/63  Max: 130/72.The patient's inpatient anti-hypertensive regimen is listed below:  Current Antihypertensives  amLODIPine tablet 5 mg, Daily, Oral  hydroCHLOROthiazide tablet 12.5 mg, Daily, Oral  furosemide injection 60 mg, Every 12 hours, Intravenous    Plan  - BP is controlled, no changes needed to their regimen

## 2025-03-24 LAB
ANION GAP (OHS): 7 MMOL/L (ref 8–16)
BUN SERPL-MCNC: 17 MG/DL (ref 10–30)
CALCIUM SERPL-MCNC: 9.2 MG/DL (ref 8.7–10.5)
CHLORIDE SERPL-SCNC: 92 MMOL/L (ref 95–110)
CO2 SERPL-SCNC: 38 MMOL/L (ref 23–29)
CREAT SERPL-MCNC: 0.6 MG/DL (ref 0.5–1.4)
GFR SERPLBLD CREATININE-BSD FMLA CKD-EPI: >60 ML/MIN/1.73/M2
GLUCOSE SERPL-MCNC: 94 MG/DL (ref 70–110)
POTASSIUM SERPL-SCNC: 3.2 MMOL/L (ref 3.5–5.1)
SODIUM SERPL-SCNC: 137 MMOL/L (ref 136–145)

## 2025-03-24 PROCEDURE — 63600175 PHARM REV CODE 636 W HCPCS: Performed by: INTERNAL MEDICINE

## 2025-03-24 PROCEDURE — 97530 THERAPEUTIC ACTIVITIES: CPT

## 2025-03-24 PROCEDURE — 97535 SELF CARE MNGMENT TRAINING: CPT

## 2025-03-24 PROCEDURE — 36415 COLL VENOUS BLD VENIPUNCTURE: CPT

## 2025-03-24 PROCEDURE — 80048 BASIC METABOLIC PNL TOTAL CA: CPT

## 2025-03-24 PROCEDURE — 25000003 PHARM REV CODE 250: Performed by: INTERNAL MEDICINE

## 2025-03-24 PROCEDURE — 21400001 HC TELEMETRY ROOM

## 2025-03-24 PROCEDURE — 25000003 PHARM REV CODE 250

## 2025-03-24 RX ORDER — POTASSIUM CHLORIDE 20 MEQ/1
40 TABLET, EXTENDED RELEASE ORAL 2 TIMES DAILY
Status: COMPLETED | OUTPATIENT
Start: 2025-03-24 | End: 2025-03-24

## 2025-03-24 RX ORDER — BUMETANIDE 1 MG/1
2 TABLET ORAL 2 TIMES DAILY
Status: DISCONTINUED | OUTPATIENT
Start: 2025-03-24 | End: 2025-03-26 | Stop reason: HOSPADM

## 2025-03-24 RX ORDER — MUPIROCIN 20 MG/G
OINTMENT TOPICAL 2 TIMES DAILY
Status: DISCONTINUED | OUTPATIENT
Start: 2025-03-24 | End: 2025-03-26 | Stop reason: HOSPADM

## 2025-03-24 RX ADMIN — AMOXICILLIN AND CLAVULANATE POTASSIUM 1 TABLET: 875; 125 TABLET, FILM COATED ORAL at 09:03

## 2025-03-24 RX ADMIN — DOXYCYCLINE HYCLATE 100 MG: 100 TABLET, COATED ORAL at 09:03

## 2025-03-24 RX ADMIN — APIXABAN 5 MG: 5 TABLET, FILM COATED ORAL at 09:03

## 2025-03-24 RX ADMIN — CLOPIDOGREL 75 MG: 75 TABLET ORAL at 09:03

## 2025-03-24 RX ADMIN — POLYETHYLENE GLYCOL 3350 17 G: 17 POWDER, FOR SOLUTION ORAL at 09:03

## 2025-03-24 RX ADMIN — AMLODIPINE BESYLATE 5 MG: 5 TABLET ORAL at 09:03

## 2025-03-24 RX ADMIN — MUPIROCIN: 20 OINTMENT TOPICAL at 09:03

## 2025-03-24 RX ADMIN — ESCITALOPRAM OXALATE 20 MG: 10 TABLET ORAL at 09:03

## 2025-03-24 RX ADMIN — LEVOTHYROXINE SODIUM 88 MCG: 0.05 TABLET ORAL at 05:03

## 2025-03-24 RX ADMIN — POTASSIUM CHLORIDE 40 MEQ: 1500 TABLET, EXTENDED RELEASE ORAL at 09:03

## 2025-03-24 RX ADMIN — HYDROCHLOROTHIAZIDE 12.5 MG: 12.5 TABLET ORAL at 09:03

## 2025-03-24 RX ADMIN — MUPIROCIN: 20 OINTMENT TOPICAL at 11:03

## 2025-03-24 RX ADMIN — ACETAMINOPHEN 650 MG: 325 TABLET ORAL at 09:03

## 2025-03-24 RX ADMIN — FUROSEMIDE 60 MG: 10 INJECTION, SOLUTION INTRAMUSCULAR; INTRAVENOUS at 09:03

## 2025-03-24 RX ADMIN — BUMETANIDE 2 MG: 1 TABLET ORAL at 06:03

## 2025-03-24 NOTE — ASSESSMENT & PLAN NOTE
Patient's most recent potassium results are listed below.   Recent Labs     03/22/25  0558 03/23/25  0739 03/24/25  0600   K 3.0* 3.7 3.2*     Plan  - Replete potassium per protocol  - Monitor potassium Daily  - Patient's hypokalemia is stable

## 2025-03-24 NOTE — SUBJECTIVE & OBJECTIVE
Interval History: No acute events     Review of Systems   Constitutional:  Positive for fatigue. Negative for chills and fever.   HENT:  Negative for congestion, hearing loss, mouth sores, rhinorrhea, trouble swallowing and voice change.    Eyes:  Negative for pain and visual disturbance.   Respiratory:  Positive for shortness of breath. Negative for cough.    Cardiovascular:  Positive for leg swelling. Negative for chest pain.   Gastrointestinal:  Negative for abdominal pain, constipation, diarrhea, nausea and vomiting.   Genitourinary:  Negative for difficulty urinating.   Musculoskeletal:  Negative for arthralgias and back pain.   Skin:  Positive for wound. Negative for pallor and rash.   Neurological:  Negative for dizziness and light-headedness.   Psychiatric/Behavioral:  Negative for agitation and behavioral problems.      Objective:     Vital Signs (Most Recent):  Temp: 98.1 °F (36.7 °C) (03/24/25 0802)  Pulse: 97 (03/24/25 0802)  Resp: 18 (03/24/25 0802)  BP: 121/68 (03/24/25 0802)  SpO2: 97 % (03/24/25 0802) Vital Signs (24h Range):  Temp:  [97.7 °F (36.5 °C)-99.2 °F (37.3 °C)] 98.1 °F (36.7 °C)  Pulse:  [] 97  Resp:  [17-18] 18  SpO2:  [97 %-100 %] 97 %  BP: (107-121)/(63-70) 121/68     Weight: 70 kg (154 lb 5.2 oz)  Body mass index is 28.23 kg/m².    Intake/Output Summary (Last 24 hours) at 3/24/2025 1142  Last data filed at 3/24/2025 0510  Gross per 24 hour   Intake 180 ml   Output 2900 ml   Net -2720 ml      Physical Exam  Vitals and nursing note reviewed.   Constitutional:       General: She is not in acute distress.     Appearance: Normal appearance.   HENT:      Head: Normocephalic and atraumatic.      Nose: Nose normal.      Mouth/Throat:      Mouth: Mucous membranes are moist.   Eyes:      General: No scleral icterus.     Extraocular Movements: Extraocular movements intact.   Cardiovascular:      Rate and Rhythm: Normal rate and regular rhythm.      Heart sounds: Murmur heard.      No  "friction rub. No gallop.   Pulmonary:      Effort: No respiratory distress.      Breath sounds: Normal breath sounds. No wheezing, rhonchi or rales.   Abdominal:      General: There is no distension.      Palpations: Abdomen is soft.      Tenderness: There is no abdominal tenderness. There is no guarding.   Musculoskeletal:         General: Normal range of motion.      Cervical back: Neck supple. No rigidity.   Skin:     General: Skin is warm and dry.      Capillary Refill: Capillary refill takes less than 2 seconds.   Neurological:      General: No focal deficit present.      Mental Status: She is alert.   Psychiatric:         Mood and Affect: Mood normal.         MELD 3.0: 12 at 3/9/2025  3:41 AM  MELD-Na: 7 at 3/9/2025  3:41 AM  Calculated from:  Serum Creatinine: 0.9 mg/dL (Using min of 1 mg/dL) at 3/9/2025  3:41 AM  Serum Sodium: 133 mmol/L at 3/9/2025  3:41 AM  Total Bilirubin: 0.4 mg/dL (Using min of 1 mg/dL) at 3/9/2025  3:41 AM  Serum Albumin: 3 g/dL at 3/9/2025  3:41 AM  INR(ratio): 1.1 at 3/7/2025  1:24 PM  Age at listing (hypothetical): 89 years  Sex: Female at 3/9/2025  3:41 AM      Significant Labs:  CBC:  No results for input(s): "WBC", "HGB", "HCT", "PLT" in the last 48 hours.  CMP:  Recent Labs   Lab 03/23/25  0739 03/24/25  0600    137   K 3.7 3.2*   CL 95 92*   CO2 34* 38*   BUN 24 17   CREATININE 0.7 0.6   CALCIUM 8.8 9.2   ANIONGAP 8 7*     PTINR:  No results for input(s): "INR" in the last 48 hours.    Significant Procedures:   Dobutamine Stress Test with Color Flow: No results found for this or any previous visit.    None  "

## 2025-03-24 NOTE — ASSESSMENT & PLAN NOTE
Nutrition consulted. Most recent weight and BMI monitored-     Measurements:  Wt Readings from Last 1 Encounters:   03/24/25 70 kg (154 lb 5.2 oz)   Body mass index is 28.23 kg/m².    Patient has been screened and assessed by RD.    Malnutrition Type:  Context: chronic illness  Level: moderate    Malnutrition Characteristic Summary:  Energy Intake (Malnutrition): less than 75% for greater than or equal to 1 month  Subcutaneous Fat (Malnutrition): moderate depletion  Muscle Mass (Malnutrition): moderate depletion    Interventions/Recommendations (treatment strategy):  1. Continue low sodium diet 2. Add Boost plus TID if medically warranted 3. RD following

## 2025-03-24 NOTE — CONSULTS
[facility] - Internal Medicine Telemetry    Wound Care     Patient Name:  Dorothy M Knobloch  MRN:  [id number]  Date: 3/24/2025  Diagnosis: Acute on chronic congestive heart failure     History:  Past Medical History:   Diagnosis Date    Acquired hypothyroidism 12/13/2013    Age-related physical debility 04/05/2024    Antiplatelet or antithrombotic long-term use     AV block, 3rd degree 03/19/2019    Bilateral nonexudative age-related macular degeneration 08/27/2013    Blindness and low vision 08/10/2018    Blood transfusion     Cardiac pacemaker 08/22/2018    Carotid artery disease 08/10/2016    Chronic diastolic congestive heart failure 11/24/2019    CRAO (central retinal artery occlusion), left 01/29/2018    Dry eye syndrome of both eyes 08/10/2018    Essential hypertension 12/13/2013    History of TIA (transient ischemic attack) 12/13/2013    Nonexudative age-related macular degeneration, bilateral, intermediate dry stage 07/12/2018    Nuclear sclerotic cataract of left eye 08/10/2018    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   Labs:   WBC (K/uL)   Date Value   03/21/2025 11.29   03/20/2025 15.36 (H)     Glucose (mg/dL)   Date Value   03/21/2025 121 (H)   03/20/2025 130 (H)     Albumin (g/dL)   Date Value   03/20/2025 2.9 (L)   03/10/2025 2.8 (L)     [employer] Score: 16  Nutrition sub-score: 3  Chart review reveals pt is incontinent of bowel and bladder, PureWick in  "use.   Pt has been seen and being followed by Nutrition.   Narrative:  Pt seen for WC consultation and agreed to assessment.  Chart reviewed for this encounter.   See Flow Sheet for additional documentation and media.    Pt laying supine in bed with pillow to elevate bilateral legs elevated on pillow. Prevena wound vac noted to incision line, vac is off, foam not collapsed, pt and family unable to provide accurate timeline when vac may have stopped working. Adhesive remover used to take vac drape and foam off, incision line sutures intact, no s/s of infection, well approximated, left open to air, periwound bruised.   Bedside nurse present to assist in "log rolling" pt for buttock assessment revealing pink, blanchable area. Triad currently being applied.     Bilateral heels assessed revealing clean, intact, blanchable erythema, no induration, no bogginess, no open wounds noted.     Waffle ordered, EHOB boots in room at bedside placed on pt with education how to use as well as proper use of waffle overlay.     RECOMMENDATIONS:  Bedside nurse assess for acute changes (purulence, increased redness/swelling, increased drainage, malodor, increased pain, pallor, necrosis) please contact physician on any acute changes.    Ensure waffle is placed and properly inflated  Ensure EHOB boots in place bilaterally  Ensure Triad is applied per orders  Ensure q2h turn protocol - Use purple wedge / pillow to ensure turning  Ensure pt is clean and dry   No foam borders if pt is incontinent  PIP Protocol    Discussed POC with patient and primary nurse.   See EMR for orders & patient education.     Discussed nutrition and the role of protein in wound healing with the patient. Instructed patient to optimize protein for wound healing.     Bedside nursing to continue care, dressing changes, & continue monitoring.  Bedside nursing to maintain pressure injury prevention interventions, (PIP).     Recommendations made to primary team for above " plan.    Thank you for the consult. Wound Care will continue to follow.     03/24/25 0630   WOCN Assessment   WOCN Total Time (mins) 30   Visit Date 03/24/25   Visit Time 0630   Consult Type New   WOCN Speciality Wound   Wound surgical;moisture;At risk for pressure Injury   Intervention chart review;assessed;changed;applied;orders   Teaching on-going        Wound 03/08/25 0950 Incision Right lateral Thigh   Date First Assessed/Time First Assessed: 03/08/25 0950   Primary Wound Type: Incision  Side: Right  Orientation: lateral  Location: Thigh  Closure Method: Sutures  Additional Comments: AQUACEL FOAM DRESSING, TEGADERM   Wound Image      Dressing Appearance other (see comments)  (Prevena incisional vac -)   Appearance Sutures intact;Intact   Tissue loss description Full thickness   Periwound Area Intact;Dry;Ecchymotic   Wound Edges Approximated   Wound Length (cm) 47 cm   Wound Width (cm) 0.1 cm   Wound Depth (cm) 0.1 cm   Wound Volume (cm^3) 0.246 cm^3   Wound Surface Area (cm^2) 3.69 cm^2   Care Cleansed with:;Antimicrobial agent   Periwound Care Dry periwound area maintained        Negative Pressure Wound Therapy  03/13/25 1500 Right anterior   Placement Date/Time: 03/13/25 1500   Side: Right  Orientation: anterior  Location: Thigh   NPWT Type Incision Management   Therapy Setting NPWT Vacuum off   Therapy Interventions NPWT other (see comments)  (Dressing removed)       Right heel     Left heel     Buttock                        [1]   Social History  Socioeconomic History    Marital status:    Tobacco Use    Smoking status: Never     Passive exposure: Never    Smokeless tobacco: Never   Substance and Sexual Activity    Alcohol use: No     Alcohol/week: 0.0 standard drinks of alcohol    Drug use: No   Social History Narrative    Cares for her blind       Social Drivers of Health     Financial Resource Strain: Low Risk  (3/21/2025)    Overall Financial Resource Strain (CARDIA)     Difficulty of  Paying Living Expenses: Not hard at all   Food Insecurity: No Food Insecurity (3/21/2025)    Hunger Vital Sign     Worried About Running Out of Food in the Last Year: Never true     Ran Out of Food in the Last Year: Never true   Transportation Needs: No Transportation Needs (3/21/2025)    PRAPARE - Transportation     Lack of Transportation (Medical): No     Lack of Transportation (Non-Medical): No   Stress: No Stress Concern Present (3/15/2025)    [employer] [place] [employer] - Occupational Stress Questionnaire     Feeling of Stress : Only a little   Housing Stability: Low Risk  (3/21/2025)    Housing Stability Vital Sign     Unable to Pay for Housing in the Last Year: No     Homeless in the Last Year: No

## 2025-03-24 NOTE — PT/OT/SLP PROGRESS
"Occupational Therapy   Treatment    Name: Dorothy M Knobloch  MRN: [id number]  Admitting Diagnosis:  Acute on chronic congestive heart failure       Recommendations:     Discharge Recommendations: Moderate Intensity Therapy  Discharge Equipment Recommendations:  wheelchair, bedside commode  Barriers to discharge:  Other (Comment) (increased skilled assistance required)    Assessment:     Dorothy M Knobloch is a 89 y.o. female with a medical diagnosis of Acute on chronic congestive heart failure.  She presents with the following performance deficits affecting function are weakness, impaired endurance, impaired self care skills, impaired functional mobility, impaired balance, decreased lower extremity function, decreased safety awareness, decreased coordination.     Pt agreeable to therapy and tolerated fairly well. Pt was cooperative & demonstrated good effort during the therapy session. However, due to fear of falling & generalized weakness, pt is limited with functional mobility/transfers & ADLs. Pt would continue to benefit from skilled OT services to maximize functional independence with ADLs and functional mobility, reduce caregiver burden, and facilitate safe discharge in the least restrictive environment.      Rehab Prognosis:  Good; patient would benefit from acute skilled OT services to address these deficits and reach maximum level of function.       Plan:     Patient to be seen 4 x/week to address the above listed problems via self-care/home management, therapeutic activities, therapeutic exercises  Plan of Care Expires: 04/20/25  Plan of Care Reviewed with: patient, family    Subjective     Chief Complaint: none reported  Patient/Family Comments/goals: "I felt like my feet were glued to the ground"  Pain/Comfort:  Pain Rating 1: 0/10    Objective:     Communicated with: nurse prior to session.  Patient found HOB elevated with PureWick, oxygen, telemetry upon OT entry to room.    General Precautions: Standard, " fall, vision impaired    Orthopedic Precautions:RLE weight bearing as tolerated  Braces: N/A  Respiratory Status: Nasal cannula, flow 4 L/min     Occupational Performance:     Bed Mobility:  (pt required additional time to complete tasks)  Patient completed Scooting to EOB with moderate assistance    Patient completed Supine to Sit with moderate assistance     Functional Mobility/Transfers:  Patient completed Sit -> Stand Transfer with moderate assistance  with  rolling walker   Pt required verbal cueing for hand placement to maintain pt safety    Patient completed Bed > Chair Transfer using Step Transfer technique with minimum assistance -> moderate assistance with rolling walker    Functional Mobility: pt is significantly limited due to B LE weakness. Pt was only able to complete 3-4 mini steps from bed->chair using RW    Activities of Daily Living:  Grooming: set-up assistance to complete oral/facial hygiene while seated in chair     Upper Body Dressing: minimum assistance to don gown over back while seated EOB    [facility] 6 Click ADL: 15    Treatment & Education:  -Education on task modification to maximize safety and (I) during ADLs and mobility  -Education on importance of OOB activity to maintain/improve overall strength, activity tolerance and promote recovery  -Pt educated to call for assistance and to transfer with hospital staff only  -Provided education regarding role of OT & POC with pt  verbalizing understanding. Pt had no further questions & when asked whether there were any concerns pt reported none.     Patient left up in chair with all lines intact, call button in reach, and daughter present    2nd session: 0013 - [id number]    Occupational Performance:     Bed Mobility:    Patient completed Sit to Supine with moderate assistance     Patient complete scooting/bridging to center of bed with CGA & verbal cues    Functional Mobility/Transfers:  Patient completed 2 Sit <> Stand Transfers with moderate  assistance  with  rolling walker   B knee blocking & gait belt utilized  Verbal cueing required for safe hand placement    Patient completed Chair -> Bed Transfer using Step Transfer technique with minimum assistance with  gait belt assist  Pt required increased verbal cueing for LE sequencing to improve coordination and complete transfer safely    Activities of Daily Living:  Lower Body Dressing: total assistance to adjust R sock while seated in chair    Patient left HOB elevated with all lines intact, call button in reach, bed alarm on, and niece present    GOALS:   Multidisciplinary Problems       Occupational Therapy Goals          Problem: Occupational Therapy    Goal Priority Disciplines Outcome Interventions   Occupational Therapy Goal     OT, PT/OT Progressing    Description: Goals to be met by: 4/20/25     Patient will increase functional independence with ADLs by performing:    UE Dressing with Stand-by Assistance.  LE Dressing with Minimal Assistance.  Grooming while standing at sink with Contact Guard Assistance.  Toileting from toilet with Minimal Assistance for hygiene and clothing management.   All functional transfers performed with CGA                         DME Justifications:   Carol requires a commode for home use because she is confined to a single room.  Dorothy Knobloch has a mobility limitation that significantly impairs her ability to participate in one or more mobility related activities of daily living (MRADLs) such as toileting, feeding, dressing, grooming, and bathing in customary locations in the home.  The mobility limitation cannot be sufficiently resolved by the use of a cane or walker.   The use of a manual wheelchair will significantly improve the patients ability to participate in MRADLS and the patient will use it on regular basis in the home.  Dorothy Knobloch has expressed her willingness to use a manual wheelchair in the home. Patients upper body strength is sufficient  for propulsion.  She also has a caregiver who is available, willing, and able to provide assistance with the wheelchair when needed.      Time Tracking:     OT Date of Treatment: 03/24/25  OT Start Time: 1st start: 1013  2nd start: 1339  OT Stop Time: 1st stop: 1056   2nd stop:1357  OT Total Time (min): 43 min + 18 min = 61 minutes    Billable Minutes:Self Care/Home Management 25  Therapeutic Activity 36    OT/CORBIN: OT          3/24/2025

## 2025-03-24 NOTE — PLAN OF CARE
Problem: Skin Injury Risk Increased  Goal: Skin Health and Integrity  Outcome: Progressing     Problem: Adult Inpatient Plan of Care  Goal: Plan of Care Review  Outcome: Progressing  Goal: Patient-Specific Goal (Individualized)  Outcome: Progressing  Goal: Absence of Hospital-Acquired Illness or Injury  Outcome: Progressing  Goal: Optimal Comfort and Wellbeing  Outcome: Progressing  Goal: Readiness for Transition of Care  Outcome: Progressing     Problem: Fall Injury Risk  Goal: Absence of Fall and Fall-Related Injury  Outcome: Progressing     Problem: Wound  Goal: Optimal Coping  Outcome: Progressing  Goal: Optimal Functional Ability  Outcome: Progressing  Goal: Absence of Infection Signs and Symptoms  Outcome: Progressing  Goal: Improved Oral Intake  Outcome: Progressing  Goal: Optimal Pain Control and Function  Outcome: Progressing  Goal: Skin Health and Integrity  Outcome: Progressing  Goal: Optimal Wound Healing  Outcome: Progressing     Problem: Pneumonia  Goal: Fluid Balance  Outcome: Progressing  Goal: Resolution of Infection Signs and Symptoms  Outcome: Progressing  Goal: Effective Oxygenation and Ventilation  Outcome: Progressing   Pt AAO X 4; able to express needs.  Wound vac in place and off.  Day nurse consulted wound care for today (on unit now).  Wound looks great.  Cleaned per wound nurse.  Xray in progress now.  Family at the bedside.  Safety maintained.  Bed in low position,  call  light in reach.

## 2025-03-24 NOTE — PROGRESS NOTES
[facility] Internal Medicine [facility] Medicine  Progress Note    Patient Name: Dorothy M Knobloch  MRN: [id number]  Patient Class: IP- Inpatient   Admission Date: 3/20/2025  Length of Stay: 4 days  Attending Physician: Lake Tate MD  Primary Care Provider: Lola Wolff MD        Subjective     Principal Problem:Acute on chronic congestive heart failure        HPI:  89 y/o female with PAF on long term Apixiban at home for anticoagulation, HTN, chronic diastolic heart failure, chronic anemia, cardiac pacemaker due to sick sinus syndrome and hypothyroidism. She was recently admitted from 3/7 to 3/13 after fall at home and femur fracture for which she received operative management. She was discharged to SNF for wound care and PT/OT.     For the last few days she has had increasing sob and work of breathing, family also noticed increased lower extremity swelling. She was also having a change in mental status with hallucinations and confusion. These symptoms prompted her to be transferred to the ED for evaluation.     In the ED she was found to have an elevated BNP (much greater than prior values), cxr showed fluid accumulation. CTA PE showed no evidence of large PE, did show signs of volume overload and possible infection. She was started on lasix with improvement in oxygenation in the ED (non-re breather to 4L NC). Cultures were collected and patient started on vancomycin and zosyn for possible HCAP as well given her symptoms.       Patient pleasantly confused on exam. Son present in the room and said she is normally very sharp mentally. He is not aware of most of her medical history including past medications.     Overview/Hospital Course:  3/21: Echo unremarkable. Intermittent confusion noted. Continue Lasix and Zosyn/Doxy  3/22: Mental status improved. Continue Lasix and abx   3/23: Increased dyspnea overnight. CXR w/ worsening effusions/edema.     Interval History: Pt w/ increased dyspnea  overnight. Notes improvement this morning. No other concerns.     Review of Systems   Constitutional:  Positive for fatigue. Negative for chills and fever.   HENT:  Negative for congestion, hearing loss, trouble swallowing and voice change.    Eyes:  Negative for pain and visual disturbance.   Respiratory:  Positive for shortness of breath. Negative for cough.    Cardiovascular:  Positive for leg swelling. Negative for chest pain.   Gastrointestinal:  Negative for abdominal pain, constipation, diarrhea, nausea and vomiting.   Genitourinary:  Negative for difficulty urinating.   Musculoskeletal:  Negative for arthralgias.   Skin:  Negative for pallor, rash and wound.   Neurological:  Positive for weakness. Negative for dizziness, light-headedness and headaches.   Psychiatric/Behavioral:  Negative for agitation and behavioral problems.      Objective:     Vital Signs (Most Recent):  Temp: 98.2 °F (36.8 °C) (03/23/25 0803)  Pulse: 100 (03/23/25 0803)  Resp: 18 (03/23/25 0803)  BP: 130/72 (03/23/25 0803)  SpO2: 99 % (03/23/25 0803) Vital Signs (24h Range):  Temp:  [97.5 °F (36.4 °C)-98.7 °F (37.1 °C)] 98.2 °F (36.8 °C)  Pulse:  [] 100  Resp:  [18-20] 18  SpO2:  [98 %-100 %] 99 %  BP: (112-130)/(53-73) 130/72     Weight: 70 kg (154 lb 5.2 oz)  Body mass index is 28.23 kg/m².    Intake/Output Summary (Last 24 hours) at 3/23/2025 1058  Last data filed at 3/23/2025 0535  Gross per 24 hour   Intake --   Output 1650 ml   Net -1650 ml      Physical Exam  Vitals and nursing note reviewed.   Constitutional:       General: She is not in acute distress.     Appearance: She is not toxic-appearing.   HENT:      Head: Normocephalic and atraumatic.      Nose: Nose normal.      Mouth/Throat:      Mouth: Mucous membranes are moist.   Eyes:      General: No scleral icterus.     Extraocular Movements: Extraocular movements intact.   Cardiovascular:      Rate and Rhythm: Normal rate and regular rhythm.      Heart sounds: Murmur  "heard.      No friction rub. No gallop.   Pulmonary:      Effort: Pulmonary effort is normal. No respiratory distress.      Breath sounds: No rhonchi or rales.   Abdominal:      General: There is no distension.      Palpations: Abdomen is soft.      Tenderness: There is no abdominal tenderness. There is no guarding or rebound.   Musculoskeletal:         General: Normal range of motion.      Cervical back: Neck supple.   Skin:     General: Skin is warm and dry.      Capillary Refill: Capillary refill takes less than 2 seconds.   Neurological:      General: No focal deficit present.      Mental Status: She is alert.   Psychiatric:         Mood and Affect: Mood normal.         MELD 3.0: 12 at 3/9/2025  3:41 AM  MELD-Na: 7 at 3/9/2025  3:41 AM  Calculated from:  Serum Creatinine: 0.9 mg/dL (Using min of 1 mg/dL) at 3/9/2025  3:41 AM  Serum Sodium: 133 mmol/L at 3/9/2025  3:41 AM  Total Bilirubin: 0.4 mg/dL (Using min of 1 mg/dL) at 3/9/2025  3:41 AM  Serum Albumin: 3 g/dL at 3/9/2025  3:41 AM  INR(ratio): 1.1 at 3/7/2025  1:24 PM  Age at listing (hypothetical): 89 years  Sex: Female at 3/9/2025  3:41 AM      Significant Labs:  CBC:  No results for input(s): "WBC", "HGB", "HCT", "PLT" in the last 48 hours.  CMP:  Recent Labs   Lab 03/22/25  0558 03/23/25  0739    137   K 3.0* 3.7   CL 93* 95   CO2 33* 34*   BUN 31* 24   CREATININE 0.8 0.7   CALCIUM 8.8 8.8   ANIONGAP 10 8     PTINR:  No results for input(s): "INR" in the last 48 hours.    Significant Procedures:   Dobutamine Stress Test with Color Flow: No results found for this or any previous visit.    None      Assessment & Plan  Acute on chronic congestive heart failure  Patient has  diastolic  heart failure that is Acute on chronic. On presentation their CHF was decompensated. Evidence of decompensated CHF on presentation includes: edema, crackles on lung auscultation, dyspnea on exertion (SMITH), and shortness of breath. The etiology of their decompensation is " "likely increased fluid intake. Most recent BNP and echo results are listed below.  No results for input(s): "BNP" in the last 72 hours.    Latest ECHO  Results for orders placed during the hospital encounter of 06/20/24    Echo    Interpretation Summary    Left Ventricle: The left ventricle is normal in size. Normal wall thickness. There is concentric hypertrophy. There is normal systolic function with a visually estimated ejection fraction of 65 - 70%. Unable to assess diastolic function due to atrial fibrillation.    Right Ventricle: Normal right ventricular cavity size. Wall thickness is normal. Systolic function is normal.    Left Atrium: Left atrium is severely dilated.    Right Atrium: Right atrium is severely dilated.    Mitral Valve: There is moderate posterior leaflet sclerosis. There is no stenosis. The mean pressure gradient across the mitral valve is 1 mmHg at a heart rate of 60 bpm. There is moderate regurgitation.    Tricuspid Valve: There is moderate to severe regurgitation.    Pulmonic Valve: There is no stenosis.    Pulmonary Artery: There is moderate pulmonary hypertension. The estimated pulmonary artery systolic pressure is 60 mmHg.    IVC/SVC: Intermediate venous pressure at 8 mmHg.    Current Heart Failure Medications  hydroCHLOROthiazide tablet 12.5 mg, Daily, Oral  furosemide injection 60 mg, Every 12 hours, Intravenous    Plan  - Monitor strict I&Os and daily weights.    - Place on telemetry  - Low sodium diet  - Place on fluid restriction of 1.5 L.   - Cardiology has not been consulted  - The patient's volume status is  increased  - Will increase Lasix to 60 mg IV BID.   - could also need increased bumex on discharge with sliding scale diuretics to prevent relapse    Essential hypertension  Patient's blood pressure range in the last 24 hours was: BP  Min: 107/63  Max: 130/72.The patient's inpatient anti-hypertensive regimen is listed below:  Current Antihypertensives  amLODIPine tablet 5 mg, " Daily, Oral  hydroCHLOROthiazide tablet 12.5 mg, Daily, Oral  furosemide injection 60 mg, Every 12 hours, Intravenous    Plan  - BP is controlled, no changes needed to their regimen  Acquired hypothyroidism  Continue with synthroid at current dose. Pt will need to follow up as an outpatient  -TSH elevated at 22.581, Free T4 0.83      Cardiac pacemaker  H/o  Paced rhythm on telemetry currently  Paroxysmal atrial fibrillation  Patient has persistent (7 days or more) atrial fibrillation. Patient is currently in sinus rhythm. [id number] Score: 4. The patients heart rate in the last 24 hours is as follows:  Pulse  Min: 70  Max: 101     Antiarrhythmics       Anticoagulants  apixaban tablet 5 mg, 2 times daily, Oral    Plan  - Replete lytes with a goal of K>4, Mg >2  - Patient is anticoagulated, see medications listed above.  - Patient's afib is currently controlled  - holding BB due to CHF exacerbation. Likely to restart in the next day or two  Aortic stenosis  The patient's most recent echocardiogram result is listed below.     Echo  Result Date: 3/21/2025    Left Ventricle: The left ventricle is normal in size. Mildly increased   ventricular mass. Normal wall thickness. There is mild concentric   hypertrophy. Normal wall motion. There is normal systolic function with a   visually estimated ejection fraction of 65 - 70%. There is normal   diastolic function.    Right Ventricle: The right ventricle is normal in size. Wall thickness   is normal. Systolic function is normal. Pacemaker lead present in the   ventricle.    Left Atrium: Severely dilated    Right Atrium: Right atrium is moderately dilated.    Aortic Valve: The aortic valve is a trileaflet valve. There is severe   aortic valve sclerosis. There is moderate annular calcification present.   Moderately restricted motion. There is moderate to severe low flow   stenosis. Aortic valve area by VTI is 0.8 cm2. Aortic valve peak velocity   is 3.4 m/s. Mean gradient is 25  mmHg. The dimensionless index is 0.27. AS   severity has increased compared to study 1/24    Mitral Valve: There is severe posterior mitral annular calcification.    Aorta: Aortic root is normal in size measuring 2.62 cm. Aortic root at   ST junction is normal. Ascending aorta is normal measuring 2.62 cm.    Pulmonary Artery: There is mild pulmonary hypertension. The estimated   pulmonary artery systolic pressure is 44 mmHg.    IVC/SVC: Normal venous pressure at 3 mmHg.    Pericardium: There is no pericardial effusion.        Echo  Result Date: 6/20/2024    Left Ventricle: The left ventricle is normal in size. Normal wall   thickness. There is concentric hypertrophy. There is normal systolic   function with a visually estimated ejection fraction of 65 - 70%. Unable   to assess diastolic function due to atrial fibrillation.    Right Ventricle: Normal right ventricular cavity size. Wall thickness   is normal. Systolic function is normal.    Left Atrium: Left atrium is severely dilated.    Right Atrium: Right atrium is severely dilated.    Mitral Valve: There is moderate posterior leaflet sclerosis. There is   no stenosis. The mean pressure gradient across the mitral valve is 1 mmHg   at a heart rate of 60 bpm. There is moderate regurgitation.    Tricuspid Valve: There is moderate to severe regurgitation.    Pulmonic Valve: There is no stenosis.    Pulmonary Artery: There is moderate pulmonary hypertension. The   estimated pulmonary artery systolic pressure is 60 mmHg.    IVC/SVC: Intermediate venous pressure at 8 mmHg.           Pulmonary hypertension  H/o on prior echo  - diuresis for CHF already in ordered    Periprosthetic fracture of shaft of right femur s/p ORIF on 3/8/2025    H/o with recent surgery  - wound vac in place. Orthopedics planning to remove in the next day or two    Hyponatremia  Hyponatremia is likely due to Heart Failure. The patient's most recent sodium results are listed below.  Recent Labs      "03/22/25  0558 03/23/25  0739 03/24/25  0600    137 137     Plan  - Will treat the hyponatremia with Fluid restriction of:  1.5 liter per day  - Monitor sodium Daily.   - Patient hyponatremia is stable  - acute on chronic in the setting of CHF exacerbation   Moderate protein-calorie malnutrition  Nutrition consulted. Most recent weight and BMI monitored-     Measurements:  Wt Readings from Last 1 Encounters:   03/24/25 70 kg (154 lb 5.2 oz)   Body mass index is 28.23 kg/m².    Patient has been screened and assessed by RD.    Malnutrition Type:  Context: chronic illness  Level: moderate    Malnutrition Characteristic Summary:  Energy Intake (Malnutrition): less than 75% for greater than or equal to 1 month  Subcutaneous Fat (Malnutrition): moderate depletion  Muscle Mass (Malnutrition): moderate depletion    Interventions/Recommendations (treatment strategy):  1. Continue low sodium diet 2. Add Boost plus TID if medically warranted 3. RD following    Pneumonia  -Blood cultures w/ NGTD.   - Will d/c Zosyn and transition to Augmentin. Continue doxycycline.     Acute hypoxic respiratory failure  Patient with Hypoxic Respiratory failure which is Acute.  she is not on home oxygen. Supplemental oxygen was provided and noted-      .   Signs/symptoms of respiratory failure include- increased work of breathing, respiratory distress, and use of accessory muscles. Contributing diagnoses includes - CHF and Pneumonia Labs and images were reviewed. Patient Has not had a recent ABG. Will treat underlying causes and adjust management of respiratory failure as follows-     - 2/2 pna vs chf  - titrate oxygen as tolerated   Anemia  Anemia is likely due to chronic disease due to heart failure . Most recent hemoglobin and hematocrit are listed below.  No results for input(s): "HGB", "HCT" in the last 72 hours.  Plan  - Monitor serial CBC: Daily  - Transfuse PRBC if patient becomes hemodynamically unstable, symptomatic or H/H drops " below 7/21.  - Patient has not received any PRBC transfusions to date  - Patient's anemia is currently stable  -   Hypokalemia  Patient's most recent potassium results are listed below.   Recent Labs     03/22/25  0558 03/23/25  0739 03/24/25  0600   K 3.0* 3.7 3.2*     Plan  - Replete potassium per protocol  - Monitor potassium Daily  - Patient's hypokalemia is stable    VTE Risk Mitigation (From admission, onward)           Ordered     apixaban tablet 5 mg  2 times daily         03/20/25 1509                    Discharge Planning   ISH: 3/24/2025     Code Status: Full Code   Medical Readiness for Discharge Date:   Discharge Plan A: Skilled Nursing Facility (Re-admit OSNF)                Please place Justification for DME        Lake Tate MD  Department of Hospital Medicine   [facility] - Internal Medicine Telemetry

## 2025-03-24 NOTE — ASSESSMENT & PLAN NOTE
Patient's blood pressure range in the last 24 hours was: BP  Min: 107/63  Max: 121/68.The patient's inpatient anti-hypertensive regimen is listed below:  Current Antihypertensives  amLODIPine tablet 5 mg, Daily, Oral  hydroCHLOROthiazide tablet 12.5 mg, Daily, Oral  bumetanide tablet 2 mg, 2 times daily, Oral    Plan  - BP is controlled, no changes needed to their regimen

## 2025-03-24 NOTE — ASSESSMENT & PLAN NOTE
Patient has persistent (7 days or more) atrial fibrillation. Patient is currently in sinus rhythm. [id number] Score: 4. The patients heart rate in the last 24 hours is as follows:  Pulse  Min: 70  Max: 101     Antiarrhythmics       Anticoagulants  apixaban tablet 5 mg, 2 times daily, Oral    Plan  - Replete lytes with a goal of K>4, Mg >2  - Patient is anticoagulated, see medications listed above.  - Patient's afib is currently controlled  - holding BB due to CHF exacerbation. Likely to restart in the next day or two

## 2025-03-24 NOTE — ASSESSMENT & PLAN NOTE
"Patient has diastolic heart failure that is Acute on chronic. On presentation their CHF was decompensated. Evidence of decompensated CHF on presentation includes: edema, crackles on lung auscultation, dyspnea on exertion (SMITH), and shortness of breath. The etiology of their decompensation is likely increased fluid intake. Most recent BNP and echo results are listed below.  No results for input(s): "BNP" in the last 72 hours.    Latest ECHO  Results for orders placed during the hospital encounter of 06/20/24    Echo    Interpretation Summary    Left Ventricle: The left ventricle is normal in size. Normal wall thickness. There is concentric hypertrophy. There is normal systolic function with a visually estimated ejection fraction of 65 - 70%. Unable to assess diastolic function due to atrial fibrillation.    Right Ventricle: Normal right ventricular cavity size. Wall thickness is normal. Systolic function is normal.    Left Atrium: Left atrium is severely dilated.    Right Atrium: Right atrium is severely dilated.    Mitral Valve: There is moderate posterior leaflet sclerosis. There is no stenosis. The mean pressure gradient across the mitral valve is 1 mmHg at a heart rate of 60 bpm. There is moderate regurgitation.    Tricuspid Valve: There is moderate to severe regurgitation.    Pulmonic Valve: There is no stenosis.    Pulmonary Artery: There is moderate pulmonary hypertension. The estimated pulmonary artery systolic pressure is 60 mmHg.    IVC/SVC: Intermediate venous pressure at 8 mmHg.    Current Heart Failure Medications  hydroCHLOROthiazide tablet 12.5 mg, Daily, Oral  bumetanide tablet 2 mg, 2 times daily, Oral    Plan  - Monitor strict I&Os and daily weights.    - Place on telemetry  - Low sodium diet  - Place on fluid restriction of 1.5 L.   - Cardiology has not been consulted  - The patient's volume status is increased  - Will transition to Bumex 2 mg BID.   - could also need increased bumex on discharge " with sliding scale diuretics to prevent relapse

## 2025-03-24 NOTE — DISCHARGE SUMMARY
"[facility]  Discharge Summary      Patient Name: Dorothy M Knobloch  MRN: [id number]  Admission Date: 3/13/2025  Hospital Length of Stay: 7 days  Discharge Date and Time: 3/20/2025  8:49 AM  Attending Physician: Justin Fleming MD  Discharging Provider: Justin Fleming MD  Discharge Provider Team: [address]   Primary Care Provider: Lola Wolff MD      HPI: "Dorothy M Knobloch is a 89 y.o. female with history of legally blind d/t macular degeneration, Afib, HTN, CHF, anemia and hypothyroidism who presented to [facility] [place] on 03/07/2025 with complaint of pain after fall. Admitted for closed periprosthetic right displaced femoral shaft fracture.Orthopedic surgery consult for closed periprosthetic right femoral shaft fracture in patient with previous right MARCEL. Patient underwent right femur ORIF with long femoral plate and screws and cables to repair fracture by Dr. Albino William on 03/08/2025. Post-op patient weight bear as tolerated to the left lower extremity per Orthopedics rec. Chest pain with post op anemia, received total of 2 units PRBC. CP resolved and Hgb stabilized at 9.8 on 3/11. Patient restarted on home Apixiban post-op but at reduced dosing of 2.5 mg po BID as per request of Orthopedics (Dr. William) as concerned about bleeding into surgical site and requesting decreased dose for 1 week and then can return back to home dosing of 5 mg po BID after 1 week on 3/15. No other DVT prophylaxis needed post-op as on oral anticoagulation. IV Cefazolin post-op for 24 hours for antibiotic prophylaxis post-op and received 24 hours of Cefazolin then transitioned to Cefadroxil 500 mg po BID for 7 days for extended antibiotic prophylaxis post-op to prevent post-op wound infection at surgical site.   Of note, patient's  was in [facility] about a month ago per patient. He is blind. Social support of adult children." Per Shruthi Santana NP on 3/14/25.      She is " doing okay. Sons and  at bedside. Son says that she has had increased hallucinations today. He thinks it is related to the tramadol. She says that she has not slept the last 2 nights. She says that her pain is controlled. She denies any shortness of breath currently. Says that she had some brief chest pain yesterday, but has not had any recurrence. She is eating okay. Denies any nausea or abdominal pain. Says that she is having regular BMs. She is working well with therapy and walked 5 feet with modA and RW today.     * No surgery found *      Hospital Course: She was discharged earlier than expected to [facility] ED for evaluation of hypoxia, crackles, and wheezing with worsened hypoxia and increased supplemental oxygen needs.     Consults:   Consults (From admission, onward)          Status Ordering Provider     Inpatient consult to Registered Dietitian/Nutritionist  Once        Provider:  (Not yet assigned)    Completed CLINTON ROGER            Final Active Diagnoses:    Diagnosis Date Noted POA    PRINCIPAL PROBLEM:  Periprosthetic fracture of shaft of right femur s/p ORIF on 3/8/2025 [M97.8XXA, Z96.649] 03/07/2025 Yes    Hyponatremia [E87.1] 03/14/2025 Yes    Moderate protein-calorie malnutrition [E44.0] 03/14/2025 Yes    Acute blood loss anemia [D62] 03/07/2025 Yes    On anticoagulant therapy [Z79.01] 06/13/2024 Not Applicable    Age-related physical debility [R54] 04/05/2024 Yes    Aortic stenosis [I35.0] 04/05/2024 Yes    Pulmonary hypertension [I27.20] 04/05/2024 Yes    Chronic diastolic congestive heart failure [I50.32] 11/24/2019 Yes    AV block, 3rd degree [I44.2] 03/19/2019 Yes    Paroxysmal atrial fibrillation [I48.0] 09/11/2018 Yes     Chronic    Cardiac pacemaker [Z95.0] 08/22/2018 Yes    Bilateral carotid artery stenosis [I65.23]  Yes    Takotsubo cardiomyopathy [I51.81] 11/17/2016 Yes    Acquired hypothyroidism [E03.9] 12/13/2013 Yes    Essential hypertension [I10] 12/13/2013 Yes     Chronic     Pure hypercholesterolemia [E78.00] 12/13/2013 Yes      Problems Resolved During this Admission:      Discharged Condition: stable    Disposition: Short Term Hospital    Follow Up:    Patient Instructions:   No discharge procedures on file.  Medications:  Transfer Medications (for Discharge Readmit only): Current Medications[1]    Significant Diagnostic Studies: N/A    Pending Diagnostic Studies:       Procedure Component Value Units Date/Time    Vitamin B12 [[id number]] Collected: 03/19/25 2257    Order Status: Sent Lab Status: In process Updated: 03/19/25 2257    Specimen: Blood           Indwelling Lines/Drains at time of discharge:   Lines/Drains/Airways       None                       Justin Fleming MD  Department of Hospital Medicine  [facility]           [1]   No current facility-administered medications for this encounter.     No current outpatient medications on file.     Facility-Administered Medications Ordered in Other Encounters   Medication Dose Route Frequency Provider Last Rate Last Admin    acetaminophen tablet 650 mg  650 mg Oral Q4H PRN Esteban Jimenez DO   650 mg at 03/23/25 2237    albuterol-ipratropium 2.5 mg-0.5 mg/3 mL nebulizer solution 3 mL  3 mL Nebulization Q6H PRN Lake Tate MD        amLODIPine tablet 5 mg  5 mg Oral Daily Esteban Jimenez DO   5 mg at 03/24/25 0951    amoxicillin-clavulanate 875-125mg per tablet 1 tablet  1 tablet Oral Q12H Lake Tate MD   1 tablet at 03/24/25 0951    apixaban tablet 5 mg  5 mg Oral BID Esteban Jimeenz DO   5 mg at 03/24/25 0951    clopidogreL tablet 75 mg  75 mg Oral Daily Esteban Jimenez DO   75 mg at 03/24/25 0951    doxycycline tablet 100 mg  100 mg Oral Q12H Lake Tate MD   100 mg at 03/24/25 0951    EScitalopram oxalate tablet 20 mg  20 mg Oral Daily Esteban Jimenez DO   20 mg at 03/24/25 0951    furosemide injection 60 mg  60 mg Intravenous Q12H Lake Tate MD   60 mg at 03/24/25  0952    hydroCHLOROthiazide tablet 12.5 mg  12.5 mg Oral Daily Esteban Jimenez DO   12.5 mg at 03/24/25 0951    levothyroxine tablet 88 mcg  88 mcg Oral Before breakfast Esteban Jimenez DO   88 mcg at 03/24/25 0505    melatonin tablet 6 mg  6 mg Oral Nightly PRN Esteban Jimenez DO        mupirocin 2 % ointment   Nasal BID Baumgarten, Katherine L., MD        polyethylene glycol packet 17 g  17 g Oral Daily Esteban Jimenez DO   17 g at 03/24/25 0951    potassium chloride SA CR tablet 40 mEq  40 mEq Oral BID Lake Tate MD   40 mEq at 03/24/25 0951    prochlorperazine tablet 5 mg  5 mg Oral TID PRN Esteban Jimenez DO        sodium chloride 0.9% flush 10 mL  10 mL Intravenous PRN Esteban Jimenez DO        sodium chloride 0.9% flush 10 mL  10 mL Intravenous PRN Esteban Jimenez DO

## 2025-03-24 NOTE — PLAN OF CARE
[facility] - Internal Medicine Telemetry  Discharge Reassessment    Primary Care Provider: Lola Wolff MD    Expected Discharge Date: 3/26/2025    Reassessment (most recent)       Discharge Reassessment - 03/24/25 1633          Discharge Reassessment    Assessment Type Discharge Planning Reassessment     Did the patient's condition or plan change since previous assessment? No     Discharge Plan discussed with: Patient (P)      Communicated ISH with patient/caregiver Yes (P)      Discharge Plan A Skilled Nursing Facility (P)      Discharge Plan B Home Health;Assisted Living (P)      DME Needed Upon Discharge  none (P)      Why the patient remains in the hospital Requires continued medical care (P)         Post-Acute Status    Post-Acute Authorization Placement (P)      Post-Acute Placement Status Pending medical clearance/testing (P)      Coverage [facility] - (P)      Hospital Resources/Appts/Education Provided Provided education on problems/symptoms using teachback (P)                  Discharge Plan A and Plan B have been determined by review of patient's clinical status, future medical and therapeutic needs, and coverage/benefits for post-acute care in coordination with multidisciplinary team members.                     CASEY Young, LMSW  [facility]  Case Management  Ext. [phone]

## 2025-03-24 NOTE — ASSESSMENT & PLAN NOTE
Patient has persistent (7 days or more) atrial fibrillation. Patient is currently in sinus rhythm. [id number] Score: 4. The patients heart rate in the last 24 hours is as follows:  Pulse  Min: 70  Max: 101     Antiarrhythmics       Anticoagulants  apixaban tablet 5 mg, 2 times daily, Oral    Plan  - Replete lytes with a goal of K>4, Mg >2  - Patient is anticoagulated, see medications listed above.  - Patient's afib is currently controlled  - holding BB due to CHF exacerbation. Likely to restart in the next day or two

## 2025-03-24 NOTE — PROGRESS NOTES
[facility] - Internal Medicine [facility] Medicine  Progress Note    Patient Name: Dorothy M Knobloch  MRN: [id number]  Patient Class: IP- Inpatient   Admission Date: 3/20/2025  Length of Stay: 4 days  Attending Physician: Lake Tate MD  Primary Care Provider: Lola Wolff MD        Subjective     Principal Problem:Acute on chronic congestive heart failure        HPI:  89 y/o female with PAF on long term Apixiban at home for anticoagulation, HTN, chronic diastolic heart failure, chronic anemia, cardiac pacemaker due to sick sinus syndrome and hypothyroidism. She was recently admitted from 3/7 to 3/13 after fall at home and femur fracture for which she received operative management. She was discharged to SNF for wound care and PT/OT.     For the last few days she has had increasing sob and work of breathing, family also noticed increased lower extremity swelling. She was also having a change in mental status with hallucinations and confusion. These symptoms prompted her to be transferred to the ED for evaluation.     In the ED she was found to have an elevated BNP (much greater than prior values), cxr showed fluid accumulation. CTA PE showed no evidence of large PE, did show signs of volume overload and possible infection. She was started on lasix with improvement in oxygenation in the ED (non-re breather to 4L NC). Cultures were collected and patient started on vancomycin and zosyn for possible HCAP as well given her symptoms.       Patient pleasantly confused on exam. Son present in the room and said she is normally very sharp mentally. He is not aware of most of her medical history including past medications.     Overview/Hospital Course:  3/21: Echo unremarkable. Intermittent confusion noted. Continue Lasix and Zosyn/Doxy  3/22: Mental status improved. Continue Lasix and abx   3/23: Increased dyspnea overnight. CXR w/ worsening effusions/edema.     Interval History: No acute events     Review of  Systems   Constitutional:  Positive for fatigue. Negative for chills and fever.   HENT:  Negative for congestion, hearing loss, mouth sores, rhinorrhea, trouble swallowing and voice change.    Eyes:  Negative for pain and visual disturbance.   Respiratory:  Positive for shortness of breath. Negative for cough.    Cardiovascular:  Positive for leg swelling. Negative for chest pain.   Gastrointestinal:  Negative for abdominal pain, constipation, diarrhea, nausea and vomiting.   Genitourinary:  Negative for difficulty urinating.   Musculoskeletal:  Negative for arthralgias and back pain.   Skin:  Positive for wound. Negative for pallor and rash.   Neurological:  Negative for dizziness and light-headedness.   Psychiatric/Behavioral:  Negative for agitation and behavioral problems.      Objective:     Vital Signs (Most Recent):  Temp: 98.1 °F (36.7 °C) (03/24/25 0802)  Pulse: 97 (03/24/25 0802)  Resp: 18 (03/24/25 0802)  BP: 121/68 (03/24/25 0802)  SpO2: 97 % (03/24/25 0802) Vital Signs (24h Range):  Temp:  [97.7 °F (36.5 °C)-99.2 °F (37.3 °C)] 98.1 °F (36.7 °C)  Pulse:  [] 97  Resp:  [17-18] 18  SpO2:  [97 %-100 %] 97 %  BP: (107-121)/(63-70) 121/68     Weight: 70 kg (154 lb 5.2 oz)  Body mass index is 28.23 kg/m².    Intake/Output Summary (Last 24 hours) at 3/24/2025 1142  Last data filed at 3/24/2025 0510  Gross per 24 hour   Intake 180 ml   Output 2900 ml   Net -2720 ml      Physical Exam  Vitals and nursing note reviewed.   Constitutional:       General: She is not in acute distress.     Appearance: Normal appearance.   HENT:      Head: Normocephalic and atraumatic.      Nose: Nose normal.      Mouth/Throat:      Mouth: Mucous membranes are moist.   Eyes:      General: No scleral icterus.     Extraocular Movements: Extraocular movements intact.   Cardiovascular:      Rate and Rhythm: Normal rate and regular rhythm.      Heart sounds: Murmur heard.      No friction rub. No gallop.   Pulmonary:      Effort: No  "respiratory distress.      Breath sounds: Normal breath sounds. No wheezing, rhonchi or rales.   Abdominal:      General: There is no distension.      Palpations: Abdomen is soft.      Tenderness: There is no abdominal tenderness. There is no guarding.   Musculoskeletal:         General: Normal range of motion.      Cervical back: Neck supple. No rigidity.   Skin:     General: Skin is warm and dry.      Capillary Refill: Capillary refill takes less than 2 seconds.   Neurological:      General: No focal deficit present.      Mental Status: She is alert.   Psychiatric:         Mood and Affect: Mood normal.         MELD 3.0: 12 at 3/9/2025  3:41 AM  MELD-Na: 7 at 3/9/2025  3:41 AM  Calculated from:  Serum Creatinine: 0.9 mg/dL (Using min of 1 mg/dL) at 3/9/2025  3:41 AM  Serum Sodium: 133 mmol/L at 3/9/2025  3:41 AM  Total Bilirubin: 0.4 mg/dL (Using min of 1 mg/dL) at 3/9/2025  3:41 AM  Serum Albumin: 3 g/dL at 3/9/2025  3:41 AM  INR(ratio): 1.1 at 3/7/2025  1:24 PM  Age at listing (hypothetical): 89 years  Sex: Female at 3/9/2025  3:41 AM      Significant Labs:  CBC:  No results for input(s): "WBC", "HGB", "HCT", "PLT" in the last 48 hours.  CMP:  Recent Labs   Lab 03/23/25  0739 03/24/25  0600    137   K 3.7 3.2*   CL 95 92*   CO2 34* 38*   BUN 24 17   CREATININE 0.7 0.6   CALCIUM 8.8 9.2   ANIONGAP 8 7*     PTINR:  No results for input(s): "INR" in the last 48 hours.    Significant Procedures:   Dobutamine Stress Test with Color Flow: No results found for this or any previous visit.    None      Assessment & Plan  Acute on chronic congestive heart failure  Patient has  diastolic  heart failure that is Acute on chronic. On presentation their CHF was decompensated. Evidence of decompensated CHF on presentation includes: edema, crackles on lung auscultation, dyspnea on exertion (SMITH), and shortness of breath. The etiology of their decompensation is likely increased fluid intake. Most recent BNP and echo results " "are listed below.  No results for input(s): "BNP" in the last 72 hours.    Latest ECHO  Results for orders placed during the hospital encounter of 06/20/24    Echo    Interpretation Summary    Left Ventricle: The left ventricle is normal in size. Normal wall thickness. There is concentric hypertrophy. There is normal systolic function with a visually estimated ejection fraction of 65 - 70%. Unable to assess diastolic function due to atrial fibrillation.    Right Ventricle: Normal right ventricular cavity size. Wall thickness is normal. Systolic function is normal.    Left Atrium: Left atrium is severely dilated.    Right Atrium: Right atrium is severely dilated.    Mitral Valve: There is moderate posterior leaflet sclerosis. There is no stenosis. The mean pressure gradient across the mitral valve is 1 mmHg at a heart rate of 60 bpm. There is moderate regurgitation.    Tricuspid Valve: There is moderate to severe regurgitation.    Pulmonic Valve: There is no stenosis.    Pulmonary Artery: There is moderate pulmonary hypertension. The estimated pulmonary artery systolic pressure is 60 mmHg.    IVC/SVC: Intermediate venous pressure at 8 mmHg.    Current Heart Failure Medications  hydroCHLOROthiazide tablet 12.5 mg, Daily, Oral  bumetanide tablet 2 mg, 2 times daily, Oral    Plan  - Monitor strict I&Os and daily weights.    - Place on telemetry  - Low sodium diet  - Place on fluid restriction of 1.5 L.   - Cardiology has not been consulted  - The patient's volume status is  increased  - Will transition to Bumex 2 mg BID.   - could also need increased bumex on discharge with sliding scale diuretics to prevent relapse    Essential hypertension  Patient's blood pressure range in the last 24 hours was: BP  Min: 107/63  Max: 121/68.The patient's inpatient anti-hypertensive regimen is listed below:  Current Antihypertensives  amLODIPine tablet 5 mg, Daily, Oral  hydroCHLOROthiazide tablet 12.5 mg, Daily, Oral  bumetanide " tablet 2 mg, 2 times daily, Oral    Plan  - BP is controlled, no changes needed to their regimen  Acquired hypothyroidism  Continue with synthroid at current dose. Pt will need to follow up as an outpatient  -TSH elevated at 22.581, Free T4 0.83      Cardiac pacemaker  H/o  Paced rhythm on telemetry currently  Paroxysmal atrial fibrillation  Patient has persistent (7 days or more) atrial fibrillation. Patient is currently in sinus rhythm. [id number] Score: 4. The patients heart rate in the last 24 hours is as follows:  Pulse  Min: 70  Max: 101     Antiarrhythmics       Anticoagulants  apixaban tablet 5 mg, 2 times daily, Oral    Plan  - Replete lytes with a goal of K>4, Mg >2  - Patient is anticoagulated, see medications listed above.  - Patient's afib is currently controlled  - holding BB due to CHF exacerbation. Likely to restart in the next day or two  Aortic stenosis  The patient's most recent echocardiogram result is listed below.     Echo  Result Date: 3/21/2025    Left Ventricle: The left ventricle is normal in size. Mildly increased   ventricular mass. Normal wall thickness. There is mild concentric   hypertrophy. Normal wall motion. There is normal systolic function with a   visually estimated ejection fraction of 65 - 70%. There is normal   diastolic function.    Right Ventricle: The right ventricle is normal in size. Wall thickness   is normal. Systolic function is normal. Pacemaker lead present in the   ventricle.    Left Atrium: Severely dilated    Right Atrium: Right atrium is moderately dilated.    Aortic Valve: The aortic valve is a trileaflet valve. There is severe   aortic valve sclerosis. There is moderate annular calcification present.   Moderately restricted motion. There is moderate to severe low flow   stenosis. Aortic valve area by VTI is 0.8 cm2. Aortic valve peak velocity   is 3.4 m/s. Mean gradient is 25 mmHg. The dimensionless index is 0.27. AS   severity has increased compared to study  1/24    Mitral Valve: There is severe posterior mitral annular calcification.    Aorta: Aortic root is normal in size measuring 2.62 cm. Aortic root at   ST junction is normal. Ascending aorta is normal measuring 2.62 cm.    Pulmonary Artery: There is mild pulmonary hypertension. The estimated   pulmonary artery systolic pressure is 44 mmHg.    IVC/SVC: Normal venous pressure at 3 mmHg.    Pericardium: There is no pericardial effusion.        Echo  Result Date: 6/20/2024    Left Ventricle: The left ventricle is normal in size. Normal wall   thickness. There is concentric hypertrophy. There is normal systolic   function with a visually estimated ejection fraction of 65 - 70%. Unable   to assess diastolic function due to atrial fibrillation.    Right Ventricle: Normal right ventricular cavity size. Wall thickness   is normal. Systolic function is normal.    Left Atrium: Left atrium is severely dilated.    Right Atrium: Right atrium is severely dilated.    Mitral Valve: There is moderate posterior leaflet sclerosis. There is   no stenosis. The mean pressure gradient across the mitral valve is 1 mmHg   at a heart rate of 60 bpm. There is moderate regurgitation.    Tricuspid Valve: There is moderate to severe regurgitation.    Pulmonic Valve: There is no stenosis.    Pulmonary Artery: There is moderate pulmonary hypertension. The   estimated pulmonary artery systolic pressure is 60 mmHg.    IVC/SVC: Intermediate venous pressure at 8 mmHg.           Pulmonary hypertension  H/o on prior echo  - diuresis for CHF already in ordered    Periprosthetic fracture of shaft of right femur s/p ORIF on 3/8/2025    H/o with recent surgery  - wound vac in place. Orthopedics planning to remove in the next day or two    Hyponatremia  Hyponatremia is likely due to Heart Failure. The patient's most recent sodium results are listed below.  Recent Labs     03/22/25  0558 03/23/25  0739 03/24/25  0600    137 137     Plan  - Will treat  "the hyponatremia with Fluid restriction of:  1.5 liter per day  - Monitor sodium Daily.   - Patient hyponatremia is stable  - acute on chronic in the setting of CHF exacerbation   Moderate protein-calorie malnutrition  Nutrition consulted. Most recent weight and BMI monitored-     Measurements:  Wt Readings from Last 1 Encounters:   03/24/25 70 kg (154 lb 5.2 oz)   Body mass index is 28.23 kg/m².    Patient has been screened and assessed by RD.    Malnutrition Type:  Context: chronic illness  Level: moderate    Malnutrition Characteristic Summary:  Energy Intake (Malnutrition): less than 75% for greater than or equal to 1 month  Subcutaneous Fat (Malnutrition): moderate depletion  Muscle Mass (Malnutrition): moderate depletion    Interventions/Recommendations (treatment strategy):  1. Continue low sodium diet 2. Add Boost plus TID if medically warranted 3. RD following    Pneumonia  -Blood cultures w/ NGTD.   -Continue Augmentin/Doxycycline.     Acute hypoxic respiratory failure  Patient with Hypoxic Respiratory failure which is Acute.  she is not on home oxygen. Supplemental oxygen was provided and noted-      .   Signs/symptoms of respiratory failure include- increased work of breathing, respiratory distress, and use of accessory muscles. Contributing diagnoses includes - CHF and Pneumonia Labs and images were reviewed. Patient Has not had a recent ABG. Will treat underlying causes and adjust management of respiratory failure as follows-     - 2/2 pna vs chf  - titrate oxygen as tolerated   Anemia  Anemia is likely due to chronic disease due to heart failure . Most recent hemoglobin and hematocrit are listed below.  No results for input(s): "HGB", "HCT" in the last 72 hours.  Plan  - Monitor serial CBC: Daily  - Transfuse PRBC if patient becomes hemodynamically unstable, symptomatic or H/H drops below 7/21.  - Patient has not received any PRBC transfusions to date  - Patient's anemia is currently stable  - "   Hypokalemia  Patient's most recent potassium results are listed below.   Recent Labs     03/22/25  0558 03/23/25  0739 03/24/25  0600   K 3.0* 3.7 3.2*     Plan  - Replete potassium per protocol  - Monitor potassium Daily  - Patient's hypokalemia is stable    VTE Risk Mitigation (From admission, onward)           Ordered     apixaban tablet 5 mg  2 times daily         03/20/25 1509                    Discharge Planning   ISH: 3/25/2025     Code Status: Full Code   Medical Readiness for Discharge Date:   Discharge Plan A: Skilled Nursing Facility (Re-admit [facility])                Please place Justification for DME        Lake Tate MD  Department of Hospital Medicine   [facility] - Internal Medicine Telemetry

## 2025-03-24 NOTE — PROGRESS NOTES
Phone visit today, pt declined home visit.Reports no new s/s, states progressing well. Will continue to monitor

## 2025-03-25 LAB
ANION GAP (OHS): 6 MMOL/L (ref 8–16)
BACTERIA BLD CULT: NORMAL
BACTERIA BLD CULT: NORMAL
BUN SERPL-MCNC: 19 MG/DL (ref 10–30)
CALCIUM SERPL-MCNC: 9.4 MG/DL (ref 8.7–10.5)
CHLORIDE SERPL-SCNC: 93 MMOL/L (ref 95–110)
CO2 SERPL-SCNC: 35 MMOL/L (ref 23–29)
CREAT SERPL-MCNC: 0.6 MG/DL (ref 0.5–1.4)
GFR SERPLBLD CREATININE-BSD FMLA CKD-EPI: >60 ML/MIN/1.73/M2
GLUCOSE SERPL-MCNC: 93 MG/DL (ref 70–110)
POTASSIUM SERPL-SCNC: 3.8 MMOL/L (ref 3.5–5.1)
SODIUM SERPL-SCNC: 134 MMOL/L (ref 136–145)

## 2025-03-25 PROCEDURE — 25000003 PHARM REV CODE 250: Performed by: INTERNAL MEDICINE

## 2025-03-25 PROCEDURE — 97530 THERAPEUTIC ACTIVITIES: CPT

## 2025-03-25 PROCEDURE — 25000003 PHARM REV CODE 250

## 2025-03-25 PROCEDURE — 36415 COLL VENOUS BLD VENIPUNCTURE: CPT

## 2025-03-25 PROCEDURE — 80048 BASIC METABOLIC PNL TOTAL CA: CPT

## 2025-03-25 PROCEDURE — 21400001 HC TELEMETRY ROOM

## 2025-03-25 RX ADMIN — ACETAMINOPHEN 650 MG: 325 TABLET ORAL at 08:03

## 2025-03-25 RX ADMIN — DOXYCYCLINE HYCLATE 100 MG: 100 TABLET, COATED ORAL at 08:03

## 2025-03-25 RX ADMIN — POLYETHYLENE GLYCOL 3350 17 G: 17 POWDER, FOR SOLUTION ORAL at 09:03

## 2025-03-25 RX ADMIN — CLOPIDOGREL 75 MG: 75 TABLET ORAL at 09:03

## 2025-03-25 RX ADMIN — ESCITALOPRAM OXALATE 20 MG: 10 TABLET ORAL at 09:03

## 2025-03-25 RX ADMIN — HYDROCHLOROTHIAZIDE 12.5 MG: 12.5 TABLET ORAL at 09:03

## 2025-03-25 RX ADMIN — BUMETANIDE 2 MG: 1 TABLET ORAL at 09:03

## 2025-03-25 RX ADMIN — DOXYCYCLINE HYCLATE 100 MG: 100 TABLET, COATED ORAL at 09:03

## 2025-03-25 RX ADMIN — AMLODIPINE BESYLATE 5 MG: 5 TABLET ORAL at 09:03

## 2025-03-25 RX ADMIN — MUPIROCIN: 20 OINTMENT TOPICAL at 09:03

## 2025-03-25 RX ADMIN — LEVOTHYROXINE SODIUM 88 MCG: 0.05 TABLET ORAL at 06:03

## 2025-03-25 RX ADMIN — BUMETANIDE 2 MG: 1 TABLET ORAL at 05:03

## 2025-03-25 RX ADMIN — APIXABAN 5 MG: 5 TABLET, FILM COATED ORAL at 08:03

## 2025-03-25 RX ADMIN — APIXABAN 5 MG: 5 TABLET, FILM COATED ORAL at 09:03

## 2025-03-25 RX ADMIN — AMOXICILLIN AND CLAVULANATE POTASSIUM 1 TABLET: 875; 125 TABLET, FILM COATED ORAL at 09:03

## 2025-03-25 RX ADMIN — AMOXICILLIN AND CLAVULANATE POTASSIUM 1 TABLET: 875; 125 TABLET, FILM COATED ORAL at 08:03

## 2025-03-25 NOTE — PLAN OF CARE
Discharge Plan A and Plan B have been determined by review of patient's clinical status, future medical and therapeutic needs, and coverage/benefits for post-acute care in coordination with multidisciplinary team members.    03/25/25 1451   Post-Acute Status   Post-Acute Authorization Placement   Post-Acute Placement Status Pending post-acute provider review/more information requested   Coverage [facility] -   Hospital Resources/Appts/Education Provided Provided education on problems/symptoms using teachback   Discharge Plan   Discharge Plan A Skilled Nursing Facility   Discharge Plan B Home Health;Assisted Living      sent updated clinicals to [facility] and requested that facility review patient for re-admit. [facility] admissions confirmed receipt of updated clinicals, will further review case to confirm if able to accept. Per admissions, if able to clinically accept, will need new authorization. Pending facility review. Patient and daughter Gemma notified and agreeable to plan.                Rick Ashford, CASEY, LMSW  [facility]  Case Management  Ext. [phone]

## 2025-03-25 NOTE — PHYSICIAN QUERY
Due to conflicting documentation, Please clarify the type of atrial fibrillation. :    Long-standing persistent atrial fibrillation

## 2025-03-25 NOTE — ASSESSMENT & PLAN NOTE
"Patient has diastolic heart failure that is Acute on chronic. On presentation their CHF was decompensated. Evidence of decompensated CHF on presentation includes: edema, crackles on lung auscultation, dyspnea on exertion (SMITH), and shortness of breath. The etiology of their decompensation is likely increased fluid intake. Most recent BNP and echo results are listed below.  No results for input(s): "BNP" in the last 72 hours.    Latest ECHO  Results for orders placed during the hospital encounter of 06/20/24    Echo    Interpretation Summary    Left Ventricle: The left ventricle is normal in size. Normal wall thickness. There is concentric hypertrophy. There is normal systolic function with a visually estimated ejection fraction of 65 - 70%. Unable to assess diastolic function due to atrial fibrillation.    Right Ventricle: Normal right ventricular cavity size. Wall thickness is normal. Systolic function is normal.    Left Atrium: Left atrium is severely dilated.    Right Atrium: Right atrium is severely dilated.    Mitral Valve: There is moderate posterior leaflet sclerosis. There is no stenosis. The mean pressure gradient across the mitral valve is 1 mmHg at a heart rate of 60 bpm. There is moderate regurgitation.    Tricuspid Valve: There is moderate to severe regurgitation.    Pulmonic Valve: There is no stenosis.    Pulmonary Artery: There is moderate pulmonary hypertension. The estimated pulmonary artery systolic pressure is 60 mmHg.    IVC/SVC: Intermediate venous pressure at 8 mmHg.    Current Heart Failure Medications  hydroCHLOROthiazide tablet 12.5 mg, Daily, Oral  bumetanide tablet 2 mg, 2 times daily, Oral    Plan  - Monitor strict I&Os and daily weights.    - Place on telemetry  - Low sodium diet  - Place on fluid restriction of 1.5 L.   - Cardiology has not been consulted  - The patient's volume status is at their baseline  - Continue Bumex 2 mg BID.   - could also need increased bumex on discharge " with sliding scale diuretics to prevent relapse

## 2025-03-25 NOTE — ASSESSMENT & PLAN NOTE
Patient's most recent potassium results are listed below.   Recent Labs     03/23/25  0739 03/24/25  0600 03/25/25  0723   K 3.7 3.2* 3.8     Plan  - Replete potassium per protocol  - Monitor potassium Daily  - Patient's hypokalemia is stable

## 2025-03-25 NOTE — SUBJECTIVE & OBJECTIVE
Interval History: No acute events overnight. Pt reports improvement in dyspnea. No new concerns or complaints.     Review of Systems   Constitutional:  Positive for fatigue. Negative for chills and fever.   HENT:  Negative for congestion, hearing loss, sinus pressure, sore throat, trouble swallowing and voice change.    Eyes:  Negative for pain and visual disturbance.   Respiratory:  Negative for cough and shortness of breath.    Cardiovascular:  Positive for leg swelling. Negative for chest pain.   Gastrointestinal:  Negative for abdominal pain, constipation, diarrhea, nausea and vomiting.   Musculoskeletal:  Negative for arthralgias.   Skin:  Positive for wound. Negative for pallor and rash.   Neurological:  Positive for weakness. Negative for dizziness, numbness and headaches.   Psychiatric/Behavioral:  Negative for agitation and behavioral problems.      Objective:     Vital Signs (Most Recent):  Temp: 97.6 °F (36.4 °C) (03/25/25 1156)  Pulse: 99 (03/25/25 1207)  Resp: 18 (03/25/25 1156)  BP: 119/64 (03/25/25 1207)  SpO2: 97 % (03/25/25 1156) Vital Signs (24h Range):  Temp:  [97.6 °F (36.4 °C)-98.4 °F (36.9 °C)] 97.6 °F (36.4 °C)  Pulse:  [] 99  Resp:  [17-19] 18  SpO2:  [96 %-100 %] 97 %  BP: ()/(54-71) 119/64     Weight: 70 kg (154 lb 5.2 oz)  Body mass index is 28.23 kg/m².    Intake/Output Summary (Last 24 hours) at 3/25/2025 1444  Last data filed at 3/25/2025 1105  Gross per 24 hour   Intake 160 ml   Output 1200 ml   Net -1040 ml      Physical Exam  Vitals and nursing note reviewed.   Constitutional:       General: She is not in acute distress.  HENT:      Head: Normocephalic and atraumatic.      Nose: Nose normal.      Mouth/Throat:      Mouth: Mucous membranes are moist.   Eyes:      General: No scleral icterus.     Extraocular Movements: Extraocular movements intact.   Cardiovascular:      Rate and Rhythm: Normal rate and regular rhythm.      Heart sounds: Murmur heard.      No friction rub.  "No gallop.   Pulmonary:      Effort: Pulmonary effort is normal. No respiratory distress.      Breath sounds: No wheezing, rhonchi or rales.   Abdominal:      General: There is no distension.      Palpations: Abdomen is soft.      Tenderness: There is no abdominal tenderness. There is no guarding or rebound.   Musculoskeletal:         General: Normal range of motion.      Cervical back: Neck supple. No rigidity.      Right lower leg: Edema present.      Left lower leg: Edema present.   Skin:     General: Skin is warm and dry.      Capillary Refill: Capillary refill takes less than 2 seconds.      Findings: Lesion (Right thigh surgical wound clean, dry, and intact) present.   Neurological:      General: No focal deficit present.      Mental Status: She is alert.   Psychiatric:         Mood and Affect: Mood normal.         MELD 3.0: 12 at 3/9/2025  3:41 AM  MELD-Na: 7 at 3/9/2025  3:41 AM  Calculated from:  Serum Creatinine: 0.9 mg/dL (Using min of 1 mg/dL) at 3/9/2025  3:41 AM  Serum Sodium: 133 mmol/L at 3/9/2025  3:41 AM  Total Bilirubin: 0.4 mg/dL (Using min of 1 mg/dL) at 3/9/2025  3:41 AM  Serum Albumin: 3 g/dL at 3/9/2025  3:41 AM  INR(ratio): 1.1 at 3/7/2025  1:24 PM  Age at listing (hypothetical): 89 years  Sex: Female at 3/9/2025  3:41 AM      Significant Labs:  CBC:  No results for input(s): "WBC", "HGB", "HCT", "PLT" in the last 48 hours.  CMP:  Recent Labs   Lab 03/24/25  0600 03/25/25  0723    134*   K 3.2* 3.8   CL 92* 93*   CO2 38* 35*   BUN 17 19   CREATININE 0.6 0.6   CALCIUM 9.2 9.4   ANIONGAP 7* 6*     PTINR:  No results for input(s): "INR" in the last 48 hours.    Significant Procedures:   Dobutamine Stress Test with Color Flow: No results found for this or any previous visit.    None  "

## 2025-03-25 NOTE — PROGRESS NOTES
[facility] - Internal Medicine [facility] Medicine  Progress Note    Patient Name: Dorothy M Knobloch  MRN: [id number]  Patient Class: IP- Inpatient   Admission Date: 3/20/2025  Length of Stay: 5 days  Attending Physician: Lake Tate MD  Primary Care Provider: Lola Wolff MD        Subjective     Principal Problem:Acute on chronic congestive heart failure        HPI:  89 y/o female with PAF on long term Apixiban at home for anticoagulation, HTN, chronic diastolic heart failure, chronic anemia, cardiac pacemaker due to sick sinus syndrome and hypothyroidism. She was recently admitted from 3/7 to 3/13 after fall at home and femur fracture for which she received operative management. She was discharged to SNF for wound care and PT/OT.     For the last few days she has had increasing sob and work of breathing, family also noticed increased lower extremity swelling. She was also having a change in mental status with hallucinations and confusion. These symptoms prompted her to be transferred to the ED for evaluation.     In the ED she was found to have an elevated BNP (much greater than prior values), cxr showed fluid accumulation. CTA PE showed no evidence of large PE, did show signs of volume overload and possible infection. She was started on lasix with improvement in oxygenation in the ED (non-re breather to 4L NC). Cultures were collected and patient started on vancomycin and zosyn for possible HCAP as well given her symptoms.       Patient pleasantly confused on exam. Son present in the room and said she is normally very sharp mentally. He is not aware of most of her medical history including past medications.     Overview/Hospital Course:  3/21: Echo unremarkable. Intermittent confusion noted. Continue Lasix and Zosyn/Doxy  3/22: Mental status improved. Continue Lasix and abx   3/23: Increased dyspnea overnight. CXR w/ worsening effusions/edema.       Interval History: No acute events overnight. Pt  reports improvement in dyspnea. No new concerns or complaints.     Review of Systems   Constitutional:  Positive for fatigue. Negative for chills and fever.   HENT:  Negative for congestion, hearing loss, sinus pressure, sore throat, trouble swallowing and voice change.    Eyes:  Negative for pain and visual disturbance.   Respiratory:  Negative for cough and shortness of breath.    Cardiovascular:  Positive for leg swelling. Negative for chest pain.   Gastrointestinal:  Negative for abdominal pain, constipation, diarrhea, nausea and vomiting.   Musculoskeletal:  Negative for arthralgias.   Skin:  Positive for wound. Negative for pallor and rash.   Neurological:  Positive for weakness. Negative for dizziness, numbness and headaches.   Psychiatric/Behavioral:  Negative for agitation and behavioral problems.      Objective:     Vital Signs (Most Recent):  Temp: 97.6 °F (36.4 °C) (03/25/25 1156)  Pulse: 99 (03/25/25 1207)  Resp: 18 (03/25/25 1156)  BP: 119/64 (03/25/25 1207)  SpO2: 97 % (03/25/25 1156) Vital Signs (24h Range):  Temp:  [97.6 °F (36.4 °C)-98.4 °F (36.9 °C)] 97.6 °F (36.4 °C)  Pulse:  [] 99  Resp:  [17-19] 18  SpO2:  [96 %-100 %] 97 %  BP: ()/(54-71) 119/64     Weight: 70 kg (154 lb 5.2 oz)  Body mass index is 28.23 kg/m².    Intake/Output Summary (Last 24 hours) at 3/25/2025 1444  Last data filed at 3/25/2025 1105  Gross per 24 hour   Intake 160 ml   Output 1200 ml   Net -1040 ml      Physical Exam  Vitals and nursing note reviewed.   Constitutional:       General: She is not in acute distress.  HENT:      Head: Normocephalic and atraumatic.      Nose: Nose normal.      Mouth/Throat:      Mouth: Mucous membranes are moist.   Eyes:      General: No scleral icterus.     Extraocular Movements: Extraocular movements intact.   Cardiovascular:      Rate and Rhythm: Normal rate and regular rhythm.      Heart sounds: Murmur heard.      No friction rub. No gallop.   Pulmonary:      Effort: Pulmonary  "effort is normal. No respiratory distress.      Breath sounds: No wheezing, rhonchi or rales.   Abdominal:      General: There is no distension.      Palpations: Abdomen is soft.      Tenderness: There is no abdominal tenderness. There is no guarding or rebound.   Musculoskeletal:         General: Normal range of motion.      Cervical back: Neck supple. No rigidity.      Right lower leg: Edema present.      Left lower leg: Edema present.   Skin:     General: Skin is warm and dry.      Capillary Refill: Capillary refill takes less than 2 seconds.      Findings: Lesion (Right thigh surgical wound clean, dry, and intact) present.   Neurological:      General: No focal deficit present.      Mental Status: She is alert.   Psychiatric:         Mood and Affect: Mood normal.         MELD 3.0: 12 at 3/9/2025  3:41 AM  MELD-Na: 7 at 3/9/2025  3:41 AM  Calculated from:  Serum Creatinine: 0.9 mg/dL (Using min of 1 mg/dL) at 3/9/2025  3:41 AM  Serum Sodium: 133 mmol/L at 3/9/2025  3:41 AM  Total Bilirubin: 0.4 mg/dL (Using min of 1 mg/dL) at 3/9/2025  3:41 AM  Serum Albumin: 3 g/dL at 3/9/2025  3:41 AM  INR(ratio): 1.1 at 3/7/2025  1:24 PM  Age at listing (hypothetical): 89 years  Sex: Female at 3/9/2025  3:41 AM      Significant Labs:  CBC:  No results for input(s): "WBC", "HGB", "HCT", "PLT" in the last 48 hours.  CMP:  Recent Labs   Lab 03/24/25  0600 03/25/25  0723    134*   K 3.2* 3.8   CL 92* 93*   CO2 38* 35*   BUN 17 19   CREATININE 0.6 0.6   CALCIUM 9.2 9.4   ANIONGAP 7* 6*     PTINR:  No results for input(s): "INR" in the last 48 hours.    Significant Procedures:   Dobutamine Stress Test with Color Flow: No results found for this or any previous visit.    None      Assessment & Plan  Acute on chronic congestive heart failure  Patient has  diastolic  heart failure that is Acute on chronic. On presentation their CHF was decompensated. Evidence of decompensated CHF on presentation includes: edema, crackles on lung " "auscultation, dyspnea on exertion (SMITH), and shortness of breath. The etiology of their decompensation is likely increased fluid intake. Most recent BNP and echo results are listed below.  No results for input(s): "BNP" in the last 72 hours.    Latest ECHO  Results for orders placed during the hospital encounter of 06/20/24    Echo    Interpretation Summary    Left Ventricle: The left ventricle is normal in size. Normal wall thickness. There is concentric hypertrophy. There is normal systolic function with a visually estimated ejection fraction of 65 - 70%. Unable to assess diastolic function due to atrial fibrillation.    Right Ventricle: Normal right ventricular cavity size. Wall thickness is normal. Systolic function is normal.    Left Atrium: Left atrium is severely dilated.    Right Atrium: Right atrium is severely dilated.    Mitral Valve: There is moderate posterior leaflet sclerosis. There is no stenosis. The mean pressure gradient across the mitral valve is 1 mmHg at a heart rate of 60 bpm. There is moderate regurgitation.    Tricuspid Valve: There is moderate to severe regurgitation.    Pulmonic Valve: There is no stenosis.    Pulmonary Artery: There is moderate pulmonary hypertension. The estimated pulmonary artery systolic pressure is 60 mmHg.    IVC/SVC: Intermediate venous pressure at 8 mmHg.    Current Heart Failure Medications  hydroCHLOROthiazide tablet 12.5 mg, Daily, Oral  bumetanide tablet 2 mg, 2 times daily, Oral    Plan  - Monitor strict I&Os and daily weights.    - Place on telemetry  - Low sodium diet  - Place on fluid restriction of 1.5 L.   - Cardiology has not been consulted  - The patient's volume status is at their baseline  - Continue Bumex 2 mg BID.   - could also need increased bumex on discharge with sliding scale diuretics to prevent relapse    Essential hypertension  Patient's blood pressure range in the last 24 hours was: BP  Min: 92/54  Max: 122/69.The patient's inpatient " anti-hypertensive regimen is listed below:  Current Antihypertensives  amLODIPine tablet 5 mg, Daily, Oral  hydroCHLOROthiazide tablet 12.5 mg, Daily, Oral  bumetanide tablet 2 mg, 2 times daily, Oral    Plan  - BP is controlled, no changes needed to their regimen  Acquired hypothyroidism  Continue with synthroid at current dose. Pt will need to follow up as an outpatient  -TSH elevated at 22.581, Free T4 0.83      Cardiac pacemaker  H/o  Paced rhythm on telemetry currently  Paroxysmal atrial fibrillation  Patient has persistent (7 days or more) atrial fibrillation. Patient is currently in sinus rhythm. [id number] Score: 4. The patients heart rate in the last 24 hours is as follows:  Pulse  Min: 79  Max: 103     Antiarrhythmics       Anticoagulants  apixaban tablet 5 mg, 2 times daily, Oral    Plan  - Replete lytes with a goal of K>4, Mg >2  - Patient is anticoagulated, see medications listed above.  - Patient's afib is currently controlled  - holding BB due to CHF exacerbation. Likely to restart in the next day or two  Aortic stenosis  The patient's most recent echocardiogram result is listed below.     Echo  Result Date: 3/21/2025    Left Ventricle: The left ventricle is normal in size. Mildly increased   ventricular mass. Normal wall thickness. There is mild concentric   hypertrophy. Normal wall motion. There is normal systolic function with a   visually estimated ejection fraction of 65 - 70%. There is normal   diastolic function.    Right Ventricle: The right ventricle is normal in size. Wall thickness   is normal. Systolic function is normal. Pacemaker lead present in the   ventricle.    Left Atrium: Severely dilated    Right Atrium: Right atrium is moderately dilated.    Aortic Valve: The aortic valve is a trileaflet valve. There is severe   aortic valve sclerosis. There is moderate annular calcification present.   Moderately restricted motion. There is moderate to severe low flow   stenosis. Aortic valve  area by VTI is 0.8 cm2. Aortic valve peak velocity   is 3.4 m/s. Mean gradient is 25 mmHg. The dimensionless index is 0.27. AS   severity has increased compared to study 1/24    Mitral Valve: There is severe posterior mitral annular calcification.    Aorta: Aortic root is normal in size measuring 2.62 cm. Aortic root at   ST junction is normal. Ascending aorta is normal measuring 2.62 cm.    Pulmonary Artery: There is mild pulmonary hypertension. The estimated   pulmonary artery systolic pressure is 44 mmHg.    IVC/SVC: Normal venous pressure at 3 mmHg.    Pericardium: There is no pericardial effusion.        Echo  Result Date: 6/20/2024    Left Ventricle: The left ventricle is normal in size. Normal wall   thickness. There is concentric hypertrophy. There is normal systolic   function with a visually estimated ejection fraction of 65 - 70%. Unable   to assess diastolic function due to atrial fibrillation.    Right Ventricle: Normal right ventricular cavity size. Wall thickness   is normal. Systolic function is normal.    Left Atrium: Left atrium is severely dilated.    Right Atrium: Right atrium is severely dilated.    Mitral Valve: There is moderate posterior leaflet sclerosis. There is   no stenosis. The mean pressure gradient across the mitral valve is 1 mmHg   at a heart rate of 60 bpm. There is moderate regurgitation.    Tricuspid Valve: There is moderate to severe regurgitation.    Pulmonic Valve: There is no stenosis.    Pulmonary Artery: There is moderate pulmonary hypertension. The   estimated pulmonary artery systolic pressure is 60 mmHg.    IVC/SVC: Intermediate venous pressure at 8 mmHg.           Pulmonary hypertension  H/o on prior echo  - diuresis for CHF already in ordered    Periprosthetic fracture of shaft of right femur s/p ORIF on 3/8/2025    H/o with recent surgery  - wound vac in place. Orthopedics planning to remove in the next day or two    Hyponatremia  Hyponatremia is likely due to Heart  "Failure. The patient's most recent sodium results are listed below.  Recent Labs     03/23/25  0739 03/24/25  0600 03/25/25  0723    137 134*     Plan  - Will treat the hyponatremia with Fluid restriction of:  1.5 liter per day  - Monitor sodium Daily.   - Patient hyponatremia is stable  - acute on chronic in the setting of CHF exacerbation   Moderate protein-calorie malnutrition  Nutrition consulted. Most recent weight and BMI monitored-     Measurements:  Wt Readings from Last 1 Encounters:   03/24/25 70 kg (154 lb 5.2 oz)   Body mass index is 28.23 kg/m².    Patient has been screened and assessed by RD.    Malnutrition Type:  Context: chronic illness  Level: moderate    Malnutrition Characteristic Summary:  Energy Intake (Malnutrition): less than 75% for greater than or equal to 1 month  Subcutaneous Fat (Malnutrition): moderate depletion  Muscle Mass (Malnutrition): moderate depletion    Interventions/Recommendations (treatment strategy):  1. Continue low sodium diet 2. Add Boost plus TID if medically warranted 3. RD following    Pneumonia  -Blood cultures w/ NGTD.   -Continue Augmentin/Doxycycline.     Acute hypoxic respiratory failure  Patient with Hypoxic Respiratory failure which is Acute.  she is not on home oxygen. Supplemental oxygen was provided and noted-      .   Signs/symptoms of respiratory failure include- increased work of breathing, respiratory distress, and use of accessory muscles. Contributing diagnoses includes - CHF and Pneumonia Labs and images were reviewed. Patient Has not had a recent ABG. Will treat underlying causes and adjust management of respiratory failure as follows-     - 2/2 pna vs chf  - titrate oxygen as tolerated   Anemia  Anemia is likely due to chronic disease due to heart failure . Most recent hemoglobin and hematocrit are listed below.  No results for input(s): "HGB", "HCT" in the last 72 hours.  Plan  - Monitor serial CBC: Daily  - Transfuse PRBC if patient becomes " hemodynamically unstable, symptomatic or H/H drops below 7/21.  - Patient has not received any PRBC transfusions to date  - Patient's anemia is currently stable  -   Hypokalemia  Patient's most recent potassium results are listed below.   Recent Labs     03/23/25  0739 03/24/25  0600 03/25/25  0723   K 3.7 3.2* 3.8     Plan  - Replete potassium per protocol  - Monitor potassium Daily  - Patient's hypokalemia is stable    VTE Risk Mitigation (From admission, onward)           Ordered     apixaban tablet 5 mg  2 times daily         03/20/25 1509                    Discharge Planning   ISH: 3/26/2025     Code Status: Full Code   Medical Readiness for Discharge Date: 3/25/2025  Discharge Plan A: Skilled Nursing Facility                Please place Justification for DME        Lake Tate MD  Department of Hospital Medicine   [facility] - Internal Medicine Telemetry

## 2025-03-25 NOTE — ASSESSMENT & PLAN NOTE
Patient has persistent (7 days or more) atrial fibrillation. Patient is currently in sinus rhythm. [id number] Score: 4. The patients heart rate in the last 24 hours is as follows:  Pulse  Min: 79  Max: 103     Antiarrhythmics       Anticoagulants  apixaban tablet 5 mg, 2 times daily, Oral    Plan  - Replete lytes with a goal of K>4, Mg >2  - Patient is anticoagulated, see medications listed above.  - Patient's afib is currently controlled  - holding BB due to CHF exacerbation. Likely to restart in the next day or two

## 2025-03-25 NOTE — PLAN OF CARE
Important Message from Medicare  Important Message from Medicare regarding Discharge Appeal Rights: Signed/date by patient/caregiver, Explained to patient/caregiver, Given to patient/caregiver     Date IMM was signed: 03/25/25  Time IMM was signed: 9132               CASEY Young, LMSW  [facility]  Case Management  Ext. [phone]

## 2025-03-25 NOTE — PT/OT/SLP PROGRESS
"Occupational Therapy   Treatment    Name: Dorothy M Knobloch  MRN: [id number]  Admitting Diagnosis:  Acute on chronic congestive heart failure       Recommendations:     Discharge Recommendations: Moderate Intensity Therapy  Discharge Equipment Recommendations:  wheelchair, bedside commode  Barriers to discharge:  Other (Comment) (Increased skilled assistance required)    Assessment:     Dorothy M Knobloch is a 89 y.o. female with a medical diagnosis of Acute on chronic congestive heart failure.  She presents with the following performance deficits affecting function are weakness, impaired endurance, impaired self care skills, impaired functional mobility, impaired balance, decreased lower extremity function, decreased safety awareness, decreased coordination.     Pt agreeable to therapy and tolerated fairly well. Pt continues to demonstrate improvements with bed mobility, MRADLs/transfers & overall activity tolerance. Pt would continue to benefit from skilled OT services to maximize functional independence with ADLs and functional mobility, reduce caregiver burden, and facilitate safe discharge in the least restrictive environment.      Rehab Prognosis:  Good; patient would benefit from acute skilled OT services to address these deficits and reach maximum level of function.       Plan:     Patient to be seen 4 x/week to address the above listed problems via self-care/home management, therapeutic activities, therapeutic exercises  Plan of Care Expires: 04/20/25  Plan of Care Reviewed with: patient    Subjective     Chief Complaint: dizziness  Patient/Family Comments/goals: "I want to walk again"  Pain/Comfort:  Pain Rating 1: 0/10    Objective:     Communicated with: nurse prior to session.  Patient found HOB elevated with PureWick, oxygen, telemetry upon OT entry to room.    General Precautions: Standard, fall, vision impaired    Orthopedic Precautions:RLE weight bearing as tolerated (R ROMAT)  Braces: N/A  Respiratory " Status: Room air     Occupational Performance:     Bed Mobility:    Patient completed Scooting towards back of the chair with maximal assistance (via drawsheet & chuckpad)    Patient completed Supine to Sit with supervision     Functional Mobility/Transfers:  Patient completed 2 Sit <> Stand Transfers with moderate assistance  with  rolling walker   1st STS from EOB  2nd STS from BSC  Pt required verbal cueing for hand placement and countdown    Patient completed Bed -> BSC Transfer using Step Transfer technique with minimum assistance with rolling walker  Pt required verbal cueing for sequencing & RW management to maintain safety throughout the transfer    Patient completed BSC -> Chair Transfer using Step Transfer technique with contact guard assistance with rolling walker  Pt required additional time to complete but was able to complete the transfer without any verbal cueing    Functional Mobility: pt is now able to complete two short distance transfers (less than 5 ft in total) with min A- CGA using RW  No LOB noted but pt does require additional time 2/2 SOB    Activities of Daily Living:  Upper Body Dressing: minimum assistance to don gown over back while seated EOB    Lower Body Dressing: total assistance to adjust B socks while in supine with HOB elevated    AMPAC 6 Click ADL: 15    Treatment & Education:  -Education on energy conservation tips and task modification to maximize safety and (I) during bed mobility, MRADLs and general mobility/transfers  -Education on importance of OOB activity to maintain/improve overall strength, activity tolerance and promote recovery  -Pt educated to call for assistance and to transfer with hospital staff only  Pt had no further questions & when asked whether there were any concerns pt reported none.     Patient left up in chair with all lines intact, call button in reach, nurse notified, and daughter present    GOALS:   Multidisciplinary Problems       Occupational Therapy  Goals          Problem: Occupational Therapy    Goal Priority Disciplines Outcome Interventions   Occupational Therapy Goal     OT, PT/OT Progressing    Description: Goals to be met by: 4/20/25     Patient will increase functional independence with ADLs by performing:    UE Dressing with Stand-by Assistance.  LE Dressing with Minimal Assistance.  Grooming while standing at sink with Contact Guard Assistance.  Toileting from toilet with Minimal Assistance for hygiene and clothing management.   All functional transfers performed with CGA                         DME Justifications:  Dorothy Knobloch has a mobility limitation that significantly impairs her ability to participate in one or more mobility related activities of daily living (MRADLs) such as toileting, feeding, dressing, grooming, and bathing in customary locations in the home.  The mobility limitation cannot be sufficiently resolved by the use of a cane or walker.   The use of a manual wheelchair will significantly improve the patients ability to participate in MRADLS and the patient will use it on regular basis in the home.  Dorothy Knobloch has expressed her willingness to use a manual wheelchair in the home. Patients upper body strength is sufficient for propulsion.  She also has a caregiver who is available, willing, and able to provide assistance with the wheelchair when needed.      Time Tracking:     OT Date of Treatment: 03/25/25  OT Start Time: 0954  OT Stop Time: 1042  OT Total Time (min): 48 min    Billable Minutes:Therapeutic Activity 48    OT/CORBIN: OT          3/25/2025

## 2025-03-25 NOTE — ASSESSMENT & PLAN NOTE
Hyponatremia is likely due to Heart Failure. The patient's most recent sodium results are listed below.  Recent Labs     03/23/25  0739 03/24/25  0600 03/25/25  0723    137 134*     Plan  - Will treat the hyponatremia with Fluid restriction of:  1.5 liter per day  - Monitor sodium Daily.   - Patient hyponatremia is stable  - acute on chronic in the setting of CHF exacerbation

## 2025-03-25 NOTE — PT/OT/SLP PROGRESS
Physical Therapy Treatment    Patient Name:  Dorothy M Knobloch   MRN:  [id number]  Admitting Diagnosis:  Acute on chronic congestive heart failure   Recent Surgery: * No surgery found *    Admit Date: 3/20/2025  Length of Stay: 5 days    Recommendations:     Discharge Recommendations: Moderate Intensity Therapy  Discharge Equipment Recommendations: wheelchair, bedside commode   Barriers to discharge:  assist needed    Appropriate transfer level with nursing staff: bed <> chair assist x 2     Plan:     During this hospitalization, patient to be seen 4 x/week to address the identified rehab impairments via gait training, therapeutic activities, therapeutic exercises, neuromuscular re-education and progress towards the established goals.  Plan of Care Expires:  04/18/25  Plan of Care Reviewed with: patient    Assessment     Dorothy M Knobloch is a 89 y.o. female admitted with a medical diagnosis of Acute on chronic congestive heart failure. Pt with fair tolerance to session today. Pt found upright in chair after being OOB for ~4 hrs. Pt with extreme fatigue due to this so treatment session focused on performance of functional transfer from chair > bed. Pt attempted to perform sit <> stand x 2 for performing a step transfer or stand pivot transfer to bed but was unable to due so due to fatigue and weakness. Due to this squat pivot transfer performed with pt having difficulty bringing weight anteriorly as well as pivoting hips. Pt is still well below their baseline level of function and presents with deficits affecting mobility as per problem list below. Based on clinical presentation, co-morbidities, and today's performance, patient would benefit from skilled physical therapy services to progress functional transfers, improve musculoskeletal strength, as well as increase pt's cardiopulmonary endurance to maximize pt's independence and return to PLOF.      Problem List: weakness, impaired endurance, impaired self care  "skills, gait instability, impaired functional mobility, impaired balance, decreased coordination, decreased lower extremity function, decreased upper extremity function, pain, impaired skin, impaired cardiopulmonary response to activity, edema.  Rehab Prognosis: Good; patient would benefit from acute skilled PT services to address these deficits and reach maximum level of function.      Goals:   Multidisciplinary Problems       Physical Therapy Goals          Problem: Physical Therapy    Goal Priority Disciplines Outcome Interventions   Physical Therapy Goal     PT, PT/OT Progressing    Description: Goals to be met by: 25     Patient will increase functional independence with mobility by performin. Supine to sit with [place]  2. Sit to supine with [place]  3. Sit to stand transfer with [place]  4. Bed to chair transfer with Modified [place] using Rolling Walker  5. Gait  x 150 feet with Modified [place] using Rolling Walker.   6. Lower extremity exercise program x15 reps per handout, with independence                         Subjective     RN notified prior to session. Son and daughter present upon PT entrance into room. Patient agreeable to PT evaluation.    Chief Complaint: "I'm so sorry I did so bad today"  Patient/Family Comments/goals: get stronger  Pain/Comfort:  Pain Rating 1: 0/10 (at rest)  Location - Side 1: Right  Location - Orientation 1: generalized  Location 1: hip  Pain Addressed 1: Distraction, Cessation of Activity, Nurse notified  Pain Rating Post-Intervention 1: other (see comments) (pain increased with weightbearing)    Objective:     Additional staff present: N/A    Patient found up in chair with: telemetry, [employer]   Cognition:   Alert and Cooperative  Patient is oriented to Person, Place, Time, Situation  Command following: Follows multistep verbal commands  Fluency: clear/fluent  General Precautions: Standard, Cardiac fall, vision impaired   Orthopedic " "Precautions:RLE weight bearing as tolerated   Braces: N/A   Body mass index is 28.23 kg/m².  Oxygen Device: Room Air  Vitals: BP (!) 116/51   Pulse 104   Temp 98.1 °F (36.7 °C) (Oral)   Resp 18   Ht 5' 2" (1.575 m)   Wt 70 kg (154 lb 5.2 oz)   SpO2 (!) 94%   Breastfeeding No   BMI 28.23 kg/m²     Outcome Measures:  AM-PAC 6 CLICK MOBILITY  Turning over in bed (including adjusting bedclothes, sheets and blankets)?: 3  Sitting down on and standing up from a chair with arms (e.g., wheelchair, bedside commode, etc.): 2  Moving from lying on back to sitting on the side of the bed?: 2  Moving to and from a bed to a chair (including a wheelchair)?: 2  Need to walk in hospital room?: 1  Climbing 3-5 steps with a railing?: 1  Basic Mobility Total Score: 11     Functional Mobility:    Bed Mobility:   Scooting to HOB via supine bridge: total assistance and 2 persons   Sit to Supine: moderate assistance for LE management; to Rt side of bed    Sitting Balance at Edge of Bed:  Static Sitting Balance: Fair : able to sit unsupported without balance loss and without UE support  Dynamic Sitting Balance: Fair : minimal weight shifting ipsilaterally or anteriorly. Difficulty crossing midline  Assistance Level Required: Contact Guard Assistance  Comments: posterior trunk lean req'ing CGA to prevent LOB    Transfers:   Sit <> Stand Transfer: attempted x 2; pt unable to clear buttocks from chair   Chair <> Bed Transfer Squat Pivot technique: maximal assistance with no AD. Bed on the Lt    Education:  Time provided for education, counseling and discussion of health disposition in regards to patient's current status  All questions answered within PT scope of practice and to patient's satisfaction  PT role in POC to address current functional deficits  Pt educated on proper body mechanics, safety techniques, and energy conservation with PT facilitation and cueing throughout session  Whiteboard updated with therapist name and pt's " current mobility status documented above  Safe to perform stand or squat transfer to/from chair/bedside commode assist x 2 and RW w/ nursing/PCT present    DME Justifications:  Dorothy Knobloch has a mobility limitation that significantly impairs her ability to participate in one or more mobility related activities of daily living (MRADLs) such as toileting, feeding, dressing, grooming, and bathing in customary locations in the home.  The mobility limitation cannot be sufficiently resolved by the use of a cane or walker.   The use of a manual wheelchair will significantly improve the patients ability to participate in MRADLS and the patient will use it on regular basis in the home.  Dorothy Knobloch has expressed her willingness to use a manual wheelchair in the home. Patients upper body strength is sufficient for propulsion.  She also has a caregiver who is available, willing, and able to provide assistance with the wheelchair when needed.      Patient left HOB elevated with all lines intact, call button in reach, RN notified, and son present.    Time Tracking:     PT Received On: 03/25/25  PT Start Time: 1324     PT Stop Time: 1350  PT Total Time (min): 26 min     Billable Minutes:   Therapeutic Activity 26 minutes    Treatment Type: Treatment  PT/PTA: PT       3/25/2025

## 2025-03-25 NOTE — ASSESSMENT & PLAN NOTE
Patient's blood pressure range in the last 24 hours was: BP  Min: 92/54  Max: 122/69.The patient's inpatient anti-hypertensive regimen is listed below:  Current Antihypertensives  amLODIPine tablet 5 mg, Daily, Oral  hydroCHLOROthiazide tablet 12.5 mg, Daily, Oral  bumetanide tablet 2 mg, 2 times daily, Oral    Plan  - BP is controlled, no changes needed to their regimen

## 2025-03-26 ENCOUNTER — DOCUMENTATION ONLY (OUTPATIENT)
Dept: CARDIOLOGY | Facility: CLINIC | Age: OVER 89
End: 2025-03-26
Payer: MEDICARE

## 2025-03-26 ENCOUNTER — HOSPITAL ENCOUNTER (INPATIENT)
Facility: HOSPITAL | Age: OVER 89
LOS: 28 days | Discharge: HOSPICE | DRG: 947 | End: 2025-04-23
Attending: HOSPITALIST | Admitting: HOSPITALIST
Payer: MEDICARE

## 2025-03-26 VITALS
HEIGHT: 62 IN | WEIGHT: 154.31 LBS | HEART RATE: 90 BPM | BODY MASS INDEX: 28.39 KG/M2 | DIASTOLIC BLOOD PRESSURE: 66 MMHG | SYSTOLIC BLOOD PRESSURE: 117 MMHG | OXYGEN SATURATION: 93 % | TEMPERATURE: 98 F | RESPIRATION RATE: 18 BRPM

## 2025-03-26 DIAGNOSIS — M97.8XXA PERI-PROSTHETIC FRACTURE OF SHAFT OF FEMUR: ICD-10-CM

## 2025-03-26 DIAGNOSIS — Z96.649 PERI-PROSTHETIC FRACTURE OF SHAFT OF FEMUR: ICD-10-CM

## 2025-03-26 DIAGNOSIS — I50.23 ACUTE ON CHRONIC SYSTOLIC CONGESTIVE HEART FAILURE: ICD-10-CM

## 2025-03-26 DIAGNOSIS — I50.9 ACUTE ON CHRONIC HEART FAILURE: ICD-10-CM

## 2025-03-26 DIAGNOSIS — I50.33 ACUTE ON CHRONIC DIASTOLIC CONGESTIVE HEART FAILURE: Primary | ICD-10-CM

## 2025-03-26 DIAGNOSIS — Z96.649 PERIPROSTHETIC FRACTURE OF SHAFT OF FEMUR: ICD-10-CM

## 2025-03-26 DIAGNOSIS — M97.8XXA PERIPROSTHETIC FRACTURE OF SHAFT OF FEMUR: ICD-10-CM

## 2025-03-26 LAB
ABSOLUTE EOSINOPHIL (OHS): 0.24 K/UL
ABSOLUTE MONOCYTE (OHS): 0.54 K/UL (ref 0.3–1)
ABSOLUTE NEUTROPHIL COUNT (OHS): 4.32 K/UL (ref 1.8–7.7)
ANION GAP (OHS): 12 MMOL/L (ref 8–16)
BASOPHILS # BLD AUTO: 0.1 K/UL
BASOPHILS NFR BLD AUTO: 1.5 %
BUN SERPL-MCNC: 19 MG/DL (ref 10–30)
CALCIUM SERPL-MCNC: 9.4 MG/DL (ref 8.7–10.5)
CHLORIDE SERPL-SCNC: 90 MMOL/L (ref 95–110)
CO2 SERPL-SCNC: 31 MMOL/L (ref 23–29)
CREAT SERPL-MCNC: 0.7 MG/DL (ref 0.5–1.4)
ERYTHROCYTE [DISTWIDTH] IN BLOOD BY AUTOMATED COUNT: 15.3 % (ref 11.5–14.5)
GFR SERPLBLD CREATININE-BSD FMLA CKD-EPI: >60 ML/MIN/1.73/M2
GLUCOSE SERPL-MCNC: 90 MG/DL (ref 70–110)
HCT VFR BLD AUTO: 28.2 % (ref 37–48.5)
HGB BLD-MCNC: 8.9 GM/DL (ref 12–16)
IMM GRANULOCYTES # BLD AUTO: 0.09 K/UL (ref 0–0.04)
IMM GRANULOCYTES NFR BLD AUTO: 1.4 % (ref 0–0.5)
LYMPHOCYTES # BLD AUTO: 1.17 K/UL (ref 1–4.8)
MCH RBC QN AUTO: 30.7 PG (ref 27–50)
MCHC RBC AUTO-ENTMCNC: 31.6 G/DL (ref 32–36)
MCV RBC AUTO: 97 FL (ref 82–98)
NUCLEATED RBC (/100WBC) (OHS): 0 /100 WBC
PLATELET # BLD AUTO: 428 K/UL (ref 150–450)
PMV BLD AUTO: 8.2 FL (ref 9.2–12.9)
POTASSIUM SERPL-SCNC: 3.1 MMOL/L (ref 3.5–5.1)
RBC # BLD AUTO: 2.9 M/UL (ref 4–5.4)
RELATIVE EOSINOPHIL (OHS): 3.7 %
RELATIVE LYMPHOCYTE (OHS): 18.1 % (ref 18–48)
RELATIVE MONOCYTE (OHS): 8.4 % (ref 4–15)
RELATIVE NEUTROPHIL (OHS): 66.9 % (ref 38–73)
SARS-COV-2 RNA RESP QL NAA+PROBE: NEGATIVE
SODIUM SERPL-SCNC: 133 MMOL/L (ref 136–145)
WBC # BLD AUTO: 6.46 K/UL (ref 3.9–12.7)

## 2025-03-26 PROCEDURE — 36415 COLL VENOUS BLD VENIPUNCTURE: CPT | Performed by: INTERNAL MEDICINE

## 2025-03-26 PROCEDURE — 80048 BASIC METABOLIC PNL TOTAL CA: CPT

## 2025-03-26 PROCEDURE — 25000003 PHARM REV CODE 250: Performed by: HOSPITALIST

## 2025-03-26 PROCEDURE — 25000003 PHARM REV CODE 250: Performed by: INTERNAL MEDICINE

## 2025-03-26 PROCEDURE — 85025 COMPLETE CBC W/AUTO DIFF WBC: CPT | Performed by: INTERNAL MEDICINE

## 2025-03-26 PROCEDURE — 87635 SARS-COV-2 COVID-19 AMP PRB: CPT | Performed by: INTERNAL MEDICINE

## 2025-03-26 PROCEDURE — 11000004 HC SNF PRIVATE

## 2025-03-26 PROCEDURE — 25000003 PHARM REV CODE 250

## 2025-03-26 RX ORDER — LEVOTHYROXINE SODIUM 88 UG/1
88 TABLET ORAL
Status: DISCONTINUED | OUTPATIENT
Start: 2025-03-27 | End: 2025-03-28

## 2025-03-26 RX ORDER — HYDROCHLOROTHIAZIDE 12.5 MG/1
12.5 TABLET ORAL DAILY
Status: DISCONTINUED | OUTPATIENT
Start: 2025-03-27 | End: 2025-04-04

## 2025-03-26 RX ORDER — ERYTHROMYCIN 5 MG/G
OINTMENT OPHTHALMIC NIGHTLY
Status: DISCONTINUED | OUTPATIENT
Start: 2025-03-26 | End: 2025-03-26

## 2025-03-26 RX ORDER — BUMETANIDE 0.5 MG/1
1 TABLET ORAL DAILY
Qty: 60 TABLET | Refills: 0 | Status: ON HOLD | OUTPATIENT
Start: 2025-03-26

## 2025-03-26 RX ORDER — ACETAMINOPHEN 325 MG/1
650 TABLET ORAL EVERY 6 HOURS PRN
Status: DISCONTINUED | OUTPATIENT
Start: 2025-03-26 | End: 2025-04-23 | Stop reason: HOSPADM

## 2025-03-26 RX ORDER — AMOXICILLIN 250 MG
1 CAPSULE ORAL 2 TIMES DAILY
Status: DISCONTINUED | OUTPATIENT
Start: 2025-03-26 | End: 2025-04-23 | Stop reason: HOSPADM

## 2025-03-26 RX ORDER — TALC
6 POWDER (GRAM) TOPICAL NIGHTLY PRN
Status: DISCONTINUED | OUTPATIENT
Start: 2025-03-26 | End: 2025-04-23 | Stop reason: HOSPADM

## 2025-03-26 RX ORDER — POTASSIUM CHLORIDE 20 MEQ/1
40 TABLET, EXTENDED RELEASE ORAL ONCE
Status: CANCELLED | OUTPATIENT
Start: 2025-03-26 | End: 2025-03-26

## 2025-03-26 RX ORDER — ESCITALOPRAM OXALATE 10 MG/1
20 TABLET ORAL DAILY
Status: DISCONTINUED | OUTPATIENT
Start: 2025-03-27 | End: 2025-04-23 | Stop reason: HOSPADM

## 2025-03-26 RX ORDER — BUMETANIDE 1 MG/1
1 TABLET ORAL DAILY
Status: DISCONTINUED | OUTPATIENT
Start: 2025-03-27 | End: 2025-04-10

## 2025-03-26 RX ORDER — METHOCARBAMOL 500 MG/1
500 TABLET, FILM COATED ORAL EVERY 8 HOURS
Status: DISCONTINUED | OUTPATIENT
Start: 2025-03-26 | End: 2025-03-27

## 2025-03-26 RX ORDER — TRAMADOL HYDROCHLORIDE 50 MG/1
50 TABLET ORAL EVERY 6 HOURS PRN
Status: DISCONTINUED | OUTPATIENT
Start: 2025-03-26 | End: 2025-04-16

## 2025-03-26 RX ORDER — BRIMONIDINE TARTRATE 1.5 MG/ML
1 SOLUTION/ DROPS OPHTHALMIC 2 TIMES DAILY
Status: DISCONTINUED | OUTPATIENT
Start: 2025-03-26 | End: 2025-04-23 | Stop reason: HOSPADM

## 2025-03-26 RX ORDER — DOXYCYCLINE HYCLATE 100 MG
100 TABLET ORAL EVERY 12 HOURS
Qty: 6 TABLET | Refills: 0 | Status: ON HOLD | OUTPATIENT
Start: 2025-03-26 | End: 2025-03-29

## 2025-03-26 RX ORDER — CLOPIDOGREL BISULFATE 75 MG/1
75 TABLET ORAL DAILY
Status: DISCONTINUED | OUTPATIENT
Start: 2025-03-27 | End: 2025-04-23 | Stop reason: HOSPADM

## 2025-03-26 RX ORDER — TIMOLOL MALEATE 5 MG/ML
1 SOLUTION/ DROPS OPHTHALMIC 2 TIMES DAILY
Status: DISCONTINUED | OUTPATIENT
Start: 2025-03-26 | End: 2025-04-23 | Stop reason: HOSPADM

## 2025-03-26 RX ORDER — ACETAMINOPHEN 325 MG/1
650 TABLET ORAL EVERY 6 HOURS PRN
Status: DISCONTINUED | OUTPATIENT
Start: 2025-03-26 | End: 2025-03-27

## 2025-03-26 RX ORDER — AMLODIPINE BESYLATE 5 MG/1
5 TABLET ORAL DAILY
Status: DISCONTINUED | OUTPATIENT
Start: 2025-03-27 | End: 2025-03-27

## 2025-03-26 RX ORDER — DOXYCYCLINE HYCLATE 100 MG
100 TABLET ORAL EVERY 12 HOURS
Status: COMPLETED | OUTPATIENT
Start: 2025-03-26 | End: 2025-03-29

## 2025-03-26 RX ORDER — METOPROLOL SUCCINATE 25 MG/1
25 TABLET, EXTENDED RELEASE ORAL NIGHTLY
Status: DISCONTINUED | OUTPATIENT
Start: 2025-03-26 | End: 2025-03-31

## 2025-03-26 RX ORDER — CALCIUM CARBONATE 200(500)MG
500 TABLET,CHEWABLE ORAL 2 TIMES DAILY PRN
Status: DISCONTINUED | OUTPATIENT
Start: 2025-03-26 | End: 2025-04-23 | Stop reason: HOSPADM

## 2025-03-26 RX ORDER — ERYTHROMYCIN 5 MG/G
OINTMENT OPHTHALMIC NIGHTLY
Status: DISCONTINUED | OUTPATIENT
Start: 2025-03-26 | End: 2025-03-31

## 2025-03-26 RX ORDER — AMOXICILLIN AND CLAVULANATE POTASSIUM 875; 125 MG/1; MG/1
1 TABLET, FILM COATED ORAL EVERY 12 HOURS
Qty: 6 TABLET | Refills: 0 | Status: ON HOLD | OUTPATIENT
Start: 2025-03-26 | End: 2025-03-29

## 2025-03-26 RX ORDER — POTASSIUM CHLORIDE 20 MEQ/1
40 TABLET, EXTENDED RELEASE ORAL ONCE
Status: COMPLETED | OUTPATIENT
Start: 2025-03-26 | End: 2025-03-26

## 2025-03-26 RX ORDER — METOPROLOL SUCCINATE 50 MG/1
50 TABLET, EXTENDED RELEASE ORAL DAILY
Status: DISCONTINUED | OUTPATIENT
Start: 2025-03-27 | End: 2025-03-31

## 2025-03-26 RX ORDER — AMOXICILLIN AND CLAVULANATE POTASSIUM 875; 125 MG/1; MG/1
1 TABLET, FILM COATED ORAL EVERY 12 HOURS
Status: COMPLETED | OUTPATIENT
Start: 2025-03-26 | End: 2025-03-29

## 2025-03-26 RX ADMIN — POTASSIUM CHLORIDE 40 MEQ: 1500 TABLET, EXTENDED RELEASE ORAL at 08:03

## 2025-03-26 RX ADMIN — LEVOTHYROXINE SODIUM 88 MCG: 0.05 TABLET ORAL at 06:03

## 2025-03-26 RX ADMIN — CLOPIDOGREL 75 MG: 75 TABLET ORAL at 08:03

## 2025-03-26 RX ADMIN — ACETAMINOPHEN 650 MG: 325 TABLET ORAL at 09:03

## 2025-03-26 RX ADMIN — TIMOLOL MALEATE 1 DROP: 5 SOLUTION OPHTHALMIC at 09:03

## 2025-03-26 RX ADMIN — APIXABAN 5 MG: 5 TABLET, FILM COATED ORAL at 09:03

## 2025-03-26 RX ADMIN — DOXYCYCLINE HYCLATE 100 MG: 100 TABLET, COATED ORAL at 08:03

## 2025-03-26 RX ADMIN — DOXYCYCLINE HYCLATE 100 MG: 100 TABLET, COATED ORAL at 09:03

## 2025-03-26 RX ADMIN — SENNOSIDES AND DOCUSATE SODIUM 1 TABLET: 50; 8.6 TABLET ORAL at 09:03

## 2025-03-26 RX ADMIN — AMOXICILLIN AND CLAVULANATE POTASSIUM 1 TABLET: 875; 125 TABLET, FILM COATED ORAL at 09:03

## 2025-03-26 RX ADMIN — APIXABAN 5 MG: 5 TABLET, FILM COATED ORAL at 08:03

## 2025-03-26 RX ADMIN — ESCITALOPRAM OXALATE 20 MG: 10 TABLET ORAL at 08:03

## 2025-03-26 RX ADMIN — BUMETANIDE 2 MG: 1 TABLET ORAL at 08:03

## 2025-03-26 RX ADMIN — AMOXICILLIN AND CLAVULANATE POTASSIUM 1 TABLET: 875; 125 TABLET, FILM COATED ORAL at 08:03

## 2025-03-26 RX ADMIN — METHOCARBAMOL 500 MG: 500 TABLET ORAL at 09:03

## 2025-03-26 RX ADMIN — ACETAMINOPHEN 650 MG: 325 TABLET ORAL at 06:03

## 2025-03-26 RX ADMIN — HYDROCHLOROTHIAZIDE 12.5 MG: 12.5 TABLET ORAL at 08:03

## 2025-03-26 RX ADMIN — AMLODIPINE BESYLATE 5 MG: 5 TABLET ORAL at 08:03

## 2025-03-26 RX ADMIN — METOPROLOL SUCCINATE 25 MG: 25 TABLET, EXTENDED RELEASE ORAL at 09:03

## 2025-03-26 RX ADMIN — MUPIROCIN: 20 OINTMENT TOPICAL at 09:03

## 2025-03-26 NOTE — PLAN OF CARE
Discharge Plan A and Plan B have been determined by review of patient's clinical status, future medical and therapeutic needs, and coverage/benefits for post-acute care in coordination with multidisciplinary team members.    03/26/25 0939   Post-Acute Status   Post-Acute Authorization Placement   Post-Acute Placement Status Pending post-acute provider review/more information requested   Coverage [facility] -   Hospital Resources/Appts/Education Provided Provided education on problems/symptoms using teachback   Discharge Plan   Discharge Plan A Skilled Nursing Facility   Discharge Plan B Home Health;Assisted Living     RONNY confirmed w/ Heather ([facility]) that facility has a bed and able to accept patient today. MD notified and placed transfer orders. Post-acute set-up pending facility approval of orders. RONNY submitted auth to [facility] via hard fax to [phone]. Patient and daughter notified and agreeable to dc plan.     3:15pm  Auth approved, orders approved. [facility] requesting transport for 4:30pm. RONNY placed [facility] orders for patient transport to [facility]. Patient and daughter notified and agreeable to dc plan. Report to be called to [phone]. MD and bedside nurse notified.                                Rick Ashford, CASEY, LMSW  [facility]  Case Management  Ext. [phone]

## 2025-03-26 NOTE — DISCHARGE SUMMARY
[facility] - Internal Medicine [facility] Medicine  Discharge Summary      Patient Name: Dorothy M Knobloch  MRN: [id number]  Admission Date: 3/20/2025  Hospital Length of Stay: 6 days  Discharge Date and Time:  03/26/2025 4:00 PM  Attending Physician: Lake Tate MD   Discharging Provider: Lake Tate MD  Discharge Provider Team: [facility] MED A  Primary Care Provider: Lola Wolff MD        HPI: 89 y/o female with PAF on long term Apixiban at home for anticoagulation, HTN, chronic diastolic heart failure, chronic anemia, cardiac pacemaker due to sick sinus syndrome and hypothyroidism. She was recently admitted from 3/7 to 3/13 after fall at home and femur fracture for which she received operative management. She was discharged to SNF for wound care and PT/OT.      For the last few days she has had increasing sob and work of breathing, family also noticed increased lower extremity swelling. She was also having a change in mental status with hallucinations and confusion. These symptoms prompted her to be transferred to the ED for evaluation.      In the ED she was found to have an elevated BNP (much greater than prior values), cxr showed fluid accumulation. CTA PE showed no evidence of large PE, did show signs of volume overload and possible infection. She was started on lasix with improvement in oxygenation in the ED (non-re breather to 4L NC). Cultures were collected and patient started on vancomycin and zosyn for possible HCAP as well given her symptoms.         Patient pleasantly confused on exam. Son present in the room and said she is normally very sharp mentally. He is not aware of most of her medical history including past medications.     * No surgery found *      Hospital Course:     Pt was started on Zosyn/Doxycycline for suspected PNA and IV lasix on admission. Respiratory symptoms and mental status improved over the course of hospital stay. She was de-escalated to Augmentin/Doxycycline  for total 7 day course. Last dose on 3/28/25. Discharged on Bumex 1 mg daily ( increased from 0.5 mg daily on admission).     Consults:   Consults (From admission, onward)          Status Ordering Provider     Inpatient consult to Registered Dietitian/Nutritionist  Once        Provider:  (Not yet assigned)    Completed HOOD VANG            Final Active Diagnoses:    Diagnosis Date Noted POA    PRINCIPAL PROBLEM:  Acute on chronic congestive heart failure [I50.9] 03/20/2025 Yes    Anemia [D64.9] 03/21/2025 Yes    Hypokalemia [E87.6] 03/21/2025 Yes    Pneumonia [J18.9] 03/20/2025 Yes    Acute hypoxic respiratory failure [J96.01] 03/20/2025 Yes    Hyponatremia [E87.1] 03/14/2025 Yes    Moderate protein-calorie malnutrition [E44.0] 03/14/2025 Yes    Periprosthetic fracture of shaft of right femur s/p ORIF on 3/8/2025 [M97.8XXA, Z96.649] 03/07/2025 Yes    Aortic stenosis [I35.0] 04/05/2024 Yes    Pulmonary hypertension [I27.20] 04/05/2024 Yes    Paroxysmal atrial fibrillation [I48.0] 09/11/2018 Yes     Chronic    Cardiac pacemaker [Z95.0] 08/22/2018 Yes    Essential hypertension [I10] 12/13/2013 Yes     Chronic    Acquired hypothyroidism [E03.9] 12/13/2013 Yes      Problems Resolved During this Admission:      Discharged Condition: stable    Disposition: Nursing Facility    Follow Up:    Patient Instructions:      Ambulatory referral/consult to [facility]   Standing Status: Future   Referral Priority: Routine Referral Type: Consultation   Referral Reason: Specialty Services Required   Requested Specialty: Cardiology   Number of Visits Requested: 1     Diet Cardiac     Call MD for:  temperature >100.4     Call MD for:  persistent nausea and vomiting or diarrhea     Call MD for:  severe uncontrolled pain     Call MD for:  redness, tenderness, or signs of infection (pain, swelling, redness, odor or green/yellow discharge around incision site)     Call MD for:  difficulty breathing or  increased cough     Call MD for:  severe persistent headache     Call MD for:  worsening rash     Call MD for:  persistent dizziness, light-headedness, or visual disturbances     Call MD for:  increased confusion or weakness     Activity as tolerated     Medications:  Reconciled Home Medications:      Medication List        START taking these medications      amoxicillin-clavulanate 875-125mg 875-125 mg per tablet  Commonly known as: AUGMENTIN  Take 1 tablet by mouth every 12 (twelve) hours. for 3 days     doxycycline 100 MG tablet  Commonly known as: VIBRA-TABS  Take 1 tablet (100 mg total) by mouth every 12 (twelve) hours. for 3 days            CHANGE how you take these medications      bumetanide 0.5 MG Tab  Commonly known as: BUMEX  Take 2 tablets (1 mg total) by mouth once daily.  What changed:   how much to take  when to take this            CONTINUE taking these medications      amLODIPine 5 MG tablet  Commonly known as: NORVASC  Take 1 tablet (5 mg total) by mouth once daily.     apixaban 5 mg Tab  Commonly known as: ELIQUIS  Take 1 tablet (5 mg total) by mouth 2 (two) times daily. Patient to decrease dosing of Apixiban to 2.5 mg po twice daily for 1 week after surgery done on 3/8/2025 and then can resume back to her home dosing of 5 mg po BID on 315/2025.     brimonidine-timoloL 0.2-0.5 % Drop  Commonly known as: COMBIGAN  Place 1 drop into the left eye 2 (two) times a day.     clopidogreL 75 mg tablet  Commonly known as: PLAVIX  Take 1 tablet (75 mg total) by mouth once daily.     erythromycin ophthalmic ointment  Commonly known as: ROMYCIN  APPLY 1 INCH TO THE BOTTOM LID OF LEFT EYE FOR FIRST WEEK OF EVERY MONTH     EScitalopram oxalate 20 MG tablet  Commonly known as: LEXAPRO  Take 1 tablet (20 mg total) by mouth once daily.     hydroCHLOROthiazide 12.5 MG Tab  TAKE 1 TABLET BY MOUTH EVERY DAY     levothyroxine 88 MCG tablet  Commonly known as: SYNTHROID  Take 88 mcg by mouth before breakfast.      melatonin 3 mg tablet  Commonly known as: MELATIN  Take 2 tablets (6 mg total) by mouth nightly as needed for Insomnia.     methocarbamoL 500 MG Tab  Commonly known as: Robaxin  Take 1 tablet (500 mg total) by mouth every 8 (eight) hours.     metoprolol succinate 25 MG 24 hr tablet  Commonly known as: TOPROL-XL  TAKE 2 TABLETS IN AM AND 1 TABLET IN PM     NITROSTAT 0.4 mg SL tablet  Generic drug: nitroGLYCERIN  Place 0.4 mg under the tongue every 5 (five) minutes as needed for Chest pain.     senna-docusate 8.6-50 mg 8.6-50 mg per tablet  Commonly known as: PERICOLACE  Take 1 tablet by mouth 2 (two) times daily.     traMADoL 50 mg tablet  Commonly known as: ULTRAM  Take 1 tablet (50 mg total) by mouth every 6 (six) hours as needed (Moderate to severe pain).              Significant Diagnostic Studies: Cardiac Graphics: Echocardiogram: Transthoracic echo (TTE) complete (Cupid Only):   Results for orders placed or performed during the hospital encounter of 03/20/25   Echo   Result Value Ref Range    LV Diastolic Volume 83 mL    Echo EF Estimated 57 %    LV Systolic Volume 36 mL    IVS 1.2 (A) 0.6 - 1.1 cm    LVIDd 4.3 3.5 - 6.0 cm    LVIDs 3.0 2.1 - 4.0 cm    LVOT diameter 2.0 cm    PW 1.0 0.6 - 1.1 cm    AV LVOT peak gradient 3 mmHg    LVOT mn grad 2 mmHg    LVOT peak jorgito 0.9 m/s    LVOT peak VTI 19.3 cm    RV- marrufo basal diam 3.9 cm    LA size 5.0 cm    Left Atrium Major Axis 6.6 cm    Left Atrium Minor Axis 6.4 cm    LA Vol (MOD) 131 mL    RA Major [place] 6.02 cm    AV valve area 0.8 cm2    AV area by cont VTI 0.8 cm2    AV peak gradient 46 mmHg    AV mean gradient 25 mmHg    Ao peak jorgito 3.4 m/s    Ao VTI 72.8 cm    MV Peak A [place] 0.32 m/s    E wave deceleration time 214 ms    MV Peak E [place] 1.36 m/s    E/A ratio 4.25     LV LATERAL E/E' RATIO 22.7     LV SEPTAL E/E' RATIO 22.7     TDI LATERAL 0.06 m/s    TDI SEPTAL 0.06 m/s    MV peak gradient 7 mmHg    MV mean gradient 2 mmHg    TV peak gradient 40 mmHg    TR Max  Jorgito 3.2 m/s    Ascending aorta 2.62 cm    STJ 2.04 cm    Sinus 2.62 cm    LA WIDTH 5.2 cm    RA Width 4.00 cm    TAPSE 1.81 cm    BSA 1.75 m2    LVOT stroke volume 60.6 cm3    LV Systolic Volume Index 21.1 mL/m2    LV Diastolic Volume Index 48.54 mL/m2    LVOT area 3.1 cm2    FS 30.2 28 - 44 %    Left Ventricle Relative Wall Thickness 0.47 cm    LV mass 162.9 g    LV Mass Index 95.3 g/m2    E/E' ratio 23 m/s    SNEHA 84 mL/m2    LA Vol 144 cm3    RV/LV Ratio 0.91 cm    SNEHA (MOD) 77 mL/m2    AV Velocity Ratio 0.26     AV index (prosthetic) 0.27     DEMARCUS by Velocity Ratio 0.8 cm²    Mean e' 0.06 m/s    ZLVIDS 0.16     ZLVIDD -0.99     TV resting pulmonary artery pressure 44 mmHg    RV TB RVSP 6 mmHg    Est. RA pres 3 mmHg    Narrative      Left Ventricle: The left ventricle is normal in size. Mildly increased   ventricular mass. Normal wall thickness. There is mild concentric   hypertrophy. Normal wall motion. There is normal systolic function with a   visually estimated ejection fraction of 65 - 70%. There is normal   diastolic function.    Right Ventricle: The right ventricle is normal in size. Wall thickness   is normal. Systolic function is normal. Pacemaker lead present in the   ventricle.    Left Atrium: Severely dilated    Right Atrium: Right atrium is moderately dilated.    Aortic Valve: The aortic valve is a trileaflet valve. There is severe   aortic valve sclerosis. There is moderate annular calcification present.   Moderately restricted motion. There is moderate to severe low flow   stenosis. Aortic valve area by VTI is 0.8 cm2. Aortic valve peak velocity   is 3.4 m/s. Mean gradient is 25 mmHg. The dimensionless index is 0.27. AS   severity has increased compared to study 1/24    Mitral Valve: There is severe posterior mitral annular calcification.    Aorta: Aortic root is normal in size measuring 2.62 cm. Aortic root at   ST junction is normal. Ascending aorta is normal measuring 2.62 cm.    Pulmonary  Artery: There is mild pulmonary hypertension. The estimated   pulmonary artery systolic pressure is 44 mmHg.    IVC/SVC: Normal venous pressure at 3 mmHg.    Pericardium: There is no pericardial effusion.         Pending Diagnostic Studies:       None          Indwelling Lines/Drains at time of discharge:   Lines/Drains/Airways       Drain  Duration             Female External Urinary Catheter w/ Suction 03/20/25 1712 5 days                    Time spent on the discharge of patient: 20 minutes         Lake Tate MD  Department of Hospital Medicine  [facility] - Internal Medicine Telemetry

## 2025-03-26 NOTE — PLAN OF CARE
[facility] - Internal Medicine Telemetry  Facility Transfer Orders        Admit to: SNF    Diagnoses:   Active Hospital Problems    Diagnosis  POA    *Acute on chronic congestive heart failure [I50.9]  Yes    Anemia [D64.9]  Yes    Hypokalemia [E87.6]  Yes    Pneumonia [J18.9]  Yes    Acute hypoxic respiratory failure [J96.01]  Yes    Hyponatremia [E87.1]  Yes    Moderate protein-calorie malnutrition [E44.0]  Yes    Periprosthetic fracture of shaft of right femur s/p ORIF on 3/8/2025 [M97.8XXA, Z96.649]  Yes    Aortic stenosis [I35.0]  Yes    Pulmonary hypertension [I27.20]  Yes    Paroxysmal atrial fibrillation [I48.0]  Yes     Chronic    Cardiac pacemaker [Z95.0]  Yes    Essential hypertension [I10]  Yes     Chronic    Acquired hypothyroidism [E03.9]  Yes      Resolved Hospital Problems   No resolved problems to display.     Allergies:   Review of patient's allergies indicates:   Allergen Reactions    Aspartame Swelling     equal       Code Status: Full    Vitals: Routine       Diet: cardiac diet    Activity: Activity as tolerated    Nursing Precautions: Fall    Bed/Surface: Low Air Loss    Consults: PT to evaluate and treat- 3 times a week and OT to evaluate and treat- 3 times a week    Oxygen: room air    Dialysis: Patient is not on dialysis.     Labs: Per facility recommendations    Pending Diagnostic Studies:       Procedure Component Value Units Date/Time    COVID-19 Routine Screening [[id number]]     Order Status: Sent Lab Status: No result     Specimen: Nasopharyngeal           Imaging: None    Miscellaneous Care:   CHF Care: Daily Weight with notification of MD/NP of 2lb or > increase in 24 hours    v/s and O2 sat every shift    Oxygen as needed for sats <90%    Report abnormal breath sounds to MD/NP    Edema checks q shift- notify MD/NP of increased edema    Task segmentation by nursing for daily care to decrease exertion    Schedule appointment with cardiologistDr. In 1-2 weeks    CHF education to  include diet ,medication, and CHF flags for MD notification    IV Access: Peripheral     Medications: Discontinue all previous medication orders, if any. See new list below.  Current Discharge Medication List        START taking these medications    Details   amoxicillin-clavulanate 875-125mg (AUGMENTIN) 875-125 mg per tablet Take 1 tablet by mouth every 12 (twelve) hours. for 3 days  Qty: 6 tablet, Refills: 0      doxycycline (VIBRA-TABS) 100 MG tablet Take 1 tablet (100 mg total) by mouth every 12 (twelve) hours. for 3 days  Qty: 6 tablet, Refills: 0           CONTINUE these medications which have CHANGED    Details   bumetanide (BUMEX) 0.5 MG Tab Take 2 tablets (1 mg total) by mouth once daily.  Qty: 60 tablet, Refills: 0           CONTINUE these medications which have NOT CHANGED    Details   amLODIPine (NORVASC) 5 MG tablet Take 1 tablet (5 mg total) by mouth once daily.  Qty: 90 tablet, Refills: 3    Comments: .      apixaban (ELIQUIS) 5 mg Tab Take 1 tablet (5 mg total) by mouth 2 (two) times daily. Patient to decrease dosing of Apixiban to 2.5 mg po twice daily for 1 week after surgery done on 3/8/2025 and then can resume back to her home dosing of 5 mg po BID on 315/2025.    Comments: This prescription was filled on 12/16/2024. Any refills authorized will be placed on file.      clopidogreL (PLAVIX) 75 mg tablet Take 1 tablet (75 mg total) by mouth once daily.  Qty: 90 tablet, Refills: 3    Associated Diagnoses: Chronic diastolic congestive heart failure; Paroxysmal atrial fibrillation; AV block, 3rd degree; Essential hypertension; Carotid artery disease, unspecified laterality, unspecified type; Takotsubo cardiomyopathy      EScitalopram oxalate (LEXAPRO) 20 MG tablet Take 1 tablet (20 mg total) by mouth once daily.  Qty: 90 tablet, Refills: 3    Associated Diagnoses: Anxiety      hydroCHLOROthiazide 12.5 MG Tab TAKE 1 TABLET BY MOUTH EVERY DAY  Qty: 90 tablet, Refills: 3    Comments: .      levothyroxine  (SYNTHROID) 88 MCG tablet Take 88 mcg by mouth before breakfast.       metoprolol succinate (TOPROL-XL) 25 MG 24 hr tablet TAKE 2 TABLETS IN AM AND 1 TABLET IN PM  Qty: 270 tablet, Refills: 3    Comments: .      brimonidine-timoloL (COMBIGAN) 0.2-0.5 % Drop Place 1 drop into the left eye 2 (two) times a day.      erythromycin (ROMYCIN) ophthalmic ointment APPLY 1 INCH TO THE BOTTOM LID OF LEFT EYE FOR FIRST WEEK OF EVERY MONTH  Qty: 3.5 g, Refills: 6    Associated Diagnoses: Unspecified entropion of left eye, unspecified eyelid      melatonin (MELATIN) 3 mg tablet Take 2 tablets (6 mg total) by mouth nightly as needed for Insomnia.      methocarbamoL (ROBAXIN) 500 MG Tab Take 1 tablet (500 mg total) by mouth every 8 (eight) hours.      NITROSTAT 0.4 mg SL tablet Place 0.4 mg under the tongue every 5 (five) minutes as needed for Chest pain.      senna-docusate 8.6-50 mg (PERICOLACE) 8.6-50 mg per tablet Take 1 tablet by mouth 2 (two) times daily.      traMADoL (ULTRAM) 50 mg tablet Take 1 tablet (50 mg total) by mouth every 6 (six) hours as needed (Moderate to severe pain).    Comments: N/A  Associated Diagnoses: Periprosthetic fracture of shaft of femur           Follow up:       Immunizations Administered as of 3/26/2025       Name Date Dose VIS Date Route Exp Date    COVID-19, MRNA, LN-S, PF (Pfizer) (Purple Cap) 11/11/2021 12:11 PM 0.3 mL 12/12/2020 Intramuscular 3/31/2022    Site: Left deltoid     Given By: Shruthi Goodman     : [employer]     Lot: [id number]     COVID-19, MRNA, LN-S, PF (Pfizer) (Purple Cap) 2/3/2021 10:59 AM 0.3 mL 12/12/2020 Intramuscular 5/31/2021    Site: Left deltoid     Given By: Sofya Antonio MA     : [employer]     Lot: [id number]     COVID-19, MRNA, LN-S, PF (Pfizer) (Purple Cap) 1/13/2021 12:26 PM 0.3 mL 12/12/2020 Intramuscular 1/13/2021    Site: Left deltoid     Given By: Blessing Orr, RN     : [employer]     Lot: [id number]             This patient  has had both covid vaccinations    Some patients may experience side effects after vaccination.  These may include fever, headache, muscle or joint aches.  Most symptoms resolve with 24-48 hours and do not require urgent medical evaluation unless they persist for more than 72 hours or symptoms are concerning for an unrelated medical condition.          Lake Tate MD

## 2025-03-26 NOTE — PROGRESS NOTES
[facility] ([facility] Team notified of pt referral via [facility] (automated inbasket notification) .    Patient screened today 3-26-25 by provider and RN for enrollment to program.      Pt was deemed not a candidate for enrollment at this time related to  PT is being D/C to [facility] will monitor for D/C    Pt will require additional follow up planning per primary team.     If pt status, diagnosis, or treatment plan changes , please place AMB referral to [facility] ([id number]) for [facility] enrollment re-evalution.

## 2025-03-26 NOTE — PROGRESS NOTES
[facility] - Internal Medicine Telemetry  Facility Transfer Orders        Admit to: SNF      Diagnoses:   Active Hospital Problems    Diagnosis  POA    *Acute on chronic congestive heart failure [I50.9]  Yes    Anemia [D64.9]  Yes    Hypokalemia [E87.6]  Yes    Pneumonia [J18.9]  Yes    Acute hypoxic respiratory failure [J96.01]  Yes    Hyponatremia [E87.1]  Yes    Moderate protein-calorie malnutrition [E44.0]  Yes    Periprosthetic fracture of shaft of right femur s/p ORIF on 3/8/2025 [M97.8XXA, Z96.649]  Yes    Aortic stenosis [I35.0]  Yes    Pulmonary hypertension [I27.20]  Yes    Paroxysmal atrial fibrillation [I48.0]  Yes     Chronic    Cardiac pacemaker [Z95.0]  Yes    Essential hypertension [I10]  Yes     Chronic    Acquired hypothyroidism [E03.9]  Yes      Resolved Hospital Problems   No resolved problems to display.     Allergies:   Review of patient's allergies indicates:   Allergen Reactions    Aspartame Swelling     equal       Code Status: Full Code     Vitals: Routine       Diet: cardiac diet    Activity: Weight bearing status: weight bearing as tolerated: right leg    Nursing Precautions: Fall    Bed/Surface: Low Air Loss    Consults: PT to evaluate and treat- 3 times a week and OT to evaluate and treat- 3 times a week    Oxygen: room air    Dialysis: Patient is not on dialysis.   Labs: None              BMP None     Pending Diagnostic Studies:     Procedure Component Value Units Date/Time    COVID-19 Routine Screening [[id number]] Collected: 03/26/25 0900    Order Status: Sent Lab Status: No result     Specimen: Nasopharyngeal         Imaging: None      Miscellaneous Care:   CHF Care: Daily Weight with notification of MD/NP of 2lb or > increase in 24 hours    v/s and O2 sat every shift    Oxygen as needed for sats <90%    Report abnormal breath sounds to MD/NP    Edema checks q shift- notify MD/NP of increased edema    Task segmentation by nursing for daily care to decrease  exertion    Schedule appointment with cardiologist,   in 1-2 Weeks    CHF education to include diet ,medication, and CHF flags for MD notification    IV Access: Peripheral     Medications: Discontinue all previous medication orders, if any. See new list below.  Current Discharge Medication List      START taking these medications    Details   amoxicillin-clavulanate 875-125mg (AUGMENTIN) 875-125 mg per tablet Take 1 tablet by mouth every 12 (twelve) hours. for 3 days  Qty: 6 tablet, Refills: 0      doxycycline (VIBRA-TABS) 100 MG tablet Take 1 tablet (100 mg total) by mouth every 12 (twelve) hours. for 3 days  Qty: 6 tablet, Refills: 0         CONTINUE these medications which have CHANGED    Details   bumetanide (BUMEX) 0.5 MG Tab Take 2 tablets (1 mg total) by mouth once daily.  Qty: 60 tablet, Refills: 0         CONTINUE these medications which have NOT CHANGED    Details   amLODIPine (NORVASC) 5 MG tablet Take 1 tablet (5 mg total) by mouth once daily.  Qty: 90 tablet, Refills: 3    Comments: .      apixaban (ELIQUIS) 5 mg Tab Take 1 tablet (5 mg total) by mouth 2 (two) times daily. Patient to decrease dosing of Apixiban to 2.5 mg po twice daily for 1 week after surgery done on 3/8/2025 and then can resume back to her home dosing of 5 mg po BID on 315/2025.    Comments: This prescription was filled on 12/16/2024. Any refills authorized will be placed on file.      clopidogreL (PLAVIX) 75 mg tablet Take 1 tablet (75 mg total) by mouth once daily.  Qty: 90 tablet, Refills: 3    Associated Diagnoses: Chronic diastolic congestive heart failure; Paroxysmal atrial fibrillation; AV block, 3rd degree; Essential hypertension; Carotid artery disease, unspecified laterality, unspecified type; Takotsubo cardiomyopathy      EScitalopram oxalate (LEXAPRO) 20 MG tablet Take 1 tablet (20 mg total) by mouth once daily.  Qty: 90 tablet, Refills: 3    Associated Diagnoses: Anxiety      hydroCHLOROthiazide 12.5 MG Tab TAKE 1  TABLET BY MOUTH EVERY DAY  Qty: 90 tablet, Refills: 3    Comments: .      levothyroxine (SYNTHROID) 88 MCG tablet Take 88 mcg by mouth before breakfast.       metoprolol succinate (TOPROL-XL) 25 MG 24 hr tablet TAKE 2 TABLETS IN AM AND 1 TABLET IN PM  Qty: 270 tablet, Refills: 3    Comments: .      brimonidine-timoloL (COMBIGAN) 0.2-0.5 % Drop Place 1 drop into the left eye 2 (two) times a day.      erythromycin (ROMYCIN) ophthalmic ointment APPLY 1 INCH TO THE BOTTOM LID OF LEFT EYE FOR FIRST WEEK OF EVERY MONTH  Qty: 3.5 g, Refills: 6    Associated Diagnoses: Unspecified entropion of left eye, unspecified eyelid      melatonin (MELATIN) 3 mg tablet Take 2 tablets (6 mg total) by mouth nightly as needed for Insomnia.      methocarbamoL (ROBAXIN) 500 MG Tab Take 1 tablet (500 mg total) by mouth every 8 (eight) hours.      NITROSTAT 0.4 mg SL tablet Place 0.4 mg under the tongue every 5 (five) minutes as needed for Chest pain.      senna-docusate 8.6-50 mg (PERICOLACE) 8.6-50 mg per tablet Take 1 tablet by mouth 2 (two) times daily.      traMADoL (ULTRAM) 50 mg tablet Take 1 tablet (50 mg total) by mouth every 6 (six) hours as needed (Moderate to severe pain).    Comments: N/A  Associated Diagnoses: Periprosthetic fracture of shaft of femur           Follow up:       Immunizations Administered as of 3/26/2025     Name Date Dose VIS Date Route Exp Date    COVID-19, MRNA, LN-S, PF (Pfizer) (Purple Cap) 11/11/2021 12:11 PM 0.3 mL 12/12/2020 Intramuscular 3/31/2022    Site: Left deltoid     Given By: Shruthi Goodman     : [employer]     Lot: [id number]     COVID-19, MRNA, LN-S, PF (Pfizer) (Purple Cap) 2/3/2021 10:59 AM 0.3 mL 12/12/2020 Intramuscular 5/31/2021    Site: Left deltoid     Given By: Sofya Antonio MA     : [employer]     Lot: [id number]     COVID-19, MRNA, LN-S, PF (Pfizer) (Purple Cap) 1/13/2021 12:26 PM 0.3 mL 12/12/2020 Intramuscular 1/13/2021    Site: Left deltoid     Given By:  Blessing Orr, RN     : [employer]     Lot: [id number]           This patient has had both covid vaccinations    Some patients may experience side effects after vaccination.  These may include fever, headache, muscle or joint aches.  Most symptoms resolve with 24-48 hours and do not require urgent medical evaluation unless they persist for more than 72 hours or symptoms are concerning for an unrelated medical condition.          Lake Tate MD

## 2025-03-26 NOTE — NURSING
AAOX4,VSS,NAD noted. Report given to KINGS[facility] Escobedo at [facility]. Discharge paper work given to AMS, Pt discharged off unit via wheelchair by AMS, family bedside.

## 2025-03-27 DIAGNOSIS — R52 PAIN: Primary | ICD-10-CM

## 2025-03-27 PROBLEM — J96.01 ACUTE HYPOXIC RESPIRATORY FAILURE: Status: RESOLVED | Noted: 2025-03-20 | Resolved: 2025-03-27

## 2025-03-27 PROBLEM — E87.1 HYPONATREMIA: Status: RESOLVED | Noted: 2025-03-14 | Resolved: 2025-03-27

## 2025-03-27 PROBLEM — M97.8XXA PERIPROSTHETIC FRACTURE OF SHAFT OF FEMUR: Status: RESOLVED | Noted: 2025-03-07 | Resolved: 2025-03-27

## 2025-03-27 PROBLEM — Z96.649 PERIPROSTHETIC FRACTURE OF SHAFT OF FEMUR: Status: RESOLVED | Noted: 2025-03-07 | Resolved: 2025-03-27

## 2025-03-27 PROBLEM — I63.432 STROKE DUE TO EMBOLISM OF LEFT POSTERIOR CEREBRAL ARTERY: Chronic | Status: ACTIVE | Noted: 2024-04-04

## 2025-03-27 LAB
ABSOLUTE EOSINOPHIL (OHS): 0.22 K/UL
ABSOLUTE MONOCYTE (OHS): 0.62 K/UL (ref 0.3–1)
ABSOLUTE NEUTROPHIL COUNT (OHS): 4.42 K/UL (ref 1.8–7.7)
ANION GAP (OHS): 10 MMOL/L (ref 8–16)
BASOPHILS # BLD AUTO: 0.06 K/UL
BASOPHILS NFR BLD AUTO: 0.9 %
BUN SERPL-MCNC: 23 MG/DL (ref 10–30)
CALCIUM SERPL-MCNC: 8.9 MG/DL (ref 8.7–10.5)
CHLORIDE SERPL-SCNC: 93 MMOL/L (ref 95–110)
CO2 SERPL-SCNC: 33 MMOL/L (ref 23–29)
CREAT SERPL-MCNC: 0.7 MG/DL (ref 0.5–1.4)
ERYTHROCYTE [DISTWIDTH] IN BLOOD BY AUTOMATED COUNT: 15.7 % (ref 11.5–14.5)
GFR SERPLBLD CREATININE-BSD FMLA CKD-EPI: >60 ML/MIN/1.73/M2
GLUCOSE SERPL-MCNC: 94 MG/DL (ref 70–110)
HCT VFR BLD AUTO: 26.7 % (ref 37–48.5)
HGB BLD-MCNC: 8.5 GM/DL (ref 12–16)
IMM GRANULOCYTES # BLD AUTO: 0.09 K/UL (ref 0–0.04)
IMM GRANULOCYTES NFR BLD AUTO: 1.4 % (ref 0–0.5)
LYMPHOCYTES # BLD AUTO: 1.14 K/UL (ref 1–4.8)
MAGNESIUM SERPL-MCNC: 1.7 MG/DL (ref 1.6–2.6)
MCH RBC QN AUTO: 31 PG (ref 27–50)
MCHC RBC AUTO-ENTMCNC: 31.8 G/DL (ref 32–36)
MCV RBC AUTO: 97 FL (ref 82–98)
NUCLEATED RBC (/100WBC) (OHS): 0 /100 WBC
PHOSPHATE SERPL-MCNC: 2.9 MG/DL (ref 2.7–4.5)
PLATELET # BLD AUTO: 385 K/UL (ref 150–450)
PMV BLD AUTO: 8.5 FL (ref 9.2–12.9)
POTASSIUM SERPL-SCNC: 3.2 MMOL/L (ref 3.5–5.1)
RBC # BLD AUTO: 2.74 M/UL (ref 4–5.4)
RELATIVE EOSINOPHIL (OHS): 3.4 %
RELATIVE LYMPHOCYTE (OHS): 17.4 % (ref 18–48)
RELATIVE MONOCYTE (OHS): 9.5 % (ref 4–15)
RELATIVE NEUTROPHIL (OHS): 67.4 % (ref 38–73)
SODIUM SERPL-SCNC: 136 MMOL/L (ref 136–145)
WBC # BLD AUTO: 6.55 K/UL (ref 3.9–12.7)

## 2025-03-27 PROCEDURE — 97162 PT EVAL MOD COMPLEX 30 MIN: CPT

## 2025-03-27 PROCEDURE — 97530 THERAPEUTIC ACTIVITIES: CPT

## 2025-03-27 PROCEDURE — 36415 COLL VENOUS BLD VENIPUNCTURE: CPT | Performed by: HOSPITALIST

## 2025-03-27 PROCEDURE — 25000003 PHARM REV CODE 250: Performed by: NURSE PRACTITIONER

## 2025-03-27 PROCEDURE — 11000004 HC SNF PRIVATE

## 2025-03-27 PROCEDURE — 85025 COMPLETE CBC W/AUTO DIFF WBC: CPT | Performed by: HOSPITALIST

## 2025-03-27 PROCEDURE — 84100 ASSAY OF PHOSPHORUS: CPT | Performed by: HOSPITALIST

## 2025-03-27 PROCEDURE — 80048 BASIC METABOLIC PNL TOTAL CA: CPT | Performed by: HOSPITALIST

## 2025-03-27 PROCEDURE — 97166 OT EVAL MOD COMPLEX 45 MIN: CPT

## 2025-03-27 PROCEDURE — 25000003 PHARM REV CODE 250: Performed by: HOSPITALIST

## 2025-03-27 PROCEDURE — 83735 ASSAY OF MAGNESIUM: CPT | Performed by: HOSPITALIST

## 2025-03-27 RX ORDER — POTASSIUM CHLORIDE 750 MG/1
30 CAPSULE, EXTENDED RELEASE ORAL 3 TIMES DAILY
Status: COMPLETED | OUTPATIENT
Start: 2025-03-27 | End: 2025-03-27

## 2025-03-27 RX ORDER — POTASSIUM CHLORIDE 750 MG/1
10 CAPSULE, EXTENDED RELEASE ORAL DAILY
Status: DISCONTINUED | OUTPATIENT
Start: 2025-03-28 | End: 2025-03-31

## 2025-03-27 RX ORDER — ACETAMINOPHEN 325 MG/1
650 TABLET ORAL EVERY 8 HOURS
Status: DISCONTINUED | OUTPATIENT
Start: 2025-03-27 | End: 2025-04-23 | Stop reason: HOSPADM

## 2025-03-27 RX ORDER — METHOCARBAMOL 500 MG/1
500 TABLET, FILM COATED ORAL EVERY 8 HOURS PRN
Status: DISCONTINUED | OUTPATIENT
Start: 2025-03-27 | End: 2025-04-23 | Stop reason: HOSPADM

## 2025-03-27 RX ORDER — GABAPENTIN 100 MG/1
100 CAPSULE ORAL 3 TIMES DAILY
Status: DISCONTINUED | OUTPATIENT
Start: 2025-03-27 | End: 2025-04-23 | Stop reason: HOSPADM

## 2025-03-27 RX ADMIN — METOPROLOL SUCCINATE 50 MG: 50 TABLET, EXTENDED RELEASE ORAL at 09:03

## 2025-03-27 RX ADMIN — POTASSIUM CHLORIDE 30 MEQ: 750 CAPSULE, EXTENDED RELEASE ORAL at 08:03

## 2025-03-27 RX ADMIN — GABAPENTIN 100 MG: 300 CAPSULE ORAL at 03:03

## 2025-03-27 RX ADMIN — POTASSIUM CHLORIDE 30 MEQ: 750 CAPSULE, EXTENDED RELEASE ORAL at 03:03

## 2025-03-27 RX ADMIN — TRAMADOL HYDROCHLORIDE 50 MG: 50 TABLET, COATED ORAL at 03:03

## 2025-03-27 RX ADMIN — BRIMONIDINE TARTRATE 1 DROP: 1.5 SOLUTION OPHTHALMIC at 09:03

## 2025-03-27 RX ADMIN — POTASSIUM CHLORIDE 30 MEQ: 750 CAPSULE, EXTENDED RELEASE ORAL at 12:03

## 2025-03-27 RX ADMIN — ERYTHROMYCIN: 5 OINTMENT OPHTHALMIC at 09:03

## 2025-03-27 RX ADMIN — AMOXICILLIN AND CLAVULANATE POTASSIUM 1 TABLET: 875; 125 TABLET, FILM COATED ORAL at 08:03

## 2025-03-27 RX ADMIN — CLOPIDOGREL 75 MG: 75 TABLET ORAL at 09:03

## 2025-03-27 RX ADMIN — ACETAMINOPHEN 650 MG: 500 TABLET ORAL at 10:03

## 2025-03-27 RX ADMIN — DOXYCYCLINE HYCLATE 100 MG: 100 TABLET, COATED ORAL at 09:03

## 2025-03-27 RX ADMIN — AMOXICILLIN AND CLAVULANATE POTASSIUM 1 TABLET: 875; 125 TABLET, FILM COATED ORAL at 09:03

## 2025-03-27 RX ADMIN — LEVOTHYROXINE SODIUM 88 MCG: 88 TABLET ORAL at 05:03

## 2025-03-27 RX ADMIN — METHOCARBAMOL 500 MG: 500 TABLET ORAL at 05:03

## 2025-03-27 RX ADMIN — METOPROLOL SUCCINATE 25 MG: 25 TABLET, EXTENDED RELEASE ORAL at 08:03

## 2025-03-27 RX ADMIN — APIXABAN 5 MG: 5 TABLET, FILM COATED ORAL at 09:03

## 2025-03-27 RX ADMIN — APIXABAN 5 MG: 5 TABLET, FILM COATED ORAL at 08:03

## 2025-03-27 RX ADMIN — ACETAMINOPHEN 650 MG: 500 TABLET ORAL at 03:03

## 2025-03-27 RX ADMIN — GABAPENTIN 100 MG: 300 CAPSULE ORAL at 08:03

## 2025-03-27 RX ADMIN — TIMOLOL MALEATE 1 DROP: 5 SOLUTION OPHTHALMIC at 09:03

## 2025-03-27 RX ADMIN — SENNOSIDES AND DOCUSATE SODIUM 1 TABLET: 50; 8.6 TABLET ORAL at 08:03

## 2025-03-27 RX ADMIN — ESCITALOPRAM OXALATE 20 MG: 10 TABLET ORAL at 09:03

## 2025-03-27 RX ADMIN — SENNOSIDES AND DOCUSATE SODIUM 1 TABLET: 50; 8.6 TABLET ORAL at 09:03

## 2025-03-27 NOTE — PROGRESS NOTES
[facility]                                  Skilled Nursing Facility                   Progress Note     Admit Date: 3/26/2025  ISH   [id number]  Principal Problem:  Acute on chronic congestive heart failure   HPI obtained from patient interview and chart review     Chief Complaint: Establish Care/ Initial Visit     HPI:   Mrs. Knobloch is a 89 year old female PMHx of PAF on Apixiban, HTN, chronic diastolic HF, cardiac pacemaker due to sick sinus syndrome, CVA and hypothyroidism who presents to SNF following hospitalization for PNA.  Patient with previous SNF admission on 03/13 for Right periprosthetic femoral shaft fracture below a total hip replacement s/p ORIF on 3/8/25 with Dr. William.  Admission to SNF for secondary weakness and debility    Patient originally presented to [facility] ED on 03/07 after a fall.  Found to have Right periprosthetic femoral shaft fracture below a total hip replacement.  Underwent ORIF on 03/08.  Received 2 unit RBC transfusion during that admission.  Admitted to SNF on 3/13.  On 03/20 patient developed worsening hypoxia and increased oxygen requirements and was sent to ED for evaluation.  Patient had also been experiencing confusion which is abnormal for her. In the ED, noted to have elevated BNP, CXR revealed fluid accumulation. CTA PE negative for PE, did show signs of volume overload and possible infection. She was started on lasix with improvement in oxygenation. Cultures were collected and patient started on vancomycin and zosyn for possible HCAP as well given her symptoms.  Respiratory symptoms and mental status improved over the course of hospital stay. She was de-escalated to Augmentin/Doxycycline for total 7 day course. Last dose on 3/28/25. Discharged on Bumex 1 mg daily (increased from 0.5 mg daily on admission).  Patient was transferred back to [facility].    Today, patient states that her pain  is well controlled.  She is having some nerve type pain that is shooting from her knee downward.  Patient has sutures to her surgical site, will get with orthopedics to see when they would like to see her back.    Patient will be treated at [facility] with PT and OT to improve functional status and ability to perform ADLs.     Past Medical History: Patient has a past medical history of Acquired hypothyroidism (12/13/2013), Age-related physical debility (04/05/2024), Antiplatelet or antithrombotic long-term use, AV block, 3rd degree (03/19/2019), Bilateral nonexudative age-related macular degeneration (08/27/2013), Blindness and low vision (08/10/2018), Blood transfusion, Cardiac pacemaker (08/22/2018), Carotid artery disease (08/10/2016), Chronic diastolic congestive heart failure (11/24/2019), CRAO (central retinal artery occlusion), left (01/29/2018), Dry eye syndrome of both eyes (08/10/2018), Essential hypertension (12/13/2013), History of TIA (transient ischemic attack) (12/13/2013), Nonexudative age-related macular degeneration, bilateral, intermediate dry stage (07/12/2018), Nuclear sclerotic cataract of left eye (08/10/2018), 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    Past Surgical History: Patient has a past surgical history that includes Tonsillectomy; Joint replacement; Skin biopsy; Total hip arthroplasty (11/01/2000); Cardiac pacemaker placement; Cataract extraction (Right); pr transcran dopp intracran, emboli w/o inj (01/29/2018); Carpal tunnel release (Bilateral, 1991); and ORIF femur fracture (Right, 3/8/2025).    Social History: Patient reports that she has never smoked. She has never been exposed to tobacco  smoke. She has never used smokeless tobacco. She reports that she does not drink alcohol and does not use drugs.    Family History: family history includes Arthritis in her maternal grandmother, paternal grandmother, and sister; Birth defects in her son; Cancer (age of onset: 80) in her paternal aunt; Diabetes in her paternal aunt and paternal uncle; Early death in her father and mother; Hearing loss in her daughter; Hypertension in her paternal grandmother and son.    Allergies: Patient is allergic to aspartame.    ROS  Constitutional: Negative for fever   Eyes: Negative for blurred vision, double vision   Respiratory: Negative for cough, shortness of breath   Cardiovascular: Negative for chest pain, palpitations, and leg swelling.   Gastrointestinal: Negative for abdominal pain, constipation, diarrhea, nausea, vomiting.   Genitourinary: Negative for dysuria, frequency   Musculoskeletal:  + generalized weakness. Negative for back pain and myalgias.   Skin: Negative for itching and rash.   Neurological: Negative for dizziness, headaches.   Psychiatric/Behavioral: Negative for depression. The patient is not nervous/anxious.      24 hour Vital Sign Range   Temp:  [97.6 °F (36.4 °C)-98.4 °F (36.9 °C)]   Pulse:  [70-94]   Resp:  [18-20]   BP: (117-151)/(51-66)   SpO2:  [93 %-96 %]     Current BMI: Body mass index is 27.14 kg/m².    PEx  Constitutional: Patient appears debilitated.  No distress noted  HENT:   Head: Normocephalic and atraumatic.   Eyes: Pupils are equal, round  Neck: Normal range of motion. Neck supple.   Cardiovascular: Normal rate, regular rhythm and normal heart sounds.    Pulmonary/Chest: Effort normal and breath sounds are clear  Abdominal: Soft. Bowel sounds are normal.   Musculoskeletal: Normal range of motion.   Neurological: Alert and oriented to person, place, and time.   Psychiatric: Normal mood and affect. Behavior is normal.   Skin: Skin is warm and dry.     Recent Labs   Lab 03/27/25  8918  "  GLUCOSE 94      K 3.2*   CL 93*   CO2 33*   BUN 23   CREATININE 0.7   CALCIUM 8.9   MG 1.7     Recent Labs   Lab 03/27/25  0401   WBC 6.55   RBC 2.74*   HGB 8.5*   HCT 26.7*      MCV 97   MCH 31.0   MCHC 31.8*     No results for input(s): "POCTGLUCOSE" in the last 168 hours.     Assessment and Plan:    Acute on chronic congestive heart failure  Aortic stenosis  Pulmonary hypertension  - patient presented back to [facility] ED on 03/20 with decompensation, BNP was 1,625  - diuresed with IV Lasix  - most recent echo reviewed from 3/21/25, EF 65-70%, severely dilated left atrium, mod to severe AS, PASP 44 mmHg  - monitor I&Os, daily weights  - goal is euvolemia given aortic stenosis  - continue Bumex 1 mg daily (increased from previous home dose of 0.5), HCTZ 12.5 mg daily, metoprolol XL 50/25    Pneumonia  Acute hypoxic respiratory failure  - patient admitted back to ED on 03/20 with suspected pneumonia  - initially treated with IV Vanc/Zosyn  -  Abx deescalated to PO, continue Augmentin and doxycycline x3 more days, ending on 03/29  - patient has been successfully weaned to room air  - continue IS, OOB    Periprosthetic fracture of shaft of right femur   s/p ORIF on 3/8/2025  - PT/OT, RLE WBAT  - DVT PPX with apixaban  - Monitor and report drainage to incisional site  - Fall precautions  - Bowel regimen in place, hold for loose or frequent stools  - Ortho NEETU to see patient intermittently at SNF  - follow-up with orthopedics after discharge    Acute postoperative pain  - initiated acetaminophen 650 mg q.8 hours, gabapentin 100 mg TID, continue tramadol 50 mg q.6 hours PRN, methocarbamol 500 mg q.6 hours PRN    Essential hypertension  - continue amlodipine 5 mg daily, Bumex 1 mg daily, HCTZ 12.5 mg daily, metoprolol XL 50 mg daily, 25 mg qHS.    Acquired hypothyroidism  - TSH elevated at 22.581, Free T4 0.83  - continue levothyroxine 88 mcg daily     Cardiac pacemaker  - placed for sick sinus " syndrome    Paroxysmal atrial fibrillation  - continue home metoprolol XL 50 mg daily, 25 mg qHS, apixaban 5 mg BID     Moderate protein-calorie malnutrition  - RD following, appreciate recommendations  - continue supplements as ordered     Acute blood loss anemia  Anemia of chronic disease  - patient received 2 unit RBC transfusion postoperatively  - transfuse hemoglobin < 7  - continue monitor twice weekly CBC    Hypokalemia  - initiated KCl 30 mEq TID x3 doses, 10 mEq daily to begin tomorrow    HX of CVA  - 4/5/2024 MRI showed acute left thalamus and hippocampus infarcts   - Not on statin due to prior intolerance (lower extremity myalgias). Reportedly has tried Repatha in the past and had similar reaction. Also had reaction to Praluent and Zetia     Carotid Artery disease    - continue home Plavix 75 mg daily     Depression, moderate, reoccurring  - continue home escitalopram 20 mg daily    Macular degeneration  Decreased vision  Hx of CRAO  Dry eyes  - continue home eye drops    Debility   - Continue with PT/OT for gait training and strengthening and restoration of ADL's   - Encourage mobility, OOB in chair, and early ambulation as appropriate  - Fall precautions   - Monitor for bowel and bladder dysfunction  - Monitor for and prevent skin breakdown and pressure ulcers  - Continue DVT prophylaxis with apixaban       Psychiatric Assessment  Patient Health Screening (PHQ-2)    Date: 3/27/2025      Over the last two weeks how often have you been bothered by the following problems?     1. Little interest or pleasure in doing things? 1  2. Feeling down, depressed, or hopeless? 1    0 = not at all   1 = several days   2 =  more than half the days   3 = nearly every day    Score: 2  Screening is Negative.  Patient to continue on home regimen that she feels is working well        Anticipate disposition:  Home with home health    IP [facility] RISK OF UNPLANNED READMISSION Model: HIGH     Follow-up needed during SNF  stay-    Follow-up needed after discharge from SNF: PCP, orthopedics, cardiology     No future appointments.    Advance Care Planning   This was a voluntary visit in the option to decline counseling was given  Length of Conversation:  18 minutes  Topics Discussed: Disease process of advanced age, frailty, heart failure  Overview of Materials/Resources given(if applicable):  Discussed code status.  Discussed what it means to be DNR.  Discussed what resuscitative measures are in detail.  All topics recapped and questions answered.  Outcome of Discussion: Patient does want to be a DNR. DNR order implemented in EMR.   Individuals present:  Myself, patient, patient's son  Decision Maker: Dorothy Knobloch, patient    I certify that SNF services are required to be given on an inpatient basis because Dorothy M Knobloch needs for skilled nursing care and/or skilled rehabilitation are required on a daily basis and such services can only practically be provided in a skilled nursing facility setting and are for an ongoing condition for which she received inpatient care in the hospital.     Total time of the visit 136 minutes (does not include ACP time)  Description of non physical exam/non charting time: counseling patient on clinical conditions and therapies provided.  Extensive chart review completed including all consultation notes.  All pertinent laboratory and radiographical images reviewed.  Care coordination with pharmacist.      Angeles Aguero NP  Department of Hospital Medicine   [facility]     DOS: 3/27/2025       Patient note was created using MModal Dictation.  Any errors in syntax or even information may not have been identified and edited on initial review prior to signing this note.

## 2025-03-27 NOTE — CONSULTS
[facility] - Skilled Nursing  Adult Nutrition  Consult Note    SUMMARY   Recommendations  Change diet to minced and moist, thin liquids, add boost plus chocolate BID,RD following  Goals: PO intake to meet 85% of EEN/EPN with ONS by next RD follow up  Nutrition Goal Status: progressing towards goal    Nutrition Discharge Planning     Nutrition Discharge Planning: Therapeutic diet (comments), Oral diet with texture modifications per SLP (comments)  Therapeutic diet (comments): low sodium, weight monitoring,  Oral supplement regimen (comments): boost plus BID or equalivent  Oral diet with texture modifications per SLP (comments): minced and moist    Assessment and Plan   Endocrine  Moderate protein-calorie malnutrition  Malnutrition Type:  Context: chronic illness  Level: moderate     Related to (etiology):   Decreased ability to consume sufficient energy 2/2 poor oral intake      Signs and Symptoms (as evidenced by):  Malnutrition Characteristic Summary:  Energy Intake (Malnutrition): less than 75% for greater than or equal to 1 month  Subcutaneous Fat (Malnutrition): moderate depletion  Muscle Mass (Malnutrition): moderate depletion        Interventions/Recommendations (treatment strategy):  Commercial beverage medical food supplement  Minced and moist diet  Thin liquids  Collaboration of nutrition professional with other providers              Malnutrition Assessment 3/14       Malnutrition Context: social/environmental circumstances  Malnutrition Level: moderate  Skin (Micronutrient): bruised, thinned, turgor reduced, wounds unhealed  Hair/Scalp (Micronutrient): dry  Teeth (Micronutrient): broken dentition  Tongue (Micronutrient): red  Neck/Chest (Micronutrient): muscle wasting  Musculoskeletal/Lower Extremities: muscle wasting       Weight Loss (Malnutrition): 10% in 6 months  Subcutaneous Fat (Malnutrition): mild depletion  Muscle Mass (Malnutrition): moderate depletion   Orbital Region (Subcutaneous Fat  "Loss): severe depletion  Upper Arm Region (Subcutaneous Fat Loss): mild depletion  Thoracic and Lumbar Region: mild depletion   [place] Region (Muscle Loss): mild depletion  Clavicle Bone Region (Muscle Loss): moderate depletion  Clavicle and Acromion Bone Region (Muscle Loss): moderate depletion  Dorsal Hand (Muscle Loss): moderate depletion  Patellar Region (Muscle Loss): mild depletion  Anterior Thigh Region (Muscle Loss): mild depletion  Posterior Calf Region (Muscle Loss): mild depletion                                 Reason for Assessment    Reason For Assessment: consult  Diagnosis:  (HF)  General Information Comments: Patient returned to SNF after short hospitalization, started on lasix for diuresis, Abx added, family asking that ONS chocolate and texture modified diet be put back into place    Nutrition/Diet History    Nutrition Intake History: 3 meal day  Food Preferences: whole milk  Spiritual, Cultural Beliefs, Judaism Practices, Values that Affect Care: yes  Food Allergies:  (aspartame)  Factors Affecting Nutritional Intake: None identified at this time    Anthropometrics    Height: 5' 2" (157.5 cm)  Height (inches): 62 in  Height Method: Stated  Weight: 67.3 kg (148 lb 5.9 oz)  Weight (lb): 148.37 lb  Weight Method: Bed Scale  Ideal Body Weight (IBW), Female: 110 lb  % Ideal Body Weight, Female (lb): 134.88 %  BMI (Calculated): 27.1  Usual Body Weight (UBW), k.4 kg  Weight Change Amount:  (weight loss in 6 months 10% noted on intitial admit to SNF, loss of 6 # recently at acute readmit from diuresis)  % Usual Body Weight: 104.72  % Weight Change From Usual Weight: 4.5 %       Lab/Procedures/Meds    Pertinent Labs Reviewed: reviewed  Pertinent Labs Comments: Hg 8.5, Hct 27.7, K 3.2  Pertinent Medications Reviewed: reviewed  Pertinent Medications Comments: Abx, apixaban, gabapentin, HCTZ, senna-docusate, KCl, levothroxine, bumetamide,    Estimated/Assessed Needs                             "       Nutrition Prescription Ordered    Current Diet Order: minced and moist level 5,  Nutrition Order Comments: PO 50-75%  Oral Nutrition Supplement: boost plus BID    Evaluation of Received Nutrient/Fluid Intake    Comments: LBM 3/26  Tolerance: tolerating (had to request texture modification once returned to SNF)  % Intake of Estimated Energy Needs: 50 - 75 %  % Meal Intake: 50 - 75 %    Nutrition Risk    Level of Risk/Frequency of Follow-up: low (one time per week)       Monitor and Evaluation    Monitor and Evaluation: Food and beverage intake, Diet order, Weight       Nutrition Follow-Up    RD Follow-up?: Yes

## 2025-03-27 NOTE — PLAN OF CARE
[facility] - Internal Medicine Telemetry  Discharge Final Note    Primary Care Provider: Lola Wolff MD    Expected Discharge Date: 3/26/2025    Patient discharged [facility] via [facility].     Patient's bedside nurse and MD notified of the above.    Discharge Plan A and Plan B have been determined by review of patient's clinical status, future medical and therapeutic needs, and coverage/benefits for post-acute care in coordination with multidisciplinary team members.        Final Discharge Note (most recent)       Final Note - 03/27/25 1146          Final Note    Assessment Type Final Discharge Note (P)      Anticipated Discharge Disposition Skilled Nursing Facility (P)      What phone number can be called within the next 1-3 days to see how you are doing after discharge? [phone] (P)      Hospital Resources/Appts/Education Provided Provided education on problems/symptoms using teachback (P)         Post-Acute Status    Post-Acute Authorization Placement (P)      Post-Acute Placement Status Set-up Complete/Auth obtained (P)      [facility] CHOICES - (P)                      Important Message from Medicare  Important Message from Medicare regarding Discharge Appeal Rights: Signed/date by patient/caregiver, Explained to patient/caregiver, Given to patient/caregiver     Date IMM was signed: 03/25/25  Time IMM was signed: 7444                      CASEY Young, LMSW  [facility]  Case Management  Ext. [phone]

## 2025-03-27 NOTE — PLAN OF CARE
Change diet to minced and moist, thin liquids, add boost plus chocolate BID,RD following  Goals: PO intake to meet 85% of EEN/EPN with ONS by next RD follow up  Nutrition Goal Status: progressing towards goal     Nutrition Discharge Planning      Nutrition Discharge Planning: Therapeutic diet (comments), Oral diet with texture modifications per SLP (comments)  Therapeutic diet (comments): low sodium, weight monitoring,  Oral supplement regimen (comments): boost plus BID or equalivent  Oral diet with texture modifications per SLP (comments): minced and moist     Assessment and Plan   Endocrine  Moderate protein-calorie malnutrition  Malnutrition Type:  Context: chronic illness  Level: moderate     Related to (etiology):   Decreased ability to consume sufficient energy 2/2 poor oral intake      Signs and Symptoms (as evidenced by):  Malnutrition Characteristic Summary:  Energy Intake (Malnutrition): less than 75% for greater than or equal to 1 month  Subcutaneous Fat (Malnutrition): moderate depletion  Muscle Mass (Malnutrition): moderate depletion        Interventions/Recommendations (treatment strategy):  Commercial beverage medical food supplement  Minced and moist diet  Thin liquids  Collaboration of nutrition professional with other providers

## 2025-03-27 NOTE — PT/OT/SLP EVAL
Occupational Therapy   Evaluation    Name: Dorothy M Knobloch  MRN: [id number]  Admit Date: 3/26/2025  Recent Surgeries: ORIF L Femur     General Precautions: Standard, fall  Orthopedic Precautions:RLE weight bearing as tolerated   Braces: N/A    Recommendations:     Discharge Recommendations:  TBD  Level of Assistance Recommended: 24 hours significant assistance  Discharge Equipment Recommendations:  wheelchair, 3-in-1 commode  Barriers to discharge:   current level of function    Assessment:     Dorothy M Knobloch is a 89 y.o. female with a medical diagnosis of acute on chronic congestive heart failure. Pt noted to have mild SOB throughout session, however per pt and daughter, SOB /c exertion is baseline. SOB improved /c pursed lipped breathing and rest breaks. Pt /c FOF, req encouragement and explanation of safety with standing/transfers during therapy sessions. Otherwise, Pt tolerated session well and without incident and shows excellent motivation and potential to improve, but the pt continues to require assistance to perform self-care tasks and mobility. Pt strengths include Functional cognition and Attention to task and Family support, Motivation, and Willingness to participate. However, pt would continue to benefit from cont'd OT services in the SNF setting to improve safety and independence /c functional tasks and ADLs upon discharge.  Performance deficits affecting function: weakness, impaired endurance, impaired self care skills, decreased upper extremity function, impaired functional mobility, gait instability, decreased lower extremity function, visual deficits, impaired balance, decreased safety awareness, impaired cardiopulmonary response to activity, decreased coordination.      Rehab Potential is good    Activity Tolerance: Fair    Plan:     Patient to be seen 5 x/week to address the above listed problems via self-care/home management, community/work re-entry, therapeutic activities, therapeutic  exercises, neuromuscular re-education  Plan of Care Expires: 04/27/25  Plan of Care Reviewed with:  Patient, daughter    Subjective     Chief Complaint: Fear of falling/mild SOB  Patient/Family Comments/goals: Improve function    Occupational Profile:  Living Environment: Pt lives in [facility] with  on 2nd floor (elevator entrance) /c WIS and SC.   Previous level of function: Req some (A) for ADLs, using rollator/RW for mobility  Roles and Routines: Pt assists /c care of self, enjoys getting involved /c [facility] book club, cooks simple, microwave meals, and does not drive  Equipment Used at Home: walker, rolling, rollator  Assistance upon Discharge: (A) from [facility] staff    Pain/Comfort:  Pain Rating 1: 0/10  Pain Rating Post-Intervention 1: 0/10    Patients cultural, spiritual, Religion conflicts given the current situation: no    Objective:     Communicated with: PIPPA prior to session.  Patient found HOB elevated with  (no active lines) upon OT entry to room. Pt alert and agreeable to therapy.       Occupational Performance:    03/27/25 1158   Prior Functioning: Everyday Activities   Self Care Independent   Indoor Mobility (Ambulation) Independent   Stairs Unknown   Functional Cognition Independent   Prior Device Use Walker   Functional Limitation in Range of Motion   Upper Extremity Impairment on both sides   Lower Extremity Impairment on both sides   Mobility Devices Walker;Wheelchair (manual or electric)   Eating   Was the activity attempted? Yes   Was the activity done independently? No   Assistance Needed Setup / clean-up   Was adaptive equipment used? No   CARE Score - Eating 5   Oral Hygiene   Was the activity attempted? Yes   Was the activity done independently? No   Assistance Needed Setup / clean-up;Supervision  (Seated in WC at sink; (A) for set-up 2/2 impaired vision)   Was adaptive equipment used? Yes   CARE Score - Oral Hygiene 4   Toileting Hygiene   Was the activity attempted? Yes   Was the activity done  independently? No   Assistance Needed Physical assistance   Physical Assistance Level Total assistance  (hygiene performed in supine for safety and standing /c Min A and RW for clothing mgmt)   Was adaptive equipment used? Yes   CARE Score - Toileting Hygiene 1   Shower/Bathe Self   Was the activity attempted? Yes   Was the activity done independently? No   Assistance Needed Physical assistance   Physical Assistance Level More than half  (Max A seated at sink /c pt only able to clean underarms and chest 2/2 fatigue)   Was adaptive equipment used? Yes   CARE Score - Shower/Bathe Self 2   Upper Body Dressing   Was the activity attempted? Yes   Was the activity done independently? No   Assistance Needed Physical assistance   Physical Assistance Level Less than half  (Min A to don shirt; (A) to pull over torso)   Was adaptive equipment used? No   CARE Score - Upper Body Dressing 3   Lower Body Dressing   Was the activity attempted? Yes   Was the activity done independently? No   Assistance Needed Physical assistance   Physical Assistance Level Total assistance  (to don pants. Min A /c RW for standing portion of task)   Was adaptive equipment used? Yes   CARE Score - Lower Body Dressing 1   Putting On/Taking Off Footwear   Was the activity attempted? Yes   Was the activity done independently? No   Assistance Needed Physical assistance   Physical Assistance Level More than half  (Mod A to doff/don shoes. Pt able to doff, req (A) to don.)   Was adaptive equipment used? Yes   CARE Score - Putting On/Taking Off Footwear 2   Personal Hygiene   Was the activity attempted? Yes   Was the activity done independently? No   Assistance Needed Setup / clean-up;Supervision  (Seated at sink in WC to wash hands.)   Was adaptive equipment used? Yes   CARE Score - Personal Hygiene 4   Roll Left and Right   Was the activity attempted? Yes   Was the activity done independently? No   Assistance Needed Physical assistance   Physical  Assistance Level More than half  (Mod A /c bed rails R/L)   Was adaptive equipment used? Yes   CARE Score - Roll Left and Right 2   Lying to Sitting on Side of Bed   Was the activity attempted? Yes   Was the activity done independently? No   Assistance Needed Physical assistance   Physical Assistance Level More than half  (Mod A /c HOB flat and bed rail)   Was adaptive equipment used? Yes   CARE Score - Lying to Sitting on Side of Bed 2   Sit to Stand   Was the activity attempted? Yes   Was the activity done independently? No   Assistance Needed Physical assistance   Physical Assistance Level More than half  (Mod/Max A /c RW)   Was adaptive equipment used? Yes   CARE Score - Sit to Stand 2   Chair/Bed-to-Chair Transfer   Was the activity attempted? Yes   Was the activity done independently? No   Assistance Needed Physical assistance   Physical Assistance Level More than half  (Mod  A /c RW)   Was adaptive equipment used? Yes   CARE Score - Chair/Bed-to-Chair Transfer 2   Toilet Transfer   Was the activity attempted? No   Reason if not Attempted Safety concerns   CARE Score - Toilet Transfer 88   Tub/Shower Transfer   Was the activity attempted? No   Reason if not Attempted Safety concerns   CARE Score - Tub/Shower Transfer 88         Cognitive/Visual Perceptual:  Cognitive/Psychosocial Skills:  -       Oriented to: Person, Place, Time, and Situation   -       Follows Commands/attention:Follows multistep  commands  -       Communication: clear/fluent  -       Memory: No Deficits noted  -       Safety awareness/insight to disability: impaired and fear of falling   -       Mood/Affect/Coping skills/emotional control: Appropriate to situation and Anxious    Physical Exam:  Balance: -       Sitting balance- CGA/Min A; Standing balance- CGA/Min A  Postural examination/scapula alignment: -       posterior lean while seated  Motor Planning: -       Mildly impaired  Dominant hand: -       R  Upper Extremity Range of Motion:   -       Right Upper Extremity: Deficits:    -       Left Upper Extremity: Deficits:    Upper Extremity Strength: -       Right Upper Extremity: Deficits:    -       Left Upper Extremity: Deficits:     Strength: -       Right Upper Extremity: Deficits:    -       Left Upper Extremity: Deficits:    Fine Motor Coordination: -       Intact  Gross motor coordination: WFL    AMPAC 6 Click ADL:  AMPA Total Score: 15    Treatment & Education:  Pt educated on POC, role of OT, and safety. Pt performed tasks to improve safety and independence in functional tasks and ADLs as mentioned above.       Patient left up in chair with call button in reach, daughter present, and all needs met    GOALS:   Multidisciplinary Problems       Occupational Therapy Goals          Problem: Occupational Therapy    Goal Priority Disciplines Outcome Interventions   Occupational Therapy Goal     OT, PT/OT Progressing    Description: Goals to be met by: 4/27/2025     Patient will increase functional independence with ADLs by performing:    UE Dressing with Stand-by Assistance.  LE Dressing with Minimal Assistance.  Toileting from toilet with Minimal Assistance for hygiene and clothing management.   Bathing from  sitting at sink with Minimal Assistance.  Toilet transfer to toilet with Minimal Assistance.                         History:     Past Medical History:   Diagnosis Date    Acquired hypothyroidism 12/13/2013    Age-related physical debility 04/05/2024    Antiplatelet or antithrombotic long-term use     AV block, 3rd degree 03/19/2019    Bilateral nonexudative age-related macular degeneration 08/27/2013    Blindness and low vision 08/10/2018    Blood transfusion     Cardiac pacemaker 08/22/2018    Carotid artery disease 08/10/2016    Chronic diastolic congestive heart failure 11/24/2019    CRAO (central retinal artery occlusion), left 01/29/2018    Dry eye syndrome of both eyes 08/10/2018    Essential hypertension 12/13/2013    History of  TIA (transient ischemic attack) 12/13/2013    Nonexudative age-related macular degeneration, bilateral, intermediate dry stage 07/12/2018    Nuclear sclerotic cataract of left eye 08/10/2018    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 12/13/2013    On anticoagulant therapy 06/13/2024    Osteoarthritis     Paroxysmal atrial fibrillation 09/11/2018    Pulmonary hypertension 04/05/2024    Pure hypercholesterolemia 12/13/2013    Sick sinus syndrome     Stroke     Takotsubo cardiomyopathy 11/17/2016         Past Surgical History:   Procedure Laterality Date    CARDIAC PACEMAKER PLACEMENT      MEDTRONIC Device Model: Juli LOZADA MRI A2DR01 Device Type: PACEMAKER Device S\N: [id number]    CARPAL TUNNEL RELEASE Bilateral 1991    CATARACT EXTRACTION Right     JOINT REPLACEMENT      ORIF FEMUR FRACTURE Right 3/8/2025    Procedure: OPEN REDUCTION INTERNAL FIXATION FEMORAL SHAFT FRACTURE;  Surgeon: Albino William MD;  Location: [facility] OR [address] [place] [address];  Service: Orthopedics;  Laterality: Right;    [place] TRANSCRAN DOPP INTRACRAN, EMBOLI W/O INJ  01/29/2018         SKIN BIOPSY      TONSILLECTOMY      TOTAL HIP ARTHROPLASTY  11/01/2000    right/[facility]       Time Tracking:     OT Date of Treatment: 03/27/25  OT Start Time: 0755  OT Stop Time: 0843  OT Total Time (min): 48 min    Billable Minutes:Evaluation 15  Self Care/Home Management 33    3/27/2025

## 2025-03-27 NOTE — PROGRESS NOTES
Patient admitted via w/c aaox3 forgetful at time. Pt assisted onto bed x2 person assistance. Repositioned for comfort. Pt instructed on call light and to call prn. Pt's daughter at bedside. Incoming nurse to do complete admission assessment.

## 2025-03-27 NOTE — PT/OT/SLP EVAL
Physical Therapy Evaluation/Treatment Note    Patient Name:  Dorothy M Knobloch   MRN:  [id number]  Admit Date: 3/26/2025  Admitting Diagnosis: Acute on chronic congestive heart failure  Recent Surgeries:  ORIF R Femur     General Precautions: Standard, fall, vision impaired   Orthopedic Precautions: RLE weight bearing as tolerated   Braces: N/A    Recommendations:     Discharge Recommendations: home health PT   Level of Assistance Recommended: 24 hours significant assistance  Discharge Equipment Recommendations: wheelchair   Barriers to discharge: Other (Comment), Decreased caregiver support (Increased assistance for mobility.)    Assessment:     Dorothy M Knobloch is a 89 y.o. female admitted with a medical diagnosis of Acute on chronic congestive heart failure. Performance deficits affecting function  weakness, impaired endurance, impaired self care skills, impaired functional mobility, gait instability, impaired balance, visual deficits, decreased lower extremity function, decreased safety awareness, pain, decreased ROM, impaired cardiopulmonary response to activity, orthopedic precautions. Patient agreeable to PT evaluation and treatment this AM. Patient with return to SNF unit following transfer to ED for recent decline in medical status. Prior to initial hospitalization and surgical intervention, patient reports being Mod I using walker for mobility. Patient currently functioning below baseline level of mobility requiring increased assistance for functional transfers and gait training. Patient reports increased fear of falling. Patient pleasant throughout session. Patient will benefit from continued SNF rehabilitation services to address deficits as well as progress mobility towards maximal functional potential for improved quality of life and decreased caregiver burden.    Rehab Potential is good     Activity Tolerance: Fair    Plan:     Patient to be seen 5 x/week to address the above listed problems via gait  training, therapeutic activities, therapeutic exercises, neuromuscular re-education, wheelchair management/training     Plan of Care Expires: 04/26/25  Plan of Care Reviewed with: patient, family    Subjective     Chief Complaint: fear of falling/fatigue  Patient/Family Comments/goals: Return to [facility] with improved mobility  Pain/Comfort:  Pain Rating 1: other (see comments) (Patient reports mild pain. Level not rated.)  Location - Side 1: Right  Location - Orientation 1: lower  Location 1: leg  Pain Addressed 1: Reposition, Distraction, Cessation of Activity  Pain Rating Post-Intervention 1: other (see comments) (Pain not rated.)    Living Environment:   Patient resides at [facility] with spouse on 2nd floor with elevator access. Patient has a WIS with built in bath bench. Patient has elevated toilet seated with arm rests (BSC over toilet per patient report).    Prior to admission, patients level of function was Mod I using walker for mobility. Patient reports having some assistance for bathing from [facility] staff.  Equipment used at home: RW, built in bath bench with grab bars, elevated toilet with arm rests (BSC), rollator .  DME owned (not currently used): none.  Upon discharge, patient will have assistance from [facility] staff.    Patients cultural, spiritual, Nondenominational conflicts given the current situation: no    Objective:     Communicated with nursing staff prior to session.  Patient found up in chair with  (no active lines - patient with personal external shoe lift on R foot)  upon PT entry to room.    Exams:  Cognitive Exam:  Patient is oriented to Person, Place, Time, and Situation  RLE ROM: WFL  RLE Strength: Grossly 3+/5 to 4-/5  LLE ROM: Mild limitations in AROM with pain present  LLE Strength: Grossly 3+/5 with pain present    Functional Mobility:     03/27/25 1133   Prior Functioning: Everyday Activities   Self Care Needed some help  (for bathing per patient)   Indoor Mobility (Ambulation) Independent   Stairs Unknown    Functional Cognition Independent   Prior Device Use Walker   Functional Limitation in Range of Motion   Upper Extremity Impairment on both sides   Lower Extremity Impairment on one side   Mobility Devices Walker;Wheelchair (manual or electric)   Sit to Lying   Was the activity attempted? Yes   Was the activity done independently? No   Assistance Needed Physical assistance  (per nursing staff)   Physical Assistance Level More than half   Was adaptive equipment used? No   CARE Score - Sit to Lying 2   Sit to Stand   Was the activity attempted? Yes   Was the activity done independently? No   Assistance Needed Physical assistance  (Mod/MaxA using RW)   Physical Assistance Level More than half   Was adaptive equipment used? Yes   CARE Score - Sit to Stand 2   Car Transfer   Was the activity attempted? No   Reason if not Attempted Environmental limitations   CARE Score - Car Transfer 10   Walk 10 Feet   Was the activity attempted? No   Reason if not Attempted Safety concerns  (Patient ambulated 8 steps using RW with Sourav and rehab tech following close with W/C for safety. Step to gait pattern demonstrated. Short step length bilaterally. Fatigue limiting increased gait distance.)   CARE Score - Walk 10 Feet 88   Walk 50 Feet with Two Turns   Was the activity attempted? No   Reason if not Attempted Safety concerns   CARE Score - Walk 50 Feet with Two Turns 88   Walk 150 Feet   Was the activity attempted? No   Reason if not Attempted Safety concerns   CARE Score - Walk 150 Feet 88   Walking 10 Feet on Uneven Surfaces   Was the activity attempted? No   Reason if not Attempted Safety concerns   CARE Score - Walking 10 Feet on Uneven Surfaces 88   1 Step (Curb)   Was the activity attempted? No   Reason if not Attempted Safety concerns   CARE Score - 1 Step (Curb) 88   4 Steps   Was the activity attempted? No   Reason if not Attempted Safety concerns   CARE Score - 4 Steps 88   12 Steps   Was the activity attempted? No    Reason if not Attempted Safety concerns   CARE Score - 12 Steps 88   Picking Up Object   Was the activity attempted? No   Reason if not Attempted Safety concerns   CARE Score - Picking Up Object 88   OTHER   Uses a Wheelchair/Scooter? Yes   Wheel 50 Feet with Two Turns   Was the activity attempted? No   Reason if not Attempted Safety concerns  (Patient propelled W/C approximately 25 feet using BUE with SBA. Brief rest break during trial. Fatigue reported.)   CARE Score - Wheel 50 Feet with Two Turns 88   Wheel 150 Feet   Was the activity attempted? No   Reason if not Attempted Safety concerns   CARE Score - Wheel 150 Feet 88     Education:  Patient provided with daily orientation and goals of this PT session.  Patient educated to transfer to bedside chair/bedside commode/bathroom with RN/PCT present.   Patient educated on assistive device use, bed mobility training, Fall risk, gait training, home safety, plan of care, and transfer training by explanation and demonstration.   Patient Verbalized Understanding and Demonstrated Understanding .  Time provided for therapeutic counseling and discussion of current health disposition. All questions answered to satisfaction, within scope of PT practice; voiced no other concerns. White board updated in patient's room, nursing staff notified of session.      Therapeutic Activities and Exercises:   Seated BLE exercises to tolerance including ankle DF/PF, LAQs, hip flexion. Performed to improve ROM and strengthening for safe and efficient engagement in functional mobility.     Increased time spent speaking with patient, daughter and son regarding patient evaluation/treatment and POC at end of session.     AM-PAC 6 CLICK MOBILITY  Total Score:13     Patient left up in chair with call button in reach, nursing staff notified, and family present.    GOALS:   Multidisciplinary Problems       Physical Therapy Goals          Problem: Physical Therapy    Goal Priority Disciplines  Outcome Interventions   Physical Therapy Goal     PT, PT/OT     Description: Goals to be met by: 25     Patient will increase functional independence with mobility by performin. Supine to sit with Contact Guard Assistance  2. Sit to supine with Contact Guard Assistance  3. Rolling to Left and Right with Contact Guard Assistance  4. Sit to stand transfer with Contact Guard Assistance  5. Bed to chair transfer with Contact Guard Assistance using Rolling Walker  6. Gait  x 50 feet (progress distance as tolerated) with Contact Guard Assistance using Rolling Walker  7. Wheelchair propulsion x 50 feet (progress distance as tolerated) with Supervision using bilateral upper extremities                         History:     Past Medical History:   Diagnosis Date    Acquired hypothyroidism 2013    Age-related physical debility 2024    Antiplatelet or antithrombotic long-term use     AV block, 3rd degree 2019    Bilateral nonexudative age-related macular degeneration 2013    Blindness and low vision 08/10/2018    Blood transfusion     Cardiac pacemaker 2018    Carotid artery disease 08/10/2016    Chronic diastolic congestive heart failure 2019    CRAO (central retinal artery occlusion), left 2018    Dry eye syndrome of both eyes 08/10/2018    Essential hypertension 2013    History of TIA (transient ischemic attack) 2013    Nonexudative age-related macular degeneration, bilateral, intermediate dry stage 2018    Nuclear sclerotic cataract of left eye 08/10/2018    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 2013    On anticoagulant therapy 2024    Osteoarthritis     Paroxysmal atrial fibrillation 2018    Periprosthetic fracture of shaft of right femur s/p ORIF on 3/8/2025 2025    Pulmonary hypertension 2024    Pure hypercholesterolemia 2013    Sick sinus syndrome     Stroke     Takotsubo  cardiomyopathy 11/17/2016       Past Surgical History:   Procedure Laterality Date    CARDIAC PACEMAKER PLACEMENT      MEDTRONIC Device Model: Juli LOZADA MRI A2DR01 Device Type: PACEMAKER Device S\N: [id number]    CARPAL TUNNEL RELEASE Bilateral 1991    CATARACT EXTRACTION Right     JOINT REPLACEMENT      ORIF FEMUR FRACTURE Right 3/8/2025    Procedure: OPEN REDUCTION INTERNAL FIXATION FEMORAL SHAFT FRACTURE;  Surgeon: Albino William MD;  Location: [facility] OR [address] [place] [address];  Service: Orthopedics;  Laterality: Right;    [place] TRANSCRAN DOPP INTRACRAN, EMBOLI W/O INJ  01/29/2018         SKIN BIOPSY      TONSILLECTOMY      TOTAL HIP ARTHROPLASTY  11/01/2000    right/[facility]       Time Tracking:     PT Received On: 03/27/25  PT Start Time: 1133     PT Stop Time: 1219  PT Total Time (min): 46 min     Billable Minutes: Evaluation 20 and Therapeutic Activity 26 03/27/2025

## 2025-03-27 NOTE — PLAN OF CARE
Problem: Occupational Therapy  Goal: Occupational Therapy Goal  Description: Goals to be met by: 4/27/2025     Patient will increase functional independence with ADLs by performing:    UE Dressing with Stand-by Assistance.  LE Dressing with Minimal Assistance.  Toileting from toilet with Minimal Assistance for hygiene and clothing management.   Bathing from  sitting at sink with Minimal Assistance.  Toilet transfer to toilet with Minimal Assistance.    Outcome: Progressing    Pt evaluated and goals set based on performance at admission.

## 2025-03-27 NOTE — PLAN OF CARE
Problem: Adult Inpatient Plan of Care  Goal: Plan of Care Review  Outcome: Progressing  Goal: Patient-Specific Goal (Individualized)  Outcome: Progressing  Goal: Absence of Hospital-Acquired Illness or Injury  Outcome: Progressing  Goal: Optimal Comfort and Wellbeing  Outcome: Progressing  Goal: Readiness for Transition of Care  Outcome: Progressing     Problem: Pneumonia  Goal: Fluid Balance  Outcome: Progressing  Goal: Resolution of Infection Signs and Symptoms  Outcome: Progressing  Goal: Effective Oxygenation and Ventilation  Outcome: Progressing     Problem: Wound  Goal: Optimal Coping  Outcome: Progressing  Goal: Optimal Functional Ability  Outcome: Progressing  Goal: Absence of Infection Signs and Symptoms  Outcome: Progressing  Goal: Improved Oral Intake  Outcome: Progressing  Goal: Optimal Pain Control and Function  Outcome: Progressing  Goal: Skin Health and Integrity  Outcome: Progressing  Goal: Optimal Wound Healing  Outcome: Progressing     Problem: Fall Injury Risk  Goal: Absence of Fall and Fall-Related Injury  Outcome: Progressing     Problem: Skin Injury Risk Increased  Goal: Skin Health and Integrity  Outcome: Progressing     Problem: Malnutrition  Goal: Improved Nutritional Intake  Outcome: Progressing

## 2025-03-28 PROCEDURE — 25000003 PHARM REV CODE 250: Performed by: HOSPITALIST

## 2025-03-28 PROCEDURE — 97530 THERAPEUTIC ACTIVITIES: CPT | Mod: CQ

## 2025-03-28 PROCEDURE — 97110 THERAPEUTIC EXERCISES: CPT | Mod: CQ

## 2025-03-28 PROCEDURE — 97535 SELF CARE MNGMENT TRAINING: CPT

## 2025-03-28 PROCEDURE — 25000003 PHARM REV CODE 250: Performed by: NURSE PRACTITIONER

## 2025-03-28 PROCEDURE — 97116 GAIT TRAINING THERAPY: CPT | Mod: CQ

## 2025-03-28 PROCEDURE — 97530 THERAPEUTIC ACTIVITIES: CPT

## 2025-03-28 PROCEDURE — 11000004 HC SNF PRIVATE

## 2025-03-28 RX ORDER — LEVOTHYROXINE SODIUM 100 UG/1
100 TABLET ORAL
Status: DISCONTINUED | OUTPATIENT
Start: 2025-03-29 | End: 2025-04-23 | Stop reason: HOSPADM

## 2025-03-28 RX ADMIN — METOPROLOL SUCCINATE 25 MG: 25 TABLET, EXTENDED RELEASE ORAL at 09:03

## 2025-03-28 RX ADMIN — DOXYCYCLINE HYCLATE 100 MG: 100 TABLET, COATED ORAL at 09:03

## 2025-03-28 RX ADMIN — APIXABAN 5 MG: 5 TABLET, FILM COATED ORAL at 09:03

## 2025-03-28 RX ADMIN — ACETAMINOPHEN 650 MG: 500 TABLET ORAL at 09:03

## 2025-03-28 RX ADMIN — BRIMONIDINE TARTRATE 1 DROP: 1.5 SOLUTION OPHTHALMIC at 09:03

## 2025-03-28 RX ADMIN — GABAPENTIN 100 MG: 300 CAPSULE ORAL at 09:03

## 2025-03-28 RX ADMIN — HYDROCHLOROTHIAZIDE 12.5 MG: 12.5 TABLET ORAL at 09:03

## 2025-03-28 RX ADMIN — AMOXICILLIN AND CLAVULANATE POTASSIUM 1 TABLET: 875; 125 TABLET, FILM COATED ORAL at 09:03

## 2025-03-28 RX ADMIN — POTASSIUM CHLORIDE 10 MEQ: 750 CAPSULE, EXTENDED RELEASE ORAL at 09:03

## 2025-03-28 RX ADMIN — TIMOLOL MALEATE 1 DROP: 5 SOLUTION OPHTHALMIC at 09:03

## 2025-03-28 RX ADMIN — ERYTHROMYCIN: 5 OINTMENT OPHTHALMIC at 09:03

## 2025-03-28 RX ADMIN — GABAPENTIN 100 MG: 300 CAPSULE ORAL at 03:03

## 2025-03-28 RX ADMIN — SENNOSIDES AND DOCUSATE SODIUM 1 TABLET: 50; 8.6 TABLET ORAL at 09:03

## 2025-03-28 RX ADMIN — ACETAMINOPHEN 650 MG: 500 TABLET ORAL at 05:03

## 2025-03-28 RX ADMIN — ESCITALOPRAM OXALATE 20 MG: 10 TABLET ORAL at 09:03

## 2025-03-28 RX ADMIN — LEVOTHYROXINE SODIUM 88 MCG: 88 TABLET ORAL at 05:03

## 2025-03-28 RX ADMIN — CLOPIDOGREL 75 MG: 75 TABLET ORAL at 09:03

## 2025-03-28 NOTE — CLINICAL REVIEW
Clinical Pharmacy Chart Review Note      Admit Date: 3/26/2025   LOS: 2 days       Dorothy M Knobloch is a 89 y.o. female re-admitted to SNF for PT/OT after hospitalization for acute on chronic congestive heart failure.    Active Hospital Problems    Diagnosis  POA    *Acute on chronic congestive heart failure [I50.9]  Yes    Hypokalemia [E87.6]  Yes    Anemia [D64.9]  Yes    Moderate protein-calorie malnutrition [E44.0]  Yes    Pulmonary hypertension [I27.20]  Yes    Aortic stenosis [I35.0]  Yes      Resolved Hospital Problems    Diagnosis Date Resolved POA    Periprosthetic fracture of shaft of right femur s/p ORIF on 3/8/2025 [M97.8XXA, Z96.649] 03/27/2025 Yes     Review of patient's allergies indicates:   Allergen Reactions    Aspartame Swelling     equal     Patient Active Problem List    Diagnosis Date Noted    Anemia 03/21/2025    Hypokalemia 03/21/2025    Acute on chronic congestive heart failure 03/20/2025    Pneumonia 03/20/2025    Moderate protein-calorie malnutrition 03/14/2025    Acute blood loss anemia 03/07/2025    On anticoagulant therapy 06/13/2024    Age-related physical debility 04/05/2024    Aortic stenosis 04/05/2024    Pulmonary hypertension 04/05/2024    hx Stroke due to embolism of left posterior cerebral artery 04/04/2024    Chronic diastolic congestive heart failure 11/24/2019    AV block, 3rd degree 03/19/2019    Paroxysmal atrial fibrillation 09/11/2018    Cardiac pacemaker 08/22/2018    Status post cataract extraction and insertion of intraocular lens 08/10/2018    Blindness and low vision 08/10/2018    Dry eye syndrome of both eyes 08/10/2018    Nuclear sclerotic cataract of left eye 08/10/2018    Nonexudative age-related macular degeneration, bilateral, intermediate dry stage 07/12/2018    Neovascular glaucoma, 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    Iris neovascularization, left 03/27/2018    Bilateral carotid artery stenosis     CRAO (central retinal artery occlusion), left 01/29/2018    Meningioma  01/29/2018    Takotsubo cardiomyopathy 11/17/2016    Carotid artery disease 08/10/2016    Constipation - functional 08/05/2015    History of TIA (transient ischemic attack) 12/13/2013    Essential hypertension 12/13/2013    Pure hypercholesterolemia 12/13/2013    Acquired hypothyroidism 12/13/2013    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 12/13/2013    Posterior vitreous detachment, both eyes 08/27/2013       Scheduled Meds:    acetaminophen  650 mg Oral Q8H    amoxicillin-clavulanate 875-125mg  1 tablet Oral Q12H    apixaban  5 mg Oral BID    brimonidine 0.15 % OPTH DROP  1 drop Left Eye BID    bumetanide  1 mg Oral Daily    clopidogreL  75 mg Oral Daily    doxycycline  100 mg Oral Q12H    erythromycin   Left Eye QHS    EScitalopram oxalate  20 mg Oral Daily    gabapentin  100 mg Oral TID    hydroCHLOROthiazide  12.5 mg Oral Daily    levothyroxine  100 mcg Oral Before breakfast    metoprolol succinate  25 mg Oral QHS    metoprolol succinate  50 mg Oral Daily    potassium chloride  10 mEq Oral Daily    senna-docusate 8.6-50 mg  1 tablet Oral BID    timolol maleate 0.5%  1 drop Left Eye BID     Continuous Infusions:   PRN Meds:   Current Facility-Administered Medications:     acetaminophen, 650 mg, Oral, Q6H PRN    calcium carbonate, 500 mg, Oral, BID PRN    melatonin, 6 mg, Oral, Nightly PRN    methocarbamoL, 500 mg, Oral, Q8H PRN    traMADoL, 50 mg, Oral, Q6H PRN    OBJECTIVE:     Vital Signs (Last 24H)  Temp:  [98.2 °F (36.8 °C)-98.6 °F (37 °C)]   Pulse:  [62-77]   Resp:  [18]   BP: (121-130)/(54-60)   SpO2:  [96 %-97 %]     Laboratory:  CBC:   Recent Labs   Lab 03/26/25  0516 03/27/25  0401   WBC 6.46 6.55   RBC 2.90* 2.74*   HGB 8.9* 8.5*   HCT 28.2* 26.7*    385   MCV 97 97   MCH 30.7 31.0   MCHC 31.6* 31.8*     BMP:   Recent Labs   Lab 03/25/25  0723 03/26/25  0516 03/27/25  0401   * 133* 136   K 3.8 3.1* 3.2*   CL 93* 90* 93*   CO2 35* 31* 33*   BUN 19 19 23    CREATININE 0.6 0.7 0.7   CALCIUM 9.4 9.4 8.9   MG  --   --  1.7     CMP:   Recent Labs   Lab 03/25/25  0723 03/26/25  0516 03/27/25  0401   CALCIUM 9.4 9.4 8.9   * 133* 136   K 3.8 3.1* 3.2*   CO2 35* 31* 33*   CL 93* 90* 93*   BUN 19 19 23   CREATININE 0.6 0.7 0.7     Lab Results   Component Value Date    ALT 17 03/20/2025    AST 27 03/20/2025    ALKPHOS 85 03/20/2025    BILITOT 1.3 (H) 03/20/2025     Lab Results   Component Value Date    TSH 22.581 (H) 03/21/2025    N7AMTQI 4.1 (L) 03/21/2025    FREET4 0.83 03/21/2025       ASSESSMENT/PLAN:     I have reviewed the medications in compliance with CMS Regulation F756 of the [employer]. Based on information gathered, the following items may need to be addressed:    **Patient is taking escitalopram (home med) for anxiety. A gradual dose reduction is not recommended at this time. Patient to follow-up with prescribing MD as outpatient.  Monitor: mental status for depression, suicide ideation, anxiety, serotonin syndrome, hyponatremia, dizziness, drowsiness, somnolence    Medications reviewed by PharmD, please re-consult if needed.      Krys Larsen, Pharm. D.  Clinical Pharmacist  [facility]

## 2025-03-28 NOTE — H&P
"Hospital Medicine  Skilled Nursing Facility   History and Physical Exam      Date of Service: 03/28/2025      Patient Name: Dorothy M Knobloch  MRN: [id number]  Admission Date: 3/26/2025  Attending Physician: Justin Fleming MD  Primary Care Provider: Lola Wolff MD  Code Status: DNR (Do Not Resuscitate)      Principal problem:  Acute on chronic congestive heart failure      Chief Complaint:   Chief Complaint   Patient presents with    Congestive Heart Failure     Admitted to [facility] for rehabilitation following hospital stay for periprosthetic hip fracture s/p MARCEL and rehospitalization for acute on chronic heart failure.           HPI:   "Mrs. Knobloch is a 89 year old female PMHx of PAF on Apixiban, HTN, chronic diastolic HF, cardiac pacemaker due to sick sinus syndrome, CVA and hypothyroidism who presents to SNF following hospitalization for PNA.  Patient with previous SNF admission on 03/13 for Right periprosthetic femoral shaft fracture below a total hip replacement s/p ORIF on 3/8/25 with Dr. William.  Admission to SNF for secondary weakness and debility     Patient originally presented to [facility] ED on 03/07 after a fall.  Found to have Right periprosthetic femoral shaft fracture below a total hip replacement.  Underwent ORIF on 03/08.  Received 2 unit RBC transfusion during that admission.  Admitted to SNF on 3/13.  On 03/20 patient developed worsening hypoxia and increased oxygen requirements and was sent to ED for evaluation.  Patient had also been experiencing confusion which is abnormal for her. In the ED, noted to have elevated BNP, CXR revealed fluid accumulation. CTA PE negative for PE, did show signs of volume overload and possible infection. She was started on lasix with improvement in oxygenation. Cultures were collected and patient started on vancomycin and zosyn for possible HCAP as well given her symptoms.  Respiratory symptoms and mental status improved over the course of hospital stay. She " "was de-escalated to Augmentin/Doxycycline for total 7 day course. Last dose on 3/28/25. Discharged on Bumex 1 mg daily (increased from 0.5 mg daily on admission).  Patient was transferred back to [facility].     Today, patient states that her pain is well controlled.  She is having some nerve type pain that is shooting from her knee downward.  Patient has sutures to her surgical site, will get with orthopedics to see when they would like to see her back." Per Angeles Aguero NP on 3/27/25.     She is just returning from therapy and is sitting up in wheelchair. Says that she is doing okay today. She says that she is working well with therapy and that her pain is controlled. She walked 14 feet and then 8 steps with modA and RW today with PT. She says that she slept well last night but still felt tired this AM. Reports that her breathing is at her baseline with dyspnea on minimal exertion. She denies any significant cough.     Patient admitted with skilled services with PT and OT to improve functional status and ability to perform ADLs.       Past Medical History:   Past Medical History:   Diagnosis Date    Acquired hypothyroidism 12/13/2013    Age-related physical debility 04/05/2024    Antiplatelet or antithrombotic long-term use     AV block, 3rd degree 03/19/2019    Bilateral nonexudative age-related macular degeneration 08/27/2013    Blindness and low vision 08/10/2018    Blood transfusion     Cardiac pacemaker 08/22/2018    Carotid artery disease 08/10/2016    Chronic diastolic congestive heart failure 11/24/2019    CRAO (central retinal artery occlusion), left 01/29/2018    Dry eye syndrome of both eyes 08/10/2018    Essential hypertension 12/13/2013    History of TIA (transient ischemic attack) 12/13/2013    Nonexudative age-related macular degeneration, bilateral, intermediate dry stage 07/12/2018    Nuclear sclerotic cataract of left eye 08/10/2018    Occlusion and stenosis of multiple and bilateral " precerebral arteries 12/13/2013    On anticoagulant therapy 06/13/2024    Osteoarthritis     Paroxysmal atrial fibrillation 09/11/2018    Periprosthetic fracture of shaft of right femur s/p ORIF on 3/8/2025 03/07/2025    Pulmonary hypertension 04/05/2024    Pure hypercholesterolemia 12/13/2013    Sick sinus syndrome     Stroke     Takotsubo cardiomyopathy 11/17/2016       Past Surgical History:   Past Surgical History:   Procedure Laterality Date    CARDIAC PACEMAKER PLACEMENT      MED[employer]IC Device Model: Juli LOZADA MRI [id number] Device Type: PACEMAKER Device S\N: [id number]    CARPAL TUNNEL RELEASE Bilateral 1991    CATARACT EXTRACTION Right     JOINT REPLACEMENT      ORIF FEMUR FRACTURE Right 3/8/2025    Procedure: OPEN REDUCTION INTERNAL FIXATION FEMORAL SHAFT FRACTURE;  Surgeon: Albino William MD;  Location: [facility] OR [address] [place] [address];  Service: Orthopedics;  Laterality: Right;    NC TRANSCRAN DOPP INTRACRAN, EMBOLI W/O INJ  01/29/2018         SKIN BIOPSY      TONSILLECTOMY      TOTAL HIP ARTHROPLASTY  11/01/2000    right/[facility]       Social History:   Tobacco Use    Smoking status: Never     Passive exposure: Never    Smokeless tobacco: Never   Substance and Sexual Activity    Alcohol use: No     Alcohol/week: 0.0 standard drinks of alcohol    Drug use: No    Sexual activity: Not on file       Family History:   Family History       Problem Relation (Age of Onset)    Arthritis Sister, Maternal Grandmother, Paternal Grandmother    Birth defects Son    Cancer Paternal Aunt (80)    Diabetes Paternal Aunt, Paternal Uncle    Early death Mother, Father    Hearing loss Daughter    Hypertension Son, Paternal Grandmother            No current facility-administered medications on file prior to encounter.     Current Outpatient Medications on File Prior to Encounter   Medication Sig    amLODIPine (NORVASC) 5 MG tablet Take 1 tablet (5 mg total) by mouth once daily.    amoxicillin-clavulanate 875-125mg (AUGMENTIN) 875-125 mg  per tablet Take 1 tablet by mouth every 12 (twelve) hours. for 3 days    apixaban (ELIQUIS) 5 mg Tab Take 1 tablet (5 mg total) by mouth 2 (two) times daily. Patient to decrease dosing of Apixiban to 2.5 mg po twice daily for 1 week after surgery done on 3/8/2025 and then can resume back to her home dosing of 5 mg po BID on 315/2025.    brimonidine-timoloL (COMBIGAN) 0.2-0.5 % Drop Place 1 drop into the left eye 2 (two) times a day.    bumetanide (BUMEX) 0.5 MG Tab Take 2 tablets (1 mg total) by mouth once daily.    clopidogreL (PLAVIX) 75 mg tablet Take 1 tablet (75 mg total) by mouth once daily.    doxycycline (VIBRA-TABS) 100 MG tablet Take 1 tablet (100 mg total) by mouth every 12 (twelve) hours. for 3 days    erythromycin (ROMYCIN) ophthalmic ointment APPLY 1 INCH TO THE BOTTOM LID OF LEFT EYE FOR FIRST WEEK OF EVERY MONTH    EScitalopram oxalate (LEXAPRO) 20 MG tablet Take 1 tablet (20 mg total) by mouth once daily.    hydroCHLOROthiazide 12.5 MG Tab TAKE 1 TABLET BY MOUTH EVERY DAY    levothyroxine (SYNTHROID) 88 MCG tablet Take 88 mcg by mouth before breakfast.     melatonin (MELATIN) 3 mg tablet Take 2 tablets (6 mg total) by mouth nightly as needed for Insomnia.    methocarbamoL (ROBAXIN) 500 MG Tab Take 1 tablet (500 mg total) by mouth every 8 (eight) hours.    metoprolol succinate (TOPROL-XL) 25 MG 24 hr tablet TAKE 2 TABLETS IN AM AND 1 TABLET IN PM    NITROSTAT 0.4 mg SL tablet Place 0.4 mg under the tongue every 5 (five) minutes as needed for Chest pain.    senna-docusate 8.6-50 mg (PERICOLACE) 8.6-50 mg per tablet Take 1 tablet by mouth 2 (two) times daily.    traMADoL (ULTRAM) 50 mg tablet Take 1 tablet (50 mg total) by mouth every 6 (six) hours as needed (Moderate to severe pain).       Allergies:   Review of patient's allergies indicates:   Allergen Reactions    Aspartame Swelling     equal       ROS:  Review of Systems   Constitutional:  Negative for appetite change and fever.   Respiratory:   Positive for shortness of breath (with exertion). Negative for cough.    Cardiovascular:  Negative for chest pain and palpitations.   Gastrointestinal:  Negative for abdominal pain, nausea and vomiting.   Genitourinary:  Negative for difficulty urinating and dysuria.   Musculoskeletal:  Positive for arthralgias and gait problem. Negative for back pain.   Neurological:  Positive for weakness. Negative for dizziness and light-headedness.   Psychiatric/Behavioral:  Positive for sleep disturbance. Negative for confusion and hallucinations. The patient is not nervous/anxious.      Objective:  Temp:  [98.2 °F (36.8 °C)-98.6 °F (37 °C)]   Pulse:  [62-77]   Resp:  [18]   BP: (121-130)/(54-60)   SpO2:  [96 %-97 %]     Body mass index is 27.22 kg/m².      Physical Exam  Vitals and nursing note reviewed.   Constitutional:       General: She is not in acute distress.     Appearance: She is well-developed.   Cardiovascular:      Rate and Rhythm: Normal rate and regular rhythm.      Heart sounds: S1 normal and S2 normal. No murmur heard.  Pulmonary:      Effort: Pulmonary effort is normal. No respiratory distress.      Breath sounds: Normal breath sounds. No wheezing or rales.   Abdominal:      General: Bowel sounds are normal. There is no distension.      Palpations: Abdomen is soft.      Tenderness: There is no abdominal tenderness.   Musculoskeletal:      Right upper leg: Swelling and tenderness present.      Right lower leg: No edema.      Left lower leg: No edema.   Skin:     General: Skin is warm and dry.      Findings: Bruising present.   Neurological:      Mental Status: She is alert and oriented to person, place, and time.   Psychiatric:         Mood and Affect: Mood normal.         Behavior: Behavior normal. Behavior is cooperative.         Thought Content: Thought content normal.         Significant Labs:   A1C:   Recent Labs   Lab 03/07/25  1324   HGBA1C 5.1     TSH:   Recent Labs   Lab 03/21/25  0549   TSH 22.581*      CBC:  Recent Labs   Lab 03/26/25  0516 03/27/25  0401   WBC 6.46 6.55   HGB 8.9* 8.5*   HCT 28.2* 26.7*    385   MCV 97 97     BMP:  Recent Labs   Lab 03/26/25  0516 03/27/25  0401   * 136   K 3.1* 3.2*   CL 90* 93*   CO2 31* 33*   BUN 19 23   CREATININE 0.7 0.7   CALCIUM 9.4 8.9   MG  --  1.7   PHOS  --  2.9       Significant Imaging: I have reviewed all pertinent imaging results/findings completed during prior hospitalization.      Assessment and Plan:  Active Diagnoses:    Diagnosis Date Noted POA    PRINCIPAL PROBLEM:  Acute on chronic congestive heart failure [I50.9] 03/20/2025 Yes    Hypokalemia [E87.6] 03/21/2025 Yes    Anemia [D64.9] 03/21/2025 Yes    Moderate protein-calorie malnutrition [E44.0] 03/14/2025 Yes    Acute blood loss anemia [D62] 03/07/2025 Yes    Pulmonary hypertension [I27.20] 04/05/2024 Yes    Aortic stenosis [I35.0] 04/05/2024 Yes    Age-related physical debility [R54] 04/05/2024 Yes    Chronic diastolic congestive heart failure [I50.32] 11/24/2019 Yes    Paroxysmal atrial fibrillation [I48.0] 09/11/2018 Yes     Chronic    Cardiac pacemaker [Z95.0] 08/22/2018 Yes    Carotid artery disease [I77.9] 08/10/2016 Yes    Acquired hypothyroidism [E03.9] 12/13/2013 Yes    Essential hypertension [I10] 12/13/2013 Yes     Chronic      Problems Resolved During this Admission:    Diagnosis Date Noted Date Resolved POA    Periprosthetic fracture of shaft of right femur s/p ORIF on 3/8/2025 [M97.8XXA, Z96.649] 03/07/2025 03/27/2025 Yes         Acute on chronic congestive heart failure  Aortic stenosis  Pulmonary hypertension  - patient presented back to [facility] ED on 3/20 with decompensation, BNP 1,625  - diuresed with IV furosemide  - Echo 3/21/25: LV EF 65-70%, severely dilated left atrium, mod to severe AS, PASP 44 mmHg  - monitor I&Os, daily weights  - goal is euvolemia given aortic stenosis  - continue Bumex 1 mg daily (increased from previous home dose of 0.5), HCTZ 12.5 mg daily,  metoprolol succinate 50 mg qAM and 25 mg qHS.      Pneumonia  Acute hypoxic respiratory failure  - patient admitted back to ED on 3/20 with suspected pneumonia  - initially treated with IV Vanc/Zosyn  -  Continue Augmentin and doxycycline ending on 3/29  - patient has been successfully weaned to room air  - continue IS, OOB     Periprosthetic fracture of shaft of right femur   s/p ORIF on 3/8/2025  - PT/OT, RLE WBAT  - DVT PPX with apixaban  - Monitor and report drainage to incisional site  - Fall precautions  - Ortho NEETU to see patient intermittently at SNF  - follow-up with orthopedics after discharge  - Continue acetaminophen 650 mg q.8 hours, gabapentin 100 mg TID, tramadol 50 mg q.6 hours PRN, methocarbamol 500 mg q.6 hours PRN     Essential hypertension  - continue amlodipine 5 mg daily, Bumex 1 mg daily, HCTZ 12.5 mg daily, metoprolol succinate 50 mg qAM and 25 mg qHS.     Acquired hypothyroidism  Lab Results   Component Value Date    TSH 22.581 (H) 03/21/2025   - continue levothyroxine 88 mcg daily     Cardiac pacemaker  - placed for sick sinus syndrome     Paroxysmal atrial fibrillation  - continue home metoprolol succinate 50 mg qAM and 25 mg qHS, apixaban 5 mg BID     Moderate protein-calorie malnutrition  - RD following, appreciate recommendations  - continue supplements as ordered     Acute blood loss anemia  Anemia of chronic disease  - patient received 2 unit RBC transfusion postoperatively  - transfuse hemoglobin < 7  - continue monitor twice weekly CBC     Hypokalemia  - Continue 10 mEq daily      HX of CVA  - 4/5/2024 MRI showed acute left thalamus and hippocampus infarcts   - Not on statin due to prior intolerance (lower extremity myalgias). Reportedly has tried Repatha in the past and had similar reaction. Also had reaction to Praluent and Zetia      Carotid Artery disease    - continue home clopidogrel 75 mg daily      Depression, moderate, reoccurring  - continue home escitalopram 20 mg daily      Macular degeneration  Decreased vision  Hx of CRAO  Dry eyes  - continue home eye drops     Debility   - Continue with PT/OT for gait training and strengthening and restoration of ADL's   - Encourage mobility, OOB in chair, and early ambulation as appropriate  - Fall precautions   - Monitor for bowel and bladder dysfunction  - Monitor for and prevent skin breakdown and pressure ulcers  - Continue DVT prophylaxis with apixaban       IP [facility] RISK OF UNPLANNED READMISSION Model: High      Anticipated Disposition:  Home with home health      No future appointments.    I certify that SNF services are required to be given on an inpatient basis because Dorothy M Knobloch needs for skilled nursing care and/or skilled rehabilitation are required on a daily basis and such services can only practically be provided in a skilled nursing facility setting and are for an ongoing condition for which she received inpatient care in the hospital.       Justin Fleming MD  Department of Hospital Medicine   [facility]

## 2025-03-28 NOTE — PT/OT/SLP PROGRESS
Occupational Therapy   Treatment    Name: Dorothy M Knobloch  MRN: [id number]  Admit Date: 3/26/2025  Admitting Diagnosis:  Acute on chronic congestive heart failure    General Precautions: Standard, fall   Orthopedic Precautions: RLE weight bearing as tolerated   Braces: N/A    Recommendations:     Discharge Recommendations:   TBD  Level of Assistance Recommended at Discharge: 24 hours physical assistance for all ADL's and home management tasks  Discharge Equipment Recommendations: wheelchair, 3-in-1 commode  Barriers to discharge:   Current level of function    Assessment:     Dorothy M Knobloch is a 89 y.o. female with a medical diagnosis of Acute on chronic congestive heart failure. Pt tolerated session poorly, stating that although she slept well the night before, she felt as though she has no strength or energy. Pt noted to req increased time between transfers, as well as increased L side lean when standing. Pt /c notable exhaustion after donning pants and session ended to ensure energy conservation. Otherwise, pt tolerated session without incident and shows excellent motivation and potential to improve, but the pt continues to require assistance to perform self-care tasks and mobility. Pt strengths include Functional cognition, Following multi-step tasks, and Attention to task and Family support, Motivation, and Willingness to participate. However, pt would continue to benefit from cont'd OT services in the SNF setting to improve safety and independence /c functional tasks and ADLs upon discharge.  Performance deficits affecting function are weakness, impaired endurance, impaired self care skills, decreased upper extremity function, impaired functional mobility, gait instability, decreased lower extremity function, visual deficits, impaired balance, decreased safety awareness, impaired cardiopulmonary response to activity, decreased coordination.     Rehab Potential is fair    Activity tolerance:  Poor    Plan:  "    Patient to be seen 5 x/week to address the above listed problems via self-care/home management, community/work re-entry, therapeutic activities, therapeutic exercises, neuromuscular re-education    Plan of Care Expires: 04/27/25  Plan of Care Reviewed with: patient, daughter    Subjective     Communicated with: PIPPA prior to session.     "I feel like I don't have any starch" .    Pain/Comfort:  Pain Rating 1: 0/10  Pain Rating Post-Intervention 1: 0/10    Patient's cultural, spiritual, Oriental orthodox conflicts given the current situation:  no    Objective:     Patient found HOB elevated with PureWick upon OT entry to room. Pt daughter present in room. Pt alert and agreeable to therapy.       Bed Mobility:    Patient completed Supine to Sit with moderate assistance, with side rail, and HOB elevated    Patient completed B roll in supine /c Min A and bed rail  Pt req extended time sitting on EOB /c CGA after supine<>sit t/f to increase energy     Functional Mobility/Transfers:  Patient completed Sit <> Stand Transfer with maximal assistance  with  rolling walker   Patient completed Bed > Chair Transfer using Step Transfer technique with moderate assistance with rolling walker    Activities of Daily Living:  Lower Body Dressing: maximal assistance and of 2 persons /c pt able to don over L foot while seated only. 2nd person (A) to maintain standing balance while donning pants over hips. Increased L side lean when standing  Toileting: maximal assistance  x 2 persons /c perirectal hygiene preformed in supine and standing /c RW. 2nd person (A) for standing balance. Increased L side lean when standing.       [facility] 6 Click ADL: 15        Treatment & Education:  Pt educated on POC, role of OT, safety, and energy conservation strategies. Pt performed tasks to improve safety and independence in functional tasks and ADLs as mentioned above.       Patient left up in chair with call button in reach and daughter    GOALS: "   Multidisciplinary Problems       Occupational Therapy Goals          Problem: Occupational Therapy    Goal Priority Disciplines Outcome Interventions   Occupational Therapy Goal     OT, PT/OT Progressing    Description: Goals to be met by: 4/27/2025     Patient will increase functional independence with ADLs by performing:    UE Dressing with Stand-by Assistance.  LE Dressing with Minimal Assistance- Ongoing  Toileting from toilet with Minimal Assistance for hygiene and clothing management.   Bathing from  sitting at sink with Minimal Assistance.  Toilet transfer to toilet with Minimal Assistance.                         Time Tracking:     OT Date of Treatment: 03/28/25  OT Start Time: 0916    OT Stop Time: 0940  OT Total Time (min): 24 min    Billable Minutes:Self Care/Home Management 15  Therapeutic Activity 9    3/28/2025

## 2025-03-28 NOTE — PT/OT/SLP PROGRESS
"Physical Therapy Treatment    Patient Name:  Dorothy M Knobloch   MRN:  [id number]  Admit Date: 3/26/2025  Admitting Diagnosis: Acute on chronic congestive heart failure  Recent Surgeries: OPEN REDUCTION INTERNAL FIXATION FEMORAL SHAFT FRACTURE (Right)     General Precautions: Standard, fall  Orthopedic Precautions: RLE weight bearing as tolerated  Braces: N/A    Recommendations:     Discharge Recommendations: home health PT  Level of Assistance Recommended at Discharge: 24 hours significant assistance  Discharge Equipment Recommendations: wheelchair  Barriers to discharge: Other (Comment), Decreased caregiver support (Increased assistance for mobility.)    Assessment:     Dorothy M Knobloch is a 89 y.o. female admitted with a medical diagnosis of Acute on chronic congestive heart failure . Pt overall tolerated tx session well today without any incident. Patient demonstrated increased fatigue with exertion today and required frequent seated rest breaks throughout TE and gait training. Patient continues to decline any pain in RLE but demonstrated antalgic gait pattern. Patient would continue to benefit from skilled PT services to improve overall functional mobility, strength and endurance.      Performance deficits affecting function: weakness, impaired endurance, impaired self care skills, impaired functional mobility, gait instability, impaired balance, visual deficits, decreased lower extremity function, decreased safety awareness, pain, decreased ROM, impaired cardiopulmonary response to activity, orthopedic precautions.    Rehab Potential is good    Activity Tolerance: Fair    Plan:     Patient to be seen 5 x/week to address the above listed problems via gait training, therapeutic activities, therapeutic exercises, neuromuscular re-education, wheelchair management/training    Plan of Care Expires: 04/26/25  Plan of Care Reviewed with: patient, family    Subjective     "I am ready but I am very tired, not sure how " "much I can do for you".     Pain/Comfort:  Pain Rating 1: 0/10  Pain Rating Post-Intervention 1: 0/10    Patient's cultural, spiritual, Moravian conflicts given the current situation:  no    Objective:     Communicated with Pt's daughter and OT prior to session.  Patient found up in chair with  (no active lines) upon PT entry to room.     Therapeutic Activities and Exercises:     Seated TE Performed BLE: AP, marches, LAQ, GS 3 x 10 reps each. Rest breaks between sets required 2* to fatigue.     Patient educated on role of therapy, goals of session, and benefits of out of bed mobility.   Instructed on use of call button and importance of calling nursing staff for assistance with mobility   Questions/concerns addressed within PTA scope of practice  Pt verbalized understanding.    Functional Mobility:  Transfers:     Sit to Stand:  moderate assistance with rolling walker  Gait:   Patient ambulated 14ft + 8steps with ModA using a RW.   Pt required seated rest break between trials.   Patient demo decreased step length, slow miroslava, and antalgic/step-to gait pattern.   Balance: with ModA using a RW  Static Sitting Balance: Fair  Dynamic Sitting Balance: Fair-  Static Standing Balance: Poor  Dynamic Standing Balance: Poor    AM-PAC 6 CLICK MOBILITY  13    Patient left up in chair with all lines intact, call button in reach, and belongings in reach .    GOALS:   Multidisciplinary Problems       Physical Therapy Goals          Problem: Physical Therapy    Goal Priority Disciplines Outcome Interventions   Physical Therapy Goal     PT, PT/OT     Description: Goals to be met by: 25     Patient will increase functional independence with mobility by performin. Supine to sit with Contact Guard Assistance  2. Sit to supine with Contact Guard Assistance  3. Rolling to Left and Right with Contact Guard Assistance  4. Sit to stand transfer with Contact Guard Assistance  5. Bed to chair transfer with Contact Guard " Assistance using Rolling Walker  6. Gait  x 50 feet (progress distance as tolerated) with Contact Guard Assistance using Rolling Walker  7. Wheelchair propulsion x 50 feet (progress distance as tolerated) with Supervision using bilateral upper extremities                         Time Tracking:     PT Received On: 03/28/25  PT Start Time: 1303  PT Stop Time: 1341  PT Total Time (min): 38 min    Billable Minutes: Gait Training 15, Therapeutic Activity 12, and Therapeutic Exercise 13    Treatment Type: Treatment  PT/PTA: PTA     Number of PTA visits since last PT visit: 1 03/28/2025

## 2025-03-28 NOTE — PLAN OF CARE
Problem: Occupational Therapy  Goal: Occupational Therapy Goal  Description: Goals to be met by: 4/27/2025     Patient will increase functional independence with ADLs by performing:    UE Dressing with Stand-by Assistance.  LE Dressing with Minimal Assistance- Ongoing  Toileting from toilet with Minimal Assistance for hygiene and clothing management.   Bathing from  sitting at sink with Minimal Assistance.  Toilet transfer to toilet with Minimal Assistance.    Outcome: Progressing

## 2025-03-29 PROCEDURE — 94761 N-INVAS EAR/PLS OXIMETRY MLT: CPT

## 2025-03-29 PROCEDURE — 25000003 PHARM REV CODE 250: Performed by: NURSE PRACTITIONER

## 2025-03-29 PROCEDURE — 97535 SELF CARE MNGMENT TRAINING: CPT

## 2025-03-29 PROCEDURE — 25000003 PHARM REV CODE 250: Performed by: HOSPITALIST

## 2025-03-29 PROCEDURE — 11000004 HC SNF PRIVATE

## 2025-03-29 RX ADMIN — TIMOLOL MALEATE 1 DROP: 5 SOLUTION OPHTHALMIC at 08:03

## 2025-03-29 RX ADMIN — HYDROCHLOROTHIAZIDE 12.5 MG: 12.5 TABLET ORAL at 08:03

## 2025-03-29 RX ADMIN — APIXABAN 5 MG: 5 TABLET, FILM COATED ORAL at 09:03

## 2025-03-29 RX ADMIN — APIXABAN 5 MG: 5 TABLET, FILM COATED ORAL at 08:03

## 2025-03-29 RX ADMIN — BRIMONIDINE TARTRATE 1 DROP: 1.5 SOLUTION OPHTHALMIC at 09:03

## 2025-03-29 RX ADMIN — GABAPENTIN 100 MG: 300 CAPSULE ORAL at 08:03

## 2025-03-29 RX ADMIN — AMOXICILLIN AND CLAVULANATE POTASSIUM 1 TABLET: 875; 125 TABLET, FILM COATED ORAL at 08:03

## 2025-03-29 RX ADMIN — ESCITALOPRAM OXALATE 20 MG: 10 TABLET ORAL at 08:03

## 2025-03-29 RX ADMIN — BRIMONIDINE TARTRATE 1 DROP: 1.5 SOLUTION OPHTHALMIC at 08:03

## 2025-03-29 RX ADMIN — GABAPENTIN 100 MG: 300 CAPSULE ORAL at 02:03

## 2025-03-29 RX ADMIN — SENNOSIDES AND DOCUSATE SODIUM 1 TABLET: 50; 8.6 TABLET ORAL at 08:03

## 2025-03-29 RX ADMIN — TIMOLOL MALEATE 1 DROP: 5 SOLUTION OPHTHALMIC at 09:03

## 2025-03-29 RX ADMIN — LEVOTHYROXINE SODIUM 100 MCG: 0.05 TABLET ORAL at 06:03

## 2025-03-29 RX ADMIN — POTASSIUM CHLORIDE 10 MEQ: 750 CAPSULE, EXTENDED RELEASE ORAL at 08:03

## 2025-03-29 RX ADMIN — DOXYCYCLINE HYCLATE 100 MG: 100 TABLET, COATED ORAL at 08:03

## 2025-03-29 RX ADMIN — CLOPIDOGREL 75 MG: 75 TABLET ORAL at 08:03

## 2025-03-29 RX ADMIN — ACETAMINOPHEN 650 MG: 500 TABLET ORAL at 06:03

## 2025-03-29 RX ADMIN — GABAPENTIN 100 MG: 300 CAPSULE ORAL at 09:03

## 2025-03-29 RX ADMIN — ACETAMINOPHEN 650 MG: 500 TABLET ORAL at 02:03

## 2025-03-29 RX ADMIN — ACETAMINOPHEN 650 MG: 500 TABLET ORAL at 09:03

## 2025-03-29 RX ADMIN — METOPROLOL SUCCINATE 25 MG: 25 TABLET, EXTENDED RELEASE ORAL at 09:03

## 2025-03-29 NOTE — PT/OT/SLP PROGRESS
"Occupational Therapy   Treatment    Name: Dorothy M Knobloch  MRN: [id number]  Admit Date: 3/26/2025  Admitting Diagnosis:  Acute on chronic congestive heart failure    General Precautions: Standard, fall   Orthopedic Precautions: RLE weight bearing as tolerated   Braces: N/A    Recommendations:     Discharge Recommendations:  home health OT  Level of Assistance Recommended at Discharge: 24 hours light assistance for ADL's and homemaking tasks  Discharge Equipment Recommendations: wheelchair, 3-in-1 commode  Barriers to discharge:       Assessment:     Dorothy M Knobloch is a 89 y.o. female with a medical diagnosis of Acute on chronic congestive heart failure .  She presents with good participation focusing on self care and functional transfers. She is making good progress sit <> stand and toilet transfer with BSC donned over toilet to raise height- mod assist with extra time and vc's for set-up using AE. Performance deficits affecting function are weakness, impaired endurance, decreased ROM, orthopedic precautions, impaired self care skills, impaired functional mobility, gait instability, impaired balance, decreased safety awareness, impaired skin, pain.   Pt would continue to benefit from additional skilled OT services to maximize independence potential, decrease risk of falls, and caregiver burden.     Rehab Potential is good    Activity tolerance:  Fair    Plan:     Patient to be seen 5 x/week to address the above listed problems via self-care/home management, therapeutic activities, therapeutic exercises    Plan of Care Expires: 04/27/25  Plan of Care Reviewed with: patient, daughter, son    Subjective     Communicated with: RN and PCT prior to session. Pt stated, " I'm better using the RW." .    Pain/Comfort:  Pain Rating 1: 0/10  Pain Rating Post-Intervention 1: 0/10    Patient's cultural, spiritual, Gnosticist conflicts given the current situation:  no    Objective:     Patient found supine with   upon OT entry " to room, daughter at bedside, her son arrived shorly after start of treatment session.     Bed Mobility:    Patient completed Supine to Sit with moderate assistance     Functional Mobility/Transfers:  Patient completed Sit <> Stand Transfer with moderate assistance  with  rolling walker   Patient completed Bed <> Chair Transfer using Step Transfer technique with moderate assistance with rolling walker  Patient completed Toilet Transfer Step Transfer technique with moderate assistance with  grab bars      Activities of Daily Living:  Grooming: supervision / set-up / vc's 2* poor vision  Lower Body Dressing: maximal assistance supine in bed, bridging up /c L LE and rolling to the left to pull up over right hip  Toileting: moderate assistance for managing clothing, pt completed posterior hygiene seated    [facility] 6 Click ADL: 15        Treatment & Education:  -Education on compensatory dressing techniques, functional transfers, energy conservation, and task modification to maximize safety and (I) during ADLs and mobility  -Education on importance of OOB activity to improve overall activity tolerance and promote recovery  -Pt educated to call for assistance and to transfer with hospital staff only  -Provided education regarding role of OT, POC, & discharge recommendations with pt  verbalizing understanding.  Pt had no further questions & when asked whether there were any concerns pt reported none.    Patient left up in chair with call button in reach, RN and PCT notified, and son present    GOALS:   Multidisciplinary Problems       Occupational Therapy Goals          Problem: Occupational Therapy    Goal Priority Disciplines Outcome Interventions   Occupational Therapy Goal     OT, PT/OT Progressing    Description: Goals to be met by: 4/27/2025     Patient will increase functional independence with ADLs by performing:    UE Dressing with Stand-by Assistance.  LE Dressing with Minimal Assistance- Ongoing  Toileting from  toilet with Minimal Assistance for hygiene and clothing management.   Bathing from  sitting at sink with Minimal Assistance.  Toilet transfer to toilet with Minimal Assistance.                         Time Tracking:     OT Date of Treatment: 03/29/25  OT Start Time: 1153    OT Stop Time: 1249  OT Total Time (min): 56 min    Billable Minutes:Self Care/Home Management 56    3/29/2025

## 2025-03-30 PROCEDURE — 25000003 PHARM REV CODE 250: Performed by: NURSE PRACTITIONER

## 2025-03-30 PROCEDURE — 25000003 PHARM REV CODE 250: Performed by: HOSPITALIST

## 2025-03-30 PROCEDURE — 11000004 HC SNF PRIVATE

## 2025-03-30 PROCEDURE — 94761 N-INVAS EAR/PLS OXIMETRY MLT: CPT

## 2025-03-30 RX ADMIN — TIMOLOL MALEATE 1 DROP: 5 SOLUTION OPHTHALMIC at 09:03

## 2025-03-30 RX ADMIN — ACETAMINOPHEN 650 MG: 500 TABLET ORAL at 05:03

## 2025-03-30 RX ADMIN — LEVOTHYROXINE SODIUM 100 MCG: 0.05 TABLET ORAL at 05:03

## 2025-03-30 RX ADMIN — GABAPENTIN 100 MG: 300 CAPSULE ORAL at 09:03

## 2025-03-30 RX ADMIN — POTASSIUM CHLORIDE 10 MEQ: 750 CAPSULE, EXTENDED RELEASE ORAL at 09:03

## 2025-03-30 RX ADMIN — BUMETANIDE 1 MG: 1 TABLET ORAL at 09:03

## 2025-03-30 RX ADMIN — ACETAMINOPHEN 650 MG: 500 TABLET ORAL at 02:03

## 2025-03-30 RX ADMIN — HYDROCHLOROTHIAZIDE 12.5 MG: 12.5 TABLET ORAL at 09:03

## 2025-03-30 RX ADMIN — APIXABAN 5 MG: 5 TABLET, FILM COATED ORAL at 09:03

## 2025-03-30 RX ADMIN — ACETAMINOPHEN 650 MG: 500 TABLET ORAL at 09:03

## 2025-03-30 RX ADMIN — ESCITALOPRAM OXALATE 20 MG: 10 TABLET ORAL at 09:03

## 2025-03-30 RX ADMIN — GABAPENTIN 100 MG: 300 CAPSULE ORAL at 02:03

## 2025-03-30 RX ADMIN — CLOPIDOGREL 75 MG: 75 TABLET ORAL at 09:03

## 2025-03-30 RX ADMIN — BRIMONIDINE TARTRATE 1 DROP: 1.5 SOLUTION OPHTHALMIC at 09:03

## 2025-03-30 RX ADMIN — METOPROLOL SUCCINATE 25 MG: 25 TABLET, EXTENDED RELEASE ORAL at 09:03

## 2025-03-30 NOTE — PLAN OF CARE
Problem: Fall Injury Risk  Goal: Absence of Fall and Fall-Related Injury  Intervention: Promote Injury-Free Environment  Flowsheets (Taken 3/30/2025 4601)  Safety Promotion/Fall Prevention: assistive device/personal item within reach

## 2025-03-31 LAB
ABSOLUTE EOSINOPHIL (OHS): 0.24 K/UL
ABSOLUTE MONOCYTE (OHS): 0.66 K/UL (ref 0.3–1)
ABSOLUTE NEUTROPHIL COUNT (OHS): 3.92 K/UL (ref 1.8–7.7)
ANION GAP (OHS): 9 MMOL/L (ref 8–16)
BASOPHILS # BLD AUTO: 0.08 K/UL
BASOPHILS NFR BLD AUTO: 1.2 %
BUN SERPL-MCNC: 28 MG/DL (ref 10–30)
CALCIUM SERPL-MCNC: 9.3 MG/DL (ref 8.7–10.5)
CHLORIDE SERPL-SCNC: 95 MMOL/L (ref 95–110)
CO2 SERPL-SCNC: 29 MMOL/L (ref 23–29)
CREAT SERPL-MCNC: 0.6 MG/DL (ref 0.5–1.4)
ERYTHROCYTE [DISTWIDTH] IN BLOOD BY AUTOMATED COUNT: 16.1 % (ref 11.5–14.5)
GFR SERPLBLD CREATININE-BSD FMLA CKD-EPI: >60 ML/MIN/1.73/M2
GLUCOSE SERPL-MCNC: 83 MG/DL (ref 70–110)
HCT VFR BLD AUTO: 27.5 % (ref 37–48.5)
HGB BLD-MCNC: 8.8 GM/DL (ref 12–16)
IMM GRANULOCYTES # BLD AUTO: 0.15 K/UL (ref 0–0.04)
IMM GRANULOCYTES NFR BLD AUTO: 2.3 % (ref 0–0.5)
LYMPHOCYTES # BLD AUTO: 1.5 K/UL (ref 1–4.8)
MAGNESIUM SERPL-MCNC: 2 MG/DL (ref 1.6–2.6)
MCH RBC QN AUTO: 32.1 PG (ref 27–50)
MCHC RBC AUTO-ENTMCNC: 32 G/DL (ref 32–36)
MCV RBC AUTO: 100 FL (ref 82–98)
NUCLEATED RBC (/100WBC) (OHS): 0 /100 WBC
PHOSPHATE SERPL-MCNC: 3 MG/DL (ref 2.7–4.5)
PLATELET # BLD AUTO: 325 K/UL (ref 150–450)
PMV BLD AUTO: 9.2 FL (ref 9.2–12.9)
POTASSIUM SERPL-SCNC: 3.3 MMOL/L (ref 3.5–5.1)
RBC # BLD AUTO: 2.74 M/UL (ref 4–5.4)
RELATIVE EOSINOPHIL (OHS): 3.7 %
RELATIVE LYMPHOCYTE (OHS): 22.9 % (ref 18–48)
RELATIVE MONOCYTE (OHS): 10.1 % (ref 4–15)
RELATIVE NEUTROPHIL (OHS): 59.8 % (ref 38–73)
SODIUM SERPL-SCNC: 133 MMOL/L (ref 136–145)
WBC # BLD AUTO: 6.55 K/UL (ref 3.9–12.7)

## 2025-03-31 PROCEDURE — 97110 THERAPEUTIC EXERCISES: CPT

## 2025-03-31 PROCEDURE — 25000003 PHARM REV CODE 250: Performed by: HOSPITALIST

## 2025-03-31 PROCEDURE — 83735 ASSAY OF MAGNESIUM: CPT | Performed by: HOSPITALIST

## 2025-03-31 PROCEDURE — 97535 SELF CARE MNGMENT TRAINING: CPT

## 2025-03-31 PROCEDURE — 99306 1ST NF CARE HIGH MDM 50: CPT | Mod: NSCH,,, | Performed by: HOSPITALIST

## 2025-03-31 PROCEDURE — 84100 ASSAY OF PHOSPHORUS: CPT | Performed by: HOSPITALIST

## 2025-03-31 PROCEDURE — 84295 ASSAY OF SERUM SODIUM: CPT | Performed by: HOSPITALIST

## 2025-03-31 PROCEDURE — 36415 COLL VENOUS BLD VENIPUNCTURE: CPT | Performed by: HOSPITALIST

## 2025-03-31 PROCEDURE — 11000004 HC SNF PRIVATE

## 2025-03-31 PROCEDURE — 25000003 PHARM REV CODE 250: Performed by: NURSE PRACTITIONER

## 2025-03-31 PROCEDURE — 97116 GAIT TRAINING THERAPY: CPT

## 2025-03-31 PROCEDURE — 85025 COMPLETE CBC W/AUTO DIFF WBC: CPT | Performed by: HOSPITALIST

## 2025-03-31 PROCEDURE — 97530 THERAPEUTIC ACTIVITIES: CPT

## 2025-03-31 RX ORDER — POTASSIUM CHLORIDE 20 MEQ/1
40 TABLET, EXTENDED RELEASE ORAL ONCE
Status: COMPLETED | OUTPATIENT
Start: 2025-03-31 | End: 2025-03-31

## 2025-03-31 RX ORDER — POTASSIUM CHLORIDE 20 MEQ/1
20 TABLET, EXTENDED RELEASE ORAL DAILY
Status: DISCONTINUED | OUTPATIENT
Start: 2025-04-01 | End: 2025-04-23 | Stop reason: HOSPADM

## 2025-03-31 RX ORDER — ERYTHROMYCIN 5 MG/G
OINTMENT OPHTHALMIC NIGHTLY
Status: COMPLETED | OUTPATIENT
Start: 2025-04-01 | End: 2025-04-07

## 2025-03-31 RX ORDER — METOPROLOL SUCCINATE 25 MG/1
25 TABLET, EXTENDED RELEASE ORAL 2 TIMES DAILY
Status: DISCONTINUED | OUTPATIENT
Start: 2025-03-31 | End: 2025-04-23 | Stop reason: HOSPADM

## 2025-03-31 RX ADMIN — HYDROCHLOROTHIAZIDE 12.5 MG: 12.5 TABLET ORAL at 08:03

## 2025-03-31 RX ADMIN — TIMOLOL MALEATE 1 DROP: 5 SOLUTION OPHTHALMIC at 08:03

## 2025-03-31 RX ADMIN — ESCITALOPRAM OXALATE 20 MG: 10 TABLET ORAL at 08:03

## 2025-03-31 RX ADMIN — CLOPIDOGREL 75 MG: 75 TABLET ORAL at 08:03

## 2025-03-31 RX ADMIN — GABAPENTIN 100 MG: 300 CAPSULE ORAL at 09:03

## 2025-03-31 RX ADMIN — SENNOSIDES AND DOCUSATE SODIUM 1 TABLET: 50; 8.6 TABLET ORAL at 08:03

## 2025-03-31 RX ADMIN — SENNOSIDES AND DOCUSATE SODIUM 1 TABLET: 50; 8.6 TABLET ORAL at 09:03

## 2025-03-31 RX ADMIN — LEVOTHYROXINE SODIUM 100 MCG: 0.05 TABLET ORAL at 06:03

## 2025-03-31 RX ADMIN — APIXABAN 5 MG: 5 TABLET, FILM COATED ORAL at 09:03

## 2025-03-31 RX ADMIN — ACETAMINOPHEN 650 MG: 500 TABLET ORAL at 01:03

## 2025-03-31 RX ADMIN — TIMOLOL MALEATE 1 DROP: 5 SOLUTION OPHTHALMIC at 09:03

## 2025-03-31 RX ADMIN — GABAPENTIN 100 MG: 300 CAPSULE ORAL at 03:03

## 2025-03-31 RX ADMIN — ACETAMINOPHEN 650 MG: 500 TABLET ORAL at 09:03

## 2025-03-31 RX ADMIN — BRIMONIDINE TARTRATE 1 DROP: 1.5 SOLUTION OPHTHALMIC at 09:03

## 2025-03-31 RX ADMIN — POTASSIUM CHLORIDE 10 MEQ: 750 CAPSULE, EXTENDED RELEASE ORAL at 08:03

## 2025-03-31 RX ADMIN — BRIMONIDINE TARTRATE 1 DROP: 1.5 SOLUTION OPHTHALMIC at 08:03

## 2025-03-31 RX ADMIN — ACETAMINOPHEN 650 MG: 500 TABLET ORAL at 06:03

## 2025-03-31 RX ADMIN — POTASSIUM CHLORIDE 40 MEQ: 1500 TABLET, EXTENDED RELEASE ORAL at 01:03

## 2025-03-31 RX ADMIN — APIXABAN 5 MG: 5 TABLET, FILM COATED ORAL at 08:03

## 2025-03-31 RX ADMIN — GABAPENTIN 100 MG: 300 CAPSULE ORAL at 08:03

## 2025-03-31 RX ADMIN — BUMETANIDE 1 MG: 1 TABLET ORAL at 08:03

## 2025-03-31 NOTE — PT/OT/SLP PROGRESS
Physical Therapy Treatment    Patient Name:  Dorothy M Knobloch   MRN:  [id number]  Admit Date: 3/26/2025  Admitting Diagnosis: Acute on chronic congestive heart failure  Recent Surgeries: OPEN REDUCTION INTERNAL FIXATION FEMORAL SHAFT FRACTURE (Right)     General Precautions: Standard, fall  Orthopedic Precautions: RLE weight bearing as tolerated  Braces: N/A    Recommendations:     Discharge Recommendations: home health PT  Level of Assistance Recommended at Discharge: 24 hours significant assistance  Discharge Equipment Recommendations: wheelchair  Barriers to discharge: Other (Comment), Decreased caregiver support (Increased assistance for mobility.)    Assessment:     Dorothy M Knobloch is a 89 y.o. female admitted with a medical diagnosis of Acute on chronic congestive heart failure. Pt with good participation in PT this morning despite fatigue from earlier OT session. She was able to progress ambulation distance to 30' with RW and Judith progressing to CGA with close w/c follow. She required modA for sit<>stand from w/c today. Pt was unable to make full rotations when attempting seated LE bike due to limitations in R knee ROM. Pt is motivated to continue to progress towards goals and remains appropriate for skilled rehab services.     Performance deficits affecting function: weakness, impaired endurance, impaired self care skills, impaired functional mobility, gait instability, impaired balance, visual deficits, decreased lower extremity function, decreased safety awareness, pain, decreased ROM, impaired cardiopulmonary response to activity, orthopedic precautions.    Rehab Potential is good    Activity Tolerance: Good    Plan:     Patient to be seen 5 x/week to address the above listed problems via gait training, therapeutic activities, therapeutic exercises, neuromuscular re-education, wheelchair management/training    Plan of Care Expires: 04/26/25  Plan of Care Reviewed with: patient, daughter    Subjective  "    "I just have to get better. I am putting such a strain on my children right now."     Pain/Comfort:  Pain Rating 1: 310  Location - Side 1: Bilateral  Location - Orientation 1: generalized  Location 1: shoulder  Pain Addressed 1: Reposition, Distraction  Pain Rating Post-Intervention 1: 3/10    Patient's cultural, spiritual, Yarsanism conflicts given the current situation:  no    Objective:     Communicated with OT prior to session.  Patient found up in chair with  (no active lines) upon PT entry to room.     Therapeutic Activities and Exercises:   -seated marches 3x10 reps BLE (AAROM on R to move through full range)  -attempted LE bike however pt was unable to make full rotations due to limitations in ROM/pain in R knee  -seated heel slides/hamstring curl with towel 2x20 reps RLE only     Functional Mobility:    Transfers:     Sit to Stand:  moderate assistance with rolling walker from w/c     Gait: Pt ambulates 30' with RW and Judith progressing to CGA with close w/c follow. Shoe lift worn on R. Decreased R step length, decreased gait speed, forward flexed posture, mildly antalgic pattern.     AM-PAC 6 CLICK MOBILITY  14    Patient left up in chair with call button in reach and daughter present.    GOALS:   Multidisciplinary Problems       Physical Therapy Goals          Problem: Physical Therapy    Goal Priority Disciplines Outcome Interventions   Physical Therapy Goal     PT, PT/OT     Description: Goals to be met by: 25     Patient will increase functional independence with mobility by performin. Supine to sit with Contact Guard Assistance  2. Sit to supine with Contact Guard Assistance  3. Rolling to Left and Right with Contact Guard Assistance  4. Sit to stand transfer with Contact Guard Assistance  5. Bed to chair transfer with Contact Guard Assistance using Rolling Walker  6. Gait  x 50 feet (progress distance as tolerated) with Contact Guard Assistance using Rolling Walker  7. Wheelchair " propulsion x 50 feet (progress distance as tolerated) with Supervision using bilateral upper extremities                         Time Tracking:     PT Received On: 03/31/25  PT Start Time: 1105  PT Stop Time: 1146  PT Total Time (min): 41 min    Billable Minutes: Gait Training 17 min and Therapeutic Exercise 24 min    Treatment Type: Treatment  PT/PTA: PT     Number of PTA visits since last PT visit: 0     03/31/2025

## 2025-03-31 NOTE — PT/OT/SLP PROGRESS
Occupational Therapy   Treatment    Name: Dorothy M Knobloch  MRN: [id number]  Admit Date: 3/26/2025  Admitting Diagnosis:  Acute on chronic congestive heart failure    General Precautions: Standard, fall   Orthopedic Precautions: RLE weight bearing as tolerated   Braces: N/A    Recommendations:     Discharge Recommendations:  home health OT  Level of Assistance Recommended at Discharge: 24 hours physical assistance for all ADL's and home management tasks  Discharge Equipment Recommendations: wheelchair, 3-in-1 commode  Barriers to discharge:       Assessment:     Dorothy M Knobloch is a 89 y.o. female with a medical diagnosis of Acute on chronic congestive heart failure .  She presents with performance deficits affecting function are weakness, impaired endurance, decreased ROM, orthopedic precautions, impaired self care skills, impaired functional mobility, gait instability, impaired balance, decreased safety awareness, impaired skin, pain.   Pt tolerated Tx without incident and is making progress but continues to require assist to perform self care tasks, functional mobility and functional transfers .  She would continue to benefit from OT intervention to further her functional (I)ce and safety.     Rehab Potential is good    Activity tolerance:  Good    Plan:     Patient to be seen 5 x/week to address the above listed problems via self-care/home management, therapeutic activities, therapeutic exercises    Plan of Care Expires: 04/27/25  Plan of Care Reviewed with: patient, daughter, son    Subjective     Communicated with: nurse prior to session.  .    Pain/Comfort:  Pain Rating 1: 0/10  Pain Rating Post-Intervention 1: 0/10    Patient's cultural, spiritual, Yarsani conflicts given the current situation:  no    Objective:     Patient found up in chair with  (No active lines) upon OT entry to room.    Bed Mobility:     Pt seated in W/C at onset of therapy session.     Functional Mobility/Transfers:  Patient  completed Sit <> Stand Transfer with moderate assistance  with  rolling walker . Performed 3 trials.  Patient completed Bed <> Chair Transfer using Step Transfer technique with moderate assistance to transition from sitting to standing and Min (A) when stepping.   Functional Mobility: Patient propelled light weight W/C from her room toward Nurse's station using (B) UEs a distance of 77 ft with increased time required.     Pt worked on functional standing activity consisting of standing with RW while reaching in all planes and crossing of midline increase functional standing tolerance and to facilitate (B) wt shifting and stability in standing in prep for performance of self care tasks and functional ADLS in standing.  Pt tolerated up to 6 Min. in standing with SBA  and RW to steady.      Activities of Daily Living:  Lower Body Dressing: moderate assistance with (A) to thread (R) LE into pant leg, to steady when standing with RW and to pull pants up in back.   Pt able to doff slip on shoes but needing (A) to start feet into shoes to don.     [facility] 6 Click ADL: 15    OT Exercises: Pt performed UBE exercise for 10 minutes with Min  resistance.  UE exercises performed to increase functional endurance and strength in order increase independence when performing self care tasks, functional ambulation, W/C propulsion, and functional standing activities .     Treatment & Education:  Pt edu on POC, safety when performing self care tasks , safety when performing functional transfers and functional mobility.  - White board updated  - Self care tasks completed-- as noted above      Patient left up in chair with call button in reach and daughter  present    GOALS:   Multidisciplinary Problems       Occupational Therapy Goals          Problem: Occupational Therapy    Goal Priority Disciplines Outcome Interventions   Occupational Therapy Goal     OT, PT/OT Progressing    Description: Goals to be met by: 4/27/2025     Patient will  increase functional independence with ADLs by performing:    UE Dressing with Stand-by Assistance.  LE Dressing with Minimal Assistance- Ongoing  Toileting from toilet with Minimal Assistance for hygiene and clothing management.   Bathing from  sitting at sink with Minimal Assistance.  Toilet transfer to toilet with Minimal Assistance.                         Time Tracking:     OT Date of Treatment: 03/31/25  OT Start Time: 1012    OT Stop Time: 1106  OT Total Time (min): 54 min    Billable Minutes:Self Care/Home Management 29  Therapeutic Activity 15  Therapeutic Exercise 10    3/31/2025

## 2025-03-31 NOTE — PLAN OF CARE
Problem: Fall Injury Risk  Goal: Absence of Fall and Fall-Related Injury  Outcome: Progressing  Intervention: Promote Injury-Free Environment  Flowsheets (Taken 3/31/2025 1662)  Safety Promotion/Fall Prevention: assistive device/personal item within reach

## 2025-03-31 NOTE — PROGRESS NOTES
[facility]                                  Skilled Nursing Facility                   Progress Note     Admit Date: 3/26/2025  ISH   [id number]  Principal Problem:  Acute on chronic congestive heart failure   HPI obtained from patient interview and chart review     Chief Complaint: Re-evaluation of medical treatment and therapy status: Lab review     HPI:   Mrs. Knobloch is a 89 year old female PMHx of PAF on Apixiban, HTN, chronic diastolic HF, cardiac pacemaker due to sick sinus syndrome, CVA and hypothyroidism who presents to SNF following hospitalization for PNA.  Patient with previous SNF admission on 03/13 for Right periprosthetic femoral shaft fracture below a total hip replacement s/p ORIF on 3/8/25 with Dr. William.  Admission to SNF for secondary weakness and debility    Interval history: All of today's labs reviewed and are listed below.  K3.3, Na 133.  24 hr vital sign ranges reviewed and listed below. HRs borderline low.  SpO2 is 96% on room air.  Weight with increase on charting but some discrepancy noted in the recordings.  Patient denies shortness of breath, abdominal discomfort, nausea, or vomiting.  Patient reports an adequate appetite.  Patient denies dysuria.  Patient reports having regular bowel movements.  Patient progessing with PT/OT- Pt ambulates 30' with RW and Judith progressing to CGA with close w/c follow. Shoe lift worn on R. Decreased R step length, decreased gait speed, forward flexed posture, mildly antalgic pattern. Continuing to follow and treat all acute and chronic conditions.    Past Medical History: Patient has a past medical history of Acquired hypothyroidism (12/13/2013), Age-related physical debility (04/05/2024), Antiplatelet or antithrombotic long-term use, AV block, 3rd degree (03/19/2019), Bilateral nonexudative age-related macular degeneration (08/27/2013), Blindness and low vision  (08/10/2018), Blood transfusion, Cardiac pacemaker (08/22/2018), Carotid artery disease (08/10/2016), Chronic diastolic congestive heart failure (11/24/2019), CRAO (central retinal artery occlusion), left (01/29/2018), Dry eye syndrome of both eyes (08/10/2018), Essential hypertension (12/13/2013), History of TIA (transient ischemic attack) (12/13/2013), Nonexudative age-related macular degeneration, bilateral, intermediate dry stage (07/12/2018), Nuclear sclerotic cataract of left eye (08/10/2018), 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    Past Surgical History: Patient has a past surgical history that includes Tonsillectomy; Joint replacement; Skin biopsy; Total hip arthroplasty (11/01/2000); Cardiac pacemaker placement; Cataract extraction (Right); pr transcran dopp intracran, emboli w/o inj (01/29/2018); Carpal tunnel release (Bilateral, 1991); and ORIF femur fracture (Right, 3/8/2025).    Social History: Patient reports that she has never smoked. She has never been exposed to tobacco smoke. She has never used smokeless tobacco. She reports that she does not drink alcohol and does not use drugs.    Family History: family history includes Arthritis in her maternal grandmother, paternal grandmother, and sister; Birth defects in her son; Cancer (age of onset: 80) in her paternal aunt; Diabetes in her paternal aunt and paternal uncle; Early death in her father and mother; Hearing loss in her daughter; Hypertension in her paternal grandmother and son.    Allergies: Patient is allergic to aspartame.    ROS  Constitutional: Negative for fever  "  Eyes: Negative for blurred vision, double vision   Respiratory: Negative for cough, shortness of breath   Cardiovascular: Negative for chest pain, palpitations, and leg swelling.   Gastrointestinal: Negative for abdominal pain, constipation, diarrhea, nausea, vomiting.   Genitourinary: Negative for dysuria, frequency   Musculoskeletal:  + generalized weakness. Negative for back pain and myalgias.   Skin: Negative for itching and rash.   Neurological: Negative for dizziness, headaches.   Psychiatric/Behavioral: Negative for depression. The patient is not nervous/anxious.      24 hour Vital Sign Range   Temp:  [97.7 °F (36.5 °C)-98 °F (36.7 °C)]   Pulse:  [60-65]   Resp:  [16-18]   BP: (130-132)/(56-63)   SpO2:  [94 %-96 %]     Current BMI: Body mass index is 28.23 kg/m².    PEx  Constitutional: Patient appears debilitated.  No distress noted  HENT:   Head: Normocephalic and atraumatic.   Eyes: Pupils are equal, round  Neck: Normal range of motion. Neck supple.   Cardiovascular: Normal rate, regular rhythm and normal heart sounds.    Pulmonary/Chest: Effort normal and breath sounds are clear  Abdominal: Soft. Bowel sounds are normal.   Musculoskeletal: Normal range of motion.   Neurological: Alert and oriented to person, place, and time.   Psychiatric: Normal mood and affect. Behavior is normal.   Skin: Skin is warm and dry.     Recent Labs   Lab 03/31/25  0433   GLUCOSE 83   *   K 3.3*   CL 95   CO2 29   BUN 28   CREATININE 0.6   CALCIUM 9.3   MG 2.0     Recent Labs   Lab 03/31/25  0433   WBC 6.55   RBC 2.74*   HGB 8.8*   HCT 27.5*      *   MCH 32.1   MCHC 32.0     No results for input(s): "POCTGLUCOSE" in the last 168 hours.     Assessment and Plan:    Acute on chronic congestive heart failure  Aortic stenosis  Pulmonary hypertension  - patient presented back to [facility] ED on 03/20 with decompensation, BNP was 1,625  - diuresed with IV Lasix  - most recent echo reviewed from 3/21/25, EF 65-70%, " severely dilated left atrium, mod to severe AS, PASP 44 mmHg  - monitor I&Os, daily weights  - goal is euvolemia given aortic stenosis  - continue Bumex 1 mg daily (increased from previous home dose of 0.5), HCTZ 12.5 mg daily, metoprolol XL 25 daily    Pneumonia  Acute hypoxic respiratory failure  - patient admitted back to ED on 03/20 with suspected pneumonia  - initially treated with IV Vanc/Zosyn  -  Abx deescalated to PO, continue Augmentin and doxycycline x3 more days, ending on 03/29  - patient has been successfully weaned to room air  - continue IS, OOB    Periprosthetic fracture of shaft of right femur   s/p ORIF on 3/8/2025  - PT/OT, RLE WBAT  - DVT PPX with apixaban  - Monitor and report drainage to incisional site  - Fall precautions  - Bowel regimen in place, hold for loose or frequent stools  - Ortho NEETU to see patient intermittently at SNF  - follow-up with orthopedics after discharge    Acute postoperative pain  - initiated acetaminophen 650 mg q.8 hours, gabapentin 100 mg TID, continue tramadol 50 mg q.6 hours PRN, methocarbamol 500 mg q.6 hours PRN    Essential hypertension  - continue amlodipine 5 mg daily, Bumex 1 mg daily, HCTZ 12.5 mg daily, metoprolol XL 50 mg daily, 25 mg qHS.    Cardiac pacemaker  - placed for sick sinus syndrome    Bradycardia  Paroxysmal atrial fibrillation  - home metoprolol XL 50 mg daily, 25 mg qHS  - continue apixaban 5 mg BID, reducing metoprolol 25 mg BID at this time    Acquired hypothyroidism  - TSH elevated at 22.581, Free T4 0.83  - increased levothyroxine to 100 mcg daily  - will need TSH recheck as outpatient in 6 weeks- will inform PCP prior to discharge     Moderate protein-calorie malnutrition  - RD following, appreciate recommendations  - continue supplements as ordered     Acute blood loss anemia  Anemia of chronic disease  - patient received 2 unit RBC transfusion postoperatively  - transfuse hemoglobin < 7  - improving, continue monitor twice weekly  CBC    Hypokalemia  - initiated KCl 40 mEq once, increase daily replacement to 20 mEq    HX of CVA  - 4/5/2024 MRI showed acute left thalamus and hippocampus infarcts   - Not on statin due to prior intolerance (lower extremity myalgias). Reportedly has tried Repatha in the past and had similar reaction. Also had reaction to Praluent and Zetia     Carotid Artery disease    - continue home Plavix 75 mg daily     Depression, moderate, reoccurring  - continue home escitalopram 20 mg daily    Macular degeneration  Decreased vision  Hx of CRAO  Dry eyes  - continue home eye drops    Debility   - Continue with PT/OT for gait training and strengthening and restoration of ADL's   - Encourage mobility, OOB in chair, and early ambulation as appropriate  - Fall precautions   - Monitor for bowel and bladder dysfunction  - Monitor for and prevent skin breakdown and pressure ulcers  - Continue DVT prophylaxis with apixaban         Anticipate disposition:  Home with home health    IP OHS RISK OF UNPLANNED READMISSION Model: HIGH     Follow-up needed during SNF stay-    Follow-up needed after discharge from SNF: PCP, orthopedics, cardiology     No future appointments.      I certify that SNF services are required to be given on an inpatient basis because Carol TEE Knobloch needs for skilled nursing care and/or skilled rehabilitation are required on a daily basis and such services can only practically be provided in a skilled nursing facility setting and are for an ongoing condition for which she received inpatient care in the hospital.     I spent 103 minutes reviewing patient records, examining, and counseling the patient/ patient's family with greater than 50% of the time spent in direct patient care and coordination.  Review of outpatient clinic records    Angeles Aguero NP  Department of Hospital Medicine   [facility]     DOS: 3/31/2025       Patient note was created using MModal Dictation.   Any errors in syntax or even information may not have been identified and edited on initial review prior to signing this note.

## 2025-04-01 PROCEDURE — 25000003 PHARM REV CODE 250: Performed by: HOSPITALIST

## 2025-04-01 PROCEDURE — 97535 SELF CARE MNGMENT TRAINING: CPT

## 2025-04-01 PROCEDURE — 97530 THERAPEUTIC ACTIVITIES: CPT | Mod: CQ

## 2025-04-01 PROCEDURE — 11000004 HC SNF PRIVATE

## 2025-04-01 PROCEDURE — 97530 THERAPEUTIC ACTIVITIES: CPT

## 2025-04-01 PROCEDURE — 97116 GAIT TRAINING THERAPY: CPT | Mod: CQ

## 2025-04-01 PROCEDURE — 25000003 PHARM REV CODE 250: Performed by: NURSE PRACTITIONER

## 2025-04-01 PROCEDURE — 97110 THERAPEUTIC EXERCISES: CPT | Mod: CQ

## 2025-04-01 RX ADMIN — BRIMONIDINE TARTRATE 1 DROP: 1.5 SOLUTION OPHTHALMIC at 09:04

## 2025-04-01 RX ADMIN — SENNOSIDES AND DOCUSATE SODIUM 1 TABLET: 50; 8.6 TABLET ORAL at 09:04

## 2025-04-01 RX ADMIN — METOPROLOL SUCCINATE 25 MG: 25 TABLET, EXTENDED RELEASE ORAL at 08:04

## 2025-04-01 RX ADMIN — ACETAMINOPHEN 650 MG: 500 TABLET ORAL at 05:04

## 2025-04-01 RX ADMIN — APIXABAN 5 MG: 5 TABLET, FILM COATED ORAL at 08:04

## 2025-04-01 RX ADMIN — ACETAMINOPHEN 650 MG: 500 TABLET ORAL at 09:04

## 2025-04-01 RX ADMIN — ERYTHROMYCIN: 5 OINTMENT OPHTHALMIC at 08:04

## 2025-04-01 RX ADMIN — HYDROCHLOROTHIAZIDE 12.5 MG: 12.5 TABLET ORAL at 09:04

## 2025-04-01 RX ADMIN — LEVOTHYROXINE SODIUM 100 MCG: 0.05 TABLET ORAL at 05:04

## 2025-04-01 RX ADMIN — ESCITALOPRAM OXALATE 20 MG: 10 TABLET ORAL at 09:04

## 2025-04-01 RX ADMIN — SENNOSIDES AND DOCUSATE SODIUM 1 TABLET: 50; 8.6 TABLET ORAL at 08:04

## 2025-04-01 RX ADMIN — GABAPENTIN 100 MG: 300 CAPSULE ORAL at 09:04

## 2025-04-01 RX ADMIN — BRIMONIDINE TARTRATE 1 DROP: 1.5 SOLUTION OPHTHALMIC at 08:04

## 2025-04-01 RX ADMIN — BUMETANIDE 1 MG: 1 TABLET ORAL at 09:04

## 2025-04-01 RX ADMIN — APIXABAN 5 MG: 5 TABLET, FILM COATED ORAL at 09:04

## 2025-04-01 RX ADMIN — TIMOLOL MALEATE 1 DROP: 5 SOLUTION OPHTHALMIC at 08:04

## 2025-04-01 RX ADMIN — GABAPENTIN 100 MG: 300 CAPSULE ORAL at 08:04

## 2025-04-01 RX ADMIN — POTASSIUM CHLORIDE 20 MEQ: 1500 TABLET, EXTENDED RELEASE ORAL at 09:04

## 2025-04-01 RX ADMIN — ACETAMINOPHEN 650 MG: 500 TABLET ORAL at 02:04

## 2025-04-01 RX ADMIN — CLOPIDOGREL 75 MG: 75 TABLET ORAL at 09:04

## 2025-04-01 RX ADMIN — GABAPENTIN 100 MG: 300 CAPSULE ORAL at 02:04

## 2025-04-01 RX ADMIN — TIMOLOL MALEATE 1 DROP: 5 SOLUTION OPHTHALMIC at 09:04

## 2025-04-01 RX ADMIN — METOPROLOL SUCCINATE 25 MG: 25 TABLET, EXTENDED RELEASE ORAL at 09:04

## 2025-04-01 NOTE — PT/OT/SLP PROGRESS
"Physical Therapy Treatment    Patient Name:  Dorothy M Knobloch   MRN:  [id number]  Admit Date: 3/26/2025  Admitting Diagnosis: Acute on chronic congestive heart failure  Recent Surgeries:     General Precautions: Standard, fall  Orthopedic Precautions: RLE weight bearing as tolerated  Braces: N/A    Recommendations:     Discharge Recommendations: home health PT  Level of Assistance Recommended at Discharge: 24 hours significant assistance  Discharge Equipment Recommendations: wheelchair  Barriers to discharge: Other (Comment), Decreased caregiver support (Increased assistance for mobility.)    Assessment:     Dorothy M Knobloch is a 89 y.o. female admitted with a medical diagnosis of Acute on chronic congestive heart failure . Pt tolerated well, pt is very pleasant, demo good effort, pt would continue to benefit from skilled PT services to improve overall functional mobility, strength and endurance.  .      Performance deficits affecting function: weakness, impaired endurance, impaired self care skills, impaired functional mobility, gait instability, impaired balance, visual deficits, decreased lower extremity function, decreased safety awareness, pain, decreased ROM, impaired cardiopulmonary response to activity, orthopedic precautions.    Rehab Potential is good    Activity Tolerance: Fair    Plan:     Patient to be seen 5 x/week to address the above listed problems via gait training, therapeutic activities, therapeutic exercises, neuromuscular re-education, wheelchair management/training    Plan of Care Expires: 04/26/25  Plan of Care Reviewed with: patient, daughter    Subjective     "Pretty good" pt agreeable to therapy.     Pain/Comfort:  Pain Rating 1: 3/10 (2-3 with gait, 0 at rest)  Location - Orientation 1: generalized  Location 1: hip  Pain Addressed 1: Pre-medicate for activity, Reposition, Distraction, Cessation of Activity, Nurse notified ("tylenol")  Pain Rating Post-Intervention 1: 3/10 (inc with " "mobility, dec with rest)    Patient's cultural, spiritual, Quaker conflicts given the current situation:  no    Objective:     .  Patient found  with  (in wc) upon PT entry to room.     Therapeutic Activities and Exercises: 3x10 reps AP,LAQ,hip flex, A/AA with RLE as needed, thigh support for LAQ rest breaks    Functional Mobility:  Transfers:     Sit to Stand:  moderate assistance with rolling walker and vcs for tech  Gait: amb with RW min/CGA vcs for sequencing steps inc BUE support on RW to off load LEs 34 ft few standing rest breaks along the way wc follow, " I want to go farther" slow miroslava step to gait pattern    AM-PAC 6 CLICK MOBILITY  13    Patient left up in chair with call button in reach and belongings in reach .    GOALS:   Multidisciplinary Problems       Physical Therapy Goals          Problem: Physical Therapy    Goal Priority Disciplines Outcome Interventions   Physical Therapy Goal     PT, PT/OT     Description: Goals to be met by: 25     Patient will increase functional independence with mobility by performin. Supine to sit with Contact Guard Assistance  2. Sit to supine with Contact Guard Assistance  3. Rolling to Left and Right with Contact Guard Assistance  4. Sit to stand transfer with Contact Guard Assistance  5. Bed to chair transfer with Contact Guard Assistance using Rolling Walker  6. Gait  x 50 feet (progress distance as tolerated) with Contact Guard Assistance using Rolling Walker  7. Wheelchair propulsion x 50 feet (progress distance as tolerated) with Supervision using bilateral upper extremities                         Time Tracking:     PT Received On: 25  PT Start Time: 1039  PT Stop Time: 1129  PT Total Time (min): 50 min    Billable Minutes: Gait Training 15, Therapeutic Activity 10, and Therapeutic Exercise 25    Treatment Type: Treatment  PT/PTA: PTA     Number of PTA visits since last PT visit: 2025  "

## 2025-04-01 NOTE — PROGRESS NOTES
"                                                        [facility]                                  Skilled Nursing Facility                   Progress Note     Admit Date: 3/26/2025  ISH 4/16/2025  [id number]  Principal Problem:  Acute on chronic congestive heart failure   HPI obtained from patient interview and chart review     Chief Complaint: Re-evaluation of medical treatment and therapy status    HPI:   Mrs. Knobloch is a 89 year old female PMHx of PAF on Apixiban, HTN, chronic diastolic HF, cardiac pacemaker due to sick sinus syndrome, CVA and hypothyroidism who presents to SNF following hospitalization for PNA.  Patient with previous SNF admission on 03/13 for Right periprosthetic femoral shaft fracture below a total hip replacement s/p ORIF on 3/8/25 with Dr. William.  Admission to SNF for secondary weakness and debility    Interval history:  24 hr vital sign ranges reviewed and listed below. HRs borderline low.  SpO2 is 96% on room air.  Weight with decreased today.  States pain is controlled.  Patient denies shortness of breath, abdominal discomfort, nausea, or vomiting.  Patient reports an adequate appetite.  Patient denies dysuria.  Patient reports having regular bowel movements.  Patient progessing with PT/OT- Pt ambulated with RW min/CGA vcs for sequencing steps inc BUE support on RW to off load LEs 34 ft few standing rest breaks along the way wc follow, " I want to go farther" slow miroslava step to gait pattern . Continuing to follow and treat all acute and chronic conditions.    Past Medical History: Patient has a past medical history of Acquired hypothyroidism (12/13/2013), Age-related physical debility (04/05/2024), Antiplatelet or antithrombotic long-term use, AV block, 3rd degree (03/19/2019), Bilateral nonexudative age-related macular degeneration (08/27/2013), Blindness and low vision (08/10/2018), Blood transfusion, Cardiac pacemaker (08/22/2018), Carotid artery " disease (08/10/2016), Chronic diastolic congestive heart failure (11/24/2019), CRAO (central retinal artery occlusion), left (01/29/2018), Dry eye syndrome of both eyes (08/10/2018), Essential hypertension (12/13/2013), History of TIA (transient ischemic attack) (12/13/2013), Nonexudative age-related macular degeneration, bilateral, intermediate dry stage (07/12/2018), Nuclear sclerotic cataract of left eye (08/10/2018), 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    Past Surgical History: Patient has a past surgical history that includes Tonsillectomy; Joint replacement; Skin biopsy; Total hip arthroplasty (11/01/2000); Cardiac pacemaker placement; Cataract extraction (Right); pr transcran dopp intracran, emboli w/o inj (01/29/2018); Carpal tunnel release (Bilateral, 1991); and ORIF femur fracture (Right, 3/8/2025).    Social History: Patient reports that she has never smoked. She has never been exposed to tobacco smoke. She has never used smokeless tobacco. She reports that she does not drink alcohol and does not use drugs.    Family History: family history includes Arthritis in her maternal grandmother, paternal grandmother, and sister; Birth defects in her son; Cancer (age of onset: 80) in her paternal aunt; Diabetes in her paternal aunt and paternal uncle; Early death in her father and mother; Hearing loss in her daughter; Hypertension in her paternal grandmother and son.    Allergies: Patient is allergic to aspartame.    ROS  Constitutional: Negative for fever   Eyes: Negative for blurred vision, double vision   Respiratory: Negative for cough,  "shortness of breath   Cardiovascular: Negative for chest pain, palpitations, and leg swelling.   Gastrointestinal: Negative for abdominal pain, constipation, diarrhea, nausea, vomiting.   Genitourinary: Negative for dysuria, frequency   Musculoskeletal:  + generalized weakness. Negative for back pain and myalgias.   Skin: Negative for itching and rash.   Neurological: Negative for dizziness, headaches.   Psychiatric/Behavioral: Negative for depression. The patient is not nervous/anxious.      24 hour Vital Sign Range   Temp:  [97.5 °F (36.4 °C)]   Pulse:  [60-67]   Resp:  [18]   BP: (112-149)/(53-63)   SpO2:  [96 %-99 %]     Current BMI: Body mass index is 27.62 kg/m².    PEx  Constitutional: Patient appears debilitated.  No distress noted  HENT:   Head: Normocephalic and atraumatic.   Eyes: Pupils are equal, round  Neck: Normal range of motion. Neck supple.   Cardiovascular: Normal rate, regular rhythm and normal heart sounds.    Pulmonary/Chest: Effort normal and breath sounds are clear  Abdominal: Soft. Bowel sounds are normal.   Musculoskeletal: Normal range of motion.   Neurological: Alert and oriented to person, place, and time.   Psychiatric: Normal mood and affect. Behavior is normal.   Skin: Skin is warm and dry.     No results for input(s): "GLUCOSE", "NA", "K", "CL", "CO2", "BUN", "CREATININE", "CALCIUM", "MG" in the last 24 hours.    No results for input(s): "WBC", "RBC", "HGB", "HCT", "PLT", "MCV", "MCH", "MCHC" in the last 24 hours.    No results for input(s): "POCTGLUCOSE" in the last 168 hours.     Assessment and Plan:    Acute on chronic congestive heart failure  Aortic stenosis  Pulmonary hypertension  - patient presented back to [facility] ED on 03/20 with decompensation, BNP was 1,625  - diuresed with IV Lasix  - most recent echo reviewed from 3/21/25, EF 65-70%, severely dilated left atrium, mod to severe AS, PASP 44 mmHg  - monitor I&Os, daily weights  - goal is euvolemia given aortic stenosis  - " continue Bumex 1 mg daily (increased from previous home dose of 0.5), HCTZ 12.5 mg daily, metoprolol XL 25 daily    Pneumonia  Acute hypoxic respiratory failure  - patient admitted back to ED on 03/20 with suspected pneumonia  - initially treated with IV Vanc/Zosyn  -  Abx deescalated to PO, continue Augmentin and doxycycline x3 more days, ending on 03/29  - patient has been successfully weaned to room air  - continue IS, OOB    Periprosthetic fracture of shaft of right femur   s/p ORIF on 3/8/2025  - PT/OT, RLE WBAT  - DVT PPX with apixaban  - Monitor and report drainage to incisional site  - Fall precautions  - Bowel regimen in place, hold for loose or frequent stools  - Ortho NEETU to see patient intermittently at SNF  - follow-up with orthopedics after discharge  - will inform orthopedics that sutures are still in place    Acute postoperative pain  - stable, continue acetaminophen 650 mg q.8 hours, gabapentin 100 mg TID, continue tramadol 50 mg q.6 hours PRN, methocarbamol 500 mg q.6 hours PRN    Essential hypertension  - stable, continue amlodipine 5 mg daily, Bumex 1 mg daily, HCTZ 12.5 mg daily, metoprolol XL 50 mg daily, 25 mg qHS.    Cardiac pacemaker  - placed for sick sinus syndrome    Bradycardia  Paroxysmal atrial fibrillation  - home metoprolol XL 50 mg daily, 25 mg qHS  - continue apixaban 5 mg BID, reducing metoprolol 25 mg BID at this time    Acquired hypothyroidism  - TSH elevated at 22.581, Free T4 0.83  - increased levothyroxine to 100 mcg daily  - will need TSH recheck as outpatient in 6 weeks- will inform PCP prior to discharge     Moderate protein-calorie malnutrition  - RD following, appreciate recommendations  - continue supplements as ordered     Acute blood loss anemia  Anemia of chronic disease  - patient received 2 unit RBC transfusion postoperatively  - transfuse hemoglobin < 7  - improving, continue monitor twice weekly CBC    Hypokalemia  - continue KCl daily to 20 mEq    HX of CVA  -  4/5/2024 MRI showed acute left thalamus and hippocampus infarcts   - Not on statin due to prior intolerance (lower extremity myalgias). Reportedly has tried Repatha in the past and had similar reaction. Also had reaction to Praluent and Zetia     Carotid Artery disease    - continue home Plavix 75 mg daily     Depression, moderate, reoccurring  - continue home escitalopram 20 mg daily    Macular degeneration  Decreased vision  Hx of CRAO  Dry eyes  - continue home eye drops    Debility   - Continue with PT/OT for gait training and strengthening and restoration of ADL's   - Encourage mobility, OOB in chair, and early ambulation as appropriate  - Fall precautions   - Monitor for bowel and bladder dysfunction  - Monitor for and prevent skin breakdown and pressure ulcers  - Continue DVT prophylaxis with apixaban         Anticipate disposition:  Home with home health    IP [facility] RISK OF UNPLANNED READMISSION Model: HIGH     Follow-up needed during SNF stay-    Follow-up needed after discharge from SNF: PCP, orthopedics, cardiology     No future appointments.      I certify that SNF services are required to be given on an inpatient basis because Dorothy M Knobloch needs for skilled nursing care and/or skilled rehabilitation are required on a daily basis and such services can only practically be provided in a skilled nursing facility setting and are for an ongoing condition for which she received inpatient care in the hospital.     I spent 45 minutes reviewing patient records, examining, and counseling the patient/ patient's family with greater than 50% of the time spent in direct patient care and coordination.  Daughter updated at bedside      Angeles Aguero NP  Department of Hospital Medicine   [facility]     DOS: 4/1/2025       Patient note was created using MModal Dictation.  Any errors in syntax or even information may not have been identified and edited on initial review prior to  signing this note.

## 2025-04-01 NOTE — PLAN OF CARE
Problem: Adult Inpatient Plan of Care  Goal: Plan of Care Review  Outcome: Progressing  Goal: Patient-Specific Goal (Individualized)  Outcome: Progressing  Goal: Absence of Hospital-Acquired Illness or Injury  Outcome: Progressing  Goal: Optimal Comfort and Wellbeing  Outcome: Progressing  Goal: Readiness for Transition of Care  Outcome: Progressing     Problem: Pneumonia  Goal: Fluid Balance  Outcome: Progressing  Goal: Resolution of Infection Signs and Symptoms  Outcome: Progressing  Goal: Effective Oxygenation and Ventilation  Outcome: Progressing     Problem: Wound  Goal: Optimal Pain Control and Function  Outcome: Progressing  Goal: Skin Health and Integrity  Outcome: Progressing     Problem: Fall Injury Risk  Goal: Absence of Fall and Fall-Related Injury  Outcome: Progressing     Problem: Malnutrition  Goal: Improved Nutritional Intake  Outcome: Progressing     Problem: Fall Injury Risk  Goal: Absence of Fall and Fall-Related Injury  Outcome: Progressing

## 2025-04-01 NOTE — PT/OT/SLP PROGRESS
"Occupational Therapy   Treatment    Name: Dorothy M Knobloch  MRN: [id number]  Admit Date: 3/26/2025  Admitting Diagnosis:  Acute on chronic congestive heart failure    General Precautions: Standard, fall   Orthopedic Precautions: RLE weight bearing as tolerated   Braces: N/A    Recommendations:     Discharge Recommendations:  home health OT  Level of Assistance Recommended at Discharge: 24 hours physical assistance for all ADL's and home management tasks  Discharge Equipment Recommendations: wheelchair, 3-in-1 commode  Barriers to discharge:   current level of function    Assessment:     Dorothy M Knobloch is a 89 y.o. female with a medical diagnosis of Acute on chronic congestive heart failure. Pt tolerated session well and without incident and shows excellent motivation and potential to improve, but the pt continues to require assistance to perform self-care tasks and mobility. Pt strengths include Functional cognition, Following multi-step tasks, and Attention to task and Family support, Motivation, and Willingness to participate. Pt improved LBD, Toileting, and Toilet transfers. However, pt would continue to benefit from cont'd OT services in the SNF setting to improve safety and independence /c functional tasks and ADLs upon discharge. Pt /c mild SOB at end of session. Performance deficits affecting function are weakness, impaired endurance, decreased ROM, orthopedic precautions, impaired self care skills, impaired functional mobility, gait instability, impaired balance, decreased safety awareness, impaired skin, pain.     Rehab Potential is good    Activity tolerance:  Fair    Plan:     Patient to be seen 5 x/week to address the above listed problems via self-care/home management, therapeutic activities, therapeutic exercises    Plan of Care Expires: 04/27/25  Plan of Care Reviewed with: patient, daughter    Subjective     Communicated with: PIPPA prior to session.     "The breathing is nothing new." " [place] medication LEFT knee received from [facility] pharmacy. I called to scheduled appointment.  Patient said she will call back tomorrow morning to schedule.     .    Pain/Comfort:  Pain Rating 1: 0/10  Pain Rating Post-Intervention 1: 0/10    Patient's cultural, spiritual, Hinduism conflicts given the current situation:  no    Objective:     Patient found up in chair with  (no active lines) upon OT entry to room. Pt alert and agreeable to therapy.     Functional Mobility/Transfers:  Patient completed Sit <> Stand Transfer with moderate assistance  with  rolling walker   Patient completed Toilet Transfer to standard height toilet Step Transfer technique with moderate assistance with  rolling walker    Activities of Daily Living:  Lower Body Dressing: minimum assistance to doff/don pants. (A) to pull over knees and for minor adjustments around hips while standing. Vcs for adaptive dressing technique  Toileting: minimum assistance /c pt able to perform seated perirectal hygiene and some (A) for clothing mgmt    [facility] 6 Click ADL: 18    OT Exercises:   ~Pt participated in task to improve functional reach, UE strength, standing balance, and use of touch for object recognition and retrieval. Pt able to complete ~90% of task with out (A), however req (A) to retrieve objects placed R anterolateral 2/2 impaired flexion in R shoulder.     Treatment & Education:  Pt educated on POC, role of OT, adaptive dressing techniques, and safety. Pt performed tasks to improve safety and independence in functional tasks and ADLs as mentioned above.       Patient left up in chair with call button in reach, daughter present, and all needs met    GOALS:   Multidisciplinary Problems       Occupational Therapy Goals          Problem: Occupational Therapy    Goal Priority Disciplines Outcome Interventions   Occupational Therapy Goal     OT, PT/OT Progressing    Description: Goals to be met by: 4/27/2025     Patient will increase functional independence with ADLs by performing:    UE Dressing with Stand-by Assistance.  LE Dressing with Minimal Assistance- Ongoing  Toileting from toilet with Minimal  Assistance for hygiene and clothing management- Ongoing.   Bathing from  sitting at sink with Minimal Assistance.  Toilet transfer to toilet with Minimal Assistance- Ongoing                         Time Tracking:     OT Date of Treatment: 04/01/25  OT Start Time: 1348    OT Stop Time: 1427  OT Total Time (min): 39 min    Billable Minutes:Self Care/Home Management 23  Therapeutic Activity 15    4/1/2025

## 2025-04-01 NOTE — PLAN OF CARE
Problem: Occupational Therapy  Goal: Occupational Therapy Goal  Description: Goals to be met by: 4/27/2025     Patient will increase functional independence with ADLs by performing:    UE Dressing with Stand-by Assistance.  LE Dressing with Minimal Assistance- Ongoing  Toileting from toilet with Minimal Assistance for hygiene and clothing management- Ongoing.   Bathing from  sitting at sink with Minimal Assistance.  Toilet transfer to toilet with Minimal Assistance- Ongoing    Outcome: Progressing

## 2025-04-01 NOTE — PLAN OF CARE
Skilled Nursing IDT Note  Date: 3/31/25       Patient Name:  Dorothy M Knobloch       Medical Record Number: [id number]   YOB: 1932  Sex: Female          Room/Bed:  LIET379/PBTJ003 A  Payor Info:  Payor: [employer] [facility] / Plan: [employer] / Product Type: Medicare Advantage /      Admitting Diagnosis:   Heart failure, unspecified [I50.9]  Acute on chronic heart failure [I50.9]   Admit Date/Time:  3/26/2025  6:40 PM    Primary Diagnosis:  Acute on chronic congestive heart failure  Principal Problem: Acute on chronic congestive heart failure    Patient Active Problem List    Diagnosis Date Noted    Anemia 03/21/2025    Hypokalemia 03/21/2025    Acute on chronic congestive heart failure 03/20/2025    Pneumonia 03/20/2025    Moderate protein-calorie malnutrition 03/14/2025    Acute blood loss anemia 03/07/2025    On anticoagulant therapy 06/13/2024    Age-related physical debility 04/05/2024    Aortic stenosis 04/05/2024    Pulmonary hypertension 04/05/2024    hx Stroke due to embolism of left posterior cerebral artery 04/04/2024    Chronic diastolic congestive heart failure 11/24/2019    AV block, 3rd degree 03/19/2019    Paroxysmal atrial fibrillation 09/11/2018    Cardiac pacemaker 08/22/2018    Status post cataract extraction and insertion of intraocular lens 08/10/2018    Blindness and low vision 08/10/2018    Dry eye syndrome of both eyes 08/10/2018    Nuclear sclerotic cataract of left eye 08/10/2018    Nonexudative age-related macular degeneration, bilateral, intermediate dry stage 07/12/2018    Neovascular glaucoma, 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    Iris neovascularization, left 03/27/2018    Bilateral carotid artery stenosis     CRAO (central retinal artery occlusion), left 01/29/2018    Meningioma 01/29/2018    Takotsubo cardiomyopathy 11/17/2016    Carotid artery disease 08/10/2016    Constipation - functional 08/05/2015    History of TIA (transient ischemic attack) 12/13/2013     Essential hypertension 12/13/2013    Pure hypercholesterolemia 12/13/2013    Acquired hypothyroidism 12/13/2013    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 12/13/2013    Posterior vitreous detachment, both eyes 08/27/2013       Team Members Present: Seema Mackey, RN Nurse Manager, Lety Sanford, RN Charge Nurse, Maria L Rose LPN, , Mercy Huffman, , Candida Green, PT, Rehab, Rosenda Gong, Activities, and Samantha Pinzon, Dietician     Patient/Family Present: Patient and patient's daughter, Gemma via phone                                                Issues/Consults: None    Caregiver at Discharge: Staff    Living Setting at Discharge:  [facility] assisted living    Anticipated Discharge Date:  4/16/25    Supervision Recommended at Discharge: 24 hours physical assistance    Follow-up Services:   Home Health  physical therapy and occupational therapy     Preferred Home Health: [facility]    Preferred Pharmacy: [facility]

## 2025-04-02 PROCEDURE — 97530 THERAPEUTIC ACTIVITIES: CPT

## 2025-04-02 PROCEDURE — 11000004 HC SNF PRIVATE

## 2025-04-02 PROCEDURE — 97116 GAIT TRAINING THERAPY: CPT

## 2025-04-02 PROCEDURE — 25000003 PHARM REV CODE 250: Performed by: HOSPITALIST

## 2025-04-02 PROCEDURE — 97110 THERAPEUTIC EXERCISES: CPT

## 2025-04-02 PROCEDURE — 25000003 PHARM REV CODE 250: Performed by: NURSE PRACTITIONER

## 2025-04-02 RX ADMIN — ACETAMINOPHEN 650 MG: 500 TABLET ORAL at 09:04

## 2025-04-02 RX ADMIN — POTASSIUM CHLORIDE 20 MEQ: 1500 TABLET, EXTENDED RELEASE ORAL at 09:04

## 2025-04-02 RX ADMIN — ESCITALOPRAM OXALATE 20 MG: 10 TABLET ORAL at 09:04

## 2025-04-02 RX ADMIN — APIXABAN 5 MG: 5 TABLET, FILM COATED ORAL at 09:04

## 2025-04-02 RX ADMIN — SENNOSIDES AND DOCUSATE SODIUM 1 TABLET: 50; 8.6 TABLET ORAL at 09:04

## 2025-04-02 RX ADMIN — TIMOLOL MALEATE 1 DROP: 5 SOLUTION OPHTHALMIC at 09:04

## 2025-04-02 RX ADMIN — BRIMONIDINE TARTRATE 1 DROP: 1.5 SOLUTION OPHTHALMIC at 09:04

## 2025-04-02 RX ADMIN — GABAPENTIN 100 MG: 300 CAPSULE ORAL at 03:04

## 2025-04-02 RX ADMIN — GABAPENTIN 100 MG: 300 CAPSULE ORAL at 09:04

## 2025-04-02 RX ADMIN — ACETAMINOPHEN 650 MG: 500 TABLET ORAL at 03:04

## 2025-04-02 RX ADMIN — LEVOTHYROXINE SODIUM 100 MCG: 0.05 TABLET ORAL at 05:04

## 2025-04-02 RX ADMIN — METOPROLOL SUCCINATE 25 MG: 25 TABLET, EXTENDED RELEASE ORAL at 09:04

## 2025-04-02 RX ADMIN — ERYTHROMYCIN: 5 OINTMENT OPHTHALMIC at 09:04

## 2025-04-02 RX ADMIN — ACETAMINOPHEN 650 MG: 500 TABLET ORAL at 05:04

## 2025-04-02 RX ADMIN — CLOPIDOGREL 75 MG: 75 TABLET ORAL at 09:04

## 2025-04-02 RX ADMIN — CALCIUM CARBONATE (ANTACID) CHEW TAB 500 MG 500 MG: 500 CHEW TAB at 11:04

## 2025-04-02 NOTE — PT/OT/SLP PROGRESS
"Physical Therapy Treatment    Patient Name:  Dorothy M Knobloch   MRN:  [id number]  Admit Date: 3/26/2025  Admitting Diagnosis: Acute on chronic congestive heart failure  Recent Surgeries: OPEN REDUCTION INTERNAL FIXATION FEMORAL SHAFT FRACTURE (Right)     General Precautions: Standard, fall  Orthopedic Precautions: RLE weight bearing as tolerated  Braces: N/A    Recommendations:     Discharge Recommendations: home health PT  Level of Assistance Recommended at Discharge: 24 hours significant assistance  Discharge Equipment Recommendations: wheelchair  Barriers to discharge: Other (Comment), Decreased caregiver support (Increased assistance for mobility.)    Assessment:     Dorothy M Knobloch is a 89 y.o. female admitted with a medical diagnosis of Acute on chronic congestive heart failure.    Pleasant and in good spirits. Pt demonstrated improved activity tolerance and amb distance of 39' RW with 1 person for safety. Pt presented with SMITH mouth breathing, however SpO2 >95% throughout. Pt continues to benefit from skilled PT services while in house in order to address the aforementioned deficits.        Performance deficits affecting function: weakness, impaired endurance, impaired functional mobility, impaired balance, gait instability, decreased upper extremity function, decreased lower extremity function, decreased coordination.    Rehab Potential is good    Activity Tolerance: Good    Plan:     Patient to be seen 5 x/week to address the above listed problems via gait training, therapeutic activities, therapeutic exercises, neuromuscular re-education, wheelchair management/training    Plan of Care Expires: 04/26/25  Plan of Care Reviewed with: patient    Subjective     "I love to sew".     Pain/Comfort:  Pain Rating 1: 0/10    Patient's cultural, spiritual, Baptist conflicts given the current situation:  no    Objective:     Communicated with RN prior to session.  Patient found up in chair with   upon PT entry to " room.     Therapeutic Activities and Exercises:   Attempted LEB however reported increase in pain thus cessation warranted  Seated B LE therex: marching, LAQ, AP 10 reps x 2 sets bilaterally  Discussed RW management, fall prevention, and safety  Discussed sitting upright in chair >1hr, pt agreeable    Educated pt on PT role/POC  Educated pt on importance of OOB activity and daily ambulation   Pt educated on proper body mechanics, safety techniques, and energy conservation with PT facilitation and cueing throughout session   Pt verbalized understanding    Functional Mobility:  Transfers:     Sit to Stand:  moderate assistance with rolling walker  Gait: pt amb 39' RW CGA. Pt presented with kyphotic posture, decreased miroslava, SMITH, decreased R stance time  Balance:   Good sitting balance  Fair standing balance  Wheelchair Propulsion:  Pt propelled Standard wheelchair x 100 feet on Level tile with  Bilateral upper extremity with Minimal Assistance.     AM-PAC 6 CLICK MOBILITY  13    Patient left up in chair with call button in reach, RN notified, and son present.    GOALS:   Multidisciplinary Problems       Physical Therapy Goals          Problem: Physical Therapy    Goal Priority Disciplines Outcome Interventions   Physical Therapy Goal     PT, PT/OT     Description: Goals to be met by: 25     Patient will increase functional independence with mobility by performin. Supine to sit with Contact Guard Assistance  2. Sit to supine with Contact Guard Assistance  3. Rolling to Left and Right with Contact Guard Assistance  4. Sit to stand transfer with Contact Guard Assistance  5. Bed to chair transfer with Contact Guard Assistance using Rolling Walker  6. Gait  x 50 feet (progress distance as tolerated) with Contact Guard Assistance using Rolling Walker  7. Wheelchair propulsion x 50 feet (progress distance as tolerated) with Supervision using bilateral upper extremities                         Time Tracking:      PT Received On: 04/02/25  PT Start Time: 1423  PT Stop Time: 1503  PT Total Time (min): 40 min    Billable Minutes: Gait Training 15, Therapeutic Activity 10, and Therapeutic Exercise 15    Treatment Type: Treatment  PT/PTA: PT     Number of PTA visits since last PT visit: 0     04/02/2025

## 2025-04-02 NOTE — CONSULTS
[facility] - Skilled Nursing    Wound Care     Patient Name:  Dorothy M Knobloch  MRN:  [id number]  Date: 4/2/2025  Diagnosis: Acute on chronic congestive heart failure     History:  Past Medical History:   Diagnosis Date    Acquired hypothyroidism 12/13/2013    Age-related physical debility 04/05/2024    Antiplatelet or antithrombotic long-term use     AV block, 3rd degree 03/19/2019    Bilateral nonexudative age-related macular degeneration 08/27/2013    Blindness and low vision 08/10/2018    Blood transfusion     Cardiac pacemaker 08/22/2018    Carotid artery disease 08/10/2016    Chronic diastolic congestive heart failure 11/24/2019    CRAO (central retinal artery occlusion), left 01/29/2018    Dry eye syndrome of both eyes 08/10/2018    Essential hypertension 12/13/2013    History of TIA (transient ischemic attack) 12/13/2013    Nonexudative age-related macular degeneration, bilateral, intermediate dry stage 07/12/2018    Nuclear sclerotic cataract of left eye 08/10/2018    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 12/13/2013    On anticoagulant therapy 06/13/2024    Osteoarthritis     Paroxysmal atrial fibrillation 09/11/2018    Periprosthetic fracture of shaft of right femur s/p ORIF on 3/8/2025 03/07/2025    Pulmonary hypertension 04/05/2024    Pure hypercholesterolemia 12/13/2013    Sick sinus syndrome     Stroke     Takotsubo cardiomyopathy 11/17/2016     Social History[1]  Precautions:  Allergies as of 03/26/2025 - Reviewed 03/26/2025   Allergen Reaction Noted    Aspartame Swelling 03/13/2013       WO Assessment Details / Treatment:    Patient seen for wound care: New Consult   Chart reviewed for this encounter.   Labs:   WBC (K/uL)   Date Value   03/31/2025 6.55   03/27/2025 6.55     Glucose (mg/dL)   Date Value   03/21/2025 121 (H)   03/20/2025 130 (H)     Albumin (g/dL)   Date Value   03/20/2025 2.9 (L)   03/10/2025 2.8 (L)       Kodak Score: 17    Narrative:  Pt seen for WC  consultation and agreeable to assessment. Chart reviewed for this encounter. Patient lying in bed, able to reposition with minimal assistance. Left hip staples intact. Buttocks with pink, blanchable intact skin. Diaper in use. Bilateral heels pink, blanchable intact skin    Recommend: Barrier cream to buttocks, limited diaper use and pressure injury prevention-waffle overlay and EHOB use when in bed  WC to follow    See Flow Sheet for additional documentation and media.                                         [1]   Social History  Socioeconomic History    Marital status:    Tobacco Use    Smoking status: Never     Passive exposure: Never    Smokeless tobacco: Never   Substance and Sexual Activity    Alcohol use: No     Alcohol/week: 0.0 standard drinks of alcohol    Drug use: No   Social History Narrative    Cares for her blind       Social Drivers of Health     Financial Resource Strain: Low Risk  (3/21/2025)    Overall Financial Resource Strain (CARDIA)     Difficulty of Paying Living Expenses: Not hard at all   Food Insecurity: No Food Insecurity (3/21/2025)    Hunger Vital Sign     Worried About Running Out of Food in the Last Year: Never true     Ran Out of Food in the Last Year: Never true   Transportation Needs: No Transportation Needs (3/21/2025)    PRAPARE - Transportation     Lack of Transportation (Medical): No     Lack of Transportation (Non-Medical): No   Stress: No Stress Concern Present (3/15/2025)    [employer] - Occupational Stress Questionnaire     Feeling of Stress : Only a little   Housing Stability: Low Risk  (3/21/2025)    Housing Stability Vital Sign     Unable to Pay for Housing in the Last Year: No     Homeless in the Last Year: No

## 2025-04-02 NOTE — PROGRESS NOTES
"                                                        [facility]                                  Skilled Nursing Facility                   Progress Note     Admit Date: 3/26/2025  ISH 4/16/2025  [id number] A  Principal Problem:  Acute on chronic congestive heart failure   HPI obtained from patient interview and chart review     Chief Complaint: Re-evaluation of medical treatment and therapy status    HPI:   Mrs. Knobloch is a 89 year old female PMHx of PAF on Apixiban, HTN, chronic diastolic HF, cardiac pacemaker due to sick sinus syndrome, CVA and hypothyroidism who presents to SNF following hospitalization for PNA.  Patient with previous SNF admission on 03/13 for Right periprosthetic femoral shaft fracture below a total hip replacement s/p ORIF on 3/8/25 with Dr. William.  Admission to SNF for secondary weakness and debility    Interval history:  24 hr vital sign ranges reviewed and listed below. HRs borderline low.  SpO2 is 97-95% on room air.  Weight is slowly decreasing.  States pain is controlled.  Patient denies shortness of breath, abdominal discomfort, nausea, or vomiting.  Patient reports an adequate appetite.  Patient denies dysuria.  Patient reports having regular bowel movements.  Patient progessing with PT/OT- Pt ambulated with RW min/CGA vcs for sequencing steps inc BUE support on RW to off load LEs 34 ft few standing rest breaks along the way wc follow, " I want to go farther" slow miroslava step to gait pattern . Continuing to follow and treat all acute and chronic conditions.    Past Medical History: Patient has a past medical history of Acquired hypothyroidism (12/13/2013), Age-related physical debility (04/05/2024), Antiplatelet or antithrombotic long-term use, AV block, 3rd degree (03/19/2019), Bilateral nonexudative age-related macular degeneration (08/27/2013), Blindness and low vision (08/10/2018), Blood transfusion, Cardiac pacemaker (08/22/2018), Carotid artery " disease (08/10/2016), Chronic diastolic congestive heart failure (11/24/2019), CRAO (central retinal artery occlusion), left (01/29/2018), Dry eye syndrome of both eyes (08/10/2018), Essential hypertension (12/13/2013), History of TIA (transient ischemic attack) (12/13/2013), Nonexudative age-related macular degeneration, bilateral, intermediate dry stage (07/12/2018), Nuclear sclerotic cataract of left eye (08/10/2018), 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    Past Surgical History: Patient has a past surgical history that includes Tonsillectomy; Joint replacement; Skin biopsy; Total hip arthroplasty (11/01/2000); Cardiac pacemaker placement; Cataract extraction (Right); pr transcran dopp intracran, emboli w/o inj (01/29/2018); Carpal tunnel release (Bilateral, 1991); and ORIF femur fracture (Right, 3/8/2025).    Social History: Patient reports that she has never smoked. She has never been exposed to tobacco smoke. She has never used smokeless tobacco. She reports that she does not drink alcohol and does not use drugs.    Family History: family history includes Arthritis in her maternal grandmother, paternal grandmother, and sister; Birth defects in her son; Cancer (age of onset: 80) in her paternal aunt; Diabetes in her paternal aunt and paternal uncle; Early death in her father and mother; Hearing loss in her daughter; Hypertension in her paternal grandmother and son.    Allergies: Patient is allergic to aspartame.    ROS  Constitutional: Negative for fever   Eyes: Negative for blurred vision, double vision   Respiratory: Negative for cough,  "shortness of breath   Cardiovascular: Negative for chest pain, palpitations, and leg swelling.   Gastrointestinal: Negative for abdominal pain, constipation, diarrhea, nausea, vomiting.   Genitourinary: Negative for dysuria, frequency   Musculoskeletal:  + generalized weakness. Negative for back pain and myalgias.   Skin: Negative for itching and rash.   Neurological: Negative for dizziness, headaches.   Psychiatric/Behavioral: Negative for depression. The patient is not nervous/anxious.      24 hour Vital Sign Range   Temp:  [97.6 °F (36.4 °C)]   Pulse:  [63-67]   Resp:  [16-17]   BP: (118-121)/(54-63)   SpO2:  [95 %-97 %]     Current BMI: Body mass index is 27.58 kg/m².    PEx  Constitutional: Patient appears debilitated.  No distress noted  HENT:   Head: Normocephalic and atraumatic.   Eyes: Pupils are equal, round  Neck: Normal range of motion. Neck supple.   Cardiovascular: Normal rate, regular rhythm and normal heart sounds.    Pulmonary/Chest: Effort normal and breath sounds are clear  Abdominal: Soft. Bowel sounds are normal.   Musculoskeletal: Normal range of motion.   Neurological: Alert and oriented to person, place, and time.   Psychiatric: Normal mood and affect. Behavior is normal.   Skin: Skin is warm and dry.     No results for input(s): "GLUCOSE", "NA", "K", "CL", "CO2", "BUN", "CREATININE", "CALCIUM", "MG" in the last 24 hours.    No results for input(s): "WBC", "RBC", "HGB", "HCT", "PLT", "MCV", "MCH", "MCHC" in the last 24 hours.    No results for input(s): "POCTGLUCOSE" in the last 168 hours.     Assessment and Plan:    Acute on chronic congestive heart failure  Aortic stenosis  Pulmonary hypertension  - patient [place] back [place] [facility] [place] with decompensation, BNP was 1,625  - diuresed with IV Lasix  - most recent echo reviewed from 3/21/25, EF 65-70%, severely dilated left atrium, mod to severe AS, PASP 44 mmHg  - monitor I&Os, daily weights  - goal is euvolemia given aortic stenosis  - " continue Bumex 1 mg daily (increased from previous home dose of 0.5), HCTZ 12.5 mg daily, metoprolol XL 25 daily    Pneumonia  Acute hypoxic respiratory failure  - patient admitted back to ED on 03/20 with suspected pneumonia  - initially treated with IV Vanc/Zosyn  -  Abx deescalated to PO, continue Augmentin and doxycycline x3 more days, ending on 03/29  - patient has been successfully weaned to room air  - continue IS, OOB    Periprosthetic fracture of shaft of right femur   s/p ORIF on 3/8/2025  - PT/OT, RLE WBAT  - DVT PPX with apixaban  - Monitor and report drainage to incisional site  - Fall precautions  - Bowel regimen in place, hold for loose or frequent stools  - Ortho NEETU to see patient intermittently at SNF  - follow-up with orthopedics after discharge  - will inform orthopedics that sutures are still in place    Acute postoperative pain  - stable, continue acetaminophen 650 mg q.8 hours, gabapentin 100 mg TID, continue tramadol 50 mg q.6 hours PRN, methocarbamol 500 mg q.6 hours PRN    Essential hypertension  - stable, continue amlodipine 5 mg daily, Bumex 1 mg daily, HCTZ 12.5 mg daily, metoprolol XL 50 mg daily, 25 mg qHS.    Cardiac pacemaker  - placed for sick sinus syndrome    Bradycardia  Paroxysmal atrial fibrillation  - home metoprolol XL 50 mg daily, 25 mg qHS  - continue apixaban 5 mg BID, reducing metoprolol 25 mg BID at this time    Acquired hypothyroidism  - TSH elevated at 22.581, Free T4 0.83  - increased levothyroxine to 100 mcg daily  - will need TSH recheck as outpatient in 6 weeks- will inform PCP prior to discharge     Moderate protein-calorie malnutrition  - RD following, appreciate recommendations  - continue supplements as ordered     Acute blood loss anemia  Anemia of chronic disease  - patient received 2 unit RBC transfusion postoperatively  - transfuse hemoglobin < 7  - improving, continue monitor twice weekly CBC    Hypokalemia  - continue KCl daily to 20 mEq    HX of CVA  -  4/5/2024 MRI showed acute left thalamus and hippocampus infarcts   - Not on statin due to prior intolerance (lower extremity myalgias). Reportedly has tried Repatha in the past and had similar reaction. Also had reaction to Praluent and Zetia     Carotid Artery disease    - continue home Plavix 75 mg daily     Depression, moderate, reoccurring  - continue home escitalopram 20 mg daily    Macular degeneration  Decreased vision  Hx of CRAO  Dry eyes  - continue home eye drops    Debility   - Continue with PT/OT for gait training and strengthening and restoration of ADL's   - Encourage mobility, OOB in chair, and early ambulation as appropriate  - Fall precautions   - Monitor for bowel and bladder dysfunction  - Monitor for and prevent skin breakdown and pressure ulcers  - Continue DVT prophylaxis with apixaban         Anticipate disposition:  Home with home health    IP [facility] RISK OF UNPLANNED READMISSION Model: HIGH     Follow-up needed during SNF stay-    Follow-up needed after discharge from SNF: PCP, orthopedics, cardiology     No future appointments.      I certify that SNF services are required to be given on an inpatient basis because Dorothy M Knobloch needs for skilled nursing care and/or skilled rehabilitation are required on a daily basis and such services can only practically be provided in a skilled nursing facility setting and are for an ongoing condition for which she received inpatient care in the hospital.     I spent 46 minutes reviewing patient records, examining, and counseling the patient/ patient's family with greater than 50% of the time spent in direct patient care and coordination.  son updated at bedside.    Angeles Aguero NP  Department of Hospital Medicine   [facility]     DOS: 4/2/2025       Patient note was created using MModal Dictation.  Any errors in syntax or even information may not have been identified and edited on initial review prior to signing  this note.

## 2025-04-03 LAB
ABSOLUTE EOSINOPHIL (OHS): 0.17 K/UL
ABSOLUTE MONOCYTE (OHS): 0.53 K/UL (ref 0.3–1)
ABSOLUTE NEUTROPHIL COUNT (OHS): 3.9 K/UL (ref 1.8–7.7)
ANION GAP (OHS): 9 MMOL/L (ref 8–16)
BASOPHILS # BLD AUTO: 0.09 K/UL
BASOPHILS NFR BLD AUTO: 1.5 %
BUN SERPL-MCNC: 20 MG/DL (ref 10–30)
CALCIUM SERPL-MCNC: 8.9 MG/DL (ref 8.7–10.5)
CHLORIDE SERPL-SCNC: 97 MMOL/L (ref 95–110)
CO2 SERPL-SCNC: 26 MMOL/L (ref 23–29)
CREAT SERPL-MCNC: 0.6 MG/DL (ref 0.5–1.4)
ERYTHROCYTE [DISTWIDTH] IN BLOOD BY AUTOMATED COUNT: 16.4 % (ref 11.5–14.5)
GFR SERPLBLD CREATININE-BSD FMLA CKD-EPI: >60 ML/MIN/1.73/M2
GLUCOSE SERPL-MCNC: 80 MG/DL (ref 70–110)
HCT VFR BLD AUTO: 27.4 % (ref 37–48.5)
HGB BLD-MCNC: 8.7 GM/DL (ref 12–16)
IMM GRANULOCYTES # BLD AUTO: 0.07 K/UL (ref 0–0.04)
IMM GRANULOCYTES NFR BLD AUTO: 1.1 % (ref 0–0.5)
LYMPHOCYTES # BLD AUTO: 1.37 K/UL (ref 1–4.8)
MAGNESIUM SERPL-MCNC: 2.1 MG/DL (ref 1.6–2.6)
MCH RBC QN AUTO: 31.6 PG (ref 27–31)
MCHC RBC AUTO-ENTMCNC: 31.8 G/DL (ref 32–36)
MCV RBC AUTO: 100 FL (ref 82–98)
NUCLEATED RBC (/100WBC) (OHS): 0 /100 WBC
PHOSPHATE SERPL-MCNC: 3 MG/DL (ref 2.7–4.5)
PLATELET # BLD AUTO: 301 K/UL (ref 150–450)
PMV BLD AUTO: 8.8 FL (ref 9.2–12.9)
POTASSIUM SERPL-SCNC: 4 MMOL/L (ref 3.5–5.1)
RBC # BLD AUTO: 2.75 M/UL (ref 4–5.4)
RELATIVE EOSINOPHIL (OHS): 2.8 %
RELATIVE LYMPHOCYTE (OHS): 22.3 % (ref 18–48)
RELATIVE MONOCYTE (OHS): 8.6 % (ref 4–15)
RELATIVE NEUTROPHIL (OHS): 63.7 % (ref 38–73)
SODIUM SERPL-SCNC: 132 MMOL/L (ref 136–145)
WBC # BLD AUTO: 6.13 K/UL (ref 3.9–12.7)

## 2025-04-03 PROCEDURE — 97535 SELF CARE MNGMENT TRAINING: CPT | Mod: CO

## 2025-04-03 PROCEDURE — 97530 THERAPEUTIC ACTIVITIES: CPT | Mod: CO

## 2025-04-03 PROCEDURE — 36415 COLL VENOUS BLD VENIPUNCTURE: CPT | Performed by: HOSPITALIST

## 2025-04-03 PROCEDURE — 94761 N-INVAS EAR/PLS OXIMETRY MLT: CPT

## 2025-04-03 PROCEDURE — 11000004 HC SNF PRIVATE

## 2025-04-03 PROCEDURE — 97116 GAIT TRAINING THERAPY: CPT | Mod: CQ

## 2025-04-03 PROCEDURE — 97110 THERAPEUTIC EXERCISES: CPT | Mod: CQ

## 2025-04-03 PROCEDURE — 25000003 PHARM REV CODE 250: Performed by: NURSE PRACTITIONER

## 2025-04-03 PROCEDURE — 84100 ASSAY OF PHOSPHORUS: CPT | Performed by: HOSPITALIST

## 2025-04-03 PROCEDURE — 25000003 PHARM REV CODE 250: Performed by: HOSPITALIST

## 2025-04-03 PROCEDURE — 85025 COMPLETE CBC W/AUTO DIFF WBC: CPT | Performed by: HOSPITALIST

## 2025-04-03 PROCEDURE — 83735 ASSAY OF MAGNESIUM: CPT | Performed by: HOSPITALIST

## 2025-04-03 PROCEDURE — 97110 THERAPEUTIC EXERCISES: CPT | Mod: CO

## 2025-04-03 PROCEDURE — 82310 ASSAY OF CALCIUM: CPT | Performed by: HOSPITALIST

## 2025-04-03 RX ADMIN — GABAPENTIN 100 MG: 300 CAPSULE ORAL at 08:04

## 2025-04-03 RX ADMIN — BRIMONIDINE TARTRATE 1 DROP: 1.5 SOLUTION OPHTHALMIC at 08:04

## 2025-04-03 RX ADMIN — HYDROCHLOROTHIAZIDE 12.5 MG: 12.5 TABLET ORAL at 08:04

## 2025-04-03 RX ADMIN — APIXABAN 5 MG: 5 TABLET, FILM COATED ORAL at 08:04

## 2025-04-03 RX ADMIN — POTASSIUM CHLORIDE 20 MEQ: 1500 TABLET, EXTENDED RELEASE ORAL at 08:04

## 2025-04-03 RX ADMIN — TIMOLOL MALEATE 1 DROP: 5 SOLUTION OPHTHALMIC at 08:04

## 2025-04-03 RX ADMIN — METOPROLOL SUCCINATE 25 MG: 25 TABLET, EXTENDED RELEASE ORAL at 08:04

## 2025-04-03 RX ADMIN — GABAPENTIN 100 MG: 300 CAPSULE ORAL at 03:04

## 2025-04-03 RX ADMIN — ACETAMINOPHEN 650 MG: 500 TABLET ORAL at 05:04

## 2025-04-03 RX ADMIN — LEVOTHYROXINE SODIUM 100 MCG: 0.05 TABLET ORAL at 05:04

## 2025-04-03 RX ADMIN — CLOPIDOGREL 75 MG: 75 TABLET ORAL at 08:04

## 2025-04-03 RX ADMIN — ESCITALOPRAM OXALATE 20 MG: 10 TABLET ORAL at 08:04

## 2025-04-03 RX ADMIN — BUMETANIDE 1 MG: 1 TABLET ORAL at 08:04

## 2025-04-03 RX ADMIN — SENNOSIDES AND DOCUSATE SODIUM 1 TABLET: 50; 8.6 TABLET ORAL at 08:04

## 2025-04-03 RX ADMIN — ACETAMINOPHEN 650 MG: 500 TABLET ORAL at 09:04

## 2025-04-03 RX ADMIN — ACETAMINOPHEN 650 MG: 500 TABLET ORAL at 01:04

## 2025-04-03 RX ADMIN — ERYTHROMYCIN: 5 OINTMENT OPHTHALMIC at 08:04

## 2025-04-03 NOTE — PROGRESS NOTES
Ms. Knobloch was seen at [facility] today for a post-operative visit after undergoing   Right periprosthetic femoral shaft fracture below a total hip replacement   by Dr. William   on 3/8/2025.      Interval History:  She reports that she is doing ok.  Pain is controlled.  She is taking pain medication.    She denies fever, chills, and sweats since the time of the surgery.  She is participating in her therapy sessions.     Physical exam:  Dressing taken down.  Incision is clean, dry and intact.  Sutures removed without difficulty.  She has tactile stimulation to their lower leg, she denies calf pain, there is no leg edema and their pedal pulse is palpable x 2.     RADS: none done today: ordered     Assessment:  Post-op visit (3 weeks)    Plan:  Current care, treatment plan, precautions, activity level/ modifications, limitations, rehabilitation exercises and proposed future treatment were discussed with the patient. We discussed the need to monitor for changes in symptoms and condition and report them to the physician.  Discussed importance of compliance with all appointments and follow up examinations.     WOUND CARE ORDERS  Do not remove surgical dressing for 2 weeks post-op. This will be done only by MD at initial post-op visit. If dressing is completely saturated, Call number below.      Do not get dressings wet. Do not shower.      If dressing continues to be saturated or there are signs of infection, please call [facility] [phone] for further instructions and to make appt to be seen.         PHYSICAL THERAPY:    - Continue therapy as ordered.        - weight bearing as tolerated with walker        - range of motion as tolerated.      PAIN MEDICATION:               - Pain medication: refill was not needed,               - Pain medication refill policy provided to patient for review, yes      DVT PROPHYLAXIS:               - Eliquis     FOLLOW UP:   - Patient will continue to be seen on [facility] weekly until their  discharge then he is to return to clinic for follow up.  - Future Appointments: needed       If there are any questions prior to scheduled follow up, the patient was instructed to contact the office

## 2025-04-03 NOTE — PROGRESS NOTES
[facility]                                  Skilled Nursing Facility                   Progress Note     Admit Date: 3/26/2025  ISH 4/16/2025  [id number] A  Principal Problem:  Acute on chronic congestive heart failure   HPI obtained from patient interview and chart review     Chief Complaint: Re-evaluation of medical treatment and therapy status\, lab reviewed    HPI:   Mrs. Knobloch is a 89 year old female PMHx of PAF on Apixiban, HTN, chronic diastolic HF, cardiac pacemaker due to sick sinus syndrome, CVA and hypothyroidism who presents to SNF following hospitalization for PNA.  Patient with previous SNF admission on 03/13 for Right periprosthetic femoral shaft fracture below a total hip replacement s/p ORIF on 3/8/25 with Dr. William.  Admission to SNF for secondary weakness and debility    Interval history:  All of today's labs reviewed and are listed below.24 hr vital sign ranges reviewed and listed below. HRs borderline low. Weight stable.  States pain is controlled.  Patient seen and discussed with orthopedics, sutures removed.  Patient denies shortness of breath, abdominal discomfort, nausea, or vomiting.  Patient reports an adequate appetite.  Patient denies dysuria.  Patient reports having regular bowel movements.  Patient progessing with PT/OT- Pt ambulated with [facility] step to gait pattern 50 ft slow miroslava  . Continuing to follow and treat all acute and chronic conditions.    Past Medical History: Patient has a past medical history of Acquired hypothyroidism (12/13/2013), Age-related physical debility (04/05/2024), Antiplatelet or antithrombotic long-term use, AV block, 3rd degree (03/19/2019), Bilateral nonexudative age-related macular degeneration (08/27/2013), Blindness and low vision (08/10/2018), Blood transfusion, Cardiac pacemaker (08/22/2018), Carotid artery disease (08/10/2016), Chronic diastolic congestive heart  failure (11/24/2019), CRAO (central retinal artery occlusion), left (01/29/2018), Dry eye syndrome of both eyes (08/10/2018), Essential hypertension (12/13/2013), History of TIA (transient ischemic attack) (12/13/2013), Nonexudative age-related macular degeneration, bilateral, intermediate dry stage (07/12/2018), Nuclear sclerotic cataract of left eye (08/10/2018), 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    Past Surgical History: Patient has a past surgical history that includes Tonsillectomy; Joint replacement; Skin biopsy; Total hip arthroplasty (11/01/2000); Cardiac pacemaker placement; Cataract extraction (Right); pr transcran dopp intracran, emboli w/o inj (01/29/2018); Carpal tunnel release (Bilateral, 1991); and ORIF femur fracture (Right, 3/8/2025).    Social History: Patient reports that she has never smoked. She has never been exposed to tobacco smoke. She has never used smokeless tobacco. She reports that she does not drink alcohol and does not use drugs.    Family History: family history includes Arthritis in her maternal grandmother, paternal grandmother, and sister; Birth defects in her son; Cancer (age of onset: 80) in her paternal aunt; Diabetes in her paternal aunt and paternal uncle; Early death in her father and mother; Hearing loss in her daughter; Hypertension in her paternal grandmother and son.    Allergies: Patient is allergic to aspartame.    ROS  Constitutional: Negative for fever   Eyes: Negative for blurred vision, double vision   Respiratory: Negative for cough, shortness of breath   Cardiovascular: Negative for chest  "pain, palpitations, and leg swelling.   Gastrointestinal: Negative for abdominal pain, constipation, diarrhea, nausea, vomiting.   Genitourinary: Negative for dysuria, frequency   Musculoskeletal:  + generalized weakness. Negative for back pain and myalgias.   Skin: Negative for itching and rash.   Neurological: Negative for dizziness, headaches.   Psychiatric/Behavioral: Negative for depression. The patient is not nervous/anxious.      24 hour Vital Sign Range   Temp:  [97.6 °F (36.4 °C)-97.9 °F (36.6 °C)]   Pulse:  [60-67]   Resp:  [16]   BP: (139-146)/(60-61)   SpO2:  [93 %-95 %]     Current BMI: Body mass index is 27.62 kg/m².    PEx  Constitutional: Patient appears debilitated.  No distress noted  HENT:   Head: Normocephalic and atraumatic.   Eyes: Pupils are equal, round  Neck: Normal range of motion. Neck supple.   Cardiovascular: Normal rate, regular rhythm and normal heart sounds.    Pulmonary/Chest: Effort normal and breath sounds are clear  Abdominal: Soft. Bowel sounds are normal.   Musculoskeletal: Normal range of motion.   Neurological: Alert and oriented to person, place, and time.   Psychiatric: Normal mood and affect. Behavior is normal.   Skin: Skin is warm and dry.  Surgical incision to RLE, fully approximated, no drainage noted, no signs of infection    Recent Labs   Lab 04/03/25  0645   GLUCOSE 80   *   K 4.0   CL 97   CO2 26   BUN 20   CREATININE 0.6   CALCIUM 8.9   MG 2.1       Recent Labs   Lab 04/03/25  0645   WBC 6.13   RBC 2.75*   HGB 8.7*   HCT 27.4*      *   MCH 31.6*   MCHC 31.8*       No results for input(s): "POCTGLUCOSE" in the last 168 hours.     Assessment and Plan:    Acute on chronic congestive heart failure  Aortic stenosis  Pulmonary hypertension  - [place] [facility] [place] with decompensation, BNP was 1,625  - diuresed with IV Lasix  - most recent echo reviewed from 3/21/25, EF 65-70%, severely dilated left atrium, mod to severe AS, PASP 44 " mmHg  - monitor I&Os, daily weights  - goal is euvolemia given aortic stenosis  - continue Bumex 1 mg daily (increased from previous home dose of 0.5), HCTZ 12.5 mg daily, metoprolol XL 25 daily    Pneumonia  Acute hypoxic respiratory failure  - patient admitted back to ED on 03/20 with suspected pneumonia  - initially treated with IV Vanc/Zosyn  -  Abx deescalated to PO, continue Augmentin and doxycycline x3 more days, ending on 03/29  - patient has been successfully weaned to room air  - continue IS, OOB    Periprosthetic fracture of shaft of right femur   s/p ORIF on 3/8/2025  - PT/OT, RLE WBAT  - DVT PPX with apixaban  - Monitor and report drainage to incisional site  - Fall precautions  - Bowel regimen in place, hold for loose or frequent stools  - Ortho NEETU to see patient intermittently at SNF  - follow-up with orthopedics after discharge  - sutures removed by Orthopedics today    Acute postoperative pain  - stable, continue acetaminophen 650 mg q.8 hours, gabapentin 100 mg TID, continue tramadol 50 mg q.6 hours PRN, methocarbamol 500 mg q.6 hours PRN    Essential hypertension  - stable, continue amlodipine 5 mg daily, Bumex 1 mg daily, HCTZ 12.5 mg daily, metoprolol XL 50 mg daily, 25 mg qHS.    Cardiac pacemaker  - placed for sick sinus syndrome    Bradycardia  Paroxysmal atrial fibrillation  - home metoprolol XL 50 mg daily, 25 mg qHS  - stable, continue apixaban 5 mg BID, reducing metoprolol 25 mg BID at this time    Acquired hypothyroidism  - TSH elevated at 22.581, Free T4 0.83  - increased levothyroxine to 100 mcg daily  - will need TSH recheck as outpatient in 6 weeks- will inform PCP prior to discharge     Moderate protein-calorie malnutrition  - RD following, appreciate recommendations  - continue supplements as ordered     Acute blood loss anemia  Anemia of chronic disease  - patient received 2 unit RBC transfusion postoperatively  - transfuse hemoglobin < 7  - improving, continue monitor twice  weekly CBC    Hypokalemia  - continue KCl daily to 20 mEq    HX of CVA  - 4/5/2024 MRI showed acute left thalamus and hippocampus infarcts   - Not on statin due to prior intolerance (lower extremity myalgias). Reportedly has tried Repatha in the past and had similar reaction. Also had reaction to Praluent and Zetia     Carotid Artery disease    - continue home Plavix 75 mg daily     Depression, moderate, reoccurring  - continue home escitalopram 20 mg daily    Macular degeneration  Decreased vision  Hx of CRAO  Dry eyes  - continue home eye drops    Debility   - Continue with PT/OT for gait training and strengthening and restoration of ADL's   - Encourage mobility, OOB in chair, and early ambulation as appropriate  - Fall precautions   - Monitor for bowel and bladder dysfunction  - Monitor for and prevent skin breakdown and pressure ulcers  - Continue DVT prophylaxis with apixaban         Anticipate disposition:  Home with home health    IP [facility] RISK OF UNPLANNED READMISSION Model: HIGH     Follow-up needed during SNF stay-    Follow-up needed after discharge from SNF: PCP, orthopedics, cardiology     No future appointments.      I certify that SNF services are required to be given on an inpatient basis because Dorothy M Knobloch needs for skilled nursing care and/or skilled rehabilitation are required on a daily basis and such services can only practically be provided in a skilled nursing facility setting and are for an ongoing condition for which she received inpatient care in the hospital.     I spent 46 minutes reviewing patient records, examining, and counseling the patient/ patient's family with greater than 50% of the time spent in direct patient care and coordination.  son updated at bedside.    Angeles Aguero NP  Department of Hospital Medicine   [facility]     DOS: 4/3/2025       Patient note was created using MModal Dictation.  Any errors in syntax or even information  may not have been identified and edited on initial review prior to signing this note.

## 2025-04-03 NOTE — PROGRESS NOTES
[facility] - Skilled Nursing  Adult Nutrition  Progress Note    SUMMARY   Recommendations  Continue minced and moist, thin liquids,  boost plus chocolate BID,RD following  Goals: PO intake to meet 85% of EEN/EPN with ONS by next RD follow up  Nutrition Goal Status: progressing towards goal    Nutrition Discharge Planning    Nutrition Discharge Planning: Therapeutic diet (comments), Oral diet with texture modifications per SLP (comments)  Therapeutic diet (comments): low sodium, weight monitoring,  Oral supplement regimen (comments): boost plus BID or equalivent  Oral diet with texture modifications per SLP (comments): minced and moist    Assessment and Plan    Endocrine  Moderate protein-calorie malnutrition  Malnutrition Type:  Context: chronic illness  Level: moderate     Related to (etiology):   Decreased ability to consume sufficient energy 2/2 poor oral intake      Signs and Symptoms (as evidenced by):  Malnutrition Characteristic Summary:  Energy Intake (Malnutrition): less than 75% for greater than or equal to 1 month  Subcutaneous Fat (Malnutrition): moderate depletion  Muscle Mass (Malnutrition): moderate depletion        Interventions/Recommendations (treatment strategy):  Commercial beverage medical food supplement  Minced and moist diet  Thin liquids  Collaboration of nutrition professional with other providers            Malnutrition Assessment 3/14            Malnutrition Context: social/environmental circumstances  Malnutrition Level: moderate  Skin (Micronutrient): bruised, thinned, turgor reduced, wounds unhealed  Hair/Scalp (Micronutrient): dry  Teeth (Micronutrient): broken dentition  Tongue (Micronutrient): red  Neck/Chest (Micronutrient): muscle wasting  Musculoskeletal/Lower Extremities: muscle wasting       Weight Loss (Malnutrition): 10% in 6 months  Subcutaneous Fat (Malnutrition): mild depletion  Muscle Mass (Malnutrition): moderate depletion   Orbital Region (Subcutaneous Fat Loss):  "severe depletion  Upper Arm Region (Subcutaneous Fat Loss): mild depletion  Thoracic and Lumbar Region: mild depletion   [place] Region (Muscle Loss): mild depletion  Clavicle Bone Region (Muscle Loss): moderate depletion  Clavicle and Acromion Bone Region (Muscle Loss): moderate depletion  Dorsal Hand (Muscle Loss): moderate depletion  Patellar Region (Muscle Loss): mild depletion  Anterior Thigh Region (Muscle Loss): mild depletion  Posterior Calf Region (Muscle Loss): mild depletion                             Reason for Assessment    Reason For Assessment: RD follow-up  Diagnosis:  (HF)  General Information Comments: Patient returned to SNF after short hospitalization, started on lasix for diuresis, Abx added, family asking that ONS chocolate and texture modified diet be put back into place  Updated food preferences    Nutrition/Diet History    Nutrition Intake History: 3 meal day  Food Preferences: whole milk  Spiritual, Cultural Beliefs, Latter day Practices, Values that Affect Care: yes  Food Allergies:  (aspartame)  Factors Affecting Nutritional Intake: None identified at this time    Anthropometrics    Height: 5' 2" (157.5 cm)  Height (inches): 62 in  Height Method: Stated  Weight: 68.5 kg (151 lb 0.2 oz)  Weight (lb): 151.02 lb  Weight Method: Standard Scale  Ideal Body Weight (IBW), Female: 110 lb  % Ideal Body Weight, Female (lb): 134.88 %  BMI (Calculated): 27.6  Usual Body Weight (UBW), k.4 kg  Weight Change Amount:  (weight loss in 6 months 10% noted on intitial admit to SNF, loss of 6 # recently at acute readmit from diuresis)  % Usual Body Weight: 104.72  % Weight Change From Usual Weight: 4.5 %       Lab/Procedures/Meds    Pertinent Labs Reviewed: reviewed  Pertinent Labs Comments: Hg 8.7, Hct 27.4, Na 132,  Pertinent Medications Reviewed: reviewed  Pertinent Medications Comments: Levothyroxine, KCl    Estimated/Assessed Needs        Weight Used For Calorie Calculations: 57.5 kg (126 lb 12.2 " oz)  Energy Calorie Requirements (kcal): 7052-6114  Energy Need Method: [place]-St Jeor (x 1.4(PAL))  Protein Requirements: 69  Weight Used For Protein Calculations: 57.5 kg (126 lb 12.2 oz) (x 1.2 (PAL))  Fluid Requirements (mL): 1313 or per MD  RDA Method (mL): 1313                         Nutrition Prescription Ordered    Current Diet Order: minced and moist level 5,  Nutrition Order Comments: PO %  Oral Nutrition Supplement: boost plus BID chocolate    Evaluation of Received Nutrient/Fluid Intake    I/O: no data  Energy Calories Required: meeting needs  Protein Required: meeting needs  Fluid Required: meeting needs  Comments: LBM 4/3  Tolerance: tolerating (had to request texture modification once returned to SNF)  % Intake of Estimated Energy Needs: 75 - 100 %  % Meal Intake: 75 - 100 %    Nutrition Risk    Level of Risk/Frequency of Follow-up: low (one time per week)     Monitor and Evaluation    Monitor and Evaluation: Food and beverage intake, Diet order, Weight     Nutrition Follow-Up    RD Follow-up?: Yes

## 2025-04-03 NOTE — PLAN OF CARE
Problem: Fall Injury Risk  Goal: Absence of Fall and Fall-Related Injury  Intervention: Promote Injury-Free Environment  Flowsheets (Taken 4/3/2025 1051)  Safety Promotion/Fall Prevention: assistive device/personal item within reach

## 2025-04-03 NOTE — PT/OT/SLP PROGRESS
"Physical Therapy Treatment    Patient Name:  Dorothy M Knobloch   MRN:  [id number]  Admit Date: 3/26/2025  Admitting Diagnosis: Acute on chronic congestive heart failure  Recent Surgeries:     General Precautions: Standard, fall  Orthopedic Precautions: RLE weight bearing as tolerated  Braces: N/A    Recommendations:     Discharge Recommendations: home health PT  Level of Assistance Recommended at Discharge: 24 hours significant assistance  Discharge Equipment Recommendations: wheelchair  Barriers to discharge: Other (Comment), Decreased caregiver support (Increased assistance for mobility.)    Assessment:     Dorothy M Knobloch is a 89 y.o. female admitted with a medical diagnosis of Acute on chronic congestive heart failure . Pt tolerated well, some reports of R knee pain, nsg notified, meds given, pt would continue to benefit from skilled PT services to improve overall functional mobility, strength and endurance.  .      Performance deficits affecting function: weakness, impaired endurance, impaired functional mobility, impaired balance, gait instability, decreased upper extremity function, decreased lower extremity function, decreased coordination.    Rehab Potential is good    Activity Tolerance: Fair    Plan:     Patient to be seen 5 x/week to address the above listed problems via gait training, therapeutic activities, therapeutic exercises, neuromuscular re-education, wheelchair management/training    Plan of Care Expires: 04/26/25  Plan of Care Reviewed with: patient    Subjective     "Just this knee" pt agreeable to therapy.     Pain/Comfort:  Pain Rating 1: 2/10 (2 at rest inc with mvmt and transitional mvmt)  Location - Side 1: Right  Location - Orientation 1: generalized  Location 1: knee  Pain Addressed 1: Pre-medicate for activity, Reposition, Distraction, Cessation of Activity, Nurse notified  Pain Rating Post-Intervention 1: 2/10    Patient's cultural, spiritual, Latter day conflicts given the current " situation:  no    Objective:      Patient found  with  (in wc) upon PT entry to room.     Therapeutic Activities and Exercises: 2x10 reps AP,GS,LAQ,hip flex A/AA with RLE as needed within tolerance    Functional Mobility:  Transfers:     Sit to Stand:  moderate assistance with rolling walker and vcs for tech/positioning, step forward with RLE before sitting  Gait: amb with RW CGA step to gait pattern 50 ft slow miroslava    AM-PAC 6 CLICK MOBILITY  13    Patient left up in chair with call button in reach. Daughter present    GOALS:   Multidisciplinary Problems       Physical Therapy Goals          Problem: Physical Therapy    Goal Priority Disciplines Outcome Interventions   Physical Therapy Goal     PT, PT/OT     Description: Goals to be met by: 25     Patient will increase functional independence with mobility by performin. Supine to sit with Contact Guard Assistance  2. Sit to supine with Contact Guard Assistance  3. Rolling to Left and Right with Contact Guard Assistance  4. Sit to stand transfer with Contact Guard Assistance  5. Bed to chair transfer with Contact Guard Assistance using Rolling Walker  6. Gait  x 50 feet (progress distance as tolerated) with Contact Guard Assistance using Rolling Walker  7. Wheelchair propulsion x 50 feet (progress distance as tolerated) with Supervision using bilateral upper extremities                         Time Tracking:     PT Received On: 25  PT Start Time: 1433  PT Stop Time: 1505  PT Total Time (min): 32 min    Billable Minutes: Gait Training 15 and Therapeutic Exercise 17    Treatment Type: Treatment  PT/PTA: PTA     Number of PTA visits since last PT visit: 2025

## 2025-04-03 NOTE — PLAN OF CARE
Problem: Adult Inpatient Plan of Care  Goal: Plan of Care Review  Outcome: Progressing  Goal: Patient-Specific Goal (Individualized)  Outcome: Progressing  Goal: Absence of Hospital-Acquired Illness or Injury  Outcome: Progressing  Goal: Optimal Comfort and Wellbeing  Outcome: Progressing  Goal: Readiness for Transition of Care  Outcome: Progressing     Problem: Pneumonia  Goal: Fluid Balance  Outcome: Progressing  Goal: Resolution of Infection Signs and Symptoms  Outcome: Progressing  Goal: Effective Oxygenation and Ventilation  Outcome: Progressing     Problem: Wound  Goal: Optimal Coping  Outcome: Progressing  Goal: Optimal Functional Ability  Outcome: Progressing  Goal: Absence of Infection Signs and Symptoms  Outcome: Progressing  Goal: Improved Oral Intake  Outcome: Progressing  Goal: Optimal Pain Control and Function  Outcome: Progressing  Goal: Skin Health and Integrity  Outcome: Progressing  Goal: Optimal Wound Healing  Outcome: Progressing     Problem: Fall Injury Risk  Goal: Absence of Fall and Fall-Related Injury  Outcome: Progressing     Problem: Skin Injury Risk Increased  Goal: Skin Health and Integrity  Outcome: Progressing     Problem: Malnutrition  Goal: Improved Nutritional Intake  Outcome: Progressing     Problem: Fall Injury Risk  Goal: Absence of Fall and Fall-Related Injury  Outcome: Progressing     Problem: Fall Injury Risk  Goal: Absence of Fall and Fall-Related Injury  Outcome: Progressing

## 2025-04-03 NOTE — PT/OT/SLP PROGRESS
"Occupational Therapy   Treatment    Name: Dorothy M Knobloch  MRN: [id number]  Admit Date: 3/26/2025  Admitting Diagnosis:  Acute on chronic congestive heart failure    General Precautions: Standard, fall   Orthopedic Precautions: RLE weight bearing as tolerated   Braces: N/A    Recommendations:     Discharge Recommendations:  home health OT  Level of Assistance Recommended at Discharge: 24 hours physical assistance for all ADL's and home management tasks  Discharge Equipment Recommendations: wheelchair, 3-in-1 commode  Barriers to discharge:       Assessment:     Dorothy M Knobloch is a 89 y.o. female with a medical diagnosis of Acute on chronic congestive heart failure .  She presents with performance deficits affecting function are weakness, impaired endurance, decreased ROM, orthopedic precautions, impaired self care skills, impaired functional mobility, gait instability, impaired balance, decreased safety awareness, impaired skin, pain.   Pt participated well during today's session. Pt tolerated tx session without incident and is making progress, however, continues to demonstrate deficits with self care skills, balance, functional mobility, UB strength and endurance. Pt will benefit from continued OT services to progress towards goals.     Rehab Potential is good    Activity tolerance:  Good    Plan:     Patient to be seen 5 x/week to address the above listed problems via self-care/home management, therapeutic activities, therapeutic exercises    Plan of Care Expires: 04/27/25  Plan of Care Reviewed with: patient    Subjective   "I have to use the bathroom"  Communicated with: Tony prior to session.     Pain/Comfort:  Pain Rating 1: 7/10  Location - Side 1: Bilateral  Location - Orientation 1: generalized  Location 1: shoulder  Pain Addressed 1: Reposition, Cessation of Activity  Pain Rating Post-Intervention 1:  (not rated)    Patient's cultural, spiritual, Episcopal conflicts given the current " situation:  no    Objective:     Patient found up in chair with upon OT entry to room.    Functional Mobility/Transfers:  Patient completed Sit <> Stand Transfer with moderate assistance  with  grab bars(s)   Patient completed Toilet Transfer Stand Pivot technique with moderate assistance with  grab bars    Activities of Daily Living:  Grooming: modified independence to perform hand hygiene seated in w/c at sink   Lower Body Dressing: maximal assistance to doff/don pants and manage over hips in stance with use of grab bars.   Toileting: stand by assistance to perform chantal hygiene seated on 3-in-1 commode over standard toilet.     [facility] 6 Click ADL: 18    OT Exercises: UE Ergometer 10 min with min resistance   AROM x 2 sets of 10 with 2 lb dowel. Pt perform shoulder flex/ext, forward flex motion and bicep curls.   Therex performed to improve overall endurance, ROM and UB strength required for functional transfers, ADL's and w/c propulsion.     Treatment & Education:  Pt educated on:  - role of OT  - level of assistance  - energy conservation and task modification to maximize independence with ADL's and mobility   -  safety while performing functional transfers and self care tasks  - orthopedic precautions.   - progress towards OT goals     Patient left up in chair with call button in reach and daughter present    GOALS:   Multidisciplinary Problems       Occupational Therapy Goals          Problem: Occupational Therapy    Goal Priority Disciplines Outcome Interventions   Occupational Therapy Goal     OT, PT/OT Progressing    Description: Goals to be met by: 4/27/2025     Patient will increase functional independence with ADLs by performing:    UE Dressing with Stand-by Assistance.  LE Dressing with Minimal Assistance- Ongoing  Toileting from toilet with Minimal Assistance for hygiene and clothing management- Ongoing.   Bathing from  sitting at sink with Minimal Assistance.  Toilet transfer to toilet with Minimal  Assistance- Ongoing                         Time Tracking:     OT Date of Treatment: 04/03/25  OT Start Time: 1317    OT Stop Time: 1358  OT Total Time (min): 41 min    Billable Minutes:Self Care/Home Management 14  Therapeutic Activity 14  Therapeutic Exercise 13    4/3/2025

## 2025-04-04 PROCEDURE — 97110 THERAPEUTIC EXERCISES: CPT | Mod: CQ

## 2025-04-04 PROCEDURE — 11000004 HC SNF PRIVATE

## 2025-04-04 PROCEDURE — 97535 SELF CARE MNGMENT TRAINING: CPT | Mod: CO

## 2025-04-04 PROCEDURE — 94761 N-INVAS EAR/PLS OXIMETRY MLT: CPT

## 2025-04-04 PROCEDURE — 97116 GAIT TRAINING THERAPY: CPT | Mod: CQ

## 2025-04-04 PROCEDURE — 25000003 PHARM REV CODE 250: Performed by: NURSE PRACTITIONER

## 2025-04-04 PROCEDURE — 25000003 PHARM REV CODE 250: Performed by: HOSPITALIST

## 2025-04-04 PROCEDURE — 97530 THERAPEUTIC ACTIVITIES: CPT | Mod: CO

## 2025-04-04 RX ORDER — DICLOFENAC SODIUM 10 MG/G
2 GEL TOPICAL 3 TIMES DAILY
Status: DISCONTINUED | OUTPATIENT
Start: 2025-04-04 | End: 2025-04-15

## 2025-04-04 RX ORDER — HYDROCHLOROTHIAZIDE 12.5 MG/1
25 TABLET ORAL DAILY
Status: DISCONTINUED | OUTPATIENT
Start: 2025-04-05 | End: 2025-04-07

## 2025-04-04 RX ADMIN — HYDROCHLOROTHIAZIDE 12.5 MG: 12.5 TABLET ORAL at 08:04

## 2025-04-04 RX ADMIN — GABAPENTIN 100 MG: 300 CAPSULE ORAL at 03:04

## 2025-04-04 RX ADMIN — GABAPENTIN 100 MG: 300 CAPSULE ORAL at 08:04

## 2025-04-04 RX ADMIN — GABAPENTIN 100 MG: 300 CAPSULE ORAL at 09:04

## 2025-04-04 RX ADMIN — SENNOSIDES AND DOCUSATE SODIUM 1 TABLET: 50; 8.6 TABLET ORAL at 09:04

## 2025-04-04 RX ADMIN — POTASSIUM CHLORIDE 20 MEQ: 1500 TABLET, EXTENDED RELEASE ORAL at 08:04

## 2025-04-04 RX ADMIN — ACETAMINOPHEN 650 MG: 500 TABLET ORAL at 09:04

## 2025-04-04 RX ADMIN — DICLOFENAC SODIUM 2 G: 10 GEL TOPICAL at 04:04

## 2025-04-04 RX ADMIN — BRIMONIDINE TARTRATE 1 DROP: 1.5 SOLUTION OPHTHALMIC at 09:04

## 2025-04-04 RX ADMIN — CLOPIDOGREL 75 MG: 75 TABLET ORAL at 08:04

## 2025-04-04 RX ADMIN — TIMOLOL MALEATE 1 DROP: 5 SOLUTION OPHTHALMIC at 08:04

## 2025-04-04 RX ADMIN — TIMOLOL MALEATE 1 DROP: 5 SOLUTION OPHTHALMIC at 09:04

## 2025-04-04 RX ADMIN — APIXABAN 5 MG: 5 TABLET, FILM COATED ORAL at 08:04

## 2025-04-04 RX ADMIN — ESCITALOPRAM OXALATE 20 MG: 10 TABLET ORAL at 08:04

## 2025-04-04 RX ADMIN — DICLOFENAC SODIUM 2 G: 10 GEL TOPICAL at 09:04

## 2025-04-04 RX ADMIN — METOPROLOL SUCCINATE 25 MG: 25 TABLET, EXTENDED RELEASE ORAL at 08:04

## 2025-04-04 RX ADMIN — ACETAMINOPHEN 650 MG: 500 TABLET ORAL at 06:04

## 2025-04-04 RX ADMIN — METOPROLOL SUCCINATE 25 MG: 25 TABLET, EXTENDED RELEASE ORAL at 09:04

## 2025-04-04 RX ADMIN — LEVOTHYROXINE SODIUM 100 MCG: 0.05 TABLET ORAL at 06:04

## 2025-04-04 RX ADMIN — ACETAMINOPHEN 650 MG: 500 TABLET ORAL at 01:04

## 2025-04-04 RX ADMIN — BUMETANIDE 1 MG: 1 TABLET ORAL at 08:04

## 2025-04-04 RX ADMIN — BRIMONIDINE TARTRATE 1 DROP: 1.5 SOLUTION OPHTHALMIC at 08:04

## 2025-04-04 RX ADMIN — SENNOSIDES AND DOCUSATE SODIUM 1 TABLET: 50; 8.6 TABLET ORAL at 08:04

## 2025-04-04 RX ADMIN — ERYTHROMYCIN: 5 OINTMENT OPHTHALMIC at 09:04

## 2025-04-04 RX ADMIN — APIXABAN 5 MG: 5 TABLET, FILM COATED ORAL at 09:04

## 2025-04-04 NOTE — PROGRESS NOTES
"                                                        [facility]                                  Skilled Nursing Facility                   Progress Note     Admit Date: 3/26/2025  ISH 4/16/2025  [id number]  Principal Problem:  Acute on chronic congestive heart failure   HPI obtained from patient interview and chart review     Chief Complaint: Re-evaluation of medical treatment and therapy status, increased swelling    HPI:   Mrs. Knobloch is a 89 year old female PMHx of PAF on Apixiban, HTN, chronic diastolic HF, cardiac pacemaker due to sick sinus syndrome, CVA and hypothyroidism who presents to SNF following hospitalization for PNA.  Patient with previous SNF admission on 03/13 for Right periprosthetic femoral shaft fracture below a total hip replacement s/p ORIF on 3/8/25 with Dr. William.  Admission to SNF for secondary weakness and debility    Interval history:  24 hr vital sign ranges reviewed and listed below. HRs borderline low. Weight with increased today, patient also feels that surgical leg is a bit tighter.  We discussed methods for her to help reduce the swelling, will also increase her diuretic slightly..  States pain is controlled.  Patient denies shortness of breath, abdominal discomfort, nausea, or vomiting.  Patient reports an adequate appetite.  Patient denies dysuria.  Patient reports having regular bowel movements.  Patient progessing with PT/OT- Pt ambulated with RW CGA 66 ft step to gait pattern, slow miroslava occ standing brief rest break wc follow, inc BUE support to off load LEs "gotta go further than yesterday". Continuing to follow and treat all acute and chronic conditions.    Past Medical History: Patient has a past medical history of Acquired hypothyroidism (12/13/2013), Age-related physical debility (04/05/2024), Antiplatelet or antithrombotic long-term use, AV block, 3rd degree (03/19/2019), Bilateral nonexudative age-related macular degeneration " (08/27/2013), Blindness and low vision (08/10/2018), Blood transfusion, Cardiac pacemaker (08/22/2018), Carotid artery disease (08/10/2016), Chronic diastolic congestive heart failure (11/24/2019), CRAO (central retinal artery occlusion), left (01/29/2018), Dry eye syndrome of both eyes (08/10/2018), Essential hypertension (12/13/2013), History of TIA (transient ischemic attack) (12/13/2013), Nonexudative age-related macular degeneration, bilateral, intermediate dry stage (07/12/2018), Nuclear sclerotic cataract of left eye (08/10/2018), 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    Past Surgical History: Patient has a past surgical history that includes Tonsillectomy; Joint replacement; Skin biopsy; Total hip arthroplasty (11/01/2000); Cardiac pacemaker placement; Cataract extraction (Right); pr transcran dopp intracran, emboli w/o inj (01/29/2018); Carpal tunnel release (Bilateral, 1991); and ORIF femur fracture (Right, 3/8/2025).    Social History: Patient reports that she has never smoked. She has never been exposed to tobacco smoke. She has never used smokeless tobacco. She reports that she does not drink alcohol and does not use drugs.    Family History: family history includes Arthritis in her maternal grandmother, paternal grandmother, and sister; Birth defects in her son; Cancer (age of onset: 80) in her paternal aunt; Diabetes in her paternal aunt and paternal uncle; Early death in her father and mother; Hearing loss in her daughter; Hypertension in her paternal grandmother and son.    Allergies: Patient is allergic to  "aspartame.    ROS  Constitutional: Negative for fever   Eyes: Negative for blurred vision, double vision   Respiratory: Negative for cough, shortness of breath   Cardiovascular: Negative for chest pain, palpitations  Gastrointestinal: Negative for abdominal pain, constipation, diarrhea, nausea, vomiting.   Genitourinary: Negative for dysuria, frequency   Musculoskeletal:  + generalized weakness. Negative for back pain and myalgias.   Skin: Negative for itching and rash.   Neurological: Negative for dizziness, headaches.   Psychiatric/Behavioral: Negative for depression. The patient is not nervous/anxious.      24 hour Vital Sign Range   Temp:  [97.9 °F (36.6 °C)-98.6 °F (37 °C)]   Pulse:  [65-74]   Resp:  [18]   BP: (123-156)/(58-69)   SpO2:  [94 %-95 %]     Current BMI: Body mass index is 28.19 kg/m².    PEx  Constitutional: Patient appears debilitated.  No distress noted  HENT:   Head: Normocephalic and atraumatic.   Eyes: Pupils are equal, round  Neck: Normal range of motion. Neck supple.   Cardiovascular: Normal rate, regular rhythm and normal heart sounds.    Pulmonary/Chest: Effort normal and breath sounds are clear  Abdominal: Soft. Bowel sounds are normal.   Musculoskeletal: Normal range of motion.   Neurological: Alert and oriented to person, place, and time.   Psychiatric: Normal mood and affect. Behavior is normal.   Skin: Skin is warm and dry.  Surgical incision to RLE, fully approximated, no drainage noted, no signs of infection- associated swelling    No results for input(s): "GLUCOSE", "NA", "K", "CL", "CO2", "BUN", "CREATININE", "CALCIUM", "MG" in the last 24 hours.      No results for input(s): "WBC", "RBC", "HGB", "HCT", "PLT", "MCV", "MCH", "MCHC" in the last 24 hours.      No results for input(s): "POCTGLUCOSE" in the last 168 hours.     Assessment and Plan:    Acute on chronic congestive heart failure  Aortic stenosis  Pulmonary hypertension  - [place] [facility] [place] with " decompensation, BNP was 1,625  - diuresed with IV Lasix  - most recent echo reviewed from 3/21/25, EF 65-70%, severely dilated left atrium, mod to severe AS, PASP 44 mmHg  - monitor I&Os, daily weights  - goal is euvolemia given aortic stenosis  - weight gain today, continue Bumex 1 mg daily (increased from previous home dose of 0.5),  metoprolol XL 25 daily.  Increase HCTZ to 25 mg daily    Pneumonia  Acute hypoxic respiratory failure  - patient admitted back to ED on 03/20 with suspected pneumonia  - initially treated with IV Vanc/Zosyn  -  Abx deescalated to PO, continue Augmentin and doxycycline x3 more days, ending on 03/29  - patient has been successfully weaned to room air  - continue IS, OOB    Periprosthetic fracture of shaft of right femur   s/p ORIF on 3/8/2025  - PT/OT, RLE WBAT  - DVT PPX with apixaban  - Monitor and report drainage to incisional site  - Fall precautions  - Bowel regimen in place, hold for loose or frequent stools  - Ortho NEETU to see patient intermittently at SNF  - follow-up with orthopedics after discharge  - sutures removed by Orthopedics today    Acute postoperative pain  - stable, continue acetaminophen 650 mg q.8 hours, gabapentin 100 mg TID, continue tramadol 50 mg q.6 hours PRN, methocarbamol 500 mg q.6 hours PRN    Essential hypertension  - stable, continue amlodipine 5 mg daily, Bumex 1 mg daily, HCTZ 12.5 mg daily, metoprolol XL 50 mg daily, 25 mg qHS.    Cardiac pacemaker  - placed for sick sinus syndrome    Bradycardia  Paroxysmal atrial fibrillation  - home metoprolol XL 50 mg daily, 25 mg qHS  - stable, continue apixaban 5 mg BID, reducing metoprolol 25 mg BID at this time    Acquired hypothyroidism  - TSH elevated at 22.581, Free T4 0.83  - increased levothyroxine to 100 mcg daily  - will need TSH recheck as outpatient in 6 weeks- will inform PCP prior to discharge     Moderate protein-calorie malnutrition  - RD following, appreciate recommendations  - continue  supplements as ordered     Acute blood loss anemia  Anemia of chronic disease  - patient received 2 unit RBC transfusion postoperatively  - transfuse hemoglobin < 7  - improving, continue monitor twice weekly CBC    Hypokalemia  - continue KCl daily to 20 mEq    HX of CVA  - 4/5/2024 MRI showed acute left thalamus and hippocampus infarcts   - Not on statin due to prior intolerance (lower extremity myalgias). Reportedly has tried Repatha in the past and had similar reaction. Also had reaction to Praluent and Zetia     Carotid Artery disease    - continue home Plavix 75 mg daily     Depression, moderate, reoccurring  - continue home escitalopram 20 mg daily    Macular degeneration  Decreased vision  Hx of CRAO  Dry eyes  - continue home eye drops    Debility   - Continue with PT/OT for gait training and strengthening and restoration of ADL's   - Encourage mobility, OOB in chair, and early ambulation as appropriate  - Fall precautions   - Monitor for bowel and bladder dysfunction  - Monitor for and prevent skin breakdown and pressure ulcers  - Continue DVT prophylaxis with apixaban         Anticipate disposition:  Home with home health    IP OHS RISK OF UNPLANNED READMISSION Model: HIGH     Follow-up needed during SNF stay-    Follow-up needed after discharge from SNF: PCP, orthopedics, cardiology     No future appointments.      I certify that SNF services are required to be given on an inpatient basis because Dorothy M Knobloch needs for skilled nursing care and/or skilled rehabilitation are required on a daily basis and such services can only practically be provided in a skilled nursing facility setting and are for an ongoing condition for which she received inpatient care in the hospital.     I spent 47 minutes reviewing patient records, examining, and counseling the patient/ patient's family with greater than 50% of the time spent in direct patient care and coordination.  son updated at bedside.    Angeles Aguero,  NP  Department of Hospital Medicine   [facility]     DOS: 4/4/2025       Patient note was created using MModal Dictation.  Any errors in syntax or even information may not have been identified and edited on initial review prior to signing this note.

## 2025-04-04 NOTE — PT/OT/SLP PROGRESS
"Occupational Therapy   Treatment    Name: Dorothy M Knobloch  MRN: [id number]  Admit Date: 3/26/2025  Admitting Diagnosis:  Acute on chronic congestive heart failure    General Precautions: Standard, fall   Orthopedic Precautions: RLE weight bearing as tolerated   Braces: N/A    Recommendations:     Discharge Recommendations:  home health OT  Level of Assistance Recommended at Discharge: 24 hours physical assistance for all ADL's and home management tasks  Discharge Equipment Recommendations: wheelchair, 3-in-1 commode  Barriers to discharge:       Assessment:     Dorothy M Knobloch is a 89 y.o. female with a medical diagnosis of Acute on chronic congestive heart failure .  She presents with performance deficits affecting function are weakness, impaired endurance, decreased ROM, orthopedic precautions, impaired self care skills, impaired functional mobility, gait instability, impaired balance, decreased safety awareness, impaired skin, pain. Pt participated well during today's session. Pt tolerated tx session without incident and is making progress, however, continues to demonstrate deficits with self care skills, balance, functional mobility, UB strength and endurance. Pt will benefit from continued OT services to progress towards goals.     Rehab Potential is good    Activity tolerance:  Good    Plan:     Patient to be seen 5 x/week to address the above listed problems via self-care/home management, therapeutic activities, therapeutic exercises    Plan of Care Expires: 04/27/25  Plan of Care Reviewed with: patient    Subjective   "Thank you for fixing my phone"  Communicated with: Tony prior to session.     Pain/Comfort:  Pain Rating 1: 0/10  Pain Rating Post-Intervention 1: 0/10    Patient's cultural, spiritual, Latter-day conflicts given the current situation:  no    Objective:     Patient found HOB elevated with PureWick, pressure relief boots upon OT entry to room.    Bed Mobility:    Patient completed " Rolling/Turning to Left with  minimum assistance and with side rail  Patient completed Rolling/Turning to Right with minimum assistance and with side rail  Patient completed Scooting/Bridging with minimum assistance  Patient completed Supine to Sit with minimum assistance and with side rail     Functional Mobility/Transfers:  Patient completed Sit <> Stand Transfer x 2 with minimum assistance and moderate assistance  with  rolling walker   Patient completed Bed <> Chair Transfer using Step Transfer technique with minimum assistance with rolling walker  Patient completed Toilet Transfer Stand Pivot technique with minimum assistance with  grab bars    Activities of Daily Living:  Grooming: set up assistance to perform oral/facial hygiene seated in w/c at sink.    Lower Body Dressing: moderate assistance to thread B LE and manage pants over hips in stance with pt use grab bars for balance aspects.   Toileting: x 2 trials - maximal assistance to perform chantal hygiene at bed level                                      - minimum assistance for clothing management in stance.     [facility] 6 Click ADL: 18    Treatment & Education:  Pt educated on:  - role of OT  - level of assistance  - energy conservation and task modification to maximize independence with ADL's and mobility   -  safety while performing functional transfers and self care tasks  - orthopedic precautions.   - progress towards OT goals     Patient left up in chair with call button in reach    GOALS:   Multidisciplinary Problems       Occupational Therapy Goals          Problem: Occupational Therapy    Goal Priority Disciplines Outcome Interventions   Occupational Therapy Goal     OT, PT/OT Progressing    Description: Goals to be met by: 4/27/2025     Patient will increase functional independence with ADLs by performing:    UE Dressing with Stand-by Assistance.  LE Dressing with Minimal Assistance- Ongoing  Toileting from toilet with Minimal Assistance for hygiene  and clothing management- Ongoing.   Bathing from  sitting at sink with Minimal Assistance.  Toilet transfer to toilet with Minimal Assistance- Ongoing                         Time Tracking:     OT Date of Treatment: 04/04/25  OT Start Time: 0840    OT Stop Time: 0925  OT Total Time (min): 45 min    Billable Minutes:Self Care/Home Management 27  Therapeutic Activity 18    4/4/2025

## 2025-04-04 NOTE — PLAN OF CARE
Problem: Fall Injury Risk  Goal: Absence of Fall and Fall-Related Injury  Outcome: Progressing  Intervention: Promote Injury-Free Environment  Flowsheets (Taken 4/4/2025 6220)  Safety Promotion/Fall Prevention: assistive device/personal item within reach

## 2025-04-05 PROCEDURE — 97535 SELF CARE MNGMENT TRAINING: CPT

## 2025-04-05 PROCEDURE — 11000004 HC SNF PRIVATE

## 2025-04-05 PROCEDURE — 25000003 PHARM REV CODE 250: Performed by: NURSE PRACTITIONER

## 2025-04-05 PROCEDURE — 25000003 PHARM REV CODE 250: Performed by: HOSPITALIST

## 2025-04-05 RX ADMIN — HYDROCHLOROTHIAZIDE 25 MG: 12.5 TABLET ORAL at 09:04

## 2025-04-05 RX ADMIN — POTASSIUM CHLORIDE 20 MEQ: 1500 TABLET, EXTENDED RELEASE ORAL at 09:04

## 2025-04-05 RX ADMIN — BRIMONIDINE TARTRATE 1 DROP: 1.5 SOLUTION OPHTHALMIC at 09:04

## 2025-04-05 RX ADMIN — ESCITALOPRAM OXALATE 20 MG: 10 TABLET ORAL at 09:04

## 2025-04-05 RX ADMIN — GABAPENTIN 100 MG: 300 CAPSULE ORAL at 09:04

## 2025-04-05 RX ADMIN — APIXABAN 5 MG: 5 TABLET, FILM COATED ORAL at 09:04

## 2025-04-05 RX ADMIN — TIMOLOL MALEATE 1 DROP: 5 SOLUTION OPHTHALMIC at 09:04

## 2025-04-05 RX ADMIN — ACETAMINOPHEN 650 MG: 500 TABLET ORAL at 01:04

## 2025-04-05 RX ADMIN — DICLOFENAC SODIUM 2 G: 10 GEL TOPICAL at 02:04

## 2025-04-05 RX ADMIN — DICLOFENAC SODIUM 2 G: 10 GEL TOPICAL at 09:04

## 2025-04-05 RX ADMIN — GABAPENTIN 100 MG: 300 CAPSULE ORAL at 02:04

## 2025-04-05 RX ADMIN — ACETAMINOPHEN 650 MG: 500 TABLET ORAL at 06:04

## 2025-04-05 RX ADMIN — CLOPIDOGREL 75 MG: 75 TABLET ORAL at 09:04

## 2025-04-05 RX ADMIN — SENNOSIDES AND DOCUSATE SODIUM 1 TABLET: 50; 8.6 TABLET ORAL at 09:04

## 2025-04-05 RX ADMIN — ACETAMINOPHEN 650 MG: 500 TABLET ORAL at 10:04

## 2025-04-05 RX ADMIN — ERYTHROMYCIN: 5 OINTMENT OPHTHALMIC at 09:04

## 2025-04-05 RX ADMIN — LEVOTHYROXINE SODIUM 100 MCG: 0.05 TABLET ORAL at 06:04

## 2025-04-05 RX ADMIN — METOPROLOL SUCCINATE 25 MG: 25 TABLET, EXTENDED RELEASE ORAL at 09:04

## 2025-04-05 NOTE — PLAN OF CARE
Problem: Adult Inpatient Plan of Care  Goal: Plan of Care Review  Outcome: Progressing  Goal: Patient-Specific Goal (Individualized)  Outcome: Progressing  Goal: Absence of Hospital-Acquired Illness or Injury  Outcome: Progressing  Goal: Optimal Comfort and Wellbeing  Outcome: Progressing  Goal: Readiness for Transition of Care  Outcome: Progressing     Problem: Pneumonia  Goal: Fluid Balance  Outcome: Progressing  Goal: Resolution of Infection Signs and Symptoms  Outcome: Progressing  Goal: Effective Oxygenation and Ventilation  Outcome: Progressing     Problem: Wound  Goal: Optimal Coping  Outcome: Progressing  Goal: Optimal Functional Ability  Outcome: Progressing  Goal: Absence of Infection Signs and Symptoms  Outcome: Progressing  Goal: Improved Oral Intake  Outcome: Progressing  Goal: Optimal Pain Control and Function  Outcome: Progressing  Goal: Skin Health and Integrity  Outcome: Progressing  Goal: Optimal Wound Healing  Outcome: Progressing     Problem: Fall Injury Risk  Goal: Absence of Fall and Fall-Related Injury  Outcome: Progressing     Problem: Skin Injury Risk Increased  Goal: Skin Health and Integrity  Outcome: Progressing     Problem: Malnutrition  Goal: Improved Nutritional Intake  Outcome: Progressing     Problem: Fall Injury Risk  Goal: Absence of Fall and Fall-Related Injury  Outcome: Progressing     Problem: Fall Injury Risk  Goal: Absence of Fall and Fall-Related Injury  Outcome: Progressing     Problem: Fall Injury Risk  Goal: Absence of Fall and Fall-Related Injury  Outcome: Progressing     Problem: Fall Injury Risk  Goal: Absence of Fall and Fall-Related Injury  Outcome: Progressing     Problem: Fall Injury Risk  Goal: Absence of Fall and Fall-Related Injury  Outcome: Progressing

## 2025-04-05 NOTE — PT/OT/SLP PROGRESS
"Occupational Therapy   Treatment    Name: Dorothy M Knobloch  MRN: [id number]  Admit Date: 3/26/2025  Admitting Diagnosis:  Acute on chronic congestive heart failure    General Precautions: Standard, fall   Orthopedic Precautions: RLE weight bearing as tolerated   Braces: N/A    Recommendations:     Discharge Recommendations:  home health OT  Level of Assistance Recommended at Discharge: 24 hours light assistance for ADL's and homemaking tasks  Discharge Equipment Recommendations: hip kit, wheelchair, 3-in-1 commode  Barriers to discharge:  Decreased caregiver support    Assessment:     Dorothy M Knobloch is a 89 y.o. female with a medical diagnosis of Acute on chronic congestive heart failure.  She presents with good participation in treatment session focused on morning self care routine. Performance deficits affecting function are weakness, impaired endurance, impaired self care skills, impaired functional mobility, gait instability, impaired balance, decreased lower extremity function, decreased ROM, pain, impaired cardiopulmonary response to activity, orthopedic precautions. Pt is making progress, mod I form grooming and min to mod assist for sit <> stand using adaptive equipment. Pt would continue to benefit from skilled OT services to maximize functional independence with ADLs and functional mobility, reduce caregiver burden, and facilitate safe discharge in the least restrictive environment.     Rehab Potential is good    Activity tolerance:  Fair    Plan:     Patient to be seen 5 x/week to address the above listed problems via self-care/home management, therapeutic activities, therapeutic exercises    Plan of Care Expires: 04/27/25  Plan of Care Reviewed with: patient    Subjective     Communicated with: RN prior to session. Pt stated, " I did good" .    Pain/Comfort:  Pain Rating 1: 0/10  Pain Rating Post-Intervention 1: 0/10    Patient's cultural, spiritual, Presybeterian conflicts given the current " situation:  yes    Objective:     Patient found supine with   upon OT entry to room.     04/05/25 1017   Oral Hygiene   Was the activity attempted? Yes   Was the activity done independently? Yes   Was adaptive equipment used? Yes  (seated in wc at sink)   CARE Score - Oral Hygiene 6   Toileting Hygiene   Was the activity attempted? Yes   Was the activity done independently? No   Assistance Needed Physical assistance   Physical Assistance Level Less than half   Was adaptive equipment used? Yes  (BSC donned over toilet, grab bar)   CARE Score - Toileting Hygiene 3   Putting On/Taking Off Footwear   Was the activity attempted? Yes   Was the activity done independently? No   Assistance Needed Physical assistance   Physical Assistance Level More than half   Was adaptive equipment used? No   CARE Score - Putting On/Taking Off Footwear 2   Personal Hygiene   Was the activity attempted? Yes   Was the activity done independently? Yes   Was adaptive equipment used? Yes  (seated in wc at sink)   CARE Score - Personal Hygiene 6   Lying to Sitting on Side of Bed   Was the activity attempted? Yes   Was the activity done independently? No   Assistance Needed Physical assistance   Physical Assistance Level Less than half   Was adaptive equipment used? Yes  (bed rail and head of bed slightly elevated)   CARE Score - Lying to Sitting on Side of Bed 3   Sit to Stand   Was the activity attempted? Yes   Was the activity done independently? No   Assistance Needed Physical assistance   Physical Assistance Level Less than half   Was adaptive equipment used? Yes  (RW and grab rail in bathroom)   CARE Score - Sit to Stand 3   Chair/Bed-to-Chair Transfer   Was the activity attempted? Yes   Was the activity done independently? No   Assistance Needed Physical assistance   Physical Assistance Level Less than half   Was adaptive equipment used? Yes  (RW)   CARE Score - Chair/Bed-to-Chair Transfer 3   Toilet Transfer   Was the activity attempted?  Yes   Was the activity done independently? No   Assistance Needed Physical assistance   Physical Assistance Level Less than half   Was adaptive equipment used? Yes  (grab rail)   CARE Score - Toilet Transfer 3   Tub/Shower Transfer   Was the activity attempted? No   Reason if not Attempted Safety concerns   CARE Score - Tub/Shower Transfer 88     [facility] 6 Click ADL: 20        Treatment & Education:  -Education on energy conservation and task modification to maximize safety and (I) during ADLs and mobility  -Education on importance of OOB activity to improve overall activity tolerance and promote recovery  -Pt educated to call for assistance and to transfer with hospital staff only  -Provided education regarding role of OT, POC, & discharge recommendations with pt verbalizing understanding.  Pt had no further questions & when asked whether there were any concerns pt reported none.    Patient left up in chair with call button in reach and RN notified    GOALS:   Multidisciplinary Problems       Occupational Therapy Goals          Problem: Occupational Therapy    Goal Priority Disciplines Outcome Interventions   Occupational Therapy Goal     OT, PT/OT Progressing    Description: Goals to be met by: 4/27/2025     Patient will increase functional independence with ADLs by performing:    UE Dressing with Stand-by Assistance.  LE Dressing with Minimal Assistance- Ongoing  Toileting from toilet with Minimal Assistance for hygiene and clothing management- Ongoing.   Bathing from  sitting at sink with Minimal Assistance.  Toilet transfer to toilet with Minimal Assistance- Ongoing                         Time Tracking:     OT Date of Treatment: 04/05/25  OT Start Time: 0956    OT Stop Time: 1047  OT Total Time (min): 51 min    Billable Minutes:Self Care/Home Management 51    4/5/2025

## 2025-04-06 PROCEDURE — 11000004 HC SNF PRIVATE

## 2025-04-06 PROCEDURE — 25000003 PHARM REV CODE 250: Performed by: NURSE PRACTITIONER

## 2025-04-06 PROCEDURE — 25000003 PHARM REV CODE 250: Performed by: HOSPITALIST

## 2025-04-06 RX ADMIN — DICLOFENAC SODIUM 2 G: 10 GEL TOPICAL at 09:04

## 2025-04-06 RX ADMIN — ACETAMINOPHEN 650 MG: 500 TABLET ORAL at 09:04

## 2025-04-06 RX ADMIN — APIXABAN 5 MG: 5 TABLET, FILM COATED ORAL at 09:04

## 2025-04-06 RX ADMIN — HYDROCHLOROTHIAZIDE 25 MG: 12.5 TABLET ORAL at 09:04

## 2025-04-06 RX ADMIN — TIMOLOL MALEATE 1 DROP: 5 SOLUTION OPHTHALMIC at 09:04

## 2025-04-06 RX ADMIN — BRIMONIDINE TARTRATE 1 DROP: 1.5 SOLUTION OPHTHALMIC at 08:04

## 2025-04-06 RX ADMIN — POTASSIUM CHLORIDE 20 MEQ: 1500 TABLET, EXTENDED RELEASE ORAL at 09:04

## 2025-04-06 RX ADMIN — ACETAMINOPHEN 650 MG: 500 TABLET ORAL at 06:04

## 2025-04-06 RX ADMIN — GABAPENTIN 100 MG: 300 CAPSULE ORAL at 09:04

## 2025-04-06 RX ADMIN — SENNOSIDES AND DOCUSATE SODIUM 1 TABLET: 50; 8.6 TABLET ORAL at 09:04

## 2025-04-06 RX ADMIN — METOPROLOL SUCCINATE 25 MG: 25 TABLET, EXTENDED RELEASE ORAL at 09:04

## 2025-04-06 RX ADMIN — DICLOFENAC SODIUM 2 G: 10 GEL TOPICAL at 03:04

## 2025-04-06 RX ADMIN — BUMETANIDE 1 MG: 1 TABLET ORAL at 09:04

## 2025-04-06 RX ADMIN — ACETAMINOPHEN 650 MG: 500 TABLET ORAL at 02:04

## 2025-04-06 RX ADMIN — GABAPENTIN 100 MG: 300 CAPSULE ORAL at 03:04

## 2025-04-06 RX ADMIN — BRIMONIDINE TARTRATE 1 DROP: 1.5 SOLUTION OPHTHALMIC at 09:04

## 2025-04-06 RX ADMIN — ESCITALOPRAM OXALATE 20 MG: 10 TABLET ORAL at 09:04

## 2025-04-06 RX ADMIN — LEVOTHYROXINE SODIUM 100 MCG: 0.05 TABLET ORAL at 06:04

## 2025-04-06 RX ADMIN — CLOPIDOGREL 75 MG: 75 TABLET ORAL at 09:04

## 2025-04-06 RX ADMIN — ERYTHROMYCIN: 5 OINTMENT OPHTHALMIC at 09:04

## 2025-04-07 LAB
ABSOLUTE EOSINOPHIL (OHS): 0.15 K/UL
ABSOLUTE MONOCYTE (OHS): 0.66 K/UL (ref 0.3–1)
ABSOLUTE NEUTROPHIL COUNT (OHS): 3.7 K/UL (ref 1.8–7.7)
ANION GAP (OHS): 6 MMOL/L (ref 8–16)
BASOPHILS # BLD AUTO: 0.08 K/UL
BASOPHILS NFR BLD AUTO: 1.3 %
BUN SERPL-MCNC: 24 MG/DL (ref 10–30)
CALCIUM SERPL-MCNC: 9 MG/DL (ref 8.7–10.5)
CHLORIDE SERPL-SCNC: 91 MMOL/L (ref 95–110)
CO2 SERPL-SCNC: 30 MMOL/L (ref 23–29)
CREAT SERPL-MCNC: 0.6 MG/DL (ref 0.5–1.4)
ERYTHROCYTE [DISTWIDTH] IN BLOOD BY AUTOMATED COUNT: 16.7 % (ref 11.5–14.5)
GFR SERPLBLD CREATININE-BSD FMLA CKD-EPI: >60 ML/MIN/1.73/M2
GLUCOSE SERPL-MCNC: 89 MG/DL (ref 70–110)
HCT VFR BLD AUTO: 27.3 % (ref 37–48.5)
HGB BLD-MCNC: 8.7 GM/DL (ref 12–16)
IMM GRANULOCYTES # BLD AUTO: 0.09 K/UL (ref 0–0.04)
IMM GRANULOCYTES NFR BLD AUTO: 1.4 % (ref 0–0.5)
LYMPHOCYTES # BLD AUTO: 1.69 K/UL (ref 1–4.8)
MAGNESIUM SERPL-MCNC: 2 MG/DL (ref 1.6–2.6)
MCH RBC QN AUTO: 31.8 PG (ref 27–31)
MCHC RBC AUTO-ENTMCNC: 31.9 G/DL (ref 32–36)
MCV RBC AUTO: 100 FL (ref 82–98)
NUCLEATED RBC (/100WBC) (OHS): 0 /100 WBC
PHOSPHATE SERPL-MCNC: 3.4 MG/DL (ref 2.7–4.5)
PLATELET # BLD AUTO: 282 K/UL (ref 150–450)
PMV BLD AUTO: 8.8 FL (ref 9.2–12.9)
POTASSIUM SERPL-SCNC: 3.4 MMOL/L (ref 3.5–5.1)
RBC # BLD AUTO: 2.74 M/UL (ref 4–5.4)
RELATIVE EOSINOPHIL (OHS): 2.4 %
RELATIVE LYMPHOCYTE (OHS): 26.5 % (ref 18–48)
RELATIVE MONOCYTE (OHS): 10.4 % (ref 4–15)
RELATIVE NEUTROPHIL (OHS): 58 % (ref 38–73)
SODIUM SERPL-SCNC: 127 MMOL/L (ref 136–145)
WBC # BLD AUTO: 6.37 K/UL (ref 3.9–12.7)

## 2025-04-07 PROCEDURE — 97530 THERAPEUTIC ACTIVITIES: CPT

## 2025-04-07 PROCEDURE — 97110 THERAPEUTIC EXERCISES: CPT

## 2025-04-07 PROCEDURE — 80048 BASIC METABOLIC PNL TOTAL CA: CPT | Performed by: HOSPITALIST

## 2025-04-07 PROCEDURE — 36415 COLL VENOUS BLD VENIPUNCTURE: CPT | Performed by: HOSPITALIST

## 2025-04-07 PROCEDURE — 83735 ASSAY OF MAGNESIUM: CPT | Performed by: HOSPITALIST

## 2025-04-07 PROCEDURE — 97116 GAIT TRAINING THERAPY: CPT | Mod: CQ

## 2025-04-07 PROCEDURE — 11000004 HC SNF PRIVATE

## 2025-04-07 PROCEDURE — 25000003 PHARM REV CODE 250: Performed by: HOSPITALIST

## 2025-04-07 PROCEDURE — 84100 ASSAY OF PHOSPHORUS: CPT | Performed by: HOSPITALIST

## 2025-04-07 PROCEDURE — 97110 THERAPEUTIC EXERCISES: CPT | Mod: CQ

## 2025-04-07 PROCEDURE — 25000003 PHARM REV CODE 250: Performed by: NURSE PRACTITIONER

## 2025-04-07 PROCEDURE — 97535 SELF CARE MNGMENT TRAINING: CPT

## 2025-04-07 PROCEDURE — 85025 COMPLETE CBC W/AUTO DIFF WBC: CPT | Performed by: HOSPITALIST

## 2025-04-07 RX ORDER — HYDROCHLOROTHIAZIDE 12.5 MG/1
12.5 TABLET ORAL DAILY
Status: DISCONTINUED | OUTPATIENT
Start: 2025-04-08 | End: 2025-04-09

## 2025-04-07 RX ADMIN — BRIMONIDINE TARTRATE 1 DROP: 1.5 SOLUTION OPHTHALMIC at 09:04

## 2025-04-07 RX ADMIN — TIMOLOL MALEATE 1 DROP: 5 SOLUTION OPHTHALMIC at 10:04

## 2025-04-07 RX ADMIN — GABAPENTIN 100 MG: 300 CAPSULE ORAL at 10:04

## 2025-04-07 RX ADMIN — TIMOLOL MALEATE 1 DROP: 5 SOLUTION OPHTHALMIC at 09:04

## 2025-04-07 RX ADMIN — GABAPENTIN 100 MG: 300 CAPSULE ORAL at 03:04

## 2025-04-07 RX ADMIN — ACETAMINOPHEN 650 MG: 500 TABLET ORAL at 09:04

## 2025-04-07 RX ADMIN — POTASSIUM CHLORIDE 20 MEQ: 1500 TABLET, EXTENDED RELEASE ORAL at 10:04

## 2025-04-07 RX ADMIN — ACETAMINOPHEN 650 MG: 500 TABLET ORAL at 05:04

## 2025-04-07 RX ADMIN — TRAMADOL HYDROCHLORIDE 50 MG: 50 TABLET, COATED ORAL at 09:04

## 2025-04-07 RX ADMIN — ESCITALOPRAM OXALATE 20 MG: 10 TABLET ORAL at 10:04

## 2025-04-07 RX ADMIN — ERYTHROMYCIN: 5 OINTMENT OPHTHALMIC at 09:04

## 2025-04-07 RX ADMIN — SENNOSIDES AND DOCUSATE SODIUM 1 TABLET: 50; 8.6 TABLET ORAL at 10:04

## 2025-04-07 RX ADMIN — LEVOTHYROXINE SODIUM 100 MCG: 0.05 TABLET ORAL at 05:04

## 2025-04-07 RX ADMIN — APIXABAN 5 MG: 5 TABLET, FILM COATED ORAL at 10:04

## 2025-04-07 RX ADMIN — METOPROLOL SUCCINATE 25 MG: 25 TABLET, EXTENDED RELEASE ORAL at 10:04

## 2025-04-07 RX ADMIN — GABAPENTIN 100 MG: 300 CAPSULE ORAL at 09:04

## 2025-04-07 RX ADMIN — DICLOFENAC SODIUM 2 G: 10 GEL TOPICAL at 09:04

## 2025-04-07 RX ADMIN — ACETAMINOPHEN 650 MG: 500 TABLET ORAL at 03:04

## 2025-04-07 RX ADMIN — METOPROLOL SUCCINATE 25 MG: 25 TABLET, EXTENDED RELEASE ORAL at 09:04

## 2025-04-07 RX ADMIN — CLOPIDOGREL 75 MG: 75 TABLET ORAL at 10:04

## 2025-04-07 RX ADMIN — APIXABAN 5 MG: 5 TABLET, FILM COATED ORAL at 09:04

## 2025-04-07 RX ADMIN — DICLOFENAC SODIUM 2 G: 10 GEL TOPICAL at 08:04

## 2025-04-07 RX ADMIN — SENNOSIDES AND DOCUSATE SODIUM 1 TABLET: 50; 8.6 TABLET ORAL at 09:04

## 2025-04-07 RX ADMIN — DICLOFENAC SODIUM 2 G: 10 GEL TOPICAL at 03:04

## 2025-04-07 NOTE — PROGRESS NOTES
"                                                        [facility]                                  Skilled Nursing Facility                   Progress Note     Admit Date: 3/26/2025  ISH 4/16/2025  [id number]  Principal Problem:  Acute on chronic congestive heart failure   HPI obtained from patient interview and chart review     Chief Complaint: Re-evaluation of medical treatment and therapy status, lab review    HPI:   Mrs. Knobloch is a 89 year old female PMHx of PAF on Apixiban, HTN, chronic diastolic HF, cardiac pacemaker due to sick sinus syndrome, CVA and hypothyroidism who presents to SNF following hospitalization for PNA.  Patient with previous SNF admission on 03/13 for Right periprosthetic femoral shaft fracture below a total hip replacement s/p ORIF on 3/8/25 with Dr. William.  Admission to SNF for secondary weakness and debility    Interval history:  All of today's labs reviewed and are listed below.  Na decreased to 127, K3.4, likely due to increased diuretic therapy.  24 hr vital sign ranges reviewed and listed below.  Bilateral lower extremity edema appears improved.  Patient with weight gain in the computer, unsure of its accuracy.  Patient denies shortness of breath, abdominal discomfort, nausea, or vomiting.  Patient reports an ok appetite.  Patient denies dysuria.  Patient reports having regular bowel movements.  Patient progressing with PT/OT- patient ambulated with [facility] step to gait pattern 100 ft with 2 turns slow miroslava "I'm gonna do it, my legs feel fine. Continuing to follow and treat all acute and chronic conditions.    Past Medical History: Patient has a past medical history of Acquired hypothyroidism (12/13/2013), Age-related physical debility (04/05/2024), Antiplatelet or antithrombotic long-term use, AV block, 3rd degree (03/19/2019), Bilateral nonexudative age-related macular degeneration (08/27/2013), Blindness and low vision (08/10/2018), Blood " transfusion, Cardiac pacemaker (08/22/2018), Carotid artery disease (08/10/2016), Chronic diastolic congestive heart failure (11/24/2019), CRAO (central retinal artery occlusion), left (01/29/2018), Dry eye syndrome of both eyes (08/10/2018), Essential hypertension (12/13/2013), History of TIA (transient ischemic attack) (12/13/2013), Nonexudative age-related macular degeneration, bilateral, intermediate dry stage (07/12/2018), Nuclear sclerotic cataract of left eye (08/10/2018), 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    Past Surgical History: Patient has a past surgical history that includes Tonsillectomy; Joint replacement; Skin biopsy; Total hip arthroplasty (11/01/2000); Cardiac pacemaker placement; Cataract extraction (Right); pr transcran dopp intracran, emboli w/o inj (01/29/2018); Carpal tunnel release (Bilateral, 1991); and ORIF femur fracture (Right, 3/8/2025).    Social History: Patient reports that she has never smoked. She has never been exposed to tobacco smoke. She has never used smokeless tobacco. She reports that she does not drink alcohol and does not use drugs.    Family History: family history includes Arthritis in her maternal grandmother, paternal grandmother, and sister; Birth defects in her son; Cancer (age of onset: 80) in her paternal aunt; Diabetes in her paternal aunt and paternal uncle; Early death in her father and mother; Hearing loss in her daughter; Hypertension in her paternal grandmother and son.    Allergies: Patient is allergic to aspartame.    ROS  Constitutional: Negative for fever   Eyes: Negative for  "blurred vision, double vision   Respiratory: Negative for cough, shortness of breath   Cardiovascular: Negative for chest pain, palpitations  Gastrointestinal: Negative for abdominal pain, constipation, diarrhea, nausea, vomiting.   Genitourinary: Negative for dysuria, frequency   Musculoskeletal:  + generalized weakness. Negative for back pain and myalgias.   Skin: Negative for itching and rash.   Neurological: Negative for dizziness, headaches.   Psychiatric/Behavioral: Negative for depression. The patient is not nervous/anxious.      24 hour Vital Sign Range   Temp:  [97.8 °F (36.6 °C)-98.4 °F (36.9 °C)]   Pulse:  [64-68]   Resp:  [16-17]   BP: (126-139)/(52-74)   SpO2:  [95 %]     Current BMI: Body mass index is 29.9 kg/m².    PEx  Constitutional: Patient appears debilitated.  No distress noted  HENT:   Head: Normocephalic and atraumatic.   Eyes: Pupils are equal, round  Neck: Normal range of motion. Neck supple.   Cardiovascular: Normal rate, regular rhythm and normal heart sounds.    Pulmonary/Chest: Effort normal and breath sounds are clear  Abdominal: Soft. Bowel sounds are normal.   Musculoskeletal: Normal range of motion.   Neurological: Alert and oriented to person, place, and time.   Psychiatric: Normal mood and affect. Behavior is normal.   Skin: Skin is warm and dry.  Surgical incision to RLE, fully approximated, no drainage noted, no signs of infection- associated swelling    Recent Labs   Lab 04/07/25  0410   GLUCOSE 89   *   K 3.4*   CL 91*   CO2 30*   BUN 24   CREATININE 0.6   CALCIUM 9.0   MG 2.0         Recent Labs   Lab 04/07/25  0410   WBC 6.37   RBC 2.74*   HGB 8.7*   HCT 27.3*      *   MCH 31.8*   MCHC 31.9*         No results for input(s): "POCTGLUCOSE" in the last 168 hours.     Assessment and Plan:    Acute on chronic congestive heart failure  Aortic stenosis  Pulmonary hypertension  - patient [place] [facility] [place] with decompensation, BNP was 1,625  - " diuresed with IV Lasix  - most recent echo reviewed from 3/21/25, EF 65-70%, severely dilated left atrium, mod to severe AS, PASP 44 mmHg  - monitor I&Os, daily weights  - goal is euvolemia given aortic stenosis  - weight gain today, continue Bumex 1 mg daily (increased from previous home dose of 0.5),  metoprolol XL 25 daily,  Decrease HCTZ to back to 12.5 mg daily    Periprosthetic fracture of shaft of right femur   s/p ORIF on 3/8/2025  - PT/OT, RLE WBAT  - DVT PPX with apixaban  - Monitor and report drainage to incisional site  - Fall precautions  - Bowel regimen in place, hold for loose or frequent stools  - Ortho NEETU to see patient intermittently at [facility]  - follow-up with orthopedics after discharge  - sutures removed by Orthopedics     Acute postoperative pain  - stable, continue acetaminophen 650 mg q.8 hours, gabapentin 100 mg TID, continue tramadol 50 mg q.6 hours PRN, methocarbamol 500 mg q.6 hours PRN    Essential hypertension  - stable, continue amlodipine 5 mg daily, Bumex 1 mg daily, HCTZ 12.5 mg daily, metoprolol XL 50 mg daily, 25 mg qHS.    Cardiac pacemaker  - placed for sick sinus syndrome    Bradycardia  Paroxysmal atrial fibrillation  - home metoprolol XL 50 mg daily, 25 mg qHS  - stable, continue apixaban 5 mg BID, reducing metoprolol 25 mg BID at this time    Acquired hypothyroidism  - TSH elevated at 22.581, Free T4 0.83  - increased levothyroxine to 100 mcg daily  - will need TSH recheck as outpatient in 6 weeks- will inform PCP prior to discharge     Moderate protein-calorie malnutrition  - RD following, appreciate recommendations  - continue supplements as ordered     Acute blood loss anemia  Anemia of chronic disease  - patient received 2 unit RBC transfusion postoperatively  - transfuse hemoglobin < 7  - improving, continue monitor twice weekly CBC    Hypokalemia  - initiated KCl 20 mEq x1, continue KCl daily to 20 mEq    HX of CVA  - 4/5/2024 MRI showed acute left thalamus and  hippocampus infarcts   - Not on statin due to prior intolerance (lower extremity myalgias). Reportedly has tried Repatha in the past and had similar reaction. Also had reaction to Praluent and Zetia     Carotid Artery disease    - continue home Plavix 75 mg daily     Depression, moderate, reoccurring  - continue home escitalopram 20 mg daily    Macular degeneration  Decreased vision  Hx of CRAO  Dry eyes  - continue home eye drops    Debility   - Continue with PT/OT for gait training and strengthening and restoration of ADL's   - Encourage mobility, OOB in chair, and early ambulation as appropriate  - Fall precautions   - Monitor for bowel and bladder dysfunction  - Monitor for and prevent skin breakdown and pressure ulcers  - Continue DVT prophylaxis with apixaban       Pneumonia  Acute hypoxic respiratory failure  - patient admitted back to ED on 03/20 with suspected pneumonia  - initially treated with IV Vanc/Zosyn  -  Abx deescalated to PO, continue Augmentin and doxycycline x3 more days, ending on 03/29  - patient has been successfully weaned to room air  - continue IS, OOB      Anticipate disposition:  Home with home health    IP OHS RISK OF UNPLANNED READMISSION Model: HIGH     Follow-up needed during SNF stay-    Follow-up needed after discharge from SNF: PCP, orthopedics, cardiology     Future Appointments   Date Time Provider Department Center   4/24/2025 11:20 AM Albino William MD [facility] ORTHO [facility]       I certify that SNF services are required to be given on an inpatient basis because Carol TEE Knobloch needs for skilled nursing care and/or skilled rehabilitation are required on a daily basis and such services can only practically be provided in a skilled nursing facility setting and are for an ongoing condition for which she received inpatient care in the hospital.     I spent 48 minutes reviewing patient records, examining, and counseling the patient/ patient's family with greater than 50% of  the time spent in direct patient care and coordination.  Family updated at bedside    Angeles Aguero NP  Department of Hospital Medicine   [facility]     DOS: 4/7/2025       Patient note was created using MModal Dictation.  Any errors in syntax or even information may not have been identified and edited on initial review prior to signing this note.

## 2025-04-07 NOTE — PT/OT/SLP PROGRESS
"Occupational Therapy   Treatment    Name: Dorothy M Knobloch  MRN: [id number]  Admit Date: 3/26/2025  Admitting Diagnosis:  Acute on chronic congestive heart failure    General Precautions: Standard, fall   Orthopedic Precautions: RLE weight bearing as tolerated   Braces: N/A    Recommendations:     Discharge Recommendations:  home health OT  Level of Assistance Recommended at Discharge: 24 hours light assistance for ADL's and homemaking tasks  Discharge Equipment Recommendations: hip kit, wheelchair, 3-in-1 commode  Barriers to discharge:  Decreased caregiver support    Assessment:     Dorothy M Knobloch is a 89 y.o. female with a medical diagnosis of Acute on chronic congestive heart failure. Pt tolerated session well and without incident and shows excellent motivation and potential to improve, but the pt continues to require assistance to perform self-care tasks and mobility. Pt strengths include Functional cognition, Following multi-step tasks, and Attention to task and Family support, Motivation, and Willingness to participate. Pt improved Grooming/hygiene, Toileting, and Toilet transfers. However, pt would continue to benefit from cont'd OT services in the SNF setting to improve safety and independence /c functional tasks and ADLs upon discharge. Performance deficits affecting function are weakness, impaired endurance, impaired self care skills, impaired functional mobility, gait instability, impaired balance, decreased lower extremity function, decreased ROM, pain, impaired cardiopulmonary response to activity, orthopedic precautions.     Rehab Potential is good    Activity tolerance:  Good    Plan:     Patient to be seen 5 x/week to address the above listed problems via self-care/home management, therapeutic activities, therapeutic exercises    Plan of Care Expires: 04/27/25  Plan of Care Reviewed with: patient    Subjective     Communicated with: PIPPA prior to session.     "I've never done that before." RE: " Ambulate to toilet .    Pain/Comfort:  Pain Rating 1: 0/10    Patient's cultural, spiritual, Mu-ism conflicts given the current situation:  yes    Objective:     Patient found up in chair with  (no active lines) upon OT entry to room. Pt alert and agreeable to therapy.         Functional Mobility/Transfers:  Patient completed Sit <> Stand Transfer with contact guard assistance  with  rolling walker   Patient completed Toilet Transfer to raised toilet seat Step Transfer technique with contact guard assistance with  rolling walker  Functional Mobility: Pt ambulated ~15 + ~15 feet from WC<>toilet    Activities of Daily Living:  Grooming: contact guard assistance standing at sink to wash hands  Toileting: minimum assistance /c (A) to don pants over knees in standing only. CGA /c RW for standing balance    AMPAC 6 Click ADL: 20    OT Exercises:     ~Pt performed 1x10 reps of the following exercises with 2 lb dowel while seated using BUE.     Chest presses, Biceps curls, and Shoulder flexion    ~Pt participated in task to improve stereognosis, UE strength, gross grasp, and functional reach. Pt able to complete /c no (A).     ~Pt participated in ~7 minute UBE activity seated in WC at no resistance to address endurance and strength of UE to improve performance of ADLs and other functional tasks. Frequent rest breaks taken throughout 2/2 pain      Treatment & Education:  Pt educated on POC, role of OT, and safety. Pt performed tasks to improve safety and independence in functional tasks and ADLs as mentioned above.       Patient left up in chair with call button in reach and all needs met    GOALS:   Multidisciplinary Problems       Occupational Therapy Goals          Problem: Occupational Therapy    Goal Priority Disciplines Outcome Interventions   Occupational Therapy Goal     OT, PT/OT Progressing    Description: Goals to be met by: 4/27/2025     Patient will increase functional independence with ADLs by  performing:    UE Dressing with Stand-by Assistance.  LE Dressing with Minimal Assistance- Ongoing  Toileting from toilet with Minimal Assistance for hygiene and clothing management- Met  ~Toileting from toilet /c CGA- Ongoing  Bathing from  sitting at sink with Minimal Assistance.  Toilet transfer to toilet with Minimal Assistance- MET  ~Toilet transfer to toilet /c SBA- Ongoing                         Time Tracking:     OT Date of Treatment: 04/07/25  OT Start Time: 1323    OT Stop Time: 1401  OT Total Time (min): 38 min    Billable Minutes:Self Care/Home Management 15  Therapeutic Activity 8  Therapeutic Exercise 15    4/7/2025

## 2025-04-07 NOTE — PT/OT/SLP PROGRESS
"Physical Therapy Treatment    Patient Name:  Dorothy M Knobloch   MRN:  [id number]  Admit Date: 3/26/2025  Admitting Diagnosis: Acute on chronic congestive heart failure  Recent Surgeries:     General Precautions: Standard, fall  Orthopedic Precautions: RLE weight bearing as tolerated  Braces: N/A    Recommendations:     Discharge Recommendations: home health PT  Level of Assistance Recommended at Discharge: 24 hours significant assistance  Discharge Equipment Recommendations: wheelchair  Barriers to discharge: Other (Comment), Decreased caregiver support (Increased assistance for mobility.)    Assessment:     Dorothy M Knobloch is a 89 y.o. female admitted with a medical diagnosis of Acute on chronic congestive heart failure . Pt tolerated well, highly motivated, pt would continue to benefit from skilled PT services to improve overall functional mobility, strength and endurance.  .      Performance deficits affecting function: weakness, impaired endurance, impaired functional mobility, impaired balance, gait instability, decreased upper extremity function, decreased lower extremity function, decreased coordination.    Rehab Potential is good    Activity Tolerance: Fair    Plan:     Patient to be seen 5 x/week to address the above listed problems via gait training, therapeutic activities, therapeutic exercises, neuromuscular re-education, wheelchair management/training    Plan of Care Expires: 04/26/25  Plan of Care Reviewed with: patient    Subjective     "The light is flickering in my bathroom that's bothering me" has been reported will follow up.  "My head is little light" /52 HR 61     Pain/Comfort:  Pain Rating 1: 5/10  Location - Side 1: Right  Location - Orientation 1: generalized  Location 1:  (shin)  Pain Addressed 1: Pre-medicate for activity  Pain Rating Post-Intervention 1: 5/10  Pain Rating 2: 3/10  Location - Side 2: Left  Location - Orientation 2: generalized  Location 2: knee  Pain Addressed 2: " "Pre-medicate for activity  Pain Rating Post-Intervention 2: 3/10    Patient's cultural, spiritual, Anabaptist conflicts given the current situation:  no    Objective:      Patient found  with  (in WC) upon PT entry to room.     Therapeutic Activities and Exercises: 2x10 reps AP,GS,LAQ,hip flex A AA RLE as needed within tolerable ROM    Functional Mobility:   Transfers:     Sit to Stand:  minimum assistance and moderate assistance with rolling walker  Gait: amb with RW CGA step to gait pattern 100 ft with 2 turns slow miroslava "I'm gonna do it, my legs feel fine"    AM-PAC 6 CLICK MOBILITY  13    Patient left up in chair with call button in reach and belongings in reach .    GOALS:   Multidisciplinary Problems       Physical Therapy Goals          Problem: Physical Therapy    Goal Priority Disciplines Outcome Interventions   Physical Therapy Goal     PT, PT/OT     Description: Goals to be met by: 25     Patient will increase functional independence with mobility by performin. Supine to sit with Contact Guard Assistance  2. Sit to supine with Contact Guard Assistance  3. Rolling to Left and Right with Contact Guard Assistance  4. Sit to stand transfer with Contact Guard Assistance  5. Bed to chair transfer with Contact Guard Assistance using Rolling Walker  6. Gait  x 50 feet (progress distance as tolerated) with Contact Guard Assistance using Rolling Walker  7. Wheelchair propulsion x 50 feet (progress distance as tolerated) with Supervision using bilateral upper extremities                         Time Tracking:     PT Received On: 25  PT Start Time: 1146  PT Stop Time: 1222  PT Total Time (min): 36 min    Billable Minutes: Gait Training 23 and Therapeutic Exercise 13    Treatment Type: Treatment  PT/PTA: PTA     Number of PTA visits since last PT visit: 3     2025  "

## 2025-04-07 NOTE — PLAN OF CARE
Problem: Occupational Therapy  Goal: Occupational Therapy Goal  Description: Goals to be met by: 4/27/2025     Patient will increase functional independence with ADLs by performing:    UE Dressing with Stand-by Assistance.  LE Dressing with Minimal Assistance- Ongoing  Toileting from toilet with Minimal Assistance for hygiene and clothing management- Met  ~Toileting from toilet /c CGA- Ongoing  Bathing from  sitting at sink with Minimal Assistance.  Toilet transfer to toilet with Minimal Assistance- MET  ~Toilet transfer to toilet /c SBA- Ongoing    Outcome: Progressing

## 2025-04-08 ENCOUNTER — TELEPHONE (OUTPATIENT)
Dept: ORTHOPEDICS | Facility: CLINIC | Age: OVER 89
End: 2025-04-08
Payer: MEDICARE

## 2025-04-08 PROCEDURE — 11000004 HC SNF PRIVATE

## 2025-04-08 PROCEDURE — 94761 N-INVAS EAR/PLS OXIMETRY MLT: CPT

## 2025-04-08 PROCEDURE — 25000003 PHARM REV CODE 250: Performed by: NURSE PRACTITIONER

## 2025-04-08 PROCEDURE — 97116 GAIT TRAINING THERAPY: CPT | Mod: CQ

## 2025-04-08 PROCEDURE — 25000003 PHARM REV CODE 250: Performed by: HOSPITALIST

## 2025-04-08 PROCEDURE — 97110 THERAPEUTIC EXERCISES: CPT | Mod: CO

## 2025-04-08 PROCEDURE — 97530 THERAPEUTIC ACTIVITIES: CPT | Mod: CO

## 2025-04-08 PROCEDURE — 97110 THERAPEUTIC EXERCISES: CPT | Mod: CQ

## 2025-04-08 RX ADMIN — CLOPIDOGREL 75 MG: 75 TABLET ORAL at 08:04

## 2025-04-08 RX ADMIN — APIXABAN 5 MG: 5 TABLET, FILM COATED ORAL at 10:04

## 2025-04-08 RX ADMIN — METOPROLOL SUCCINATE 25 MG: 25 TABLET, EXTENDED RELEASE ORAL at 10:04

## 2025-04-08 RX ADMIN — TIMOLOL MALEATE 1 DROP: 5 SOLUTION OPHTHALMIC at 10:04

## 2025-04-08 RX ADMIN — DICLOFENAC SODIUM 2 G: 10 GEL TOPICAL at 03:04

## 2025-04-08 RX ADMIN — BRIMONIDINE TARTRATE 1 DROP: 1.5 SOLUTION OPHTHALMIC at 08:04

## 2025-04-08 RX ADMIN — APIXABAN 5 MG: 5 TABLET, FILM COATED ORAL at 09:04

## 2025-04-08 RX ADMIN — DICLOFENAC SODIUM 2 G: 10 GEL TOPICAL at 10:04

## 2025-04-08 RX ADMIN — SENNOSIDES AND DOCUSATE SODIUM 1 TABLET: 50; 8.6 TABLET ORAL at 10:04

## 2025-04-08 RX ADMIN — GABAPENTIN 100 MG: 300 CAPSULE ORAL at 08:04

## 2025-04-08 RX ADMIN — ACETAMINOPHEN 650 MG: 500 TABLET ORAL at 03:04

## 2025-04-08 RX ADMIN — ESCITALOPRAM OXALATE 20 MG: 10 TABLET ORAL at 08:04

## 2025-04-08 RX ADMIN — ACETAMINOPHEN 650 MG: 500 TABLET ORAL at 10:04

## 2025-04-08 RX ADMIN — GABAPENTIN 100 MG: 300 CAPSULE ORAL at 03:04

## 2025-04-08 RX ADMIN — GABAPENTIN 100 MG: 300 CAPSULE ORAL at 10:04

## 2025-04-08 RX ADMIN — DICLOFENAC SODIUM 2 G: 10 GEL TOPICAL at 08:04

## 2025-04-08 RX ADMIN — SENNOSIDES AND DOCUSATE SODIUM 1 TABLET: 50; 8.6 TABLET ORAL at 08:04

## 2025-04-08 RX ADMIN — POTASSIUM CHLORIDE 20 MEQ: 1500 TABLET, EXTENDED RELEASE ORAL at 09:04

## 2025-04-08 RX ADMIN — BRIMONIDINE TARTRATE 1 DROP: 1.5 SOLUTION OPHTHALMIC at 10:04

## 2025-04-08 RX ADMIN — LEVOTHYROXINE SODIUM 100 MCG: 0.05 TABLET ORAL at 05:04

## 2025-04-08 RX ADMIN — TIMOLOL MALEATE 1 DROP: 5 SOLUTION OPHTHALMIC at 09:04

## 2025-04-08 RX ADMIN — ACETAMINOPHEN 650 MG: 500 TABLET ORAL at 05:04

## 2025-04-08 NOTE — PT/OT/SLP PROGRESS
Occupational Therapy   Treatment    Name: Dorothy M Knobloch  MRN: [id number]  Admit Date: 3/26/2025  Admitting Diagnosis:  Acute on chronic congestive heart failure    General Precautions: Standard, fall   Orthopedic Precautions: RLE weight bearing as tolerated   Braces: N/A    Recommendations:     Discharge Recommendations:  home health OT  Level of Assistance Recommended at Discharge: 24 hours physical assistance for all ADL's and home management tasks  Discharge Equipment Recommendations: hip kit, wheelchair, 3-in-1 commode  Barriers to discharge:  Decreased caregiver support    Assessment:     Dorothy M Knobloch is a 89 y.o. female with a medical diagnosis of Acute on chronic congestive heart failure .  She presents with performance deficits affecting function are weakness, impaired endurance, impaired self care skills, impaired functional mobility, gait instability, impaired balance, decreased lower extremity function, decreased ROM, pain, impaired cardiopulmonary response to activity, orthopedic precautions. Pt participated well during today's session. Pt tolerated tx session without incident and is making progress, however, continues to demonstrate deficits with self care skills, balance, functional mobility, UB strength and endurance. Pt will benefit from continued OT services to progress towards goals.     Rehab Potential is good    Activity tolerance:  Good    Plan:     Patient to be seen 5 x/week to address the above listed problems via self-care/home management, therapeutic activities, therapeutic exercises    Plan of Care Expires: 04/27/25  Plan of Care Reviewed with: patient, son    Subjective   Pt agreeable to session   Communicated with: Tony prior to session..    Pain/Comfort:  Pain Rating 1: 0/10  Pain Rating Post-Intervention 1: 0/10    Patient's cultural, spiritual, Mormon conflicts given the current situation:  yes    Objective:     Patient found up in chair with son present upon OT entry to  "room.    Functional Mobility/Transfers:  Patient completed Sit <> Stand Transfer x 2 trials with moderate assistance and maximal assistance with rolling walker     Activities of Daily Living:  Footwear: total assistance to doff B socks and don B shoes seated in w/c     [facility] 6 Click ADL: 20    OT Exercises: UE Ergometer 10 min with min resistance   AROM x 2 sets of 15 with 2 lb dowel. Pt perform shoulder flex/ext, forward flex motion and bicep curls.   Therex performed to improve overall endurance, ROM and UB strength required for functional transfers, ADL's and w/c propulsion.     Treatment & Education:  Pt with functional activity on today with minimum assistance and RW. Pt in stance at window to retrieve "Squigz" with focus on functional reaching, standing tolerance, dynamic standing bal, crossing midline, and to promote independence with homemaking and self care tasks. Pt with x 2 trials of 1:35 and 3:02 min/sec. Pt with rest break in between trials.     Pt educated on:  - role of OT  - level of assistance  - energy conservation and task modification to maximize independence with ADL's and mobility   -  safety while performing functional transfers and self care tasks  - orthopedic precautions.   - progress towards OT goals     Patient left up in chair with  PTA present in rehab gym.     GOALS:   Multidisciplinary Problems       Occupational Therapy Goals          Problem: Occupational Therapy    Goal Priority Disciplines Outcome Interventions   Occupational Therapy Goal     OT, PT/OT Progressing    Description: Goals to be met by: 4/27/2025     Patient will increase functional independence with ADLs by performing:    UE Dressing with Stand-by Assistance.  LE Dressing with Minimal Assistance- Ongoing  Toileting from toilet with Minimal Assistance for hygiene and clothing management- Met  ~Toileting from toilet /c CGA- Ongoing  Bathing from  sitting at sink with Minimal Assistance.  Toilet transfer to toilet with " Minimal Assistance- MET  ~Toilet transfer to toilet /c SBA- Ongoing      Rehab Services' DME recommendations    None required, pt owns all equipment     CORBIN [facility] 4/8/2025                       Time Tracking:     OT Date of Treatment: 04/08/25  OT Start Time: 1312    OT Stop Time: 1351  OT Total Time (min): 39 min    Billable Minutes:Therapeutic Activity 24  Therapeutic Exercise 15    4/8/2025

## 2025-04-08 NOTE — PROGRESS NOTES
[facility]                                  Skilled Nursing Facility                   Progress Note     Admit Date: 3/26/2025  ISH 4/16/2025  [id number]  Principal Problem:  Acute on chronic congestive heart failure   HPI obtained from patient interview and chart review     Chief Complaint: Re-evaluation of medical treatment and therapy status, lab review    HPI:   Mrs. Knobloch is a 89 year old female PMHx of PAF on Apixiban, HTN, chronic diastolic HF, cardiac pacemaker due to sick sinus syndrome, CVA and hypothyroidism who presents to SNF following hospitalization for PNA.  Patient with previous SNF admission on 03/13 for Right periprosthetic femoral shaft fracture below a total hip replacement s/p ORIF on 3/8/25 with Dr. William.  Admission to SNF for secondary weakness and debility    Interval history:  24 hr vital sign ranges reviewed and listed below.  Bilateral lower extremity edema appears improved.  Weight reduced in computer today.  Patient very tired today, she states that she did not sleep well last night.  She reports some right heel pain, blanchable redness noted, heel boots in the room inpatient states she has been utilizing, education provided on proper use.  Patient also reports bilateral shin pain.  Patient denies shortness of breath, abdominal discomfort, nausea, or vomiting.  Patient reports an ok appetite.  Patient denies dysuria.  Patient reports having regular bowel movements.  Patient progressing with PT/OT- patient ambulated with RW CGA ro to gait pattern wc follow 40 ft limited by fatigue. Continuing to follow and treat all acute and chronic conditions.    Past Medical History: Patient has a past medical history of Acquired hypothyroidism (12/13/2013), Age-related physical debility (04/05/2024), Antiplatelet or antithrombotic long-term use, AV block, 3rd degree (03/19/2019), Bilateral nonexudative  age-related macular degeneration (08/27/2013), Blindness and low vision (08/10/2018), Blood transfusion, Cardiac pacemaker (08/22/2018), Carotid artery disease (08/10/2016), Chronic diastolic congestive heart failure (11/24/2019), CRAO (central retinal artery occlusion), left (01/29/2018), Dry eye syndrome of both eyes (08/10/2018), Essential hypertension (12/13/2013), History of TIA (transient ischemic attack) (12/13/2013), Nonexudative age-related macular degeneration, bilateral, intermediate dry stage (07/12/2018), Nuclear sclerotic cataract of left eye (08/10/2018), 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    Past Surgical History: Patient has a past surgical history that includes Tonsillectomy; Joint replacement; Skin biopsy; Total hip arthroplasty (11/01/2000); Cardiac pacemaker placement; Cataract extraction (Right); pr transcran dopp intracran, emboli w/o inj (01/29/2018); Carpal tunnel release (Bilateral, 1991); and ORIF femur fracture (Right, 3/8/2025).    Social History: Patient reports that she has never smoked. She has never been exposed to tobacco smoke. She has never used smokeless tobacco. She reports that she does not drink alcohol and does not use drugs.    Family History: family history includes Arthritis in her maternal grandmother, paternal grandmother, and sister; Birth defects in her son; Cancer (age of onset: 80) in her paternal aunt; Diabetes in her paternal aunt and paternal uncle; Early death in her father and mother; Hearing loss in her daughter; Hypertension in her paternal grandmother and son.    Allergies: Patient is  "allergic to aspartame.    ROS  Constitutional: Negative for fever   Eyes: Negative for blurred vision, double vision   Respiratory: Negative for cough, shortness of breath   Cardiovascular: Negative for chest pain, palpitations  Gastrointestinal: Negative for abdominal pain, constipation, diarrhea, nausea, vomiting.   Genitourinary: Negative for dysuria, frequency   Musculoskeletal:  + generalized weakness. Negative for back pain and myalgias.   Skin: Negative for itching and rash.   Neurological: Negative for dizziness, headaches.   Psychiatric/Behavioral: Negative for depression. The patient is not nervous/anxious.      24 hour Vital Sign Range   Temp:  [97.7 °F (36.5 °C)-97.9 °F (36.6 °C)]   Pulse:  [62-68]   Resp:  [16]   BP: ()/(46-64)   SpO2:  [95 %]     Current BMI: Body mass index is 28.67 kg/m².    PEx  Constitutional: Patient appears frail,  debilitated.  No distress noted  HENT:   Head: Normocephalic and atraumatic.   Eyes: Pupils are equal, round  Neck: Normal range of motion. Neck supple.   Cardiovascular: Normal rate, regular rhythm and normal heart sounds.    Pulmonary/Chest: Effort normal and breath sounds are clear  Abdominal: Soft. Bowel sounds are normal.   Musculoskeletal: Normal range of motion.   Neurological: Alert and oriented to person, place, and time.   Psychiatric: Normal mood and affect. Behavior is normal.   Skin: Skin is warm and dry.  Surgical incision to RLE, fully approximated, no drainage noted, no signs of infection- associated swelling    No results for input(s): "GLUCOSE", "NA", "K", "CL", "CO2", "BUN", "CREATININE", "CALCIUM", "MG" in the last 24 hours.        No results for input(s): "WBC", "RBC", "HGB", "HCT", "PLT", "MCV", "MCH", "MCHC" in the last 24 hours.        No results for input(s): "POCTGLUCOSE" in the last 168 hours.     Assessment and Plan:    Acute on chronic congestive heart failure  Aortic stenosis  Pulmonary hypertension  - patient presented back to [facility] " ED on 03/20 with decompensation, BNP was 1,625  - diuresed with IV Lasix  - most recent echo reviewed from 3/21/25, EF 65-70%, severely dilated left atrium, mod to severe AS, PASP 44 mmHg  - monitor I&Os, daily weights  - goal is euvolemia given aortic stenosis  - weights decreasing, continue Bumex 1 mg daily (increased from previous home dose of 0.5),  metoprolol XL 25 daily,  HCTZ 12.5 mg daily    Periprosthetic fracture of shaft of right femur   s/p ORIF on 3/8/2025  - PT/OT, RLE WBAT  - DVT PPX with apixaban  - Monitor and report drainage to incisional site  - Fall precautions  - Bowel regimen in place, hold for loose or frequent stools  - Ortho NEETU to see patient intermittently at SNF  - follow-up with orthopedics after discharge  - sutures removed by Orthopedics     Acute postoperative pain  - stable, continue acetaminophen 650 mg q.8 hours, gabapentin 100 mg TID, continue tramadol 50 mg q.6 hours PRN, methocarbamol 500 mg q.6 hours PRN    Essential hypertension  - stable, continue amlodipine 5 mg daily, Bumex 1 mg daily, HCTZ 12.5 mg daily, metoprolol XL 50 mg daily, 25 mg qHS.    Cardiac pacemaker  - placed for sick sinus syndrome    Bradycardia  Paroxysmal atrial fibrillation  - home metoprolol XL 50 mg daily, 25 mg qHS  - stable, continue apixaban 5 mg BID, reducing metoprolol 25 mg BID at this time    Acquired hypothyroidism  - TSH elevated at 22.581, Free T4 0.83  - increased levothyroxine to 100 mcg daily  - will need TSH recheck as outpatient in 6 weeks- will inform PCP prior to discharge     Moderate protein-calorie malnutrition  - RD following, appreciate recommendations  - continue supplements as ordered     Acute blood loss anemia  Anemia of chronic disease  - patient received 2 unit RBC transfusion postoperatively  - transfuse hemoglobin < 7  - improving, continue monitor twice weekly CBC    Hypokalemia  - continue KCl daily to 20 mEq    HX of CVA  - 4/5/2024 MRI showed acute left thalamus and  hippocampus infarcts   - Not on statin due to prior intolerance (lower extremity myalgias). Reportedly has tried Repatha in the past and had similar reaction. Also had reaction to Praluent and Zetia     Carotid Artery disease    - continue home Plavix 75 mg daily     Depression, moderate, reoccurring  - continue home escitalopram 20 mg daily    Macular degeneration  Decreased vision  Hx of CRAO  Dry eyes  - continue home eye drops    Debility   - Continue with PT/OT for gait training and strengthening and restoration of ADL's   - Encourage mobility, OOB in chair, and early ambulation as appropriate  - Fall precautions   - Monitor for bowel and bladder dysfunction  - Monitor for and prevent skin breakdown and pressure ulcers  - Continue DVT prophylaxis with apixaban       Pneumonia  Acute hypoxic respiratory failure  - patient admitted back to ED on 03/20 with suspected pneumonia  - initially treated with IV Vanc/Zosyn  -  Abx deescalated to PO, continue Augmentin and doxycycline x3 more days, ending on 03/29  - patient has been successfully weaned to room air  - continue IS, OOB      Anticipate disposition:  Home with home health    IP [facility] RISK OF UNPLANNED READMISSION Model: HIGH     Follow-up needed during SNF stay-    Follow-up needed after discharge from SNF: PCP, orthopedics, cardiology     Future Appointments   Date Time Provider Department Center   4/24/2025  3:20 PM Albino William MD [facility]       I certify that SNF services are required to be given on an inpatient basis because Dorothy M Knobloch needs for skilled nursing care and/or skilled rehabilitation are required on a daily basis and such services can only practically be provided in a skilled nursing facility setting and are for an ongoing condition for which she received inpatient care in the hospital.     I spent 46 minutes reviewing patient records, examining, and counseling the patient/ patient's family with greater than 50% of  the time spent in direct patient care and coordination.  Son updated at bedside    Angeles Aguero NP  Department of Hospital Medicine   [facility]     DOS: 4/8/2025       Patient note was created using MModal Dictation.  Any errors in syntax or even information may not have been identified and edited on initial review prior to signing this note.

## 2025-04-08 NOTE — PT/OT/SLP PROGRESS
"Physical Therapy Treatment    Patient Name:  Dorothy M Knobloch   MRN:  [id number]  Admit Date: 3/26/2025  Admitting Diagnosis: Acute on chronic congestive heart failure  Recent Surgeries:     General Precautions: Standard, fall  Orthopedic Precautions: RLE weight bearing as tolerated  Braces: N/A    Recommendations:     Discharge Recommendations: home health PT  Level of Assistance Recommended at Discharge: 24 hours significant assistance  Discharge Equipment Recommendations: wheelchair  Barriers to discharge: Other (Comment), Decreased caregiver support (Increased assistance for mobility.)    Assessment:     Dorothy M Knobloch is a 89 y.o. female admitted with a medical diagnosis of Acute on chronic congestive heart failure . Pt tolerated fairly well, some reports of fatigue limiting gait distance today, pt would continue to benefit from skilled PT services to improve overall functional mobility, strength and endurance.  .      Performance deficits affecting function: weakness, impaired endurance, impaired functional mobility, impaired balance, gait instability, decreased upper extremity function, decreased lower extremity function, decreased coordination.    Rehab Potential is good    Activity Tolerance: Fair    Plan:     Patient to be seen 5 x/week to address the above listed problems via gait training, therapeutic activities, therapeutic exercises, neuromuscular re-education, wheelchair management/training    Plan of Care Expires: 04/26/25  Plan of Care Reviewed with: patient    Subjective     "OK" during session "tired, didn't sleep good" .     Pain/Comfort:  Pain Rating 1: 0/10  Pain Rating Post-Intervention 1: 0/10    Patient's cultural, spiritual, Congregation conflicts given the current situation:  no    Objective:       Patient found  with  (in WC) upon PT entry to room.     Therapeutic Activities and Exercises: 2x10 reps A/AA with LLE as needed AP,GS, LAQ,hip flex    Functional Mobility:  Transfers:     Sit to " Stand:  minimum assistance with rolling walker and vcs for tech/positioning/sustaining transition  Gait: amb with RW CGA ro to gait pattern wc follow 40 ft limited by fatigue    AM-PAC 6 CLICK MOBILITY  13    Patient left up in chair with call button in reach and belonging sin reach .    GOALS:   Multidisciplinary Problems       Physical Therapy Goals          Problem: Physical Therapy    Goal Priority Disciplines Outcome Interventions   Physical Therapy Goal     PT, PT/OT     Description: Goals to be met by: 25     Patient will increase functional independence with mobility by performin. Supine to sit with Contact Guard Assistance  2. Sit to supine with Contact Guard Assistance  3. Rolling to Left and Right with Contact Guard Assistance  4. Sit to stand transfer with Contact Guard Assistance  5. Bed to chair transfer with Contact Guard Assistance using Rolling Walker  6. Gait  x 50 feet (progress distance as tolerated) with Contact Guard Assistance using Rolling Walker  7. Wheelchair propulsion x 50 feet (progress distance as tolerated) with Supervision using bilateral upper extremities                         Time Tracking:     PT Received On: 25  PT Start Time: 1354  PT Stop Time: 1419  PT Total Time (min): 25 min    Billable Minutes: Gait Training 12 and Therapeutic Exercise 13    Treatment Type: Treatment  PT/PTA: PTA     Number of PTA visits since last PT visit: 2025

## 2025-04-09 PROCEDURE — 25000003 PHARM REV CODE 250: Performed by: NURSE PRACTITIONER

## 2025-04-09 PROCEDURE — 97530 THERAPEUTIC ACTIVITIES: CPT

## 2025-04-09 PROCEDURE — 25000242 PHARM REV CODE 250 ALT 637 W/ HCPCS: Performed by: NURSE PRACTITIONER

## 2025-04-09 PROCEDURE — 97110 THERAPEUTIC EXERCISES: CPT

## 2025-04-09 PROCEDURE — 94761 N-INVAS EAR/PLS OXIMETRY MLT: CPT

## 2025-04-09 PROCEDURE — 94640 AIRWAY INHALATION TREATMENT: CPT

## 2025-04-09 PROCEDURE — 99900035 HC TECH TIME PER 15 MIN (STAT)

## 2025-04-09 PROCEDURE — 97116 GAIT TRAINING THERAPY: CPT

## 2025-04-09 PROCEDURE — 11000004 HC SNF PRIVATE

## 2025-04-09 PROCEDURE — 25000003 PHARM REV CODE 250: Performed by: HOSPITALIST

## 2025-04-09 RX ORDER — LEVALBUTEROL INHALATION SOLUTION 0.63 MG/3ML
0.63 SOLUTION RESPIRATORY (INHALATION) EVERY 4 HOURS PRN
Status: DISCONTINUED | OUTPATIENT
Start: 2025-04-09 | End: 2025-04-23 | Stop reason: HOSPADM

## 2025-04-09 RX ADMIN — SENNOSIDES AND DOCUSATE SODIUM 1 TABLET: 50; 8.6 TABLET ORAL at 09:04

## 2025-04-09 RX ADMIN — LEVALBUTEROL HYDROCHLORIDE 0.63 MG: 0.63 SOLUTION RESPIRATORY (INHALATION) at 10:04

## 2025-04-09 RX ADMIN — ACETAMINOPHEN 650 MG: 500 TABLET ORAL at 05:04

## 2025-04-09 RX ADMIN — ESCITALOPRAM OXALATE 20 MG: 10 TABLET ORAL at 09:04

## 2025-04-09 RX ADMIN — GABAPENTIN 100 MG: 300 CAPSULE ORAL at 03:04

## 2025-04-09 RX ADMIN — HYDROCHLOROTHIAZIDE 12.5 MG: 12.5 TABLET ORAL at 09:04

## 2025-04-09 RX ADMIN — BRIMONIDINE TARTRATE 1 DROP: 1.5 SOLUTION OPHTHALMIC at 09:04

## 2025-04-09 RX ADMIN — METOPROLOL SUCCINATE 25 MG: 25 TABLET, EXTENDED RELEASE ORAL at 09:04

## 2025-04-09 RX ADMIN — TIMOLOL MALEATE 1 DROP: 5 SOLUTION OPHTHALMIC at 09:04

## 2025-04-09 RX ADMIN — DICLOFENAC SODIUM 2 G: 10 GEL TOPICAL at 09:04

## 2025-04-09 RX ADMIN — POTASSIUM CHLORIDE 20 MEQ: 1500 TABLET, EXTENDED RELEASE ORAL at 09:04

## 2025-04-09 RX ADMIN — APIXABAN 5 MG: 5 TABLET, FILM COATED ORAL at 09:04

## 2025-04-09 RX ADMIN — CLOPIDOGREL 75 MG: 75 TABLET ORAL at 09:04

## 2025-04-09 RX ADMIN — ACETAMINOPHEN 650 MG: 500 TABLET ORAL at 01:04

## 2025-04-09 RX ADMIN — GABAPENTIN 100 MG: 300 CAPSULE ORAL at 09:04

## 2025-04-09 RX ADMIN — ACETAMINOPHEN 650 MG: 500 TABLET ORAL at 09:04

## 2025-04-09 RX ADMIN — DICLOFENAC SODIUM 2 G: 10 GEL TOPICAL at 03:04

## 2025-04-09 RX ADMIN — LEVOTHYROXINE SODIUM 100 MCG: 0.05 TABLET ORAL at 05:04

## 2025-04-09 NOTE — PLAN OF CARE
Problem: Fall Injury Risk  Goal: Absence of Fall and Fall-Related Injury  Outcome: Progressing  Intervention: Promote Injury-Free Environment  Flowsheets (Taken 4/9/2025 1800)  Safety Promotion/Fall Prevention: assistive device/personal item within reach

## 2025-04-09 NOTE — PLAN OF CARE
Problem: Adult Inpatient Plan of Care  Goal: Plan of Care Review  Outcome: Progressing  Goal: Patient-Specific Goal (Individualized)  Outcome: Progressing  Goal: Absence of Hospital-Acquired Illness or Injury  Outcome: Progressing  Goal: Optimal Comfort and Wellbeing  Outcome: Progressing  Goal: Readiness for Transition of Care  Outcome: Progressing     Problem: Pneumonia  Goal: Fluid Balance  Outcome: Progressing  Goal: Resolution of Infection Signs and Symptoms  Outcome: Progressing  Goal: Effective Oxygenation and Ventilation  Outcome: Progressing     Problem: Wound  Goal: Optimal Coping  Outcome: Progressing  Goal: Optimal Functional Ability  Outcome: Progressing  Goal: Absence of Infection Signs and Symptoms  Outcome: Progressing  Goal: Improved Oral Intake  Outcome: Progressing  Goal: Optimal Pain Control and Function  Outcome: Progressing  Goal: Skin Health and Integrity  Outcome: Progressing  Goal: Optimal Wound Healing  Outcome: Progressing     Problem: Fall Injury Risk  Goal: Absence of Fall and Fall-Related Injury  Outcome: Progressing     Problem: Fall Injury Risk  Goal: Absence of Fall and Fall-Related Injury  Outcome: Progressing     Problem: Fall Injury Risk  Goal: Absence of Fall and Fall-Related Injury  Outcome: Progressing     Problem: Fall Injury Risk  Goal: Absence of Fall and Fall-Related Injury  Outcome: Progressing     Problem: Fall Injury Risk  Goal: Absence of Fall and Fall-Related Injury  Outcome: Progressing     Problem: Skin Injury Risk Increased  Goal: Skin Health and Integrity  Outcome: Progressing     Problem: Malnutrition  Goal: Improved Nutritional Intake  Outcome: Progressing

## 2025-04-09 NOTE — PT/OT/SLP PROGRESS
Physical Therapy Treatment    Patient Name:  Dorothy M Knobloch   MRN:  [id number]  Admit Date: 3/26/2025  Admitting Diagnosis: Acute on chronic congestive heart failure  Recent Surgeries: OPEN REDUCTION INTERNAL FIXATION FEMORAL SHAFT FRACTURE (Right)     General Precautions: Standard, fall  Orthopedic Precautions: RLE weight bearing as tolerated  Braces: N/A    Recommendations:     Discharge Recommendations: home health PT  Level of Assistance Recommended at Discharge: 24 hours significant assistance  Discharge Equipment Recommendations: 3-in-1 commode, hip kit, wheelchair  Barriers to discharge: Decreased caregiver support, Other (Comment) (Increased assistance for mobility)    Assessment:     Dorothy M Knobloch is a 89 y.o. female admitted with a medical diagnosis of Acute on chronic congestive heart failure. Patient agreeable to PT treatment this PM. Patient with limited tolerance overall for treatment secondary to increased fatigue with SOB. O2 measured at 95-98% on RA and HR at 67 bpm. Nursing notified. Patient ambulated one gait trial 61 feet in distance using RW with Sourav with rest break following in relation to generalized fatigue. Patient pleasant throughout session. Patient will benefit from continued SNF rehabilitation services to address deficits as well as progress mobility towards PLOF and increased functional independence for safe transition to home environment at time of discharge.     Performance deficits affecting function: weakness, impaired endurance, impaired self care skills, impaired functional mobility, gait instability, impaired balance, decreased lower extremity function, decreased safety awareness, pain, decreased upper extremity function, decreased ROM, impaired cardiopulmonary response to activity, orthopedic precautions.    Rehab Potential is good    Activity Tolerance: Fair    Plan:     Patient to be seen 5 x/week to address the above listed problems via gait training, therapeutic  "activities, therapeutic exercises, neuromuscular re-education, wheelchair management/training    Plan of Care Expires: 04/26/25  Plan of Care Reviewed with: patient    Subjective     "I just feel deflated."     Pain/Comfort:  Pain Rating 1: 5/10  Location - Side 1: Right  Location - Orientation 1: generalized  Location 1: knee  Pain Addressed 1: Reposition, Distraction, Cessation of Activity  Pain Rating Post-Intervention 1: 5/10    Patient's cultural, spiritual, Episcopal conflicts given the current situation:  no    Objective:     Communicated with nursing staff prior to session.  Patient found up in chair with  (no active lines) upon PT entry to room.     Therapeutic Activities and Exercises:   Seated BLE exercises x 20 reps including ankle DF/PF, LAQs, hip flexion, hip abduction/adduction and glute sets. Assistance for RLE LAQs. Performed to improve ROM and strengthening for safe and efficient engagement in functional mobility. Increased time to complete with rest breaks throughout as needed.     O2 seated at rest with SOB noted: 95-98%  HR seated at rest with SOB noted: 67 bpm    Functional Mobility:  Transfers:  Sit to Stand:  moderate assistance and maximal assistance with rolling walker and repeated x 2 trials. Patient with L lateral lean during initial stand. Seated rest break prior to additional stand for gait training.   Gait: Patient ambulated 61 feet using RW with Sourav. W/C in tow per rehab tech assist. Slow gait speed with increased time to complete. Fatigue limiting overall distance traveled.   Balance: Static standing using RW with CGA/Sourav.     AM-PAC 6 CLICK MOBILITY  13    Patient left up in chair with call button in reach and nursing staff notified.    GOALS:   Multidisciplinary Problems       Physical Therapy Goals          Problem: Physical Therapy    Goal Priority Disciplines Outcome Interventions   Physical Therapy Goal     PT, PT/OT Progressing    Description: Goals to be met by: 4/26/25 "     Patient will increase functional independence with mobility by performin. Supine to sit with Contact Guard Assistance  2. Sit to supine with Contact Guard Assistance  3. Rolling to Left and Right with Contact Guard Assistance  4. Sit to stand transfer with Contact Guard Assistance - in progress  5. Bed to chair transfer with Contact Guard Assistance using Rolling Walker  6. Gait  x 50 feet (progress distance as tolerated) with Contact Guard Assistance using Rolling Walker - in progress  7. Wheelchair propulsion x 50 feet (progress distance as tolerated) with Supervision using bilateral upper extremities                         Time Tracking:     PT Received On: 25  PT Start Time: 1412  PT Stop Time: 1450  PT Total Time (min): 38 min    Billable Minutes: Gait Training 10, Therapeutic Activity 13, and Therapeutic Exercise 15    Treatment Type: Treatment  PT/PTA: PT     Number of PTA visits since last PT visit: 0     2025

## 2025-04-09 NOTE — PLAN OF CARE
Continue with POC    Problem: Physical Therapy  Goal: Physical Therapy Goal  Description: Goals to be met by: 25     Patient will increase functional independence with mobility by performin. Supine to sit with Contact Guard Assistance  2. Sit to supine with Contact Guard Assistance  3. Rolling to Left and Right with Contact Guard Assistance  4. Sit to stand transfer with Contact Guard Assistance - in progress  5. Bed to chair transfer with Contact Guard Assistance using Rolling Walker  6. Gait  x 50 feet (progress distance as tolerated) with Contact Guard Assistance using Rolling Walker - in progress  7. Wheelchair propulsion x 50 feet (progress distance as tolerated) with Supervision using bilateral upper extremities    Outcome: Progressing   2025

## 2025-04-09 NOTE — PROGRESS NOTES
[facility]                                  Skilled Nursing Facility                   Progress Note     Admit Date: 3/26/2025  ISH 4/16/2025  [id number]  Principal Problem:  Acute on chronic congestive heart failure   HPI obtained from patient interview and chart review     Chief Complaint: Re-evaluation of medical treatment and therapy status    HPI:   Mrs. Knobloch is a 89 year old female PMHx of PAF on Apixiban, HTN, chronic diastolic HF, cardiac pacemaker due to sick sinus syndrome, CVA and hypothyroidism who presents to SNF following hospitalization for PNA.  Patient with previous SNF admission on 03/13 for Right periprosthetic femoral shaft fracture below a total hip replacement s/p ORIF on 3/8/25 with Dr. William.  Admission to SNF for secondary weakness and debility    Interval history:   24 hr vital sign ranges reviewed and listed below.  Patient states she is feeling more short of breath than her baseline.  Patient did just come back from therapy.  Ordered for PRN breathing treatment to see if this will help.  BP's have been low lately, this morning noted to be 103/50.  Continues to have significant swelling to her surgical leg, no concerns for DVT at this time.  Patient denies abdominal discomfort, nausea, or vomiting.  Patient reports an inadequate appetite.  Patient denies dysuria.  Patient reports having regular bowel movements.  Patient progressing with PT/OT. Continuing to follow and treat all acute and chronic conditions.    Past Medical History: Patient has a past medical history of Acquired hypothyroidism (12/13/2013), Age-related physical debility (04/05/2024), Antiplatelet or antithrombotic long-term use, AV block, 3rd degree (03/19/2019), Bilateral nonexudative age-related macular degeneration (08/27/2013), Blindness and low vision (08/10/2018), Blood transfusion, Cardiac pacemaker (08/22/2018), Carotid artery  disease (08/10/2016), Chronic diastolic congestive heart failure (11/24/2019), CRAO (central retinal artery occlusion), left (01/29/2018), Dry eye syndrome of both eyes (08/10/2018), Essential hypertension (12/13/2013), History of TIA (transient ischemic attack) (12/13/2013), Nonexudative age-related macular degeneration, bilateral, intermediate dry stage (07/12/2018), Nuclear sclerotic cataract of left eye (08/10/2018), 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    Past Surgical History: Patient has a past surgical history that includes Tonsillectomy; Joint replacement; Skin biopsy; Total hip arthroplasty (11/01/2000); Cardiac pacemaker placement; Cataract extraction (Right); pr transcran dopp intracran, emboli w/o inj (01/29/2018); Carpal tunnel release (Bilateral, 1991); and ORIF femur fracture (Right, 3/8/2025).    Social History: Patient reports that she has never smoked. She has never been exposed to tobacco smoke. She has never used smokeless tobacco. She reports that she does not drink alcohol and does not use drugs.    Family History: family history includes Arthritis in her maternal grandmother, paternal grandmother, and sister; Birth defects in her son; Cancer (age of onset: 80) in her paternal aunt; Diabetes in her paternal aunt and paternal uncle; Early death in her father and mother; Hearing loss in her daughter; Hypertension in her paternal grandmother and son.    Allergies: Patient is allergic to aspartame.    ROS  Constitutional: Negative for fever   Eyes:  +chronic vision changes   Respiratory: Negative for cough.  + chronic SOB/SMITH  "slightly above baseline  Cardiovascular: Negative for chest pain, palpitations  Gastrointestinal: Negative for abdominal pain, constipation, diarrhea, nausea, vomiting.   Genitourinary: Negative for dysuria, frequency   Musculoskeletal:  + generalized weakness.  + intermittent left hip pain, bilateral knee and shin pain  Skin: Negative for itching and rash.   Neurological: Negative for dizziness, headaches.   Psychiatric/Behavioral: + for depression. The patient is not nervous/anxious.      24 hour Vital Sign Range   Temp:  [97.6 °F (36.4 °C)-97.8 °F (36.6 °C)]   Pulse:  [62-68]   Resp:  [16]   BP: (103-122)/(50-57)   SpO2:  [95 %]     Current BMI: Body mass index is 28.87 kg/m².    PEx  Constitutional: Patient appears frail,  debilitated.  No distress noted  Cardiovascular: Normal rate, regular rhythm and normal heart sounds.    Pulmonary/Chest: Effort normal and breath sounds are clear  Abdominal: Soft. Bowel sounds are normal.   Musculoskeletal: Normal range of motion.   Neurological: Alert and oriented to person, place, and time.   Psychiatric: Normal mood and affect. Behavior is normal.   Skin: Skin is warm and dry.  Surgical incision to RLE, fully approximated, no drainage noted, no signs of infection- associated swelling    No results for input(s): "GLUCOSE", "NA", "K", "CL", "CO2", "BUN", "CREATININE", "CALCIUM", "MG" in the last 24 hours.        No results for input(s): "WBC", "RBC", "HGB", "HCT", "PLT", "MCV", "MCH", "MCHC" in the last 24 hours.        No results for input(s): "POCTGLUCOSE" in the last 168 hours.     Assessment and Plan:    Acute on chronic congestive heart failure  Aortic stenosis  Pulmonary hypertension  - patient [place] [facility] [place] with decompensation, BNP was 1,625  - diuresed with IV Lasix  - most recent echo reviewed from 3/21/25, EF 65-70%, severely dilated left atrium, mod to severe AS, PASP 44 mmHg  - monitor I&Os, daily weights  - goal is euvolemia given aortic " stenosis  - 4/9 weights decreasing, continue Bumex 1 mg daily (increased from previous home dose of 0.5),  metoprolol XL 25 daily,  hold HCTZ 12.5 mg daily for low BP    Periprosthetic fracture of shaft of right femur   s/p ORIF on 3/8/2025  - PT/OT, RLE WBAT  - DVT PPX with apixaban  - Monitor and report drainage to incisional site  - Fall precautions  - Bowel regimen in place, hold for loose or frequent stools  - Ortho NEETU to see patient intermittently at SNF  - follow-up with orthopedics after discharge  - sutures removed by Orthopedics     Acute postoperative pain  - stable, continue acetaminophen 650 mg q.8 hours, gabapentin 100 mg TID, continue tramadol 50 mg q.6 hours PRN, methocarbamol 500 mg q.6 hours PRN    Essential hypertension  - unstable, BP's soft, discontinuing HCTZ 12.5 mg daily, continue to hold amlodipine 5 mg daily, continue Bumex 1 mg daily, metoprolol XL 25 mg BID with holding parameters    Cardiac pacemaker  - placed for sick sinus syndrome    Bradycardia  Paroxysmal atrial fibrillation  - home metoprolol XL 50 mg daily, 25 mg qHS  - stable, continue apixaban 5 mg BID, continue reduced metoprolol 25 mg BID     Acquired hypothyroidism  - TSH elevated at 22.581, Free T4 0.83  - increased levothyroxine to 100 mcg daily  - will need TSH recheck as outpatient in 6 weeks- will inform PCP prior to discharge     Moderate protein-calorie malnutrition  - RD following, appreciate recommendations  - continue supplements as ordered     Acute blood loss anemia  Anemia of chronic disease  - patient received 2 unit RBC transfusion postoperatively  - transfuse hemoglobin < 7  - improving, continue monitor twice weekly CBC    Hypokalemia  - continue KCl daily to 20 mEq    HX of CVA  - 4/5/2024 MRI showed acute left thalamus and hippocampus infarcts   - Not on statin due to prior intolerance (lower extremity myalgias). Reportedly has tried Repatha in the past and had similar reaction. Also had reaction to  Praluent and Zetia     Carotid Artery disease    - continue home Plavix 75 mg daily     Depression, moderate, reoccurring  - continue home escitalopram 20 mg daily    Macular degeneration  Decreased vision  Hx of CRAO  Dry eyes  - continue home eye drops    Debility   - Continue with PT/OT for gait training and strengthening and restoration of ADL's   - Encourage mobility, OOB in chair, and early ambulation as appropriate  - Fall precautions   - Monitor for bowel and bladder dysfunction  - Monitor for and prevent skin breakdown and pressure ulcers  - Continue DVT prophylaxis with apixaban       Pneumonia  Acute hypoxic respiratory failure  - patient admitted back to ED on 03/20 with suspected pneumonia  - initially treated with IV Vanc/Zosyn  -  Abx deescalated to PO, continue Augmentin and doxycycline x3 more days, ending on 03/29  - patient has been successfully weaned to room air  - continue IS, OOB      Anticipate disposition:  Home with home health    IP [facility] RISK OF UNPLANNED READMISSION Model: HIGH     Follow-up needed during SNF stay-    Follow-up needed after discharge from SNF: PCP, orthopedics (4/24), cardiology     Future Appointments   Date Time Provider Department Center   4/24/2025  3:20 PM Albino William MD [facility]     I certify that SNF services are required to be given on an inpatient basis because Dorothy M Knobloch needs for skilled nursing care and/or skilled rehabilitation are required on a daily basis and such services can only practically be provided in a skilled nursing facility setting and are for an ongoing condition for which she received inpatient care in the hospital.     I spent 45 minutes reviewing patient records, examining, and counseling the patient/ patient's family with greater than 50% of the time spent in direct patient care and coordination.  Care coordination with staff    Angeles Aguero NP  Department of Hospital Medicine   [facility]- Skilled  Nursing [facility]     DOS: 4/9/2025       Patient note was created using MModal Dictation.  Any errors in syntax or even information may not have been identified and edited on initial review prior to signing this note.

## 2025-04-10 LAB
ABSOLUTE EOSINOPHIL (OHS): 0.25 K/UL
ABSOLUTE MONOCYTE (OHS): 0.64 K/UL (ref 0.3–1)
ABSOLUTE NEUTROPHIL COUNT (OHS): 3.8 K/UL (ref 1.8–7.7)
ANION GAP (OHS): 10 MMOL/L (ref 8–16)
BASOPHILS # BLD AUTO: 0.08 K/UL
BASOPHILS NFR BLD AUTO: 1.3 %
BNP SERPL-MCNC: 365 PG/ML (ref 0–99)
BUN SERPL-MCNC: 23 MG/DL (ref 10–30)
CALCIUM SERPL-MCNC: 9.2 MG/DL (ref 8.7–10.5)
CHLORIDE SERPL-SCNC: 95 MMOL/L (ref 95–110)
CO2 SERPL-SCNC: 26 MMOL/L (ref 23–29)
CREAT SERPL-MCNC: 0.6 MG/DL (ref 0.5–1.4)
ERYTHROCYTE [DISTWIDTH] IN BLOOD BY AUTOMATED COUNT: 16.7 % (ref 11.5–14.5)
GFR SERPLBLD CREATININE-BSD FMLA CKD-EPI: >60 ML/MIN/1.73/M2
GLUCOSE SERPL-MCNC: 78 MG/DL (ref 70–110)
HCT VFR BLD AUTO: 29.1 % (ref 37–48.5)
HGB BLD-MCNC: 9.2 GM/DL (ref 12–16)
IMM GRANULOCYTES # BLD AUTO: 0.08 K/UL (ref 0–0.04)
IMM GRANULOCYTES NFR BLD AUTO: 1.3 % (ref 0–0.5)
LYMPHOCYTES # BLD AUTO: 1.53 K/UL (ref 1–4.8)
MAGNESIUM SERPL-MCNC: 2 MG/DL (ref 1.6–2.6)
MCH RBC QN AUTO: 31.8 PG (ref 27–31)
MCHC RBC AUTO-ENTMCNC: 31.6 G/DL (ref 32–36)
MCV RBC AUTO: 101 FL (ref 82–98)
NUCLEATED RBC (/100WBC) (OHS): 0 /100 WBC
PHOSPHATE SERPL-MCNC: 3 MG/DL (ref 2.7–4.5)
PLATELET # BLD AUTO: 288 K/UL (ref 150–450)
PMV BLD AUTO: 8.9 FL (ref 9.2–12.9)
POTASSIUM SERPL-SCNC: 4.1 MMOL/L (ref 3.5–5.1)
RBC # BLD AUTO: 2.89 M/UL (ref 4–5.4)
RELATIVE EOSINOPHIL (OHS): 3.9 %
RELATIVE LYMPHOCYTE (OHS): 24 % (ref 18–48)
RELATIVE MONOCYTE (OHS): 10 % (ref 4–15)
RELATIVE NEUTROPHIL (OHS): 59.5 % (ref 38–73)
SODIUM SERPL-SCNC: 131 MMOL/L (ref 136–145)
WBC # BLD AUTO: 6.38 K/UL (ref 3.9–12.7)

## 2025-04-10 PROCEDURE — 63600175 PHARM REV CODE 636 W HCPCS: Performed by: NURSE PRACTITIONER

## 2025-04-10 PROCEDURE — 25000003 PHARM REV CODE 250: Performed by: NURSE PRACTITIONER

## 2025-04-10 PROCEDURE — 83880 ASSAY OF NATRIURETIC PEPTIDE: CPT | Performed by: NURSE PRACTITIONER

## 2025-04-10 PROCEDURE — 36415 COLL VENOUS BLD VENIPUNCTURE: CPT | Performed by: NURSE PRACTITIONER

## 2025-04-10 PROCEDURE — 36415 COLL VENOUS BLD VENIPUNCTURE: CPT | Performed by: HOSPITALIST

## 2025-04-10 PROCEDURE — 82310 ASSAY OF CALCIUM: CPT | Performed by: HOSPITALIST

## 2025-04-10 PROCEDURE — 85025 COMPLETE CBC W/AUTO DIFF WBC: CPT | Performed by: HOSPITALIST

## 2025-04-10 PROCEDURE — 83735 ASSAY OF MAGNESIUM: CPT | Performed by: HOSPITALIST

## 2025-04-10 PROCEDURE — 84100 ASSAY OF PHOSPHORUS: CPT | Performed by: HOSPITALIST

## 2025-04-10 PROCEDURE — 11000004 HC SNF PRIVATE

## 2025-04-10 PROCEDURE — 25000003 PHARM REV CODE 250: Performed by: HOSPITALIST

## 2025-04-10 RX ORDER — POTASSIUM CHLORIDE 20 MEQ/1
40 TABLET, EXTENDED RELEASE ORAL ONCE
Status: COMPLETED | OUTPATIENT
Start: 2025-04-10 | End: 2025-04-10

## 2025-04-10 RX ORDER — FUROSEMIDE 10 MG/ML
80 INJECTION INTRAMUSCULAR; INTRAVENOUS ONCE
Status: COMPLETED | OUTPATIENT
Start: 2025-04-10 | End: 2025-04-10

## 2025-04-10 RX ORDER — FUROSEMIDE 40 MG/1
40 TABLET ORAL DAILY
Status: DISCONTINUED | OUTPATIENT
Start: 2025-04-11 | End: 2025-04-16

## 2025-04-10 RX ORDER — FUROSEMIDE 80 MG/1
80 TABLET ORAL ONCE
Status: DISCONTINUED | OUTPATIENT
Start: 2025-04-10 | End: 2025-04-10

## 2025-04-10 RX ADMIN — LEVOTHYROXINE SODIUM 100 MCG: 0.05 TABLET ORAL at 05:04

## 2025-04-10 RX ADMIN — TIMOLOL MALEATE 1 DROP: 5 SOLUTION OPHTHALMIC at 08:04

## 2025-04-10 RX ADMIN — SENNOSIDES AND DOCUSATE SODIUM 1 TABLET: 50; 8.6 TABLET ORAL at 08:04

## 2025-04-10 RX ADMIN — BRIMONIDINE TARTRATE 1 DROP: 1.5 SOLUTION OPHTHALMIC at 08:04

## 2025-04-10 RX ADMIN — FUROSEMIDE 80 MG: 10 INJECTION, SOLUTION INTRAMUSCULAR; INTRAVENOUS at 02:04

## 2025-04-10 RX ADMIN — POTASSIUM CHLORIDE 20 MEQ: 1500 TABLET, EXTENDED RELEASE ORAL at 08:04

## 2025-04-10 RX ADMIN — CLOPIDOGREL 75 MG: 75 TABLET ORAL at 08:04

## 2025-04-10 RX ADMIN — METHOCARBAMOL 500 MG: 500 TABLET ORAL at 05:04

## 2025-04-10 RX ADMIN — GABAPENTIN 100 MG: 300 CAPSULE ORAL at 08:04

## 2025-04-10 RX ADMIN — POTASSIUM CHLORIDE 40 MEQ: 1500 TABLET, EXTENDED RELEASE ORAL at 02:04

## 2025-04-10 RX ADMIN — ACETAMINOPHEN 650 MG: 500 TABLET ORAL at 05:04

## 2025-04-10 RX ADMIN — ACETAMINOPHEN 650 MG: 500 TABLET ORAL at 10:04

## 2025-04-10 RX ADMIN — METOPROLOL SUCCINATE 25 MG: 25 TABLET, EXTENDED RELEASE ORAL at 08:04

## 2025-04-10 RX ADMIN — ACETAMINOPHEN 650 MG: 500 TABLET ORAL at 01:04

## 2025-04-10 RX ADMIN — DICLOFENAC SODIUM 2 G: 10 GEL TOPICAL at 02:04

## 2025-04-10 RX ADMIN — BUMETANIDE 1 MG: 1 TABLET ORAL at 08:04

## 2025-04-10 RX ADMIN — ESCITALOPRAM OXALATE 20 MG: 10 TABLET ORAL at 08:04

## 2025-04-10 RX ADMIN — DICLOFENAC SODIUM 2 G: 10 GEL TOPICAL at 08:04

## 2025-04-10 RX ADMIN — GABAPENTIN 100 MG: 300 CAPSULE ORAL at 02:04

## 2025-04-10 RX ADMIN — APIXABAN 5 MG: 5 TABLET, FILM COATED ORAL at 08:04

## 2025-04-10 NOTE — PLAN OF CARE
Problem: Fall Injury Risk  Goal: Absence of Fall and Fall-Related Injury  Outcome: Progressing  Intervention: Promote Injury-Free Environment  Flowsheets (Taken 4/10/2025 1822)  Safety Promotion/Fall Prevention: assistive device/personal item within reach     Problem: Fall Injury Risk  Goal: Absence of Fall and Fall-Related Injury  Outcome: Progressing  Intervention: Promote Injury-Free Environment  Flowsheets (Taken 4/10/2025 1822)  Safety Promotion/Fall Prevention: assistive device/personal item within reach

## 2025-04-10 NOTE — PT/OT/SLP PROGRESS
Physical Therapy      Patient Name:  Dorothy M Knobloch   MRN:  [id number]    Patient not seen today secondary to reports of inc fatigue didn't sleep well, 2* pain, nsg aware  . Will follow-up next PT session.

## 2025-04-10 NOTE — PROGRESS NOTES
[facility]                                  Skilled Nursing Facility                   Progress Note     Admit Date: 3/26/2025  ISH 4/23/2025  [id number]  Principal Problem:  Acute on chronic congestive heart failure   HPI obtained from patient interview and chart review     Chief Complaint: Re-evaluation of medical treatment and therapy status, lab review, shortness of breath    HPI:   Mrs. Knobloch is a 89 year old female PMHx of PAF on Apixiban, HTN, chronic diastolic HF, cardiac pacemaker due to sick sinus syndrome, CVA and hypothyroidism who presents to SNF following hospitalization for PNA.  Patient with previous SNF admission on 03/13 for Right periprosthetic femoral shaft fracture below a total hip replacement s/p ORIF on 3/8/25 with Dr. William.  Admission to SNF for secondary weakness and debility    Interval history:    All of today's labs reviewed and are listed below.  Na improved to 131. 24 hr vital sign ranges reviewed and listed below.  BP with improvement today.  .  BP's have been low lately, this morning noted to be 103/50. Shortness of breath is worse today compared to yesterday.  Patient looks visibly uncomfortable with tachypnea.  SpO2 94% on room air.  Patient would like to avoid hospital readmission in his also considering hospice care.  I have given her IV Lasix 80 mg x 1 dose and she diruresd well- have great symptom improvement.   Continues to have significant swelling to her surgical leg, no concerns for DVT at this time.  Weight with 1 lb increase over the last 3 days but overall decreased from 4/6.  Patient denies abdominal discomfort, nausea, or vomiting.  Patient reports an inadequate appetite.  Patient denies dysuria.  Patient reports having regular bowel movements.  Patient progressing with PT/OT- Patient ambulated 61 feet using RW with Sourav. W/C in tow per rehab tech assist. Slow gait speed with  increased time to complete. Fatigue limiting overall distance traveled. Continuing to follow and treat all acute and chronic conditions.  Referral has been made to hospice, company has been contacted and will come out for evaluation and to answer questions.  Daughter updated at bedside    Past Medical History: Patient has a past medical history of Acquired hypothyroidism (12/13/2013), Age-related physical debility (04/05/2024), Antiplatelet or antithrombotic long-term use, AV block, 3rd degree (03/19/2019), Bilateral nonexudative age-related macular degeneration (08/27/2013), Blindness and low vision (08/10/2018), Blood transfusion, Cardiac pacemaker (08/22/2018), Carotid artery disease (08/10/2016), Chronic diastolic congestive heart failure (11/24/2019), CRAO (central retinal artery occlusion), left (01/29/2018), Dry eye syndrome of both eyes (08/10/2018), Essential hypertension (12/13/2013), History of TIA (transient ischemic attack) (12/13/2013), Nonexudative age-related macular degeneration, bilateral, intermediate dry stage (07/12/2018), Nuclear sclerotic cataract of left eye (08/10/2018), 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    Past Surgical History: Patient has a past surgical history that includes Tonsillectomy; Joint replacement; Skin biopsy; Total hip arthroplasty (11/01/2000); Cardiac pacemaker placement; Cataract extraction (Right); pr transcran dopp intracran, emboli w/o inj (01/29/2018); Carpal tunnel release (Bilateral, 1991); and ORIF femur fracture (Right, 3/8/2025).    Social History: Patient reports that she has never  smoked. She has never been exposed to tobacco smoke. She has never used smokeless tobacco. She reports that she does not drink alcohol and does not use drugs.    Family History: family history includes Arthritis in her maternal grandmother, paternal grandmother, and sister; Birth defects in her son; Cancer (age of onset: 80) in her paternal aunt; Diabetes in her paternal aunt and paternal uncle; Early death in her father and mother; Hearing loss in her daughter; Hypertension in her paternal grandmother and son.    Allergies: Patient is allergic to aspartame.    ROS  Constitutional: Negative for fever   Eyes:  +chronic vision changes   Respiratory: Negative for cough.  + chronic SOB/SMITH at baseline  Cardiovascular: Negative for chest pain, palpitations  Gastrointestinal: Negative for abdominal pain, constipation, diarrhea, nausea, vomiting.   Genitourinary: Negative for dysuria, frequency   Musculoskeletal:  + generalized weakness.  + intermittent left hip pain, bilateral knee and shin pain  Skin: Negative for itching and rash.   Neurological: Negative for dizziness, headaches.   Psychiatric/Behavioral: + for depression. The patient is not nervous/anxious.      24 hour Vital Sign Range   Temp:  [97.7 °F (36.5 °C)-97.9 °F (36.6 °C)]   Pulse:  [63-67]   Resp:  [16-19]   BP: (125-146)/(57-63)   SpO2:  [94 %-95 %]     Current BMI: Body mass index is 28.99 kg/m².    PEx  Constitutional: Patient appears frail,  debilitated.  No distress noted  Cardiovascular: Normal rate, regular rhythm and normal heart sounds.    Pulmonary/Chest: Effort normal and breath sounds are clear  Abdominal: Soft. Bowel sounds are normal.   Musculoskeletal: Normal range of motion.   Neurological: Alert and oriented to person, place, and time.   Psychiatric: Normal mood and affect. Behavior is normal.   Skin: Skin is warm and dry.  Surgical incision to RLE, fully approximated, no drainage noted, no signs of infection- associated swelling    Recent  "Labs   Lab 04/10/25  0523   GLUCOSE 78   *   K 4.1   CL 95   CO2 26   BUN 23   CREATININE 0.6   CALCIUM 9.2   MG 2.0           Recent Labs   Lab 04/10/25  0523   WBC 6.38   RBC 2.89*   HGB 9.2*   HCT 29.1*      *   MCH 31.8*   MCHC 31.6*           No results for input(s): "POCTGLUCOSE" in the last 168 hours.     Assessment and Plan:    Acute on chronic congestive heart failure  Aortic stenosis  Pulmonary hypertension  - patient presented back to [facility] ED on 03/20 with decompensation, BNP was 1,625  - diuresed with IV Lasix  - most recent echo reviewed from 3/21/25, EF 65-70%, severely dilated left atrium, mod to severe AS, PASP 44 mmHg  - monitor I&Os, daily weights  - goal is euvolemia given aortic stenosis  - 4/10 ordered for BNP, Lasix 80 mg x 1 dose, changing Bumex to Lasix 40 mg daily to start tomorrow,  metoprolol XL 25 daily,  hold HCTZ 12.5 mg daily for low BP    Periprosthetic fracture of shaft of right femur   s/p ORIF on 3/8/2025  - PT/OT, RLE WBAT  - DVT PPX with apixaban  - Monitor and report drainage to incisional site  - Fall precautions  - Bowel regimen in place, hold for loose or frequent stools  - Ortho NEETU to see patient intermittently at SNF  - follow-up with orthopedics after discharge  - sutures removed by Orthopedics     Acute postoperative pain  - stable, continue acetaminophen 650 mg q.8 hours, gabapentin 100 mg TID, continue tramadol 50 mg q.6 hours PRN, methocarbamol 500 mg q.6 hours PRN    Essential hypertension  - BP improved, continue to hold HCTZ 12.5 mg daily, continue to hold amlodipine 5 mg daily, continue Bumex 1 mg daily, metoprolol XL 25 mg BID with holding parameters    Cardiac pacemaker  - placed for sick sinus syndrome    Bradycardia  Paroxysmal atrial fibrillation  - home metoprolol XL 50 mg daily, 25 mg qHS  - stable, heart rates remain borderline low, continue apixaban 5 mg BID, continue reduced metoprolol 25 mg BID     Acquired hypothyroidism  - TSH " elevated at 22.581, Free T4 0.83  - increased levothyroxine to 100 mcg daily  - will need TSH recheck as outpatient in 6 weeks- will inform PCP prior to discharge     Moderate protein-calorie malnutrition  - RD following, appreciate recommendations  - continue supplements as ordered     Acute blood loss anemia  Anemia of chronic disease  - patient received 2 unit RBC transfusion postoperatively  - transfuse hemoglobin < 7  - improving, continue monitor twice weekly CBC    Hypokalemia  - continue KCl daily to 20 mEq    HX of CVA  - 4/5/2024 MRI showed acute left thalamus and hippocampus infarcts   - Not on statin due to prior intolerance (lower extremity myalgias). Reportedly has tried Repatha in the past and had similar reaction. Also had reaction to Praluent and Zetia     Carotid Artery disease    - continue home Plavix 75 mg daily     Depression, moderate, reoccurring  - continue home escitalopram 20 mg daily    Macular degeneration  Decreased vision  Hx of CRAO  Dry eyes  - continue home eye drops    Debility   - Continue with PT/OT for gait training and strengthening and restoration of ADL's   - Encourage mobility, OOB in chair, and early ambulation as appropriate  - Fall precautions   - Monitor for bowel and bladder dysfunction  - Monitor for and prevent skin breakdown and pressure ulcers  - Continue DVT prophylaxis with apixaban       Pneumonia  Acute hypoxic respiratory failure  - patient admitted back to ED on 03/20 with suspected pneumonia  - initially treated with IV Vanc/Zosyn  -  Abx deescalated to PO, continue Augmentin and doxycycline x3 more days, ending on 03/29  - patient has been successfully weaned to room air  - continue IS, OOB      Anticipate disposition:  Home with home health    IP [facility] RISK OF UNPLANNED READMISSION Model: HIGH     Follow-up needed during SNF stay-    Follow-up needed after discharge from SNF: PCP, orthopedics (4/24), cardiology     Future Appointments   Date Time Provider  Department Center   4/24/2025  3:20 PM Albino William MD [facility]     I certify that SNF services are required to be given on an inpatient basis because Dorothy M Knobloch needs for skilled nursing care and/or skilled rehabilitation are required on a daily basis and such services can only practically be provided in a skilled nursing facility setting and are for an ongoing condition for which she received inpatient care in the hospital.     I spent 103 minutes reviewing patient records, examining, and counseling the patient/ patient's family with greater than 50% of the time spent in direct patient care and coordination.  Discussed from long-term options for patient including hospice.  Contacted hospice company.  Care coordination with nurses.  To bedside visits    Angeles Aguero NP  Department of Hospital Medicine   [facility]     DOS: 4/10/2025       Patient note was created using MModal Dictation.  Any errors in syntax or even information may not have been identified and edited on initial review prior to signing this note.

## 2025-04-10 NOTE — PROGRESS NOTES
[facility] - Skilled Nursing  Adult Nutrition  Progress Note    SUMMARY   Recommendations  Continue minced and moist, thin liquids, boost plus chocolate BID,RD following  Goals: PO intake to meet 85% of EEN/EPN with ONS by next RD follow up  Nutrition Goal Status: progressing towards goal    Nutrition Discharge Planning    Nutrition Discharge Planning: Therapeutic diet (comments), Oral diet with texture modifications per SLP (comments)  Therapeutic diet (comments): low sodium, weight monitoring,  Oral supplement regimen (comments): boost plus BID or equalivent  Oral diet with texture modifications per SLP (comments): minced and moist    Assessment and Plan    Endocrine  Moderate protein-calorie malnutrition  Malnutrition Type:  Context: chronic illness  Level: moderate     Related to (etiology):   Decreased ability to consume sufficient energy 2/2 poor oral intake      Signs and Symptoms (as evidenced by):  Malnutrition Characteristic Summary:  Energy Intake (Malnutrition): less than 75% for greater than or equal to 1 month  Subcutaneous Fat (Malnutrition): moderate depletion  Muscle Mass (Malnutrition): moderate depletion        Interventions/Recommendations (treatment strategy):  Commercial beverage medical food supplement  Minced and moist diet  Thin liquids  Collaboration of nutrition professional with other providers               Malnutrition Assessment  3/14           Malnutrition Context: social/environmental circumstances  Malnutrition Level: moderate  Skin (Micronutrient): bruised, thinned, turgor reduced, wounds unhealed  Hair/Scalp (Micronutrient): dry  Teeth (Micronutrient): broken dentition  Tongue (Micronutrient): red  Neck/Chest (Micronutrient): muscle wasting  Musculoskeletal/Lower Extremities: muscle wasting       Weight Loss (Malnutrition): 10% in 6 months  Subcutaneous Fat (Malnutrition): mild depletion  Muscle Mass (Malnutrition): moderate depletion   Orbital Region (Subcutaneous Fat Loss):  "severe depletion  Upper Arm Region (Subcutaneous Fat Loss): mild depletion  Thoracic and Lumbar Region: mild depletion   [place] Region (Muscle Loss): mild depletion  Clavicle Bone Region (Muscle Loss): moderate depletion  Clavicle and Acromion Bone Region (Muscle Loss): moderate depletion  Dorsal Hand (Muscle Loss): moderate depletion  Patellar Region (Muscle Loss): mild depletion  Anterior Thigh Region (Muscle Loss): mild depletion  Posterior Calf Region (Muscle Loss): mild depletion                          Reason for Assessment    Reason For Assessment: RD follow-up  Diagnosis:  (HF)  General Information Comments: Patient trying really hard in therapy, ordering from menu, food preferences updated. taking oral supplements, weight stable    Nutrition/Diet History    Nutrition Intake History: 3 meal day  Food Preferences: whole milk  Spiritual, Cultural Beliefs, Mu-ism Practices, Values that Affect Care: yes  Food Allergies:  (aspartame)  Factors Affecting Nutritional Intake: None identified at this time    Anthropometrics    Height: 5' 2" (157.5 cm)  Height (inches): 62 in  Height Method: Stated  Weight: 71.9 kg (158 lb 8.2 oz)  Weight (lb): 158.51 lb  Weight Method: Bed Scale  Ideal Body Weight (IBW), Female: 110 lb  % Ideal Body Weight, Female (lb): 134.88 %  BMI (Calculated): 29  Usual Body Weight (UBW), k.4 kg  Weight Change Amount:  (weight loss in 6 months 10% noted on intitial admit to SNF, loss of 6 # recently at acute readmit from diuresis)  % Usual Body Weight: 104.72  % Weight Change From Usual Weight: 4.5 %       Lab/Procedures/Meds    Pertinent Labs Reviewed: reviewed  Pertinent Labs Comments: Hg 9.2, Hct 29.1, Na 131  Pertinent Medications Reviewed: reviewed  Pertinent Medications Comments: lasix, KCl    Estimated/Assessed Needs     Weight Used For Calorie Calculations: 57.5 kg (126 lb 12.2 oz)  Energy Calorie Requirements (kcal): 7200-1471  Energy Need Method: Marcelo-St Wolfe (x " 1.4(PAL))  Protein Requirements: 69  Weight Used For Protein Calculations: 57.5 kg (126 lb 12.2 oz) (x 1.2 (PAL))  Fluid Requirements (mL): 1313 or per MD  RDA Method (mL): 1313                              Nutrition Prescription Ordered    Current Diet Order: minced and moist level 5,  Nutrition Order Comments: PO %  Oral Nutrition Supplement: boost plus BID chocolate    Evaluation of Received Nutrient/Fluid Intake    I/O: +5200  Energy Calories Required: meeting needs  Protein Required: meeting needs  Fluid Required: meeting needs  Comments: LBM 4/9  Tolerance: tolerating (had to request texture modification once returned to SNF)  % Intake of Estimated Energy Needs: 75 - 100 %  % Meal Intake: 50 - 75 %    Nutrition Risk    Level of Risk/Frequency of Follow-up: low (one time per week)     Monitor and Evaluation    Monitor and Evaluation: Food and beverage intake, Diet order, Weight     Nutrition Follow-Up    RD Follow-up?: Yes

## 2025-04-10 NOTE — PT/OT/SLP PROGRESS
"Occupational Therapy      Patient Name:  Dorothy M Knobloch   MRN:  [id number]    OT with x 2 attempts on today. Patient not seen today secondary to Pt c/o R hip pain as well as shortness of breath. Pt stated "My chest feels heavy, its hard to catch my breath. I just can't today". Nsg and NP notified. Will follow-up POC..    4/10/2025  "

## 2025-04-11 LAB
ANION GAP (OHS): 8 MMOL/L (ref 8–16)
BUN SERPL-MCNC: 26 MG/DL (ref 10–30)
CALCIUM SERPL-MCNC: 9.2 MG/DL (ref 8.7–10.5)
CHLORIDE SERPL-SCNC: 97 MMOL/L (ref 95–110)
CO2 SERPL-SCNC: 30 MMOL/L (ref 23–29)
CREAT SERPL-MCNC: 0.6 MG/DL (ref 0.5–1.4)
GFR SERPLBLD CREATININE-BSD FMLA CKD-EPI: >60 ML/MIN/1.73/M2
GLUCOSE SERPL-MCNC: 91 MG/DL (ref 70–110)
POTASSIUM SERPL-SCNC: 4.3 MMOL/L (ref 3.5–5.1)
SODIUM SERPL-SCNC: 135 MMOL/L (ref 136–145)

## 2025-04-11 PROCEDURE — 97535 SELF CARE MNGMENT TRAINING: CPT | Mod: CO

## 2025-04-11 PROCEDURE — 97110 THERAPEUTIC EXERCISES: CPT | Mod: CQ

## 2025-04-11 PROCEDURE — 11000004 HC SNF PRIVATE

## 2025-04-11 PROCEDURE — 63600175 PHARM REV CODE 636 W HCPCS: Performed by: NURSE PRACTITIONER

## 2025-04-11 PROCEDURE — 97110 THERAPEUTIC EXERCISES: CPT | Mod: CO

## 2025-04-11 PROCEDURE — 25000003 PHARM REV CODE 250: Performed by: HOSPITALIST

## 2025-04-11 PROCEDURE — 97530 THERAPEUTIC ACTIVITIES: CPT | Mod: CO

## 2025-04-11 PROCEDURE — 25000003 PHARM REV CODE 250: Performed by: NURSE PRACTITIONER

## 2025-04-11 PROCEDURE — 82310 ASSAY OF CALCIUM: CPT | Performed by: NURSE PRACTITIONER

## 2025-04-11 PROCEDURE — 36415 COLL VENOUS BLD VENIPUNCTURE: CPT | Performed by: NURSE PRACTITIONER

## 2025-04-11 RX ORDER — FUROSEMIDE 10 MG/ML
40 INJECTION INTRAMUSCULAR; INTRAVENOUS ONCE
Status: COMPLETED | OUTPATIENT
Start: 2025-04-11 | End: 2025-04-11

## 2025-04-11 RX ADMIN — GABAPENTIN 100 MG: 300 CAPSULE ORAL at 02:04

## 2025-04-11 RX ADMIN — DICLOFENAC SODIUM 2 G: 10 GEL TOPICAL at 09:04

## 2025-04-11 RX ADMIN — METHOCARBAMOL 500 MG: 500 TABLET ORAL at 02:04

## 2025-04-11 RX ADMIN — ACETAMINOPHEN 650 MG: 500 TABLET ORAL at 02:04

## 2025-04-11 RX ADMIN — FUROSEMIDE 40 MG: 40 TABLET ORAL at 09:04

## 2025-04-11 RX ADMIN — CLOPIDOGREL 75 MG: 75 TABLET ORAL at 09:04

## 2025-04-11 RX ADMIN — SENNOSIDES AND DOCUSATE SODIUM 1 TABLET: 50; 8.6 TABLET ORAL at 08:04

## 2025-04-11 RX ADMIN — ESCITALOPRAM OXALATE 20 MG: 10 TABLET ORAL at 09:04

## 2025-04-11 RX ADMIN — FUROSEMIDE 40 MG: 10 INJECTION, SOLUTION INTRAMUSCULAR; INTRAVENOUS at 12:04

## 2025-04-11 RX ADMIN — METOPROLOL SUCCINATE 25 MG: 25 TABLET, EXTENDED RELEASE ORAL at 09:04

## 2025-04-11 RX ADMIN — GABAPENTIN 100 MG: 300 CAPSULE ORAL at 08:04

## 2025-04-11 RX ADMIN — ACETAMINOPHEN 650 MG: 500 TABLET ORAL at 10:04

## 2025-04-11 RX ADMIN — TIMOLOL MALEATE 1 DROP: 5 SOLUTION OPHTHALMIC at 09:04

## 2025-04-11 RX ADMIN — BRIMONIDINE TARTRATE 1 DROP: 1.5 SOLUTION OPHTHALMIC at 09:04

## 2025-04-11 RX ADMIN — SENNOSIDES AND DOCUSATE SODIUM 1 TABLET: 50; 8.6 TABLET ORAL at 09:04

## 2025-04-11 RX ADMIN — TIMOLOL MALEATE 1 DROP: 5 SOLUTION OPHTHALMIC at 08:04

## 2025-04-11 RX ADMIN — DICLOFENAC SODIUM 2 G: 10 GEL TOPICAL at 02:04

## 2025-04-11 RX ADMIN — METOPROLOL SUCCINATE 25 MG: 25 TABLET, EXTENDED RELEASE ORAL at 08:04

## 2025-04-11 RX ADMIN — APIXABAN 5 MG: 5 TABLET, FILM COATED ORAL at 09:04

## 2025-04-11 RX ADMIN — LEVOTHYROXINE SODIUM 100 MCG: 0.05 TABLET ORAL at 05:04

## 2025-04-11 RX ADMIN — BRIMONIDINE TARTRATE 1 DROP: 1.5 SOLUTION OPHTHALMIC at 08:04

## 2025-04-11 RX ADMIN — APIXABAN 5 MG: 5 TABLET, FILM COATED ORAL at 08:04

## 2025-04-11 RX ADMIN — POTASSIUM CHLORIDE 20 MEQ: 1500 TABLET, EXTENDED RELEASE ORAL at 09:04

## 2025-04-11 RX ADMIN — GABAPENTIN 100 MG: 300 CAPSULE ORAL at 09:04

## 2025-04-11 NOTE — PT/OT/SLP PROGRESS
"Physical Therapy Treatment    Patient Name:  Dorothy M Knobloch   MRN:  [id number]  Admit Date: 3/26/2025  Admitting Diagnosis: Acute on chronic congestive heart failure  Recent Surgeries:     General Precautions: Standard, fall  Orthopedic Precautions: RLE weight bearing as tolerated  Braces: N/A    Recommendations:     Discharge Recommendations: home health PT  Level of Assistance Recommended at Discharge: 24 hours significant assistance  Discharge Equipment Recommendations: 3-in-1 commode, hip kit, wheelchair  Barriers to discharge: Decreased caregiver support, Other (Comment) (Increased assistance for mobility)    Assessment:     Dorothy M Knobloch is a 89 y.o. female admitted with a medical diagnosis of Acute on chronic congestive heart failure . Pt with limited session today, 2* to starting to urinate during session and fearful it would be so much wanting to go back to the room to get cleaned up, declined my assistance to take to restroom, nsg/PCT notified,  pt would continue to benefit from skilled PT services to improve overall functional mobility, strength and endurance.  .      Performance deficits affecting function: weakness, impaired endurance, impaired self care skills, impaired functional mobility, gait instability, impaired balance, decreased lower extremity function, decreased safety awareness, pain, decreased upper extremity function, decreased ROM, impaired cardiopulmonary response to activity, orthopedic precautions.    Rehab Potential is good    Activity Tolerance: Fair    Plan:     Patient to be seen 5 x/week to address the above listed problems via gait training, therapeutic activities, therapeutic exercises, neuromuscular re-education, wheelchair management/training    Plan of Care Expires: 04/26/25  Plan of Care Reviewed with: patient    Subjective     Pt agreeable to therapy "they giving me IV meds makes me urinate alot".     Pain/Comfort:  Pain Rating 1: 0/10  Pain Rating Post-Intervention 1: " 0/10    Patient's cultural, spiritual, Rastafari conflicts given the current situation:  no    Objective:      Patient found  with  (in wc) upon PT entry to room.     Therapeutic Activities and Exercises: 2 x 10 reps AP,GS,LAQ  pt felt that she started to urinate and it wasn't stopping, declined not being able to walk today, wanting to go back to the room to get cleaned up, declined my assistance, nsg/PCT notified    AM-PAC 6 CLICK MOBILITY  13    Patient left up in chair with call button in reach, nsg/PCT notified, and belongings in reach .    GOALS:   Multidisciplinary Problems       Physical Therapy Goals          Problem: Physical Therapy    Goal Priority Disciplines Outcome Interventions   Physical Therapy Goal     PT, PT/OT Progressing    Description: Goals to be met by: 25     Patient will increase functional independence with mobility by performin. Supine to sit with Contact Guard Assistance  2. Sit to supine with Contact Guard Assistance  3. Rolling to Left and Right with Contact Guard Assistance  4. Sit to stand transfer with Contact Guard Assistance - in progress  5. Bed to chair transfer with Contact Guard Assistance using Rolling Walker  6. Gait  x 50 feet (progress distance as tolerated) with Contact Guard Assistance using Rolling Walker - in progress  7. Wheelchair propulsion x 50 feet (progress distance as tolerated) with Supervision using bilateral upper extremities                         Time Tracking:     PT Received On: 25  PT Start Time: 1306  PT Stop Time: 1318  PT Total Time (min): 12 min    Billable Minutes: Therapeutic Exercise 12    Treatment Type: Treatment  PT/PTA: PTA     Number of PTA visits since last PT visit: 2025

## 2025-04-11 NOTE — PROGRESS NOTES
[facility]                                  Skilled Nursing Facility                   Progress Note     Admit Date: 3/26/2025  ISH 4/23/2025  [id number]  Principal Problem:  Acute on chronic congestive heart failure   HPI obtained from patient interview and chart review     Chief Complaint: Re-evaluation of medical treatment and therapy status, lab review, shortness of breath    HPI:   Mrs. Knobloch is a 89 year old female PMHx of PAF on Apixiban, HTN, chronic diastolic HF, cardiac pacemaker due to sick sinus syndrome, CVA and hypothyroidism who presents to SNF following hospitalization for PNA.  Patient with previous SNF admission on 03/13 for Right periprosthetic femoral shaft fracture below a total hip replacement s/p ORIF on 3/8/25 with Dr. William.  Admission to SNF for secondary weakness and debility    Interval history:   All of today's labs reviewed and are listed below.  24 hr vital sign ranges reviewed and listed below.  BP with improvement today.  Yesterday, patient is having acute exacerbation of CHF or her aortic stenosis.  80 mg of IV Lasix was given and patient had great symptom improvement.  Weight unchanged in computer, unsure of the accuracy of this.  Today, patient states that her breathing is much easier but she still seems to be getting a bit winded.  Will repeat IV Lasix at 40 mg as patient also received a PO dose of 40 mg this morning.  Patient is to be visited by hospice for evaluation this afternoon. Patient reports an inadequate appetite.  Patient denies dysuria.  Patient reports having regular bowel movements.  Patient progressing slowly with PT/OT- unable to complete therapy yesterday due SOB. Continuing to follow and treat all acute and chronic conditions.    Past Medical History: Patient has a past medical history of Acquired hypothyroidism (12/13/2013), Age-related physical debility (04/05/2024),  Antiplatelet or antithrombotic long-term use, AV block, 3rd degree (03/19/2019), Bilateral nonexudative age-related macular degeneration (08/27/2013), Blindness and low vision (08/10/2018), Blood transfusion, Cardiac pacemaker (08/22/2018), Carotid artery disease (08/10/2016), Chronic diastolic congestive heart failure (11/24/2019), CRAO (central retinal artery occlusion), left (01/29/2018), Dry eye syndrome of both eyes (08/10/2018), Essential hypertension (12/13/2013), History of TIA (transient ischemic attack) (12/13/2013), Nonexudative age-related macular degeneration, bilateral, intermediate dry stage (07/12/2018), Nuclear sclerotic cataract of left eye (08/10/2018), 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    Past Surgical History: Patient has a past surgical history that includes Tonsillectomy; Joint replacement; Skin biopsy; Total hip arthroplasty (11/01/2000); Cardiac pacemaker placement; Cataract extraction (Right); pr transcran dopp intracran, emboli w/o inj (01/29/2018); Carpal tunnel release (Bilateral, 1991); and ORIF femur fracture (Right, 3/8/2025).    Social History: Patient reports that she has never smoked. She has never been exposed to tobacco smoke. She has never used smokeless tobacco. She reports that she does not drink alcohol and does not use drugs.    Family History: family history includes Arthritis in her maternal grandmother, paternal grandmother, and sister; Birth defects in her son; Cancer (age of onset: 80) in her paternal aunt; Diabetes in her paternal aunt and paternal uncle; Early death in her father and mother;  "Hearing loss in her daughter; Hypertension in her paternal grandmother and son.    Allergies: Patient is allergic to aspartame.    ROS  Constitutional: Negative for fever   Eyes:  +chronic vision changes   Respiratory: Negative for cough.  + chronic SOB/SMITH at baseline  Cardiovascular: Negative for chest pain, palpitations  Gastrointestinal: Negative for abdominal pain, constipation, diarrhea, nausea, vomiting.   Genitourinary: Negative for dysuria, frequency   Musculoskeletal:  + generalized weakness.  + intermittent left hip pain, bilateral knee and shin pain  Skin: Negative for itching and rash.   Neurological: Negative for dizziness, headaches.   Psychiatric/Behavioral: + for depression. The patient is not nervous/anxious.      24 hour Vital Sign Range   Temp:  [97.4 °F (36.3 °C)-97.8 °F (36.6 °C)]   Pulse:  [62-70]   Resp:  [18]   BP: (113-153)/(56-65)   SpO2:  [94 %-95 %]     Current BMI: Body mass index is 28.99 kg/m².    PEx  Constitutional: Patient appears frail,  debilitated.  No distress noted  Cardiovascular: Normal rate, regular rhythm and normal heart sounds.    Pulmonary/Chest: Effort normal and breath sounds are clear  Abdominal: Soft. Bowel sounds are normal.   Musculoskeletal: Normal range of motion.   Neurological: Alert and oriented to person, place, and time.   Psychiatric: Normal mood and affect. Behavior is normal.   Skin: Skin is warm and dry.  Surgical incision to RLE, fully approximated, no drainage noted, no signs of infection- associated swelling    Recent Labs   Lab 04/11/25  0440   GLUCOSE 91   *   K 4.3   CL 97   CO2 30*   BUN 26   CREATININE 0.6   CALCIUM 9.2           No results for input(s): "WBC", "RBC", "HGB", "HCT", "PLT", "MCV", "MCH", "MCHC" in the last 24 hours.          No results for input(s): "POCTGLUCOSE" in the last 168 hours.     Assessment and Plan:    Acute on chronic congestive heart failure  Aortic stenosis  Pulmonary hypertension  - patient presented back to " [facility] ED on 03/20 with decompensation, BNP was 1,625  - diuresed with IV Lasix  - most recent echo reviewed from 3/21/25, EF 65-70%, severely dilated left atrium, mod to severe AS, PASP 44 mmHg  - monitor I&Os, daily weights  - goal is euvolemia given aortic stenosis  - 4/10 , Lasix 80 mg x 1 dose,   - 4/10 repeating Lasix IV 40 mg x 1 dose, continue PO Lasix 40 mg daily, metoprolol XL 25 daily,  hold HCTZ 12.5 mg for now.  Hospice to evaluate patient today    Periprosthetic fracture of shaft of right femur   s/p ORIF on 3/8/2025  - PT/OT, RLE WBAT  - DVT PPX with apixaban  - Monitor and report drainage to incisional site  - Fall precautions  - Bowel regimen in place, hold for loose or frequent stools  - Ortho NEETU to see patient intermittently at SNF  - follow-up with orthopedics after discharge  - sutures removed by Orthopedics     Acute postoperative pain  - stable, continue acetaminophen 650 mg q.8 hours, gabapentin 100 mg TID, continue tramadol 50 mg q.6 hours PRN, methocarbamol 500 mg q.6 hours PRN    Essential hypertension  - BP improved, continue to hold HCTZ 12.5 mg daily, and amlodipine 5 mg daily, continue Lasix 40 mg daily, metoprolol XL 25 mg BID with holding parameters    Cardiac pacemaker  - placed for sick sinus syndrome    Bradycardia  Paroxysmal atrial fibrillation  - home metoprolol XL 50 mg daily, 25 mg qHS  - stable, heart rates remain borderline low, continue apixaban 5 mg BID, continue reduced metoprolol 25 mg BID     Acquired hypothyroidism  - TSH elevated at 22.581, Free T4 0.83  - increased levothyroxine to 100 mcg daily  - will need TSH recheck as outpatient in 6 weeks- will inform PCP prior to discharge     Moderate protein-calorie malnutrition  - RD following, appreciate recommendations  - continue supplements as ordered     Acute blood loss anemia  Anemia of chronic disease  - patient received 2 unit RBC transfusion postoperatively  - transfuse hemoglobin < 7  - improving, continue  monitor twice weekly CBC    Hypokalemia  - continue KCl daily to 20 mEq    HX of CVA  - 4/5/2024 MRI showed acute left thalamus and hippocampus infarcts   - Not on statin due to prior intolerance (lower extremity myalgias). Reportedly has tried Repatha in the past and had similar reaction. Also had reaction to Praluent and Zetia     Carotid Artery disease    - continue home Plavix 75 mg daily     Depression, moderate, reoccurring  - continue home escitalopram 20 mg daily    Macular degeneration  Decreased vision  Hx of CRAO  Dry eyes  - continue home eye drops    Debility   - Continue with PT/OT for gait training and strengthening and restoration of ADL's   - Encourage mobility, OOB in chair, and early ambulation as appropriate  - Fall precautions   - Monitor for bowel and bladder dysfunction  - Monitor for and prevent skin breakdown and pressure ulcers  - Continue DVT prophylaxis with apixaban       Pneumonia  Acute hypoxic respiratory failure  - patient admitted back to ED on 03/20 with suspected pneumonia  - initially treated with IV Vanc/Zosyn  -  Abx deescalated to PO, continue Augmentin and doxycycline x3 more days, ending on 03/29  - patient has been successfully weaned to room air  - continue IS, OOB      Anticipate disposition:  Home with home health?  Also undergoing Hospice evaluation    IP [facility] RISK OF UNPLANNED READMISSION Model: HIGH     Follow-up needed during SNF stay-    Follow-up needed after discharge from SNF: PCP, orthopedics (4/24), cardiology     Future Appointments   Date Time Provider Department Center   4/24/2025  3:20 PM Albino William MD [facility]TH[facility]     I certify that SNF services are required to be given on an inpatient basis because Dorothy M Knobloch needs for skilled nursing care and/or skilled rehabilitation are required on a daily basis and such services can only practically be provided in a skilled nursing facility setting and are for an ongoing condition for which  she received inpatient care in the hospital.     I spent 48 minutes reviewing patient records, examining, and counseling the patient/ patient's family with greater than 50% of the time spent in direct patient care and coordination.  Care coordination with staff, hospice      Angeles Aguero NP  Department of Hospital Medicine   [facility]     DOS: 4/11/2025       Patient note was created using MModal Dictation.  Any errors in syntax or even information may not have been identified and edited on initial review prior to signing this note.

## 2025-04-11 NOTE — PLAN OF CARE
Pt AAOX3. Respirations even and unlabored. New order of furosemide injection 40mg once given due to anuria per NP. Pt able to urination while in therapy; unmeasured. Pt and family spoke with Moncho from [facility] hospice.Plan of care discussed with all questions and concerns addressed.  Safety precautions in place. Bed in lowest position and locked. Bedside table, call light, and personal belongings within reach. No significant distress at this time. Will continue to monitor pt.     Nisa Martin LPN     Problem: Adult Inpatient Plan of Care  Goal: Optimal Comfort and Wellbeing  Outcome: Progressing  Goal: Readiness for Transition of Care  Outcome: Progressing     Problem: Wound  Goal: Improved Oral Intake  Outcome: Progressing  Goal: Optimal Pain Control and Function  Outcome: Progressing  Goal: Skin Health and Integrity  Outcome: Progressing     Problem: Fall Injury Risk  Goal: Absence of Fall and Fall-Related Injury  Outcome: Progressing     Problem: Skin Injury Risk Increased  Goal: Skin Health and Integrity  Outcome: Progressing     Problem: Malnutrition  Goal: Improved Nutritional Intake  Outcome: Progressing

## 2025-04-11 NOTE — PT/OT/SLP PROGRESS
"Occupational Therapy   Treatment    Name: Dorothy M Knobloch  MRN: [id number]  Admit Date: 3/26/2025  Admitting Diagnosis:  Acute on chronic congestive heart failure    General Precautions: Standard, fall   Orthopedic Precautions: RLE weight bearing as tolerated   Braces: N/A    Recommendations:     Discharge Recommendations:  home health OT  Level of Assistance Recommended at Discharge: 24 hours physical assistance for all ADL's and home management tasks  Discharge Equipment Recommendations: hip kit, wheelchair, 3-in-1 commode  Barriers to discharge:  Decreased caregiver support    Assessment:     Dorothy M Knobloch is a 89 y.o. female with a medical diagnosis of Acute on chronic congestive heart failure .  She presents with performance deficits affecting function are weakness, impaired endurance, impaired self care skills, impaired functional mobility, gait instability, impaired balance, decreased lower extremity function, decreased ROM, pain, impaired cardiopulmonary response to activity, orthopedic precautions. Pt participated well during today's session. Pt tolerated tx session without incident and is making progress, however, continues to demonstrate deficits with self care skills, balance, functional mobility, UB strength and endurance. Pt will benefit from continued OT services to progress towards goals.     Rehab Potential is good    Activity tolerance:  Good    Plan:     Patient to be seen 5 x/week to address the above listed problems via self-care/home management, therapeutic activities, therapeutic exercises    Plan of Care Expires: 04/27/25  Plan of Care Reviewed with: patient    Subjective   "I was waiting for someone to get me up"  Communicated with: Tony prior to session.     Pain/Comfort:  Pain Rating 1: 0/10  Pain Rating Post-Intervention 1: 0/10    Patient's cultural, spiritual, Yazdanism conflicts given the current situation:  yes    Objective:     Patient found HOB elevated with PureWick, pressure " relief boots upon OT entry to room.    Bed Mobility:    Patient completed Rolling/Turning to Left with  minimum assistance and with side rail  Patient completed Rolling/Turning to Right with minimum assistance and with side rail  Patient completed Scooting/Bridging with minimum assistance and with side rail  Patient completed Supine to Sit with minimum assistance and with side rail     Functional Mobility/Transfers:  Patient completed Sit <> Stand Transfer with moderate assistance  with  rolling walker   Patient completed Bed <> Chair Transfer using Stand Pivot technique with minimum assistance with rolling walker    Activities of Daily Living:  Upper Body Dressing: contact guard assistance to doff/don pullover shirt seated EOB  Lower Body Dressing: maximal assistance to thread B lE and manage pants over hips in stance.   Toileting: maximal assistance to perform chantal hygiene and brief management at bed level.   Footwear: total assistance to doff B socks and son B shoes seated EOB.     AMPA 6 Click ADL: 20    OT Exercises: UE Ergometer 10 min with min resistance   AROM x 2 sets of 15 with 2 lb dowel. Pt perform shoulder flex/ext, forward flex motion and bicep curls.   Therex performed to improve overall endurance, ROM and UB strength required for functional transfers, ADL's and w/c propulsion.     Treatment & Education:  Pt educated on:  - role of OT  - level of assistance  - energy conservation and task modification to maximize independence with ADL's and mobility   -  safety while performing functional transfers and self care tasks  - orthopedic precautions.   - progress towards OT goals     Patient left up in chair with call button in reach    GOALS:   Multidisciplinary Problems       Occupational Therapy Goals          Problem: Occupational Therapy    Goal Priority Disciplines Outcome Interventions   Occupational Therapy Goal     OT, PT/OT Progressing    Description: Goals to be met by: 4/27/2025     Patient  will increase functional independence with ADLs by performing:    UE Dressing with Stand-by Assistance.  LE Dressing with Minimal Assistance- Ongoing  Toileting from toilet with Minimal Assistance for hygiene and clothing management- Met  ~Toileting from toilet /c CGA- Ongoing  Bathing from  sitting at sink with Minimal Assistance.  Toilet transfer to toilet with Minimal Assistance- MET  ~Toilet transfer to toilet /c SBA- Ongoing      Rehab Services' DME recommendations    None required, pt owns all equipment     [facility] 4/8/2025                       Time Tracking:     OT Date of Treatment: 04/11/25  OT Start Time: 1144    OT Stop Time: 1222  OT Total Time (min): 38 min    Billable Minutes:Self Care/Home Management 15  Therapeutic Activity 11  Therapeutic Exercise 12    4/11/2025

## 2025-04-12 PROCEDURE — 11000004 HC SNF PRIVATE

## 2025-04-12 PROCEDURE — 97116 GAIT TRAINING THERAPY: CPT | Mod: CQ

## 2025-04-12 PROCEDURE — 25000003 PHARM REV CODE 250: Performed by: HOSPITALIST

## 2025-04-12 PROCEDURE — 25000003 PHARM REV CODE 250: Performed by: NURSE PRACTITIONER

## 2025-04-12 PROCEDURE — 97530 THERAPEUTIC ACTIVITIES: CPT | Mod: CQ

## 2025-04-12 PROCEDURE — 97110 THERAPEUTIC EXERCISES: CPT | Mod: CQ

## 2025-04-12 RX ADMIN — METOPROLOL SUCCINATE 25 MG: 25 TABLET, EXTENDED RELEASE ORAL at 09:04

## 2025-04-12 RX ADMIN — ACETAMINOPHEN 650 MG: 500 TABLET ORAL at 06:04

## 2025-04-12 RX ADMIN — POTASSIUM CHLORIDE 20 MEQ: 1500 TABLET, EXTENDED RELEASE ORAL at 09:04

## 2025-04-12 RX ADMIN — GABAPENTIN 100 MG: 300 CAPSULE ORAL at 09:04

## 2025-04-12 RX ADMIN — CLOPIDOGREL 75 MG: 75 TABLET ORAL at 09:04

## 2025-04-12 RX ADMIN — DICLOFENAC SODIUM 2 G: 10 GEL TOPICAL at 09:04

## 2025-04-12 RX ADMIN — ACETAMINOPHEN 650 MG: 500 TABLET ORAL at 03:04

## 2025-04-12 RX ADMIN — LEVOTHYROXINE SODIUM 100 MCG: 0.05 TABLET ORAL at 05:04

## 2025-04-12 RX ADMIN — TIMOLOL MALEATE 1 DROP: 5 SOLUTION OPHTHALMIC at 09:04

## 2025-04-12 RX ADMIN — ESCITALOPRAM OXALATE 20 MG: 10 TABLET ORAL at 09:04

## 2025-04-12 RX ADMIN — FUROSEMIDE 40 MG: 40 TABLET ORAL at 09:04

## 2025-04-12 RX ADMIN — BRIMONIDINE TARTRATE 1 DROP: 1.5 SOLUTION OPHTHALMIC at 09:04

## 2025-04-12 RX ADMIN — SENNOSIDES AND DOCUSATE SODIUM 1 TABLET: 50; 8.6 TABLET ORAL at 09:04

## 2025-04-12 RX ADMIN — APIXABAN 5 MG: 5 TABLET, FILM COATED ORAL at 09:04

## 2025-04-12 RX ADMIN — GABAPENTIN 100 MG: 300 CAPSULE ORAL at 03:04

## 2025-04-12 RX ADMIN — ACETAMINOPHEN 650 MG: 500 TABLET ORAL at 09:04

## 2025-04-12 RX ADMIN — DICLOFENAC SODIUM 2 G: 10 GEL TOPICAL at 03:04

## 2025-04-12 NOTE — PLAN OF CARE
[facility] - Skilled Nursing  Initial Discharge Assessment     SW met with patient in room for Discharge Planning Assessment.     Preferred Name: Carol  Primary Care Provider:  Lola Wolff MD   Admission Diagnosis:  Acute on chronic congestive heart failure   Admission Date: 3/26/2025  Expected Discharge Date:  4/23/2025    Discharge Barriers Identified:      Payor: Type:  [employer] [facility] [facility] -      Extended Emergency Contact Information:   Primary Emergency Contact: Gemma Solis  Mobile Phone: [phone]  Relation: Daughter  Preferred language: English   needed? No     Discharge Plan A: Home  Discharge Plan B:      Preferred Pharmacy:        Initial Discharge Assessment        Assessment Type Discharge Planning Assessment       Source of Information       Communicated ISH with patient/caregiver       Lives With spouse      Do you expect to return to your current living situation?  yes      Do you have help at home or someone to help you manage your care at home?  yes      Prior to hospitalization cognitive status: alert      Current cognitive status: alert      Equipment Currently Used at Home Wc, rollator, walker      Readmission within 30 days?       Patient currently being followed by outpatient case management? no      Do you currently have service(s) that help you manage your care at home? no      Do you take prescription medications?  yes      Do you have prescription coverage?  yes      Do you have any problems affording any of your prescribed medications? no      Who is going to help you get home at discharge? daughter      How do you get to doctors' appointments? daughter     Has lack of transportation kept you from medical appointments, meetings, work, or from getting things needed for daily living?  No        How often do you feel lonely or isolated from those around you? Sometimes     How often do you need to have someone help you when you read  instructions, pamphlets, or other written material from your doctor or pharmacy?  Always      Are you on dialysis?       Do you take coumadin?        DME Needed Upon Discharge        Discharge Plan discussed with:        Discharge Barriers Identified

## 2025-04-12 NOTE — PT/OT/SLP PROGRESS
Physical Therapy Treatment    Patient Name:  Dorothy M Knobloch   MRN:  [id number]  Admit Date: 3/26/2025  Admitting Diagnosis: Acute on chronic congestive heart failure  Recent Surgeries:  OPEN REDUCTION INTERNAL FIXATION FEMORAL SHAFT FRACTURE (Right)     General Precautions: Standard, fall  Orthopedic Precautions: RLE weight bearing as tolerated  Braces: N/A    Recommendations:     Discharge Recommendations: home health PT  Level of Assistance Recommended at Discharge: 24 hours significant assistance  Discharge Equipment Recommendations: 3-in-1 commode, hip kit, wheelchair  Barriers to discharge: Decreased caregiver support, Other (Comment) (Increased assistance for mobility)    Assessment:     Dorothy M Knobloch is a 89 y.o. female admitted with a medical diagnosis of Acute on chronic congestive heart failure . Time spent at the beginning of session in the bathroom with patient performing toilet transfer and changing briefs. Patient continues with limited tolerance for functional mobility activities and exercises due to increased fatigue with increased exertion. Patient requires multiple seated rest breaks throughout session. Pt overall tolerated tx session well today without any incident. Patient would continue to benefit from skilled PT services to improve overall functional mobility, strength and endurance.    Performance deficits affecting function: weakness, impaired endurance, impaired self care skills, impaired functional mobility, gait instability, impaired balance, decreased lower extremity function, decreased safety awareness, pain, decreased upper extremity function, decreased ROM, impaired cardiopulmonary response to activity, orthopedic precautions.    Rehab Potential is good    Activity Tolerance: Fair    Plan:     Patient to be seen 5 x/week to address the above listed problems via gait training, therapeutic activities, therapeutic exercises, neuromuscular re-education, wheelchair  "management/training    Plan of Care Expires: 04/26/25  Plan of Care Reviewed with: patient    Subjective     "I am saturated in urine, I need to go to the bathroom first".     Pain/Comfort:  Pain Rating 1: 0/10  Pain Rating Post-Intervention 1: 0/10    Patient's cultural, spiritual, Evangelical conflicts given the current situation:  no    Objective:     Communicated with NSG prior to session.  Attempted in AM, patient stated she would like to be seen in PM today. Patient found up in chair with peripheral IV upon PT entry to room.     Therapeutic Activities and Exercises:     Seated TE Performed BLE: AP, marches, LAQ, GS, hip abd/add 2 x 10 reps each    Patient educated on role of therapy, goals of session, and benefits of out of bed mobility.   Instructed on use of call button and importance of calling nursing staff for assistance with mobility   Questions/concerns addressed within PTA scope of practice  Pt verbalized understanding.    Functional Mobility:  Transfers:     Sit to Stand:  minimum assistance with rolling walker and grab bars x4 trials  Toilet Transfer: minimum assistance with  grab bars  using  Step Transfer  Gait:   Patient ambulated 70ft with CGA using a RW.   Patient demonstrated slow miroslava, decreased step length, and FFP.   Patient declined second gait trial 2* to fatigue  Balance: with CGA using a RW/grab bars in bathroom  Static Sitting Balance: Fair+  Dynamic Sitting Balance: Fair  Static Standing Balance: Poor  Dynamic Standing Balance: Poor-    AM-PAC 6 CLICK MOBILITY  13    Patient left up in chair with all lines intact, call button in reach, and belongings in reach .    GOALS:   Multidisciplinary Problems       Physical Therapy Goals          Problem: Physical Therapy    Goal Priority Disciplines Outcome Interventions   Physical Therapy Goal     PT, PT/OT Progressing    Description: Goals to be met by: 4/26/25     Patient will increase functional independence with mobility by " performin. Supine to sit with Contact Guard Assistance  2. Sit to supine with Contact Guard Assistance  3. Rolling to Left and Right with Contact Guard Assistance  4. Sit to stand transfer with Contact Guard Assistance - in progress  5. Bed to chair transfer with Contact Guard Assistance using Rolling Walker  6. Gait  x 50 feet (progress distance as tolerated) with Contact Guard Assistance using Rolling Walker - in progress  7. Wheelchair propulsion x 50 feet (progress distance as tolerated) with Supervision using bilateral upper extremities                         Time Tracking:     PT Received On: 25  PT Start Time: 1341  PT Stop Time: 1426  PT Total Time (min): 45 min    Billable Minutes: Gait Training 15, Therapeutic Activity 15, and Therapeutic Exercise 15    Treatment Type: Treatment  PT/PTA: PTA     Number of PTA visits since last PT visit: 2     2025

## 2025-04-13 PROCEDURE — 11000004 HC SNF PRIVATE

## 2025-04-13 PROCEDURE — 25000003 PHARM REV CODE 250: Performed by: HOSPITALIST

## 2025-04-13 PROCEDURE — 25000003 PHARM REV CODE 250: Performed by: NURSE PRACTITIONER

## 2025-04-13 RX ADMIN — BRIMONIDINE TARTRATE 1 DROP: 1.5 SOLUTION OPHTHALMIC at 09:04

## 2025-04-13 RX ADMIN — GABAPENTIN 100 MG: 300 CAPSULE ORAL at 02:04

## 2025-04-13 RX ADMIN — TIMOLOL MALEATE 1 DROP: 5 SOLUTION OPHTHALMIC at 09:04

## 2025-04-13 RX ADMIN — METOPROLOL SUCCINATE 25 MG: 25 TABLET, EXTENDED RELEASE ORAL at 09:04

## 2025-04-13 RX ADMIN — GABAPENTIN 100 MG: 300 CAPSULE ORAL at 09:04

## 2025-04-13 RX ADMIN — CLOPIDOGREL 75 MG: 75 TABLET ORAL at 09:04

## 2025-04-13 RX ADMIN — SENNOSIDES AND DOCUSATE SODIUM 1 TABLET: 50; 8.6 TABLET ORAL at 09:04

## 2025-04-13 RX ADMIN — APIXABAN 5 MG: 5 TABLET, FILM COATED ORAL at 09:04

## 2025-04-13 RX ADMIN — ACETAMINOPHEN 650 MG: 500 TABLET ORAL at 09:04

## 2025-04-13 RX ADMIN — ACETAMINOPHEN 650 MG: 500 TABLET ORAL at 06:04

## 2025-04-13 RX ADMIN — DICLOFENAC SODIUM 2 G: 10 GEL TOPICAL at 04:04

## 2025-04-13 RX ADMIN — DICLOFENAC SODIUM 2 G: 10 GEL TOPICAL at 09:04

## 2025-04-13 RX ADMIN — ACETAMINOPHEN 650 MG: 500 TABLET ORAL at 02:04

## 2025-04-13 RX ADMIN — ESCITALOPRAM OXALATE 20 MG: 10 TABLET ORAL at 09:04

## 2025-04-13 RX ADMIN — POTASSIUM CHLORIDE 20 MEQ: 1500 TABLET, EXTENDED RELEASE ORAL at 09:04

## 2025-04-13 RX ADMIN — LEVOTHYROXINE SODIUM 100 MCG: 0.05 TABLET ORAL at 06:04

## 2025-04-13 NOTE — PLAN OF CARE
Problem: Adult Inpatient Plan of Care  Goal: Plan of Care Review  Outcome: Progressing     Problem: Adult Inpatient Plan of Care  Goal: Plan of Care Review  Outcome: Progressing     Problem: Adult Inpatient Plan of Care  Goal: Patient-Specific Goal (Individualized)  Outcome: Progressing     Problem: Adult Inpatient Plan of Care  Goal: Patient-Specific Goal (Individualized)  Outcome: Progressing     Problem: Adult Inpatient Plan of Care  Goal: Absence of Hospital-Acquired Illness or Injury  Outcome: Progressing     Problem: Adult Inpatient Plan of Care  Goal: Absence of Hospital-Acquired Illness or Injury  Outcome: Progressing     Problem: Adult Inpatient Plan of Care  Goal: Optimal Comfort and Wellbeing  Outcome: Progressing     Problem: Adult Inpatient Plan of Care  Goal: Optimal Comfort and Wellbeing  Outcome: Progressing     Problem: Adult Inpatient Plan of Care  Goal: Readiness for Transition of Care  Outcome: Progressing     Problem: Adult Inpatient Plan of Care  Goal: Readiness for Transition of Care  Outcome: Progressing     Problem: Pneumonia  Goal: Fluid Balance  Outcome: Progressing     Problem: Pneumonia  Goal: Resolution of Infection Signs and Symptoms  Outcome: Progressing

## 2025-04-13 NOTE — PLAN OF CARE
Problem: Fall Injury Risk  Goal: Absence of Fall and Fall-Related Injury  Outcome: Progressing  Intervention: Promote Injury-Free Environment  Flowsheets (Taken 4/12/2025 1928)  Safety Promotion/Fall Prevention: assistive device/personal item within reach

## 2025-04-13 NOTE — PLAN OF CARE
Problem: Fall Injury Risk  Goal: Absence of Fall and Fall-Related Injury  Outcome: Progressing  Intervention: Promote Injury-Free Environment  Flowsheets (Taken 4/13/2025 1839)  Safety Promotion/Fall Prevention: assistive device/personal item within reach     Problem: Fall Injury Risk  Goal: Absence of Fall and Fall-Related Injury  Outcome: Progressing  Intervention: Promote Injury-Free Environment  Flowsheets (Taken 4/13/2025 1839)  Safety Promotion/Fall Prevention: assistive device/personal item within reach

## 2025-04-13 NOTE — PLAN OF CARE
Problem: Fall Injury Risk  Goal: Absence of Fall and Fall-Related Injury  4/12/2025 1931 by Clover Daley LPN  Outcome: Progressing  4/12/2025 1928 by Clover Daley LPN  Outcome: Progressing  Intervention: Promote Injury-Free Environment  4/12/2025 1931 by Clover Daley LPN  Flowsheets (Taken 4/12/2025 1931)  Safety Promotion/Fall Prevention: assistive device/personal item within reach  4/12/2025 1928 by Clover Daley LPN  Flowsheets (Taken 4/12/2025 1928)  Safety Promotion/Fall Prevention: assistive device/personal item within reach

## 2025-04-13 NOTE — PLAN OF CARE
Problem: Adult Inpatient Plan of Care  Goal: Plan of Care Review  4/13/2025 0401 by Dominic Lara RN  Outcome: Progressing  4/13/2025 0022 by Dominic Lara RN  Outcome: Progressing     Problem: Adult Inpatient Plan of Care  Goal: Patient-Specific Goal (Individualized)  4/13/2025 0401 by Dominic Lara RN  Outcome: Progressing  4/13/2025 0022 by oDminic Lara RN  Outcome: Progressing     Problem: Adult Inpatient Plan of Care  Goal: Patient-Specific Goal (Individualized)  4/13/2025 0022 by Dominic Lara RN  Outcome: Progressing     Problem: Adult Inpatient Plan of Care  Goal: Absence of Hospital-Acquired Illness or Injury  4/13/2025 0401 by Dominic Lara RN  Outcome: Progressing  4/13/2025 0022 by Dominic Lara RN  Outcome: Progressing     Problem: Adult Inpatient Plan of Care  Goal: Optimal Comfort and Wellbeing  4/13/2025 0401 by Dominic Lraa RN  Outcome: Progressing  4/13/2025 0022 by Dominic Lara RN  Outcome: Progressing     Problem: Adult Inpatient Plan of Care  Goal: Optimal Comfort and Wellbeing  4/13/2025 0022 by Dominic Lara RN  Outcome: Progressing     Problem: Adult Inpatient Plan of Care  Goal: Readiness for Transition of Care  4/13/2025 0401 by Dominic Lara RN  Outcome: Progressing  4/13/2025 0022 by Dominic Lara RN  Outcome: Progressing

## 2025-04-14 DIAGNOSIS — R52 PAIN: Primary | ICD-10-CM

## 2025-04-14 LAB
ABSOLUTE EOSINOPHIL (OHS): 0.29 K/UL
ABSOLUTE MONOCYTE (OHS): 0.59 K/UL (ref 0.3–1)
ABSOLUTE NEUTROPHIL COUNT (OHS): 3.21 K/UL (ref 1.8–7.7)
ANION GAP (OHS): 10 MMOL/L (ref 8–16)
BASOPHILS # BLD AUTO: 0.07 K/UL
BASOPHILS NFR BLD AUTO: 1.2 %
BUN SERPL-MCNC: 26 MG/DL (ref 10–30)
CALCIUM SERPL-MCNC: 9.3 MG/DL (ref 8.7–10.5)
CHLORIDE SERPL-SCNC: 97 MMOL/L (ref 95–110)
CO2 SERPL-SCNC: 24 MMOL/L (ref 23–29)
CREAT SERPL-MCNC: 0.6 MG/DL (ref 0.5–1.4)
ERYTHROCYTE [DISTWIDTH] IN BLOOD BY AUTOMATED COUNT: 16.4 % (ref 11.5–14.5)
GFR SERPLBLD CREATININE-BSD FMLA CKD-EPI: >60 ML/MIN/1.73/M2
GLUCOSE SERPL-MCNC: 82 MG/DL (ref 70–110)
HCT VFR BLD AUTO: 30.5 % (ref 37–48.5)
HGB BLD-MCNC: 9.4 GM/DL (ref 12–16)
IMM GRANULOCYTES # BLD AUTO: 0.05 K/UL (ref 0–0.04)
IMM GRANULOCYTES NFR BLD AUTO: 0.9 % (ref 0–0.5)
LYMPHOCYTES # BLD AUTO: 1.66 K/UL (ref 1–4.8)
MAGNESIUM SERPL-MCNC: 2.1 MG/DL (ref 1.6–2.6)
MCH RBC QN AUTO: 31 PG (ref 27–31)
MCHC RBC AUTO-ENTMCNC: 30.8 G/DL (ref 32–36)
MCV RBC AUTO: 101 FL (ref 82–98)
NUCLEATED RBC (/100WBC) (OHS): 0 /100 WBC
PHOSPHATE SERPL-MCNC: 3.2 MG/DL (ref 2.7–4.5)
PLATELET # BLD AUTO: 290 K/UL (ref 150–450)
PMV BLD AUTO: 8.4 FL (ref 9.2–12.9)
POTASSIUM SERPL-SCNC: 4.4 MMOL/L (ref 3.5–5.1)
RBC # BLD AUTO: 3.03 M/UL (ref 4–5.4)
RELATIVE EOSINOPHIL (OHS): 4.9 %
RELATIVE LYMPHOCYTE (OHS): 28.3 % (ref 18–48)
RELATIVE MONOCYTE (OHS): 10.1 % (ref 4–15)
RELATIVE NEUTROPHIL (OHS): 54.6 % (ref 38–73)
SODIUM SERPL-SCNC: 131 MMOL/L (ref 136–145)
WBC # BLD AUTO: 5.87 K/UL (ref 3.9–12.7)

## 2025-04-14 PROCEDURE — 83735 ASSAY OF MAGNESIUM: CPT | Performed by: HOSPITALIST

## 2025-04-14 PROCEDURE — 97116 GAIT TRAINING THERAPY: CPT | Mod: CQ

## 2025-04-14 PROCEDURE — 80048 BASIC METABOLIC PNL TOTAL CA: CPT | Performed by: HOSPITALIST

## 2025-04-14 PROCEDURE — 97535 SELF CARE MNGMENT TRAINING: CPT | Mod: CO

## 2025-04-14 PROCEDURE — 97530 THERAPEUTIC ACTIVITIES: CPT | Mod: CO

## 2025-04-14 PROCEDURE — 97110 THERAPEUTIC EXERCISES: CPT | Mod: CO

## 2025-04-14 PROCEDURE — 36415 COLL VENOUS BLD VENIPUNCTURE: CPT | Performed by: HOSPITALIST

## 2025-04-14 PROCEDURE — 11000004 HC SNF PRIVATE

## 2025-04-14 PROCEDURE — 97110 THERAPEUTIC EXERCISES: CPT | Mod: CQ

## 2025-04-14 PROCEDURE — 25000003 PHARM REV CODE 250: Performed by: HOSPITALIST

## 2025-04-14 PROCEDURE — 25000003 PHARM REV CODE 250: Performed by: NURSE PRACTITIONER

## 2025-04-14 PROCEDURE — 85025 COMPLETE CBC W/AUTO DIFF WBC: CPT | Performed by: HOSPITALIST

## 2025-04-14 PROCEDURE — 84100 ASSAY OF PHOSPHORUS: CPT | Performed by: HOSPITALIST

## 2025-04-14 RX ORDER — DICLOFENAC SODIUM 10 MG/G
2 GEL TOPICAL 3 TIMES DAILY PRN
Status: DISCONTINUED | OUTPATIENT
Start: 2025-04-14 | End: 2025-04-14

## 2025-04-14 RX ADMIN — FUROSEMIDE 40 MG: 40 TABLET ORAL at 09:04

## 2025-04-14 RX ADMIN — LEVOTHYROXINE SODIUM 100 MCG: 0.05 TABLET ORAL at 05:04

## 2025-04-14 RX ADMIN — BRIMONIDINE TARTRATE 1 DROP: 1.5 SOLUTION OPHTHALMIC at 09:04

## 2025-04-14 RX ADMIN — ACETAMINOPHEN 650 MG: 500 TABLET ORAL at 05:04

## 2025-04-14 RX ADMIN — ACETAMINOPHEN 650 MG: 500 TABLET ORAL at 09:04

## 2025-04-14 RX ADMIN — GABAPENTIN 100 MG: 300 CAPSULE ORAL at 09:04

## 2025-04-14 RX ADMIN — CLOPIDOGREL 75 MG: 75 TABLET ORAL at 09:04

## 2025-04-14 RX ADMIN — METOPROLOL SUCCINATE 25 MG: 25 TABLET, EXTENDED RELEASE ORAL at 09:04

## 2025-04-14 RX ADMIN — APIXABAN 5 MG: 5 TABLET, FILM COATED ORAL at 09:04

## 2025-04-14 RX ADMIN — DICLOFENAC SODIUM 2 G: 10 GEL TOPICAL at 09:04

## 2025-04-14 RX ADMIN — SENNOSIDES AND DOCUSATE SODIUM 1 TABLET: 50; 8.6 TABLET ORAL at 09:04

## 2025-04-14 RX ADMIN — TIMOLOL MALEATE 1 DROP: 5 SOLUTION OPHTHALMIC at 09:04

## 2025-04-14 RX ADMIN — POTASSIUM CHLORIDE 20 MEQ: 1500 TABLET, EXTENDED RELEASE ORAL at 09:04

## 2025-04-14 RX ADMIN — GABAPENTIN 100 MG: 300 CAPSULE ORAL at 04:04

## 2025-04-14 RX ADMIN — ESCITALOPRAM OXALATE 20 MG: 10 TABLET ORAL at 09:04

## 2025-04-14 RX ADMIN — ACETAMINOPHEN 650 MG: 500 TABLET ORAL at 02:04

## 2025-04-14 NOTE — PROGRESS NOTES
[facility]                                  Skilled Nursing Facility                   Progress Note     Admit Date: 3/26/2025  ISH 4/23/2025  [id number]  Principal Problem:  Acute on chronic congestive heart failure   HPI obtained from patient interview and chart review     Chief Complaint: Re-evaluation of medical treatment and therapy status: lab review, re eval shortness of breath    HPI:   Mrs. Knobloch is a 89 year old female PMHx of PAF on Apixiban, HTN, chronic diastolic HF, cardiac pacemaker due to sick sinus syndrome, CVA and hypothyroidism who presents to SNF following hospitalization for PNA.  Patient with previous SNF admission on 03/13 for Right periprosthetic femoral shaft fracture below a total hip replacement s/p ORIF on 3/8/25 with Dr. William.  Admission to SNF for secondary weakness and debility    Interval history:     No acute no acute events documented in nursing notes overnight.    Patient seen sitting up in bed today.    She states she feels much better after being diuresed last week.    Lungs clear to auscultation   Continuing p.o. Lasix 40 daily  Weight trend reviewed-patient down 9 lb since 4/11 (unsure of accuracy)   Today's labs reviewed-no critical results noted, labs listed below   Vital signs reviewed-stable as -138  Continuing to follow and treat all acute and chronic conditions.    Past Medical History: Patient has a past medical history of Acquired hypothyroidism (12/13/2013), Age-related physical debility (04/05/2024), Antiplatelet or antithrombotic long-term use, AV block, 3rd degree (03/19/2019), Bilateral nonexudative age-related macular degeneration (08/27/2013), Blindness and low vision (08/10/2018), Blood transfusion, Cardiac pacemaker (08/22/2018), Carotid artery disease (08/10/2016), Chronic diastolic congestive heart failure (11/24/2019), CRAO (central retinal artery occlusion), left  (01/29/2018), Dry eye syndrome of both eyes (08/10/2018), Essential hypertension (12/13/2013), History of TIA (transient ischemic attack) (12/13/2013), Nonexudative age-related macular degeneration, bilateral, intermediate dry stage (07/12/2018), Nuclear sclerotic cataract of left eye (08/10/2018), 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    Past Surgical History: Patient has a past surgical history that includes Tonsillectomy; Joint replacement; Skin biopsy; Total hip arthroplasty (11/01/2000); Cardiac pacemaker placement; Cataract extraction (Right); pr transcran dopp intracran, emboli w/o inj (01/29/2018); Carpal tunnel release (Bilateral, 1991); and ORIF femur fracture (Right, 3/8/2025).    Social History: Patient reports that she has never smoked. She has never been exposed to tobacco smoke. She has never used smokeless tobacco. She reports that she does not drink alcohol and does not use drugs.    Family History: family history includes Arthritis in her maternal grandmother, paternal grandmother, and sister; Birth defects in her son; Cancer (age of onset: 80) in her paternal aunt; Diabetes in her paternal aunt and paternal uncle; Early death in her father and mother; Hearing loss in her daughter; Hypertension in her paternal grandmother and son.    Allergies: Patient is allergic to aspartame.    ROS  Constitutional: Negative for fever   Eyes:  +chronic vision changes   Respiratory: Negative for cough.  + chronic SOB/SMITH at baseline  Cardiovascular: Negative for chest pain, palpitations  Gastrointestinal: Negative for abdominal pain,  "constipation, diarrhea, nausea, vomiting.   Genitourinary: Negative for dysuria, frequency   Musculoskeletal:  + generalized weakness.  + intermittent left hip pain, bilateral knee and shin pain  Skin: Negative for itching and rash.   Neurological: Negative for dizziness, headaches.   Psychiatric/Behavioral: + for depression. The patient is not nervous/anxious.      24 hour Vital Sign Range   Temp:  [98.1 °F (36.7 °C)-98.7 °F (37.1 °C)]   Pulse:  [65-68]   Resp:  [16-17]   BP: (135-138)/(61-63)   SpO2:  [95 %]     Current BMI: Body mass index is 27.38 kg/m².    PEx  Constitutional: Patient appears frail,  debilitated.  No distress noted  Cardiovascular: Normal rate, regular rhythm and normal heart sounds.    Pulmonary/Chest: Effort normal and breath sounds are clear  Abdominal: Soft. Bowel sounds are normal.   Musculoskeletal: Normal range of motion.   Neurological: Alert and oriented to person, place, and time.   Psychiatric: Normal mood and affect. Behavior is normal.   Skin: Skin is warm and dry.  Surgical incision to RLE, fully approximated, no drainage noted, no signs of infection- associated swelling    Recent Labs   Lab 04/14/25  0452   GLUCOSE 82   *   K 4.4   CL 97   CO2 24   BUN 26   CREATININE 0.6   CALCIUM 9.3   MG 2.1           Recent Labs   Lab 04/14/25  0452   WBC 5.87   RBC 3.03*   HGB 9.4*   HCT 30.5*      *   MCH 31.0   MCHC 30.8*             No results for input(s): "POCTGLUCOSE" in the last 168 hours.     Assessment and Plan:    Acute on chronic congestive heart failure  Aortic stenosis  Pulmonary hypertension  - patient presented back to [facility] ED on 03/20 with decompensation, BNP was 1,625  - diuresed with IV Lasix  - most recent echo reviewed from 3/21/25, EF 65-70%, severely dilated left atrium, mod to severe AS, PASP 44 mmHg  - monitor I&Os, daily weights  - goal is euvolemia given aortic stenosis  - 4/10 , Lasix 80 mg x 1 dose,   - 4/10 repeating Lasix IV 40 mg x 1 " dose, continue PO Lasix 40 mg daily, metoprolol XL 25 daily,  hold HCTZ 12.5 mg for now.  Hospice to evaluate patient today  4/14 Continuing p.o. Lasix 40 daily  Weight trend reviewed-patient down 9 lb since 4/11 (unsure of accuracy)     Periprosthetic fracture of shaft of right femur   s/p ORIF on 3/8/2025  - PT/OT, RLE WBAT  - DVT PPX with apixaban  - Monitor and report drainage to incisional site  - Fall precautions  - Bowel regimen in place, hold for loose or frequent stools  - Ortho NEETU to see patient intermittently at [facility]  - follow-up with orthopedics after discharge  - sutures removed by Orthopedics     Acute postoperative pain  - stable, continue acetaminophen 650 mg q.8 hours, gabapentin 100 mg TID, continue tramadol 50 mg q.6 hours PRN, methocarbamol 500 mg q.6 hours PRN    Essential hypertension  - BP improved, continue to hold HCTZ 12.5 mg daily, and amlodipine 5 mg daily, continue Lasix 40 mg daily, metoprolol XL 25 mg BID with holding parameters    Cardiac pacemaker  - placed for sick sinus syndrome    Bradycardia  Paroxysmal atrial fibrillation  - home metoprolol XL 50 mg daily, 25 mg qHS  - stable, heart rates remain borderline low, continue apixaban 5 mg BID, continue reduced metoprolol 25 mg BID     Acquired hypothyroidism  - TSH elevated at 22.581, Free T4 0.83  - increased levothyroxine to 100 mcg daily  - will need TSH recheck as outpatient in 6 weeks- will inform PCP prior to discharge     Moderate protein-calorie malnutrition  - RD following, appreciate recommendations  - continue supplements as ordered     Acute blood loss anemia  Anemia of chronic disease  - patient received 2 unit RBC transfusion postoperatively  - transfuse hemoglobin < 7  - improving, continue monitor twice weekly CBC    Hypokalemia  - continue KCl daily to 20 mEq    HX of CVA  - 4/5/2024 MRI showed acute left thalamus and hippocampus infarcts   - Not on statin due to prior intolerance (lower extremity myalgias).  Reportedly has tried Repatha in the past and had similar reaction. Also had reaction to Praluent and Zetia     Carotid Artery disease    - continue home Plavix 75 mg daily     Depression, moderate, reoccurring  - continue home escitalopram 20 mg daily    Macular degeneration  Decreased vision  Hx of CRAO  Dry eyes  - continue home eye drops    Debility   - Continue with PT/OT for gait training and strengthening and restoration of ADL's   - Encourage mobility, OOB in chair, and early ambulation as appropriate  - Fall precautions   - Monitor for bowel and bladder dysfunction  - Monitor for and prevent skin breakdown and pressure ulcers  - Continue DVT prophylaxis with apixaban       Pneumonia  Acute hypoxic respiratory failure  - patient admitted back to ED on 03/20 with suspected pneumonia  - initially treated with IV Vanc/Zosyn  -  Abx deescalated to PO, continue Augmentin and doxycycline x3 more days, ending on 03/29  - patient has been successfully weaned to room air  - continue IS, OOB      Anticipate disposition:  Home with home health?  Also undergoing Hospice evaluation    IP OHS RISK OF UNPLANNED READMISSION Model: HIGH     Follow-up needed during SNF stay-    Follow-up needed after discharge from SNF: PCP, orthopedics (4/24), cardiology     Future Appointments   Date Time Provider Department Center   4/24/2025  3:00 PM [facility] [id number] 485 LB LIMIT [facility] XRAYORT [facility]   4/24/2025  3:20 PM Albino William MD [facility] ORTHO [facility]     I certify that SNF services are required to be given on an inpatient basis because Dorothy M Knobloch needs for skilled nursing care and/or skilled rehabilitation are required on a daily basis and such services can only practically be provided in a skilled nursing facility setting and are for an ongoing condition for which she received inpatient care in the hospital.     I spent 51 minutes reviewing patient records, examining, and counseling the patient/ patient's family  with greater than 50% of the time spent in direct patient care and coordination.  Care coordination with staff, hospice      Joanna Basilio NP  Department of [facility] Medicine   [facility]     DOS: 4/14/2025       Patient note was created using MModal Dictation.  Any errors in syntax or even information may not have been identified and edited on initial review prior to signing this note.

## 2025-04-14 NOTE — PT/OT/SLP PROGRESS
"Physical Therapy Treatment    Patient Name:  Dorothy M Knobloch   MRN:  [id number]  Admit Date: 3/26/2025  Admitting Diagnosis: Acute on chronic congestive heart failure  Recent Surgeries:     General Precautions: Standard, fall  Orthopedic Precautions: RLE weight bearing as tolerated  Braces: N/A    Recommendations:     Discharge Recommendations: home health PT  Level of Assistance Recommended at Discharge: 24 hours significant assistance  Discharge Equipment Recommendations: 3-in-1 commode, hip kit, wheelchair  Barriers to discharge: Decreased caregiver support, Other (Comment) (Increased assistance for mobility)    Assessment:     Dorothy M Knobloch is a 89 y.o. female admitted with a medical diagnosis of Acute on chronic congestive heart failure . Pt tolerated well, pt would continue to benefit from skilled PT services to improve overall functional mobility, strength and endurance.  .      Performance deficits affecting function: weakness, impaired endurance, impaired self care skills, impaired functional mobility, gait instability, impaired balance, decreased lower extremity function, decreased safety awareness, pain, decreased upper extremity function, decreased ROM, impaired cardiopulmonary response to activity, orthopedic precautions.    Rehab Potential is good    Activity Tolerance: Fair    Plan:     Patient to be seen 5 x/week to address the above listed problems via gait training, therapeutic activities, therapeutic exercises, neuromuscular re-education, wheelchair management/training    Plan of Care Expires: 04/26/25  Plan of Care Reviewed with: patient    Subjective     "Just my shoulder" pt agreeable to therapy.     Pain/Comfort:  Pain Rating 1: 6/10  Location - Side 1: Right  Location - Orientation 1: generalized  Location 1: shoulder  Pain Addressed 1:  (declined needing meds)  Pain Rating Post-Intervention 1: 6/10 (no further mentioned)    Patient's cultural, spiritual, Congregation conflicts given the " "current situation:  no    Objective:      Patient found  with peripheral IV (in wc) upon PT entry to room.     Therapeutic Activities and Exercises: 2x10 reps A/AA RLE within tolerance AP,GS,LAQ,hip flex    Functional Mobility:  Transfers:     Sit to Stand:  moderate assistance with rolling walker and with vcs for tech/positioning, on 2nd trial added chair filler cushion, still mod A but better, "more comfortable too"  Gait: amb with RW CGA 93 ft no LOB multiple turns wc follow occ standing rest break "I'm good, got in the rhythm"    AM-PAC 6 CLICK MOBILITY  13    Patient left up in chair with call button in reach and belonging sin reach .    GOALS:   Multidisciplinary Problems       Physical Therapy Goals          Problem: Physical Therapy    Goal Priority Disciplines Outcome Interventions   Physical Therapy Goal     PT, PT/OT Progressing    Description: Goals to be met by: 25     Patient will increase functional independence with mobility by performin. Supine to sit with Contact Guard Assistance  2. Sit to supine with Contact Guard Assistance  3. Rolling to Left and Right with Contact Guard Assistance  4. Sit to stand transfer with Contact Guard Assistance - in progress  5. Bed to chair transfer with Contact Guard Assistance using Rolling Walker  6. Gait  x 50 feet (progress distance as tolerated) with Contact Guard Assistance using Rolling Walker - in progress  7. Wheelchair propulsion x 50 feet (progress distance as tolerated) with Supervision using bilateral upper extremities                         Time Tracking:     PT Received On: 25  PT Start Time: 1350  PT Stop Time: 1417  PT Total Time (min): 27 min    Billable Minutes: Gait Training 13 and Therapeutic Exercise 14    Treatment Type: Treatment  PT/PTA: PTA     Number of PTA visits since last PT visit: 3     2025  "

## 2025-04-14 NOTE — PLAN OF CARE
Problem: Adult Inpatient Plan of Care  Goal: Plan of Care Review  Outcome: Progressing     Problem: Adult Inpatient Plan of Care  Goal: Patient-Specific Goal (Individualized)  Outcome: Progressing     Problem: Adult Inpatient Plan of Care  Goal: Absence of Hospital-Acquired Illness or Injury  Outcome: Progressing     Problem: Adult Inpatient Plan of Care  Goal: Optimal Comfort and Wellbeing  Outcome: Progressing     Problem: Adult Inpatient Plan of Care  Goal: Readiness for Transition of Care  Outcome: Progressing     Problem: Pneumonia  Goal: Effective Oxygenation and Ventilation  Outcome: Progressing

## 2025-04-14 NOTE — PLAN OF CARE
Problem: Adult Inpatient Plan of Care  Goal: Plan of Care Review  4/14/2025 0626 by Dominic Lara RN  Outcome: Progressing  4/14/2025 0019 by Dominic Lara RN  Outcome: Progressing     Problem: Adult Inpatient Plan of Care  Goal: Patient-Specific Goal (Individualized)  4/14/2025 0626 by Dominic Lara RN  Outcome: Progressing  4/14/2025 0019 by Dominic Lara RN  Outcome: Progressing     Problem: Adult Inpatient Plan of Care  Goal: Absence of Hospital-Acquired Illness or Injury  4/14/2025 0626 by Dominic Lara RN  Outcome: Progressing  4/14/2025 0019 by Dominic Lara RN  Outcome: Progressing     Problem: Adult Inpatient Plan of Care  Goal: Optimal Comfort and Wellbeing  4/14/2025 0626 by Dominic Lara RN  Outcome: Progressing  4/14/2025 0019 by Dominic Lara RN  Outcome: Progressing     Problem: Adult Inpatient Plan of Care  Goal: Readiness for Transition of Care  4/14/2025 0626 by Dominic Lara RN  Outcome: Progressing  4/14/2025 0019 by Dominic Lara RN  Outcome: Progressing

## 2025-04-14 NOTE — PT/OT/SLP PROGRESS
"Occupational Therapy   Treatment    Name: Dorothy M Knobloch  MRN: [id number]  Admit Date: 3/26/2025  Admitting Diagnosis:  Acute on chronic congestive heart failure    General Precautions: Standard, fall   Orthopedic Precautions: RLE weight bearing as tolerated   Braces: N/A    Recommendations:     Discharge Recommendations:  home health OT  Level of Assistance Recommended at Discharge: 24 hours physical assistance for all ADL's and home management tasks  Discharge Equipment Recommendations: hip kit, wheelchair, 3-in-1 commode  Barriers to discharge:  Decreased caregiver support    Assessment:     Dorothy M Knobloch is a 89 y.o. female with a medical diagnosis of Acute on chronic congestive heart failure .  She presents with performance deficits affecting function are weakness, impaired endurance, impaired self care skills, impaired functional mobility, gait instability, impaired balance, decreased lower extremity function, decreased ROM, pain, impaired cardiopulmonary response to activity, orthopedic precautions. Pt participated well during today's session. Pt tolerated tx session without incident and is making progress, however, continues to demonstrate deficits with self care skills, balance, functional mobility, UB strength and endurance. Pt will benefit from continued OT services to progress towards goals.     Rehab Potential is good    Activity tolerance:  Good    Plan:     Patient to be seen 5 x/week to address the above listed problems via self-care/home management, therapeutic activities, therapeutic exercises    Plan of Care Expires: 04/27/25  Plan of Care Reviewed with: patient    Subjective   "No one came to get me up"  Communicated with: Tony prior to session.     Pain/Comfort:  Pain Rating 1: 0/10  Location - Side 1: Right  Location - Orientation 1: generalized  Location 1: shoulder  Pain Addressed 1: Cessation of Activity, Reposition  Pain Rating Post-Intervention 1: 6/10    Patient's cultural, spiritual, " Episcopal conflicts given the current situation:  yes    Objective:     Patient found HOB elevated with PureWick, with children present  upon OT entry to room.    Bed Mobility:    Patient completed Rolling/Turning to Left with  minimum assistance and with side rail  Patient completed Rolling/Turning to Right with minimum assistance and with side rail  Patient completed Scooting/Bridging with minimum assistance and with side rail  Patient completed Supine to Sit with minimum assistance     Functional Mobility/Transfers:  Patient completed Sit <> Stand Transfer with minimum assistance  with  rolling walker   Patient completed Bed <> Chair Transfer using Step Transfer technique with minimum assistance with rolling walker    Activities of Daily Living:  Grooming: minimum assistance to comb hair seated in w/c   Upper Body Dressing: set up assistance to doff/don pullover shirt seated EOB  Lower Body Dressing: maximal assistance to thread B LE and manage pants over hips in stance with use of RW for support   Toileting: maximal assistance to perform chantal hygiene and brief management at bed level     [facility] 6 Click ADL: 20    OT Exercises: UE Ergometer 8 min with moderate resistance   AROM x 2 sets of 10 with 2 lb dowel. Pt perform shoulder flex/ext, forward flex motion and bicep curls.   Therex performed to improve overall endurance, ROM and UB strength required for functional transfers, ADL's and w/c propulsion.     Treatment & Education:  Pt educated on:  - role of OT  - level of assistance  - energy conservation and task modification to maximize independence with ADL's and mobility   -  safety while performing functional transfers and self care tasks  - orthopedic precautions.   - progress towards OT goals     Patient left up in chair with  PTA present in rehab gym     GOALS:   Multidisciplinary Problems       Occupational Therapy Goals          Problem: Occupational Therapy    Goal Priority Disciplines Outcome  Interventions   Occupational Therapy Goal     OT, PT/OT Progressing    Description: Goals to be met by: 4/27/2025     Patient will increase functional independence with ADLs by performing:    UE Dressing with Stand-by Assistance.  LE Dressing with Minimal Assistance- Ongoing  Toileting from toilet with Minimal Assistance for hygiene and clothing management- Met  ~Toileting from toilet /c CGA- Ongoing  Bathing from  sitting at sink with Minimal Assistance.  Toilet transfer to toilet with Minimal Assistance- MET  ~Toilet transfer to toilet /c SBA- Ongoing      Rehab Services' DME recommendations    None required, pt owns all equipment     [facility] 4/8/2025                       Time Tracking:     OT Date of Treatment: 04/14/25  OT Start Time: 1308    OT Stop Time: 1349  OT Total Time (min): 41 min    Billable Minutes:Self Care/Home Management 14  Therapeutic Activity 14  Therapeutic Exercise 13    4/14/2025

## 2025-04-15 PROCEDURE — 97530 THERAPEUTIC ACTIVITIES: CPT | Mod: CQ

## 2025-04-15 PROCEDURE — 25000003 PHARM REV CODE 250: Performed by: NURSE PRACTITIONER

## 2025-04-15 PROCEDURE — 97535 SELF CARE MNGMENT TRAINING: CPT

## 2025-04-15 PROCEDURE — 97116 GAIT TRAINING THERAPY: CPT | Mod: CQ

## 2025-04-15 PROCEDURE — 11000004 HC SNF PRIVATE

## 2025-04-15 PROCEDURE — 25000003 PHARM REV CODE 250: Performed by: HOSPITALIST

## 2025-04-15 PROCEDURE — 97110 THERAPEUTIC EXERCISES: CPT | Mod: CQ

## 2025-04-15 RX ADMIN — BRIMONIDINE TARTRATE 1 DROP: 1.5 SOLUTION OPHTHALMIC at 09:04

## 2025-04-15 RX ADMIN — FUROSEMIDE 40 MG: 40 TABLET ORAL at 08:04

## 2025-04-15 RX ADMIN — TIMOLOL MALEATE 1 DROP: 5 SOLUTION OPHTHALMIC at 08:04

## 2025-04-15 RX ADMIN — LEVOTHYROXINE SODIUM 100 MCG: 0.05 TABLET ORAL at 06:04

## 2025-04-15 RX ADMIN — METOPROLOL SUCCINATE 25 MG: 25 TABLET, EXTENDED RELEASE ORAL at 09:04

## 2025-04-15 RX ADMIN — BRIMONIDINE TARTRATE 1 DROP: 1.5 SOLUTION OPHTHALMIC at 08:04

## 2025-04-15 RX ADMIN — POTASSIUM CHLORIDE 20 MEQ: 1500 TABLET, EXTENDED RELEASE ORAL at 08:04

## 2025-04-15 RX ADMIN — ACETAMINOPHEN 650 MG: 500 TABLET ORAL at 01:04

## 2025-04-15 RX ADMIN — APIXABAN 5 MG: 5 TABLET, FILM COATED ORAL at 09:04

## 2025-04-15 RX ADMIN — ACETAMINOPHEN 650 MG: 500 TABLET ORAL at 09:04

## 2025-04-15 RX ADMIN — APIXABAN 5 MG: 5 TABLET, FILM COATED ORAL at 08:04

## 2025-04-15 RX ADMIN — TIMOLOL MALEATE 1 DROP: 5 SOLUTION OPHTHALMIC at 09:04

## 2025-04-15 RX ADMIN — GABAPENTIN 100 MG: 300 CAPSULE ORAL at 08:04

## 2025-04-15 RX ADMIN — SENNOSIDES AND DOCUSATE SODIUM 1 TABLET: 50; 8.6 TABLET ORAL at 08:04

## 2025-04-15 RX ADMIN — GABAPENTIN 100 MG: 300 CAPSULE ORAL at 03:04

## 2025-04-15 RX ADMIN — MENTHOL, METHYL SALICYLATE: 10; 15 CREAM TOPICAL at 09:04

## 2025-04-15 RX ADMIN — SENNOSIDES AND DOCUSATE SODIUM 1 TABLET: 50; 8.6 TABLET ORAL at 09:04

## 2025-04-15 RX ADMIN — GABAPENTIN 100 MG: 300 CAPSULE ORAL at 09:04

## 2025-04-15 RX ADMIN — CLOPIDOGREL 75 MG: 75 TABLET ORAL at 08:04

## 2025-04-15 RX ADMIN — ESCITALOPRAM OXALATE 20 MG: 10 TABLET ORAL at 08:04

## 2025-04-15 RX ADMIN — ACETAMINOPHEN 650 MG: 500 TABLET ORAL at 06:04

## 2025-04-15 NOTE — PLAN OF CARE
Problem: Adult Inpatient Plan of Care  Goal: Plan of Care Review  Outcome: Progressing  Goal: Patient-Specific Goal (Individualized)  Outcome: Progressing  Goal: Absence of Hospital-Acquired Illness or Injury  Outcome: Progressing  Goal: Optimal Comfort and Wellbeing  Outcome: Progressing  Goal: Readiness for Transition of Care  Outcome: Progressing     Problem: Pneumonia  Goal: Resolution of Infection Signs and Symptoms  Outcome: Progressing  Goal: Effective Oxygenation and Ventilation  Outcome: Progressing     Problem: Wound  Goal: Optimal Coping  Outcome: Progressing  Goal: Improved Oral Intake  Outcome: Progressing  Goal: Skin Health and Integrity  Outcome: Progressing     Problem: Fall Injury Risk  Goal: Absence of Fall and Fall-Related Injury  Outcome: Progressing

## 2025-04-15 NOTE — PLAN OF CARE
Skilled Nursing IDT Note  Date: 4/14/25       Patient Name:  Dorothy M Knobloch       Medical Record Number: [id number]   YOB: 1932  Sex: Female          Room/Bed:  SFGL397/UUKG413 A  Payor Info:  Payor: [employer] [facility] / Plan: [employer] CHOICES / Product Type: Medicare Advantage /      Admitting Diagnosis:   Heart failure, unspecified [I50.9]  Acute on chronic heart failure [I50.9]   Admit Date/Time:  3/26/2025  6:40 PM    Primary Diagnosis:  Acute on chronic congestive heart failure  Principal Problem: Acute on chronic congestive heart failure    Patient Active Problem List    Diagnosis Date Noted    Anemia 03/21/2025    Hypokalemia 03/21/2025    Acute on chronic congestive heart failure 03/20/2025    Pneumonia 03/20/2025    Moderate protein-calorie malnutrition 03/14/2025    Acute blood loss anemia 03/07/2025    On anticoagulant therapy 06/13/2024    Age-related physical debility 04/05/2024    Aortic stenosis 04/05/2024    Pulmonary hypertension 04/05/2024    hx Stroke due to embolism of left posterior cerebral artery 04/04/2024    Chronic diastolic congestive heart failure 11/24/2019    AV block, 3rd degree 03/19/2019    Paroxysmal atrial fibrillation 09/11/2018    Cardiac pacemaker 08/22/2018    Status post cataract extraction and insertion of intraocular lens 08/10/2018    Blindness and low vision 08/10/2018    Dry eye syndrome of both eyes 08/10/2018    Nuclear sclerotic cataract of left eye 08/10/2018    Nonexudative age-related macular degeneration, bilateral, intermediate dry stage 07/12/2018    Neovascular glaucoma, 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    Iris neovascularization, left 03/27/2018    Bilateral carotid artery stenosis     CRAO (central retinal artery occlusion), left 01/29/2018    Meningioma 01/29/2018    Takotsubo cardiomyopathy 11/17/2016    Carotid artery disease 08/10/2016    Constipation - functional 08/05/2015    History of TIA (transient ischemic attack) 12/13/2013     Essential hypertension 12/13/2013    Pure hypercholesterolemia 12/13/2013    Acquired hypothyroidism 12/13/2013    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  also recently fell and now uses wheelchair.    Anticipated Discharge Date:  4/23/25    Supervision Recommended at Discharge: 24 hours significant assistance    Follow-up Services:   Home Health  physical therapy and occupational therapy     Preferred Home Health: [facility]    Preferred Pharmacy: [facility]

## 2025-04-15 NOTE — PROGRESS NOTES
"                                                        [facility]                                  Skilled Nursing Facility                   Progress Note     Admit Date: 3/26/2025  ISH 4/23/2025  [id number] A  Principal Problem:  Acute on chronic congestive heart failure   HPI obtained from patient interview and chart review     Chief Complaint: Re-evaluation of medical treatment and therapy status, shortness of breath    HPI:   Mrs. Knobloch is a 89 year old female PMHx of PAF on Apixiban, HTN, chronic diastolic HF, cardiac pacemaker due to sick sinus syndrome, CVA and hypothyroidism who presents to SNF following hospitalization for PNA.  Patient with previous SNF admission on 03/13 for Right periprosthetic femoral shaft fracture below a total hip replacement s/p ORIF on 3/8/25 with Dr. William.  Admission to SNF for secondary weakness and debility    Interval history:   24 hr vital sign ranges reviewed and listed below.  Patient is looking much better this week compared to last week.  Patient states that his breathing is much easier since she has been diuresed.  She is concerned about her weight as she states her staff has not wait her in the last 2 days.  Patient abdominal discomfort, nausea, or vomiting.  Patient reports an adequate appetite.  Patient denies dysuria.  Patient reports having regular bowel movements.  Patient progressing with PT/OT- patient ambulated with RW CGA 93 ft no LOB multiple turns wc follow occ standing rest break "I'm good, got in the rhythm". Continuing to follow and treat all acute and chronic conditions.    Past Medical History: Patient has a past medical history of Acquired hypothyroidism (12/13/2013), Age-related physical debility (04/05/2024), Antiplatelet or antithrombotic long-term use, AV block, 3rd degree (03/19/2019), Bilateral nonexudative age-related macular degeneration (08/27/2013), Blindness and low vision (08/10/2018), Blood transfusion, " Cardiac pacemaker (08/22/2018), Carotid artery disease (08/10/2016), Chronic diastolic congestive heart failure (11/24/2019), CRAO (central retinal artery occlusion), left (01/29/2018), Dry eye syndrome of both eyes (08/10/2018), Essential hypertension (12/13/2013), History of TIA (transient ischemic attack) (12/13/2013), Nonexudative age-related macular degeneration, bilateral, intermediate dry stage (07/12/2018), Nuclear sclerotic cataract of left eye (08/10/2018), 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    Past Surgical History: Patient has a past surgical history that includes Tonsillectomy; Joint replacement; Skin biopsy; Total hip arthroplasty (11/01/2000); Cardiac pacemaker placement; Cataract extraction (Right); pr transcran dopp intracran, emboli w/o inj (01/29/2018); Carpal tunnel release (Bilateral, 1991); and ORIF femur fracture (Right, 3/8/2025).    Social History: Patient reports that she has never smoked. She has never been exposed to tobacco smoke. She has never used smokeless tobacco. She reports that she does not drink alcohol and does not use drugs.    Family History: family history includes Arthritis in her maternal grandmother, paternal grandmother, and sister; Birth defects in her son; Cancer (age of onset: 80) in her paternal aunt; Diabetes in her paternal aunt and paternal uncle; Early death in her father and mother; Hearing loss in her daughter; Hypertension in her paternal grandmother and son.    Allergies: Patient is allergic to aspartame.    ROS  Constitutional: Negative for fever   Eyes:  +chronic vision changes  "  Respiratory: Negative for cough.  + chronic SOB/SMITH near baseline  Cardiovascular: Negative for chest pain, palpitations  Gastrointestinal: Negative for abdominal pain, constipation, diarrhea, nausea, vomiting.   Genitourinary: Negative for dysuria, frequency   Musculoskeletal:  + generalized weakness.  + intermittent left hip pain, bilateral knee and shin pain  Skin: Negative for itching and rash.   Neurological: Negative for dizziness, headaches.   Psychiatric/Behavioral: + for depression. The patient is not nervous/anxious.      24 hour Vital Sign Range   Temp:  [97.5 °F (36.4 °C)-98 °F (36.7 °C)]   Pulse:  [60-62]   Resp:  [18]   BP: (130)/(61-63)   SpO2:  [95 %]     Current BMI: Body mass index is 27.38 kg/m².    PEx  Constitutional: Patient appears frail,  debilitated.  No distress noted  Cardiovascular: Normal rate, regular rhythm and normal heart sounds.    Pulmonary/Chest: Effort normal and breath sounds are clear  Abdominal: Soft. Bowel sounds are normal.   Musculoskeletal: Normal range of motion.   Neurological: Alert and oriented to person, place, and time.   Psychiatric: Normal mood and affect. Behavior is normal.   Skin: Skin is warm and dry.  Surgical incision to RLE, fully approximated, no drainage noted, no signs of infection- associated swelling    No results for input(s): "GLUCOSE", "NA", "K", "CL", "CO2", "BUN", "CREATININE", "CALCIUM", "MG" in the last 24 hours.          No results for input(s): "WBC", "RBC", "HGB", "HCT", "PLT", "MCV", "MCH", "MCHC" in the last 24 hours.          No results for input(s): "POCTGLUCOSE" in the last 168 hours.     Assessment and Plan:    Acute on chronic congestive heart failure  Aortic stenosis  Pulmonary hypertension  - patient presented back to [facility] ED on 03/20 with decompensation, BNP was 1,625  - diuresed with IV Lasix  - most recent echo reviewed from 3/21/25, EF 65-70%, severely dilated left atrium, mod to severe AS, PASP 44 mmHg  - monitor I&Os, daily " weights  - goal is euvolemia given aortic stenosis  - 4/10 , Lasix 80 mg x 1 dose,   - 4/10 repeating Lasix IV 40 mg x 1 dose  - 4/15 patient is less symptomatic, continue PO Lasix 40 mg daily, metoprolol XL 25 daily,  hold HCTZ 12.5 mg for now.     Periprosthetic fracture of shaft of right femur   s/p ORIF on 3/8/2025  - PT/OT, RLE WBAT  - DVT PPX with apixaban  - Monitor and report drainage to incisional site  - Fall precautions  - Bowel regimen in place, hold for loose or frequent stools  - Ortho NEETU to see patient intermittently at SNF  - follow-up with orthopedics after discharge  - sutures removed by Orthopedics     Acute postoperative pain  - stable, continue acetaminophen 650 mg q.8 hours, gabapentin 100 mg TID, continue tramadol 50 mg q.6 hours PRN, methocarbamol 500 mg q.6 hours PRN    Essential hypertension  - BP improved, continue to hold HCTZ 12.5 mg daily, and amlodipine 5 mg daily, continue Lasix 40 mg daily, metoprolol XL 25 mg BID with holding parameters    Cardiac pacemaker  - placed for sick sinus syndrome    Bradycardia  Paroxysmal atrial fibrillation  - home metoprolol XL 50 mg daily, 25 mg qHS  - stable, heart rates remain borderline low, continue apixaban 5 mg BID, continue reduced metoprolol 25 mg BID     Acquired hypothyroidism  - TSH elevated at 22.581, Free T4 0.83  - increased levothyroxine to 100 mcg daily  - will need TSH recheck as outpatient in 6 weeks- will inform PCP prior to discharge     Moderate protein-calorie malnutrition  - RD following, appreciate recommendations  - continue supplements as ordered     Acute blood loss anemia  Anemia of chronic disease  - patient received 2 unit RBC transfusion postoperatively  - transfuse hemoglobin < 7  - improving, continue monitor twice weekly CBC    Hypokalemia  - continue KCl daily to 20 mEq    HX of CVA  - 4/5/2024 MRI showed acute left thalamus and hippocampus infarcts   - Not on statin due to prior intolerance (lower extremity  myalgias). Reportedly has tried Repatha in the past and had similar reaction. Also had reaction to Praluent and Zetia     Carotid Artery disease    - continue home Plavix 75 mg daily     Depression, moderate, reoccurring  - continue home escitalopram 20 mg daily    Macular degeneration  Decreased vision  Hx of CRAO  Dry eyes  - continue home eye drops    Debility   - Continue with PT/OT for gait training and strengthening and restoration of ADL's   - Encourage mobility, OOB in chair, and early ambulation as appropriate  - Fall precautions   - Monitor for bowel and bladder dysfunction  - Monitor for and prevent skin breakdown and pressure ulcers  - Continue DVT prophylaxis with apixaban       Pneumonia  Acute hypoxic respiratory failure  - patient admitted back to ED on 03/20 with suspected pneumonia  - initially treated with IV Vanc/Zosyn  -  Abx deescalated to PO, continue Augmentin and doxycycline x3 more days, ending on 03/29  - patient has been successfully weaned to room air  - continue IS, OOB      Anticipate disposition:  Home with home health?  Also undergoing Hospice evaluation    IP OHS RISK OF UNPLANNED READMISSION Model: HIGH     Follow-up needed during SNF stay-    Follow-up needed after discharge from SNF: PCP, orthopedics (4/24), cardiology     Future Appointments   Date Time Provider Department Center   4/24/2025  3:00 PM [facility] [id number] LB LIMIT [facility] XRAYORT [facility]   4/24/2025  3:20 PM Albino William MD [facility] ORTHO [facility]     I certify that SNF services are required to be given on an inpatient basis because Dorothy M Knobloch needs for skilled nursing care and/or skilled rehabilitation are required on a daily basis and such services can only practically be provided in a skilled nursing facility setting and are for an ongoing condition for which she received inpatient care in the hospital.     I spent 46 minutes reviewing patient records, examining, and counseling the patient/  patient's family with greater than 50% of the time spent in direct patient care and coordination.  Care coordination with staff    Angeles Aguero NP  Department of Hospital Medicine   [facility]     DOS: 4/15/2025       Patient note was created using MModal Dictation.  Any errors in syntax or even information may not have been identified and edited on initial review prior to signing this note.

## 2025-04-15 NOTE — PT/OT/SLP PROGRESS
"Occupational Therapy   Treatment    Name: Dorothy M Knobloch  MRN: [id number]  Admit Date: 3/26/2025  Admitting Diagnosis:  Acute on chronic congestive heart failure    General Precautions: Standard, fall   Orthopedic Precautions: RLE weight bearing as tolerated   Braces: N/A    Recommendations:     Discharge Recommendations:  home health OT  Level of Assistance Recommended at Discharge: 24 hours light assistance for ADL's and homemaking tasks  Discharge Equipment Recommendations: hip kit, wheelchair, 3-in-1 commode  Barriers to discharge:  Decreased caregiver support    Assessment:     Dorothy M Knobloch is a 89 y.o. female with a medical diagnosis of Acute on chronic congestive heart failure. Pt tolerated session well and without incident and shows excellent motivation and potential to improve, but the pt continues to require assistance to perform self-care tasks and mobility. Pt strengths include Functional cognition, Following multi-step tasks, and Attention to task and Family support, Motivation, and Willingness to participate. Pt improved LBD. However, pt would continue to benefit from cont'd OT services in the SNF setting to improve safety and independence /c functional tasks and ADLs upon discharge. Performance deficits affecting function are weakness, impaired endurance, impaired self care skills, impaired functional mobility, gait instability, impaired balance, decreased lower extremity function, decreased ROM, pain, impaired cardiopulmonary response to activity, orthopedic precautions.     Rehab Potential is good    Activity tolerance:  Good    Plan:     Patient to be seen 5 x/week to address the above listed problems via self-care/home management, therapeutic activities, therapeutic exercises    Plan of Care Expires: 04/27/25  Plan of Care Reviewed with: patient    Subjective     Communicated with: PIPPA prior to session.     "That's the hardest time I have had walking. Maybe it's because I am just waking up." " .    Pain/Comfort:  Pain Rating 1: 0/10  Pain Rating Post-Intervention 1: 0/10    Patient's cultural, spiritual, [employer] conflicts given the current situation:  yes    Objective:     Patient found HOB elevated with peripheral IV upon OT entry to room. Pt alert and agreeable to therapy.       Bed Mobility:    Patient completed Supine to Sit with minimum assistance, with side rail, and HOB elevated      Functional Mobility/Transfers:  Patient completed Sit <> Stand Transfer with minimum assistance  with  rolling walker. Extended time to gain balance in standing.    Patient completed Toilet Transfer to raised toilet seat Step Transfer technique with contact guard assistance with  rolling walker  Functional Mobility: Pt ambulated ~15 feet /c CGA and RW.  Extended time.     Activities of Daily Living:  Lower Body Dressing: minimum assistance to don pants. (A) for some orientation to clothing 2/2 poor vision and Min A/CGA for standing balance.  Toileting: minimum assistance /c pt able to perform perirectal hygiene while seated and req Min A for standing balance.     [facility] 6 Click ADL: 20      Treatment & Education:  Pt educated on POC, role of OT, and safety. Pt performed tasks to improve safety and independence in functional tasks and ADLs as mentioned above.       Patient left up in chair with all lines intact, call button in reach, and all needs met    GOALS:   Multidisciplinary Problems       Occupational Therapy Goals          Problem: Occupational Therapy    Goal Priority Disciplines Outcome Interventions   Occupational Therapy Goal     OT, PT/OT Progressing    Description: Goals to be met by: 4/27/2025     Patient will increase functional independence with ADLs by performing:    UE Dressing with Stand-by Assistance.  LE Dressing with Minimal Assistance- Ongoing  Toileting from toilet with Minimal Assistance for hygiene and clothing management- Met  ~Toileting from toilet /c CGA- Ongoing  Bathing from  sitting at  sink with Minimal Assistance.  Toilet transfer to toilet with Minimal Assistance- MET  ~Toilet transfer to toilet /c SBA- Ongoing      Rehab Services' DME recommendations    None required, pt owns all equipment     [facility] 4/8/2025                       Time Tracking:     OT Date of Treatment: 04/15/25  OT Start Time: 0735    OT Stop Time: 0803  OT Total Time (min): 28 min    Billable Minutes:Self Care/Home Management 28    4/15/2025

## 2025-04-15 NOTE — PLAN OF CARE
Problem: Occupational Therapy  Goal: Occupational Therapy Goal  Description: Goals to be met by: 4/27/2025     Patient will increase functional independence with ADLs by performing:    UE Dressing with Stand-by Assistance.  LE Dressing with Minimal Assistance- Ongoing  Toileting from toilet with Minimal Assistance for hygiene and clothing management- Met  ~Toileting from toilet /c CGA- Ongoing  Bathing from  sitting at sink with Minimal Assistance.  Toilet transfer to toilet with Minimal Assistance- MET  ~Toilet transfer to toilet /c SBA- Ongoing      Rehab Services' DME recommendations    None required, pt owns all equipment     [facility] 4/8/2025  Outcome: Progressing

## 2025-04-15 NOTE — PLAN OF CARE
Problem: Adult Inpatient Plan of Care  Goal: Plan of Care Review  Outcome: Progressing  Flowsheets (Taken 4/14/2025 2100)  Plan of Care Reviewed With: patient  Goal: Patient-Specific Goal (Individualized)  Outcome: Progressing  Goal: Absence of Hospital-Acquired Illness or Injury  Outcome: Progressing  Goal: Optimal Comfort and Wellbeing  Outcome: Progressing  Goal: Readiness for Transition of Care  Outcome: Progressing     Problem: Pneumonia  Goal: Fluid Balance  Outcome: Progressing  Goal: Resolution of Infection Signs and Symptoms  Outcome: Progressing  Goal: Effective Oxygenation and Ventilation  Outcome: Progressing     Problem: Wound  Goal: Optimal Coping  Outcome: Progressing  Goal: Optimal Functional Ability  Outcome: Progressing  Goal: Absence of Infection Signs and Symptoms  Outcome: Progressing  Goal: Improved Oral Intake  Outcome: Progressing  Goal: Optimal Pain Control and Function  Outcome: Progressing  Goal: Skin Health and Integrity  Outcome: Progressing  Goal: Optimal Wound Healing  Outcome: Progressing     Problem: Fall Injury Risk  Goal: Absence of Fall and Fall-Related Injury  Outcome: Progressing     Problem: Skin Injury Risk Increased  Goal: Skin Health and Integrity  Outcome: Progressing     Problem: Malnutrition  Goal: Improved Nutritional Intake  Outcome: Progressing     Problem: Fall Injury Risk  Goal: Absence of Fall and Fall-Related Injury  Outcome: Progressing     Problem: Fall Injury Risk  Goal: Absence of Fall and Fall-Related Injury  Outcome: Progressing     Problem: Fall Injury Risk  Goal: Absence of Fall and Fall-Related Injury  Outcome: Progressing     Problem: Fall Injury Risk  Goal: Absence of Fall and Fall-Related Injury  Outcome: Progressing     Problem: Fall Injury Risk  Goal: Absence of Fall and Fall-Related Injury  Outcome: Progressing     Problem: Fall Injury Risk  Goal: Absence of Fall and Fall-Related Injury  Outcome: Progressing     Problem: Fall Injury Risk  Goal:  Absence of Fall and Fall-Related Injury  Outcome: Progressing     Problem: Fall Injury Risk  Goal: Absence of Fall and Fall-Related Injury  Outcome: Progressing     Problem: Fall Injury Risk  Goal: Absence of Fall and Fall-Related Injury  Outcome: Progressing     Problem: Fall Injury Risk  Goal: Absence of Fall and Fall-Related Injury  Outcome: Progressing     Problem: Fall Injury Risk  Goal: Absence of Fall and Fall-Related Injury  Outcome: Progressing

## 2025-04-15 NOTE — PLAN OF CARE
Problem: Fall Injury Risk  Goal: Absence of Fall and Fall-Related Injury  Outcome: Progressing  Intervention: Promote Injury-Free Environment  Flowsheets (Taken 4/14/2025 1902)  Safety Promotion/Fall Prevention: assistive device/personal item within reach

## 2025-04-15 NOTE — PT/OT/SLP PROGRESS
"Physical Therapy Treatment    Patient Name:  Dorothy M Knobloch   MRN:  [id number]  Admit Date: 3/26/2025  Admitting Diagnosis: Acute on chronic congestive heart failure  Recent Surgeries:     General Precautions: Standard, fall  Orthopedic Precautions: RLE weight bearing as tolerated  Braces: N/A    Recommendations:     Discharge Recommendations: home health PT  Level of Assistance Recommended at Discharge: 24 hours significant assistance  Discharge Equipment Recommendations: 3-in-1 commode, hip kit, wheelchair  Barriers to discharge: Decreased caregiver support, Other (Comment) (Increased assistance for mobility)    Assessment:     Dorothy M Knobloch is a 89 y.o. female admitted with a medical diagnosis of Acute on chronic congestive heart failure . Pt tolerated well, pt would continue to benefit from skilled PT services to improve overall functional mobility, strength and endurance.  .      Performance deficits affecting function: weakness, impaired endurance, impaired self care skills, impaired functional mobility, gait instability, impaired balance, decreased lower extremity function, decreased safety awareness, pain, decreased upper extremity function, decreased ROM, impaired cardiopulmonary response to activity, orthopedic precautions.    Rehab Potential is good    Activity Tolerance: Fair    Plan:     Patient to be seen 5 x/week to address the above listed problems via gait training, therapeutic activities, therapeutic exercises, neuromuscular re-education, wheelchair management/training    Plan of Care Expires: 04/26/25  Plan of Care Reviewed with: patient    Subjective     " I'm worried about them not weighing me every day". Nsg notified, they are weighing pt in bed     Pain/Comfort:  Pain Rating 1: 2/10 (0 at rest, 2 with gait in shin)  Location - Side 1: Right  Location - Orientation 1: generalized  Location 1:  (shin, shoulder is better today)  Pain Addressed 1: Cessation of Activity (declined needing " meds)  Pain Rating Post-Intervention 1: 2/10 (no further mentioned)    Patient's cultural, spiritual, Voodoo conflicts given the current situation:  no    Objective:     Communicated with nsg prior to session. Re pts concerns about being weighed Patient found  with  (in wc) upon PT entry to room.     Therapeutic Activities and Exercises: 3 x 10 reps AP, GS, LAQ, hip flex, abd/add A AA RLE within tolerance, no pain    Functional Mobility:  Transfers:     Sit to Stand:  minimum assistance and moderate assistance with rolling walker and vcs for tech/positioning/momentum  Gait: amb with RW CGA 74 ft and 56 ft seated rest break no LOB    AM-PAC 6 CLICK MOBILITY  13    Patient left up in chair with call button in reach and belongings in reach .    GOALS:   Multidisciplinary Problems       Physical Therapy Goals          Problem: Physical Therapy    Goal Priority Disciplines Outcome Interventions   Physical Therapy Goal     PT, PT/OT Progressing    Description: Goals to be met by: 25     Patient will increase functional independence with mobility by performin. Supine to sit with Contact Guard Assistance  2. Sit to supine with Contact Guard Assistance  3. Rolling to Left and Right with Contact Guard Assistance  4. Sit to stand transfer with Contact Guard Assistance - in progress  5. Bed to chair transfer with Contact Guard Assistance using Rolling Walker  6. Gait  x 50 feet (progress distance as tolerated) with Contact Guard Assistance using Rolling Walker - in progress  7. Wheelchair propulsion x 50 feet (progress distance as tolerated) with Supervision using bilateral upper extremities                         Time Tracking:     PT Received On: 04/15/25  PT Start Time: 1039  PT Stop Time: 1121  PT Total Time (min): 42 min    Billable Minutes: Gait Training 15, Therapeutic Activity 10, and Therapeutic Exercise 17    Treatment Type: Treatment  PT/PTA: PTA     Number of PTA visits since last PT visit: 4      04/15/2025

## 2025-04-16 PROCEDURE — 97530 THERAPEUTIC ACTIVITIES: CPT | Mod: CQ

## 2025-04-16 PROCEDURE — 97530 THERAPEUTIC ACTIVITIES: CPT

## 2025-04-16 PROCEDURE — 97535 SELF CARE MNGMENT TRAINING: CPT

## 2025-04-16 PROCEDURE — 97116 GAIT TRAINING THERAPY: CPT | Mod: CQ

## 2025-04-16 PROCEDURE — 25000003 PHARM REV CODE 250: Performed by: NURSE PRACTITIONER

## 2025-04-16 PROCEDURE — 11000004 HC SNF PRIVATE

## 2025-04-16 PROCEDURE — 97110 THERAPEUTIC EXERCISES: CPT | Mod: CQ

## 2025-04-16 PROCEDURE — 25000003 PHARM REV CODE 250: Performed by: HOSPITALIST

## 2025-04-16 PROCEDURE — 94761 N-INVAS EAR/PLS OXIMETRY MLT: CPT

## 2025-04-16 RX ORDER — POLYETHYLENE GLYCOL 3350 17 G/17G
17 POWDER, FOR SOLUTION ORAL 2 TIMES DAILY PRN
Status: DISCONTINUED | OUTPATIENT
Start: 2025-04-16 | End: 2025-04-21

## 2025-04-16 RX ORDER — FUROSEMIDE 40 MG/1
40 TABLET ORAL ONCE
Status: COMPLETED | OUTPATIENT
Start: 2025-04-16 | End: 2025-04-16

## 2025-04-16 RX ORDER — FUROSEMIDE 40 MG/1
40 TABLET ORAL
Status: DISCONTINUED | OUTPATIENT
Start: 2025-04-17 | End: 2025-04-18

## 2025-04-16 RX ADMIN — CLOPIDOGREL 75 MG: 75 TABLET ORAL at 09:04

## 2025-04-16 RX ADMIN — TIMOLOL MALEATE 1 DROP: 5 SOLUTION OPHTHALMIC at 09:04

## 2025-04-16 RX ADMIN — ACETAMINOPHEN 650 MG: 500 TABLET ORAL at 09:04

## 2025-04-16 RX ADMIN — BRIMONIDINE TARTRATE 1 DROP: 1.5 SOLUTION OPHTHALMIC at 09:04

## 2025-04-16 RX ADMIN — ACETAMINOPHEN 650 MG: 500 TABLET ORAL at 04:04

## 2025-04-16 RX ADMIN — APIXABAN 5 MG: 5 TABLET, FILM COATED ORAL at 09:04

## 2025-04-16 RX ADMIN — POTASSIUM CHLORIDE 20 MEQ: 1500 TABLET, EXTENDED RELEASE ORAL at 09:04

## 2025-04-16 RX ADMIN — METOPROLOL SUCCINATE 25 MG: 25 TABLET, EXTENDED RELEASE ORAL at 09:04

## 2025-04-16 RX ADMIN — TRAMADOL HYDROCHLORIDE 25 MG: 50 TABLET, COATED ORAL at 09:04

## 2025-04-16 RX ADMIN — FUROSEMIDE 40 MG: 40 TABLET ORAL at 06:04

## 2025-04-16 RX ADMIN — GABAPENTIN 100 MG: 300 CAPSULE ORAL at 09:04

## 2025-04-16 RX ADMIN — ESCITALOPRAM OXALATE 20 MG: 10 TABLET ORAL at 09:04

## 2025-04-16 RX ADMIN — SENNOSIDES AND DOCUSATE SODIUM 1 TABLET: 50; 8.6 TABLET ORAL at 09:04

## 2025-04-16 RX ADMIN — ACETAMINOPHEN 650 MG: 500 TABLET ORAL at 02:04

## 2025-04-16 RX ADMIN — FUROSEMIDE 40 MG: 40 TABLET ORAL at 09:04

## 2025-04-16 RX ADMIN — GABAPENTIN 100 MG: 300 CAPSULE ORAL at 02:04

## 2025-04-16 RX ADMIN — LEVOTHYROXINE SODIUM 100 MCG: 0.05 TABLET ORAL at 05:04

## 2025-04-16 NOTE — PT/OT/SLP PROGRESS
Occupational Therapy   Treatment    Name: Dorothy M Knobloch  MRN: [id number]  Admit Date: 3/26/2025  Admitting Diagnosis:  Acute on chronic congestive heart failure    General Precautions: Standard, fall   Orthopedic Precautions: RLE weight bearing as tolerated   Braces: N/A    Recommendations:     Discharge Recommendations:  home health OT  Level of Assistance Recommended at Discharge: 24 hours physical assistance for all ADL's and home management tasks  Discharge Equipment Recommendations: hip kit, wheelchair, 3-in-1 commode  Barriers to discharge:  Decreased caregiver support    Assessment:     Dorothy M Knobloch is a 89 y.o. female with a medical diagnosis of Acute on chronic congestive heart failure. Pt daughter present for family training. Pt tolerated session well and without incident and shows excellent motivation and potential to improve, but the pt continues to require assistance to perform self-care tasks and mobility. Pt strengths include Functional cognition, Following multi-step tasks, and Attention to task and Family support, Motivation, and Willingness to participate. Pt improved LBD. However, pt would continue to benefit from cont'd OT services in the SNF setting to improve safety and independence /c functional tasks and ADLs upon discharge.  Performance deficits affecting function are weakness, impaired endurance, impaired self care skills, impaired functional mobility, gait instability, impaired balance, decreased lower extremity function, decreased ROM, pain, impaired cardiopulmonary response to activity, orthopedic precautions.     Rehab Potential is good    Activity tolerance:  Good    Plan:     Patient to be seen 5 x/week to address the above listed problems via self-care/home management, therapeutic activities, therapeutic exercises    Plan of Care Expires: 04/27/25  Plan of Care Reviewed with: patient, daughter    Subjective     Communicated with: PIPPA prior to session.     Pain/Comfort:  Pain  Rating 1: 0/10  Pain Rating 2: 0/10    Patient's cultural, spiritual, Congregational conflicts given the current situation:  yes    Objective:     Patient found HOB elevated with  (no active lines) upon OT entry to room. Pt alert and agreeable to therapy.       Bed Mobility:    Patient completed Supine to Sit with minimum assistance, with side rail, and HOB flat      Functional Mobility/Transfers:  Patient completed Sit <> Stand Transfer with minimum assistance  with  rolling walker   Patient completed Chair Transfer using Step Transfer technique with contact guard assistance with rolling walker  Patient completed Toilet Transfer  to raised toilel seat Step Transfer technique with contact guard assistance with  rolling walker  Functional Mobility: Pt ambulated around room for performance of functional tasks /c CGA and RW    Activities of Daily Living:  Grooming: modified independence seated at sink to wash face and brush teeth  Upper Body Dressing: set-up (A) to doff/don pullover shirt    Lower Body Dressing: minimum assistance to don pants. Min A for balance only. Extended time to don over R foot 2/2 low vision.  Toileting: minimum assistance /c Min A for balance when standing to perform clothing mgmt only.   Footwear: Mod A to doff/don shoes. (A) to don /c pt able to doff.     [facility] 6 Click ADL: 20      Treatment & Education:  ~Pt and daughter educated on current level of function, techniques for (A) /c performance of ADLs/FM, proper use of AE/DME, safety /c ADLs, energy conservation strategies, and importance of OOB activity /c d/c. Pt and daughter also educated on POC, role of OT, and safety. Pt performed tasks to improve safety and independence in functional tasks and ADLs as mentioned above.       Patient left up in chair with  KATJA Lobo present and in rehabilitation gym for PT session/family training.    GOALS:   Multidisciplinary Problems       Occupational Therapy Goals          Problem: Occupational Therapy     Goal Priority Disciplines Outcome Interventions   Occupational Therapy Goal     OT, PT/OT Progressing    Description: Goals to be met by: 4/27/2025     Patient will increase functional independence with ADLs by performing:    UE Dressing with Stand-by Assistance.  LE Dressing with Minimal Assistance- Ongoing  Toileting from toilet with Minimal Assistance for hygiene and clothing management- Met  ~Toileting from toilet /c CGA- Ongoing  Bathing from  sitting at sink with Minimal Assistance.  Toilet transfer to toilet with Minimal Assistance- MET  ~Toilet transfer to toilet /c SBA- Ongoing      Rehab Services' DME recommendations    None required, pt owns all equipment     CORBIN Eagle 4/8/2025                       Time Tracking:     OT Date of Treatment: 04/16/25  OT Start Time: 0947    OT Stop Time: 1025  OT Total Time (min): 38 min    Billable Minutes:Self Care/Home Management 30  Therapeutic Activity 8    4/16/2025

## 2025-04-16 NOTE — PT/OT/SLP PROGRESS
Physical Therapy Treatment    Patient Name:  Dorothy M Knobloch   MRN:  [id number]    Recommendations:     Discharge Recommendations: Low Intensity Therapy  Discharge Equipment Recommendations: 3-in-1 commode, hip kit, wheelchair  Barriers to discharge:  decreased caregiver support/increased assistance for functional mobility    Assessment:     Dorothy M Knobloch is a 89 y.o. female admitted with a medical diagnosis of Acute on chronic congestive heart failure.  She presents with the following impairments/functional limitations: weakness, impaired endurance, impaired self care skills, impaired functional mobility, gait instability, impaired balance, decreased safety awareness, decreased lower extremity function, decreased upper extremity function, decreased ROM, impaired cardiopulmonary response to activity, orthopedic precautions Pt would continue to benefit from P.T. To address impairments listed above.  .    Rehab Prognosis: Fair; patient would benefit from acute skilled PT services to address these deficits and reach maximum level of function.    Recent Surgery: * No surgery found *      Plan:     During this hospitalization, patient to be seen 5 x/week to address the identified rehab impairments via gait training, therapeutic activities, therapeutic exercises, neuromuscular re-education, wheelchair management/training and progress toward the following goals:    Plan of Care Expires:  04/26/25    Subjective       Patient/Family Comments/goals: Pt agreed to tx with pt's daughter present for pt/family training.  Pain/Comfort:  Pain Rating 1: 0/10  Pain Rating 2: 0/10      Objective:     Communicated with RN prior to session.  Patient found up in chair with  (in w/c) upon PT entry to room.     General Precautions: Standard, fall  Orthopedic Precautions: RLE weight bearing as tolerated  Braces: N/A  Respiratory Status: Room air     Functional Mobility:  Transfers:     Sit to Stand: 1st and 2nd trials Mod A with Rw. 3rd  "trial Sourav.  VC's to scoot fwd closer to Edge of chair and vc's and assist for improved foot placement prior to standing.  Gait: 20ft and 40ft with RW and close CGA with vc's for upright posture as able.  VCs for slower pace for energy conservation secondary to SOB and for improved stability.  Seated rest between bouts secondary to decreased strength/endurance and SOB.  O2 sats 91% immediately after ambulating.  Less SOB noted after second bout of gait.    Balance: sitting good-, standing fair RW, gait fair RW      AM-PAC 6 CLICK MOBILITY  Turning over in bed (including adjusting bedclothes, sheets and blankets)?: 2  Sitting down on and standing up from a chair with arms (e.g., wheelchair, bedside commode, etc.): 2  Moving from lying on back to sitting on the side of the bed?: 2  Moving to and from a bed to a chair (including a wheelchair)?: 3  Need to walk in hospital room?: 3  Climbing 3-5 steps with a railing?: 1  Basic Mobility Total Score: 13       Treatment & Education:  Pt stated she felt weaker today stating, "I think I did too much yesterday."  PTA spoke with pt about having good and bad days, and knowing how to recognize when to take rest breaks for increased safety/stability.  Pt and her daughter were present for pt/family training.  Pt/pt's daughter spoke with PTA about pt usually using a rollator at home.  PTA recommended pt use a RW at this time for increased stability and safety.  Pt and pt's daughter agreed.  Pt has a RW at home already.  PTA instructed on how to properly size RW for pt's daughter to check her mother's walker at home.    BLE therex prior to gait secondary to pt stating, "I need to warm up first."  BLE therex: APs 20 reps, LAQs, hip flexion x 10 reps.15  Pt has a small step to step over to get into shower.  Practiced going up/down 2 inch curb with RW with Mod A/Sourav with vc's for technique and assistance to step up with LLE.  Pt has GB and physical assistance at assisted living to " help her get into and out of shower.    Gait as above.  Pt demonstrated increased SOB after first bout of gait.  Seated rest with pt instructed in PLB bouts and O2 sats 91%.  SOB was less on second bout of gait.  Pt's daughter was instructed and assisted pt on how to properly assist pt (hand placement, etc) with sit <>stand, gait, and up/down 2 inch step.  Pt's daughter performed assistance properly and safely and felt comfortable assisting her mother.  Pt's daughter/pt had no further questions at end of tx.      Patient left  up in w/c  with all lines intact, call button in reach, and pt's daughter present..    GOALS:   Multidisciplinary Problems       Physical Therapy Goals          Problem: Physical Therapy    Goal Priority Disciplines Outcome Interventions   Physical Therapy Goal     PT, PT/OT Progressing    Description: Goals to be met by: 25     Patient will increase functional independence with mobility by performin. Supine to sit with Contact Guard Assistance  2. Sit to supine with Contact Guard Assistance  3. Rolling to Left and Right with Contact Guard Assistance  4. Sit to stand transfer with Contact Guard Assistance - in progress  5. Bed to chair transfer with Contact Guard Assistance using Rolling Walker  6. Gait  x 50 feet (progress distance as tolerated) with Contact Guard Assistance using Rolling Walker - in progress  7. Wheelchair propulsion x 50 feet (progress distance as tolerated) with Supervision using bilateral upper extremities                         Time Tracking:     PT Received On: 25  PT Start Time: 1025     PT Stop Time: 1120  PT Total Time (min): 55 min     Billable Minutes: Gait Training 15, Therapeutic Activity 32, and Therapeutic Exercise 8    Treatment Type: Treatment  PT/PTA: PTA     Number of PTA visits since last PT visit: 2025

## 2025-04-17 LAB
ABSOLUTE EOSINOPHIL (OHS): 0.27 K/UL
ABSOLUTE MONOCYTE (OHS): 0.72 K/UL (ref 0.3–1)
ABSOLUTE NEUTROPHIL COUNT (OHS): 3.2 K/UL (ref 1.8–7.7)
ANION GAP (OHS): 9 MMOL/L (ref 8–16)
BASOPHILS # BLD AUTO: 0.08 K/UL
BASOPHILS NFR BLD AUTO: 1.3 %
BUN SERPL-MCNC: 33 MG/DL (ref 10–30)
CALCIUM SERPL-MCNC: 8.9 MG/DL (ref 8.7–10.5)
CHLORIDE SERPL-SCNC: 100 MMOL/L (ref 95–110)
CO2 SERPL-SCNC: 26 MMOL/L (ref 23–29)
CREAT SERPL-MCNC: 0.6 MG/DL (ref 0.5–1.4)
ERYTHROCYTE [DISTWIDTH] IN BLOOD BY AUTOMATED COUNT: 16.3 % (ref 11.5–14.5)
GFR SERPLBLD CREATININE-BSD FMLA CKD-EPI: >60 ML/MIN/1.73/M2
GLUCOSE SERPL-MCNC: 85 MG/DL (ref 70–110)
HCT VFR BLD AUTO: 27.9 % (ref 37–48.5)
HGB BLD-MCNC: 8.7 GM/DL (ref 12–16)
IMM GRANULOCYTES # BLD AUTO: 0.06 K/UL (ref 0–0.04)
IMM GRANULOCYTES NFR BLD AUTO: 0.9 % (ref 0–0.5)
LYMPHOCYTES # BLD AUTO: 2.02 K/UL (ref 1–4.8)
MAGNESIUM SERPL-MCNC: 2 MG/DL (ref 1.6–2.6)
MCH RBC QN AUTO: 31.4 PG (ref 27–31)
MCHC RBC AUTO-ENTMCNC: 31.2 G/DL (ref 32–36)
MCV RBC AUTO: 101 FL (ref 82–98)
NUCLEATED RBC (/100WBC) (OHS): 0 /100 WBC
PHOSPHATE SERPL-MCNC: 3.7 MG/DL (ref 2.7–4.5)
PLATELET # BLD AUTO: 276 K/UL (ref 150–450)
PMV BLD AUTO: 8.7 FL (ref 9.2–12.9)
POTASSIUM SERPL-SCNC: 3.8 MMOL/L (ref 3.5–5.1)
RBC # BLD AUTO: 2.77 M/UL (ref 4–5.4)
RELATIVE EOSINOPHIL (OHS): 4.3 %
RELATIVE LYMPHOCYTE (OHS): 31.8 % (ref 18–48)
RELATIVE MONOCYTE (OHS): 11.3 % (ref 4–15)
RELATIVE NEUTROPHIL (OHS): 50.4 % (ref 38–73)
SODIUM SERPL-SCNC: 135 MMOL/L (ref 136–145)
WBC # BLD AUTO: 6.35 K/UL (ref 3.9–12.7)

## 2025-04-17 PROCEDURE — 83735 ASSAY OF MAGNESIUM: CPT | Performed by: HOSPITALIST

## 2025-04-17 PROCEDURE — 25000003 PHARM REV CODE 250: Performed by: HOSPITALIST

## 2025-04-17 PROCEDURE — 36415 COLL VENOUS BLD VENIPUNCTURE: CPT | Performed by: HOSPITALIST

## 2025-04-17 PROCEDURE — 84100 ASSAY OF PHOSPHORUS: CPT | Performed by: HOSPITALIST

## 2025-04-17 PROCEDURE — 85025 COMPLETE CBC W/AUTO DIFF WBC: CPT | Performed by: HOSPITALIST

## 2025-04-17 PROCEDURE — 97110 THERAPEUTIC EXERCISES: CPT | Mod: CO

## 2025-04-17 PROCEDURE — 97116 GAIT TRAINING THERAPY: CPT

## 2025-04-17 PROCEDURE — 97530 THERAPEUTIC ACTIVITIES: CPT | Mod: CO

## 2025-04-17 PROCEDURE — 80048 BASIC METABOLIC PNL TOTAL CA: CPT | Performed by: HOSPITALIST

## 2025-04-17 PROCEDURE — 11000004 HC SNF PRIVATE

## 2025-04-17 PROCEDURE — 25000003 PHARM REV CODE 250: Performed by: NURSE PRACTITIONER

## 2025-04-17 PROCEDURE — 97110 THERAPEUTIC EXERCISES: CPT

## 2025-04-17 RX ADMIN — APIXABAN 5 MG: 5 TABLET, FILM COATED ORAL at 09:04

## 2025-04-17 RX ADMIN — ACETAMINOPHEN 650 MG: 500 TABLET ORAL at 06:04

## 2025-04-17 RX ADMIN — ESCITALOPRAM OXALATE 20 MG: 10 TABLET ORAL at 09:04

## 2025-04-17 RX ADMIN — GABAPENTIN 100 MG: 300 CAPSULE ORAL at 08:04

## 2025-04-17 RX ADMIN — GABAPENTIN 100 MG: 300 CAPSULE ORAL at 03:04

## 2025-04-17 RX ADMIN — LEVOTHYROXINE SODIUM 100 MCG: 0.05 TABLET ORAL at 06:04

## 2025-04-17 RX ADMIN — APIXABAN 5 MG: 5 TABLET, FILM COATED ORAL at 08:04

## 2025-04-17 RX ADMIN — POTASSIUM CHLORIDE 20 MEQ: 1500 TABLET, EXTENDED RELEASE ORAL at 09:04

## 2025-04-17 RX ADMIN — TRAMADOL HYDROCHLORIDE 25 MG: 50 TABLET, COATED ORAL at 09:04

## 2025-04-17 RX ADMIN — METOPROLOL SUCCINATE 25 MG: 25 TABLET, EXTENDED RELEASE ORAL at 08:04

## 2025-04-17 RX ADMIN — CLOPIDOGREL 75 MG: 75 TABLET ORAL at 09:04

## 2025-04-17 RX ADMIN — BRIMONIDINE TARTRATE 1 DROP: 1.5 SOLUTION OPHTHALMIC at 09:04

## 2025-04-17 RX ADMIN — TRAMADOL HYDROCHLORIDE 25 MG: 50 TABLET, COATED ORAL at 08:04

## 2025-04-17 RX ADMIN — BRIMONIDINE TARTRATE 1 DROP: 1.5 SOLUTION OPHTHALMIC at 08:04

## 2025-04-17 RX ADMIN — GABAPENTIN 100 MG: 300 CAPSULE ORAL at 09:04

## 2025-04-17 RX ADMIN — ACETAMINOPHEN 650 MG: 500 TABLET ORAL at 01:04

## 2025-04-17 RX ADMIN — SENNOSIDES AND DOCUSATE SODIUM 1 TABLET: 50; 8.6 TABLET ORAL at 09:04

## 2025-04-17 RX ADMIN — SENNOSIDES AND DOCUSATE SODIUM 1 TABLET: 50; 8.6 TABLET ORAL at 08:04

## 2025-04-17 RX ADMIN — TIMOLOL MALEATE 1 DROP: 5 SOLUTION OPHTHALMIC at 08:04

## 2025-04-17 RX ADMIN — TIMOLOL MALEATE 1 DROP: 5 SOLUTION OPHTHALMIC at 09:04

## 2025-04-17 NOTE — PROGRESS NOTES
[facility]                                  Skilled Nursing Facility                   Progress Note     Admit Date: 3/26/2025  ISH 4/23/2025  [id number]  Principal Problem:  Acute on chronic congestive heart failure   HPI obtained from patient interview and chart review     Chief Complaint: Re-evaluation of medical treatment and therapy status, shortness of breath    HPI:   Mrs. Knobloch is a 89 year old female PMHx of PAF on Apixiban, HTN, chronic diastolic HF, cardiac pacemaker due to sick sinus syndrome, CVA and hypothyroidism who presents to SNF following hospitalization for PNA.  Patient with previous SNF admission on 03/13 for Right periprosthetic femoral shaft fracture below a total hip replacement s/p ORIF on 3/8/25 with Dr. William.  Admission to SNF for secondary weakness and debility    Interval history:   24 hr vital sign ranges reviewed and listed below.  Patient is looking much better this week compared to last week.  Patient states that his breathing is much easier since she has been diuresed- is requesting a dose of Lasix today.  Bilateral lower extremity edema appears improved.  Patient abdominal discomfort, nausea, or vomiting.  Patient reports an adequate appetite.  Patient denies dysuria.  Patient reports having regular bowel movements.  Patient progressing with PT/OT- patient ambulated with RW CGA 74 ft and 56 ft seated rest break no LOB Continuing to follow and treat all acute and chronic conditions.    Past Medical History: Patient has a past medical history of Acquired hypothyroidism (12/13/2013), Age-related physical debility (04/05/2024), Antiplatelet or antithrombotic long-term use, AV block, 3rd degree (03/19/2019), Bilateral nonexudative age-related macular degeneration (08/27/2013), Blindness and low vision (08/10/2018), Blood transfusion, Cardiac pacemaker (08/22/2018), Carotid artery disease  (08/10/2016), Chronic diastolic congestive heart failure (11/24/2019), CRAO (central retinal artery occlusion), left (01/29/2018), Dry eye syndrome of both eyes (08/10/2018), Essential hypertension (12/13/2013), History of TIA (transient ischemic attack) (12/13/2013), Nonexudative age-related macular degeneration, bilateral, intermediate dry stage (07/12/2018), Nuclear sclerotic cataract of left eye (08/10/2018), 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    Past Surgical History: Patient has a past surgical history that includes Tonsillectomy; Joint replacement; Skin biopsy; Total hip arthroplasty (11/01/2000); Cardiac pacemaker placement; Cataract extraction (Right); pr transcran dopp intracran, emboli w/o inj (01/29/2018); Carpal tunnel release (Bilateral, 1991); and ORIF femur fracture (Right, 3/8/2025).    Social History: Patient reports that she has never smoked. She has never been exposed to tobacco smoke. She has never used smokeless tobacco. She reports that she does not drink alcohol and does not use drugs.    Family History: family history includes Arthritis in her maternal grandmother, paternal grandmother, and sister; Birth defects in her son; Cancer (age of onset: 80) in her paternal aunt; Diabetes in her paternal aunt and paternal uncle; Early death in her father and mother; Hearing loss in her daughter; Hypertension in her paternal grandmother and son.    Allergies: Patient is allergic to aspartame.    ROS  Constitutional: Negative for fever   Eyes:  +chronic vision changes   Respiratory: Negative for cough.  + chronic SOB/SMITH near  "baseline  Cardiovascular: Negative for chest pain, palpitations  Gastrointestinal: Negative for abdominal pain, constipation, diarrhea, nausea, vomiting.   Genitourinary: Negative for dysuria, frequency   Musculoskeletal:  + generalized weakness.  + intermittent left hip pain, bilateral knee and shin pain  Skin: Negative for itching and rash.   Neurological: Negative for dizziness, headaches.   Psychiatric/Behavioral: + for depression. The patient is not nervous/anxious.      24 hour Vital Sign Range   Temp:  [97.4 °F (36.3 °C)-97.6 °F (36.4 °C)]   Pulse:  [60-74]   Resp:  [16]   BP: (127-139)/(56-65)   SpO2:  [94 %-95 %]     Current BMI: Body mass index is 28.1 kg/m².    PEx  Constitutional: Patient appears frail,  debilitated.  No distress noted  Cardiovascular: Normal rate, regular rhythm and normal heart sounds.    Pulmonary/Chest: Effort normal and breath sounds are clear  Abdominal: Soft. Bowel sounds are normal.   Musculoskeletal: Normal range of motion.   Neurological: Alert and oriented to person, place, and time.   Psychiatric: Normal mood and affect. Behavior is normal.   Skin: Skin is warm and dry.  Surgical incision to RLE, fully approximated, no drainage noted, no signs of infection- associated swelling      No results for input(s): "POCTGLUCOSE" in the last 168 hours.     Assessment and Plan:    Acute on chronic congestive heart failure  Aortic stenosis  Pulmonary hypertension  - [facility] with decompensation, BNP was 1,625  - diuresed with IV Lasix  - most recent echo reviewed from 3/21/25, EF 65-70%, severely dilated left atrium, mod to severe AS, PASP 44 mmHg  - monitor I&Os, daily weights  - goal is euvolemia given aortic stenosis  - 4/10 , Lasix 80 mg x 1 dose,   - 4/10 repeating Lasix IV 40 mg x 1 dose  - 4/16 patient is less symptomatic, giving additional PO Lasix 40 mg x 1 dose this afternoon, continue PO Lasix 40 mg daily, metoprolol XL 25 daily,  hold " HCTZ 12.5 mg for now.     Periprosthetic fracture of shaft of right femur   s/p ORIF on 3/8/2025  - PT/OT, RLE WBAT  - DVT PPX with apixaban  - Monitor and report drainage to incisional site  - Fall precautions  - Bowel regimen in place, hold for loose or frequent stools  - Ortho NEETU to see patient intermittently at SNF  - follow-up with orthopedics after discharge  - sutures removed by Orthopedics     Acute postoperative pain  - stable, continue acetaminophen 650 mg q.8 hours, gabapentin 100 mg TID, continue tramadol 50 mg q.6 hours PRN, methocarbamol 500 mg q.6 hours PRN    Essential hypertension  - BP improved, continue to hold HCTZ 12.5 mg daily, and amlodipine 5 mg daily, continue Lasix 40 mg daily, metoprolol XL 25 mg BID with holding parameters    Cardiac pacemaker  - placed for sick sinus syndrome    Bradycardia  Paroxysmal atrial fibrillation  - home metoprolol XL 50 mg daily, 25 mg qHS  - stable, heart rates remain borderline low, continue apixaban 5 mg BID, continue reduced metoprolol 25 mg BID     Acquired hypothyroidism  - TSH elevated at 22.581, Free T4 0.83  - increased levothyroxine to 100 mcg daily  - will need TSH recheck as outpatient in 6 weeks- will inform PCP prior to discharge     Moderate protein-calorie malnutrition  - RD following, appreciate recommendations  - continue supplements as ordered     Acute blood loss anemia  Anemia of chronic disease  - patient received 2 unit RBC transfusion postoperatively  - transfuse hemoglobin < 7  - improving, continue monitor twice weekly CBC    Hypokalemia  - continue KCl daily to 20 mEq    HX of CVA  - 4/5/2024 MRI showed acute left thalamus and hippocampus infarcts   - Not on statin due to prior intolerance (lower extremity myalgias). Reportedly has tried Repatha in the past and had similar reaction. Also had reaction to Praluent and Zetia     Carotid Artery disease    - continue home Plavix 75 mg daily     Depression, moderate, reoccurring  -  continue home escitalopram 20 mg daily    Macular degeneration  Decreased vision  Hx of CRAO  Dry eyes  - continue home eye drops    Debility   - Continue with PT/OT for gait training and strengthening and restoration of ADL's   - Encourage mobility, OOB in chair, and early ambulation as appropriate  - Fall precautions   - Monitor for bowel and bladder dysfunction  - Monitor for and prevent skin breakdown and pressure ulcers  - Continue DVT prophylaxis with apixaban       Pneumonia  Acute hypoxic respiratory failure  - patient admitted back to ED on 03/20 with suspected pneumonia  - initially treated with IV Vanc/Zosyn  -  Abx deescalated to PO, continue Augmentin and doxycycline x3 more days, ending on 03/29  - patient has been successfully weaned to room air  - continue IS, OOB      Anticipate disposition:  Home with home health?  Also undergoing Hospice evaluation    IP OHS RISK OF UNPLANNED READMISSION Model: HIGH     Follow-up needed during SNF stay-    Follow-up needed after discharge from SNF: PCP, orthopedics (4/24), cardiology     Future Appointments   Date Time Provider Department Center   4/24/2025  3:00 PM [facility] [id number] LIMIT [facility] XRAYORT [facility]   4/24/2025  3:20 PM Albino William MD [facility] ORTHO [facility]     I certify that SNF services are required to be given on an inpatient basis because Dorothy M Knobloch needs for skilled nursing care and/or skilled rehabilitation are required on a daily basis and such services can only practically be provided in a skilled nursing facility setting and are for an ongoing condition for which she received inpatient care in the hospital.     I spent 47 minutes reviewing patient records, examining, and counseling the patient/ patient's family with greater than 50% of the time spent in direct patient care and coordination.  Care coordination with staff, daughter updated at bedside    Angeles Aguero NP  Department of Hospital Medicine   [facility]     DOS: 4/16/2025       Patient note was created using MModal Dictation.  Any errors in syntax or even information may not have been identified and edited on initial review prior to signing this note.

## 2025-04-17 NOTE — PT/OT/SLP PROGRESS
"Occupational Therapy   Treatment    Name: Dorothy M Knobloch  MRN: [id number]  Admit Date: 3/26/2025  Admitting Diagnosis:  Acute on chronic congestive heart failure    General Precautions: Standard, fall   Orthopedic Precautions: RLE weight bearing as tolerated   Braces: N/A    Recommendations:     Discharge Recommendations:  home health OT  Level of Assistance Recommended at Discharge: 24 hours physical assistance for all ADL's and home management tasks  Discharge Equipment Recommendations: hip kit, wheelchair, 3-in-1 commode  Barriers to discharge:  Decreased caregiver support    Assessment:     Dorothy M Knobloch is a 89 y.o. female with a medical diagnosis of Acute on chronic congestive heart failure .  She presents with performance deficits affecting function are weakness, impaired endurance, impaired self care skills, impaired functional mobility, gait instability, impaired balance, decreased lower extremity function, decreased ROM, pain, impaired cardiopulmonary response to activity, orthopedic precautions. Pt participated well during today's session. Pt tolerated tx session without incident and is making progress, however, continues to demonstrate deficits with self care skills, balance, functional mobility, UB strength and endurance. Pt will benefit from continued OT services to progress towards goals.     Rehab Potential is good    Activity tolerance:  Good    Plan:     Patient to be seen 5 x/week to address the above listed problems via self-care/home management, therapeutic activities, therapeutic exercises    Plan of Care Expires: 04/27/25  Plan of Care Reviewed with: patient, son    Subjective   "I had a great shower"  Communicated with: Tony prior to session.     Pain/Comfort:  Pain Rating 1: 0/10  Pain Rating 2: 0/10    Patient's cultural, spiritual, Protestant conflicts given the current situation:  yes    Objective:     Patient found up in chair with son present upon OT entry to room.    Functional " Mobility/Transfers:  Patient completed Sit <> Stand Transfer with minimum assistance  with  rolling walker     Activities of Daily Living:  Footwear: total assistance to doff B socks and don B shoes seated in w/c.     [facility] 6 Click ADL: 20    OT Exercises: UE Ergometer 10 min with moderate resistance   AROM x 2 sets of 15 with 2 lb dowel. Pt perform shoulder flex/ext, forward flex motion and bicep curls.   Therex performed to improve overall endurance, ROM and UB strength required for functional transfers, ADL's and w/c propulsion.     Treatment & Education:  Pt with functional activity on today with CGA/min assistance and RW. Pt at raised counter to perform activity with focus on standing tolerance, dynamic standing bal, crossing midline, and to promote independence with homemaking and self care tasks.  Pt tolerate stance for - 5:32 min/sec.     Pt educated on:  - role of OT  - level of assistance  - energy conservation and task modification to maximize independence with ADL's and mobility   -  safety while performing functional transfers and self care tasks  - orthopedic precautions.   - progress towards OT goals     Patient left up in chair with call button in reach    GOALS:   Multidisciplinary Problems       Occupational Therapy Goals          Problem: Occupational Therapy    Goal Priority Disciplines Outcome Interventions   Occupational Therapy Goal     OT, PT/OT Progressing    Description: Goals to be met by: 4/27/2025     Patient will increase functional independence with ADLs by performing:    UE Dressing with Stand-by Assistance.  LE Dressing with Minimal Assistance- Ongoing  Toileting from toilet with Minimal Assistance for hygiene and clothing management- Met  ~Toileting from toilet /c CGA- Ongoing  Bathing from  sitting at sink with Minimal Assistance.  Toilet transfer to toilet with Minimal Assistance- MET  ~Toilet transfer to toilet /c SBA- Ongoing      Rehab Services' DME recommendations    None  required, pt owns all equipment     CORBIN [facility] 4/8/2025                       Time Tracking:     OT Date of Treatment: 04/17/25  OT Start Time: 1108    OT Stop Time: 1148  OT Total Time (min): 40 min    Billable Minutes:Therapeutic Activity 25  Therapeutic Exercise 15    4/17/2025

## 2025-04-17 NOTE — PROGRESS NOTES
[facility]                                  Skilled Nursing Facility                   Progress Note     Admit Date: 3/26/2025  ISH 4/23/2025  [id number]  Principal Problem:  Acute on chronic congestive heart failure   HPI obtained from patient interview and chart review     Chief Complaint: Re-evaluation of medical treatment and therapy status, lab review    HPI:   Mrs. Knobloch is a 89 year old female PMHx of PAF on Apixiban, HTN, chronic diastolic HF, cardiac pacemaker due to sick sinus syndrome, CVA and hypothyroidism who presents to SNF following hospitalization for PNA.  Patient with previous SNF admission on 03/13 for Right periprosthetic femoral shaft fracture below a total hip replacement s/p ORIF on 3/8/25 with Dr. William.  Admission to SNF for secondary weakness and debility    Interval history:  All of today's labs reviewed and are listed below.  Na improved to 135, BUN was slight increased to 33.  24 hr vital sign ranges reviewed and listed below.  Patient seen by Orthopedics today, note reviewed. Patient with weight gain listed in epic but received an extra dose of Lasix yesterday.  Edema overall appears improved.  Shortness of breath is at her baseline.  Patient denies abdominal discomfort, nausea, or vomiting.  Patient reports an adequate appetite.  Patient denies dysuria.  Patient reports having regular bowel movements.  Patient progressing with PT/OT- 20ft and 40ft with RW and close CGA with vc's for upright posture as able. VCs for slower pace for energy conservation secondary to SOB and for improved stability. Seated rest between bouts secondary to decreased strength/endurance and SOB. O2 sats 91% immediately after ambulating. Less SOB noted after second bout of gait. Continuing to follow and treat all acute and chronic conditions.    Past Medical History: Patient has a past medical history of Acquired  hypothyroidism (12/13/2013), Age-related physical debility (04/05/2024), Antiplatelet or antithrombotic long-term use, AV block, 3rd degree (03/19/2019), Bilateral nonexudative age-related macular degeneration (08/27/2013), Blindness and low vision (08/10/2018), Blood transfusion, Cardiac pacemaker (08/22/2018), Carotid artery disease (08/10/2016), Chronic diastolic congestive heart failure (11/24/2019), CRAO (central retinal artery occlusion), left (01/29/2018), Dry eye syndrome of both eyes (08/10/2018), Essential hypertension (12/13/2013), History of TIA (transient ischemic attack) (12/13/2013), Nonexudative age-related macular degeneration, bilateral, intermediate dry stage (07/12/2018), Nuclear sclerotic cataract of left eye (08/10/2018), 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    Past Surgical History: Patient has a past surgical history that includes Tonsillectomy; Joint replacement; Skin biopsy; Total hip arthroplasty (11/01/2000); Cardiac pacemaker placement; Cataract extraction (Right); pr transcran dopp intracran, emboli w/o inj (01/29/2018); Carpal tunnel release (Bilateral, 1991); and ORIF femur fracture (Right, 3/8/2025).    Social History: Patient reports that she has never smoked. She has never been exposed to tobacco smoke. She has never used smokeless tobacco. She reports that she does not drink alcohol and does not use drugs.    Family History: family history includes Arthritis in her maternal grandmother, paternal grandmother, and sister; Birth defects in her son; Cancer (age of onset: 80) in her paternal aunt; Diabetes in her  "paternal aunt and paternal uncle; Early death in her father and mother; Hearing loss in her daughter; Hypertension in her paternal grandmother and son.    Allergies: Patient is allergic to aspartame.    ROS  Constitutional: Negative for fever   Eyes:  +chronic vision changes   Respiratory: Negative for cough.  + chronic SOB/SMITH near baseline  Cardiovascular: Negative for chest pain, palpitations  Gastrointestinal: Negative for abdominal pain, constipation, diarrhea, nausea, vomiting.   Genitourinary: Negative for dysuria, frequency   Musculoskeletal:  + generalized weakness.  + intermittent left hip pain, bilateral knee and shin pain  Skin: Negative for itching and rash.   Neurological: Negative for dizziness, headaches.   Psychiatric/Behavioral: + for depression. The patient is not nervous/anxious.      24 hour Vital Sign Range   Temp:  [97.4 °F (36.3 °C)-97.6 °F (36.4 °C)]   Pulse:  [60-74]   Resp:  [16]   BP: (127-139)/(56-65)   SpO2:  [94 %-95 %]     Current BMI: Body mass index is 28.1 kg/m².    PEx  Constitutional: Patient appears frail,  debilitated.  No distress noted  Cardiovascular: Normal rate, regular rhythm and normal heart sounds.    Pulmonary/Chest: Effort normal and breath sounds are clear  Abdominal: Soft. Bowel sounds are normal.   Musculoskeletal: Normal range of motion.   Neurological: Alert and oriented to person, place, and time.   Psychiatric: Normal mood and affect. Behavior is normal.   Skin: Skin is warm and dry.  Surgical incision to RLE, fully approximated, no drainage noted, no signs of infection- associated swelling    Recent Labs   Lab 04/17/25  0409   GLUCOSE 85   *   K 3.8      CO2 26   BUN 33*   CREATININE 0.6   CALCIUM 8.9   MG 2.0             Recent Labs   Lab 04/17/25  0409   WBC 6.35   RBC 2.77*   HGB 8.7*   HCT 27.9*      *   MCH 31.4*   MCHC 31.2*             No results for input(s): "POCTGLUCOSE" in the last 168 hours.     Assessment and " Plan:    Acute on chronic congestive heart failure  Aortic stenosis  Pulmonary hypertension  - patient presented back to [facility] ED on 03/20 with decompensation, BNP was 1,625  - diuresed with IV Lasix  - most recent echo reviewed from 3/21/25, EF 65-70%, severely dilated left atrium, mod to severe AS, PASP 44 mmHg  - monitor I&Os, daily weights  - goal is euvolemia given aortic stenosis  - 4/10 , Lasix 80 mg x 1 dose,   - 4/10 repeating Lasix IV 40 mg x 1 dose  - 4/17 patient is less symptomatic, continue PO Lasix 40 mg daily, metoprolol XL 25 daily,  hold HCTZ 12.5 mg for now.     Periprosthetic fracture of shaft of right femur   s/p ORIF on 3/8/2025  - PT/OT, RLE WBAT  - DVT PPX with apixaban  - Monitor and report drainage to incisional site  - Fall precautions  - Bowel regimen in place, hold for loose or frequent stools  - Ortho NEETU to see patient intermittently at [facility]  - follow-up with orthopedics after discharge  - sutures removed by Orthopedics     Acute postoperative pain  - stable, continue acetaminophen 650 mg q.8 hours, gabapentin 100 mg TID, continue tramadol 50 mg q.6 hours PRN, methocarbamol 500 mg q.6 hours PRN    Azotemia, new  - likely from extra Lasix dose yesterday  - continue with current daily Lasix dosing for now, encourage appropriate oral hydration, repeat BMP 4/21    Essential hypertension  - BP improved, continue to hold HCTZ 12.5 mg daily, and amlodipine 5 mg daily, continue Lasix 40 mg daily, metoprolol XL 25 mg BID with holding parameters    Cardiac pacemaker  - placed for sick sinus syndrome    Bradycardia  Paroxysmal atrial fibrillation  - home metoprolol XL 50 mg daily, 25 mg qHS  - stable, heart rates remain borderline low, continue apixaban 5 mg BID, continue reduced metoprolol 25 mg BID     Acquired hypothyroidism  - TSH elevated at 22.581, Free T4 0.83  - increased levothyroxine to 100 mcg daily  - will need TSH recheck as outpatient in 6 weeks- will inform PCP prior to  discharge     Moderate protein-calorie malnutrition  - RD following, appreciate recommendations  - continue supplements as ordered     Acute blood loss anemia  Anemia of chronic disease  - patient received 2 unit RBC transfusion postoperatively  - transfuse hemoglobin < 7  - improving, continue monitor twice weekly CBC    Hypokalemia  - continue KCl daily to 20 mEq    HX of CVA  - 4/5/2024 MRI showed acute left thalamus and hippocampus infarcts   - Not on statin due to prior intolerance (lower extremity myalgias). Reportedly has tried Repatha in the past and had similar reaction. Also had reaction to Praluent and Zetia     Carotid Artery disease    - continue home Plavix 75 mg daily     Depression, moderate, reoccurring  - continue home escitalopram 20 mg daily    Macular degeneration  Decreased vision  Hx of CRAO  Dry eyes  - continue home eye drops    Debility   - Continue with PT/OT for gait training and strengthening and restoration of ADL's   - Encourage mobility, OOB in chair, and early ambulation as appropriate  - Fall precautions   - Monitor for bowel and bladder dysfunction  - Monitor for and prevent skin breakdown and pressure ulcers  - Continue DVT prophylaxis with apixaban       Pneumonia  Acute hypoxic respiratory failure  - patient admitted back to ED on 03/20 with suspected pneumonia  - initially treated with IV Vanc/Zosyn  -  Abx deescalated to PO, continue Augmentin and doxycycline x3 more days, ending on 03/29  - patient has been successfully weaned to room air  - continue IS, OOB      Anticipate disposition:  Home with home health?  Also undergoing Hospice evaluation    IP OHS RISK OF UNPLANNED READMISSION Model: HIGH     Follow-up needed during SNF stay-    Follow-up needed after discharge from SNF: PCP, orthopedics (4/24), cardiology     Future Appointments   Date Time Provider Department Center   4/24/2025  3:00 PM [facility] [id number] 485 LB LIMIT [facility] XRAYORT [facility]   4/24/2025  3:20 PM  Albino William MD [facility] ORTHO [facility]     I certify that SNF services are required to be given on an inpatient basis because Dorothy M Knobloch needs for skilled nursing care and/or skilled rehabilitation are required on a daily basis and such services can only practically be provided in a skilled nursing facility setting and are for an ongoing condition for which she received inpatient care in the hospital.     I spent 47 minutes reviewing patient records, examining, and counseling the patient/ patient's family with greater than 50% of the time spent in direct patient care and coordination.  Care coordination with staff,     Angeles Aguero NP  Department of Hospital Medicine   [facility]     DOS: 4/17/2025       Patient note was created using MModal Dictation.  Any errors in syntax or even information may not have been identified and edited on initial review prior to signing this note.

## 2025-04-17 NOTE — PROGRESS NOTES
[facility] - Skilled Nursing  Adult Nutrition  Progress Note    SUMMARY   Recommendations  Continue level 6 soft and bite sized, boost plus chocoalte BID, no diet products, RD following  Goals: PO intake to meet 85% of EEN/EPN with ONS by next RD follow up  Nutrition Goal Status: progressing towards goal    Nutrition Discharge Planning    Nutrition Discharge Planning: Therapeutic diet (comments), Oral diet with texture modifications per SLP (comments)  Therapeutic diet (comments): low sodium, weight monitoring,  Oral supplement regimen (comments): boost plus BID or equalivent  Oral diet with texture modifications per SLP (comments): minced and moist    Assessment and Plan    Endocrine  Moderate protein-calorie malnutrition  Malnutrition Type:  Context: chronic illness  Level: moderate     Related to (etiology):   Decreased ability to consume sufficient energy 2/2 poor oral intake      Signs and Symptoms (as evidenced by):  Malnutrition Characteristic Summary:  Energy Intake (Malnutrition): less than 75% for greater than or equal to 1 month  Subcutaneous Fat (Malnutrition): moderate depletion  Muscle Mass (Malnutrition): moderate depletion        Interventions/Recommendations (treatment strategy):  Commercial beverage medical food supplement(protein) boost plus BID  Soft and bite sized diet  Thin liquids  Collaboration of nutrition professional with other providers            Malnutrition Assessment 3/14        Malnutrition Context: social/environmental circumstances  Malnutrition Level: moderate  Skin (Micronutrient): bruised, thinned, turgor reduced, wounds unhealed  Hair/Scalp (Micronutrient): dry  Teeth (Micronutrient): broken dentition  Tongue (Micronutrient): red  Neck/Chest (Micronutrient): muscle wasting  Musculoskeletal/Lower Extremities: muscle wasting       Weight Loss (Malnutrition): 10% in 6 months  Subcutaneous Fat (Malnutrition): mild depletion  Muscle Mass (Malnutrition): moderate depletion   Orbital  "Region (Subcutaneous Fat Loss): severe depletion  Upper Arm Region (Subcutaneous Fat Loss): mild depletion  Thoracic and Lumbar Region: mild depletion   [place] Region (Muscle Loss): mild depletion  Clavicle Bone Region (Muscle Loss): moderate depletion  Clavicle and Acromion Bone Region (Muscle Loss): moderate depletion  Dorsal Hand (Muscle Loss): moderate depletion  Patellar Region (Muscle Loss): mild depletion  Anterior Thigh Region (Muscle Loss): mild depletion  Posterior Calf Region (Muscle Loss): mild depletion                                     Reason for Assessment    Reason For Assessment: RD follow-up  Diagnosis:  (HF)  General Information Comments: Patient states she still receives mashed potato despite having do not send in comments of diet order, she is taking the boost, we discussed her weight gain since hospitalization, some of which is fluid, her surgical ankle area still has mild edema. She is back on lasix    Nutrition/Diet History    Nutrition Intake History: 3 meal day  Food Preferences: whole milk  Spiritual, Cultural Beliefs, Gnosticism Practices, Values that Affect Care: yes  Food Allergies:  (aspartame)  Factors Affecting Nutritional Intake: None identified at this time    Anthropometrics    Height: 5' 2" (157.5 cm)  Height (inches): 62 in  Height Method: Stated  Weight: 69.7 kg (153 lb 10.6 oz)  Weight (lb): 153.66 lb  Weight Method: Standard Scale  Ideal Body Weight (IBW), Female: 110 lb  % Ideal Body Weight, Female (lb): 134.88 %  BMI (Calculated): 28.1  Usual Body Weight (UBW), k.4 kg  Weight Change Amount:  (weight loss in 6 months 10% noted on intitial admit to SNF, loss of 6 # recently at acute readmit from diuresis)  % Usual Body Weight: 104.72  % Weight Change From Usual Weight: 4.5 %       Lab/Procedures/Meds    Pertinent Labs Reviewed: reviewed  Pertinent Labs Comments: Hg 8.7, Hct 27.9, Na 135, BUN 33,  Pertinent Medications Reviewed: reviewed  Pertinent Medications Comments: " lasix, KCl    Estimated/Assessed Needs      Weight Used For Calorie Calculations: 57.5 kg (126 lb 12.2 oz)  Energy Calorie Requirements (kcal): 8033-0682  Energy Need Method: [place]-St Jeor (x 1.4(PAL))  Protein Requirements: 69  Weight Used For Protein Calculations: 57.5 kg (126 lb 12.2 oz) (x 1.2 (PAL))  Fluid Requirements (mL): 1313 or per MD  RDA Method (mL): 1313                              Nutrition Prescription Ordered    Current Diet Order: minced and moist level 5,  Nutrition Order Comments: PO %  Oral Nutrition Supplement: boost plus BID chocolate    Evaluation of Received Nutrient/Fluid Intake    I/O: +2454  Energy Calories Required: meeting needs  Protein Required: meeting needs  Fluid Required: meeting needs  Comments: LBM 4/15  Tolerance: tolerating (had to request texture modification once returned to SNF)  % Intake of Estimated Energy Needs: 75 - 100 %  % Meal Intake: 75 - 100 %    Nutrition Risk    Level of Risk/Frequency of Follow-up: low (one time per week)     Monitor and Evaluation    Monitor and Evaluation: Food and beverage intake, Diet order, Weight     Nutrition Follow-Up    RD Follow-up?: Yes

## 2025-04-17 NOTE — PROGRESS NOTES
Ms. Knobloch was seen at [facility] today for a post-operative visit after undergoing   Right periprosthetic femoral shaft fracture below a total hip replacement   by Dr. William   on 3/8/2025.      Interval History:  She reports that she is doing ok.  Pain is controlled.  She is taking pain medication.    She denies fever, chills, and sweats since the time of the surgery.  She is participating in her therapy sessions.     Physical exam:  Patient resting Incision is clean, dry and intact.  Sutures removed without difficulty.  She has tactile stimulation to their lower leg, she denies calf pain, there is no leg edema and their pedal pulse is palpable x 2.     RADS: none done today: ordered     Assessment:  Post-op visit (6weeks)    Plan:  Current care, treatment plan, precautions, activity level/ modifications, limitations, rehabilitation exercises and proposed future treatment were discussed with the patient. We discussed the need to monitor for changes in symptoms and condition and report them to the physician.  Discussed importance of compliance with all appointments and follow up examinations.     WOUND CARE ORDERS  -  she may wash the area with antibacterial soap in the shower. Will not submerge until the incision is completely healed  -Patient was advised to monitor wound closely and multiple times daily for any problems. Call clinic immediately or report to ED for immediate medical attention for any complications including reopening of wound, drainage, purulence, redness, streaking, odor, pain out of proportion, fever, chills, etc.   -- If there are signs of infection, please call Ortho Clinic [phone] for further instructions and to make appt to be seen.            PHYSICAL THERAPY:    - Continue therapy as ordered.        - weight bearing as tolerated with walker        - range of motion as tolerated.      PAIN MEDICATION:               - Pain medication: refill was not needed,               - Pain medication  refill policy provided to patient for review, yes      DVT PROPHYLAXIS:               - Eliquis     FOLLOW UP:   - Patient will continue to be seen on SNF weekly until their discharge then he is to return to clinic for follow up.  - Future Appointments:   Future Appointments   Date Time Provider Department Center   4/24/2025  3:00 PM [facility] XRORTHO2 485 LB LIMIT [facility] XRAYORT [facility]   4/24/2025  3:20 PM Albino William MD [facility] ORTHO [facility]           If there are any questions prior to scheduled follow up, the patient was instructed to contact the office

## 2025-04-17 NOTE — PT/OT/SLP PROGRESS
Physical Therapy Treatment    Patient Name:  Dorothy M Knobloch   MRN:  [id number]  Admit Date: 3/26/2025  Admitting Diagnosis: Acute on chronic congestive heart failure  Recent Surgeries: OPEN REDUCTION INTERNAL FIXATION FEMORAL SHAFT FRACTURE (Right)     General Precautions: Standard, fall  Orthopedic Precautions: RLE weight bearing as tolerated  Braces: N/A    Recommendations:     Discharge Recommendations: home health PT  Level of Assistance Recommended at Discharge: 24 hours light assistance  Discharge Equipment Recommendations: 3-in-1 commode, hip kit, wheelchair  Barriers to discharge: Decreased caregiver support, Other (Comment) (Increased assistance for mobility)    Assessment:     Dorothy M Knobloch is a 89 y.o. female admitted with a medical diagnosis of Acute on chronic congestive heart failure . Pt with improved functional mobility since her initial eval. Pt is now needing Sourav for sit<>stand compared to MaxA x 2people, and ambulating on average 40ft at a time with CGA compared to less than 10ft with ModAx 2people on eval. Pt remains very motivated and would therefore benefit from continued SNF rehab.    Performance deficits affecting function: weakness, impaired endurance, impaired self care skills, impaired functional mobility, gait instability, impaired balance, decreased lower extremity function, decreased upper extremity function, decreased ROM, impaired cardiopulmonary response to activity, orthopedic precautions.    Rehab Potential is good    Activity Tolerance: Good    Plan:     Patient to be seen 5 x/week to address the above listed problems via gait training, therapeutic activities, therapeutic exercises, neuromuscular re-education, wheelchair management/training    Plan of Care Expires: 04/26/25  Plan of Care Reviewed with: patient    Subjective     Pt agreeable to work with PT.     Pain/Comfort:  Pain Rating 1: 0/10  Pain Rating 2: 0/10    Patient's cultural, spiritual, Denominational conflicts given  the current situation:  no    Objective:     Communicated with pt prior to session.  Patient found up in chair with   upon PT entry to room.     Therapeutic Activities and Exercises: B L/e seated therex x 30reps: AP, LAQ, hip flex, GS.    Functional Mobility:  Transfers:     Sit to Stand:  minimum assistance with rolling walker  Gait: 38ft +45ft with RW and CGA for safety. Pt with FFT, short step to GT pattern, and very slow miroslava.    AM-PAC 6 CLICK MOBILITY  15    Patient left up in chair with call button in reach.    GOALS:   Multidisciplinary Problems       Physical Therapy Goals          Problem: Physical Therapy    Goal Priority Disciplines Outcome Interventions   Physical Therapy Goal     PT, PT/OT Progressing    Description: Goals to be met by: 25     Patient will increase functional independence with mobility by performin. Supine to sit with Contact Guard Assistance  2. Sit to supine with Contact Guard Assistance  3. Rolling to Left and Right with Contact Guard Assistance  4. Sit to stand transfer with Contact Guard Assistance - in progress  5. Bed to chair transfer with Contact Guard Assistance using Rolling Walker  6. Gait  x 50 feet (progress distance as tolerated) with Contact Guard Assistance using Rolling Walker - in progress  7. Wheelchair propulsion x 50 feet (progress distance as tolerated) with Supervision using bilateral upper extremities                         Time Tracking:     PT Received On: 25  PT Start Time: 1321  PT Stop Time: 1352  PT Total Time (min): 31 min    Billable Minutes: Gait Training 21 and Therapeutic Exercise 10    Treatment Type: Treatment  PT/PTA: PT     Number of PTA visits since last PT visit: 0     2025

## 2025-04-17 NOTE — PLAN OF CARE
Problem: Adult Inpatient Plan of Care  Goal: Plan of Care Review  Outcome: Progressing  Goal: Patient-Specific Goal (Individualized)  Outcome: Progressing  Goal: Absence of Hospital-Acquired Illness or Injury  Outcome: Progressing  Goal: Optimal Comfort and Wellbeing  Outcome: Progressing  Goal: Readiness for Transition of Care  Outcome: Progressing     Problem: Pneumonia  Goal: Fluid Balance  Outcome: Progressing  Goal: Resolution of Infection Signs and Symptoms  Outcome: Progressing  Goal: Effective Oxygenation and Ventilation  Outcome: Progressing     Problem: Wound  Goal: Optimal Coping  Outcome: Progressing  Goal: Optimal Functional Ability  Outcome: Progressing  Goal: Absence of Infection Signs and Symptoms  Outcome: Progressing  Goal: Improved Oral Intake  Outcome: Progressing  Goal: Optimal Pain Control and Function  Outcome: Progressing  Goal: Skin Health and Integrity  Outcome: Progressing  Goal: Optimal Wound Healing  Outcome: Progressing     Problem: Fall Injury Risk  Goal: Absence of Fall and Fall-Related Injury  Outcome: Progressing     Problem: Skin Injury Risk Increased  Goal: Skin Health and Integrity  Outcome: Progressing     Problem: Malnutrition  Goal: Improved Nutritional Intake  Outcome: Progressing     Problem: Fall Injury Risk  Goal: Absence of Fall and Fall-Related Injury  Outcome: Progressing     Problem: Fall Injury Risk  Goal: Absence of Fall and Fall-Related Injury  Outcome: Progressing     Problem: Fall Injury Risk  Goal: Absence of Fall and Fall-Related Injury  Outcome: Progressing     Problem: Fall Injury Risk  Goal: Absence of Fall and Fall-Related Injury  Outcome: Progressing     Problem: Fall Injury Risk  Goal: Absence of Fall and Fall-Related Injury  Outcome: Progressing     Problem: Fall Injury Risk  Goal: Absence of Fall and Fall-Related Injury  Outcome: Progressing     Problem: Fall Injury Risk  Goal: Absence of Fall and Fall-Related Injury  Outcome: Progressing     Problem:  Fall Injury Risk  Goal: Absence of Fall and Fall-Related Injury  Outcome: Progressing     Problem: Fall Injury Risk  Goal: Absence of Fall and Fall-Related Injury  Outcome: Progressing     Problem: Fall Injury Risk  Goal: Absence of Fall and Fall-Related Injury  Outcome: Progressing     Problem: Fall Injury Risk  Goal: Absence of Fall and Fall-Related Injury  Outcome: Progressing

## 2025-04-18 PROCEDURE — 97116 GAIT TRAINING THERAPY: CPT | Mod: CQ

## 2025-04-18 PROCEDURE — 97110 THERAPEUTIC EXERCISES: CPT | Mod: CQ

## 2025-04-18 PROCEDURE — 25000003 PHARM REV CODE 250: Performed by: NURSE PRACTITIONER

## 2025-04-18 PROCEDURE — 97530 THERAPEUTIC ACTIVITIES: CPT

## 2025-04-18 PROCEDURE — 94761 N-INVAS EAR/PLS OXIMETRY MLT: CPT

## 2025-04-18 PROCEDURE — 11000004 HC SNF PRIVATE

## 2025-04-18 PROCEDURE — 97535 SELF CARE MNGMENT TRAINING: CPT

## 2025-04-18 PROCEDURE — 25000003 PHARM REV CODE 250: Performed by: HOSPITALIST

## 2025-04-18 RX ORDER — FUROSEMIDE 40 MG/1
40 TABLET ORAL NIGHTLY
Status: DISCONTINUED | OUTPATIENT
Start: 2025-04-18 | End: 2025-04-23 | Stop reason: HOSPADM

## 2025-04-18 RX ADMIN — BRIMONIDINE TARTRATE 1 DROP: 1.5 SOLUTION OPHTHALMIC at 09:04

## 2025-04-18 RX ADMIN — GABAPENTIN 100 MG: 300 CAPSULE ORAL at 09:04

## 2025-04-18 RX ADMIN — POTASSIUM CHLORIDE 20 MEQ: 1500 TABLET, EXTENDED RELEASE ORAL at 09:04

## 2025-04-18 RX ADMIN — CLOPIDOGREL 75 MG: 75 TABLET ORAL at 09:04

## 2025-04-18 RX ADMIN — ACETAMINOPHEN 650 MG: 500 TABLET ORAL at 06:04

## 2025-04-18 RX ADMIN — SENNOSIDES AND DOCUSATE SODIUM 1 TABLET: 50; 8.6 TABLET ORAL at 09:04

## 2025-04-18 RX ADMIN — GABAPENTIN 100 MG: 300 CAPSULE ORAL at 02:04

## 2025-04-18 RX ADMIN — TIMOLOL MALEATE 1 DROP: 5 SOLUTION OPHTHALMIC at 09:04

## 2025-04-18 RX ADMIN — ESCITALOPRAM OXALATE 20 MG: 10 TABLET ORAL at 09:04

## 2025-04-18 RX ADMIN — TRAMADOL HYDROCHLORIDE 25 MG: 50 TABLET, COATED ORAL at 09:04

## 2025-04-18 RX ADMIN — ACETAMINOPHEN 650 MG: 500 TABLET ORAL at 02:04

## 2025-04-18 RX ADMIN — METOPROLOL SUCCINATE 25 MG: 25 TABLET, EXTENDED RELEASE ORAL at 09:04

## 2025-04-18 RX ADMIN — FUROSEMIDE 40 MG: 40 TABLET ORAL at 09:04

## 2025-04-18 RX ADMIN — APIXABAN 5 MG: 5 TABLET, FILM COATED ORAL at 09:04

## 2025-04-18 RX ADMIN — LEVOTHYROXINE SODIUM 100 MCG: 0.05 TABLET ORAL at 06:04

## 2025-04-18 RX ADMIN — ACETAMINOPHEN 650 MG: 500 TABLET ORAL at 10:04

## 2025-04-18 NOTE — PLAN OF CARE
SS contacted Dorothy at [facility] regarding placement. She is requesting for updated MAR, any DME orders, and discharge summary orders.

## 2025-04-18 NOTE — PT/OT/SLP PROGRESS
"Occupational Therapy   Treatment    Name: Dorothy M Knobloch  MRN: [id number]  Admit Date: 3/26/2025  Admitting Diagnosis:  Acute on chronic congestive heart failure    General Precautions: Standard, fall   Orthopedic Precautions: RLE weight bearing as tolerated   Braces: N/A    Recommendations:     Discharge Recommendations:  home health OT  Level of Assistance Recommended at Discharge: 24 hours physical assistance for all ADL's and home management tasks  Discharge Equipment Recommendations: 3-in-1 commode, hip kit, wheelchair  Barriers to discharge:  Decreased caregiver support    Assessment:     Dorothy M Knobloch is a 89 y.o. female with a medical diagnosis of Acute on chronic congestive heart failure. Performance deficits affecting function are weakness, impaired endurance, impaired self care skills, impaired functional mobility, gait instability, impaired balance, decreased lower extremity function, decreased upper extremity function, decreased ROM, impaired cardiopulmonary response to activity, orthopedic precautions. Pt agreeable to therapy and tolerated well. Performed ADLs this AM. Pt remains limited in ADLs, functional mobility, and functional transfers.  Patient continues to demonstrate the need for low intensity therapy on a scheduled basis exhibited by decreased independence with self-care and functional mobility. Pt continues to benefit from OT services in the SNF setting to improve safety and independence /c functional tasks and ADLs/IADLs upon discharge.        Rehab Potential is good    Activity tolerance:  Good    Plan:     Patient to be seen 5 x/week to address the above listed problems via self-care/home management, therapeutic activities, therapeutic exercises, neuromuscular re-education    Plan of Care Expires: 04/27/25  Plan of Care Reviewed with: patient    Subjective     Communicated with: Nurse prior to session.   "I just think it's time for me to rest" .    Pain/Comfort:  Pain Rating 1: " 0/10  Pain Rating Post-Intervention 1: 0/10    Patient's cultural, spiritual, Jew conflicts given the current situation:  yes    Objective:     Patient found HOB elevated with PureWick upon OT entry to room.    Bed Mobility:    Patient completed Scooting/Bridging with contact guard assistance to EOB  Patient completed Supine to Sit with minimum assistance at trunk    Functional Mobility/Transfers:  Patient completed Sit <> Stand Transfer with Mod A from EOB with RW, Min A from w/c and BSC placed over toilet using grab bars   Pt with 1 attempt from EOB and unable to come to full stand with forward flexion in stance. Bed height raised with pt able to perform Mod A      Patient completed Bed <> Chair Transfer using Step Transfer technique with contact guard assistance with rolling walker  Patient completed Toilet Transfer Step Transfer technique with contact guard assistance with  grab bars    Activities of Daily Living:  Grooming: supervision pt sat in w/c at sink and performed oral/hair/hand hygiene   Bathing: contact guard assistance pt bathed B thighs sitting EOB using wipes   Upper Body Dressing: supervision pt doffed/donned pullover shirt sitting EOB  Lower Body Dressing: maximal assistance and total assistance Max A to allan pants EOB and in stance. Pt able to assist with pulling pants over knees in sitting but required assistance with most of donning. Total A to allan B shoes sitting EOB  Toileting: moderate assistance pt able to perform perineal care but required assistance with brief management and balance     [facility] 6 Click ADL: 20    Treatment & Education:  -Education on energy conservation and task modification to maximize safety and (I) during ADLs and mobility  -Education on importance of OOB activity to improve overall activity tolerance and promote recovery  -Pt educated to call for assistance and to transfer with hospital staff only  -Provided education regarding role of OT, POC, & discharge  recommendations with pt verbalizing understanding.  Pt had no further questions & when asked whether there were any concerns pt reported none.      Patient left up in chair with all lines intact, call button in reach, and nurse notified    GOALS:   Multidisciplinary Problems       Occupational Therapy Goals          Problem: Occupational Therapy    Goal Priority Disciplines Outcome Interventions   Occupational Therapy Goal     OT, PT/OT Progressing    Description: Goals to be met by: 4/27/2025     Patient will increase functional independence with ADLs by performing:    UE Dressing with Stand-by Assistance.  LE Dressing with Minimal Assistance- Ongoing  Toileting from toilet with Minimal Assistance for hygiene and clothing management- Met  ~Toileting from toilet /c CGA- Ongoing  Bathing from  sitting at sink with Minimal Assistance.  Toilet transfer to toilet with Minimal Assistance- MET  ~Toilet transfer to toilet /c SBA- Ongoing      Rehab Services' DME recommendations    None required, pt owns all equipment     CORBIN [facility] 4/8/2025                       Time Tracking:     OT Date of Treatment: 04/18/25  OT Start Time: 0916    OT Stop Time: 1006  OT Total Time (min): 50 min    Billable Minutes:Self Care/Home Management 35 min  Therapeutic Activity 15 min    4/18/2025

## 2025-04-18 NOTE — PROGRESS NOTES
[facility]                                  Skilled Nursing Facility                   Progress Note     Admit Date: 3/26/2025  ISH 4/23/2025  [id number]  Principal Problem:  Acute on chronic congestive heart failure   HPI obtained from patient interview and chart review     Chief Complaint: Re-evaluation of medical treatment and therapy status    HPI:   Mrs. Knobloch is a 89 year old female PMHx of PAF on Apixiban, HTN, chronic diastolic HF, cardiac pacemaker due to sick sinus syndrome, CVA and hypothyroidism who presents to SNF following hospitalization for PNA.  Patient with previous SNF admission on 03/13 for Right periprosthetic femoral shaft fracture below a total hip replacement s/p ORIF on 3/8/25 with Dr. William.  Admission to SNF for secondary weakness and debility    Interval history:  24 hr vital sign ranges reviewed and listed below.  Patient states Shortness of breath is at her baseline.  Patient is requesting Lasix with nighttime meds with that she can be placed on the pur wick.  Patient denies abdominal discomfort, nausea, or vomiting.  Patient reports an adequate appetite.  Patient denies dysuria.  Patient reports having regular bowel movements.  Patient progressing with PT/OT-patient ambulated 70ft with CGA using a RW.  Continuing to follow and treat all acute and chronic conditions.    Past Medical History: Patient has a past medical history of Acquired hypothyroidism (12/13/2013), Age-related physical debility (04/05/2024), Antiplatelet or antithrombotic long-term use, AV block, 3rd degree (03/19/2019), Bilateral nonexudative age-related macular degeneration (08/27/2013), Blindness and low vision (08/10/2018), Blood transfusion, Cardiac pacemaker (08/22/2018), Carotid artery disease (08/10/2016), Chronic diastolic congestive heart failure (11/24/2019), CRAO (central retinal artery occlusion), left (01/29/2018), Dry  eye syndrome of both eyes (08/10/2018), Essential hypertension (12/13/2013), History of TIA (transient ischemic attack) (12/13/2013), Nonexudative age-related macular degeneration, bilateral, intermediate dry stage (07/12/2018), Nuclear sclerotic cataract of left eye (08/10/2018), 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    Past Surgical History: Patient has a past surgical history that includes Tonsillectomy; Joint replacement; Skin biopsy; Total hip arthroplasty (11/01/2000); Cardiac pacemaker placement; Cataract extraction (Right); pr transcran dopp intracran, emboli w/o inj (01/29/2018); Carpal tunnel release (Bilateral, 1991); and ORIF femur fracture (Right, 3/8/2025).    Social History: Patient reports that she has never smoked. She has never been exposed to tobacco smoke. She has never used smokeless tobacco. She reports that she does not drink alcohol and does not use drugs.    Family History: family history includes Arthritis in her maternal grandmother, paternal grandmother, and sister; Birth defects in her son; Cancer (age of onset: 80) in her paternal aunt; Diabetes in her paternal aunt and paternal uncle; Early death in her father and mother; Hearing loss in her daughter; Hypertension in her paternal grandmother and son.    Allergies: Patient is allergic to aspartame.    ROS  Constitutional: Negative for fever   Eyes:  +chronic vision changes   Respiratory: Negative for cough.  + chronic SOB/SMITH near baseline  Cardiovascular: Negative for chest pain, palpitations  Gastrointestinal: Negative for abdominal pain, constipation, diarrhea,  "nausea, vomiting.   Genitourinary: Negative for dysuria, frequency   Musculoskeletal:  + generalized weakness.  + intermittent left hip pain, bilateral knee and shin pain  Skin: Negative for itching and rash.   Neurological: Negative for dizziness, headaches.   Psychiatric/Behavioral: + for depression. The patient is not nervous/anxious.      24 hour Vital Sign Range   Temp:  [97.6 °F (36.4 °C)-97.9 °F (36.6 °C)]   Pulse:  [66-68]   Resp:  [17-18]   BP: (142-158)/(59-65)   SpO2:  [92 %-96 %]     Current BMI: Body mass index is 28.31 kg/m².    PEx  Constitutional: Patient appears frail,  debilitated.  No distress noted  Cardiovascular: Normal rate, regular rhythm and normal heart sounds.    Pulmonary/Chest: Effort normal and breath sounds are clear  Abdominal: Soft. Bowel sounds are normal.   Musculoskeletal: Normal range of motion.   Neurological: Alert and oriented to person, place, and time.   Psychiatric: Normal mood and affect. Behavior is normal.   Skin: Skin is warm and dry.  Surgical incision to RLE, fully approximated, no drainage noted, no signs of infection- associated swelling    No results for input(s): "GLUCOSE", "NA", "K", "CL", "CO2", "BUN", "CREATININE", "CALCIUM", "MG" in the last 24 hours.            No results for input(s): "WBC", "RBC", "HGB", "HCT", "PLT", "MCV", "MCH", "MCHC" in the last 24 hours.            No results for input(s): "POCTGLUCOSE" in the last 168 hours.     Assessment and Plan:    Acute on chronic congestive heart failure  Aortic stenosis  Pulmonary hypertension  - patient presented back to [facility] ED on 03/20 with decompensation, BNP was 1,625  - diuresed with IV Lasix  - most recent echo reviewed from 3/21/25, EF 65-70%, severely dilated left atrium, mod to severe AS, PASP 44 mmHg  - monitor I&Os, daily weights  - goal is euvolemia given aortic stenosis  - 4/10 , Lasix 80 mg x 1 dose,   - 4/10 repeating Lasix IV 40 mg x 1 dose  - 4/18 patient is less symptomatic, continue " PO Lasix 40 mg qHS., metoprolol XL 25 daily,  hold HCTZ 12.5 mg for now.     Periprosthetic fracture of shaft of right femur   s/p ORIF on 3/8/2025  - PT/OT, RLE WBAT  - DVT PPX with apixaban  - Monitor and report drainage to incisional site  - Fall precautions  - Bowel regimen in place, hold for loose or frequent stools  - Ortho NEETU to see patient intermittently at [facility]  - follow-up with orthopedics after discharge  - sutures removed by Orthopedics     Acute postoperative pain  - stable, continue acetaminophen 650 mg q.8 hours, gabapentin 100 mg TID, continue tramadol 50 mg q.6 hours PRN, methocarbamol 500 mg q.6 hours PRN    Azotemia, new  - likely from extra Lasix dose yesterday  - continue with current daily Lasix dosing for now, encourage appropriate oral hydration, repeat BMP 4/21    Essential hypertension  - BP improved, continue to hold HCTZ 12.5 mg daily, and amlodipine 5 mg daily, continue Lasix 40 mg daily, metoprolol XL 25 mg BID with holding parameters    Cardiac pacemaker  - placed for sick sinus syndrome    Bradycardia  Paroxysmal atrial fibrillation  - home metoprolol XL 50 mg daily, 25 mg qHS  - stable, heart rates remain borderline low, continue apixaban 5 mg BID, continue reduced metoprolol 25 mg BID     Acquired hypothyroidism  - TSH elevated at 22.581, Free T4 0.83  - increased levothyroxine to 100 mcg daily  - will need TSH recheck as outpatient in 6 weeks- will inform PCP prior to discharge     Moderate protein-calorie malnutrition  - RD following, appreciate recommendations  - continue supplements as ordered     Acute blood loss anemia  Anemia of chronic disease  - patient received 2 unit RBC transfusion postoperatively  - transfuse hemoglobin < 7  - improving, continue monitor twice weekly CBC    Hypokalemia  - continue KCl daily to 20 mEq    HX of CVA  - 4/5/2024 MRI showed acute left thalamus and hippocampus infarcts   - Not on statin due to prior intolerance (lower extremity myalgias).  Reportedly has tried Repatha in the past and had similar reaction. Also had reaction to Praluent and Zetia     Carotid Artery disease    - continue home Plavix 75 mg daily     Depression, moderate, reoccurring  - continue home escitalopram 20 mg daily    Macular degeneration  Decreased vision  Hx of CRAO  Dry eyes  - continue home eye drops    Debility   - Continue with PT/OT for gait training and strengthening and restoration of ADL's   - Encourage mobility, OOB in chair, and early ambulation as appropriate  - Fall precautions   - Monitor for bowel and bladder dysfunction  - Monitor for and prevent skin breakdown and pressure ulcers  - Continue DVT prophylaxis with apixaban       Pneumonia  Acute hypoxic respiratory failure  - patient admitted back to ED on 03/20 with suspected pneumonia  - initially treated with IV Vanc/Zosyn  -  Abx deescalated to PO, continue Augmentin and doxycycline x3 more days, ending on 03/29  - patient has been successfully weaned to room air  - continue IS, OOB      Anticipate disposition:  Home with home health?  Also undergoing Hospice evaluation    IP OHS RISK OF UNPLANNED READMISSION Model: HIGH     Follow-up needed during SNF stay-    Follow-up needed after discharge from SNF: PCP, orthopedics (4/24), cardiology     Future Appointments   Date Time Provider Department Center   4/24/2025  3:00 PM [facility] [id number] LB LIMIT [facility] XRAYORT Burke Ortega Ort   4/24/2025  3:20 PM Albino William MD [facility] ORTHO Burke Ortega [facility]cabrera     I certify that SNF services are required to be given on an inpatient basis because Dorothy M Knobloch needs for skilled nursing care and/or skilled rehabilitation are required on a daily basis and such services can only practically be provided in a skilled nursing facility setting and are for an ongoing condition for which she received inpatient care in the hospital.     I spent 46 minutes reviewing patient records, examining, and counseling the patient/ patient's family  with greater than 50% of the time spent in direct patient care and coordination.  Care coordination with staff    Angeles Aguero NP  Department of Hospital Medicine   [facility]     DOS: 4/18/2025       Patient note was created using MModal Dictation.  Any errors in syntax or even information may not have been identified and edited on initial review prior to signing this note.

## 2025-04-18 NOTE — PT/OT/SLP PROGRESS
Physical Therapy Treatment    Patient Name:  Dorothy M Knobloch   MRN:  [id number]  Admit Date: 3/26/2025  Admitting Diagnosis: Acute on chronic congestive heart failure  Recent Surgeries:  OPEN REDUCTION INTERNAL FIXATION FEMORAL SHAFT FRACTURE (Right)     General Precautions: Standard, fall  Orthopedic Precautions: RLE weight bearing as tolerated  Braces: N/A    Recommendations:     Discharge Recommendations: home health PT  Level of Assistance Recommended at Discharge: 24 hours light assistance  Discharge Equipment Recommendations: 3-in-1 commode, hip kit, wheelchair  Barriers to discharge: Decreased caregiver support, Other (Comment) (Increased assistance for mobility)    Assessment:     Dorothy M Knobloch is a 89 y.o. female admitted with a medical diagnosis of Acute on chronic congestive heart failure . Pt overall tolerated tx session well today without any incident. Patient seen in room today due to gym being isolated for contact precautions and no patients or staff can be present at the time of treatment. Patient with increased fatigue with increased exertion today. Patient required multiple rest breaks and could only perform one ambulation trial today. Patient would continue to benefit from skilled PT services to improve overall functional mobility, strength and endurance.    Performance deficits affecting function: weakness, impaired endurance, impaired self care skills, impaired functional mobility, gait instability, impaired balance, decreased lower extremity function, decreased upper extremity function, decreased ROM, impaired cardiopulmonary response to activity, orthopedic precautions.    Rehab Potential is good    Activity Tolerance: Good    Plan:     Patient to be seen 5 x/week to address the above listed problems via gait training, therapeutic activities, therapeutic exercises, neuromuscular re-education, wheelchair management/training    Plan of Care Expires: 04/26/25  Plan of Care Reviewed with:  "patient    Subjective     "I am sleepy, but I don't feel any pain".      Pain/Comfort:  Pain Rating 1: 0/10  Pain Rating 2: 0/10    Patient's cultural, spiritual, Bahai conflicts given the current situation:  no    Objective:     Communicated with PCT/OT prior to session.  Patient found up in chair with  (no active lines) upon PT entry to room.     Therapeutic Activities and Exercises:     Seated TE Performed BLE: AP, marches, LAQ, GS, hip abd/add 3 x 10 reps each    Patient educated on role of therapy, goals of session, and benefits of out of bed mobility.   Instructed on use of call button and importance of calling nursing staff for assistance with mobility   Questions/concerns addressed within PTA scope of practice  Pt verbalized understanding.    Functional Mobility:  Transfers:     Sit to Stand:  contact guard assistance with rolling walker  Gait:   Patient ambulated 70ft with CGA using a RW.   Patient ambulated with FFP, decreased step length, and slow miroslava.   Balance: with CGA using a RW  Static Sitting Balance: Good  Dynamic Sitting Balance: Fair+  Static Standing Balance:Fair-   Dynamic Standing Balance: Poor  Wheelchair Propulsion:    Pt propelled Light weight wheelchair x 75 feet on Level tile with  Bilateral upper extremity with Stand-by Assistance.     AM-PAC 6 CLICK MOBILITY  15    Patient left up in chair with all lines intact, call button in reach, and belongings in reach .    GOALS:   Multidisciplinary Problems       Physical Therapy Goals          Problem: Physical Therapy    Goal Priority Disciplines Outcome Interventions   Physical Therapy Goal     PT, PT/OT Progressing    Description: Goals to be met by: 25     Patient will increase functional independence with mobility by performin. Supine to sit with Contact Guard Assistance  2. Sit to supine with Contact Guard Assistance  3. Rolling to Left and Right with Contact Guard Assistance  4. Sit to stand transfer with Contact Guard " Assistance - in progress  5. Bed to chair transfer with Contact Guard Assistance using Rolling Walker  6. Gait  x 50 feet (progress distance as tolerated) with Contact Guard Assistance using Rolling Walker - in progress  7. Wheelchair propulsion x 50 feet (progress distance as tolerated) with Supervision using bilateral upper extremities                         Time Tracking:     PT Received On: 04/18/25  PT Start Time: 1133  PT Stop Time: 1200  PT Total Time (min): 27 min    Billable Minutes: Gait Training 14 and Therapeutic Exercise 13    Treatment Type: Treatment  PT/PTA: PTA     Number of PTA visits since last PT visit: 1 04/18/2025

## 2025-04-19 PROCEDURE — 11000004 HC SNF PRIVATE

## 2025-04-19 PROCEDURE — 25000003 PHARM REV CODE 250: Performed by: HOSPITALIST

## 2025-04-19 PROCEDURE — 25000003 PHARM REV CODE 250: Performed by: NURSE PRACTITIONER

## 2025-04-19 RX ADMIN — APIXABAN 5 MG: 5 TABLET, FILM COATED ORAL at 09:04

## 2025-04-19 RX ADMIN — TRAMADOL HYDROCHLORIDE 25 MG: 50 TABLET, COATED ORAL at 08:04

## 2025-04-19 RX ADMIN — TIMOLOL MALEATE 1 DROP: 5 SOLUTION OPHTHALMIC at 09:04

## 2025-04-19 RX ADMIN — BRIMONIDINE TARTRATE 1 DROP: 1.5 SOLUTION OPHTHALMIC at 09:04

## 2025-04-19 RX ADMIN — SENNOSIDES AND DOCUSATE SODIUM 1 TABLET: 50; 8.6 TABLET ORAL at 09:04

## 2025-04-19 RX ADMIN — ESCITALOPRAM OXALATE 20 MG: 10 TABLET ORAL at 09:04

## 2025-04-19 RX ADMIN — FUROSEMIDE 40 MG: 40 TABLET ORAL at 08:04

## 2025-04-19 RX ADMIN — ACETAMINOPHEN 650 MG: 500 TABLET ORAL at 10:04

## 2025-04-19 RX ADMIN — LEVOTHYROXINE SODIUM 100 MCG: 0.05 TABLET ORAL at 05:04

## 2025-04-19 RX ADMIN — TIMOLOL MALEATE 1 DROP: 5 SOLUTION OPHTHALMIC at 08:04

## 2025-04-19 RX ADMIN — METOPROLOL SUCCINATE 25 MG: 25 TABLET, EXTENDED RELEASE ORAL at 08:04

## 2025-04-19 RX ADMIN — GABAPENTIN 100 MG: 300 CAPSULE ORAL at 08:04

## 2025-04-19 RX ADMIN — GABAPENTIN 100 MG: 300 CAPSULE ORAL at 02:04

## 2025-04-19 RX ADMIN — TRAMADOL HYDROCHLORIDE 25 MG: 50 TABLET, COATED ORAL at 09:04

## 2025-04-19 RX ADMIN — BRIMONIDINE TARTRATE 1 DROP: 1.5 SOLUTION OPHTHALMIC at 08:04

## 2025-04-19 RX ADMIN — ACETAMINOPHEN 650 MG: 500 TABLET ORAL at 02:04

## 2025-04-19 RX ADMIN — GABAPENTIN 100 MG: 300 CAPSULE ORAL at 09:04

## 2025-04-19 RX ADMIN — APIXABAN 5 MG: 5 TABLET, FILM COATED ORAL at 08:04

## 2025-04-19 RX ADMIN — POTASSIUM CHLORIDE 20 MEQ: 1500 TABLET, EXTENDED RELEASE ORAL at 09:04

## 2025-04-19 RX ADMIN — CLOPIDOGREL 75 MG: 75 TABLET ORAL at 09:04

## 2025-04-20 PROCEDURE — 25000003 PHARM REV CODE 250: Performed by: NURSE PRACTITIONER

## 2025-04-20 PROCEDURE — 25000003 PHARM REV CODE 250: Performed by: HOSPITALIST

## 2025-04-20 PROCEDURE — 11000004 HC SNF PRIVATE

## 2025-04-20 RX ADMIN — BRIMONIDINE TARTRATE 1 DROP: 1.5 SOLUTION OPHTHALMIC at 09:04

## 2025-04-20 RX ADMIN — APIXABAN 5 MG: 5 TABLET, FILM COATED ORAL at 08:04

## 2025-04-20 RX ADMIN — GABAPENTIN 100 MG: 300 CAPSULE ORAL at 08:04

## 2025-04-20 RX ADMIN — METOPROLOL SUCCINATE 25 MG: 25 TABLET, EXTENDED RELEASE ORAL at 08:04

## 2025-04-20 RX ADMIN — GABAPENTIN 100 MG: 300 CAPSULE ORAL at 03:04

## 2025-04-20 RX ADMIN — FUROSEMIDE 40 MG: 40 TABLET ORAL at 08:04

## 2025-04-20 RX ADMIN — BRIMONIDINE TARTRATE 1 DROP: 1.5 SOLUTION OPHTHALMIC at 08:04

## 2025-04-20 RX ADMIN — TRAMADOL HYDROCHLORIDE 25 MG: 50 TABLET, COATED ORAL at 08:04

## 2025-04-20 RX ADMIN — TIMOLOL MALEATE 1 DROP: 5 SOLUTION OPHTHALMIC at 08:04

## 2025-04-20 RX ADMIN — SENNOSIDES AND DOCUSATE SODIUM 1 TABLET: 50; 8.6 TABLET ORAL at 08:04

## 2025-04-20 RX ADMIN — TRAMADOL HYDROCHLORIDE 25 MG: 50 TABLET, COATED ORAL at 09:04

## 2025-04-20 RX ADMIN — ACETAMINOPHEN 650 MG: 500 TABLET ORAL at 10:04

## 2025-04-20 RX ADMIN — CLOPIDOGREL 75 MG: 75 TABLET ORAL at 08:04

## 2025-04-20 RX ADMIN — TIMOLOL MALEATE 1 DROP: 5 SOLUTION OPHTHALMIC at 09:04

## 2025-04-20 RX ADMIN — ACETAMINOPHEN 650 MG: 500 TABLET ORAL at 03:04

## 2025-04-20 RX ADMIN — ESCITALOPRAM OXALATE 20 MG: 10 TABLET ORAL at 08:04

## 2025-04-20 RX ADMIN — ACETAMINOPHEN 650 MG: 500 TABLET ORAL at 06:04

## 2025-04-20 RX ADMIN — POTASSIUM CHLORIDE 20 MEQ: 1500 TABLET, EXTENDED RELEASE ORAL at 08:04

## 2025-04-20 RX ADMIN — LEVOTHYROXINE SODIUM 100 MCG: 0.05 TABLET ORAL at 05:04

## 2025-04-21 LAB
ABSOLUTE EOSINOPHIL (OHS): 0.21 K/UL
ABSOLUTE MONOCYTE (OHS): 0.69 K/UL (ref 0.3–1)
ABSOLUTE NEUTROPHIL COUNT (OHS): 4.51 K/UL (ref 1.8–7.7)
ANION GAP (OHS): 8 MMOL/L (ref 8–16)
BASOPHILS # BLD AUTO: 0.1 K/UL
BASOPHILS NFR BLD AUTO: 1.4 %
BUN SERPL-MCNC: 36 MG/DL (ref 10–30)
CALCIUM SERPL-MCNC: 9.5 MG/DL (ref 8.7–10.5)
CHLORIDE SERPL-SCNC: 101 MMOL/L (ref 95–110)
CO2 SERPL-SCNC: 28 MMOL/L (ref 23–29)
CREAT SERPL-MCNC: 0.7 MG/DL (ref 0.5–1.4)
ERYTHROCYTE [DISTWIDTH] IN BLOOD BY AUTOMATED COUNT: 16.5 % (ref 11.5–14.5)
GFR SERPLBLD CREATININE-BSD FMLA CKD-EPI: >60 ML/MIN/1.73/M2
GLUCOSE SERPL-MCNC: 90 MG/DL (ref 70–110)
HCT VFR BLD AUTO: 31 % (ref 37–48.5)
HGB BLD-MCNC: 9.5 GM/DL (ref 12–16)
IMM GRANULOCYTES # BLD AUTO: 0.05 K/UL (ref 0–0.04)
IMM GRANULOCYTES NFR BLD AUTO: 0.7 % (ref 0–0.5)
LYMPHOCYTES # BLD AUTO: 1.64 K/UL (ref 1–4.8)
MAGNESIUM SERPL-MCNC: 2 MG/DL (ref 1.6–2.6)
MCH RBC QN AUTO: 31.5 PG (ref 27–31)
MCHC RBC AUTO-ENTMCNC: 30.6 G/DL (ref 32–36)
MCV RBC AUTO: 103 FL (ref 82–98)
NUCLEATED RBC (/100WBC) (OHS): 0 /100 WBC
PHOSPHATE SERPL-MCNC: 3.5 MG/DL (ref 2.7–4.5)
PLATELET # BLD AUTO: 324 K/UL (ref 150–450)
PMV BLD AUTO: 8.9 FL (ref 9.2–12.9)
POTASSIUM SERPL-SCNC: 3.9 MMOL/L (ref 3.5–5.1)
RBC # BLD AUTO: 3.02 M/UL (ref 4–5.4)
RELATIVE EOSINOPHIL (OHS): 2.9 %
RELATIVE LYMPHOCYTE (OHS): 22.8 % (ref 18–48)
RELATIVE MONOCYTE (OHS): 9.6 % (ref 4–15)
RELATIVE NEUTROPHIL (OHS): 62.6 % (ref 38–73)
SODIUM SERPL-SCNC: 137 MMOL/L (ref 136–145)
WBC # BLD AUTO: 7.2 K/UL (ref 3.9–12.7)

## 2025-04-21 PROCEDURE — 97116 GAIT TRAINING THERAPY: CPT | Mod: CQ

## 2025-04-21 PROCEDURE — 94761 N-INVAS EAR/PLS OXIMETRY MLT: CPT

## 2025-04-21 PROCEDURE — 97530 THERAPEUTIC ACTIVITIES: CPT | Mod: CQ

## 2025-04-21 PROCEDURE — 25000003 PHARM REV CODE 250: Performed by: HOSPITALIST

## 2025-04-21 PROCEDURE — 97110 THERAPEUTIC EXERCISES: CPT | Mod: CO

## 2025-04-21 PROCEDURE — 97530 THERAPEUTIC ACTIVITIES: CPT | Mod: CO

## 2025-04-21 PROCEDURE — 25000003 PHARM REV CODE 250: Performed by: NURSE PRACTITIONER

## 2025-04-21 PROCEDURE — 36415 COLL VENOUS BLD VENIPUNCTURE: CPT | Performed by: HOSPITALIST

## 2025-04-21 PROCEDURE — 97110 THERAPEUTIC EXERCISES: CPT | Mod: CQ

## 2025-04-21 PROCEDURE — 11000004 HC SNF PRIVATE

## 2025-04-21 RX ORDER — POLYETHYLENE GLYCOL 3350 17 G/17G
17 POWDER, FOR SOLUTION ORAL DAILY
Status: DISCONTINUED | OUTPATIENT
Start: 2025-04-21 | End: 2025-04-23 | Stop reason: HOSPADM

## 2025-04-21 RX ADMIN — LEVOTHYROXINE SODIUM 100 MCG: 0.05 TABLET ORAL at 05:04

## 2025-04-21 RX ADMIN — POLYETHYLENE GLYCOL 3350 17 G: 17 POWDER, FOR SOLUTION ORAL at 12:04

## 2025-04-21 RX ADMIN — APIXABAN 5 MG: 5 TABLET, FILM COATED ORAL at 11:04

## 2025-04-21 RX ADMIN — CLOPIDOGREL 75 MG: 75 TABLET ORAL at 11:04

## 2025-04-21 RX ADMIN — TIMOLOL MALEATE 1 DROP: 5 SOLUTION OPHTHALMIC at 09:04

## 2025-04-21 RX ADMIN — SENNOSIDES AND DOCUSATE SODIUM 1 TABLET: 50; 8.6 TABLET ORAL at 09:04

## 2025-04-21 RX ADMIN — MENTHOL, METHYL SALICYLATE: 10; 15 CREAM TOPICAL at 09:04

## 2025-04-21 RX ADMIN — SENNOSIDES AND DOCUSATE SODIUM 1 TABLET: 50; 8.6 TABLET ORAL at 11:04

## 2025-04-21 RX ADMIN — TRAMADOL HYDROCHLORIDE 25 MG: 50 TABLET, COATED ORAL at 11:04

## 2025-04-21 RX ADMIN — POTASSIUM CHLORIDE 20 MEQ: 1500 TABLET, EXTENDED RELEASE ORAL at 11:04

## 2025-04-21 RX ADMIN — GABAPENTIN 100 MG: 300 CAPSULE ORAL at 09:04

## 2025-04-21 RX ADMIN — GABAPENTIN 100 MG: 300 CAPSULE ORAL at 02:04

## 2025-04-21 RX ADMIN — BRIMONIDINE TARTRATE 1 DROP: 1.5 SOLUTION OPHTHALMIC at 09:04

## 2025-04-21 RX ADMIN — APIXABAN 5 MG: 5 TABLET, FILM COATED ORAL at 09:04

## 2025-04-21 RX ADMIN — BRIMONIDINE TARTRATE 1 DROP: 1.5 SOLUTION OPHTHALMIC at 11:04

## 2025-04-21 RX ADMIN — GABAPENTIN 100 MG: 300 CAPSULE ORAL at 11:04

## 2025-04-21 RX ADMIN — ESCITALOPRAM OXALATE 20 MG: 10 TABLET ORAL at 11:04

## 2025-04-21 RX ADMIN — MENTHOL, METHYL SALICYLATE: 10; 15 CREAM TOPICAL at 03:04

## 2025-04-21 RX ADMIN — METOPROLOL SUCCINATE 25 MG: 25 TABLET, EXTENDED RELEASE ORAL at 09:04

## 2025-04-21 RX ADMIN — FUROSEMIDE 40 MG: 40 TABLET ORAL at 09:04

## 2025-04-21 RX ADMIN — METOPROLOL SUCCINATE 25 MG: 25 TABLET, EXTENDED RELEASE ORAL at 11:04

## 2025-04-21 RX ADMIN — TIMOLOL MALEATE 1 DROP: 5 SOLUTION OPHTHALMIC at 11:04

## 2025-04-21 RX ADMIN — ACETAMINOPHEN 650 MG: 500 TABLET ORAL at 09:04

## 2025-04-21 NOTE — PLAN OF CARE
[facility] - Skilled Nursing      HOME HEALTH ORDERS  FACE TO FACE ENCOUNTER    Patient Name: Dorothy M Knobloch  YOB: 1932    PCP: Lola Wolff MD   PCP Address: [address] / [place]  PCP Phone Number: [phone]  PCP Fax: [phone]    Encounter Date: 3/26/25    Admit to Home Health    Diagnoses:  Active Hospital Problems    Diagnosis  POA    *Acute on chronic congestive heart failure [I50.9]  Yes    Hypokalemia [E87.6]  Yes    Anemia [D64.9]  Yes    Moderate protein-calorie malnutrition [E44.0]  Yes    Acute blood loss anemia [D62]  Yes    Pulmonary hypertension [I27.20]  Yes    Aortic stenosis [I35.0]  Yes    Age-related physical debility [R54]  Yes    Chronic diastolic congestive heart failure [I50.32]  Yes    Paroxysmal atrial fibrillation [I48.0]  Yes     Chronic    Cardiac pacemaker [Z95.0]  Yes    Carotid artery disease [I77.9]  Yes    Acquired hypothyroidism [E03.9]  Yes    Essential hypertension [I10]  Yes     Chronic      Resolved Hospital Problems    Diagnosis Date Resolved POA    Periprosthetic fracture of shaft of right femur s/p ORIF on 3/8/2025 [M97.8XXA, Z96.649] 03/27/2025 Yes       Follow Up Appointments:  Future Appointments   Date Time Provider Department Center   4/24/2025  3:00 PM [facility] [id number] 485 LB LIMIT [facility] XRAYORT [address]   4/24/2025  3:20 PM Albino William MD [facility] ORTHO [address]       Allergies:  Review of patient's allergies indicates:   Allergen Reactions    Aspartame Swelling     equal       Medications: Review discharge medications with patient and family and provide education.      I have seen and examined this patient within the last 30 days. My clinical findings that support the need for the home health skilled services and home bound status are the following:no   Weakness/numbness causing balance and gait disturbance due to Surgery making it taxing to leave home.     Referrals/ Consults  Physical Therapy to  evaluate and treat. Evaluate for home safety and equipment needs; Establish/upgrade home exercise program. Perform / instruct on therapeutic exercises, gait training, transfer training, and Range of Motion.  Occupational Therapy to evaluate and treat. Evaluate home environment for safety and equipment needs. Perform/Instruct on transfers, ADL training, ROM, and therapeutic exercises.   to evaluate for community resources/long-range planning.  Aide to provide assistance with personal care, ADLs, and vital signs.    Activities:   activity as tolerated    Labs: BMP weekly x 2 weeks and fax to PCP    Nursing:   Agency to admit patient within 24 hours of hospital discharge unless specified on physician order or at patient request    SN to complete comprehensive assessment including routine vital signs. Instruct on disease process and s/s of complications to report to MD. Review/verify medication list sent home with the patient at time of discharge  and instruct patient/caregiver as needed. Frequency may be adjusted depending on start of care date.     Skilled nurse to perform up to 3 visits PRN for symptoms related to diagnosis    Notify MD if SBP > 160 or < 90; DBP > 90 or < 50; HR > 120 or < 50; Temp > 101; O2 < 88%    Ok to schedule additional visits based on staff availability and patient request on consecutive days within the home health episode.    Miscellaneous   Heart Failure:      SN to instruct on the following:    Instruct on the definition of CHF.   Instruct on the signs/sympoms of CHF to be reported.   Instruct on and monitor daily weights.   Instruct on factors that cause exacerbation.   Instruct on action, dose, schedule, and side effects of medications.   Instruct on diet as prescribed.   Instruct on activity allowed.   Instruct on life-style modifications for life long management of CHF   SN to assess compliance with daily weights, diet, medications, fluid retention,    safety precautions,  activities permitted and life-style modifications.   Additional 1-2 SN visits per week as needed for signs and symptoms     of CHF exacerbation.      For Weight Gain > 2-3 lbs in 1 day or 4-6 lbs over 1 week notify PCP or cardiologist.  Patient's diuretic was switched so this is very important    Home Health Aide:  Nursing Three times weekly, Physical Therapy Three times weekly, Occupational Therapy Three times weekly, Medical Social Work Three times weekly, and Home Health Aide Three times weekly    Wound Care Orders    LLE Surgical incision-  Keep incision clean and dry.  Cleanse with soap and water daily, pat dry.  No lotions or ointments.  Do not submerge under water until cleared by Ortho.       I certify that this patient is confined to her home and needs intermittent skilled nursing care, physical therapy, and occupational therapy.

## 2025-04-21 NOTE — PT/OT/SLP PROGRESS
"Occupational Therapy   Treatment    Name: Dorothy M Knobloch  MRN: [id number]  Admit Date: 3/26/2025  Admitting Diagnosis:  Acute on chronic congestive heart failure    General Precautions: Standard, fall   Orthopedic Precautions: RLE weight bearing as tolerated   Braces: N/A    Recommendations:     Discharge Recommendations:  home health OT  Level of Assistance Recommended at Discharge: 24 hours physical assistance for all ADL's and home management tasks  Discharge Equipment Recommendations: 3-in-1 commode, hip kit, wheelchair  Barriers to discharge:  Decreased caregiver support    Assessment:     Dorothy M Knobloch is a 89 y.o. female with a medical diagnosis of Acute on chronic congestive heart failure .  She presents with performance deficits affecting function are weakness, impaired endurance, impaired self care skills, impaired functional mobility, gait instability, impaired balance, decreased lower extremity function, decreased upper extremity function, decreased ROM, impaired cardiopulmonary response to activity, orthopedic precautions. Pt participated well during today's session. Pt tolerated tx session without incident and is making progress, however, continues to demonstrate deficits with self care skills, balance, functional mobility, UB strength and endurance. Pt will benefit from continued OT services to progress towards goals.     Rehab Potential is good    Activity tolerance:  Good    Plan:     Patient to be seen 5 x/week to address the above listed problems via self-care/home management, therapeutic activities, therapeutic exercises, neuromuscular re-education    Plan of Care Expires: 04/27/25  Plan of Care Reviewed with: patient    Subjective   "I'm feeling better today"  Communicated with: Tony prior to session.     Pain/Comfort:  Pain Rating 1: 0/10  Pain Rating Post-Intervention 1: 0/10    Patient's cultural, spiritual, Hoahaoism conflicts given the current situation:  yes    Objective:     Patient " "found up in chair with PTA present in rehab gym.     [facility] 6 Click ADL: 20    OT Exercises: UE Ergometer 10 min with moderate resistance   Pt perform 2 x 10 of the following with red theraband:  - chest pulls  - shoulder flex/ext  - shoulder abd/add  - elbow flex/ext    Therex performed to improve overall endurance, ROM and UB strength required for functional transfers, ADL's and w/c propulsion.      Treatment & Education:  Pt seated in w/c to retrieve "Squigz" from window with focus on dynamic sitting balance, functional reaching and  strength.     Pt educated on:  - role of OT  - level of assistance  - energy conservation and task modification to maximize independence with ADL's and mobility   -  safety while performing functional transfers and self care tasks  - orthopedic precautions.   - progress towards OT goals     Patient left up in chair with call button in reach    GOALS:   Multidisciplinary Problems       Occupational Therapy Goals          Problem: Occupational Therapy    Goal Priority Disciplines Outcome Interventions   Occupational Therapy Goal     OT, PT/OT Progressing    Description: Goals to be met by: 4/27/2025     Patient will increase functional independence with ADLs by performing:    UE Dressing with Stand-by Assistance.  LE Dressing with Minimal Assistance- Ongoing  Toileting from toilet with Minimal Assistance for hygiene and clothing management- Met  ~Toileting from toilet /c CGA- Ongoing  Bathing from  sitting at sink with Minimal Assistance.  Toilet transfer to toilet with Minimal Assistance- MET  ~Toilet transfer to toilet /c SBA- Ongoing      Rehab Services' DME recommendations    Wheelchair  Number of hours up in a wheelchair per day 8+      Style Light weight    Justification for light weight w/c: patient cannot propel in a standard wheelchair, patient can and does self-propel in a lightweight wheelchair, patient has impaired ability to participate in MRADLs, mobility limitations " cannot be sifficiently resolved with a cane/walker, the home provides adequate access between rooms for a wheelchair, a wheelchair will significantly improve the ability to participate in MRADLs and will be used in the home on a regular basis, the patient is willing to use a wheelchair in the home, the patient has a caregiver who is available, willing, and able to provide assistance with the wheelchair, and the patient requires additional person for safety with mobility with walker  Seat Width 18 (Standard adult)  Seat Depth 18  Back Height Standard  Leg Support Elevated leg rest and Swing Away  Arm Height Full, Swing Away, and Adjustable Height  Lap Belt Buckle  Accessories Front Brakes, Anti-tippers, and Safety belt  Cushion Gel  Justification for Cushion Pressure relief       CORBIN Eagle 4/21/2025                       Time Tracking:     OT Date of Treatment: 04/21/25  OT Start Time: 1015    OT Stop Time: 1100  OT Total Time (min): 45 min    Billable Minutes:Therapeutic Activity 30  Therapeutic Exercise 15    4/21/2025

## 2025-04-21 NOTE — PROGRESS NOTES
[facility]                                  Skilled Nursing Facility                   Progress Note     Admit Date: 3/26/2025  ISH 4/23/2025  [id number]  Principal Problem:  Acute on chronic congestive heart failure   HPI obtained from patient interview and chart review     Chief Complaint: Re-evaluation of medical treatment and therapy status, lab review    HPI:   Mrs. Knobloch is a 89 year old female PMHx of PAF on Apixiban, HTN, chronic diastolic HF, cardiac pacemaker due to sick sinus syndrome, CVA and hypothyroidism who presents to SNF following hospitalization for PNA.  Patient with previous SNF admission on 03/13 for Right periprosthetic femoral shaft fracture below a total hip replacement s/p ORIF on 3/8/25 with Dr. William.  Admission to SNF for secondary weakness and debility    Interval history:  All of today's labs reviewed and are listed below.  BUN with increased to 36, discontinuing supplemental drinks, possibly due to Lasix but patient's symptoms are overall improved and weight is reducing, will check labs with home health and continue with current Lasix dosing for now.  24 hr vital sign ranges reviewed and listed below.  Weight continues to reduce in his back to her admit weight.  Patient denies shortness of breath, abdominal discomfort, nausea, or vomiting.  Patient reports an adequate appetite.  Patient denies dysuria.  Patient reports having regular bowel movements.  Patient progressing with PT/OT. Continuing to follow and treat all acute and chronic conditions.    Past Medical History: Patient has a past medical history of Acquired hypothyroidism (12/13/2013), Age-related physical debility (04/05/2024), Antiplatelet or antithrombotic long-term use, AV block, 3rd degree (03/19/2019), Bilateral nonexudative age-related macular degeneration (08/27/2013), Blindness and low vision (08/10/2018), Blood transfusion,  Cardiac pacemaker (08/22/2018), Carotid artery disease (08/10/2016), Chronic diastolic congestive heart failure (11/24/2019), CRAO (central retinal artery occlusion), left (01/29/2018), Dry eye syndrome of both eyes (08/10/2018), Essential hypertension (12/13/2013), History of TIA (transient ischemic attack) (12/13/2013), Nonexudative age-related macular degeneration, bilateral, intermediate dry stage (07/12/2018), Nuclear sclerotic cataract of left eye (08/10/2018), 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    Past Surgical History: Patient has a past surgical history that includes Tonsillectomy; Joint replacement; Skin biopsy; Total hip arthroplasty (11/01/2000); Cardiac pacemaker placement; Cataract extraction (Right); pr transcran dopp intracran, emboli w/o inj (01/29/2018); Carpal tunnel release (Bilateral, 1991); and ORIF femur fracture (Right, 3/8/2025).    Social History: Patient reports that she has never smoked. She has never been exposed to tobacco smoke. She has never used smokeless tobacco. She reports that she does not drink alcohol and does not use drugs.    Family History: family history includes Arthritis in her maternal grandmother, paternal grandmother, and sister; Birth defects in her son; Cancer (age of onset: 80) in her paternal aunt; Diabetes in her paternal aunt and paternal uncle; Early death in her father and mother; Hearing loss in her daughter; Hypertension in her paternal grandmother and son.    Allergies: Patient is allergic to aspartame.    ROS  Constitutional: Negative for fever   Eyes:  +chronic vision changes  "  Respiratory: Negative for cough.  + chronic SOB/SMITH near baseline  Cardiovascular: Negative for chest pain, palpitations  Gastrointestinal: Negative for abdominal pain, constipation, diarrhea, nausea, vomiting.   Genitourinary: Negative for dysuria, frequency   Musculoskeletal:  + generalized weakness.  + intermittent left hip pain, bilateral knee and shin pain  Skin: Negative for itching and rash.   Neurological: Negative for dizziness, headaches.   Psychiatric/Behavioral: + for depression. The patient is not nervous/anxious.      24 hour Vital Sign Range   Temp:  [97.4 °F (36.3 °C)-98 °F (36.7 °C)]   Pulse:  [67-77]   Resp:  [18]   BP: (111-126)/(55-60)   SpO2:  [95 %-96 %]     Current BMI: Body mass index is 27.18 kg/m².    PEx  Constitutional: Patient appears frail,  debilitated.  No distress noted  Cardiovascular: Normal rate, regular rhythm and normal heart sounds.    Pulmonary/Chest: Effort normal and breath sounds are clear  Abdominal: Soft. Bowel sounds are normal.   Musculoskeletal: Normal range of motion.   Neurological: Alert and oriented to person, place, and time.   Psychiatric: Normal mood and affect. Behavior is normal.   Skin: Skin is warm and dry.  Surgical incision to RLE, fully approximated, no drainage noted, no signs of infection- associated swelling    Recent Labs   Lab 04/21/25  0522   GLUCOSE 90      K 3.9      CO2 28   BUN 36*   CREATININE 0.7   CALCIUM 9.5   MG 2.0               Recent Labs   Lab 04/21/25  0522   WBC 7.20   RBC 3.02*   HGB 9.5*   HCT 31.0*      *   MCH 31.5*   MCHC 30.6*               No results for input(s): "POCTGLUCOSE" in the last 168 hours.     Assessment and Plan:    Acute on chronic congestive heart failure  Aortic stenosis  Pulmonary hypertension  - patient presented back to [facility] ED on 03/20 with decompensation, BNP was 1,625  - diuresed with IV Lasix  - most recent echo reviewed from 3/21/25, EF 65-70%, severely dilated left atrium, mod " to severe AS, PASP 44 mmHg  - monitor I&Os, daily weights  - goal is euvolemia given aortic stenosis  - 4/10 , Lasix 80 mg x 1 dose,   - 4/10 repeating Lasix IV 40 mg x 1 dose  - 4/21 weight reducing, continue PO Lasix 40 mg qHS., metoprolol XL 25 daily,  hold HCTZ 12.5 mg for now.     Periprosthetic fracture of shaft of right femur   s/p ORIF on 3/8/2025  - PT/OT, RLE WBAT  - DVT PPX with apixaban  - Monitor and report drainage to incisional site  - Fall precautions  - Bowel regimen in place, hold for loose or frequent stools  - Ortho NEETU to see patient intermittently at [facility]  - follow-up with orthopedics after discharge  - sutures removed by Orthopedics     Acute postoperative pain  - stable, continue acetaminophen 650 mg q.8 hours, gabapentin 100 mg TID, continue tramadol 50 mg q.6 hours PRN, methocarbamol 500 mg q.6 hours PRN    Azotemia  - slight worsening to 36  - continue with current daily Lasix dosing for now, encourage appropriate oral hydration, ordered for BMPs weekly with home health    Essential hypertension  - BP improved, continue to hold HCTZ 12.5 mg daily, and amlodipine 5 mg daily, continue Lasix 40 mg daily, metoprolol XL 25 mg BID with holding parameters    Cardiac pacemaker  - placed for sick sinus syndrome    Bradycardia  Paroxysmal atrial fibrillation  - home metoprolol XL 50 mg daily, 25 mg qHS  - stable, heart rates remain borderline low, continue apixaban 5 mg BID, continue reduced metoprolol 25 mg BID     Acquired hypothyroidism  - TSH elevated at 22.581, Free T4 0.83  - increased levothyroxine to 100 mcg daily  - will need TSH recheck as outpatient in 6 weeks- will inform PCP prior to discharge     Moderate protein-calorie malnutrition  - RD following, appreciate recommendations  - continue supplements as ordered     Acute blood loss anemia  Anemia of chronic disease  - patient received 2 unit RBC transfusion postoperatively  - transfuse hemoglobin < 7  - improving, continue  monitor twice weekly CBC    Hypokalemia  - continue KCl daily to 20 mEq    HX of CVA  - 4/5/2024 MRI showed acute left thalamus and hippocampus infarcts   - Not on statin due to prior intolerance (lower extremity myalgias). Reportedly has tried Repatha in the past and had similar reaction. Also had reaction to Praluent and Zetia     Carotid Artery disease    - continue home Plavix 75 mg daily     Depression, moderate, reoccurring  - continue home escitalopram 20 mg daily    Macular degeneration  Decreased vision  Hx of CRAO  Dry eyes  - continue home eye drops    Debility   - Continue with PT/OT for gait training and strengthening and restoration of ADL's   - Encourage mobility, OOB in chair, and early ambulation as appropriate  - Fall precautions   - Monitor for bowel and bladder dysfunction  - Monitor for and prevent skin breakdown and pressure ulcers  - Continue DVT prophylaxis with apixaban       Pneumonia  Acute hypoxic respiratory failure  - patient admitted back to ED on 03/20 with suspected pneumonia  - initially treated with IV Vanc/Zosyn  -  Abx deescalated to PO, continue Augmentin and doxycycline x3 more days, ending on 03/29  - patient has been successfully weaned to room air  - continue IS, OOB      Hospital bed justification:  Patientrequires a hospital bed due to their required positioning of the body in ways not feasible with an ordinary bed to alleviate pain/ is completely immobile /or limited mobility and cannot independently make changes in body position without the use of the bed. The positioning of the body cannot be sufficiently resolved by the use of pillows and wedges       Anticipate disposition:  Home with home health?  Also undergoing Hospice evaluation    IP [facility] RISK OF UNPLANNED READMISSION Model: HIGH     Follow-up needed during SNF stay-    Follow-up needed after discharge from SNF: PCP, orthopedics (4/24), cardiology     Future Appointments   Date Time Provider Department Center    4/24/2025  3:00 PM [facility] [id number] 485 LB LIMIT [facility] [address]ORT [address]   4/24/2025  3:20 PM Albino William MD [facility] ORTHO [address]     I certify that SNF services are required to be given on an inpatient basis because Dorothy M Knobloch needs for skilled nursing care and/or skilled rehabilitation are required on a daily basis and such services can only practically be provided in a skilled nursing facility setting and are for an ongoing condition for which she received inpatient care in the hospital.     I spent 47 minutes reviewing patient records, examining, and counseling the patient/ patient's family with greater than 50% of the time spent in direct patient care and coordination.  Care coordination with staff, discharge coordination    Angeles Aguero NP  Department of Hospital Medicine   [facility]- Skilled Nursing [facility]     DOS: 4/21/2025       Patient note was created using MModal Dictation.  Any errors in syntax or even information may not have been identified and edited on initial review prior to signing this note.

## 2025-04-21 NOTE — PT/OT/SLP PROGRESS
"Physical Therapy Treatment    Patient Name:  Dorothy M Knobloch   MRN:  [id number]  Admit Date: 3/26/2025  Admitting Diagnosis: Acute on chronic congestive heart failure  Recent Surgeries:     General Precautions: Standard, fall  Orthopedic Precautions: RLE weight bearing as tolerated  Braces: N/A    Recommendations:     Discharge Recommendations: home health PT  Level of Assistance Recommended at Discharge: 24 hours light assistance  Discharge Equipment Recommendations: 3-in-1 commode, hip kit, wheelchair  Barriers to discharge: Decreased caregiver support, Other (Comment) (Increased assistance for mobility)    Assessment:     Dorothy M Knobloch is a 89 y.o. female admitted with a medical diagnosis of Acute on chronic congestive heart failure . Pt tolerated well, pt would continue to benefit from skilled PT services to improve overall functional mobility, strength and endurance.  .      Performance deficits affecting function: weakness, impaired endurance, impaired self care skills, impaired functional mobility, gait instability, impaired balance, decreased lower extremity function, decreased upper extremity function, decreased ROM, impaired cardiopulmonary response to activity, orthopedic precautions.    Rehab Potential is good    Activity Tolerance: Fair    Plan:     Patient to be seen 5 x/week to address the above listed problems via gait training, therapeutic activities, therapeutic exercises, neuromuscular re-education, wheelchair management/training    Plan of Care Expires: 04/26/25  Plan of Care Reviewed with: patient    Subjective     "Ready for somebody".     Pain/Comfort:  Pain Rating 1: 0/10  Pain Rating Post-Intervention 1: 0/10    Patient's cultural, spiritual, Mormon conflicts given the current situation:  no    Objective:       Patient found up in chair with  (in wc) upon PT entry to room.     Therapeutic Activities and Exercises:  3x10 A/AA as needed RLE AP, GS, LAQ, hip flex, x 10 abd/add " fatigue    Functional Mobility:  Transfers:     Sit to Stand:  minimum assistance with rolling walker x 2 trials  Gait: amb with RW CGA 76 ft no LOB to include balance mat  Balance: pt amb with RW CGA over 10 ft 1 inch foam mat  Balance pt stood with RW CGA using reacher to  3 item off the floor    AM-PAC 6 CLICK MOBILITY  15    Patient left up in chair with  with OT pt reported not needing to take a break between sessions .    GOALS:   Multidisciplinary Problems       Physical Therapy Goals          Problem: Physical Therapy    Goal Priority Disciplines Outcome Interventions   Physical Therapy Goal     PT, PT/OT Progressing    Description: Goals to be met by: 25     Patient will increase functional independence with mobility by performin. Supine to sit with Contact Guard Assistance  2. Sit to supine with Contact Guard Assistance  3. Rolling to Left and Right with Contact Guard Assistance  4. Sit to stand transfer with Contact Guard Assistance - in progress  5. Bed to chair transfer with Contact Guard Assistance using Rolling Walker  6. Gait  x 50 feet (progress distance as tolerated) with Contact Guard Assistance using Rolling Walker - in progress  7. Wheelchair propulsion x 50 feet (progress distance as tolerated) with Supervision using bilateral upper extremities    Rehab Services' DME recommendations      Wheelchair    Number of hours up in a wheelchair per day 8      Style Light weight    Justification for light weight w/c: patient cannot propel in a standard wheelchair, patient can and does self-propel in a lightweight wheelchair, patient has impaired ability to participate in MRADLs, mobility limitations cannot be sifficiently resolved with a cane/walker, the home provides adequate access between rooms for a wheelchair, a wheelchair will significantly improve the ability to participate in MRADLs and will be used in the home on a regular basis, the patient is willing to use a wheelchair in  the home, the patient has a caregiver who is available, willing, and able to provide assistance with the wheelchair, and the patient requires additional person for safety with mobility with walker  Seat Width 18 (Standard adult)  Seat Depth 18  Back Height Standard  Leg Support Heel Loops, Elevated leg rest, and Swing Away  Arm Height Full and Swing Away  Lap Belt Velcro  Accessories Anti-tippers and Safety belt  Cushion Basic  Justification for Cushion maintain skin integrity      Niki Sierra PTA 4/21/2025                             Time Tracking:     PT Received On: 04/21/25  PT Start Time: 0930  PT Stop Time: 1013  PT Total Time (min): 43 min    Billable Minutes: Gait Training 15, Therapeutic Activity 13, and Therapeutic Exercise 15    Treatment Type: Treatment  PT/PTA: PTA     Number of PTA visits since last PT visit: 2     04/21/2025

## 2025-04-22 PROCEDURE — 25000003 PHARM REV CODE 250: Performed by: HOSPITALIST

## 2025-04-22 PROCEDURE — 97110 THERAPEUTIC EXERCISES: CPT

## 2025-04-22 PROCEDURE — 97530 THERAPEUTIC ACTIVITIES: CPT | Mod: CO

## 2025-04-22 PROCEDURE — 97535 SELF CARE MNGMENT TRAINING: CPT | Mod: CO

## 2025-04-22 PROCEDURE — 25000003 PHARM REV CODE 250: Performed by: NURSE PRACTITIONER

## 2025-04-22 PROCEDURE — 97530 THERAPEUTIC ACTIVITIES: CPT

## 2025-04-22 PROCEDURE — 97116 GAIT TRAINING THERAPY: CPT

## 2025-04-22 PROCEDURE — 94761 N-INVAS EAR/PLS OXIMETRY MLT: CPT

## 2025-04-22 PROCEDURE — 11000004 HC SNF PRIVATE

## 2025-04-22 RX ORDER — GABAPENTIN 100 MG/1
100 CAPSULE ORAL 3 TIMES DAILY
Qty: 90 CAPSULE | Refills: 3 | Status: SHIPPED | OUTPATIENT
Start: 2025-04-22

## 2025-04-22 RX ORDER — ACETAMINOPHEN 325 MG/1
650 TABLET ORAL EVERY 6 HOURS PRN
COMMUNITY
Start: 2025-04-22

## 2025-04-22 RX ORDER — METOPROLOL SUCCINATE 25 MG/1
25 TABLET, EXTENDED RELEASE ORAL 2 TIMES DAILY
Qty: 180 TABLET | Refills: 1 | Status: SHIPPED | OUTPATIENT
Start: 2025-04-22

## 2025-04-22 RX ORDER — TRAMADOL HYDROCHLORIDE 50 MG/1
50 TABLET ORAL EVERY 6 HOURS PRN
Qty: 28 TABLET | Refills: 0 | Status: SHIPPED | OUTPATIENT
Start: 2025-04-22

## 2025-04-22 RX ORDER — POLYETHYLENE GLYCOL 3350 17 G/17G
17 POWDER, FOR SOLUTION ORAL DAILY
COMMUNITY
Start: 2025-04-22

## 2025-04-22 RX ORDER — POTASSIUM CHLORIDE 20 MEQ/1
20 TABLET, EXTENDED RELEASE ORAL DAILY
Qty: 30 TABLET | Refills: 3 | Status: SHIPPED | OUTPATIENT
Start: 2025-04-22

## 2025-04-22 RX ORDER — FUROSEMIDE 40 MG/1
40 TABLET ORAL DAILY
Qty: 30 TABLET | Refills: 3 | Status: SHIPPED | OUTPATIENT
Start: 2025-04-22

## 2025-04-22 RX ADMIN — APIXABAN 5 MG: 5 TABLET, FILM COATED ORAL at 09:04

## 2025-04-22 RX ADMIN — POLYETHYLENE GLYCOL 3350 17 G: 17 POWDER, FOR SOLUTION ORAL at 09:04

## 2025-04-22 RX ADMIN — CLOPIDOGREL 75 MG: 75 TABLET ORAL at 09:04

## 2025-04-22 RX ADMIN — SENNOSIDES AND DOCUSATE SODIUM 1 TABLET: 50; 8.6 TABLET ORAL at 09:04

## 2025-04-22 RX ADMIN — TIMOLOL MALEATE 1 DROP: 5 SOLUTION OPHTHALMIC at 09:04

## 2025-04-22 RX ADMIN — ACETAMINOPHEN 650 MG: 500 TABLET ORAL at 06:04

## 2025-04-22 RX ADMIN — METOPROLOL SUCCINATE 25 MG: 25 TABLET, EXTENDED RELEASE ORAL at 09:04

## 2025-04-22 RX ADMIN — GABAPENTIN 100 MG: 300 CAPSULE ORAL at 09:04

## 2025-04-22 RX ADMIN — FUROSEMIDE 40 MG: 40 TABLET ORAL at 09:04

## 2025-04-22 RX ADMIN — ACETAMINOPHEN 650 MG: 500 TABLET ORAL at 09:04

## 2025-04-22 RX ADMIN — ACETAMINOPHEN 650 MG: 500 TABLET ORAL at 02:04

## 2025-04-22 RX ADMIN — MENTHOL, METHYL SALICYLATE: 10; 15 CREAM TOPICAL at 09:04

## 2025-04-22 RX ADMIN — ESCITALOPRAM OXALATE 20 MG: 10 TABLET ORAL at 09:04

## 2025-04-22 RX ADMIN — BRIMONIDINE TARTRATE 1 DROP: 1.5 SOLUTION OPHTHALMIC at 09:04

## 2025-04-22 RX ADMIN — LEVOTHYROXINE SODIUM 100 MCG: 0.05 TABLET ORAL at 06:04

## 2025-04-22 RX ADMIN — GABAPENTIN 100 MG: 300 CAPSULE ORAL at 02:04

## 2025-04-22 RX ADMIN — MENTHOL, METHYL SALICYLATE: 10; 15 CREAM TOPICAL at 03:04

## 2025-04-22 RX ADMIN — POTASSIUM CHLORIDE 20 MEQ: 1500 TABLET, EXTENDED RELEASE ORAL at 09:04

## 2025-04-22 NOTE — PLAN OF CARE
Patient is set to discharge on 04/23/2025 at 11:30 am. Patient discharging to [facility] with none via van transportation. Patient set up with [facility]. DME provided: delivered to the facility

## 2025-04-22 NOTE — PLAN OF CARE
SS spoke with Rosalie from [facility]  faxed over all updated information requested.

## 2025-04-22 NOTE — PLAN OF CARE
Problem: Physical Therapy  Goal: Physical Therapy Goal  Description: Goals to be met by: 25     Patient will increase functional independence with mobility by performin. Supine to sit with Contact Guard Assistance-met  2. Sit to supine with Contact Guard Assistance-met  3. Rolling to Left and Right with Contact Guard Assistance-met  4. Sit to stand transfer with Contact Guard Assistance - met  5. Bed to chair transfer with Contact Guard Assistance using Rolling Walker-met  6. Gait  x 50 feet (progress distance as tolerated) with Contact Guard Assistance using Rolling Walker - met  7. Wheelchair propulsion x 50 feet (progress distance as tolerated) with Supervision using bilateral upper extremities-met    Rehab Services' DME recommendations      Wheelchair    Number of hours up in a wheelchair per day 8      Style Light weight    Justification for light weight w/c: patient cannot propel in a standard wheelchair, patient can and does self-propel in a lightweight wheelchair, patient has impaired ability to participate in MRADLs, mobility limitations cannot be sifficiently resolved with a cane/walker, the home provides adequate access between rooms for a wheelchair, a wheelchair will significantly improve the ability to participate in MRADLs and will be used in the home on a regular basis, the patient is willing to use a wheelchair in the home, the patient has a caregiver who is available, willing, and able to provide assistance with the wheelchair, and the patient requires additional person for safety with mobility with walker  Seat Width 18 (Standard adult)  Seat Depth 18  Back Height Standard  Leg Support Heel Loops, Elevated leg rest, and Swing Away  Arm Height Full and Swing Away  Lap Belt Velcro  Accessories Anti-tippers and Safety belt  Cushion Basic  Justification for Cushion maintain skin integrity      Niki Sierra, KATJA 2025        Outcome: Progressing

## 2025-04-22 NOTE — PLAN OF CARE
Problem: Adult Inpatient Plan of Care  Goal: Plan of Care Review  Outcome: Progressing     Problem: Adult Inpatient Plan of Care  Goal: Patient-Specific Goal (Individualized)  Outcome: Progressing     Problem: Adult Inpatient Plan of Care  Goal: Absence of Hospital-Acquired Illness or Injury  Outcome: Progressing     Problem: Adult Inpatient Plan of Care  Goal: Optimal Comfort and Wellbeing  Outcome: Progressing     Problem: Pneumonia  Goal: Fluid Balance  Outcome: Progressing

## 2025-04-22 NOTE — PLAN OF CARE
Problem: Occupational Therapy  Goal: Occupational Therapy Goal  Description: Goals to be met by: 4/27/2025     Patient will increase functional independence with ADLs by performing:    UE Dressing with Stand-by Assistance. - Met  LE Dressing with Minimal Assistance-  Not Met  Toileting from toilet with Minimal Assistance for hygiene and clothing management- Met  ~Toileting from toilet /c CGA- Met   Bathing from  sitting at sink with Minimal Assistance.- Met  Toilet transfer to toilet with Minimal Assistance- MET  ~Toilet transfer to toilet /c SBA- Not Met      Rehab Services' DME recommendations    Wheelchair  Number of hours up in a wheelchair per day 8+      Style Light weight    Justification for light weight w/c: patient cannot propel in a standard wheelchair, patient can and does self-propel in a lightweight wheelchair, patient has impaired ability to participate in MRADLs, mobility limitations cannot be sifficiently resolved with a cane/walker, the home provides adequate access between rooms for a wheelchair, a wheelchair will significantly improve the ability to participate in MRADLs and will be used in the home on a regular basis, the patient is willing to use a wheelchair in the home, the patient has a caregiver who is available, willing, and able to provide assistance with the wheelchair, and the patient requires additional person for safety with mobility with walker  Seat Width 18 (Standard adult)  Seat Depth 18  Back Height Standard  Leg Support Elevated leg rest and Swing Away  Arm Height Full, Swing Away, and Adjustable Height  Lap Belt Buckle  Accessories Front Brakes, Anti-tippers, and Safety belt  Cushion Gel  Justification for Cushion Pressure relief       CORBIN Eagle 4/21/2025  Outcome: Unable to Meet

## 2025-04-22 NOTE — PLAN OF CARE
Skilled Nursing IDT Note  Date: 4/21/25       Patient Name:  Dorothy M Knobloch       Medical Record Number: [id number]   YOB: 1932  Sex: Female          Room/Bed:  UHOH400/JTGB644 A  Payor Info:  Payor: [employer] [facility] / Plan: [employer] CHOICES / Product Type: Medicare Advantage /      Admitting Diagnosis:   Heart failure, unspecified [I50.9]  Acute on chronic heart failure [I50.9]   Admit Date/Time:  3/26/2025  6:40 PM    Primary Diagnosis:  Acute on chronic congestive heart failure  Principal Problem: Acute on chronic congestive heart failure    Patient Active Problem List    Diagnosis Date Noted    Anemia 03/21/2025    Hypokalemia 03/21/2025    Acute on chronic congestive heart failure 03/20/2025    Pneumonia 03/20/2025    Moderate protein-calorie malnutrition 03/14/2025    Acute blood loss anemia 03/07/2025    On anticoagulant therapy 06/13/2024    Age-related physical debility 04/05/2024    Aortic stenosis 04/05/2024    Pulmonary hypertension 04/05/2024    hx Stroke due to embolism of left posterior cerebral artery 04/04/2024    Chronic diastolic congestive heart failure 11/24/2019    AV block, 3rd degree 03/19/2019    Paroxysmal atrial fibrillation 09/11/2018    Cardiac pacemaker 08/22/2018    Status post cataract extraction and insertion of intraocular lens 08/10/2018    Blindness and low vision 08/10/2018    Dry eye syndrome of both eyes 08/10/2018    Nuclear sclerotic cataract of left eye 08/10/2018    Nonexudative age-related macular degeneration, bilateral, intermediate dry stage 07/12/2018    Neovascular glaucoma, 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    Iris neovascularization, left 03/27/2018    Bilateral carotid artery stenosis     CRAO (central retinal artery occlusion), left 01/29/2018    Meningioma 01/29/2018    Takotsubo cardiomyopathy 11/17/2016    Carotid artery disease 08/10/2016    Constipation - functional 08/05/2015    History of TIA (transient ischemic attack) 12/13/2013     Essential hypertension 12/13/2013    Pure hypercholesterolemia 12/13/2013    Acquired hypothyroidism 12/13/2013    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 12/13/2013    Posterior vitreous detachment, both eyes 08/27/2013       Team Members Present: Seema Mackey, RN Nurse Manager, Maria L Rose LPN, , Mercy Huffman, , and Neelima Maza, OT, Rehab    Patient/Family Present: Patient and patient's son at bedside, patient's daughter Gemma via phone                                                Issues/Consults: Needs transportation upon discharge    Caregiver at Discharge: Facility/hospice staff, sitters    Living Setting at Discharge: [facility] living    Anticipated Discharge Date:  4/23/25    Supervision Recommended at Discharge: 24 hour physical assistance    Follow-up Services: Hospice    Preferred Pharmacy: [facility]

## 2025-04-22 NOTE — PT/OT/SLP PROGRESS
Physical Therapy Treatment/ Discharge Summary    Patient Name:  Dorothy M Knobloch   MRN:  [id number]  Admit Date: 3/26/2025  Admitting Diagnosis: Acute on chronic congestive heart failure  Recent Surgeries: OPEN REDUCTION INTERNAL FIXATION FEMORAL SHAFT FRACTURE (Right)     General Precautions: Standard, fall  Orthopedic Precautions: RLE weight bearing as tolerated  Braces: N/A    Recommendations:     Discharge Recommendations: home health PT  Level of Assistance Recommended at Discharge: 24 hours light assistance  Discharge Equipment Recommendations: 3-in-1 commode, hip kit, wheelchair  Barriers to discharge: Decreased caregiver support, Other (Comment) (Increased assistance for mobility)    Assessment:     Dorothy M Knobloch is a 89 y.o. female admitted with a medical diagnosis of Acute on chronic congestive heart failure .  Performance deficits affecting function: weakness, impaired endurance, impaired self care skills, impaired functional mobility, gait instability, impaired balance, decreased upper extremity function, decreased lower extremity function, edema, impaired cardiopulmonary response to activity, impaired skin, decreased ROM, orthopedic precautions.    Rehab Potential is good    Activity Tolerance: Good    Plan:     Patient to be seen 5 x/week to address the above listed problems via gait training, therapeutic activities, therapeutic exercises, neuromuscular re-education, wheelchair management/training    Plan of Care Expires: 04/26/25  Plan of Care Reviewed with: patient    Subjective     Pt agreeable to work with PT.     Pain/Comfort:  Pain Rating 1: 0/10  Pain Rating Post-Intervention 1: 0/10    Patient's cultural, spiritual, Rastafari conflicts given the current situation:  no    Objective:     Communicated with pt prior to session.  Patient found up in chair with   upon PT entry to room.     Therapeutic Activities and Exercises: LBEx 15mins    Functional Mobility:   04/22/25 1617   Roll Left and  Right   Was the activity attempted? Yes   Was the activity done independently? No   Assistance Needed Supervision   Was adaptive equipment used? No   CARE Score - Roll Left and Right 4   Sit to Lying   Was the activity attempted? Yes   Was the activity done independently? No   Assistance Needed Supervision   Was adaptive equipment used? No   CARE Score - Sit to Lying 4   Lying to Sitting on Side of Bed   Was the activity attempted? Yes   Was the activity done independently? No   Assistance Needed Supervision   Was adaptive equipment used? No   CARE Score - Lying to Sitting on Side of Bed 4   Sit to Stand   Was the activity attempted? Yes   Was the activity done independently? No   Assistance Needed Touching assistance   Was adaptive equipment used? Yes   CARE Score - Sit to Stand 4   Chair/Bed-to-Chair Transfer   Was the activity attempted? Yes   Was the activity done independently? No   Assistance Needed Touching assistance   Was adaptive equipment used? Yes   CARE Score - Chair/Bed-to-Chair Transfer 4   Car Transfer   Reason if not Attempted Environmental limitations   CARE Score - Car Transfer 10   Walk 10 Feet   Was the activity attempted? Yes   Was the activity done independently? No   Assistance Needed Touching assistance   Was adaptive equipment used? Yes   CARE Score - Walk 10 Feet 4   Walk 50 Feet with Two Turns   Was the activity attempted? Yes   Was the activity done independently? No   Assistance Needed Touching assistance  (82ft with RW, CGA)   Was adaptive equipment used? Yes   CARE Score - Walk 50 Feet with Two Turns 4   Walk 150 Feet   Reason if not Attempted Safety concerns   CARE Score - Walk 150 Feet 88   Walking 10 Feet on Uneven Surfaces   Reason if not Attempted Safety concerns   CARE Score - Walking 10 Feet on Uneven Surfaces 88   1 Step (Curb)   Reason if not Attempted Safety concerns   CARE Score - 1 Step (Curb) 88   4 Steps   Reason if not Attempted Safety concerns   CARE Score - 4 Steps 88    12 Steps   Reason if not Attempted Safety concerns   CARE Score - 12 Steps 88   Picking Up Object   Was the activity attempted? Yes   Was the activity done independently? No   Assistance Needed Touching assistance   Was adaptive equipment used? Yes   CARE Score - Picking Up Object 4   OTHER   Uses a Wheelchair/Scooter? Yes   Wheel 50 Feet with Two Turns   Was the activity attempted? Yes   Was the activity done independently? No   Assistance Needed Supervision   Type of Wheelchair/Scooter Manual   CARE Score - Wheel 50 Feet with Two Turns 4   Wheel 150 Feet   Was the activity attempted? Yes   Was the activity done independently? No   Assistance Needed Supervision   Type of Wheelchair/Scooter Manual   CARE Score - Wheel 150 Feet 4       AM-PAC 6 CLICK MOBILITY  17    Patient left up in chair with call button in reach.    GOALS:   Multidisciplinary Problems       Physical Therapy Goals          Problem: Physical Therapy    Goal Priority Disciplines Outcome Interventions   Physical Therapy Goal     PT, PT/OT Progressing    Description: Goals to be met by: 25     Patient will increase functional independence with mobility by performin. Supine to sit with Contact Guard Assistance-met  2. Sit to supine with Contact Guard Assistance-met  3. Rolling to Left and Right with Contact Guard Assistance-met  4. Sit to stand transfer with Contact Guard Assistance - met  5. Bed to chair transfer with Contact Guard Assistance using Rolling Walker-met  6. Gait  x 50 feet (progress distance as tolerated) with Contact Guard Assistance using Rolling Walker - met  7. Wheelchair propulsion x 50 feet (progress distance as tolerated) with Supervision using bilateral upper extremities-met    Rehab Services' DME recommendations      Wheelchair    Number of hours up in a wheelchair per day 8      Style Light weight    Justification for light weight w/c: patient cannot propel in a standard wheelchair, patient can and does  self-propel in a lightweight wheelchair, patient has impaired ability to participate in MRADLs, mobility limitations cannot be sifficiently resolved with a cane/walker, the home provides adequate access between rooms for a wheelchair, a wheelchair will significantly improve the ability to participate in MRADLs and will be used in the home on a regular basis, the patient is willing to use a wheelchair in the home, the patient has a caregiver who is available, willing, and able to provide assistance with the wheelchair, and the patient requires additional person for safety with mobility with walker  Seat Width 18 (Standard adult)  Seat Depth 18  Back Height Standard  Leg Support Heel Loops, Elevated leg rest, and Swing Away  Arm Height Full and Swing Away  Lap Belt Velcro  Accessories Anti-tippers and Safety belt  Cushion Basic  Justification for Cushion maintain skin integrity      Niki Sierra PTA 4/21/2025                             Time Tracking:     PT Received On: 04/22/25  PT Start Time: 1035  PT Stop Time: 1128  PT Total Time (min): 53 min    Billable Minutes: Gait Training 15, Therapeutic Activity 23, and Therapeutic Exercise 15    Treatment Type: Treatment  PT/PTA: PT     Number of PTA visits since last PT visit: 0     04/22/2025

## 2025-04-22 NOTE — PLAN OF CARE
SS spoke with Pt. Daughter Gemma in regards to Pt. D/C on tomorrow 4/23/25.  Pt. Daughter will be here @ 11am for transportation . DME will be delivered to [facility].

## 2025-04-22 NOTE — PT/OT/SLP PROGRESS
"Occupational Therapy   Treatment/Discharge Summary    Name: Dorothy M Knobloch  MRN: [id number]  Admit Date: 3/26/2025  Admitting Diagnosis:  Acute on chronic congestive heart failure    General Precautions: Standard, fall   Orthopedic Precautions: RLE weight bearing as tolerated   Braces: N/A    Recommendations:     Discharge Recommendations:  home health OT  Level of Assistance Recommended at Discharge: 24 hours physical assistance for all ADL's and home management tasks  Discharge Equipment Recommendations: 3-in-1 commode, hip kit, wheelchair  Barriers to discharge:  Decreased caregiver support    Assessment:     Dorothy M Knobloch is a 89 y.o. female with a medical diagnosis of Acute on chronic congestive heart failure .  She presents with performance deficits affecting function are weakness, impaired endurance, impaired self care skills, impaired functional mobility, gait instability, impaired balance, decreased lower extremity function, decreased upper extremity function, decreased ROM, impaired cardiopulmonary response to activity, orthopedic precautions. Pt participated well during today's session. Pt tolerated tx session without incident and is making progress, however, continues to demonstrate deficits with self care skills, balance, functional mobility, UB strength and endurance. Pt will benefit from continued  OT services.      Rehab Potential is good    Activity tolerance:  Good    Plan:     Patient to be seen 5 x/week to address the above listed problems via self-care/home management, therapeutic activities, therapeutic exercises, neuromuscular re-education    Plan of Care Expires: 04/27/25  Plan of Care Reviewed with: patient    Subjective   "I'm going home tomorrow"  Communicated with: Tony prior to session.     Pain/Comfort:  Pain Rating 1: 0/10  Pain Rating Post-Intervention 1: 0/10    Patient's cultural, spiritual, Amish conflicts given the current situation:  yes    Objective:     Patient found " HOB elevated with PureWick upon OT entry to room.    Bed Mobility:    Patient completed Rolling/Turning to Right with stand by assistance and with side rail  Patient completed Scooting/Bridging with stand by assistance and with side rail  Patient completed Supine to Sit with stand by assistance and with side rail     Functional Mobility/Transfers:  Patient completed Sit <> Stand Transfer with minimum assistance  with  rolling walker   Patient completed Bed <> Chair Transfer using Stand Pivot technique with minimum assistance with rolling walker  Patient completed Toilet Transfer Stand Pivot technique with minimum assistance with  grab bars  Patient completed  Shower Transfer Stand Pivot technique with minimum assistance with grab bars    Activities of Daily Living:  Grooming: set up assistance to perform oral/facial hygiene seated at sink   Bathing: minimum assistance to perform bathing seated on 3-in-1 commode in shower   Upper Body Dressing: supervision to doff/don pullover shirt while seated   Lower Body Dressing: maximal assistance to thread B LE and manage pants over hips in stance with use of RW  Toileting: supervision to perform chantal hygiene seated on 3-in-1 commode over standard toilet   Footwear: total assistance to doff B socks and don B shoes while seated     [facility] 6 Click ADL: 20    Treatment & Education:  Pt educated on:  - role of OT  - level of assistance  - energy conservation and task modification to maximize independence with ADL's and mobility   -  safety while performing functional transfers and self care tasks  - orthopedic precautions.   - progress towards OT goals     Patient left up in chair with call button in reach    GOALS:   Multidisciplinary Problems       Occupational Therapy Goals          Problem: Occupational Therapy    Goal Priority Disciplines Outcome Interventions   Occupational Therapy Goal     OT, PT/OT Unable to Meet    Description: Goals to be met by: 4/27/2025     Patient  will increase functional independence with ADLs by performing:    UE Dressing with Stand-by Assistance. - Met  LE Dressing with Minimal Assistance-  Not Met  Toileting from toilet with Minimal Assistance for hygiene and clothing management- Met  ~Toileting from toilet /c CGA- Met   Bathing from  sitting at sink with Minimal Assistance.- Met  Toilet transfer to toilet with Minimal Assistance- MET  ~Toilet transfer to toilet /c SBA- Not Met      Rehab Services' DME recommendations    Wheelchair  Number of hours up in a wheelchair per day 8+      Style Light weight    Justification for light weight w/c: patient cannot propel in a standard wheelchair, patient can and does self-propel in a lightweight wheelchair, patient has impaired ability to participate in MRADLs, mobility limitations cannot be sifficiently resolved with a cane/walker, the home provides adequate access between rooms for a wheelchair, a wheelchair will significantly improve the ability to participate in MRADLs and will be used in the home on a regular basis, the patient is willing to use a wheelchair in the home, the patient has a caregiver who is available, willing, and able to provide assistance with the wheelchair, and the patient requires additional person for safety with mobility with walker  Seat Width 18 (Standard adult)  Seat Depth 18  Back Height Standard  Leg Support Elevated leg rest and Swing Away  Arm Height Full, Swing Away, and Adjustable Height  Lap Belt Buckle  Accessories Front Brakes, Anti-tippers, and Safety belt  Cushion Gel  Justification for Cushion Pressure relief       CORBIN Eagle 4/21/2025                       Time Tracking:     OT Date of Treatment: 04/22/25  OT Start Time: 0924    OT Stop Time: 1003  OT Total Time (min): 39 min    Billable Minutes:Self Care/Home Management 25  Therapeutic Activity 14    4/22/2025

## 2025-04-22 NOTE — PLAN OF CARE
[facility]  Department of Hospital Medicine  [address] [place] [address]  [phone] after hours  [phone] fax    HOSPICE  ORDERS    04/22/2025    Admit to Hospice:  Home Service     Diagnoses:   Active Hospital Problems    Diagnosis  POA    *Acute on chronic congestive heart failure [I50.9]  Yes    Hypokalemia [E87.6]  Yes    Anemia [D64.9]  Yes    Moderate protein-calorie malnutrition [E44.0]  Yes    Acute blood loss anemia [D62]  Yes    Pulmonary hypertension [I27.20]  Yes    Aortic stenosis [I35.0]  Yes    Age-related physical debility [R54]  Yes    Chronic diastolic congestive heart failure [I50.32]  Yes    Paroxysmal atrial fibrillation [I48.0]  Yes     Chronic    Cardiac pacemaker [Z95.0]  Yes    Carotid artery disease [I77.9]  Yes    Acquired hypothyroidism [E03.9]  Yes    Essential hypertension [I10]  Yes     Chronic      Resolved Hospital Problems    Diagnosis Date Resolved POA    Periprosthetic fracture of shaft of right femur s/p ORIF on 3/8/2025 [M97.8XXA, Z96.649] 03/27/2025 Yes       Hospice Qualifying Diagnoses:        Patient has a life expectancy < 6 months due to:  Primary Hospice Diagnosis:  Acute on chronic congestive heart failure  Comorbid Conditions Contributing to Decline:  Severe aortic stenosis, pulmonary hypertension, recent right femur fracture s/p ORIF    Vital Signs: Routine per Hospice Protocol.    Code Status:  DNR    Allergies:   Review of patient's allergies indicates:   Allergen Reactions    Aspartame Swelling     equal       Diet: Soft and bite size (IDDSI level 6)     Activities: As tolerated    Goals of Care Treatment Preferences:  Code Status: DNR    Health care agent: Mckayla Solis  [facility] agent number: [phone]          What is most important right now is to focus on spending time at home, quality of life, even if it means sacrificing a little time.  Accordingly, we have decided that the best plan to meet the  patient's goals includes continuing with treatment.      Nursing: Per Hospice Routine.    Oxygen:  Not requiring at the moment, okay to use nasal cannula 2 L PRN    Medications:          Medication List        PAUSE taking these medications      amLODIPine 5 MG tablet  Wait to take this until your doctor or other care provider tells you to start again.  Commonly known as: NORVASC  Take 1 tablet (5 mg total) by mouth once daily.     hydroCHLOROthiazide 12.5 MG Tab  Wait to take this until your doctor or other care provider tells you to start again.  TAKE 1 TABLET BY MOUTH EVERY DAY            START taking these medications      acetaminophen 325 MG tablet  Commonly known as: TYLENOL  Take 2 tablets (650 mg total) by mouth every 6 (six) hours as needed for Pain.     furosemide 40 MG tablet  Commonly known as: LASIX  Take 1 tablet (40 mg total) by mouth once daily.     gabapentin 100 MG capsule  Commonly known as: NEURONTIN  Take 1 capsule (100 mg total) by mouth 3 (three) times daily.     polyethylene glycol 17 gram Pwpk  Commonly known as: GLYCOLAX  Take 17 g by mouth once daily.     potassium chloride SA 20 MEQ tablet  Commonly known as: K-DUR,KLOR-CON  Take 1 tablet (20 mEq total) by mouth once daily.            CHANGE how you take these medications      metoprolol succinate 25 MG 24 hr tablet  Commonly known as: TOPROL-XL  Take 1 tablet (25 mg total) by mouth 2 (two) times daily.  What changed: See the new instructions.     traMADoL 50 mg tablet  Commonly known as: ULTRAM  Take 1 tablet (50 mg total) by mouth every 6 (six) hours as needed for Pain.  What changed: reasons to take this            CONTINUE taking these medications      apixaban 5 mg Tab  Commonly known as: ELIQUIS  Take 1 tablet (5 mg total) by mouth 2 (two) times daily. Patient to decrease dosing of Apixiban to 2.5 mg po twice daily for 1 week after surgery done on 3/8/2025 and then can resume back to her home dosing of 5 mg po BID on 315/2025.      brimonidine-timoloL 0.2-0.5 % Drop  Commonly known as: COMBIGAN  Place 1 drop into the left eye 2 (two) times a day.     clopidogreL 75 mg tablet  Commonly known as: PLAVIX  Take 1 tablet (75 mg total) by mouth once daily.     EScitalopram oxalate 20 MG tablet  Commonly known as: LEXAPRO  Take 1 tablet (20 mg total) by mouth once daily.     levothyroxine 88 MCG tablet  Commonly known as: SYNTHROID  Take 88 mcg by mouth before breakfast.     NITROSTAT 0.4 mg SL tablet  Generic drug: nitroGLYCERIN  Place 0.4 mg under the tongue every 5 (five) minutes as needed for Chest pain.     senna-docusate 8.6-50 mg per tablet  Commonly known as: PERICOLACE  Take 1 tablet by mouth 2 (two) times daily.            STOP taking these medications      amoxicillin-clavulanate 875-125mg 875-125 mg per tablet  Commonly known as: AUGMENTIN     bumetanide 0.5 MG Tab  Commonly known as: BUMEX     doxycycline 100 MG tablet  Commonly known as: VIBRA-TABS     erythromycin ophthalmic ointment  Commonly known as: ROMYCIN     melatonin 3 mg tablet  Commonly known as: MELATIN     methocarbamoL 500 MG Tab  Commonly known as: Robaxin            Future Orders:  Hospice Medical Director may dictate new orders for comfortable care measures & sign death certificate.        _________________________________  Angeles Aguero NP  04/22/2025

## 2025-04-22 NOTE — PLAN OF CARE
[facility]    FACILITY TRANSFER ORDERS      Patient Name: Dorothy M Knobloch  YOB: 1932    PCP: Lola Wolff MD   PCP Address: [address] [place] [address] [address] / [place]  PCP Phone Number: [phone]  PCP Fax: [phone]    Encounter Date: 04/22/2025    Admit to: USP    Vital Signs:  Routine    Diagnoses:   Active Hospital Problems    Diagnosis  POA    *Acute on chronic congestive heart failure [I50.9]  Yes    Hypokalemia [E87.6]  Yes    Anemia [D64.9]  Yes    Moderate protein-calorie malnutrition [E44.0]  Yes    Acute blood loss anemia [D62]  Yes    Pulmonary hypertension [I27.20]  Yes    Aortic stenosis [I35.0]  Yes    Age-related physical debility [R54]  Yes    Chronic diastolic congestive heart failure [I50.32]  Yes    Paroxysmal atrial fibrillation [I48.0]  Yes     Chronic    Cardiac pacemaker [Z95.0]  Yes    Carotid artery disease [I77.9]  Yes    Acquired hypothyroidism [E03.9]  Yes    Essential hypertension [I10]  Yes     Chronic      Resolved Hospital Problems    Diagnosis Date Resolved POA    Periprosthetic fracture of shaft of right femur s/p ORIF on 3/8/2025 [M97.8XXA, Z96.649] 03/27/2025 Yes       Allergies:  Review of patient's allergies indicates:   Allergen Reactions    Aspartame Swelling     equal       Diet:  Soft and bite size (IDDSI level 6)    Activities: Activity as tolerated    Goals of Care Treatment Preferences:  Code Status: DNR    Health care agent: Pattey Clearwater  Health care agent number: [phone]          What is most important right now is to focus on spending time at home, quality of life, even if it means sacrificing a little time.  Accordingly, we have decided that the best plan to meet the patient's goals includes continuing with treatment.      Nursing:  Per facility protocol     Labs:  Per facility protocol    WOUND CARE ORDERS    RLE Surgical incision-  Keep incision clean and dry.  Cleanse with soap and water daily, pat dry.  No  lotions or ointments.  Do not submerge under water until cleared by Ortho.       Medications: Review discharge medications with patient and family and provide education.         Medication List        PAUSE taking these medications      amLODIPine 5 MG tablet  Wait to take this until your doctor or other care provider tells you to start again.  Commonly known as: NORVASC  Take 1 tablet (5 mg total) by mouth once daily.     hydroCHLOROthiazide 12.5 MG Tab  Wait to take this until your doctor or other care provider tells you to start again.  TAKE 1 TABLET BY MOUTH EVERY DAY            START taking these medications      acetaminophen 325 MG tablet  Commonly known as: TYLENOL  Take 2 tablets (650 mg total) by mouth every 6 (six) hours as needed for Pain.     furosemide 40 MG tablet  Commonly known as: LASIX  Take 1 tablet (40 mg total) by mouth once daily.     gabapentin 100 MG capsule  Commonly known as: NEURONTIN  Take 1 capsule (100 mg total) by mouth 3 (three) times daily.     polyethylene glycol 17 gram Pwpk  Commonly known as: GLYCOLAX  Take 17 g by mouth once daily.     potassium chloride SA 20 MEQ tablet  Commonly known as: K-DUR,KLOR-CON  Take 1 tablet (20 mEq total) by mouth once daily.            CHANGE how you take these medications      metoprolol succinate 25 MG 24 hr tablet  Commonly known as: TOPROL-XL  Take 1 tablet (25 mg total) by mouth 2 (two) times daily.  What changed: See the new instructions.     traMADoL 50 mg tablet  Commonly known as: ULTRAM  Take 1 tablet (50 mg total) by mouth every 6 (six) hours as needed for Pain.  What changed: reasons to take this            CONTINUE taking these medications      apixaban 5 mg Tab  Commonly known as: ELIQUIS  Take 1 tablet (5 mg total) by mouth 2 (two) times daily. Patient to decrease dosing of Apixiban to 2.5 mg po twice daily for 1 week after surgery done on 3/8/2025 and then can resume back to her home dosing of 5 mg po BID on 315/2025.      brimonidine-timoloL 0.2-0.5 % Drop  Commonly known as: COMBIGAN  Place 1 drop into the left eye 2 (two) times a day.     clopidogreL 75 mg tablet  Commonly known as: PLAVIX  Take 1 tablet (75 mg total) by mouth once daily.     EScitalopram oxalate 20 MG tablet  Commonly known as: LEXAPRO  Take 1 tablet (20 mg total) by mouth once daily.     levothyroxine 88 MCG tablet  Commonly known as: SYNTHROID  Take 88 mcg by mouth before breakfast.     NITROSTAT 0.4 mg SL tablet  Generic drug: nitroGLYCERIN  Place 0.4 mg under the tongue every 5 (five) minutes as needed for Chest pain.     senna-docusate 8.6-50 mg per tablet  Commonly known as: PERICOLACE  Take 1 tablet by mouth 2 (two) times daily.            STOP taking these medications      amoxicillin-clavulanate 875-125mg 875-125 mg per tablet  Commonly known as: AUGMENTIN     bumetanide 0.5 MG Tab  Commonly known as: BUMEX     doxycycline 100 MG tablet  Commonly known as: VIBRA-TABS     erythromycin ophthalmic ointment  Commonly known as: ROMYCIN     melatonin 3 mg tablet  Commonly known as: MELATIN     methocarbamoL 500 MG Tab  Commonly known as: Robaxin                Immunizations Administered as of 4/22/2025       Name Date Dose VIS Date Route Exp Date    COVID-19, MRNA, LN-S, PF (Pfizer) (Purple Cap) 11/11/2021 12:11 PM 0.3 mL 12/12/2020 Intramuscular 3/31/2022    Site: Left deltoid     Given By: Shruthi Goodman     : [employer]     Lot: [id number]     COVID-19, MRNA, LN-S, PF (Pfizer) (Purple Cap) 2/3/2021 10:59 AM 0.3 mL 12/12/2020 Intramuscular 5/31/2021    Site: Left deltoid     Given By: Sofya Antonio MA     : [employer]     Lot: [id number]     COVID-19, MRNA, LN-S, PF (Pfizer) (Purple Cap) 1/13/2021 12:26 PM 0.3 mL 12/12/2020 Intramuscular 1/13/2021    Site: Left deltoid     Given By: Blessing Orr, RN     : [employer]     Lot: [id number]             This patient has had both covid vaccinations    Some patients may experience  side effects after vaccination.  These may include fever, headache, muscle or joint aches.  Most symptoms resolve with 24-48 hours and do not require urgent medical evaluation unless they persist for more than 72 hours or symptoms are concerning for an unrelated medical condition.          _________________________________  Angeles Aguero, KAREL  04/22/2025

## 2025-04-23 VITALS
RESPIRATION RATE: 20 BRPM | HEART RATE: 80 BPM | TEMPERATURE: 98 F | HEIGHT: 62 IN | OXYGEN SATURATION: 92 % | SYSTOLIC BLOOD PRESSURE: 159 MMHG | DIASTOLIC BLOOD PRESSURE: 67 MMHG | BODY MASS INDEX: 27.79 KG/M2 | WEIGHT: 151 LBS

## 2025-04-23 PROCEDURE — 25000003 PHARM REV CODE 250: Performed by: NURSE PRACTITIONER

## 2025-04-23 PROCEDURE — 25000003 PHARM REV CODE 250: Performed by: HOSPITALIST

## 2025-04-23 RX ADMIN — METOPROLOL SUCCINATE 25 MG: 25 TABLET, EXTENDED RELEASE ORAL at 09:04

## 2025-04-23 RX ADMIN — TIMOLOL MALEATE 1 DROP: 5 SOLUTION OPHTHALMIC at 09:04

## 2025-04-23 RX ADMIN — POTASSIUM CHLORIDE 20 MEQ: 1500 TABLET, EXTENDED RELEASE ORAL at 09:04

## 2025-04-23 RX ADMIN — SENNOSIDES AND DOCUSATE SODIUM 1 TABLET: 50; 8.6 TABLET ORAL at 09:04

## 2025-04-23 RX ADMIN — APIXABAN 5 MG: 5 TABLET, FILM COATED ORAL at 09:04

## 2025-04-23 RX ADMIN — GABAPENTIN 100 MG: 300 CAPSULE ORAL at 09:04

## 2025-04-23 RX ADMIN — LEVOTHYROXINE SODIUM 100 MCG: 0.05 TABLET ORAL at 06:04

## 2025-04-23 RX ADMIN — CLOPIDOGREL 75 MG: 75 TABLET ORAL at 09:04

## 2025-04-23 RX ADMIN — BRIMONIDINE TARTRATE 1 DROP: 1.5 SOLUTION OPHTHALMIC at 09:04

## 2025-04-23 RX ADMIN — ESCITALOPRAM OXALATE 20 MG: 10 TABLET ORAL at 09:04

## 2025-04-23 RX ADMIN — ACETAMINOPHEN 650 MG: 500 TABLET ORAL at 06:04

## 2025-04-23 NOTE — NURSING
Admission Diagnosis: Heart failure, unspecified     POC reviewed with pt, pt verbalized understanding. Safety maintained throughout shift, bed locked and in lowest position, call light in reach. Pt remained free of fall/trauma. VSS, afebrile this shift.    Pt pain  Last Documentation = Comfort/Acceptable Pain Level: 0 (04/23/25 0954)    Last Documentation = Pain Rating (0-10): Activity: 0 (04/23/25 0954)    Last Documentation = Pain Rating (0-10): Rest: 0 (04/23/25 0954)    Skin assessment completed prior to discharge   Location: n/a   Description: n/a  Recommendation: n/a     AVS reviewed with patient prior to discharge. Discharge instruction, follow up appointments and medication instructions given to pt, pt verbalized understanding. Patient IV lines have been removed prior to discharge. Rx sent to patients pharmacy.       * No surgery found *      OUTCOME: Condition has improved and patient is now ready for discharge.    DISPOSITION: Home or Self Care    FOLLOWUP: With primary care provider

## 2025-04-23 NOTE — PLAN OF CARE
Problem: Adult Inpatient Plan of Care  Goal: Plan of Care Review  Outcome: Progressing     Problem: Adult Inpatient Plan of Care  Goal: Patient-Specific Goal (Individualized)  Outcome: Progressing     Problem: Adult Inpatient Plan of Care  Goal: Absence of Hospital-Acquired Illness or Injury  Outcome: Progressing     Problem: Adult Inpatient Plan of Care  Goal: Optimal Comfort and Wellbeing  Outcome: Progressing     Problem: Adult Inpatient Plan of Care  Goal: Readiness for Transition of Care  Outcome: Progressing     Problem: Pneumonia  Goal: Fluid Balance  Outcome: Progressing     Problem: Pneumonia  Goal: Resolution of Infection Signs and Symptoms  Outcome: Progressing

## 2025-04-23 NOTE — PLAN OF CARE
Problem: Adult Inpatient Plan of Care  Goal: Plan of Care Review  Outcome: Met  Goal: Patient-Specific Goal (Individualized)  Outcome: Met  Goal: Absence of Hospital-Acquired Illness or Injury  Outcome: Met  Goal: Optimal Comfort and Wellbeing  Outcome: Met  Goal: Readiness for Transition of Care  Outcome: Met     Problem: Pneumonia  Goal: Fluid Balance  Outcome: Met  Goal: Resolution of Infection Signs and Symptoms  Outcome: Met  Goal: Effective Oxygenation and Ventilation  Outcome: Met     Problem: Wound  Goal: Optimal Coping  Outcome: Met  Goal: Optimal Functional Ability  Outcome: Met  Goal: Absence of Infection Signs and Symptoms  Outcome: Met  Goal: Improved Oral Intake  Outcome: Met  Goal: Optimal Pain Control and Function  Outcome: Met  Goal: Skin Health and Integrity  Outcome: Met  Goal: Optimal Wound Healing  Outcome: Met     Problem: Fall Injury Risk  Goal: Absence of Fall and Fall-Related Injury  Outcome: Met     Problem: Skin Injury Risk Increased  Goal: Skin Health and Integrity  Outcome: Met     Problem: Malnutrition  Goal: Improved Nutritional Intake  Outcome: Met     Problem: Fall Injury Risk  Goal: Absence of Fall and Fall-Related Injury  Outcome: Met     Problem: Fall Injury Risk  Goal: Absence of Fall and Fall-Related Injury  Outcome: Met     Problem: Fall Injury Risk  Goal: Absence of Fall and Fall-Related Injury  Outcome: Met     Problem: Fall Injury Risk  Goal: Absence of Fall and Fall-Related Injury  Outcome: Met     Problem: Fall Injury Risk  Goal: Absence of Fall and Fall-Related Injury  Outcome: Met     Problem: Fall Injury Risk  Goal: Absence of Fall and Fall-Related Injury  Outcome: Met     Problem: Fall Injury Risk  Goal: Absence of Fall and Fall-Related Injury  Outcome: Met     Problem: Fall Injury Risk  Goal: Absence of Fall and Fall-Related Injury  Outcome: Met     Problem: Fall Injury Risk  Goal: Absence of Fall and Fall-Related Injury  Outcome: Met     Problem: Fall Injury  Risk  Goal: Absence of Fall and Fall-Related Injury  Outcome: Met     Problem: Fall Injury Risk  Goal: Absence of Fall and Fall-Related Injury  Outcome: Met

## 2025-04-24 NOTE — ASSESSMENT & PLAN NOTE
"Patient's most recent potassium results are listed below.   No results for input(s): "K" in the last 72 hours.    "

## 2025-04-24 NOTE — ASSESSMENT & PLAN NOTE
Interpretation Summary    Left Ventricle: The left ventricle is normal in size. Mildly increased ventricular mass. Normal wall thickness. There is mild concentric hypertrophy. Normal wall motion. There is normal systolic function with a visually estimated ejection fraction of 65 - 70%. There is normal diastolic function.    Right Ventricle: The right ventricle is normal in size. Wall thickness is normal. Systolic function is normal. Pacemaker lead present in the ventricle.    Left Atrium: Severely dilated    Right Atrium: Right atrium is moderately dilated.    Aortic Valve: The aortic valve is a trileaflet valve. There is severe aortic valve sclerosis. There is moderate annular calcification present. Moderately restricted motion. There is moderate to severe low flow stenosis. Aortic valve area by VTI is 0.8 cm2. Aortic valve peak velocity is 3.4 m/s. Mean gradient is 25 mmHg. The dimensionless index is 0.27. AS severity has increased compared to study 1/24    Mitral Valve: There is severe posterior mitral annular calcification.    Aorta: Aortic root is normal in size measuring 2.62 cm. Aortic root at ST junction is normal. Ascending aorta is normal measuring 2.62 cm.    Pulmonary Artery: There is mild pulmonary hypertension. The estimated pulmonary artery systolic pressure is 44 mmHg.    IVC/SVC: Normal venous pressure at 3 mmHg.    Pericardium: There is no pericardial effusion.    Current Heart Failure Medications  furosemide (LASIX) tablet, Daily, Oral  metoprolol succinate (TOPROL-XL) 24 hr tablet, 2 times daily, Oral

## 2025-04-24 NOTE — ASSESSMENT & PLAN NOTE
Patient's blood pressure range in the last 24 hours was: No data recorded.The patient's inpatient anti-hypertensive regimen is listed below:  Current Antihypertensives  furosemide (LASIX) tablet, Daily, Oral  metoprolol succinate (TOPROL-XL) 24 hr tablet, 2 times daily, Oral

## 2025-04-24 NOTE — ASSESSMENT & PLAN NOTE
Echo    Interpretation Summary    Left Ventricle: The left ventricle is normal in size. Mildly increased ventricular mass. Normal wall thickness. There is mild concentric hypertrophy. Normal wall motion. There is normal systolic function with a visually estimated ejection fraction of 65 - 70%. There is normal diastolic function.    Right Ventricle: The right ventricle is normal in size. Wall thickness is normal. Systolic function is normal. Pacemaker lead present in the ventricle.    Left Atrium: Severely dilated    Right Atrium: Right atrium is moderately dilated.    Aortic Valve: The aortic valve is a trileaflet valve. There is severe aortic valve sclerosis. There is moderate annular calcification present. Moderately restricted motion. There is moderate to severe low flow stenosis. Aortic valve area by VTI is 0.8 cm2. Aortic valve peak velocity is 3.4 m/s. Mean gradient is 25 mmHg. The dimensionless index is 0.27. AS severity has increased compared to study 1/24    Mitral Valve: There is severe posterior mitral annular calcification.    Aorta: Aortic root is normal in size measuring 2.62 cm. Aortic root at ST junction is normal. Ascending aorta is normal measuring 2.62 cm.    Pulmonary Artery: There is mild pulmonary hypertension. The estimated pulmonary artery systolic pressure is 44 mmHg.    IVC/SVC: Normal venous pressure at 3 mmHg.    Pericardium: There is no pericardial effusion.    Current Heart Failure Medications  furosemide (LASIX) tablet, Daily, Oral  metoprolol succinate (TOPROL-XL) 24 hr tablet, 2 times daily, Oral

## 2025-04-24 NOTE — ASSESSMENT & PLAN NOTE
Antiarrhythmics  metoprolol succinate (TOPROL-XL) 24 hr tablet, 2 times daily, Oral    Anticoagulants

## 2025-04-24 NOTE — ASSESSMENT & PLAN NOTE
Echo  Result Date: 3/21/2025    Left Ventricle: The left ventricle is normal in size. Mildly increased   ventricular mass. Normal wall thickness. There is mild concentric   hypertrophy. Normal wall motion. There is normal systolic function with a   visually estimated ejection fraction of 65 - 70%. There is normal   diastolic function.    Right Ventricle: The right ventricle is normal in size. Wall thickness   is normal. Systolic function is normal. Pacemaker lead present in the   ventricle.    Left Atrium: Severely dilated    Right Atrium: Right atrium is moderately dilated.    Aortic Valve: The aortic valve is a trileaflet valve. There is severe   aortic valve sclerosis. There is moderate annular calcification present.   Moderately restricted motion. There is moderate to severe low flow   stenosis. Aortic valve area by VTI is 0.8 cm2. Aortic valve peak velocity   is 3.4 m/s. Mean gradient is 25 mmHg. The dimensionless index is 0.27. AS   severity has increased compared to study 1/24    Mitral Valve: There is severe posterior mitral annular calcification.    Aorta: Aortic root is normal in size measuring 2.62 cm. Aortic root at   ST junction is normal. Ascending aorta is normal measuring 2.62 cm.    Pulmonary Artery: There is mild pulmonary hypertension. The estimated   pulmonary artery systolic pressure is 44 mmHg.    IVC/SVC: Normal venous pressure at 3 mmHg.    Pericardium: There is no pericardial effusion.        Echo  Result Date: 6/20/2024    Left Ventricle: The left ventricle is normal in size. Normal wall   thickness. There is concentric hypertrophy. There is normal systolic   function with a visually estimated ejection fraction of 65 - 70%. Unable   to assess diastolic function due to atrial fibrillation.    Right Ventricle: Normal right ventricular cavity size. Wall thickness   is normal. Systolic function is normal.    Left Atrium: Left atrium is severely dilated.    Right Atrium: Right atrium is  severely dilated.    Mitral Valve: There is moderate posterior leaflet sclerosis. There is   no stenosis. The mean pressure gradient across the mitral valve is 1 mmHg   at a heart rate of 60 bpm. There is moderate regurgitation.    Tricuspid Valve: There is moderate to severe regurgitation.    Pulmonic Valve: There is no stenosis.    Pulmonary Artery: There is moderate pulmonary hypertension. The   estimated pulmonary artery systolic pressure is 60 mmHg.    IVC/SVC: Intermediate venous pressure at 8 mmHg.

## 2025-04-24 NOTE — DISCHARGE SUMMARY
[facility] Medicine  Discharge Summary      Patient Name: Dorothy M Knobloch  MRN: [id number]  IZABELA: [id number]  Patient Class: IP- SNF  Admission Date: 3/26/2025  Hospital Length of Stay: 28 days  Discharge Date and Time: 4/23/2025  1:00 PM  Attending Physician: Betty att. providers found   Discharging Provider: Angeles Aguero NP  Primary Care Provider: Lola Wolff MD    Primary Care Team: LT[facility] 8 ANGELES AGUERO     HPI:   Mrs. Knobloch is a 89 year old female PMHx of PAF on Apixiban, HTN, chronic diastolic HF, cardiac pacemaker due to sick sinus syndrome, CVA and hypothyroidism who presents to SNF following hospitalization for PNA.  Patient with previous SNF admission on 03/13 for Right periprosthetic femoral shaft fracture below a total hip replacement s/p ORIF on 3/8/25 with Dr. William.  Admission to SNF for secondary weakness and debility     Patient originally presented to [facility] ED on 03/07 after a fall.  Found to have Right periprosthetic femoral shaft fracture below a total hip replacement.  Underwent ORIF on 03/08.  Received 2 unit RBC transfusion during that admission.  Admitted to SNF on 3/13.  On 03/20 patient developed worsening hypoxia and increased oxygen requirements and was sent to ED for evaluation.  Patient had also been experiencing confusion which is abnormal for her. In the ED, noted to have elevated BNP, CXR revealed fluid accumulation. CTA PE negative for PE, did show signs of volume overload and possible infection. She was started on lasix with improvement in oxygenation. Cultures were collected and patient started on vancomycin and zosyn for possible HCAP as well given her symptoms.  Respiratory symptoms and mental status improved over the course of hospital stay. She was de-escalated to Augmentin/Doxycycline for total 7 day course. Last dose on 3/28/25. Discharged on Bumex 1 mg daily (increased from 0.5 mg daily on admission).  Patient was transferred back to  [facility].     Today, patient states that her pain is well controlled.  She is having some nerve type pain that is shooting from her knee downward.  Patient has sutures to her surgical site, will get with orthopedics to see when they would like to see her back.     Patient will be treated at [facility] with PT and OT to improve functional status and ability to perform ADLs.     Hospital Course:   Patient progressed well with PT and OT- last PT note states that patient ambulated 82ft with RW, CGA.  Patient had continued symptoms of decompensated heart failure.  She was diuresed with a few days of IV Lasix with great improvement in her symptoms.  She was ultimately discharged on PO Lasix which seemed to be working better than her home Bumex.  She decided to discharge with hospice services. DME was ordered if needed. Follow up appointment to be made by patient within one week. All prescriptions and discharge instructions were ordered to be given to the patient prior to discharge.        PEx  Constitutional: Patient appears frail,  debilitated.  No distress noted  Cardiovascular: Normal rate, regular rhythm and normal heart sounds.    Pulmonary/Chest: Effort normal and breath sounds are clear  Abdominal: Soft. Bowel sounds are normal.   Musculoskeletal: Normal range of motion.   Neurological: Alert and oriented to person, place, and time.   Psychiatric: Normal mood and affect. Behavior is normal.   Skin: Skin is warm and dry.  Surgical incision to RLE, fully approximated, no drainage noted, no signs of infection- associated swelling      Goals of Care Treatment Preferences:  Code Status: DNR    Health [facility]  Health care agent number: [phone]          What is most important right now is to focus on spending time at home, quality of life, even if it means sacrificing a little time.  Accordingly, we have decided that the best plan to meet the patient's goals includes continuing with  treatment.         Consults:   Consults (From admission, onward)          Status Ordering Provider     Inpatient consult to Registered Dietitian/Nutritionist  Once        Provider:  (Not yet assigned)    Completed CLINTON ROGER            Assessment & Plan  Acute on chronic congestive heart failure      Echo    Interpretation Summary    Left Ventricle: The left ventricle is normal in size. Mildly increased ventricular mass. Normal wall thickness. There is mild concentric hypertrophy. Normal wall motion. There is normal systolic function with a visually estimated ejection fraction of 65 - 70%. There is normal diastolic function.    Right Ventricle: The right ventricle is normal in size. Wall thickness is normal. Systolic function is normal. Pacemaker lead present in the ventricle.    Left Atrium: Severely dilated    Right Atrium: Right atrium is moderately dilated.    Aortic Valve: The aortic valve is a trileaflet valve. There is severe aortic valve sclerosis. There is moderate annular calcification present. Moderately restricted motion. There is moderate to severe low flow stenosis. Aortic valve area by VTI is 0.8 cm2. Aortic valve peak velocity is 3.4 m/s. Mean gradient is 25 mmHg. The dimensionless index is 0.27. AS severity has increased compared to study 1/24    Mitral Valve: There is severe posterior mitral annular calcification.    Aorta: Aortic root is normal in size measuring 2.62 cm. Aortic root at ST junction is normal. Ascending aorta is normal measuring 2.62 cm.    Pulmonary Artery: There is mild pulmonary hypertension. The estimated pulmonary artery systolic pressure is 44 mmHg.    IVC/SVC: Normal venous pressure at 3 mmHg.    Pericardium: There is no pericardial effusion.    Current Heart Failure Medications  furosemide (LASIX) tablet, Daily, Oral  metoprolol succinate (TOPROL-XL) 24 hr tablet, 2 times daily, Oral          Essential hypertension  Patient's blood pressure range in the last 24  hours was: No data recorded.The patient's inpatient anti-hypertensive regimen is listed below:  Current Antihypertensives  furosemide (LASIX) tablet, Daily, Oral  metoprolol succinate (TOPROL-XL) 24 hr tablet, 2 times daily, Oral    Acquired hypothyroidism      Carotid artery disease      Cardiac pacemaker      Paroxysmal atrial fibrillation    Antiarrhythmics  metoprolol succinate (TOPROL-XL) 24 hr tablet, 2 times daily, Oral    Anticoagulants         Chronic diastolic congestive heart failure      Interpretation Summary    Left Ventricle: The left ventricle is normal in size. Mildly increased ventricular mass. Normal wall thickness. There is mild concentric hypertrophy. Normal wall motion. There is normal systolic function with a visually estimated ejection fraction of 65 - 70%. There is normal diastolic function.    Right Ventricle: The right ventricle is normal in size. Wall thickness is normal. Systolic function is normal. Pacemaker lead present in the ventricle.    Left Atrium: Severely dilated    Right Atrium: Right atrium is moderately dilated.    Aortic Valve: The aortic valve is a trileaflet valve. There is severe aortic valve sclerosis. There is moderate annular calcification present. Moderately restricted motion. There is moderate to severe low flow stenosis. Aortic valve area by VTI is 0.8 cm2. Aortic valve peak velocity is 3.4 m/s. Mean gradient is 25 mmHg. The dimensionless index is 0.27. AS severity has increased compared to study 1/24    Mitral Valve: There is severe posterior mitral annular calcification.    Aorta: Aortic root is normal in size measuring 2.62 cm. Aortic root at ST junction is normal. Ascending aorta is normal measuring 2.62 cm.    Pulmonary Artery: There is mild pulmonary hypertension. The estimated pulmonary artery systolic pressure is 44 mmHg.    IVC/SVC: Normal venous pressure at 3 mmHg.    Pericardium: There is no pericardial effusion.    Current Heart Failure  Medications  furosemide (LASIX) tablet, Daily, Oral  metoprolol succinate (TOPROL-XL) 24 hr tablet, 2 times daily, Oral        Age-related physical debility          Aortic stenosis      Echo  Result Date: 3/21/2025    Left Ventricle: The left ventricle is normal in size. Mildly increased   ventricular mass. Normal wall thickness. There is mild concentric   hypertrophy. Normal wall motion. There is normal systolic function with a   visually estimated ejection fraction of 65 - 70%. There is normal   diastolic function.    Right Ventricle: The right ventricle is normal in size. Wall thickness   is normal. Systolic function is normal. Pacemaker lead present in the   ventricle.    Left Atrium: Severely dilated    Right Atrium: Right atrium is moderately dilated.    Aortic Valve: The aortic valve is a trileaflet valve. There is severe   aortic valve sclerosis. There is moderate annular calcification present.   Moderately restricted motion. There is moderate to severe low flow   stenosis. Aortic valve area by VTI is 0.8 cm2. Aortic valve peak velocity   is 3.4 m/s. Mean gradient is 25 mmHg. The dimensionless index is 0.27. AS   severity has increased compared to study 1/24    Mitral Valve: There is severe posterior mitral annular calcification.    Aorta: Aortic root is normal in size measuring 2.62 cm. Aortic root at   ST junction is normal. Ascending aorta is normal measuring 2.62 cm.    Pulmonary Artery: There is mild pulmonary hypertension. The estimated   pulmonary artery systolic pressure is 44 mmHg.    IVC/SVC: Normal venous pressure at 3 mmHg.    Pericardium: There is no pericardial effusion.        Echo  Result Date: 6/20/2024    Left Ventricle: The left ventricle is normal in size. Normal wall   thickness. There is concentric hypertrophy. There is normal systolic   function with a visually estimated ejection fraction of 65 - 70%. Unable   to assess diastolic function due to atrial fibrillation.    Right  "Ventricle: Normal right ventricular cavity size. Wall thickness   is normal. Systolic function is normal.    Left Atrium: Left atrium is severely dilated.    Right Atrium: Right atrium is severely dilated.    Mitral Valve: There is moderate posterior leaflet sclerosis. There is   no stenosis. The mean pressure gradient across the mitral valve is 1 mmHg   at a heart rate of 60 bpm. There is moderate regurgitation.    Tricuspid Valve: There is moderate to severe regurgitation.    Pulmonic Valve: There is no stenosis.    Pulmonary Artery: There is moderate pulmonary hypertension. The   estimated pulmonary artery systolic pressure is 60 mmHg.    IVC/SVC: Intermediate venous pressure at 8 mmHg.         Pulmonary hypertension      Acute blood loss anemia    Moderate protein-calorie malnutrition    Anemia    Hypokalemia  Patient's most recent potassium results are listed below.   No results for input(s): "K" in the last 72 hours.    Final Active Diagnoses:    Diagnosis Date Noted POA    PRINCIPAL PROBLEM:  Acute on chronic congestive heart failure [I50.9] 03/20/2025 Yes    Hypokalemia [E87.6] 03/21/2025 Yes    Anemia [D64.9] 03/21/2025 Yes    Moderate protein-calorie malnutrition [E44.0] 03/14/2025 Yes    Acute blood loss anemia [D62] 03/07/2025 Yes    Pulmonary hypertension [I27.20] 04/05/2024 Yes    Aortic stenosis [I35.0] 04/05/2024 Yes    Age-related physical debility [R54] 04/05/2024 Yes    Chronic diastolic congestive heart failure [I50.32] 11/24/2019 Yes    Paroxysmal atrial fibrillation [I48.0] 09/11/2018 Yes     Chronic    Cardiac pacemaker [Z95.0] 08/22/2018 Yes    Carotid artery disease [I77.9] 08/10/2016 Yes    Acquired hypothyroidism [E03.9] 12/13/2013 Yes    Essential hypertension [I10] 12/13/2013 Yes     Chronic      Problems Resolved During this Admission:    Diagnosis Date Noted Date Resolved POA    Periprosthetic fracture of shaft of right femur s/p ORIF on 3/8/2025 [M97.8XXA, Z96.649] 03/07/2025 " "03/27/2025 Yes       Discharged Condition: stable    Disposition: Hospice/Home    Follow Up:    Patient Instructions:      HOSPITAL BED FOR HOME USE   Order Comments: Justification in NP note from 4/21     Order Specific Question Answer Comments   Type: Semi-electric    Length of need (1-99 months): 99    Does patient have medical equipment at home? walker, rolling    Does patient have medical equipment at home? rollator    Height: 5' 2" (1.575 m)    Weight: 67.4 kg (148 lb 9.4 oz)    Please check all that apply: Patient requires a bed height different than a fixed height hospital bed to permit transfers to chair, wheelchair, or standing.      WHEELCHAIR FOR HOME USE     Order Specific Question Answer Comments   Hours in W/C per day: 8    Type of Wheelchair: Lightweight    Patient unable to propel in Standard wheelchair? Yes    Size(Width): 18"(STD adult)    Leg Support: Elevating leg rests    Leg Support: Swing Away    Arm Height: Full length    Arm Height: Swing away    Lap Belt: Velcro    Accessories: Anti-tippers    Cushion: Basic    Justification for cushion: Prevent pressure ulcers    Reclining Back No    Height: 5' 2" (1.575 m)    Weight: 67.4 kg (148 lb 9.4 oz)    Does patient have medical equipment at home? walker, rolling    Does patient have medical equipment at home? rollator    Length of need (1-99 months): 99    Please check all that apply: Caregiver is capable and willing to operate wheelchair safely.    Please check all that apply: The patient requires the use of a w/c for activities of daily living within the Home.    Please check all that apply: Patient mobility limitations cannot be sufficiently resolved by the use of other ambulatory therapies.      No driving until:   Order Comments: Cleared by PCP     Change dressing (specify)   Order Comments: Surgical incision-  Keep incision clean and dry.  Cleanse with soap and water daily, pat dry.  No lotions or ointments.  Do not submerge under water " until cleared by Ortho.     Notify your health care provider if you experience any of the following:  temperature >100.4     Notify your health care provider if you experience any of the following:  persistent nausea and vomiting or diarrhea     Notify your health care provider if you experience any of the following:  severe uncontrolled pain     Notify your health care provider if you experience any of the following:  redness, tenderness, or signs of infection (pain, swelling, redness, odor or green/yellow discharge around incision site)     Notify your health care provider if you experience any of the following:  difficulty breathing or increased cough     Notify your health care provider if you experience any of the following:  severe persistent headache     Notify your health care provider if you experience any of the following:  worsening rash     Notify your health care provider if you experience any of the following:  persistent dizziness, light-headedness, or visual disturbances     Notify your health care provider if you experience any of the following:  increased confusion or weakness       Significant Diagnostic Studies: N/A    Pending Diagnostic Studies:       Procedure Component Value Units Date/Time    X-Ray Chest 1 View [[id number]]     Order Status: Sent Lab Status: No result            Medications:  Reconciled Home Medications:      Medication List        PAUSE taking these medications      amLODIPine 5 MG tablet  Wait to take this until your doctor or other care provider tells you to start again.  Commonly known as: NORVASC  Take 1 tablet (5 mg total) by mouth once daily.     hydroCHLOROthiazide 12.5 MG Tab  Wait to take this until your doctor or other care provider tells you to start again.  TAKE 1 TABLET BY MOUTH EVERY DAY            START taking these medications      acetaminophen 325 MG tablet  Commonly known as: TYLENOL  Take 2 tablets (650 mg total) by mouth every 6 (six) hours as needed for  Pain.     furosemide 40 MG tablet  Commonly known as: LASIX  Take 1 tablet (40 mg total) by mouth once daily.     gabapentin 100 MG capsule  Commonly known as: NEURONTIN  Take 1 capsule (100 mg total) by mouth 3 (three) times daily.     polyethylene glycol 17 gram Pwpk  Commonly known as: GLYCOLAX  Take 17 g by mouth once daily.     potassium chloride SA 20 MEQ tablet  Commonly known as: K-DUR,KLOR-CON  Take 1 tablet (20 mEq total) by mouth once daily.            CHANGE how you take these medications      metoprolol succinate 25 MG 24 hr tablet  Commonly known as: TOPROL-XL  Take 1 tablet (25 mg total) by mouth 2 (two) times daily.  What changed: See the new instructions.     traMADoL 50 mg tablet  Commonly known as: ULTRAM  Take 1 tablet (50 mg total) by mouth every 6 (six) hours as needed for Pain.  What changed: reasons to take this            CONTINUE taking these medications      apixaban 5 mg Tab  Commonly known as: ELIQUIS  Take 1 tablet (5 mg total) by mouth 2 (two) times daily. Patient to decrease dosing of Apixiban to 2.5 mg po twice daily for 1 week after surgery done on 3/8/2025 and then can resume back to her home dosing of 5 mg po BID on 315/2025.     brimonidine-timoloL 0.2-0.5 % Drop  Commonly known as: COMBIGAN  Place 1 drop into the left eye 2 (two) times a day.     clopidogreL 75 mg tablet  Commonly known as: PLAVIX  Take 1 tablet (75 mg total) by mouth once daily.     EScitalopram oxalate 20 MG tablet  Commonly known as: LEXAPRO  Take 1 tablet (20 mg total) by mouth once daily.     levothyroxine 88 MCG tablet  Commonly known as: SYNTHROID  Take 88 mcg by mouth before breakfast.     NITROSTAT 0.4 mg SL tablet  Generic drug: nitroGLYCERIN  Place 0.4 mg under the tongue every 5 (five) minutes as needed for Chest pain.     senna-docusate 8.6-50 mg per tablet  Commonly known as: PERICOLACE  Take 1 tablet by mouth 2 (two) times daily.            STOP taking these medications       amoxicillin-clavulanate 875-125mg 875-125 mg per tablet  Commonly known as: AUGMENTIN     bumetanide 0.5 MG Tab  Commonly known as: BUMEX     doxycycline 100 MG tablet  Commonly known as: VIBRA-TABS     erythromycin ophthalmic ointment  Commonly known as: ROMYCIN     melatonin 3 mg tablet  Commonly known as: MELATIN     methocarbamoL 500 MG Tab  Commonly known as: Robaxin              Indwelling Lines/Drains at time of discharge:   Lines/Drains/Airways       Drain  Duration             Female External Urinary Catheter w/ Suction 03/20/25 1712 34 days                    Time spent on the discharge of patient: 38 minutes         Angeles Aguero NP  Department of Hospital Medicine  [facility]

## 2025-04-28 ENCOUNTER — TELEPHONE (OUTPATIENT)
Dept: ELECTROPHYSIOLOGY | Facility: CLINIC | Age: OVER 89
End: 2025-04-28
Payer: MEDICARE

## 2025-04-28 ENCOUNTER — PATIENT MESSAGE (OUTPATIENT)
Dept: ELECTROPHYSIOLOGY | Facility: CLINIC | Age: OVER 89
End: 2025-04-28
Payer: MEDICARE

## 2025-04-28 NOTE — TELEPHONE ENCOUNTER
The patients' CIED has reached ELECTIVE REPLACEMENT.     Current Device  and Model: Medtronic Juli LOZADA MRI [id number]    Date of FAINA, if known:  4/16/2025    Is this replacement indicator early or unexpected due to an advisory:  No    Battery Voltage (if available):  2.83    Pacemaker Dependent: YES, previously documented CHB    Anticoagulation Status: Eliquis    Last EF: 65 - 70%  3/21/2025    Model of Leads: RA 5076, RV 5076    Any known lead recalls:   No    Leads MRI compatible:  Yes     *RN coordinator notified for scheduling; device coordinator notified to contact patient

## 2025-04-28 NOTE — TELEPHONE ENCOUNTER
Spoke to patient's daughter (Gemma). Patient's phone is currently having difficulties. Patient agree with the Gen change procedure. Patient has been scheduled for 6/2/2025 with arrival time of 12:00 PM. Informed patient's daughter (Gemma) instructions will be sent via portal and email. Patient's daughter (Gemma) verbalized understanding and appreciated the call.       ----- Message from KINGSLEY Mccoy sent at 4/28/2025  4:04 PM CDT -----  Regarding: FW: RETURN CALL    ----- Message -----  From: Cierra Esparza  Sent: 4/28/2025   3:58 PM CDT  To: Nadine Christianson RN  Subject: RETURN CALL                                      Pt is returning your call and can be reached at [phone].Thank you

## 2025-04-28 NOTE — TELEPHONE ENCOUNTER
Left voicemail for patient's daughter to call [phone]. Patient's number unable to leave voicemail.

## 2025-04-29 ENCOUNTER — TELEPHONE (OUTPATIENT)
Dept: CARDIOLOGY | Facility: HOSPITAL | Age: OVER 89
End: 2025-04-29
Payer: MEDICARE

## 2025-04-29 DIAGNOSIS — Z45.010 PACEMAKER AT END OF BATTERY LIFE: Primary | ICD-10-CM

## 2025-04-29 DIAGNOSIS — Z79.01 ON ANTICOAGULANT THERAPY: ICD-10-CM

## 2025-04-29 DIAGNOSIS — Z95.0 CARDIAC PACEMAKER: ICD-10-CM

## 2025-04-29 DIAGNOSIS — I44.2 AV BLOCK, 3RD DEGREE: ICD-10-CM

## 2025-04-29 NOTE — TELEPHONE ENCOUNTER
Spoke with pt and informed her that her device has reached RRT.  She informed me that she is now home on hospice. She asked that I reach out to her daughter for the scheduling. I informed her that I will let Dr. Velazquez nurse know to reach out to her daughter. Pt stated understanding.

## 2025-04-30 ENCOUNTER — HOME CARE VISIT (OUTPATIENT)
Dept: NEUROLOGY | Facility: HOSPITAL | Age: OVER 89
End: 2025-04-30
Payer: MEDICARE

## 2025-05-08 ENCOUNTER — OFFICE VISIT (OUTPATIENT)
Dept: ORTHOPEDICS | Facility: CLINIC | Age: OVER 89
End: 2025-05-08
Attending: STUDENT IN AN ORGANIZED HEALTH CARE EDUCATION/TRAINING PROGRAM
Payer: MEDICARE

## 2025-05-08 ENCOUNTER — HOSPITAL ENCOUNTER (OUTPATIENT)
Dept: RADIOLOGY | Facility: HOSPITAL | Age: OVER 89
Discharge: HOME | End: 2025-05-08
Attending: STUDENT IN AN ORGANIZED HEALTH CARE EDUCATION/TRAINING PROGRAM
Payer: MEDICARE

## 2025-05-08 DIAGNOSIS — M97.8XXA PERIPROSTHETIC FRACTURE OF SHAFT OF FEMUR: Primary | ICD-10-CM

## 2025-05-08 DIAGNOSIS — Z98.890 S/P ORIF (OPEN REDUCTION INTERNAL FIXATION) FRACTURE: ICD-10-CM

## 2025-05-08 DIAGNOSIS — Z96.641 S/P TOTAL RIGHT HIP ARTHROPLASTY: ICD-10-CM

## 2025-05-08 DIAGNOSIS — R52 PAIN: ICD-10-CM

## 2025-05-08 DIAGNOSIS — Z87.81 S/P ORIF (OPEN REDUCTION INTERNAL FIXATION) FRACTURE: ICD-10-CM

## 2025-05-08 DIAGNOSIS — Z96.649 PERIPROSTHETIC FRACTURE OF SHAFT OF FEMUR: Primary | ICD-10-CM

## 2025-05-08 PROCEDURE — 73552 X-RAY EXAM OF FEMUR 2/>: CPT | Mod: TC,RT

## 2025-05-08 PROCEDURE — 1159F MED LIST DOCD IN RCRD: CPT | Mod: CPTII,S$GLB,, | Performed by: STUDENT IN AN ORGANIZED HEALTH CARE EDUCATION/TRAINING PROGRAM

## 2025-05-08 PROCEDURE — 99999 PR PBB SHADOW E&M-EST. PATIENT-LVL III: CPT | Mod: PBBFAC,,, | Performed by: STUDENT IN AN ORGANIZED HEALTH CARE EDUCATION/TRAINING PROGRAM

## 2025-05-08 PROCEDURE — 99024 POSTOP FOLLOW-UP VISIT: CPT | Mod: S$GLB,,, | Performed by: STUDENT IN AN ORGANIZED HEALTH CARE EDUCATION/TRAINING PROGRAM

## 2025-05-08 NOTE — PROGRESS NOTES
Assessment & Plan   Encounter Diagnoses   Name Primary?    Periprosthetic fracture of shaft of femur Yes    S/P total right hip arthroplasty     S/P ORIF (open reduction internal fixation) fracture        Dorothy M Knobloch is recovering as anticipated from recent periprosthetic femoral shaft fracture ORIF.   The incision has healed well and the patient may submerge their incision at this point although no lakes or hot tubs until 12 weeks post op.   Recommend home physical therapy or HEP (insurance not approving home PT).   - Continue pedal bike for exercise.  - HEP handouts given - Perform exercises from handout with  and/or sitter  - Stay as active as possible.  - Continue taking Vitamin D 50k once a week  We will plan for regular follow-up in 5 weeks with XRs at that time. The patient has been asked to contact the office with any questions or concerns prior to the next visit.      Patient ID: Dorothy M Knobloch is a 89 y.o. female.  CC: Postoperative visit following MARCEL    History of Present Illness:  Dorothy M Knobloch is a 89 y.o. female who presents for 8 week post-operative visit following right periprosthetic femoral shaft fracture ORIF on 03/08/2025. She is here today with her daughter.   The patient has been using occasional tylenol for pain control which has been adequate since last clinic visit.   On Eliquis and Plavix at baseline and taking regular dosages  The patient has not been noting issues with wound drainage.  The patient denies fevers and chills.   Walker and wheel-chair (longer distances) is is used for ambulation assistance, similar to patient's baseline      Physical exam:  There were no vitals taken for this visit.  General: no apparent distress    Able to stand with minimal assistance     Right hip:   Skin:  incision is well healed. No chantal-incisional erythema or signs of drainage.  Range of motion: 90 degrees of flexion, tolerates gentle IR/ER  Strength: 3+/5 with abduction, 3+/5  with flexion  Neurovascular: WWP, Light touch sensation intact  No calf pain and neg patrick's sign      X-Ray: Radiographs of the patient's right femur were obtained and independently reviewed by me today, 5/8/2025.  The plate/screw construct in stable position from prior, interval healing at the spiral shaft fracture, the total hip components are in stable position from immediate postop and immediate preop XRs, which is chronically subsided with obvious loosening of the femoral component, as noted previously.

## 2025-05-16 NOTE — PLAN OF CARE
AAOx4; POC reviewed & verbalizes understanding.  Admit DX: CHF exacerbation  Afebrile. No acute events on shift. No deficits noted  IV access & IVF: PIV saline locked & flushed  BM: 3/23/25  : zach, changed today.   Appetite: adequate  Re-consulted wound care to ensure the wound vac gets attended to.   Bed alarm set; fall precautions maintained.   Bed locked in lowest position, side rails up x2, call light within reach, environment clear. Encouraged pt to call nurse with any concerns.  Safety measures maintained   Goal Outcome Evaluation:              Outcome Evaluation: (P) patient demonstrates independence with bed mobility, transfers, and ambulation. He does not present with any safety concerns and can return to baseline function. Skilled PT services are not indicated at this time.    Anticipated Discharge Disposition (PT): (P) home

## 2025-05-22 NOTE — PROGRESS NOTES
Home visit made at [facility], went to check on patient since her last hosp admission. She informed me that she and her family have decided on comfort measures and hospice care. A hospice RN and NA visits her at her apartmennt. She has no new neuro s/s. Emotional reassurance and guidance given.

## 2025-05-23 ENCOUNTER — LAB VISIT (OUTPATIENT)
Dept: LAB | Facility: HOSPITAL | Age: OVER 89
End: 2025-05-23
Attending: INTERNAL MEDICINE
Payer: MEDICARE

## 2025-05-23 DIAGNOSIS — Z79.01 ON ANTICOAGULANT THERAPY: ICD-10-CM

## 2025-05-23 DIAGNOSIS — Z95.0 CARDIAC PACEMAKER: ICD-10-CM

## 2025-05-23 DIAGNOSIS — Z45.010 PACEMAKER AT END OF BATTERY LIFE: ICD-10-CM

## 2025-05-23 DIAGNOSIS — I44.2 AV BLOCK, 3RD DEGREE: ICD-10-CM

## 2025-05-23 LAB
ABSOLUTE EOSINOPHIL (OHS): 0.11 K/UL
ABSOLUTE MONOCYTE (OHS): 0.59 K/UL (ref 0.3–1)
ABSOLUTE NEUTROPHIL COUNT (OHS): 5.93 K/UL (ref 1.8–7.7)
ANION GAP (OHS): 10 MMOL/L (ref 8–16)
APTT PPP: 30.3 SECONDS (ref 21–32)
BASOPHILS # BLD AUTO: 0.06 K/UL
BASOPHILS NFR BLD AUTO: 0.8 %
BUN SERPL-MCNC: 30 MG/DL (ref 10–30)
CALCIUM SERPL-MCNC: 9.8 MG/DL (ref 8.7–10.5)
CHLORIDE SERPL-SCNC: 102 MMOL/L (ref 95–110)
CO2 SERPL-SCNC: 28 MMOL/L (ref 23–29)
CREAT SERPL-MCNC: 0.6 MG/DL (ref 0.5–1.4)
ERYTHROCYTE [DISTWIDTH] IN BLOOD BY AUTOMATED COUNT: 15.2 % (ref 11.5–14.5)
GFR SERPLBLD CREATININE-BSD FMLA CKD-EPI: >60 ML/MIN/1.73/M2
GLUCOSE SERPL-MCNC: 144 MG/DL (ref 70–110)
HCT VFR BLD AUTO: 29.7 % (ref 37–48.5)
HGB BLD-MCNC: 8.7 GM/DL (ref 12–16)
IMM GRANULOCYTES # BLD AUTO: 0.03 K/UL (ref 0–0.04)
IMM GRANULOCYTES NFR BLD AUTO: 0.4 % (ref 0–0.5)
INR PPP: 1.1 (ref 0.8–1.2)
LYMPHOCYTES # BLD AUTO: 1.17 K/UL (ref 1–4.8)
MCH RBC QN AUTO: 28.3 PG (ref 27–31)
MCHC RBC AUTO-ENTMCNC: 29.3 G/DL (ref 32–36)
MCV RBC AUTO: 97 FL (ref 82–98)
NUCLEATED RBC (/100WBC) (OHS): 0 /100 WBC
PLATELET # BLD AUTO: 304 K/UL (ref 150–450)
PMV BLD AUTO: 9.2 FL (ref 9.2–12.9)
POTASSIUM SERPL-SCNC: 3.8 MMOL/L (ref 3.5–5.1)
PROTHROMBIN TIME: 11.8 SECONDS (ref 9–12.5)
RBC # BLD AUTO: 3.07 M/UL (ref 4–5.4)
RELATIVE EOSINOPHIL (OHS): 1.4 %
RELATIVE LYMPHOCYTE (OHS): 14.8 % (ref 18–48)
RELATIVE MONOCYTE (OHS): 7.5 % (ref 4–15)
RELATIVE NEUTROPHIL (OHS): 75.1 % (ref 38–73)
SODIUM SERPL-SCNC: 140 MMOL/L (ref 136–145)
WBC # BLD AUTO: 7.89 K/UL (ref 3.9–12.7)

## 2025-05-23 PROCEDURE — 80048 BASIC METABOLIC PNL TOTAL CA: CPT

## 2025-05-23 PROCEDURE — 85025 COMPLETE CBC W/AUTO DIFF WBC: CPT

## 2025-05-23 PROCEDURE — 85730 THROMBOPLASTIN TIME PARTIAL: CPT

## 2025-05-23 PROCEDURE — 85610 PROTHROMBIN TIME: CPT

## 2025-05-23 PROCEDURE — 36415 COLL VENOUS BLD VENIPUNCTURE: CPT

## 2025-05-26 DIAGNOSIS — M97.8XXA PERIPROSTHETIC FRACTURE OF SHAFT OF FEMUR: Primary | ICD-10-CM

## 2025-05-26 DIAGNOSIS — Z96.649 PERIPROSTHETIC FRACTURE OF SHAFT OF FEMUR: Primary | ICD-10-CM

## 2025-05-28 ENCOUNTER — TELEPHONE (OUTPATIENT)
Dept: ELECTROPHYSIOLOGY | Facility: CLINIC | Age: OVER 89
End: 2025-05-28
Payer: MEDICARE

## 2025-05-28 NOTE — TELEPHONE ENCOUNTER
----- Message from Carter Barth sent at 5/28/2025  3:32 PM CDT -----    ----- Message -----  From: Jodi Coley  Sent: 5/28/2025  12:07 PM CDT  To: Marcus Cho A Staff    Patient daughter calling to let nurse know that her oxygen been low. Please call Jenna reid @ [phone]. Thank you Jodi

## 2025-05-28 NOTE — TELEPHONE ENCOUNTER
Attempted to return call but no answer received. Voicemail left advising to call back if still in need of assistance, has questions or concerns.

## 2025-05-29 ENCOUNTER — TELEPHONE (OUTPATIENT)
Dept: ELECTROPHYSIOLOGY | Facility: CLINIC | Age: OVER 89
End: 2025-05-29
Payer: MEDICARE

## 2025-05-29 NOTE — TELEPHONE ENCOUNTER
Spoke to Patient and patient's daughter Gemma.    CONFIRMED procedure arrival time of 12:00 pm on 6/3/2025.     Reiterated instructions including:    -Directions to check in desk    -NPO after midnight night prior to procedure. Fasting upon arrival to the hospital the day of the procedure.     -High importance of HOLDING ELIQUIS x 2 days. Last dose to be taken on 5/30/2025. HCTZ and Lasix the morning of the procedure.     - Confirms no new meds prescribed or med changes since scheduling - N/A      -Pre-procedure LABS received and reviewed with Dr Velazquez.  H&H noted to be chronically low.    -Confirmed absence or presence of implanted device/stimulator - MDT PPM in situ.    -Confirmed no recent or current fever, cough, or signs of illness. Report shortness of breath on exertion. History of chronic intermittent shortness of breath with use of Home O2 as needed. Patient was audibly short of breath on the phone. Patient states that it is because she just got out the shower , and prior she was without shortness of breath with O2 sat of 98%. Patient states that she has mild swelling to her right foot that has been present since she broke her femur in march. Denies any other swelling or weight gain.     -Confirmed no redness, rash, irritation, or yeast infection to skin/ chest area. - NA    -Bathe night prior and morning prior to procedure with Hibiclens solution or an antibacterial soap    Patient verbalized understanding of above, denies any further questions and appreciated the call.

## 2025-05-30 ENCOUNTER — TELEPHONE (OUTPATIENT)
Dept: ELECTROPHYSIOLOGY | Facility: CLINIC | Age: OVER 89
End: 2025-05-30
Payer: MEDICARE

## 2025-05-30 NOTE — TELEPHONE ENCOUNTER
Spoke with patient's daughter , Gemma to follow up on patient's status. Gemma, states that patient appears to be at her baseline. No worsening of shortness of breath noted. Patient using 2L home oxygen with O2 sat stable at 98%. Daughter instructed to make sure that patient has portal O2 when travelling to the hospital on the day of the procedure. Denies patient has had any change in stool color or other signs of bleeding. Understanding verbalized of patient instructions. Patient currently has hospice services and daughter will contact the nurse over the weekend if any change in status noted.

## 2025-06-02 ENCOUNTER — HOSPITAL ENCOUNTER (OUTPATIENT)
Facility: HOSPITAL | Age: OVER 89
Discharge: HOME | End: 2025-06-02
Attending: INTERNAL MEDICINE | Admitting: INTERNAL MEDICINE
Payer: MEDICARE

## 2025-06-02 ENCOUNTER — ANESTHESIA EVENT (OUTPATIENT)
Dept: MEDSURG UNIT | Facility: HOSPITAL | Age: OVER 89
End: 2025-06-02
Payer: MEDICARE

## 2025-06-02 ENCOUNTER — ANESTHESIA (OUTPATIENT)
Dept: MEDSURG UNIT | Facility: HOSPITAL | Age: OVER 89
End: 2025-06-02
Payer: MEDICARE

## 2025-06-02 ENCOUNTER — CLINICAL SUPPORT (OUTPATIENT)
Dept: CARDIOLOGY | Facility: HOSPITAL | Age: OVER 89
End: 2025-06-02
Payer: MEDICARE

## 2025-06-02 ENCOUNTER — CLINICAL SUPPORT (OUTPATIENT)
Dept: CARDIOLOGY | Facility: HOSPITAL | Age: OVER 89
End: 2025-06-02
Attending: INTERNAL MEDICINE
Payer: MEDICARE

## 2025-06-02 VITALS
RESPIRATION RATE: 16 BRPM | BODY MASS INDEX: 27.42 KG/M2 | WEIGHT: 149 LBS | HEIGHT: 62 IN | HEART RATE: 60 BPM | DIASTOLIC BLOOD PRESSURE: 57 MMHG | TEMPERATURE: 98 F | SYSTOLIC BLOOD PRESSURE: 159 MMHG | OXYGEN SATURATION: 96 %

## 2025-06-02 DIAGNOSIS — I44.2 ATRIOVENTRICULAR BLOCK, COMPLETE: ICD-10-CM

## 2025-06-02 DIAGNOSIS — Z95.0 PRESENCE OF CARDIAC PACEMAKER: ICD-10-CM

## 2025-06-02 DIAGNOSIS — I49.9 ARRHYTHMIA: ICD-10-CM

## 2025-06-02 DIAGNOSIS — Z45.010 ELECTIVE REPLACEMENT INDICATED FOR CARDIAC PACEMAKER BATTERY AT END OF LIFESPAN: ICD-10-CM

## 2025-06-02 DIAGNOSIS — Z95.9 CARDIAC DEVICE IN SITU: ICD-10-CM

## 2025-06-02 LAB
OHS QRS DURATION: 186 MS
OHS QRS DURATION: 190 MS
OHS QTC CALCULATION: 512 MS
OHS QTC CALCULATION: 526 MS

## 2025-06-02 PROCEDURE — 63600175 PHARM REV CODE 636 W HCPCS: Performed by: NURSE ANESTHETIST, CERTIFIED REGISTERED

## 2025-06-02 PROCEDURE — 27800903 OPTIME MED/SURG SUP & DEVICES OTHER IMPLANTS: Performed by: INTERNAL MEDICINE

## 2025-06-02 PROCEDURE — 25000003 PHARM REV CODE 250: Performed by: NURSE ANESTHETIST, CERTIFIED REGISTERED

## 2025-06-02 PROCEDURE — 27201423 OPTIME MED/SURG SUP & DEVICES STERILE SUPPLY: Performed by: INTERNAL MEDICINE

## 2025-06-02 PROCEDURE — 93005 ELECTROCARDIOGRAM TRACING: CPT

## 2025-06-02 PROCEDURE — 93296 REM INTERROG EVL PM/IDS: CPT | Performed by: INTERNAL MEDICINE

## 2025-06-02 PROCEDURE — 33228 REMV&REPLC PM GEN DUAL LEAD: CPT | Mod: GW,GC,, | Performed by: INTERNAL MEDICINE

## 2025-06-02 PROCEDURE — 37000009 HC ANESTHESIA EA ADD 15 MINS: Performed by: INTERNAL MEDICINE

## 2025-06-02 PROCEDURE — C1785 PMKR, DUAL, RATE-RESP: HCPCS | Performed by: INTERNAL MEDICINE

## 2025-06-02 PROCEDURE — 37000008 HC ANESTHESIA 1ST 15 MINUTES: Performed by: INTERNAL MEDICINE

## 2025-06-02 PROCEDURE — 33228 REMV&REPLC PM GEN DUAL LEAD: CPT | Performed by: INTERNAL MEDICINE

## 2025-06-02 PROCEDURE — 63600175 PHARM REV CODE 636 W HCPCS: Performed by: INTERNAL MEDICINE

## 2025-06-02 DEVICE — IMPLANTABLE DEVICE: Type: IMPLANTABLE DEVICE | Site: CHEST | Status: FUNCTIONAL

## 2025-06-02 DEVICE — ENVELOPE CMRM6122 ABSORB MED MR
Type: IMPLANTABLE DEVICE | Site: CHEST | Status: FUNCTIONAL
Brand: TYRX™

## 2025-06-02 RX ORDER — CEFAZOLIN SODIUM 1 G/3ML
INJECTION, POWDER, FOR SOLUTION INTRAMUSCULAR; INTRAVENOUS
Status: DISCONTINUED | OUTPATIENT
Start: 2025-06-02 | End: 2025-06-02

## 2025-06-02 RX ORDER — HALOPERIDOL LACTATE 5 MG/ML
0.5 INJECTION, SOLUTION INTRAMUSCULAR
Status: DISCONTINUED | OUTPATIENT
Start: 2025-06-02 | End: 2025-06-02 | Stop reason: HOSPADM

## 2025-06-02 RX ORDER — VANCOMYCIN HYDROCHLORIDE 1 G/20ML
INJECTION, POWDER, LYOPHILIZED, FOR SOLUTION INTRAVENOUS
Status: DISCONTINUED | OUTPATIENT
Start: 2025-06-02 | End: 2025-06-02 | Stop reason: HOSPADM

## 2025-06-02 RX ORDER — DOXYCYCLINE HYCLATE 100 MG
100 TABLET ORAL 2 TIMES DAILY
Qty: 10 TABLET | Refills: 0 | Status: SHIPPED | OUTPATIENT
Start: 2025-06-02 | End: 2025-06-07

## 2025-06-02 RX ORDER — FENTANYL CITRATE 50 UG/ML
25 INJECTION, SOLUTION INTRAMUSCULAR; INTRAVENOUS EVERY 5 MIN PRN
Status: DISCONTINUED | OUTPATIENT
Start: 2025-06-02 | End: 2025-06-02 | Stop reason: HOSPADM

## 2025-06-02 RX ORDER — BUPIVACAINE HYDROCHLORIDE 2.5 MG/ML
INJECTION, SOLUTION EPIDURAL; INFILTRATION; INTRACAUDAL; PERINEURAL
Status: DISCONTINUED | OUTPATIENT
Start: 2025-06-02 | End: 2025-06-02 | Stop reason: HOSPADM

## 2025-06-02 RX ORDER — CEFAZOLIN 2 G/1
2 INJECTION, POWDER, FOR SOLUTION INTRAMUSCULAR; INTRAVENOUS
Status: DISCONTINUED | OUTPATIENT
Start: 2025-06-02 | End: 2025-06-02 | Stop reason: HOSPADM

## 2025-06-02 RX ORDER — FENTANYL CITRATE 50 UG/ML
INJECTION, SOLUTION INTRAMUSCULAR; INTRAVENOUS
Status: DISCONTINUED | OUTPATIENT
Start: 2025-06-02 | End: 2025-06-02

## 2025-06-02 RX ORDER — LIDOCAINE HYDROCHLORIDE 20 MG/ML
INJECTION INTRAVENOUS
Status: DISCONTINUED | OUTPATIENT
Start: 2025-06-02 | End: 2025-06-02

## 2025-06-02 RX ORDER — PROPOFOL 10 MG/ML
VIAL (ML) INTRAVENOUS CONTINUOUS PRN
Status: DISCONTINUED | OUTPATIENT
Start: 2025-06-02 | End: 2025-06-02

## 2025-06-02 RX ORDER — SODIUM CHLORIDE 0.9 % (FLUSH) 0.9 %
3 SYRINGE (ML) INJECTION
Status: DISCONTINUED | OUTPATIENT
Start: 2025-06-02 | End: 2025-06-02 | Stop reason: HOSPADM

## 2025-06-02 RX ORDER — GLUCAGON 1 MG
1 KIT INJECTION
Status: DISCONTINUED | OUTPATIENT
Start: 2025-06-02 | End: 2025-06-02 | Stop reason: HOSPADM

## 2025-06-02 RX ORDER — LIDOCAINE HYDROCHLORIDE 20 MG/ML
INJECTION, SOLUTION INFILTRATION; PERINEURAL
Status: DISCONTINUED | OUTPATIENT
Start: 2025-06-02 | End: 2025-06-02 | Stop reason: HOSPADM

## 2025-06-02 RX ADMIN — SODIUM CHLORIDE: 9 INJECTION, SOLUTION INTRAVENOUS at 01:06

## 2025-06-02 RX ADMIN — FENTANYL CITRATE 50 MCG: 0.05 INJECTION, SOLUTION INTRAMUSCULAR; INTRAVENOUS at 02:06

## 2025-06-02 RX ADMIN — PROPOFOL 50 MCG/KG/MIN: 10 INJECTION, EMULSION INTRAVENOUS at 02:06

## 2025-06-02 RX ADMIN — LIDOCAINE HYDROCHLORIDE 70 MG: 20 INJECTION INTRAVENOUS at 02:06

## 2025-06-02 RX ADMIN — CEFAZOLIN 2 G: 330 INJECTION, POWDER, FOR SOLUTION INTRAMUSCULAR; INTRAVENOUS at 02:06

## 2025-06-09 ENCOUNTER — CLINICAL SUPPORT (OUTPATIENT)
Dept: CARDIOLOGY | Facility: HOSPITAL | Age: OVER 89
End: 2025-06-09
Attending: INTERNAL MEDICINE
Payer: MEDICARE

## 2025-06-09 DIAGNOSIS — Z95.0 CARDIAC PACEMAKER: ICD-10-CM

## 2025-06-09 DIAGNOSIS — Z79.01 ON ANTICOAGULANT THERAPY: ICD-10-CM

## 2025-06-09 DIAGNOSIS — I44.2 AV BLOCK, 3RD DEGREE: ICD-10-CM

## 2025-06-09 DIAGNOSIS — Z45.010 PACEMAKER AT END OF BATTERY LIFE: ICD-10-CM

## 2025-06-09 PROCEDURE — 93280 PM DEVICE PROGR EVAL DUAL: CPT

## 2025-06-11 NOTE — PROGRESS NOTES
Assessment & Plan   Encounter Diagnoses   Name Primary?    Periprosthetic fracture of shaft of femur Yes    S/P ORIF (open reduction internal fixation) fracture     Displaced spiral fracture of shaft of right femur, subsequent encounter for closed fracture with routine healing        Dorothy M Knobloch is recovering as anticipated from recent femoral shaft ORIF.  Physical therapy may be continued at the patient's and therapist's discretion. The patient can use OTC Tylenol and NSAIDs as needed for any occasional pain or discomfort. WBAT w/ a walker, otherwise no restrictions/precautions moving forward. We will plan for regular follow-up in 3 mos with XRs at that time. The patient has been asked to contact the office with any questions or concerns prior to the next visit.      Patient ID: Dorothy M Knobloch is a 89 y.o. female.  Chief Complaint   Patient presents with    Right Femur - Post-op Evaluation       History of Present Illness:  Dorothy M Knobloch is a 89 y.o. female who presents for 12 week post-operative visit following right femoral shaft ORIF on 3/8/25.   The patient is no longer needing prescription medications for pain control which has been adequate since last clinic visit.   The patient has not been noting issues with the wound.   The patient denies fevers and chills.   Walker is currently needed for ambulation assistance.      Physical exam:  There were no vitals taken for this visit.  General: no apparent distress, presents in wheelchair today    right hip:   Skin:  incision is well healed. No chantal-incisional erythema or signs of drainage.  Range of motion: 90 degrees of flexion, 10 deg IR & 20 deg ER without pain  Strength: 4/5 with abduction, 4/5 with flexion  Neurovascular: WWP, Light touch sensation intact  No calf pain and neg patrick's sign      X-Ray: Radiographs of the patient's right femur were obtained and independently reviewed by me today, 5/8/2025.  The plate/screw construct in stable  position from prior, interval healing at the spiral shaft fracture, the total hip components are in stable position from immediate postop and immediate preop XRs, which is chronically subsided with obvious loosening of the femoral component, as noted previously.  No interval subsidence

## 2025-06-12 ENCOUNTER — HOSPITAL ENCOUNTER (OUTPATIENT)
Dept: RADIOLOGY | Facility: HOSPITAL | Age: OVER 89
Discharge: HOME | End: 2025-06-12
Attending: STUDENT IN AN ORGANIZED HEALTH CARE EDUCATION/TRAINING PROGRAM
Payer: MEDICARE

## 2025-06-12 ENCOUNTER — OFFICE VISIT (OUTPATIENT)
Dept: ORTHOPEDICS | Facility: CLINIC | Age: OVER 89
End: 2025-06-12
Payer: MEDICARE

## 2025-06-12 DIAGNOSIS — Z98.890 S/P ORIF (OPEN REDUCTION INTERNAL FIXATION) FRACTURE: ICD-10-CM

## 2025-06-12 DIAGNOSIS — Z96.649 PERIPROSTHETIC FRACTURE OF SHAFT OF FEMUR: Primary | ICD-10-CM

## 2025-06-12 DIAGNOSIS — M97.8XXA PERIPROSTHETIC FRACTURE OF SHAFT OF FEMUR: Primary | ICD-10-CM

## 2025-06-12 DIAGNOSIS — Z87.81 S/P ORIF (OPEN REDUCTION INTERNAL FIXATION) FRACTURE: ICD-10-CM

## 2025-06-12 DIAGNOSIS — M97.8XXA PERIPROSTHETIC FRACTURE OF SHAFT OF FEMUR: ICD-10-CM

## 2025-06-12 DIAGNOSIS — Z96.649 PERIPROSTHETIC FRACTURE OF SHAFT OF FEMUR: ICD-10-CM

## 2025-06-12 DIAGNOSIS — S72.341D DISPLACED SPIRAL FRACTURE OF SHAFT OF RIGHT FEMUR, SUBSEQUENT ENCOUNTER FOR CLOSED FRACTURE WITH ROUTINE HEALING: ICD-10-CM

## 2025-06-12 PROCEDURE — 99024 POSTOP FOLLOW-UP VISIT: CPT | Mod: GW,S$GLB,, | Performed by: STUDENT IN AN ORGANIZED HEALTH CARE EDUCATION/TRAINING PROGRAM

## 2025-06-12 PROCEDURE — 1159F MED LIST DOCD IN RCRD: CPT | Mod: CPTII,GW,S$GLB, | Performed by: STUDENT IN AN ORGANIZED HEALTH CARE EDUCATION/TRAINING PROGRAM

## 2025-06-12 PROCEDURE — 1126F AMNT PAIN NOTED NONE PRSNT: CPT | Mod: CPTII,GW,S$GLB, | Performed by: STUDENT IN AN ORGANIZED HEALTH CARE EDUCATION/TRAINING PROGRAM

## 2025-06-12 PROCEDURE — 73552 X-RAY EXAM OF FEMUR 2/>: CPT | Mod: 26,GW,RT, | Performed by: RADIOLOGY

## 2025-06-12 PROCEDURE — 99999 PR PBB SHADOW E&M-EST. PATIENT-LVL II: CPT | Mod: PBBFAC,,, | Performed by: STUDENT IN AN ORGANIZED HEALTH CARE EDUCATION/TRAINING PROGRAM

## 2025-06-12 PROCEDURE — 73552 X-RAY EXAM OF FEMUR 2/>: CPT | Mod: TC,RT

## 2025-06-18 LAB
OHS CV AF BURDEN PERCENT: < 1
OHS CV DC REMOTE DEVICE TYPE: NORMAL
OHS CV RV PACING PERCENT: 100 %

## 2025-06-19 DIAGNOSIS — I51.89 DIASTOLIC DYSFUNCTION: ICD-10-CM

## 2025-06-19 DIAGNOSIS — I51.81 TAKOTSUBO CARDIOMYOPATHY: ICD-10-CM

## 2025-06-19 DIAGNOSIS — I50.32 CHRONIC HEART FAILURE WITH PRESERVED EJECTION FRACTION: ICD-10-CM

## 2025-06-19 DIAGNOSIS — Z95.0 CARDIAC PACEMAKER: ICD-10-CM

## 2025-06-19 DIAGNOSIS — Z45.010 PACEMAKER AT END OF BATTERY LIFE: Primary | ICD-10-CM

## 2025-06-19 DIAGNOSIS — I44.2 AV BLOCK, 3RD DEGREE: ICD-10-CM

## 2025-06-19 DIAGNOSIS — I44.2 ATRIOVENTRICULAR BLOCK, COMPLETE: ICD-10-CM

## 2025-06-19 DIAGNOSIS — I50.32 CHRONIC DIASTOLIC CONGESTIVE HEART FAILURE: ICD-10-CM

## 2025-06-19 DIAGNOSIS — I48.0 PAROXYSMAL ATRIAL FIBRILLATION: ICD-10-CM

## 2025-06-26 ENCOUNTER — OFFICE VISIT (OUTPATIENT)
Dept: URGENT CARE | Facility: CLINIC | Age: OVER 89
End: 2025-06-26
Payer: MEDICARE

## 2025-06-26 VITALS
RESPIRATION RATE: 18 BRPM | HEART RATE: 79 BPM | HEIGHT: 62 IN | TEMPERATURE: 97 F | BODY MASS INDEX: 27.42 KG/M2 | DIASTOLIC BLOOD PRESSURE: 57 MMHG | WEIGHT: 149 LBS | OXYGEN SATURATION: 95 % | SYSTOLIC BLOOD PRESSURE: 123 MMHG

## 2025-06-26 DIAGNOSIS — H00.015 HORDEOLUM EXTERNUM OF LEFT LOWER EYELID: Primary | ICD-10-CM

## 2025-06-26 RX ORDER — AMOXICILLIN AND CLAVULANATE POTASSIUM 875; 125 MG/1; MG/1
1 TABLET, FILM COATED ORAL EVERY 12 HOURS
Qty: 20 TABLET | Refills: 0 | Status: SHIPPED | OUTPATIENT
Start: 2025-06-26 | End: 2025-07-06

## 2025-06-26 RX ORDER — GENTAMICIN SULFATE 3 MG/ML
1 SOLUTION/ DROPS OPHTHALMIC EVERY 4 HOURS
Qty: 5 ML | Refills: 0 | Status: SHIPPED | OUTPATIENT
Start: 2025-06-26

## 2025-06-26 NOTE — PROGRESS NOTES
"Subjective:      Patient ID: Dorothy M Knobloch is a 89 y.o. female.    Vitals:  height is 5' 2" (1.575 m) and weight is 67.6 kg (149 lb). Her oral temperature is 97.2 °F (36.2 °C). Her blood pressure is 123/57 (abnormal) and her pulse is 79. Her respiration is 18 and oxygen saturation is 95%.     Chief Complaint: Eye Problem    This is a 89 y.o. female who presents today with a chief complaint of   Left eye irritation, redness, and discharge. Pt is blind in her left eye. Symptoms started 2 days ago. Washed out with water.     Eye Problem   The left eye is affected. This is a new problem. The current episode started in the past 7 days. The problem occurs constantly. The problem has been gradually worsening. The injury mechanism is unknown. The pain is at a severity of 5/10. The pain is moderate. There is No known exposure to pink eye. She Does not wear contacts. Associated symptoms include an eye discharge, eye redness and a foreign body sensation. Pertinent negatives include no itching. Associated symptoms comments: Blind in left eye. She has tried water for the symptoms. The treatment provided no relief.       Eyes:  Positive for eye discharge and eye redness. Negative for eye itching.      Objective:     Physical Exam   Constitutional: She does not appear ill. No distress. normal  Eyes: Left eye exhibits discharge, exudate and hordeolum (lower lid outer margin, lid edematous and erythematous).   Abdominal: Normal appearance.   Neurological: She is alert.   Nursing note and vitals reviewed.    Assessment:     1. Hordeolum externum of left lower eyelid        Plan:       Hordeolum externum of left lower eyelid  -     amoxicillin-clavulanate 875-125mg (AUGMENTIN) 875-125 mg per tablet; Take 1 tablet by mouth every 12 (twelve) hours. for 10 days  Dispense: 20 tablet; Refill: 0  -     gentamicin (GARAMYCIN) 0.3 % ophthalmic solution; Place 1 drop into the left eye every 4 (four) hours.  Dispense: 5 mL; Refill: " 0    Warm compresses to affected area. To see optometry if persists or worsens

## 2025-08-13 ENCOUNTER — TELEPHONE (OUTPATIENT)
Dept: CARDIOLOGY | Facility: CLINIC | Age: OVER 89
End: 2025-08-13
Payer: MEDICARE

## 2025-08-13 DIAGNOSIS — Z96.649 PERIPROSTHETIC FRACTURE OF SHAFT OF FEMUR: ICD-10-CM

## 2025-08-13 DIAGNOSIS — Z96.641 S/P TOTAL RIGHT HIP ARTHROPLASTY: Primary | ICD-10-CM

## 2025-08-13 DIAGNOSIS — M97.8XXA PERIPROSTHETIC FRACTURE OF SHAFT OF FEMUR: ICD-10-CM

## 2025-08-15 ENCOUNTER — TELEPHONE (OUTPATIENT)
Dept: CARDIOLOGY | Facility: HOSPITAL | Age: OVER 89
End: 2025-08-15
Payer: MEDICARE

## (undated) DEVICE — ADHESIVE DERMABOND ADVANCED

## (undated) DEVICE — SUT PROLENE 2-0 FS

## (undated) DEVICE — DRESSING TRANS 4X4 TEGADERM

## (undated) DEVICE — TAPE SURG DURAPORE 2 X10YD

## (undated) DEVICE — SOCKINETTE IMPERVIOUS 12X48IN

## (undated) DEVICE — BIT DRILL

## (undated) DEVICE — SPONGE LAP 18X18 PREWASHED

## (undated) DEVICE — COVER INSTR ELASTIC BAND 40X20

## (undated) DEVICE — DRAPE TOP 53X102IN

## (undated) DEVICE — SCREW BONE LOCK VA 5X16MM
Type: IMPLANTABLE DEVICE | Site: FEMUR | Status: NON-FUNCTIONAL
Removed: 2025-03-08

## (undated) DEVICE — SUT VICRYL PLUS ANTIBACT

## (undated) DEVICE — BLADE SURG CARBON STEEL #10

## (undated) DEVICE — KIT WRENCH

## (undated) DEVICE — DRAPE C-ARM ELAS CLIP 42X120IN

## (undated) DEVICE — IMPLANTABLE DEVICE
Type: IMPLANTABLE DEVICE | Site: FEMUR | Status: NON-FUNCTIONAL
Removed: 2025-03-08

## (undated) DEVICE — ELECTRODE REM PLYHSV RETURN 9

## (undated) DEVICE — SPONGE COTTON TRAY 4X4IN

## (undated) DEVICE — DRAPE STERI U-SHAPED 47X51IN

## (undated) DEVICE — APPLICATOR CHLORAPREP ORN 26ML

## (undated) DEVICE — TIP YANKAUERS BULB NO VENT

## (undated) DEVICE — DRAPE C-ARMOR EQUIPMENT COVER

## (undated) DEVICE — SCREW BONE LOCK VA 5X14MM
Type: IMPLANTABLE DEVICE | Site: FEMUR | Status: NON-FUNCTIONAL
Removed: 2025-03-08

## (undated) DEVICE — SUT QUILL PDO VIOL CP 45CM 2

## (undated) DEVICE — DRESSING AQUACEL FOAM 5 X 5

## (undated) DEVICE — BIT DRILL QC STRL SS 3.2X145

## (undated) DEVICE — DRAPE T EXTRM SURG 121X128X90

## (undated) DEVICE — PAD DEFIB CADENCE ADULT R2

## (undated) DEVICE — SUT VICRYL PLUS 2-0 CT1 18

## (undated) DEVICE — BIT DRILL PERC CAL 3.2X300MM

## (undated) DEVICE — SUT VICRYL PLUS 3-0 SH 18IN

## (undated) DEVICE — BNDG COFLEX FOAM LF2 ST 6X5YD

## (undated) DEVICE — DRAPE U SPLIT SHEET 54X76IN

## (undated) DEVICE — PACK PACER PERMANENT OMC

## (undated) DEVICE — GUIDEWIRE 2.5
Type: IMPLANTABLE DEVICE | Site: FEMUR | Status: NON-FUNCTIONAL
Removed: 2025-03-08

## (undated) DEVICE — GOWN AERO CHROME W/ TOWEL XL

## (undated) DEVICE — DEVICE PLASMABLADE X 3.0S LT

## (undated) DEVICE — SUT V-LOC 90 ABSRB UD 4-0 12IN

## (undated) DEVICE — WIRE-K 20MM X 150MM.
Type: IMPLANTABLE DEVICE | Site: FEMUR | Status: NON-FUNCTIONAL
Removed: 2025-03-08

## (undated) DEVICE — DRAPE THREE-QTR REINF 53X77IN

## (undated) DEVICE — DRAPE INCISE IOBAN 2 23X17IN

## (undated) DEVICE — Device

## (undated) DEVICE — SUT ABSRB PDS PLUS 0 CT 27IN

## (undated) DEVICE — BIT DRILL CALIB 80MM 2.5X170MM

## (undated) DEVICE — SUT FIBERWIRE #5 1/2 X 38

## (undated) DEVICE — ELECTRODE MEGADYNE RETURN DUAL

## (undated) DEVICE — SUT V-LOC 180 V-20 3-0 12IN

## (undated) DEVICE — DRESSING AQUACEL AG ADV 3.5X12

## (undated) DEVICE — GOWN POLY REINF BRTH SLV XL

## (undated) DEVICE — DRAPE HIP PCH 112X137X89IN

## (undated) DEVICE — BIT DRILL QCK-CONN 4.3MMX180MM